# Patient Record
Sex: MALE | Race: BLACK OR AFRICAN AMERICAN | NOT HISPANIC OR LATINO | Employment: OTHER | URBAN - NONMETROPOLITAN AREA
[De-identification: names, ages, dates, MRNs, and addresses within clinical notes are randomized per-mention and may not be internally consistent; named-entity substitution may affect disease eponyms.]

---

## 2017-08-21 RX ORDER — DEFERASIROX 250 MG/1
250 TABLET, FOR SUSPENSION ORAL
Qty: 30 TABLET | Refills: 0 | Status: SHIPPED | OUTPATIENT
Start: 2017-08-21 | End: 2017-10-10 | Stop reason: SDUPTHER

## 2017-08-21 RX ORDER — DEFERASIROX 500 MG/1
500 TABLET, FOR SUSPENSION ORAL
Qty: 30 TABLET | Refills: 0 | Status: SHIPPED | OUTPATIENT
Start: 2017-08-21 | End: 2017-10-10 | Stop reason: SDUPTHER

## 2017-08-21 NOTE — TELEPHONE ENCOUNTER
Notified patient that he needs follow-up appointment.  Will give 1 month supply.  Verbal order from Dr. Funez to give one month supply.

## 2017-10-09 DIAGNOSIS — D57.40 SICKLE CELL BETA THALASSEMIA (HCC): ICD-10-CM

## 2017-10-09 DIAGNOSIS — E83.19 IRON OVERLOAD: Primary | ICD-10-CM

## 2017-10-10 ENCOUNTER — OFFICE VISIT (OUTPATIENT)
Dept: ONCOLOGY | Facility: CLINIC | Age: 47
End: 2017-10-10

## 2017-10-10 ENCOUNTER — LAB (OUTPATIENT)
Dept: LAB | Facility: HOSPITAL | Age: 47
End: 2017-10-10

## 2017-10-10 ENCOUNTER — RESULTS ENCOUNTER (OUTPATIENT)
Dept: ONCOLOGY | Facility: CLINIC | Age: 47
End: 2017-10-10

## 2017-10-10 VITALS
TEMPERATURE: 97.4 F | HEART RATE: 67 BPM | WEIGHT: 189.3 LBS | HEIGHT: 70 IN | RESPIRATION RATE: 18 BRPM | SYSTOLIC BLOOD PRESSURE: 142 MMHG | OXYGEN SATURATION: 95 % | BODY MASS INDEX: 27.1 KG/M2 | DIASTOLIC BLOOD PRESSURE: 76 MMHG

## 2017-10-10 DIAGNOSIS — D57.40 SICKLE CELL BETA THALASSEMIA (HCC): ICD-10-CM

## 2017-10-10 DIAGNOSIS — E83.19 IRON OVERLOAD: ICD-10-CM

## 2017-10-10 DIAGNOSIS — D57.1 SICKLE CELL ANEMIA WITHOUT CRISIS (HCC): Primary | ICD-10-CM

## 2017-10-10 DIAGNOSIS — C79.51 METASTATIC RENAL CELL CARCINOMA TO BONE (HCC): ICD-10-CM

## 2017-10-10 DIAGNOSIS — C64.9 METASTATIC RENAL CELL CARCINOMA TO BONE (HCC): ICD-10-CM

## 2017-10-10 DIAGNOSIS — E83.111 IRON OVERLOAD DUE TO REPEATED RED BLOOD CELL TRANSFUSIONS: ICD-10-CM

## 2017-10-10 LAB
ALBUMIN SERPL-MCNC: 4.5 G/DL (ref 3.5–5)
ALBUMIN/GLOB SERPL: 1.2 G/DL (ref 1.1–2.5)
ALP SERPL-CCNC: 44 U/L (ref 24–120)
ALT SERPL W P-5'-P-CCNC: 24 U/L (ref 0–54)
ANION GAP SERPL CALCULATED.3IONS-SCNC: 12 MMOL/L (ref 4–13)
AST SERPL-CCNC: 26 U/L (ref 7–45)
BILIRUB SERPL-MCNC: 2.2 MG/DL (ref 0.1–1)
BUN BLD-MCNC: 9 MG/DL (ref 5–21)
BUN/CREAT SERPL: 15.8 (ref 7–25)
CALCIUM SPEC-SCNC: 9.7 MG/DL (ref 8.4–10.4)
CHLORIDE SERPL-SCNC: 103 MMOL/L (ref 98–110)
CO2 SERPL-SCNC: 30 MMOL/L (ref 24–31)
CREAT BLD-MCNC: 0.57 MG/DL (ref 0.5–1.4)
DEPRECATED RDW RBC AUTO: 58.7 FL (ref 40–54)
EOSINOPHIL # BLD MANUAL: 0.31 10*3/MM3 (ref 0–0.7)
EOSINOPHIL NFR BLD MANUAL: 4 % (ref 0–4)
ERYTHROCYTE [DISTWIDTH] IN BLOOD BY AUTOMATED COUNT: 22.2 % (ref 12–15)
FERRITIN SERPL-MCNC: 713 NG/ML (ref 17.9–464)
GFR SERPL CREATININE-BSD FRML MDRD: >150 ML/MIN/1.73
GLOBULIN UR ELPH-MCNC: 3.8 GM/DL
GLUCOSE BLD-MCNC: 85 MG/DL (ref 70–100)
HCT VFR BLD AUTO: 30.3 % (ref 40–52)
HGB BLD-MCNC: 10 G/DL (ref 14–18)
HOWELL-JOLLY BOD BLD QL SMEAR: SLIGHT
HYPOCHROMIA BLD QL: ABNORMAL
IRON 24H UR-MRATE: 120 MCG/DL (ref 42–180)
IRON SATN MFR SERPL: 49 % (ref 20–45)
LYMPHOCYTES # BLD MANUAL: 1.24 10*3/MM3 (ref 0.72–4.86)
LYMPHOCYTES NFR BLD MANUAL: 15 % (ref 4–12)
LYMPHOCYTES NFR BLD MANUAL: 16 % (ref 15–45)
MCH RBC QN AUTO: 24.4 PG (ref 28–32)
MCHC RBC AUTO-ENTMCNC: 33 G/DL (ref 33–36)
MCV RBC AUTO: 74.1 FL (ref 82–95)
MONOCYTES # BLD AUTO: 1.16 10*3/MM3 (ref 0.19–1.3)
NEUTROPHILS # BLD AUTO: 4.97 10*3/MM3 (ref 1.87–8.4)
NEUTROPHILS NFR BLD MANUAL: 64 % (ref 39–78)
PLAT MORPH BLD: NORMAL
PLATELET # BLD AUTO: 361 10*3/MM3 (ref 130–400)
PMV BLD AUTO: 9.8 FL (ref 6–12)
POTASSIUM BLD-SCNC: 3.9 MMOL/L (ref 3.5–5.3)
PROT SERPL-MCNC: 8.3 G/DL (ref 6.3–8.7)
RBC # BLD AUTO: 4.09 10*6/MM3 (ref 4.8–5.9)
SCAN SLIDE: NORMAL
SODIUM BLD-SCNC: 145 MMOL/L (ref 135–145)
TARGETS BLD QL SMEAR: ABNORMAL
TIBC SERPL-MCNC: 247 MCG/DL (ref 225–420)
VARIANT LYMPHS NFR BLD MANUAL: 1 % (ref 0–5)
WBC MORPH BLD: NORMAL
WBC NRBC COR # BLD: 7.76 10*3/MM3 (ref 4.8–10.8)

## 2017-10-10 PROCEDURE — 80053 COMPREHEN METABOLIC PANEL: CPT

## 2017-10-10 PROCEDURE — 83550 IRON BINDING TEST: CPT

## 2017-10-10 PROCEDURE — 83540 ASSAY OF IRON: CPT

## 2017-10-10 PROCEDURE — 85025 COMPLETE CBC W/AUTO DIFF WBC: CPT

## 2017-10-10 PROCEDURE — 99214 OFFICE O/P EST MOD 30 MIN: CPT | Performed by: INTERNAL MEDICINE

## 2017-10-10 PROCEDURE — 82728 ASSAY OF FERRITIN: CPT

## 2017-10-10 PROCEDURE — 85007 BL SMEAR W/DIFF WBC COUNT: CPT

## 2017-10-10 PROCEDURE — 36415 COLL VENOUS BLD VENIPUNCTURE: CPT

## 2017-10-10 RX ORDER — MORPHINE SULFATE 30 MG/1
30 TABLET ORAL
Status: ON HOLD | COMMUNITY
End: 2021-07-13

## 2017-10-10 RX ORDER — DEFERASIROX 250 MG/1
250 TABLET, FOR SUSPENSION ORAL
Qty: 90 TABLET | Refills: 1 | Status: SHIPPED | OUTPATIENT
Start: 2017-10-10 | End: 2018-04-24 | Stop reason: SDUPTHER

## 2017-10-10 RX ORDER — MORPHINE SULFATE 60 MG/1
120 TABLET, FILM COATED, EXTENDED RELEASE ORAL 3 TIMES DAILY
Status: ON HOLD | COMMUNITY
End: 2021-07-13

## 2017-10-10 RX ORDER — DEFERASIROX 500 MG/1
500 TABLET, FOR SUSPENSION ORAL
Qty: 90 TABLET | Refills: 1 | Status: SHIPPED | OUTPATIENT
Start: 2017-10-10 | End: 2018-04-24 | Stop reason: SDUPTHER

## 2017-10-10 RX ORDER — LACTULOSE 10 G/15ML
20 SOLUTION ORAL 2 TIMES DAILY PRN
COMMUNITY

## 2017-10-10 NOTE — PROGRESS NOTES
[facility]  HEMATOLOGY & ONCOLOGY    Cancer Staging Information:  No matching staging information was found for the patient.      Subjective     VISIT DIAGNOSIS:   Encounter Diagnoses   Name Primary?   • Sickle cell anemia without crisis Yes   • Iron overload due to repeated red blood cell transfusions    • Metastatic renal cell carcinoma to bone        REASON FOR VISIT:     Chief Complaint   Patient presents with   • Follow-up        HEMATOLOGY / ONCOLOGY HISTORY:    No history exists.           INTERVAL HISTORY  Patient ID: Holland Phelps is a 47 y.o. year old male history significant for sickle cell disease, metastatic renal cell carcinoma to the bone at the present time I do not have details of his treatment for his metastatic renal cell cancer.  Patient is followed in [place] will obtain records.  From what he tells me, he was on some sort of IV medication.  He recently got a bone marrow biopsy in [place].  He is to start a clinical trial over th ere.  The reason he is seeing me today is that he needs refill on his Exjade.  Patient has not had any recent crises.    Past Medical History:   Past Medical History:   Diagnosis Date   • Gallstones    • Pneumonia    • Sickle cell      Past Surgical History:   Past Surgical History:   Procedure Laterality Date   • CHOLECYSTECTOMY       Social History:   Social History     Social History   • Marital status:      Spouse name: N/A   • Number of children: N/A   • Years of education: N/A     Occupational History   • Not on file.     Social History Main Topics   • Smoking status: Former Smoker   • Smokeless tobacco: Not on file   • Alcohol use Yes   • Drug use: Not on file   • Sexual activity: Not on file     Other Topics Concern   • Not on file     Social History Narrative     Family History:   Family History   Problem Relation Age of Onset   • Leukemia Maternal Grandmother    • Kidney failure Mother    • Hypertension Mother    • Heart disease  "Mother    • Sickle cell trait Father    • Hypertension Father    • Sickle cell trait Daughter    • Sickle cell trait Cousin    • Anemia Cousin    • Leukemia Other        Review of Systems   Constitutional: Negative.    HENT: Negative.    Eyes: Negative.    Respiratory: Negative.    Cardiovascular: Negative.    Gastrointestinal: Negative.    Endocrine: Negative.    Musculoskeletal: Negative.    Skin: Negative.    Allergic/Immunologic: Negative.    Neurological: Negative.    Hematological: Negative.    Psychiatric/Behavioral: Negative.         Performance Status:  Asymptomatic    Medications:    Current Outpatient Prescriptions   Medication Sig Dispense Refill   • lactulose (CHRONULAC) 10 GM/15ML solution Take 20 g by mouth 2 (Two) Times a Day As Needed.     • Morphine (MS CONTIN) 60 MG 12 hr tablet Take 60 mg by mouth 3 (Three) Times a Day.     • Morphine (MSIR) 30 MG tablet Take 30 mg by mouth Every 4 (Four) Hours As Needed for Severe Pain .     • deferasirox (EXJADE) 250 MG disintegrating tablet Take 1 tablet by mouth Every Morning Before Breakfast. With 500 mg tablet for total dose of 750 mg daily 90 tablet 1   • deferasirox (EXJADE) 500 MG disintegrating tablet Take 1 tablet by mouth Every Morning Before Breakfast. With 250 mg tablet for total dose of 750 mg daily 90 tablet 1   • folic acid (FOLVITE) 1 MG tablet Take 1 mg by mouth Daily.     • HYDROcodone-acetaminophen (NORCO)  MG per tablet Take 1 tablet by mouth Every 8 (Eight) Hours As Needed (two tabs am and one po afternoon).       No current facility-administered medications for this visit.        ALLERGIES:  No Known Allergies    Objective      Vitals:    10/10/17 0756   BP: 142/76   Pulse: 67   Resp: 18   Temp: 97.4 °F (36.3 °C)   TempSrc: Tympanic   SpO2: 95%   Weight: 189 lb 4.8 oz (85.9 kg)   Height: 70\" (177.8 cm)         Current Status 10/10/2017   ECOG score 0         Physical Exam  General Appearance: Patient is awake, alert, oriented and in " no acute distress. Patient is welldeveloped, wellnourished, and appears stated age.  HEENT: Normocephalic. Sclerae clear, conjunctiva pink, extraocular movements intact, pupils, round, reactive to light and  accommodation. Mouth and throat are clear with moist oral mucosa.  NECK: Supple, no jugular venous distention, thyroid not enlarged.  LYMPH: No cervical, supraclavicular, axillary, or inguinal lymphadenopathy.  CHEST: Equal bilateral expansion, AP  diameter normal, resonant percussion note  LUNGS: Good air movement, no rales, rhonchi, rubs or wheezes with auscultation  CARDIO: Regular sinus rhythm, no murmurs, gallops or rubs.  ABDOMEN: Nondistended, soft, No tenderness, no guarding, no rebound, No hepatosplenomegaly. No abdominal masses. Bowel sounds positive. No hernia  GENITALIA: Not examined.  BREASTS: Not examined.  MUSKEL: No joint swelling, decreased motion, or inflammation  EXTREMS: No edema, clubbing, cyanosis, No varicose veins.  NEURO: Grossly nonfocal, Gait is coordinated and smooth, Cognition is preserved.  SKIN: No rashes, no ecchymoses, no petechia.  PSYCH: Oriented to time, place and person. Memory is preserved. Mood and affect appear normal  RECENT LABS:  Lab on 10/10/2017   Component Date Value Ref Range Status   • Glucose 10/10/2017 85  70 - 100 mg/dL Final   • BUN 10/10/2017 9  5 - 21 mg/dL Final   • Creatinine 10/10/2017 0.57  0.50 - 1.40 mg/dL Final   • Sodium 10/10/2017 145  135 - 145 mmol/L Final   • Potassium 10/10/2017 3.9  3.5 - 5.3 mmol/L Final   • Chloride 10/10/2017 103  98 - 110 mmol/L Final   • CO2 10/10/2017 30.0  24.0 - 31.0 mmol/L Final   • Calcium 10/10/2017 9.7  8.4 - 10.4 mg/dL Final   • Total Protein 10/10/2017 8.3  6.3 - 8.7 g/dL Final   • Albumin 10/10/2017 4.50  3.50 - 5.00 g/dL Final   • ALT (SGPT) 10/10/2017 24  0 - 54 U/L Final   • AST (SGOT) 10/10/2017 26  7 - 45 U/L Final   • Alkaline Phosphatase 10/10/2017 44  24 - 120 U/L Final   • Total Bilirubin 10/10/2017 2.2*  0.1 - 1.0 mg/dL Final   • eGFR   Amer 10/10/2017 >150  >60 mL/min/1.73 Final   • Globulin 10/10/2017 3.8  gm/dL Final   • A/G Ratio 10/10/2017 1.2  1.1 - 2.5 g/dL Final   • BUN/Creatinine Ratio 10/10/2017 15.8  7.0 - 25.0 Final   • Anion Gap 10/10/2017 12.0  4.0 - 13.0 mmol/L Final   • Iron 10/10/2017 120  42 - 180 mcg/dL Final   • TIBC 10/10/2017 247  225 - 420 mcg/dL Final   • Iron Saturation 10/10/2017 49* 20 - 45 % Final   • WBC 10/10/2017 7.76  4.80 - 10.80 10*3/mm3 Final   • RBC 10/10/2017 4.09* 4.80 - 5.90 10*6/mm3 Final   • Hemoglobin 10/10/2017 10.0* 14.0 - 18.0 g/dL Final   • Hematocrit 10/10/2017 30.3* 40.0 - 52.0 % Final   • MCV 10/10/2017 74.1* 82.0 - 95.0 fL Final   • MCH 10/10/2017 24.4* 28.0 - 32.0 pg Final   • MCHC 10/10/2017 33.0  33.0 - 36.0 g/dL Final   • RDW 10/10/2017 22.2* 12.0 - 15.0 % Final   • RDW-SD 10/10/2017 58.7* 40.0 - 54.0 fl Final   • MPV 10/10/2017 9.8  6.0 - 12.0 fL Final   • Platelets 10/10/2017 361  130 - 400 10*3/mm3 Final   • Ferritin 10/10/2017 713.00* 17.90 - 464.00 ng/mL Final   • Scan Slide 10/10/2017    Final    See Manual Differential Results   • Neutrophil % 10/10/2017 64.0  39.0 - 78.0 % Final   • Lymphocyte % 10/10/2017 16.0  15.0 - 45.0 % Final   • Monocyte % 10/10/2017 15.0* 4.0 - 12.0 % Final   • Eosinophil % 10/10/2017 4.0  0.0 - 4.0 % Final   • Atypical Lymphocyte % 10/10/2017 1.0  0.0 - 5.0 % Final   • Neutrophils Absolute 10/10/2017 4.97  1.87 - 8.40 10*3/mm3 Final   • Lymphocytes Absolute 10/10/2017 1.24  0.72 - 4.86 10*3/mm3 Final   • Monocytes Absolute 10/10/2017 1.16  0.19 - 1.30 10*3/mm3 Final   • Eosinophils Absolute 10/10/2017 0.31  0.00 - 0.70 10*3/mm3 Final   • [place] Bodies 10/10/2017 Slight  None Seen Final   • Hypochromia 10/10/2017 Mod/2+  None Seen Final   • Target Cells 10/10/2017 Mod/2+  None Seen Final   • WBC Morphology 10/10/2017 Normal  Normal Final   • Platelet Morphology 10/10/2017 Normal  Normal Final       RADIOLOGY:  No  results found.         Assessment/Plan  Gene Mattie is a 47 y.o. year old male with sickle cell disease on Exjade, also centrally diagnosed with metastatic renal cell cancer to the bone.     Patient Active Problem List   Diagnosis   • Renal cell carcinoma of right kidney metastatic to other site   • Iron overload   • Sickle cell beta thalassemia          1.Metastatic mass or cancer: Patient has been followed at [facility]. I do not have details of his therapy so far.  I will obtain records.  I did mention to him that we could give him treatments locally for cancer and he can continue to follow-up up with [facility] physician.    2.  Sickle cell anemia: Patient has had multiple transfusions due to iron overload.  I asked that is pending.  Exjade refilled.          Jose G Amezquita MD    10/10/2017    11:52 AM

## 2018-05-02 RX ORDER — DEFERASIROX 250 MG/1
TABLET, FOR SUSPENSION ORAL
Qty: 90 TABLET | Refills: 1 | Status: SHIPPED | OUTPATIENT
Start: 2018-05-02 | End: 2021-07-09

## 2018-05-02 RX ORDER — DEFERASIROX 500 MG/1
TABLET, FOR SUSPENSION ORAL
Qty: 90 TABLET | Refills: 1 | Status: SHIPPED | OUTPATIENT
Start: 2018-05-02 | End: 2021-07-09

## 2018-12-04 ENCOUNTER — TELEPHONE (OUTPATIENT)
Dept: ONCOLOGY | Facility: CLINIC | Age: 48
End: 2018-12-04

## 2018-12-04 NOTE — TELEPHONE ENCOUNTER
Called patient and left message that he needs to make a follow up appointment with Dr. Amezquita before his prescription for exjade can be filled since it has been over a year since he has seen the doctor.

## 2018-12-10 RX ORDER — DEFERASIROX 250 MG/1
TABLET, FOR SUSPENSION ORAL
Qty: 90 TABLET | Refills: 1 | OUTPATIENT
Start: 2018-12-10

## 2018-12-10 RX ORDER — DEFERASIROX 500 MG/1
TABLET, FOR SUSPENSION ORAL
Qty: 90 TABLET | Refills: 1 | OUTPATIENT
Start: 2018-12-10

## 2020-10-12 ENCOUNTER — TREATMENT (OUTPATIENT)
Dept: PHYSICAL THERAPY | Facility: CLINIC | Age: 50
End: 2020-10-12

## 2020-10-12 DIAGNOSIS — I89.0 LYMPHEDEMA OF LEFT LEG: Primary | ICD-10-CM

## 2020-10-12 DIAGNOSIS — C64.1 RENAL CELL CARCINOMA OF RIGHT KIDNEY (HCC): ICD-10-CM

## 2020-10-12 DIAGNOSIS — I89.0 LYMPHEDEMA OF GENITALIA: ICD-10-CM

## 2020-10-12 PROCEDURE — 97162 PT EVAL MOD COMPLEX 30 MIN: CPT | Performed by: PHYSICAL THERAPIST

## 2020-10-12 NOTE — PROGRESS NOTES
Physical Therapy Lymphedema Initial Evaluation       Patient Name: Holland Phelps  : 1970  MRN: [id number]  Today's Date: 10/12/2020      Visit Date: 10/12/2020    Visit Dx:    ICD-10-CM ICD-9-CM   1. Lymphedema of left leg  I89.0 457.1   2. Lymphedema of genitalia  I89.0 457.1   3. Renal cell carcinoma of right kidney (CMS/HCC)  C64.1 189.0       Patient Active Problem List   Diagnosis   • Renal cell carcinoma of right kidney metastatic to other site (CMS/HCC)   • Iron overload   • Sickle cell beta thalassemia (CMS/HCC)        Past Medical History:   Diagnosis Date   • Gallstones    • Pneumonia    • Sickle cell         Past Surgical History:   Procedure Laterality Date   • CHOLECYSTECTOMY         Visit Dx:    ICD-10-CM ICD-9-CM   1. Lymphedema of left leg  I89.0 457.1   2. Lymphedema of genitalia  I89.0 457.1   3. Renal cell carcinoma of right kidney (CMS/HCC)  C64.1 189.0       Patient History     Row Name 10/12/20 1400             History    Chief Complaint  Other 1 (comment) edema left groin, penis and left LE  -KR      Date Current Problem(s) Began  20  -KR      Brief Description of Current Complaint  Patient was diagnosed with kidney cancer 5 years ago finding 9 tumors in the right kidney, 2 in each femur and on each side of tailbone.  The right kidney was removed and 3 years ago he had a apolinar placed in the left femur, a apolinar was placed in what was left of the left femur proximally and distally along with a total knee revision without patella.  He relates that he had a scratch 5 -6 weeks ago with onset of cellulitis.  He underwent radiation treatments in the left groin.  He complains of pain in the left groin and inner thigh on a constant basis. He has had swelling in the left groin and left LE.   -KR      Previous treatment for THIS PROBLEM  Medication  -KR      Patient/Caregiver Goals  Relieve pain;Improve mobility;Improve strength;Know what to do to help the symptoms;Decrease swelling  -KR       Current Tobacco Use  No  -KR      Smoking Status  quit 2000  -KR      Patient's Rating of General Health  Good  -KR      Hand Dominance  right-handed  -KR      Occupation/sports/leisure activities  Disabled due to sickle cell anemia; details cars  -KR      Patient seeing anyone else for problem(s)?  No  -KR      Related/Recent Hospitalizations  Yes  -KR      Date of Hospitalization  09/19/20  -KR      Surgery/Hospitalization  cellulitis  -KR         Pain     Pain Location  Groin left medial thigh  -KR      Pain at Present  6  -KR      Pain at Best  6  -KR      Pain at Worst  8  -KR      Pain Frequency  Constant/continuous  -KR      Pain Description  Burning;Other (Comment);Pressure hot  -KR      What Performance Factors Make the Current Problem(s) WORSE?  sitting on hard surface or perineum  -KR      What Performance Factors Make the Current Problem(s) BETTER?  lying back in recliner; elevation of legs  -KR      Is your sleep disturbed?  Yes  -KR      Is medication used to assist with sleep?  No  -KR      Total hours of sleep per night  4-5   -KR      Difficulties with ADL's?  Yes, unable to put on socks or shoes that tie  -KR         Fall Risk Assessment    Any falls in the past year:  No  -KR         Services    Prior Rehab/Home Health Experiences  Yes  -KR      When was the prior experience with Rehab/Home Health  does not remember  -KR      Are you currently receiving Home Health services  No  -KR         Daily Activities    Primary Language  English  -KR      How does patient learn best?  Listening;Reading;Demonstration;Pictures/Video  -KR      Teaching needs identified  Home Exercise Program;Management of Condition  -KR      Barriers to learning  None  -KR      Pt Participated in POC and Goals  Yes  -KR         Safety    Are you being hurt, hit, or frightened by anyone at home or in your life?  No  -KR      Are you being neglected by a caregiver  No  -KR        User Key  (r) = Recorded By, (t) = Taken By, (c)  = Cosigned By    Initials Name Provider Type    Charlene Martinez, PT DPT Physical Therapist          Lymphedema     Row Name 10/12/20 1400             Subjective Pain    Able to rate subjective pain?  yes  -KR      Pre-Treatment Pain Level  6  -KR         Lymphedema Assessment    Lymphedema Classification  LLE:;Other:;stage 1 (Spontaneously Reversible);secondary  -KR      Lymph Nodes Removed #  0  -KR      Cancer Comments  Kidney cancer stable per patient's report  -KR      Chemo Received  yes  -KR      Adverse Chemo Reactions/Complication  No  -KR      Radiation Therapy Received  yes  -KR      Radiation Treatments #/Timeframe  unknown  -KR      Adverse Radiation Reactions/Complication  no  -KR      Infections or Cellulitis?  yes  -KR      Infection/Cellulitis Treatment  IV and oral anti-biotics  -KR         General ROM    LT Lower Ext  Lt Hip ABduction  -KR         Left Lower Ext    Lt Hip ABduction AROM  18  -KR      Lt Hip Extension AROM  5  -KR      Lt Hip Flexion AROM  30  -KR      LT Lower Extremity Comments  all motions pain limited  -KR         Lymphedema Edema Assessment    Ptting Edema Category  By grade out of 3  -KR      Pitting Edema  + 1/3 (1 of 3)  -KR      Stemmer Sign  negative  -KR         Skin Changes/Observations    Location/Assessment  Lower Extremity;Other Location Left; Left groin  -KR      Lower Extremity Conditions  left:;clean;dry;other (comment) medial thigh pink, warm; L groin fibrotic, exquisite tendern  -KR      Lower Extremity Color/Pigment  left:;fibrosis left medial thigh  -KR      Other Location Conditions  other (comment) proximal to head of penis  -KR      Other Location Color/Pigment  hypopigmented;other (comment) bulbous ring of edema  -KR         Lymphedema Measurements    Measurement Type(s)  Circumferential  -KR      Circumferential Areas  Lower extremities  -KR         BLE Circumferential (cm)    Measurement Location 1  BOT  -KR      Left 1  22.5 cm  -KR      Right 1  23 cm   -KR      Measurement Location 2  mid arch  -KR      Left 2  22.6 cm  -KR      Right 2  23.3 cm  -KR      Measurement Location 3  ankle  -KR      Left 3  27.8 cm  -KR      Right 3  26 cm  -KR      Measurement Location 4  10 cm  -KR      Left 4  24.6 cm  -KR      Right 4  22.7 cm  -KR      Measurement Location 5  10 cm  -KR      Left 5  34.2 cm  -KR      Right 5  31.2 cm  -KR      Measurement Location 6  10 cm  -KR      Left 6  35.6 cm  -KR      Right 6  35.2 cm  -KR      Measurement Location 7  10 cm  -KR      Left 7  36.5 cm  -KR      Right 7  36.2 cm  -KR      Measurement Location 8  10 cm  -KR      Left 8  39.1 cm  -KR      Right 8  40.7 cm  -KR      Measurement Location 9  10 cm  -KR      Left 9  49.2 cm  -KR      Right 9  45.8 cm  -KR      Measurement Location 10  10 cm  -KR      Left 10  58.5 cm  -KR      Right 10  54.8 cm  -KR      LLE Circumferential Total  350.6 cm  -KR      RLE Circumferential Total  338.9 cm  -KR        User Key  (r) = Recorded By, (t) = Taken By, (c) = Cosigned By    Initials Name Provider Type    Charlene Martinez, PT DPT Physical Therapist            PT Ortho     Row Name 10/12/20 1400       Gait/Stairs (Locomotion)    Comment (Gait/Stairs)  Ambulates independently without AD demonstrating quick swing through on right with left hip externally rotated, decreased knee flexion during swing phase of gait on left.   -KR      User Key  (r) = Recorded By, (t) = Taken By, (c) = Cosigned By    Initials Name Provider Type    Charlene Martinez, PT DPT Physical Therapist                    Therapy Education  Education Details: Lymphedema and risk factors  Given: Edema management  Program: New  How Provided: Verbal, Demonstration, Written  Provided to: Patient  Level of Understanding: Verbalized      OP Exercises     Row Name 10/12/20 1400             Subjective Pain    Able to rate subjective pain?  yes  -KR      Pre-Treatment Pain Level  6  -KR        User Key  (r) = Recorded By, (t) = Taken By, (c) =  Cosigned By    Initials Name Provider Type    Charlene Martinez, PT DPT Physical Therapist                      PT OP Goals     Row Name 10/12/20 1430          PT Short Term Goals    STG Date to Achieve  11/11/20  -KR     STG 1  Patient will gain an understanding of lymphedema and risk factors  -KR     STG 1 Progress  New  -KR     STG 2  Patient will have a decrease in circumference of left LE along with pain reduction  -KR     STG 2 Progress  New  -KR        Long Term Goals    LTG Date to Achieve  12/11/20  -KR     LTG 1  Patient will be independent in basic exercises to increase lymph flow  -KR     LTG 1 Progress  New  -KR     LTG 2  Patient will be independent in self - massage  -KR     LTG 2 Progress  New  -KR     LTG 3  Patient will be advised of proper compression garments and fit assessed  -KR     LTG 3 Progress  New  -KR     LTG 4  Patient will be given a trial of a compression pump  -KR     LTG 4 Progress  New  -KR     LTG 5  Patient will have a pain level of no >2 on a consistent basis  -KR     LTG 5 Progress  New  -KR     LTG 6  Patient will have a 90 - 100% reduction in edema of left groin and penis  -KR     LTG 6 Progress  New  -KR     LTG 7  Patient will gain independent managment of lymphedema  -KR     LTG 7 Progress  New  -KR        Time Calculation    PT Goal Re-Cert Due Date  11/11/20  -KR       User Key  (r) = Recorded By, (t) = Taken By, (c) = Cosigned By    Initials Name Provider Type    Charlene Martinez, PT DPT Physical Therapist          PT Assessment/Plan     Row Name 10/12/20 1430          PT Assessment    Functional Limitations  Impaired gait;Limitation in home management;Limitations in community activities;Limitations in functional capacity and performance;Performance in leisure activities;Performance in self-care ADL  -KR     Impairments  Gait;Impaired lymphatic circulation;Pain;Range of motion  -KR     Assessment Comments   presents with past medical history of sickle cell anemia  which he obtained disability from 10 years ago.  Five years ago he was diagnosed with renal cell carcinoma right kidney and 8 - 9 tumors were found in the kidney, thighs and bilateral tailbone.  Three years ago an intermedullary apolinar was placed in the right femur and what was left of the left femur along with a total knee arthroplasty on the right.  Approximately 4 - 5 weeks ago he noticed a scratch on his groin that became infected.  He was hospitalized in [place] to receive anti-biotics and was cleared to come home a couple of weeks ago.  His primary complaints are pain and swelling in the penis, left groin and LLE.  Circumferential measurements reveal a 10 cm difference from left to right LE, the left groin is fibrotic from radiation he underwent and exquisitely tender to touch, the penis is edematous inferior to the head of the penis circumferentially about a 1/4 of an inch into the shaft of the penis. His gait is antalgic with reduced ROM left hip preventing him from performing normal ADL's and self-care at times. He will benefit from Complete Decongestive Therapy and education to gain independent management of condition.  Thank you for the referral.   -KR     Please refer to paper survey for additional self-reported information  Yes  -KR     Rehab Potential  Good  -KR     Patient/caregiver participated in establishment of treatment plan and goals  Yes  -KR     Patient would benefit from skilled therapy intervention  Yes  -KR        PT Plan    PT Frequency  3x/week  -KR     Predicted Duration of Therapy Intervention (OT)  10 - 12 weeks  -KR     Planned CPT's?  PT EVAL MOD COMPLELITY: 01549;PT RE-EVAL: 90619;PT THER PROC EA 15 MIN: 58679;PT THER ACT EA 15 MIN: 48824;PT MANUAL THERAPY EA 15 MIN: 45754  -KR     PT Plan Comments  Will proceed with manual lymph drainage and compression at next session.   -KR       User Key  (r) = Recorded By, (t) = Taken By, (c) = Cosigned By    Initials Name Provider Type    IAN  Charlene Camarillo, PT DPT Physical Therapist                       Time Calculation:                     Charlene Camarillo, PT DPT  10/12/2020

## 2020-10-13 ENCOUNTER — TREATMENT (OUTPATIENT)
Dept: PHYSICAL THERAPY | Facility: CLINIC | Age: 50
End: 2020-10-13

## 2020-10-13 DIAGNOSIS — I89.0 LYMPHEDEMA OF GENITALIA: ICD-10-CM

## 2020-10-13 DIAGNOSIS — I89.0 LYMPHEDEMA OF LEFT LEG: Primary | ICD-10-CM

## 2020-10-13 DIAGNOSIS — C64.1 RENAL CELL CARCINOMA OF RIGHT KIDNEY (HCC): ICD-10-CM

## 2020-10-13 PROCEDURE — 97140 MANUAL THERAPY 1/> REGIONS: CPT | Performed by: PHYSICAL THERAPIST

## 2020-10-13 NOTE — PROGRESS NOTES
Outpatient Physical Therapy Lymphedema Treatment Note       Patient Name: Holland Phelps  : 1970  MRN: [id number]  Today's Date: 10/13/2020        Visit Date: 10/13/2020    Visit Dx:    ICD-10-CM ICD-9-CM   1. Lymphedema of left leg  I89.0 457.1   2. Lymphedema of genitalia  I89.0 457.1   3. Renal cell carcinoma of right kidney (CMS/HCC)  C64.1 189.0       Patient Active Problem List   Diagnosis   • Renal cell carcinoma of right kidney metastatic to other site (CMS/HCC)   • Iron overload   • Sickle cell beta thalassemia (CMS/HCC)        Lymphedema     Row Name 10/13/20 0915             Subjective Pain    Able to rate subjective pain?  yes  -AL      Pre-Treatment Pain Level  5  -AL      Post-Treatment Pain Level  5  -AL      Subjective Pain Comment  L groin into the inner thigh to the knee  -AL         Subjective Comments    Subjective Comments  He woke up last night with burning pain in the L groin into the L inner thigh stopping before the knee.  He worked on massaging the L leg and it helped.  He noticed he had a little drainage from a small open spot L groin.    -AL         Manual Lymphatic Drainage    Manual Lymphatic Drainage  initial sequence;opened regional lymph nodes;opened anastamoses;extremity treatment  -AL      Initial Sequence  short neck;abdomen;diaphragmatic breathing  -AL      Abdomen  superficial  -AL      Diaphragmatic Breathing  x 9 with superficial abdominals  -AL      Opened Regional Lymph Nodes  axillary;inguinal  -AL      Axillary  right;left  -AL      Inguinal  right;left  -AL      Opened Anastamoses  inguino-axillary  -AL      Inguino-Axillary  right;left  -AL      Extremity Treatment  MLD to full limb  -AL      MLD to Full Limb  L LE  -AL      Manual Lymphatic Drainage Comments  Extra time working on dense tissue L groin  -AL      Manual Therapy  Instructed on HEP and MLD.  He is interested in the [employer] and did sign the consent form for [employer].   -AL          Compression/Skin Care    Compression/Skin Care  compression garment  -AL      Compression Garment Comments  Wear compression shorts with 1/2 in gray foam over genitals and left groin  -AL        User Key  (r) = Recorded By, (t) = Taken By, (c) = Cosigned By    Initials Name Provider Type    Elida Pickering PTA, CLT-LANA Physical Therapy Assistant                        PT Assessment/Plan     Row Name 10/13/20 0915          PT Assessment    Assessment Comments  Patient had a good repsonse to treatment, as he felt less discomfort after the MLD.  He was instructed on the HEP and MLD and will start working on CDT.  Will measure the L LE next treatment and assess how the compression and the gray foam is working.  Will need to spend extra time on the dense tissue L groin.  He did feel like this had softened after the MLD.  He is interested in the Flexitouch pump.  Using the basic pump would be detrimental to patient due to the genital swelling and the fibrosis left proximal thigh.    -AL        PT Plan    Predicted Duration of Therapy Intervention (OT)  Continue with CDT.   -AL       User Key  (r) = Recorded By, (t) = Taken By, (c) = Cosigned By    Initials Name Provider Type    Elida Pickering PTA, CLT-LANA Physical Therapy Assistant             OP Exercises     Row Name 10/13/20 7197             Subjective Comments    Subjective Comments  He woke up last night with burning pain in the L groin into the L inner thigh stopping before the knee.  He worked on massaging the L leg and it helped.  He noticed he had a little drainage from a small open spot L groin.    -AL         Subjective Pain    Able to rate subjective pain?  yes  -AL      Pre-Treatment Pain Level  5  -AL      Post-Treatment Pain Level  5  -AL      Subjective Pain Comment  L groin into the inner thigh to the knee  -AL         Total Minutes    52648 - PT Manual Therapy Minutes  80  -AL        User Key  (r) = Recorded By, (t) = Taken By, (c) = Cosigned By     Initials Name Provider Type    Elida Pickering, PTA, CLT-LANNY Physical Therapy Assistant                     Manual Rx (last 36 hours)      Manual Treatments     Row Name 10/13/20 0915             Total Minutes    11568 - PT Manual Therapy Minutes  80  -AL        User Key  (r) = Recorded By, (t) = Taken By, (c) = Cosigned By    Initials Name Provider Type    AL Elida Sequeira, PTA, CLT-LANNY Physical Therapy Assistant          PT OP Goals     Row Name 10/13/20 0915          PT Short Term Goals    STG Date to Achieve  11/11/20  -AL     STG 1  Patient will gain an understanding of lymphedema and risk factors  -AL     STG 1 Progress  Ongoing  -AL     STG 1 Progress Comments  Educated during treatment  -AL     STG 2  Patient will have a decrease in circumference of left LE along with pain reduction  -AL     STG 2 Progress  New  -AL        Long Term Goals    LTG Date to Achieve  12/11/20  -AL     LTG 1  Patient will be independent in basic exercises to increase lymph flow  -AL     LTG 1 Progress  Ongoing  -AL     LTG 1 Progress Comments  Instructed on HEP  -AL     LTG 2  Patient will be independent in self - massage  -AL     LTG 2 Progress  Ongoing  -AL     LTG 2 Progress Comments  Instructed on MLD  -AL     LTG 3  Patient will be advised of proper compression garments and fit assessed  -AL     LTG 3 Progress  New  -AL     LTG 4  Patient will be given a trial of a compression pump  -AL     LTG 4 Progress  New  -AL     LTG 5  Patient will have a pain level of no >2 on a consistent basis  -AL     LTG 5 Progress  New  -AL     LTG 6  Patient will have a 90 - 100% reduction in edema of left groin and penis  -AL     LTG 6 Progress  New  -AL     LTG 7  Patient will gain independent managment of lymphedema  -AL     LTG 7 Progress  New  -AL        Time Calculation    PT Goal Re-Cert Due Date  11/11/20  -AL       User Key  (r) = Recorded By, (t) = Taken By, (c) = Cosigned By    Initials Name Provider Type    AL Elida Sequeira,  SAHIL PEDRAZA Physical Therapy Assistant          Therapy Education  Education Details: Work on HEP and MLD, wear compression shorts and foam  Given: Edema management  Program: New  How Provided: Verbal, Demonstration, Written  Provided to: Patient  Level of Understanding: Verbalized, Demonstrated              Time Calculation:                     Elida Sequeira PTA, CLT-LANA  10/13/2020

## 2020-10-20 ENCOUNTER — TREATMENT (OUTPATIENT)
Dept: PHYSICAL THERAPY | Facility: CLINIC | Age: 50
End: 2020-10-20

## 2020-10-20 DIAGNOSIS — I89.0 LYMPHEDEMA OF LEFT LEG: Primary | ICD-10-CM

## 2020-10-20 DIAGNOSIS — C64.1 RENAL CELL CARCINOMA OF RIGHT KIDNEY (HCC): ICD-10-CM

## 2020-10-20 DIAGNOSIS — I89.0 LYMPHEDEMA OF GENITALIA: ICD-10-CM

## 2020-10-20 PROCEDURE — 97140 MANUAL THERAPY 1/> REGIONS: CPT | Performed by: PHYSICAL THERAPIST

## 2020-10-20 NOTE — PROGRESS NOTES
Outpatient Physical Therapy Lymphedema Treatment Note       Patient Name: Holland Phelps  : 1970  MRN: [id number]  Today's Date: 10/20/2020        Visit Date: 10/20/2020    Visit Dx:    ICD-10-CM ICD-9-CM   1. Lymphedema of left leg  I89.0 457.1   2. Lymphedema of genitalia  I89.0 457.1   3. Renal cell carcinoma of right kidney (CMS/HCC)  C64.1 189.0       Patient Active Problem List   Diagnosis   • Renal cell carcinoma of right kidney metastatic to other site (CMS/HCC)   • Iron overload   • Sickle cell beta thalassemia (CMS/HCC)        Lymphedema     Row Name 10/20/20 1300             Subjective Pain    Able to rate subjective pain?  yes  -HR      Pre-Treatment Pain Level  6  -HR      Post-Treatment Pain Level  5  -HR      Subjective Pain Comment  L groin and down the L leg to knee  -HR         Subjective Comments    Subjective Comments  He doesn't think the compression shorts he got are going to work  -HR         Skin Changes/Observations    Lower Extremity Color/Pigment  left:;radiation fibrosis;fibrosis;fat necrosis  -HR      Skin Observations Comment  fat necrosis palpable as ridge along suprapubic region and into L groin  -HR         BLE Circumferential (cm)    Measurement Location 3  ankle  -HR      Left 3  24 cm  -HR      Measurement Location 4  +10  -HR      Measurement Location 5  +10  -HR      Left 5  31.5 cm  -HR      Measurement Location 6  +10  -HR      Measurement Location 7  +10  -HR      Left 7  36 cm  -HR      Measurement Location 8  +10  -HR      Left 8  39.9 cm  -HR      Measurement Location 9  +10  -HR      Left 9  45.6 cm  -HR      Measurement Location 10  +10  -HR      Left 10  53.4 cm  -HR      LLE Circumferential Total  230.4 cm  -HR         Manual Lymphatic Drainage    Manual Lymphatic Drainage  initial sequence;opened regional lymph nodes;opened anastamoses;extremity treatment  -HR      Initial Sequence  short neck;abdomen;diaphragmatic breathing  -HR      Abdomen  superficial  -HR       Opened Regional Lymph Nodes  axillary;inguinal  -HR      Axillary  right;left  -HR      Inguinal  right;left  -HR      Opened Anastamoses  inguino-axillary  -HR      Inguino-Axillary  right;left  -HR      Inguino-Axillary Comment  sore  -HR      Extremity Treatment  MLD to full limb  -HR      MLD to Full Limb  LLE post L thigh with knees on bolster  -HR      Manual Lymphatic Drainage Comments  extra time and caution to the L inguinal crease due to not wanting wound to open.   -HR      Manual Therapy  I had to start very gently and progress pressure with mutiple follow up moves along the inner thigh and groin. Also extra time spent at the suprapubic region  -HR         Compression/Skin Care    Compression/Skin Care Comments  Discussed options to try for compression as the underwear he got is not comfortable.   -HR        User Key  (r) = Recorded By, (t) = Taken By, (c) = Cosigned By    Initials Name Provider Type    HR Debbie Morales, PT, DPT, CLT-LANNY Physical Therapist                               OP Exercises     Row Name 10/20/20 1300             Subjective Comments    Subjective Comments  He doesn't think the compression shorts he got are going to work  -HR         Subjective Pain    Able to rate subjective pain?  yes  -HR      Pre-Treatment Pain Level  6  -HR      Post-Treatment Pain Level  5  -HR      Subjective Pain Comment  L groin and down the L leg to knee  -HR        User Key  (r) = Recorded By, (t) = Taken By, (c) = Cosigned By    Initials Name Provider Type    HR Debbie Morales, PT, DPT, CLT-LANNY Physical Therapist                      PT OP Goals     Row Name 10/20/20 1400          PT Short Term Goals    STG Date to Achieve  11/11/20  -HR     STG 1  Patient will gain an understanding of lymphedema and risk factors  -HR     STG 1 Progress  Ongoing  -HR     STG 2  Patient will have a decrease in circumference of left LE along with pain reduction  -HR     STG 2 Progress  Ongoing  -HR      STG 2 Progress Comments  measurements were down in multiple places today  -HR        Long Term Goals    LTG Date to Achieve  12/11/20  -HR     LTG 1  Patient will be independent in basic exercises to increase lymph flow  -HR     LTG 1 Progress  Ongoing  -HR     LTG 2  Patient will be independent in self - massage  -HR     LTG 2 Progress  Ongoing  -HR     LTG 3  Patient will be advised of proper compression garments and fit assessed  -HR     LTG 3 Progress  New  -HR     LTG 4  Patient will be given a trial of a compression pump  -HR     LTG 4 Progress  New  -HR     LTG 5  Patient will have a pain level of no >2 on a consistent basis  -HR     LTG 5 Progress  New  -HR     LTG 6  Patient will have a 90 - 100% reduction in edema of left groin and penis  -HR     LTG 6 Progress  New  -HR     LTG 7  Patient will gain independent managment of lymphedema  -HR     LTG 7 Progress  New  -HR        Time Calculation    PT Goal Re-Cert Due Date  11/11/20  -HR       User Key  (r) = Recorded By, (t) = Taken By, (c) = Cosigned By    Initials Name Provider Type    Debbie Brambila, PT, DPT, CLZIGGY-LANNY Physical Therapist          Therapy Education  Education Details: I explained that getting compression that fits well should actually decrease his pain quite a bit.   Given: Symptoms/condition management, Pain management  Program: Reinforced, Progressed  How Provided: Verbal  Provided to: Patient  Level of Understanding: Verbalized              Time Calculation:                     Debbie Morales, PT, DPT, SAHIL  10/20/2020

## 2020-10-23 ENCOUNTER — TREATMENT (OUTPATIENT)
Dept: PHYSICAL THERAPY | Facility: CLINIC | Age: 50
End: 2020-10-23

## 2020-10-23 DIAGNOSIS — I89.0 LYMPHEDEMA OF LEFT LEG: Primary | ICD-10-CM

## 2020-10-23 DIAGNOSIS — I89.0 LYMPHEDEMA OF GENITALIA: ICD-10-CM

## 2020-10-23 PROCEDURE — 97140 MANUAL THERAPY 1/> REGIONS: CPT | Performed by: PHYSICAL THERAPIST

## 2020-10-23 NOTE — PROGRESS NOTES
Outpatient Physical Therapy Lymphedema Treatment Note       Patient Name: Holland Phelps  : 1970  MRN: [id number]  Today's Date: 10/23/2020        Visit Date: 10/23/2020    Visit Dx:    ICD-10-CM ICD-9-CM   1. Lymphedema of left leg  I89.0 457.1   2. Lymphedema of genitalia  I89.0 457.1       Patient Active Problem List   Diagnosis   • Renal cell carcinoma of right kidney metastatic to other site (CMS/HCC)   • Iron overload   • Sickle cell beta thalassemia (CMS/HCC)        Lymphedema     Row Name 10/23/20 0800             Subjective Pain    Able to rate subjective pain?  yes  -AL      Pre-Treatment Pain Level  5  -AL      Post-Treatment Pain Level  4  -AL      Subjective Pain Comment  L groin and lower abdomen  -AL         Subjective Comments    Subjective Comments  He is wearing the compression shorts with gray foam.  He woke up this morning around 2:00 am. with pain in the L groin  He was able to do the MLD and take pain meds.    -AL         Manual Lymphatic Drainage    Manual Lymphatic Drainage  initial sequence;opened regional lymph nodes;opened anastamoses;extremity treatment  -AL      Initial Sequence  short neck;abdomen;diaphragmatic breathing  -AL      Abdomen  superficial  -AL      Diaphragmatic Breathing  x 9 with superficial abdominals  -AL      Opened Regional Lymph Nodes  axillary;inguinal  -AL      Axillary  right;left  -AL      Inguinal  right;left  -AL      Opened Anastamoses  inguino-axillary  -AL      Inguino-Axillary  right;left  -AL      Inguino-Axillary Comment  .  -AL      Extremity Treatment  MLD to full limb  -AL      MLD to Full Limb  L LE Also posterior L leg in R sidelying  -AL      Manual Lymphatic Drainage Comments  Spent extra time working on MLD to the L groin and suprapubic area.  Being cautious to area where wound was present in the past.  No draining of wound or open areas L groin today.    -AL      Manual Therapy  Instructed to work on MLD to the L groin.   -AL          Compression/Skin Care    Compression/Skin Care  --  -AL      Compression/Skin Care Comments  Continue to wear compression shorts and use foam as long as this does not cause discomfort.   -AL        User Key  (r) = Recorded By, (t) = Taken By, (c) = Cosigned By    Initials Name Provider Type    Elida Pickering PTA, SAHIL Physical Therapy Assistant                        PT Assessment/Plan     Row Name 10/23/20 0800          PT Assessment    Assessment Comments  Patient continues to have soreness in the L groin that improves with the MLD.  The dense tissue just distal to the L inguinal crease did soften and change shapes with the MLD.  He did have less pain and increase comfort L groin area after treatment.  Instructed to continue to work on CDT.   -AL        PT Plan    PT Plan Comments  Continue with CDT  -AL       User Key  (r) = Recorded By, (t) = Taken By, (c) = Cosigned By    Initials Name Provider Type    Elida Pickering PTA, SAHIL Physical Therapy Assistant             OP Exercises     Row Name 10/23/20 0800             Subjective Comments    Subjective Comments  He is wearing the compression shorts with gray foam.  He woke up this morning around 2:00 am. with pain in the L groin  He was able to do the MLD and take pain meds.    -AL         Subjective Pain    Able to rate subjective pain?  yes  -AL      Pre-Treatment Pain Level  5  -AL      Post-Treatment Pain Level  4  -AL      Subjective Pain Comment  L groin and lower abdomen  -AL         Total Minutes    68262 - PT Manual Therapy Minutes  75  -AL        User Key  (r) = Recorded By, (t) = Taken By, (c) = Cosigned By    Initials Name Provider Type    Elida Pickering PTA, YOSI-LANNY Physical Therapy Assistant                     Manual Rx (last 36 hours)      Manual Treatments     Row Name 10/23/20 0800             Total Minutes    10091 - PT Manual Therapy Minutes  75  -AL        User Key  (r) = Recorded By, (t) = Taken By, (c) = Cosigned  By    Initials Name Provider Type    Elida Pickering PTA, YOSI-LANNY Physical Therapy Assistant          PT OP Goals     Row Name 10/23/20 0800          PT Short Term Goals    STG Date to Achieve  11/11/20  -AL     STG 1  Patient will gain an understanding of lymphedema and risk factors  -AL     STG 1 Progress  Ongoing  -AL     STG 1 Progress Comments  Improving  -AL     STG 2  Patient will have a decrease in circumference of left LE along with pain reduction  -AL     STG 2 Progress  Ongoing  -AL        Long Term Goals    LTG Date to Achieve  12/11/20  -AL     LTG 1  Patient will be independent in basic exercises to increase lymph flow  -AL     LTG 1 Progress  Ongoing  -AL     LTG 2  Patient will be independent in self - massage  -AL     LTG 2 Progress  Ongoing  -AL     LTG 2 Progress Comments  He is working on the self MLD  -AL     LTG 3  Patient will be advised of proper compression garments and fit assessed  -AL     LTG 3 Progress  Ongoing  -AL     LTG 3 Progress Comments  He is wearing compression shorts  -AL     LTG 4  Patient will be given a trial of a compression pump  -AL     LTG 4 Progress  Ongoing  -AL     LTG 4 Progress Comments  Have sent information to [employer]  -AL     LTG 5  Patient will have a pain level of no >2 on a consistent basis  -AL     LTG 5 Progress  New  -AL     LTG 6  Patient will have a 90 - 100% reduction in edema of left groin and penis  -AL     LTG 6 Progress  New  -AL     LTG 7  Patient will gain independent managment of lymphedema  -AL     LTG 7 Progress  New  -AL        Time Calculation    PT Goal Re-Cert Due Date  11/11/20  -AL       User Key  (r) = Recorded By, (t) = Taken By, (c) = Cosigned By    Initials Name Provider Type    Elida Pickering PTA, RDT-LANNY Physical Therapy Assistant          Therapy Education  Education Details: Continue to work on MLD to the L groin and wear compression  Given: Symptoms/condition management, Pain management  Program: Reinforced,  Progressed  How Provided: Verbal  Provided to: Patient  Level of Understanding: Verbalized              Time Calculation:                     Eliad Sequeira PTA, [facility]-LANNY  10/23/2020

## 2020-10-26 ENCOUNTER — TREATMENT (OUTPATIENT)
Dept: PHYSICAL THERAPY | Facility: CLINIC | Age: 50
End: 2020-10-26

## 2020-10-26 DIAGNOSIS — I89.0 LYMPHEDEMA OF GENITALIA: ICD-10-CM

## 2020-10-26 DIAGNOSIS — I89.0 LYMPHEDEMA OF LEFT LEG: Primary | ICD-10-CM

## 2020-10-26 PROCEDURE — 97140 MANUAL THERAPY 1/> REGIONS: CPT | Performed by: PHYSICAL THERAPIST

## 2020-10-26 NOTE — PROGRESS NOTES
Outpatient Physical Therapy Lymphedema Treatment Note       Patient Name: Holland Phelps  : 1970  MRN: [id number]  Today's Date: 10/26/2020        Visit Date: 10/26/2020    Visit Dx:    ICD-10-CM ICD-9-CM   1. Lymphedema of left leg  I89.0 457.1   2. Lymphedema of genitalia  I89.0 457.1       Patient Active Problem List   Diagnosis   • Renal cell carcinoma of right kidney metastatic to other site (CMS/HCC)   • Iron overload   • Sickle cell beta thalassemia (CMS/HCC)        Lymphedema     Row Name 10/26/20 0800             Subjective Pain    Able to rate subjective pain?  yes  -AL      Pre-Treatment Pain Level  5  -AL      Post-Treatment Pain Level  4  -AL      Subjective Pain Comment  L groin  -AL         Subjective Comments    Subjective Comments  He worked on the MLD, he feels like the tissue is a little softer in the L groin area.  He feels like he has a little more swelling in the penis.  He does feel like the burning sensation in the L upper leg is not as frequent.  He has been wearing the compression and the gray foam.  After his last treatment he felt better the rest of the day.   -AL         Manual Lymphatic Drainage    Manual Lymphatic Drainage  initial sequence;opened regional lymph nodes;opened anastamoses;extremity treatment  -AL      Initial Sequence  short neck;abdomen;diaphragmatic breathing  -AL      Abdomen  superficial  -AL      Diaphragmatic Breathing  x 9 with superficial abdominals  -AL      Opened Regional Lymph Nodes  axillary;inguinal  -AL      Axillary  right;left  -AL      Inguinal  right;left  -AL      Opened Anastamoses  inguino-axillary  -AL      Inguino-Axillary  right;left  -AL      Inguino-Axillary Comment  Tenderness L groin with MLD  -AL      Extremity Treatment  MLD to full limb  -AL      MLD to Full Limb  L LE L groin and suprapubic area  -AL      Manual Lymphatic Drainage Comments  Spent extra time working on MLD to the L groin, suprapubic area  -AL         Compression/Skin  Care    Compression/Skin Care Comments  Wear compression daily  -AL        User Key  (r) = Recorded By, (t) = Taken By, (c) = Cosigned By    Initials Name Provider Type    Elida Pickering PTA, CLT-LANA Physical Therapy Assistant                        PT Assessment/Plan     Row Name 10/26/20 0800          PT Assessment    Assessment Comments  The dense tissue L groin area and inguinal crease is changing shapes, more so distal to the inguinal crease.  This area is les dense and is wider as the height is decreasing.  The left leg is still painful but overall the pain is slowlying decreasing and the sharp pains are less frequent.  Will measure the L LE next treatment.   -AL        PT Plan    PT Plan Comments  Continue with CDT.   -AL       User Key  (r) = Recorded By, (t) = Taken By, (c) = Cosigned By    Initials Name Provider Type    Elida Pickering PTA, CLT-LANA Physical Therapy Assistant             OP Exercises     Row Name 10/26/20 0800             Subjective Comments    Subjective Comments  He worked on the MLD, he feels like the tissue is a little softer in the L groin area.  He feels like he has a little more swelling in the penis.  He does feel like the burning sensation in the L upper leg is not as frequent.  He has been wearing the compression and the gray foam.  After his last treatment he felt better the rest of the day.   -AL         Subjective Pain    Able to rate subjective pain?  yes  -AL      Pre-Treatment Pain Level  5  -AL      Post-Treatment Pain Level  4  -AL      Subjective Pain Comment  L groin  -AL         Total Minutes    51909 - PT Manual Therapy Minutes  75  -AL        User Key  (r) = Recorded By, (t) = Taken By, (c) = Cosigned By    Initials Name Provider Type    Elida Pickering PTA, CLT-LANA Physical Therapy Assistant                     Manual Rx (last 36 hours)      Manual Treatments     Row Name 10/26/20 0800             Total Minutes    12716 - PT Manual Therapy Minutes  75   -AL        User Key  (r) = Recorded By, (t) = Taken By, (c) = Cosigned By    Initials Name Provider Type    Elida Pickering, PTA, CLT-LANNY Physical Therapy Assistant          PT OP Goals     Row Name 10/26/20 0800          PT Short Term Goals    STG Date to Achieve  11/11/20  -AL     STG 1  Patient will gain an understanding of lymphedema and risk factors  -AL     STG 1 Progress  Ongoing  -AL     STG 1 Progress Comments  Continue to educate  -AL     STG 2  Patient will have a decrease in circumference of left LE along with pain reduction  -AL     STG 2 Progress  Ongoing  -AL     STG 2 Progress Comments  Will measure next treatment  -AL        Long Term Goals    LTG Date to Achieve  12/11/20  -AL     LTG 1  Patient will be independent in basic exercises to increase lymph flow  -AL     LTG 1 Progress  Ongoing  -AL     LTG 2  Patient will be independent in self - massage  -AL     LTG 2 Progress  Ongoing  -AL     LTG 2 Progress Comments  He is working on the MLD  -AL     LTG 3  Patient will be advised of proper compression garments and fit assessed  -AL     LTG 3 Progress  Ongoing  -AL     LTG 4  Patient will be given a trial of a compression pump  -AL     LTG 4 Progress  Ongoing  -AL     LTG 5  Patient will have a pain level of no >2 on a consistent basis  -AL     LTG 5 Progress  New  -AL     LTG 6  Patient will have a 90 - 100% reduction in edema of left groin and penis  -AL     LTG 6 Progress  New  -AL     LTG 7  Patient will gain independent managment of lymphedema  -AL     LTG 7 Progress  New  -AL        Time Calculation    PT Goal Re-Cert Due Date  11/11/20  -AL       User Key  (r) = Recorded By, (t) = Taken By, (c) = Cosigned By    Initials Name Provider Type    Elida Pickering PTA, CLT-LANNY Physical Therapy Assistant          Therapy Education  Education Details: Wear compression daily as well as work on the MLD  Given: Symptoms/condition management, Pain management  Program: Reinforced  How Provided:  Verbal  Provided to: Patient  Level of Understanding: Verbalized              Time Calculation:                     Elida Sequeira PTA, [facility]-LANNY  10/26/2020

## 2020-10-28 ENCOUNTER — TREATMENT (OUTPATIENT)
Dept: PHYSICAL THERAPY | Facility: CLINIC | Age: 50
End: 2020-10-28

## 2020-10-28 DIAGNOSIS — I89.0 LYMPHEDEMA OF LEFT LEG: Primary | ICD-10-CM

## 2020-10-28 DIAGNOSIS — I89.0 LYMPHEDEMA OF GENITALIA: ICD-10-CM

## 2020-10-28 DIAGNOSIS — C64.1 RENAL CELL CARCINOMA OF RIGHT KIDNEY (HCC): ICD-10-CM

## 2020-10-28 PROCEDURE — 97530 THERAPEUTIC ACTIVITIES: CPT | Performed by: PHYSICAL THERAPIST

## 2020-10-28 PROCEDURE — 97535 SELF CARE MNGMENT TRAINING: CPT | Performed by: PHYSICAL THERAPIST

## 2020-10-28 PROCEDURE — 97140 MANUAL THERAPY 1/> REGIONS: CPT | Performed by: PHYSICAL THERAPIST

## 2020-10-28 NOTE — PROGRESS NOTES
Outpatient Physical Therapy Lymphedema Treatment Note       Patient Name: Holland Phelps  : 1970  MRN: [id number]  Today's Date: 2020        Visit Date: 10/28/2020    Visit Dx:    ICD-10-CM ICD-9-CM   1. Lymphedema of left leg  I89.0 457.1   2. Lymphedema of genitalia  I89.0 457.1   3. Renal cell carcinoma of right kidney (CMS/HCC)  C64.1 189.0       Patient Active Problem List   Diagnosis   • Renal cell carcinoma of right kidney metastatic to other site (CMS/HCC)   • Iron overload   • Sickle cell beta thalassemia (CMS/[facility])            10/28/20 0900   Subjective Pain   Able to rate subjective pain? yes   Pre-Treatment Pain Level 4   Subjective Pain Comment L inner thigh   Subjective Comments   Subjective Comments Pain today is mainly in the inner thigh. It goes about half way down to the knee.   Lymphedema Measurements Pre- Pump   Measurement Type(s) Circumferential   Circumferential Areas Lower extremities;Trunk   Lymphedema Measurements Comments Measurements taken Pre-Pump trial in 9:00 column. AFTER Basic Pump Trial in 10;00 column   BLE Circumferential (cm)   Measurement Location 3 ankle- pre-pump   Left 3 23 cm   Measurement Location 4 +10- pre-pump   Left 4 24.2 cm   Measurement Location 5 +10- pre-pump   Left 5 31 cm   Measurement Location 6 +10- Pre-pump   Left 6 36.5 cm   Measurement Location 7 +10- Pre-Pump   Left 7 35.6 cm   Measurement Location 8 +10- Pre pump   Left 8 38.7 cm   Measurement Location 9 +10- Pre pump   Left 9 45.9 cm   Measurement Location 10 groin- pre-pump   Left 10 51.8 cm   LLE Circumferential Total 286.7 cm                                      Trunk Circumferential (cm)   Measurement Location 1 umbilicus- pre-pump   Trunk 1 99 cm   Trunk Circumferential Total 99 cm   Manual Lymphatic Drainage   Manual Lymphatic Drainage opened regional lymph nodes;opened anastamoses;extremity treatment   Initial Sequence abdomen   Abdomen superficial   Opened Regional Lymph Nodes  axillary;inguinal   Axillary left   Inguinal left   Opened Anastamoses inguino-axillary   Inguino-Axillary left   Inguino-Axillary Comment stayed away from the small area that is still slightly open in the hip crease   Extremity Treatment MLD to full limb   MLD to Full Limb LLE- focused on suprapubic area and inner thigh   Manual Lymphatic Drainage Comments Did a Basic pump trial to LLE with Entre pump on medium setting for 15 minutes. Measurements were taken before and after.    Manual Therapy more MLD to the posterior thigh and inner thigh with knee on bolster.         Measurements AFTER Pump   10/28/20 1000   BLE Circumferential (cm)   Measurement Location 3 ankle- post- pump   Left 3 23.6 cm   Measurement Location 4 +10- post-pump   Left 4 24.2 cm   Measurement Location 5 +10- Post- pump   Left 5 31 cm   Measurement Location 6 +10- Post- pump   Left 6 36.6 cm   Measurement Location 7 +10- Post pump   Left 7 36 cm   Measurement Location 8 +10- Post pump   Left 8 38.8 cm   Measurement Location 9 +10- Post pump   Left 9 46 cm   Measurement Location 10 groin- post pump   Left 10 54 cm   LLE Circumferential Total 290.2 cm                                      Trunk Circumferential (cm)   Measurement Location 1 umbilicus- post pump   Trunk 1 99.6 cm   Trunk Circumferential Total 99.6 cm      10/28/20 0900   PT Short Term Goals   STG Date to Achieve 11/11/20   STG 1 Patient will gain an understanding of lymphedema and risk factors   STG 1 Progress Ongoing   STG 2 Patient will have a decrease in circumference of left LE along with pain reduction   STG 2 Progress Ongoing   Long Term Goals   LTG Date to Achieve 12/11/20   LTG 1 Patient will be independent in basic exercises to increase lymph flow   LTG 1 Progress Ongoing   LTG 2 Patient will be independent in self - massage   LTG 2 Progress Ongoing   LTG 2 Progress Comments working on this. Spends time while in his recliner on self massage.   LTG 3 Patient will be advised  of proper compression garments and fit assessed   LTG 3 Progress Ongoing   LTG 3 Progress Comments Consistently wearing compression briefs with added foam across the suprapubic and proximal hip.   LTG 4 Patient will be given a trial of a compression pump   LTG 4 Progress Ongoing   LTG 4 Progress Comments Trial of entre basic LE pump today on medium setting. He had an increase in overall LLE and biggest increase at proximal thigh/groin   LTG 5 Patient will have a pain level of no >2 on a consistent basis   LTG 5 Progress Ongoing   LTG 6 Patient will have a 90 - 100% reduction in edema of left groin and penis   LTG 6 Progress Ongoing   LTG 6 Progress Comments This has gotten better. He is able to lie on his stomach for a while to get some compression through that area. However, he feels like this makes the inner thigh tighter and more painful.    LTG 7 Patient will gain independent managment of lymphedema   LTG 7 Progress Ongoing   LTG 7 Progress Comments Working on this each visit.    Time Calculation   PT Goal Re-Cert Due Date 11/11/20         10/28/20 1000   PT Assessment   Assessment Comments  has gotten a better understanding of the treatment for lymphedema and is being compliant with his self massage and compression at home.  He is wearing compression shorts and capris interchangeably. The compression grade is 20-30 mmHg. He is very careful with the tiny spot that is still open in the L inguinal crease. He checks this daily and is keeping it clean and treating with neosporin as directed by his doctor. One of his primary issues when evaluated in clinic was the involvement of his genitals and suprapubic region from his lymphedema.  He has necrotic fat deposits in the suprapubic region that are quite tender. He has hyperkeratosis and some p'eau d'orange appearance to the tissues along the inguinal and pubic area as well from radiation. His LLE also has fibrotic changes and swelling, but in treating the leg  swelling, we must avoid increasing the groin and genital swelling as this is the most symptomatic and painful. He also has a non-healing portion of the inguinal incision line.  For this reason, a lymphedema pump that addresses only the leg will not only be inadequate, but will most definitely be CONTRAINDICATED in this case.  We have already seen that the fibrosis and lymphedema is chronic all the way across the hip and includes the entire suprapubic tissues. He needs an advanced pump such as the flexitouch in order to address the truncal tissues as well as to treat across the groin and hip crease prior to drainage of the LE. The basic pump trial was completed today for a short time since I didn't want to have a significant worsening of his symptoms and the measurements at the groin measurement went up over 2 cm. His abdominal measurement increased to a lesser extent. He needs continued work with MLD, exercise, compression and skin care along with continuing to follow a low salt, low sugar diet as we complete his treatments. For his home maintenance program, he will require an advanced lymphedema pump such as the Flexitouch.     PT Plan   PT Plan Comments Cont treatment with CDT to address lymphedema.                Therapy Education  Education Details: explained the differences between the pumps and the reason that we have to try the basic even though I don't believe this would be an appropriate choice.   Given: Symptoms/condition management, Pain management  Program: Reinforced  How Provided: Verbal  Provided to: Patient  Level of Understanding: Verbalized  13397 - PT Self Care/Mgmt Minutes: 10                                  Debbie Morales, PT, DPT, CLT-LANNY  10/28/2020

## 2020-10-30 ENCOUNTER — TREATMENT (OUTPATIENT)
Dept: PHYSICAL THERAPY | Facility: CLINIC | Age: 50
End: 2020-10-30

## 2020-10-30 DIAGNOSIS — I89.0 LYMPHEDEMA OF GENITALIA: ICD-10-CM

## 2020-10-30 DIAGNOSIS — I89.0 LYMPHEDEMA OF LEFT LEG: Primary | ICD-10-CM

## 2020-10-30 PROCEDURE — 97530 THERAPEUTIC ACTIVITIES: CPT | Performed by: PHYSICAL THERAPIST

## 2020-10-30 PROCEDURE — 97140 MANUAL THERAPY 1/> REGIONS: CPT | Performed by: PHYSICAL THERAPIST

## 2020-10-30 NOTE — PROGRESS NOTES
"Outpatient Physical Therapy Lymphedema Treatment Note       Patient Name: Holland Phelps  : 1970  MRN: [id number]  Today's Date: 10/30/2020        Visit Date: 10/30/2020    Visit Dx:    ICD-10-CM ICD-9-CM   1. Lymphedema of left leg  I89.0 457.1   2. Lymphedema of genitalia  I89.0 457.1       Patient Active Problem List   Diagnosis   • Renal cell carcinoma of right kidney metastatic to other site (CMS/HCC)   • Iron overload   • Sickle cell beta thalassemia (CMS/HCC)        Lymphedema     Row Name 10/30/20 0900 10/30/20 0800          Subjective Pain    Able to rate subjective pain?  --  yes  -HR     Pre-Treatment Pain Level  --  4  -HR        Subjective Comments    Subjective Comments  He could tolerate the new compression all day yesterday. He does feel like the hard \"shelf\" along suprapubic area is softer and smaller.   -HR  --        Manual Lymphatic Drainage    Manual Lymphatic Drainage  opened regional lymph nodes;opened anastamoses;extremity treatment  -HR  --     Initial Sequence  abdomen  -HR  --     Abdomen  superficial  -HR  --     Opened Regional Lymph Nodes  axillary;inguinal  -HR  --     Axillary  left  -HR  --     Inguinal  left  -HR  --     Opened Anastamoses  inguino-axillary  -HR  --     Inguino-Axillary  left  -HR  --     Extremity Treatment  MLD to full limb  -HR  --     MLD to Full Limb  LLE- extra time to the inner and posterior thigh today.   -HR  --     Manual Lymphatic Drainage Comments  the suprapubic tissues are soft and not nearly as swollen. The fibrosis and necrotic fat still present from the L lateral edge of the pubis across the anterior hip crease.   -HR  --     Manual Therapy  Sore along suprapubic tissues and hip crease. Sore inner thigh and across patella  -HR  --        Compression/Skin Care    Compression/Skin Care Comments  Keep wearing the layered compression garments during the day  -HR  --       User Key  (r) = Recorded By, (t) = Taken By, (c) = Cosigned By    Initials " "Name Provider Type    HR Debbie Morales, PT, DPT, CLT-LANNY Physical Therapist                        PT Assessment/Plan     Row Name 10/30/20 0900          PT Assessment    Assessment Comments  He is still having neuropathy from the hip down the medial thigh that bothers him often. The suprapubic tissues have reduced in swelling and have really softened across midline and on the R. He is wearing better compression now that covers entire legs and abdomen.   -HR        PT Plan    PT Plan Comments  Cont progressing treatment as able.   -HR       User Key  (r) = Recorded By, (t) = Taken By, (c) = Cosigned By    Initials Name Provider Type    HR Debbie Morales, PT, DPT, CLT-LANNY Physical Therapist             OP Exercises     Row Name 10/30/20 0900 10/30/20 0800          Subjective Comments    Subjective Comments  He could tolerate the new compression all day yesterday. He does feel like the hard \"shelf\" along suprapubic area is softer and smaller.   -HR  --        Subjective Pain    Able to rate subjective pain?  --  yes  -HR     Pre-Treatment Pain Level  --  4  -HR        Total Minutes    72477 - PT Therapeutic Activity Minutes  --  10  -HR     52718 - PT Manual Therapy Minutes  60  -HR  --       User Key  (r) = Recorded By, (t) = Taken By, (c) = Cosigned By    Initials Name Provider Type    HR Debbie Morales, PT, DPT, CLT-LANNY Physical Therapist                     Manual Rx (last 36 hours)      Manual Treatments     Row Name 10/30/20 0900             Total Minutes    19909 - PT Manual Therapy Minutes  60  -HR        User Key  (r) = Recorded By, (t) = Taken By, (c) = Cosigned By    Initials Name Provider Type    HR Debbie Morales, PT, DPT, CLT-LANNY Physical Therapist          PT OP Goals     Row Name 10/30/20 0900          PT Short Term Goals    STG Date to Achieve  11/11/20  -HR     STG 1  Patient will gain an understanding of lymphedema and risk factors  -HR     STG 1 Progress  " Ongoing;Progressing  -HR     STG 2  Patient will have a decrease in circumference of left LE along with pain reduction  -HR     STG 2 Progress  Ongoing  -HR        Long Term Goals    LTG Date to Achieve  12/11/20  -HR     LTG 1  Patient will be independent in basic exercises to increase lymph flow  -HR     LTG 1 Progress  Ongoing  -HR     LTG 2  Patient will be independent in self - massage  -HR     LTG 2 Progress  Ongoing  -HR     LTG 2 Progress Comments  working on this  -HR     LTG 3  Patient will be advised of proper compression garments and fit assessed  -HR     LTG 3 Progress  Ongoing  -HR     LTG 3 Progress Comments  He has compression leggings and shorts now that he is able to wear   -HR     LTG 4  Patient will be given a trial of a compression pump  -HR     LTG 4 Progress  Ongoing  -HR     LTG 4 Progress Comments  Had basic pump trial. Having trial of Flexitouch today  -HR     LTG 5  Patient will have a pain level of no >2 on a consistent basis  -HR     LTG 5 Progress  Ongoing  -HR     LTG 6  Patient will have a 90 - 100% reduction in edema of left groin and penis  -HR     LTG 6 Progress  Ongoing  -HR     LTG 7  Patient will gain independent managment of lymphedema  -HR     LTG 7 Progress  Ongoing  -HR        Time Calculation    PT Goal Re-Cert Due Date  11/11/20  -HR       User Key  (r) = Recorded By, (t) = Taken By, (c) = Cosigned By    Initials Name Provider Type    Debbie Brambila, PT, DPT, CLT-LANNY Physical Therapist          Therapy Education  Education Details: Discussed the benefits of compression when he is up and moving around. Reviewed need for muscle contractions and mobility. Reviewed transfers and not forcing the stretch to the leg.  Given: Symptoms/condition management, Pain management  Program: Reinforced  How Provided: Verbal  Provided to: Patient  Level of Understanding: Verbalized              Time Calculation:                     Debbie Morales, PT, DPT,  CLT-LANNY  10/30/2020

## 2020-11-02 ENCOUNTER — TREATMENT (OUTPATIENT)
Dept: PHYSICAL THERAPY | Facility: CLINIC | Age: 50
End: 2020-11-02

## 2020-11-02 DIAGNOSIS — I89.0 LYMPHEDEMA OF LEFT LEG: Primary | ICD-10-CM

## 2020-11-02 DIAGNOSIS — I89.0 LYMPHEDEMA OF GENITALIA: ICD-10-CM

## 2020-11-02 PROCEDURE — 97140 MANUAL THERAPY 1/> REGIONS: CPT | Performed by: PHYSICAL THERAPIST

## 2020-11-02 NOTE — PROGRESS NOTES
Outpatient Physical Therapy Lymphedema Treatment Note       Patient Name: Holland Phelps  : 1970  MRN: [id number]  Today's Date: 2020        Visit Date: 2020    Visit Dx:    ICD-10-CM ICD-9-CM   1. Lymphedema of left leg  I89.0 457.1   2. Lymphedema of genitalia  I89.0 457.1       Patient Active Problem List   Diagnosis   • Renal cell carcinoma of right kidney metastatic to other site (CMS/HCC)   • Iron overload   • Sickle cell beta thalassemia (CMS/HCC)        Lymphedema     Row Name 20 0800             Subjective Pain    Able to rate subjective pain?  yes  -AL      Pre-Treatment Pain Level  4  -AL      Post-Treatment Pain Level  3 3.510  -AL      Subjective Pain Comment  L  groin  -AL         Subjective Comments    Subjective Comments  He states he has had more swelling in the L leg over the weekend.  He states the drive here made the groin area harder, this area has been softer.  He had pain at 2:00 am because of L inner thigh.  He feels like the treatments are helping.   -AL         Manual Lymphatic Drainage    Manual Lymphatic Drainage  initial sequence;opened regional lymph nodes;opened anastamoses;extremity treatment  -AL      Initial Sequence  short neck;abdomen;diaphragmatic breathing  -AL      Abdomen  superficial  -AL      Opened Regional Lymph Nodes  axillary;inguinal  -AL      Axillary  right;left  -AL      Inguinal  right;left  -AL      Opened Anastamoses  inguino-axillary  -AL      Inguino-Axillary  right;left  -AL      Extremity Treatment  MLD to full limb  -AL      MLD to Full Limb  LLE- focused on suprapubic area and inner thigh  -AL      Manual Therapy  Will look at different typed of chip bags for the L groin next treatment   -AL         Compression/Skin Care    Compression/Skin Care Comments  Wear compression   -AL        User Key  (r) = Recorded By, (t) = Taken By, (c) = Cosigned By    Initials Name Provider Type    AL Elida Sequeira PTA, YOSI-LANNY Physical Therapy  Assistant                        PT Assessment/Plan     Row Name 11/02/20 0800          PT Assessment    Assessment Comments  Patient has more visible swelling in the L LE today.  The borders of the dense tissue along the inguinal crease are starting to soften.  He also has softening of the dense tissue L inner thigh with the MLD.  Will take measurements of the L LE at the end of this week.   -AL        PT Plan    PT Plan Comments  Continue with CDT  -AL       User Key  (r) = Recorded By, (t) = Taken By, (c) = Cosigned By    Initials Name Provider Type    Elida Pickering PTA, CLT-LANA Physical Therapy Assistant             OP Exercises     Row Name 11/02/20 0800             Subjective Comments    Subjective Comments  He states he has had more swelling in the L leg over the weekend.  He states the drive here made the groin area harder, this area has been softer.  He had pain at 2:00 am because of L inner thigh.  He feels like the treatments are helping.   -AL         Subjective Pain    Able to rate subjective pain?  yes  -AL      Pre-Treatment Pain Level  4  -AL      Post-Treatment Pain Level  3 3.5/10  -AL      Subjective Pain Comment  L  groin  -AL         Total Minutes    75838 - PT Manual Therapy Minutes  75  -AL        User Key  (r) = Recorded By, (t) = Taken By, (c) = Cosigned By    Initials Name Provider Type    Elida Pickering PTA, CLT-LANA Physical Therapy Assistant                     Manual Rx (last 36 hours)      Manual Treatments     Row Name 11/02/20 0800             Total Minutes    42052 - PT Manual Therapy Minutes  75  -AL        User Key  (r) = Recorded By, (t) = Taken By, (c) = Cosigned By    Initials Name Provider Type    Elida Pickering PTA, CLT-ALNNY Physical Therapy Assistant          PT OP Goals     Row Name 11/02/20 0800          PT Short Term Goals    STG Date to Achieve  11/11/20  -AL     STG 1  Patient will gain an understanding of lymphedema and risk factors  -AL     STG 1  Progress  Ongoing;Progressing  -AL     STG 1 Progress Comments  Improving  -AL     STG 2  Patient will have a decrease in circumference of left LE along with pain reduction  -AL     STG 2 Progress  Ongoing  -AL     STG 2 Progress Comments  Will measure at the end of the week   -AL        Long Term Goals    LTG Date to Achieve  12/11/20  -AL     LTG 1  Patient will be independent in basic exercises to increase lymph flow  -AL     LTG 1 Progress  Ongoing  -AL     LTG 2  Patient will be independent in self - massage  -AL     LTG 2 Progress  Ongoing  -AL     LTG 2 Progress Comments  He is working on the MLD  -AL     LTG 3  Patient will be advised of proper compression garments and fit assessed  -AL     LTG 3 Progress  Ongoing  -AL     LTG 4  Patient will be given a trial of a compression pump  -AL     LTG 4 Progress  Ongoing  -AL     LTG 5  Patient will have a pain level of no >2 on a consistent basis  -AL     LTG 5 Progress  Ongoing  -AL     LTG 6  Patient will have a 90 - 100% reduction in edema of left groin and penis  -AL     LTG 6 Progress  Ongoing  -AL     LTG 7  Patient will gain independent managment of lymphedema  -AL     LTG 7 Progress  Ongoing  -AL        Time Calculation    PT Goal Re-Cert Due Date  11/11/20  -AL       User Key  (r) = Recorded By, (t) = Taken By, (c) = Cosigned By    Initials Name Provider Type    Elida Pickering PTA, CLT-LANA Physical Therapy Assistant          Therapy Education  Education Details: Continue to work on CDT  Given: Edema management  Program: Reinforced  How Provided: Verbal  Provided to: Patient  Level of Understanding: Verbalized              Time Calculation:                     Elida Sequeira PTA, CLT-LANA  11/2/2020

## 2020-11-04 ENCOUNTER — TREATMENT (OUTPATIENT)
Dept: PHYSICAL THERAPY | Facility: CLINIC | Age: 50
End: 2020-11-04

## 2020-11-04 DIAGNOSIS — I89.0 LYMPHEDEMA OF GENITALIA: ICD-10-CM

## 2020-11-04 DIAGNOSIS — I89.0 LYMPHEDEMA OF LEFT LEG: Primary | ICD-10-CM

## 2020-11-04 DIAGNOSIS — C64.1 RENAL CELL CARCINOMA OF RIGHT KIDNEY (HCC): ICD-10-CM

## 2020-11-04 PROCEDURE — 97140 MANUAL THERAPY 1/> REGIONS: CPT | Performed by: PHYSICAL THERAPIST

## 2020-11-04 NOTE — PROGRESS NOTES
Outpatient Physical Therapy Lymphedema Treatment Note       Patient Name: Holland Phelps  : 1970  MRN: [id number]  Today's Date: 2020        Visit Date: 2020    Visit Dx:    ICD-10-CM ICD-9-CM   1. Lymphedema of left leg  I89.0 457.1   2. Lymphedema of genitalia  I89.0 457.1   3. Renal cell carcinoma of right kidney (CMS/HCC)  C64.1 189.0       Patient Active Problem List   Diagnosis   • Renal cell carcinoma of right kidney metastatic to other site (CMS/HCC)   • Iron overload   • Sickle cell beta thalassemia (CMS/HCC)        Lymphedema     Row Name 20 0915             Subjective Pain    Able to rate subjective pain?  yes  -AL      Pre-Treatment Pain Level  -- 3.5/10  -AL      Post-Treatment Pain Level  3  -AL      Subjective Pain Comment  L groin  -AL         Subjective Comments    Subjective Comments  He states he is better today and his leg hasn't felt this good in a long time.  The L leg is not as swollen and the L thigh pain in not as intense.  The hard areas are still present in the L groin, but getting softer.  He has been wearing the compression pants.  Patient states he is eating a low salt high protein diet.  He is elevating the LE's.    -AL         Manual Lymphatic Drainage    Manual Lymphatic Drainage  initial sequence;opened regional lymph nodes;opened anastamoses;extremity treatment  -AL      Initial Sequence  short neck;abdomen;diaphragmatic breathing  -AL      Abdomen  superficial  -AL      Opened Regional Lymph Nodes  axillary;inguinal  -AL      Axillary  right;left  -AL      Inguinal  right;left  -AL      Opened Anastamoses  inguino-axillary  -AL      Inguino-Axillary  right;left  -AL      Extremity Treatment  MLD to full limb  -AL      MLD to Full Limb  MLD to the L medial thigh and L inguinal crease where hyperkeratosis is present.   -AL      Manual Lymphatic Drainage Comments  Instructed patient on how to use a straight cane, he will purchase one today.  -AL      Manual  Therapy  Mad a chip bag using foam, the gray foam and the more dense yellow foam.  Also made a pit pack using cherry pits.  He is to alternate these in the L groin area to help soften the tissue.   -AL         Compression/Skin Care    Compression/Skin Care Comments  Wear compression shorts or compression pants.   -AL        User Key  (r) = Recorded By, (t) = Taken By, (c) = Cosigned By    Initials Name Provider Type    Elida Pickering PTA, CLT-LANA Physical Therapy Assistant                        PT Assessment/Plan     Row Name 11/04/20 0988          PT Assessment    Assessment Comments  The tissue L inguinal crease continues to change shapes and soften.  He was able to sleep better with less pain last night.  He is compliant with CDT as well as elevating the legs and eating a low salt high protein diiet.  Patient was instructed on how to use a straight cane.  He was able to stand taller and have a more normal gait.  He does feel pulling in the L groin when standing tall, but his hip flexors are tight.    -AL        PT Plan    PT Plan Comments  Continue with CDT.   -AL       User Key  (r) = Recorded By, (t) = Taken By, (c) = Cosigned By    Initials Name Provider Type    Elida Pickering PTA, CLT-LANA Physical Therapy Assistant             OP Exercises     Row Name 11/04/20 7072             Subjective Comments    Subjective Comments  He states he is better today and his leg hasn't felt this good in a long time.  The L leg is not as swollen and the L thigh pain in not as intense.  The hard areas are still present in the L groin, but getting softer.  He has been wearing the compression pants.  Patient states he is eating a low salt high protein diet.  He is elevating the LE's.    -AL         Subjective Pain    Able to rate subjective pain?  yes  -AL      Pre-Treatment Pain Level  -- 3.5/10  -AL      Post-Treatment Pain Level  3  -AL      Subjective Pain Comment  L groin  -AL         Total Minutes    86411 - PT  Manual Therapy Minutes  75  -AL        User Key  (r) = Recorded By, (t) = Taken By, (c) = Cosigned By    Initials Name Provider Type    DEMARCUS SequeiraElida, PTA, YOSI-LANNY Physical Therapy Assistant                     Manual Rx (last 36 hours)      Manual Treatments     Row Name 11/04/20 0915             Total Minutes    33325 - PT Manual Therapy Minutes  75  -AL        User Key  (r) = Recorded By, (t) = Taken By, (c) = Cosigned By    Initials Name Provider Type    DEMARCUS Sequeira Elida DAVISON, KEILA, YOSI-LANNY Physical Therapy Assistant          PT OP Goals     Row Name 11/04/20 0915          PT Short Term Goals    STG Date to Achieve  11/11/20  -AL     STG 1  Patient will gain an understanding of lymphedema and risk factors  -AL     STG 1 Progress  Ongoing;Progressing  -AL     STG 1 Progress Comments  Continues to improve  -AL     STG 2  Patient will have a decrease in circumference of left LE along with pain reduction  -AL     STG 2 Progress  Ongoing  -AL     STG 2 Progress Comments  Will measure the LE next treatment  -AL        Long Term Goals    LTG Date to Achieve  12/11/20  -AL     LTG 1  Patient will be independent in basic exercises to increase lymph flow  -AL     LTG 1 Progress  Ongoing  -AL     LTG 1 Progress Comments  Patient is compliant with his exercise program  -AL     LTG 2  Patient will be independent in self - massage  -AL     LTG 2 Progress  Ongoing  -AL     LTG 2 Progress Comments  Patient is compliant with the self massage (MLD)  -AL     LTG 3  Patient will be advised of proper compression garments and fit assessed  -AL     LTG 3 Progress  Ongoing  -AL     LTG 3 Progress Comments  Patient wears compression pants or compression shorts.  -AL     LTG 4  Patient will be given a trial of a compression pump  -AL     LTG 4 Progress  Ongoing  -AL     LTG 4 Progress Comments  Patient has had the pump trial  -AL     LTG 5  Patient will have a pain level of no >2 on a consistent basis  -AL     LTG 5 Progress  Ongoing   -AL     LTG 5 Progress Comments  Pain 3.5/10 before treatment.  -AL     LTG 6  Patient will have a 90 - 100% reduction in edema of left groin and penis  -AL     LTG 6 Progress  Ongoing  -AL     LTG 6 Progress Comments  Improving  -AL     LTG 7  Patient will gain independent managment of lymphedema  -AL     LTG 7 Progress  Ongoing  -AL     LTG 7 Progress Comments  Working towards home management program  -AL        Time Calculation    PT Goal Re-Cert Due Date  11/11/20  -AL       User Key  (r) = Recorded By, (t) = Taken By, (c) = Cosigned By    Initials Name Provider Type    Elida Pickering PTA, CLT-LANA Physical Therapy Assistant          Therapy Education  Education Details: Work on CDT.  Use straight cane when ambulating.   Given: Edema management  Program: Reinforced  How Provided: Verbal  Provided to: Patient  Level of Understanding: Verbalized              Time Calculation:                     Elida Sequeira PTA, CLT-LANA  11/4/2020

## 2020-11-06 ENCOUNTER — TREATMENT (OUTPATIENT)
Dept: PHYSICAL THERAPY | Facility: CLINIC | Age: 50
End: 2020-11-06

## 2020-11-06 DIAGNOSIS — I89.0 LYMPHEDEMA OF LEFT LEG: ICD-10-CM

## 2020-11-06 DIAGNOSIS — I89.0 LYMPHEDEMA OF GENITALIA: Primary | ICD-10-CM

## 2020-11-06 PROCEDURE — 97535 SELF CARE MNGMENT TRAINING: CPT | Performed by: PHYSICAL THERAPIST

## 2020-11-06 PROCEDURE — 97140 MANUAL THERAPY 1/> REGIONS: CPT | Performed by: PHYSICAL THERAPIST

## 2020-11-06 NOTE — PROGRESS NOTES
Outpatient Physical Therapy Lymphedema Treatment Note       Patient Name: Holland Phleps  : 1970  MRN: [id number]  Today's Date: 2020        Visit Date: 2020    Visit Dx:    ICD-10-CM ICD-9-CM   1. Lymphedema of genitalia  I89.0 457.1   2. Lymphedema of left leg  I89.0 457.1       Patient Active Problem List   Diagnosis   • Renal cell carcinoma of right kidney metastatic to other site (CMS/HCC)   • Iron overload   • Sickle cell beta thalassemia (CMS/HCC)        Lymphedema     Row Name 20 08             Subjective Pain    Able to rate subjective pain?  yes  -HR      Pre-Treatment Pain Level  4  -HR      Post-Treatment Pain Level  3  -HR         Subjective Comments    Subjective Comments  He is complaining more of his lower leg and foot burning and being more swollen than they were.   -HR         Manual Lymphatic Drainage    Manual Lymphatic Drainage  initial sequence;opened regional lymph nodes;opened anastamoses;extremity treatment  -HR      Initial Sequence  short neck;abdomen;diaphragmatic breathing  -HR      Abdomen  superficial  -HR      Opened Regional Lymph Nodes  axillary;inguinal  -HR      Axillary  right;left  -HR      Inguinal  right;left  -HR      Opened Anastamoses  inguino-axillary  -HR      Inguino-Axillary  right;left  -HR      Extremity Treatment  MLD to full limb  -HR      MLD to Full Limb  extra time spent to the inguinal crease. Also spent more time down distal leg and foot. Foot with pitting edema today.  -HR         Compression/Skin Care    Compression/Skin Care Comments  He brought his compression leggings. Also fitted him with 20-30 mmHg compression sock to the L as the distal lymphedema is starting to increase.   -HR        User Key  (r) = Recorded By, (t) = Taken By, (c) = Cosigned By    Initials Name Provider Type    HR Debbie Morales, PT, DPT, CLT-LANNY Physical Therapist                        PT Assessment/Plan     Row Name 20 08          PT  Assessment    Assessment Comments  His L foot is visibly more swollen today. This started 2 nights ago per patient. He still has the majority of discomfort and tightness in the groin and upper thigh, but he is having burning pain with the swelling in the foot and lower leg today. Added compression to the foot and lower leg in addition to the leggings that he has been wearing.   -HR        PT Plan    PT Plan Comments  Continue with CDT. Adjustments as indicated.   -HR       User Key  (r) = Recorded By, (t) = Taken By, (c) = Cosigned By    Initials Name Provider Type    HR Debbie Morales, PT, DPT, CLT-LANNY Physical Therapist             OP Exercises     Row Name 11/06/20 1500 11/06/20 0800          Subjective Comments    Subjective Comments  --  He is complaining more of his lower leg and foot burning and being more swollen than they were.   -HR        Subjective Pain    Able to rate subjective pain?  --  yes  -HR     Pre-Treatment Pain Level  --  4  -HR     Post-Treatment Pain Level  --  3  -HR        Total Minutes    01327 - PT Manual Therapy Minutes  60  -HR  --       User Key  (r) = Recorded By, (t) = Taken By, (c) = Cosigned By    Initials Name Provider Type    HR Debbie Morales, PT, DPT, CLT-LANNY Physical Therapist                     Manual Rx (last 36 hours)      Manual Treatments     Row Name 11/06/20 1500             Total Minutes    19139 - PT Manual Therapy Minutes  60  -HR        User Key  (r) = Recorded By, (t) = Taken By, (c) = Cosigned By    Initials Name Provider Type    HR Debbie Morales, PT, DPT, CLT-LANNY Physical Therapist          PT OP Goals     Row Name 11/06/20 0800          PT Short Term Goals    STG 1  Patient will gain an understanding of lymphedema and risk factors  -HR     STG 1 Progress  Ongoing;Progressing  -HR     STG 2  Patient will have a decrease in circumference of left LE along with pain reduction  -HR     STG 2 Progress  Ongoing  -HR        Long Term Goals     LTG Date to Achieve  12/11/20  -HR     LTG 1  Patient will be independent in basic exercises to increase lymph flow  -HR     LTG 1 Progress  Ongoing  -HR     LTG 2  Patient will be independent in self - massage  -HR     LTG 2 Progress  Ongoing  -HR     LTG 3  Patient will be advised of proper compression garments and fit assessed  -HR     LTG 3 Progress  Ongoing  -HR     LTG 3 Progress Comments  wearing compression leggings and a sock on L. 20-30 mmHg  -HR     LTG 4  Patient will be given a trial of a compression pump  -HR     LTG 4 Progress  Ongoing  -HR     LTG 5  Patient will have a pain level of no >2 on a consistent basis  -HR     LTG 5 Progress  Ongoing  -HR     LTG 6  Patient will have a 90 - 100% reduction in edema of left groin and penis  -HR     LTG 6 Progress  Ongoing  -HR     LTG 7  Patient will gain independent managment of lymphedema  -HR     LTG 7 Progress  Ongoing  -HR        Time Calculation    PT Goal Re-Cert Due Date  11/11/20  -HR       User Key  (r) = Recorded By, (t) = Taken By, (c) = Cosigned By    Initials Name Provider Type    Debbie Brambila, PT, DPT, CLT-LANNY Physical Therapist          Therapy Education  Education Details: Discussed the use of soft material placed in the L inguinal crease to keep the crease more open and to adbsorb moisture as well. He has already been doing similar thing on his own. Try the compression sock to address the distal lymphedema and burning  Given: Edema management  Program: Reinforced  How Provided: Verbal  Provided to: Patient  Level of Understanding: Verbalized  55230 - PT Self Care/Mgmt Minutes: 15              Time Calculation:                     Debbie Morales, PT, DPT, CLT-LANNY  11/6/2020

## 2020-11-09 ENCOUNTER — TREATMENT (OUTPATIENT)
Dept: PHYSICAL THERAPY | Facility: CLINIC | Age: 50
End: 2020-11-09

## 2020-11-09 DIAGNOSIS — I89.0 LYMPHEDEMA OF LEFT LEG: ICD-10-CM

## 2020-11-09 DIAGNOSIS — I89.0 LYMPHEDEMA OF GENITALIA: Primary | ICD-10-CM

## 2020-11-09 DIAGNOSIS — C64.1 RENAL CELL CARCINOMA OF RIGHT KIDNEY (HCC): ICD-10-CM

## 2020-11-09 PROCEDURE — 97140 MANUAL THERAPY 1/> REGIONS: CPT | Performed by: PHYSICAL THERAPIST

## 2020-11-09 PROCEDURE — 97530 THERAPEUTIC ACTIVITIES: CPT | Performed by: PHYSICAL THERAPIST

## 2020-11-09 NOTE — PROGRESS NOTES
Outpatient Physical Therapy Lymphedema Treatment Note       Patient Name: Holland Phelps  : 1970  MRN: [id number]  Today's Date: 2020        Visit Date: 2020    Visit Dx:    ICD-10-CM ICD-9-CM   1. Lymphedema of genitalia  I89.0 457.1   2. Lymphedema of left leg  I89.0 457.1   3. Renal cell carcinoma of right kidney (CMS/HCC)  C64.1 189.0       Patient Active Problem List   Diagnosis   • Renal cell carcinoma of right kidney metastatic to other site (CMS/HCC)   • Iron overload   • Sickle cell beta thalassemia (CMS/HCC)        Lymphedema     Row Name 20 0800             Subjective Pain    Able to rate subjective pain?  yes  -HR      Pre-Treatment Pain Level  3  -HR      Subjective Pain Comment  more stiff and uncomfortable  -HR         Subjective Comments    Subjective Comments  His pain level isn't so much higher, but he is more uncomfortable.  -HR         Lymphedema Assessment    Lymphedema Classification  Other:;secondary;stage 2 (Spontaneously Irreversible);LLE:;stage 1 (Spontaneously Reversible) groin, pubic, inguinal  -HR      Lymphedema Surgery Comments  R nephrectomy, L femur   -HR      Stage of Cancer  Stage IV  -HR      Cancer Comments  metastatic renal carcinoma  -HR      Radiation Therapy Received  yes  -HR      Infections or Cellulitis?  yes  -HR      Lymphedema Assessment Comments  pitting edema is now increasing in the distal LLE. The proximal lymphedema involves the genital, groin and suprapubic areas and is Stage 2.  -HR         BLE Circumferential (cm)    Measurement Location 1  BOT  -HR      Left 1  23.5 cm  -HR      Measurement Location 2  mid arch  -HR      Left 2  24.9 cm  -HR      LLE Circumferential Total  48.4 cm  -HR        User Key  (r) = Recorded By, (t) = Taken By, (c) = Cosigned By    Initials Name Provider Type    HR Debbie Morales, PT, DPT, CLT-LANNY Physical Therapist                        PT Assessment/Plan     Row Name 20 0800          PT  Assessment    Assessment Comments  The swelling is progressing in the distal leg but still more symptomatic in the groin, pubic region and proximal thigh. Compression sock was painful at the knee. He will start wearing the compression while in the recliner and work on active muscle contractions such as ankle pumps. The 20-30mmHg sock was not enough compression to control the lymphedema during 45 minute car ride. More detailed progress note next visit.   -HR        PT Plan    PT Plan Comments  Continue with CDT. Adjustments as indicated.   -HR       User Key  (r) = Recorded By, (t) = Taken By, (c) = Cosigned By    Initials Name Provider Type    HR Debbie Morales, PT, DPT, CLT-LANNY Physical Therapist             OP Exercises     Row Name 11/09/20 1300 11/09/20 0800          Subjective Comments    Subjective Comments  --  His pain level isn't so much higher, but he is more uncomfortable.  -HR        Subjective Pain    Able to rate subjective pain?  --  yes  -HR     Pre-Treatment Pain Level  --  3  -HR     Subjective Pain Comment  --  more stiff and uncomfortable  -HR        Total Minutes    20142 - PT Therapeutic Activity Minutes  --  10  -HR     83386 - PT Manual Therapy Minutes  60  -HR  --       User Key  (r) = Recorded By, (t) = Taken By, (c) = Cosigned By    Initials Name Provider Type    HR Debbie Morales, PT, DPT, CLT-LANNY Physical Therapist                     Manual Rx (last 36 hours)      Manual Treatments     Row Name 11/09/20 1300             Total Minutes    87715 - PT Manual Therapy Minutes  60  -HR        User Key  (r) = Recorded By, (t) = Taken By, (c) = Cosigned By    Initials Name Provider Type    HR Debbie Morales, PT, DPT, CLT-LANNY Physical Therapist          PT OP Goals     Row Name 11/09/20 0800          PT Short Term Goals    STG 1  Patient will gain an understanding of lymphedema and risk factors  -HR     STG 1 Progress  Ongoing;Progressing  -HR     STG 2  Patient will have  a decrease in circumference of left LE along with pain reduction  -HR     STG 2 Progress  Ongoing  -HR        Long Term Goals    LTG Date to Achieve  12/11/20  -HR     LTG 1  Patient will be independent in basic exercises to increase lymph flow  -HR     LTG 1 Progress  Ongoing  -HR     LTG 2  Patient will be independent in self - massage  -HR     LTG 2 Progress  Ongoing  -HR     LTG 3  Patient will be advised of proper compression garments and fit assessed  -HR     LTG 3 Progress  Ongoing  -HR     LTG 4  Patient will be given a trial of a compression pump  -HR     LTG 4 Progress  Ongoing  -HR     LTG 5  Patient will have a pain level of no >2 on a consistent basis  -HR     LTG 5 Progress  Ongoing  -HR     LTG 6  Patient will have a 90 - 100% reduction in edema of left groin and penis  -HR     LTG 6 Progress  Ongoing  -HR     LTG 7  Patient will gain independent managment of lymphedema  -HR     LTG 7 Progress  Ongoing  -HR        Time Calculation    PT Goal Re-Cert Due Date  11/11/20  -HR       User Key  (r) = Recorded By, (t) = Taken By, (c) = Cosigned By    Initials Name Provider Type    HR Debbie Morales, PT, DPT, CLT-LANNY Physical Therapist          Therapy Education  Education Details: wear the sock and compression leggings in recliner and do active exercises. Don't wear the sock when his foot will be dependent for long periods.   Given: Edema management  Program: Reinforced  How Provided: Verbal  Provided to: Patient  Level of Understanding: Verbalized              Time Calculation:                     Debbie Morales PT, DPT, CLRAISA  11/9/2020

## 2020-11-11 ENCOUNTER — TREATMENT (OUTPATIENT)
Dept: PHYSICAL THERAPY | Facility: CLINIC | Age: 50
End: 2020-11-11

## 2020-11-11 DIAGNOSIS — I89.0 LYMPHEDEMA OF LEFT LEG: ICD-10-CM

## 2020-11-11 DIAGNOSIS — I89.0 LYMPHEDEMA OF GENITALIA: Primary | ICD-10-CM

## 2020-11-11 DIAGNOSIS — C64.1 RENAL CELL CARCINOMA OF RIGHT KIDNEY (HCC): ICD-10-CM

## 2020-11-11 PROCEDURE — 97140 MANUAL THERAPY 1/> REGIONS: CPT | Performed by: PHYSICAL THERAPIST

## 2020-11-11 PROCEDURE — 97530 THERAPEUTIC ACTIVITIES: CPT | Performed by: PHYSICAL THERAPIST

## 2020-11-11 NOTE — PROGRESS NOTES
Outpatient Physical Therapy Lymphedema Progress Note       Patient Name: Holland Phelps  : 1970  MRN: [id number]  Today's Date: 2020        Visit Date: 2020    Visit Dx:    ICD-10-CM ICD-9-CM   1. Lymphedema of genitalia  I89.0 457.1   2. Lymphedema of left leg  I89.0 457.1   3. Renal cell carcinoma of right kidney (CMS/HCC)  C64.1 189.0       Patient Active Problem List   Diagnosis   • Renal cell carcinoma of right kidney metastatic to other site (CMS/HCC)   • Iron overload   • Sickle cell beta thalassemia (CMS/HCC)        Lymphedema     Row Name 20 0900             Subjective Pain    Able to rate subjective pain?  yes  -HR      Pre-Treatment Pain Level  5  -HR      Post-Treatment Pain Level  3  -HR         Subjective Comments    Subjective Comments  The burning pain along the inguinal area is really bothering him today.   -HR         Lymphedema Measurements    Measurement Type(s)  Circumferential  -HR      Circumferential Areas  Lower extremities;Trunk  -HR         BLE Circumferential (cm)    Measurement Location 1  BOT  -HR      Left 1  23.4 cm  -HR      Measurement Location 2  mid arch  -HR      Left 2  24 cm  -HR      Measurement Location 3  ankle  -HR      Left 3  25 cm  -HR      Measurement Location 4  10 cm  -HR      Left 4  24.4 cm  -HR      Measurement Location 5  10 cm  -HR      Left 5  31 cm  -HR      Measurement Location 6  10 cm  -HR      Left 6  36 cm  -HR      Measurement Location 7  10 cm  -HR      Left 7  36.5 cm  -HR      Measurement Location 8  10 cm  -HR      Left 8  38.9 cm  -HR      Measurement Location 9  10 cm  -HR      Left 9  47 cm  -HR      Measurement Location 10  10 cm  -HR      Left 10  57 cm  -HR      LLE Circumferential Total  343.2 cm  -HR         Manual Lymphatic Drainage    Manual Lymphatic Drainage  initial sequence;opened regional lymph nodes;opened anastamoses;extremity treatment  -HR      Initial Sequence  short neck;abdomen;diaphragmatic breathing   -HR      Abdomen  superficial  -HR      Opened Regional Lymph Nodes  axillary;inguinal  -HR      Axillary  right;left  -HR      Inguinal  right;left  -HR      Opened Anastamoses  inguino-axillary  -HR      Inguino-Axillary  right;left  -HR      Extremity Treatment  MLD to full limb  -HR      MLD to Full Limb  LLE- supine and prone.  -HR      Manual Lymphatic Drainage Comments  Had him in prone/semi-prone position to address the posterior leg  more easily. He could not lie completely flat due to the anterior groin swelling and discomfort.   -HR         Compression/Skin Care    Compression/Skin Care Comments  He put the compression leggings on after treatment.  -HR        User Key  (r) = Recorded By, (t) = Taken By, (c) = Cosigned By    Initials Name Provider Type    HR Debbie Morales, PT, DPT, CLT-LANNY Physical Therapist                        PT Assessment/Plan     Row Name 11/11/20 0900          PT Assessment    Functional Limitations  Impaired gait;Limitation in home management;Limitations in community activities;Limitations in functional capacity and performance;Performance in leisure activities;Performance in self-care ADL  -HR     Impairments  Gait;Impaired lymphatic circulation;Pain;Range of motion  -HR     Assessment Comments   has been treated conservatively for his L groin, lower abdominal and LE lymphedema  for the past 4 weeks. He has been compliant with his home program for skin care, healthy diet, self massage, compression use and exercise. We have performed MLD sequences in clinic addressing the necrotic fat, and radiation damaged tissues along the pubic area and inguinal crease. He is still having burning pain and tenderness along the groin. We have been able to reduce the tightness in the LE and some of the swelling. The pubic tissues have softened in areas but are still hyperkeratotic and dense L of center. He wears a compression sock and leggings 20-30 mmHg but the sock has not  been able to contain the distal leg and foot lately as the lymphedema is progressing more distally. His greatest tissue damage and edema is in the groin, pubic region and proximal thigh. He is already wearing compression leggings that come up over the abdomen because if compression stopped at the top of the thigh, it would simply make the edema in the scrotum, pubic region and hip crease worse. This is the same reason a basic lymphedema pump is contraindicated for . We need to work on drainage of the LE as the distal swelling is worsening, but without proximal clearance of the abdomen and groin, along with drainage of the leg across the hip crease, would be harmful.  -HR     Rehab Potential  Good  -HR     Patient/caregiver participated in establishment of treatment plan and goals  Yes  -HR     Patient would benefit from skilled therapy intervention  Yes  -HR        PT Plan    PT Frequency  2x/week  -HR     Predicted Duration of Therapy Intervention (PT)  4 weeks  -HR     Planned CPT's?  PT RE-EVAL: 07890;PT THER PROC EA 15 MIN: 74788;PT THER ACT EA 15 MIN: 82202;PT MANUAL THERAPY EA 15 MIN: 29270;PT SELF CARE/HOME MGMT/TRAIN EA 15: 68973  -HR     PT Plan Comments  Continue with CDT. Adjustments as indicated.   -HR       User Key  (r) = Recorded By, (t) = Taken By, (c) = Cosigned By    Initials Name Provider Type    HR Debbie Morales, PT, DPT, CLT-LANNY Physical Therapist             OP Exercises     Row Name 11/11/20 0900             Subjective Comments    Subjective Comments  The burning pain along the inguinal area is really bothering him today.   -HR         Subjective Pain    Able to rate subjective pain?  yes  -HR      Pre-Treatment Pain Level  5  -HR      Post-Treatment Pain Level  3  -HR        User Key  (r) = Recorded By, (t) = Taken By, (c) = Cosigned By    Initials Name Provider Type    HR Debbie Morales, PT, DPT, CLT-LANNY Physical Therapist                      PT OP Goals     Row  Name 11/11/20 0900          PT Short Term Goals    STG 1  Patient will gain an understanding of lymphedema and risk factors  -HR     STG 1 Progress  Ongoing;Progressing  -HR     STG 2  Patient will have a decrease in circumference of left LE along with pain reduction  -HR     STG 2 Progress  Ongoing  -HR        Long Term Goals    LTG Date to Achieve  12/11/20  -HR     LTG 1  Patient will be independent in basic exercises to increase lymph flow  -HR     LTG 1 Progress  Ongoing  -HR     LTG 1 Progress Comments  working on this  -HR     LTG 2  Patient will be independent in self - massage  -HR     LTG 2 Progress  Ongoing  -HR     LTG 2 Progress Comments  does this daily  -HR     LTG 3  Patient will be advised of proper compression garments and fit assessed  -HR     LTG 3 Progress  Ongoing  -HR     LTG 3 Progress Comments  wearing compression daily. 20-30mmHg sock and leggings  -HR     LTG 4  Patient will be given a trial of a compression pump  -HR     LTG 4 Progress  Ongoing  -HR     LTG 4 Progress Comments  Basic pump is contraindicated in his case.   -HR     LTG 5  Patient will have a pain level of no >2 on a consistent basis  -HR     LTG 5 Progress  Ongoing  -HR     LTG 5 Progress Comments  inguinal pain is worsening  -HR     LTG 6  Patient will have a 90 - 100% reduction in edema of left groin and penis  -HR     LTG 6 Progress  Ongoing  -HR     LTG 7  Patient will gain independent managment of lymphedema  -HR     LTG 7 Progress  Ongoing  -HR        Time Calculation    PT Goal Re-Cert Due Date  12/11/20  -HR       User Key  (r) = Recorded By, (t) = Taken By, (c) = Cosigned By    Initials Name Provider Type    HR Debbie Morales, PT, DPT, SAHIL Physical Therapist          Therapy Education  Education Details: Cont with self massage, compression, HEP, skin care, elevation and healthy diet choices              Time Calculation:                     eDbbie Morales PT, DPT, SAHIL  11/11/2020

## 2020-11-13 ENCOUNTER — TREATMENT (OUTPATIENT)
Dept: PHYSICAL THERAPY | Facility: CLINIC | Age: 50
End: 2020-11-13

## 2020-11-13 DIAGNOSIS — I89.0 LYMPHEDEMA OF LEFT LEG: ICD-10-CM

## 2020-11-13 DIAGNOSIS — I89.0 LYMPHEDEMA OF GENITALIA: Primary | ICD-10-CM

## 2020-11-13 DIAGNOSIS — C64.1 RENAL CELL CARCINOMA OF RIGHT KIDNEY (HCC): ICD-10-CM

## 2020-11-13 PROCEDURE — 97140 MANUAL THERAPY 1/> REGIONS: CPT | Performed by: PHYSICAL THERAPIST

## 2020-11-13 NOTE — PROGRESS NOTES
"Outpatient Physical Therapy Lymphedema Treatment Note       Patient Name: Holland Phelps  : 1970  MRN: [id number]  Today's Date: 2020        Visit Date: 2020    Visit Dx:    ICD-10-CM ICD-9-CM   1. Lymphedema of genitalia  I89.0 457.1   2. Lymphedema of left leg  I89.0 457.1   3. Renal cell carcinoma of right kidney (CMS/HCC)  C64.1 189.0       Patient Active Problem List   Diagnosis   • Renal cell carcinoma of right kidney metastatic to other site (CMS/HCC)   • Iron overload   • Sickle cell beta thalassemia (CMS/HCC)        Lymphedema     Row Name 20 1200 20 1000          Subjective Pain    Able to rate subjective pain?  --  yes  -HR     Pre-Treatment Pain Level  --  4  -HR        Subjective Comments    Subjective Comments  --  The drive up here takes about 45 minutes and it always makes the \"knots\" come up more in the groin and the burning gets worse. He asked about the cause of burning and thought it was the nerves.   -HR        Manual Lymphatic Drainage    Manual Lymphatic Drainage  initial sequence;opened regional lymph nodes;opened anastamoses;extremity treatment  -HR  --     Initial Sequence  short neck;abdomen;diaphragmatic breathing  -HR  --     Abdomen  superficial  -HR  --     Diaphragmatic Breathing  X9 with abdominals  -HR  --     Opened Regional Lymph Nodes  axillary;inguinal  -HR  --     Axillary  right;left  -HR  --     Inguinal  right;left  -HR  --     Opened Anastamoses  inguino-axillary  -HR  --     Inguino-Axillary  right;left  -HR  --     Extremity Treatment  MLD to full limb  -HR  --     MLD to Full Limb  LLE- supine and prone.  -HR  --     Manual Lymphatic Drainage Comments  Had him in prone/semi-prone position to address the posterior leg  more easily. He could not lie completely flat due to the anterior groin swelling and discomfort.   -HR  --        Compression/Skin Care    Compression/Skin Care  skin care;wrapping location;bandaging  -HR  --     Skin Care  " "lotion applied  -HR  --     Wrapping Location  lower extremity  -HR  --     Wrapping Location LE  left:;foot to knee  -HR  --     Wrapping Comments  I want to see the foot reduce but wrapping to the top of the thigh would definitely cause a problem at the groin  -HR  --     Bandage Layers  cotton liner;padding/fluff layer;short-stretch bandages (comment size/quantity)  -HR  --     Bandaging Comments  4\" stockinette, 1 roll of artiflex, an 8cm and 10cm comprilan from base of toes to knee.   -HR  --     Compression Garment Comments  wear them for 24 hours if they remain comfortable. Remove if painful or upper leg symptoms worsen  -HR  --       User Key  (r) = Recorded By, (t) = Taken By, (c) = Cosigned By    Initials Name Provider Type    Debbie Brambila, PT, DPT, CLT-LANNY Physical Therapist                        PT Assessment/Plan     Row Name 11/13/20 1200          PT Assessment    Assessment Comments  He continues to complain of burning above the incision line in the L groin. The small area that was open has now completely healed. This may allow a bit more aggressive massage and compression to the area to calm down the paresthesias. I did not want to continue to let his foot be swollen, so I did apply compression wraps to the foot and lower leg. However, he is to remove them if they become painful or if he has an increase in swelling of the thigh or groin.   -HR        PT Plan    PT Plan Comments  Cont with CDT. [facility] for pump approval.   -HR       User Key  (r) = Recorded By, (t) = Taken By, (c) = Cosigned By    Initials Name Provider Type    Debbie Brambila, PT, DPT, CLT-LANNY Physical Therapist             OP Exercises     Row Name 11/13/20 1300 11/13/20 1000          Subjective Comments    Subjective Comments  --  The drive up here takes about 45 minutes and it always makes the \"knots\" come up more in the groin and the burning gets worse. He asked about the cause of burning and thought it was " the nerves.   -HR        Subjective Pain    Able to rate subjective pain?  --  yes  -HR     Pre-Treatment Pain Level  --  4  -HR        Total Minutes    46212 - PT Manual Therapy Minutes  60  -HR  --       User Key  (r) = Recorded By, (t) = Taken By, (c) = Cosigned By    Initials Name Provider Type    HR Debbie Morales, PT, DPT, CLT-LANNY Physical Therapist                     Manual Rx (last 36 hours)      Manual Treatments     Row Name 11/13/20 1300             Total Minutes    96821 - PT Manual Therapy Minutes  60  -HR        User Key  (r) = Recorded By, (t) = Taken By, (c) = Cosigned By    Initials Name Provider Type    HR Debbie Morales, PT, DPT, CLT-LANNY Physical Therapist          PT OP Goals     Row Name 11/13/20 1000          PT Short Term Goals    STG 1  Patient will gain an understanding of lymphedema and risk factors  -HR     STG 1 Progress  Ongoing;Progressing  -HR     STG 2  Patient will have a decrease in circumference of left LE along with pain reduction  -HR     STG 2 Progress  Ongoing  -HR        Long Term Goals    LTG Date to Achieve  12/11/20  -HR     LTG 1  Patient will be independent in basic exercises to increase lymph flow  -HR     LTG 1 Progress  Ongoing  -HR     LTG 2  Patient will be independent in self - massage  -HR     LTG 2 Progress  Ongoing  -HR     LTG 3  Patient will be advised of proper compression garments and fit assessed  -HR     LTG 3 Progress  Ongoing  -HR     LTG 3 Progress Comments  Explained use of compression wraps distally today  -HR     LTG 4  Patient will be given a trial of a compression pump  -HR     LTG 4 Progress  Ongoing  -HR     LTG 5  Patient will have a pain level of no >2 on a consistent basis  -HR     LTG 5 Progress  Ongoing  -HR     LTG 6  Patient will have a 90 - 100% reduction in edema of left groin and penis  -HR     LTG 6 Progress  Ongoing  -HR     LTG 7  Patient will gain independent managment of lymphedema  -HR     LTG 7 Progress  Ongoing   -HR        Time Calculation    PT Goal Re-Cert Due Date  12/11/20  -HR       User Key  (r) = Recorded By, (t) = Taken By, (c) = Cosigned By    Initials Name Provider Type    Debbie Brambila PT, SAHIL WILSON Physical Therapist          Therapy Education  Education Details: Leave the wraps on up to 24 hours. Wear the compression leggings with them.               Time Calculation:                     Debbie Morales PT, DPT, CLT-LANA  11/13/2020

## 2020-11-18 ENCOUNTER — TREATMENT (OUTPATIENT)
Dept: PHYSICAL THERAPY | Facility: CLINIC | Age: 50
End: 2020-11-18

## 2020-11-18 DIAGNOSIS — I89.0 LYMPHEDEMA OF GENITALIA: Primary | ICD-10-CM

## 2020-11-18 DIAGNOSIS — I89.0 LYMPHEDEMA OF LEFT LEG: ICD-10-CM

## 2020-11-18 DIAGNOSIS — C64.1 RENAL CELL CARCINOMA OF RIGHT KIDNEY (HCC): ICD-10-CM

## 2020-11-18 PROCEDURE — 97140 MANUAL THERAPY 1/> REGIONS: CPT | Performed by: PHYSICAL THERAPIST

## 2020-11-18 NOTE — PROGRESS NOTES
Outpatient Physical Therapy Lymphedema Treatment Note       Patient Name: Holland Phelps  : 1970  MRN: [id number]  Today's Date: 2020        Visit Date: 2020    Visit Dx:    ICD-10-CM ICD-9-CM   1. Lymphedema of genitalia  I89.0 457.1   2. Lymphedema of left leg  I89.0 457.1   3. Renal cell carcinoma of right kidney (CMS/HCC)  C64.1 189.0       Patient Active Problem List   Diagnosis   • Renal cell carcinoma of right kidney metastatic to other site (CMS/HCC)   • Iron overload   • Sickle cell beta thalassemia (CMS/HCC)        Lymphedema     Row Name 20 0900 20 0000          Subjective Pain    Able to rate subjective pain?  yes  -HR  --     Pre-Treatment Pain Level  2  -HR  --        Subjective Comments    Subjective Comments  He is really praying that he gets the pump. The wraps felt good on his lower leg last time and he left them on for even longer than 24 hours but when he took them off, the foot started to swell back up within just a couple of hours.  -HR  --        Manual Lymphatic Drainage    Manual Lymphatic Drainage  initial sequence;opened regional lymph nodes;opened anastamoses;extremity treatment  -HR  --     Initial Sequence  short neck;abdomen;diaphragmatic breathing  -HR  --     Abdomen  superficial  -HR  --     Opened Regional Lymph Nodes  axillary;inguinal  -HR  --     Axillary  right;left  -HR  --     Inguinal  right;left  -HR  --     Opened Anastamoses  inguino-axillary  -HR  --     Inguino-Axillary  right;left  -HR  --     Extremity Treatment  MLD to full limb  -HR  --     MLD to Full Limb  LLE- supine and prone.  -HR  --     Manual Lymphatic Drainage Comments  Again,  had him in prone/semi-prone position to address the posterior leg  more easily. He could not lie completely flat due to the anterior groin swelling and discomfort.   -HR  --        Compression/Skin Care    Compression/Skin Care  skin care;wrapping location;bandaging  -HR  --  -HR     Skin Care  lotion  "applied  -HR  --  -HR     Wrapping Location  lower extremity  -HR  --  -HR     Wrapping Location LE  left:;foot to knee  -HR  --  -HR     Bandage Layers  cotton liner;padding/fluff layer;short-stretch bandages (comment size/quantity)  -HR  --  -HR     Bandaging Comments  4\" loulou, 1 roll of artiflex, an 8cm and 10cm comprilan from base of toes to knee.   -HR  --       User Key  (r) = Recorded By, (t) = Taken By, (c) = Cosigned By    Initials Name Provider Type    Debbie Brambila, PT, DPT, SAHIL Physical Therapist                        PT Assessment/Plan     Row Name 11/18/20 0900          PT Assessment    Assessment Comments  The L thigh felt so tight last night that he really felt like it would burst. He continues to have the most discomfort from the groin and proximal leg swelling but his distal leg and especially the foot are now progressing towards Stage 2 lymphedema as well. He feels better after treatment. He really would benefit from the flexitouch pump as I believe he would use it daily and get more relief.   -HR        PT Plan    PT Plan Comments  Cont with CDT. [facility] for pump approval.   -HR       User Key  (r) = Recorded By, (t) = Taken By, (c) = Cosigned By    Initials Name Provider Type    Debbie Brambila, PT, DPT, SAHIL Physical Therapist             OP Exercises     Row Name 11/18/20 1100 11/18/20 0900          Subjective Comments    Subjective Comments  --  He is really praying that he gets the pump. The wraps felt good on his lower leg last time and he left them on for even longer than 24 hours but when he took them off, the foot started to swell back up within just a couple of hours.  -HR        Subjective Pain    Able to rate subjective pain?  --  yes  -HR     Pre-Treatment Pain Level  --  2  -HR        Total Minutes    28912 - PT Manual Therapy Minutes  70  -HR  --       User Key  (r) = Recorded By, (t) = Taken By, (c) = Cosigned By    Initials Name Provider Type    " HR Debbie Morales, PT, DPT, CLT-LANNY Physical Therapist                     Manual Rx (last 36 hours)      Manual Treatments     Row Name 11/18/20 1100             Total Minutes    95033 - PT Manual Therapy Minutes  70  -HR        User Key  (r) = Recorded By, (t) = Taken By, (c) = Cosigned By    Initials Name Provider Type    HR Debbie Morales, PT, DPT, CLT-LANNY Physical Therapist          PT OP Goals     Row Name 11/18/20 0900          PT Short Term Goals    STG 1  Patient will gain an understanding of lymphedema and risk factors  -HR     STG 1 Progress  Met;Ongoing  -HR     STG 2  Patient will have a decrease in circumference of left LE along with pain reduction  -HR     STG 2 Progress  Ongoing  -HR     STG 2 Progress Comments  This is actually worsening as he is having more distal swelling in the leg in addition to the groin and thigh where his symptoms are worst.   -HR        Long Term Goals    LTG Date to Achieve  12/11/20  -HR     LTG 1  Patient will be independent in basic exercises to increase lymph flow  -HR     LTG 1 Progress  Ongoing  -HR     LTG 2  Patient will be independent in self - massage  -HR     LTG 2 Progress  Ongoing  -HR     LTG 2 Progress Comments  works on this. His wife is also helping with his massage.   -HR     LTG 3  Patient will be advised of proper compression garments and fit assessed  -HR     LTG 3 Progress  Ongoing  -HR     LTG 3 Progress Comments  Keeps using compression garments for periods each day that he can tolerate.   -HR     LTG 4  Patient will be given a trial of a compression pump  -HR     LTG 4 Progress  Ongoing  -HR     LTG 5  Patient will have a pain level of no >2 on a consistent basis  -HR     LTG 5 Progress  Ongoing  -HR     LTG 5 Progress Comments  His thigh was very tight and painful last night- felt like it would burst  -HR     LTG 6  Patient will have a 90 - 100% reduction in edema of left groin and penis  -HR     LTG 6 Progress  Ongoing  -HR     LTG  7  Patient will gain independent managment of lymphedema  -HR     LTG 7 Progress  Ongoing  -HR        Time Calculation    PT Goal Re-Cert Due Date  12/11/20  -HR       User Key  (r) = Recorded By, (t) = Taken By, (c) = Cosigned By    Initials Name Provider Type    HR Debbie Morales, PT, DPT, CLT-LANNY Physical Therapist          Therapy Education  Education Details: Keep working on the massage and wear the compression every day. It is good for him to lie prone as tolerated each day to apply pressure and stretch at the groin and hip.   Given: HEP, Pain management, Edema management  Program: Reinforced  How Provided: Verbal  Provided to: Patient  Level of Understanding: Verbalized              Time Calculation:                     Debbie Morales, PT, DPT, YOSI-LANNY  11/18/2020

## 2020-11-20 ENCOUNTER — TREATMENT (OUTPATIENT)
Dept: PHYSICAL THERAPY | Facility: CLINIC | Age: 50
End: 2020-11-20

## 2020-11-20 DIAGNOSIS — I89.0 LYMPHEDEMA OF GENITALIA: Primary | ICD-10-CM

## 2020-11-20 DIAGNOSIS — C64.1 RENAL CELL CARCINOMA OF RIGHT KIDNEY (HCC): ICD-10-CM

## 2020-11-20 DIAGNOSIS — I89.0 LYMPHEDEMA OF LEFT LEG: ICD-10-CM

## 2020-11-20 PROCEDURE — 97530 THERAPEUTIC ACTIVITIES: CPT | Performed by: PHYSICAL THERAPIST

## 2020-11-20 PROCEDURE — 97140 MANUAL THERAPY 1/> REGIONS: CPT | Performed by: PHYSICAL THERAPIST

## 2020-11-20 NOTE — PROGRESS NOTES
Outpatient Physical Therapy Lymphedema Treatment Note       Patient Name: Holland Phelps  : 1970  MRN: [id number]  Today's Date: 2020        Visit Date: 2020    Visit Dx:    ICD-10-CM ICD-9-CM   1. Lymphedema of genitalia  I89.0 457.1   2. Lymphedema of left leg  I89.0 457.1   3. Renal cell carcinoma of right kidney (CMS/HCC)  C64.1 189.0       Patient Active Problem List   Diagnosis   • Renal cell carcinoma of right kidney metastatic to other site (CMS/HCC)   • Iron overload   • Sickle cell beta thalassemia (CMS/HCC)        Lymphedema     Row Name 20 0800             Subjective Pain    Able to rate subjective pain?  yes  -HR      Pre-Treatment Pain Level  2  -HR         Subjective Comments    Subjective Comments  The burning has gotten a little better.   -HR         Manual Lymphatic Drainage    Manual Lymphatic Drainage  initial sequence;opened regional lymph nodes;opened anastamoses;extremity treatment  -HR      Initial Sequence  short neck;abdomen;diaphragmatic breathing  -HR      Abdomen  superficial  -HR      Opened Regional Lymph Nodes  axillary;inguinal  -HR      Axillary  right;left  -HR      Inguinal  right;left  -HR      Opened Anastamoses  inguino-axillary  -HR      Inguino-Axillary  right;left  -HR      Extremity Treatment  MLD to full limb  -HR      Manual Lymphatic Drainage Comments  worked on posterior leg and hip in prone- he was able to go from more semi-prone over to the R to more fully prone over the time of treatment  -HR         Compression/Skin Care    Compression/Skin Care  skin care;wrapping location;bandaging  -HR      Skin Care  lotion applied  -HR      Wrapping Location  lower extremity  -HR      Wrapping Location LE  left:;foot to knee  -HR      Wrapping Comments  Talked at length about the need to have compression all the way up the leg and across the hip. Options are limited that work and stay up over the hips. He has been wearing compression pants. Had him try  "a neoprene type of material to contain the upper leg and proximal thigh.   -HR      Bandage Layers  cotton liner;padding/fluff layer;short-stretch bandages (comment size/quantity)  -HR      Bandaging Comments  4\" loulou, 1 roll of artiflex, an 8cm and 10cm comprilan from base of toes to knee.   -HR      Compression Garment Comments  he is going to try to cut and fit the neoprene compression wrap to fit his upper leg and his knee  -HR        User Key  (r) = Recorded By, (t) = Taken By, (c) = Cosigned By    Initials Name Provider Type    HR Debbie Morales, PT, DPT, CLT-LANNY Physical Therapist                        PT Assessment/Plan     Row Name 11/20/20 0800          PT Assessment    Assessment Comments  The burning has decreased somewhat. He is going to try the neoprene to his thigh for compression.   -HR        PT Plan    PT Plan Comments  Cont with current POC .  -HR       User Key  (r) = Recorded By, (t) = Taken By, (c) = Cosigned By    Initials Name Provider Type    HR Debbie Morales, PT, DPT, CLT-LANNY Physical Therapist             OP Exercises     Row Name 11/20/20 1300 11/20/20 0800          Subjective Comments    Subjective Comments  --  The burning has gotten a little better.   -HR        Subjective Pain    Able to rate subjective pain?  --  yes  -HR     Pre-Treatment Pain Level  --  2  -HR        Total Minutes    41141 - PT Therapeutic Activity Minutes  --  15  -HR     54533 - PT Manual Therapy Minutes  60  -HR  --       User Key  (r) = Recorded By, (t) = Taken By, (c) = Cosigned By    Initials Name Provider Type    HR Debbie Morales, PT, DPT, CLT-LANNY Physical Therapist                     Manual Rx (last 36 hours)      Manual Treatments     Row Name 11/20/20 1300             Total Minutes    34134 - PT Manual Therapy Minutes  60  -HR        User Key  (r) = Recorded By, (t) = Taken By, (c) = Cosigned By    Initials Name Provider Type    HR Debbie Morales, PT, DPT, " SAHIL Physical Therapist          PT OP Goals     Row Name 11/20/20 0800          PT Short Term Goals    STG 1  Patient will gain an understanding of lymphedema and risk factors  -HR     STG 1 Progress  Met;Ongoing  -HR     STG 2  Patient will have a decrease in circumference of left LE along with pain reduction  -HR     STG 2 Progress  Ongoing  -HR        Long Term Goals    LTG Date to Achieve  12/11/20  -HR     LTG 1  Patient will be independent in basic exercises to increase lymph flow  -HR     LTG 1 Progress  Ongoing  -HR     LTG 2  Patient will be independent in self - massage  -HR     LTG 2 Progress  Ongoing  -HR     LTG 3  Patient will be advised of proper compression garments and fit assessed  -HR     LTG 3 Progress  Ongoing  -HR     LTG 4  Patient will be given a trial of a compression pump  -HR     LTG 4 Progress  Ongoing  -HR     LTG 5  Patient will have a pain level of no >2 on a consistent basis  -HR     LTG 5 Progress  Ongoing  -HR     LTG 6  Patient will have a 90 - 100% reduction in edema of left groin and penis  -HR     LTG 6 Progress  Ongoing  -HR     LTG 7  Patient will gain independent managment of lymphedema  -HR     LTG 7 Progress  Ongoing  -HR        Time Calculation    PT Goal Re-Cert Due Date  12/11/20  -HR       User Key  (r) = Recorded By, (t) = Taken By, (c) = Cosigned By    Initials Name Provider Type    HR Debbie Morales, PT, DPT, SAHIL Physical Therapist          Therapy Education  Education Details: Discussed options for compression. Talked about what will stay in place with walking and what doesn't. Discussed long term goal of having compression that he can manage for the entire LLE and abdomen.               Time Calculation:                     Debbie Morales, PT, DPT, SAHIL  11/20/2020

## 2020-11-23 ENCOUNTER — TREATMENT (OUTPATIENT)
Dept: PHYSICAL THERAPY | Facility: CLINIC | Age: 50
End: 2020-11-23

## 2020-11-23 DIAGNOSIS — I89.0 LYMPHEDEMA OF LEFT LEG: ICD-10-CM

## 2020-11-23 DIAGNOSIS — I89.0 LYMPHEDEMA OF GENITALIA: Primary | ICD-10-CM

## 2020-11-23 PROCEDURE — 97140 MANUAL THERAPY 1/> REGIONS: CPT | Performed by: PHYSICAL THERAPIST

## 2020-11-23 NOTE — PROGRESS NOTES
Outpatient Physical Therapy Lymphedema Treatment Note       Patient Name: Holland Phelps  : 1970  MRN: [id number]  Today's Date: 2020        Visit Date: 2020    Visit Dx:    ICD-10-CM ICD-9-CM   1. Lymphedema of genitalia  I89.0 457.1   2. Lymphedema of left leg  I89.0 457.1       Patient Active Problem List   Diagnosis   • Renal cell carcinoma of right kidney metastatic to other site (CMS/HCC)   • Iron overload   • Sickle cell beta thalassemia (CMS/HCC)        Lymphedema     Row Name 20 0800             Subjective Pain    Able to rate subjective pain?  yes  -AL      Pre-Treatment Pain Level  3 3.5  -AL      Post-Treatment Pain Level  3  -AL      Subjective Pain Comment  L thigh  -AL         Subjective Comments    Subjective Comments  He states the swelling is in the L leg.  He still has the burning in the L leg but this is better.  The hard areas in the L groin get softer then tighten back up. He has bee wearing the chip bags and wearing compression.  He is using a straight cane for ambulation and feels like this helps him walk better.  He will see his DrPriti in [place] today.   -AL         Manual Lymphatic Drainage    Manual Lymphatic Drainage  initial sequence;opened regional lymph nodes;opened anastamoses;extremity treatment  -AL      Initial Sequence  short neck;abdomen;diaphragmatic breathing  -AL      Abdomen  superficial  -AL      Diaphragmatic Breathing  X9 with abdominals  -AL      Opened Regional Lymph Nodes  axillary;inguinal  -AL      Axillary  right;left  -AL      Inguinal  right;left  -AL      Opened Anastamoses  inguino-axillary  -AL      Inguino-Axillary  right;left  -AL      Extremity Treatment  MLD to full limb  -AL      MLD to Full Limb  LLE- supine and prone.  -AL      Manual Lymphatic Drainage Comments  Extra time working on L groin and posterior thigh.   -AL         Compression/Skin Care    Compression/Skin Care  skin care;wrapping location;bandaging  -AL      Skin Care  " lotion applied  -AL      Wrapping Location  lower extremity  -AL      Wrapping Location LE  left:;foot to knee  -AL      Bandage Layers  cotton liner;padding/fluff layer;short-stretch bandages (comment size/quantity)  -AL      Bandaging Comments  4\" stockinette, 1 roll of artiflex, an 8cm and 10cm comprilan from base of toes to knee.   -AL        User Key  (r) = Recorded By, (t) = Taken By, (c) = Cosigned By    Initials Name Provider Type    Elida Pickering PTA, SAHIL Physical Therapy Assistant                        PT Assessment/Plan     Row Name 11/23/20 0800          PT Assessment    Assessment Comments  Patient had a decrease in pain after treatment today.  He is having the burning still in the L thigh and the L lower leg is swollen.  He is able to tolerate the compression wraps with no problems.  He continues to have dense tissue L groin.  This does soften with the MLD.   -AL        PT Plan    PT Plan Comments  Will continue with CDT, will measure the L LE after treatment.   -AL       User Key  (r) = Recorded By, (t) = Taken By, (c) = Cosigned By    Initials Name Provider Type    Elida Pickering PTA, SAHIL Physical Therapy Assistant             OP Exercises     Row Name 11/23/20 0800             Subjective Comments    Subjective Comments  He states the swelling is in the L leg.  He still has the burning in the L leg but this is better.  The hard areas in the L groin get softer then tighten back up. He has bee wearing the chip bags and wearing compression.  He is using a straight cane for ambulation and feels like this helps him walk better.  He will see his  in [place] today.   -AL         Subjective Pain    Able to rate subjective pain?  yes  -AL      Pre-Treatment Pain Level  3 3.5  -AL      Post-Treatment Pain Level  3  -AL      Subjective Pain Comment  L thigh  -AL         Total Minutes    27727 - PT Manual Therapy Minutes  75  -AL        User Key  (r) = Recorded By, (t) = Taken By, (c) = " Cosigned By    Initials Name Provider Type    AL Jordin Sequeiraa P, PTA, CLT-ALNNY Physical Therapy Assistant                     Manual Rx (last 36 hours)      Manual Treatments     Row Name 11/23/20 0800             Total Minutes    11364 - PT Manual Therapy Minutes  75  -AL        User Key  (r) = Recorded By, (t) = Taken By, (c) = Cosigned By    Initials Name Provider Type    AL Fabby Elida SAMAN, PTA, CLT-LANNY Physical Therapy Assistant          PT OP Goals     Row Name 11/23/20 0800          PT Short Term Goals    STG 1  Patient will gain an understanding of lymphedema and risk factors  -AL     STG 1 Progress  Met;Ongoing  -AL     STG 2  Patient will have a decrease in circumference of left LE along with pain reduction  -AL     STG 2 Progress  Ongoing  -AL     STG 2 Progress Comments  Will measure next treatment  -AL        Long Term Goals    LTG Date to Achieve  12/11/20  -AL     LTG 1  Patient will be independent in basic exercises to increase lymph flow  -AL     LTG 1 Progress  Ongoing  -AL     LTG 1 Progress Comments  He is working on the HEP  -AL     LTG 2  Patient will be independent in self - massage  -AL     LTG 2 Progress  Ongoing  -AL     LTG 2 Progress Comments  He is working on the MLD  -AL     LTG 3  Patient will be advised of proper compression garments and fit assessed  -AL     LTG 3 Progress  Ongoing  -AL     LTG 4  Patient will be given a trial of a compression pump  -AL     LTG 4 Progress  Ongoing  -AL     LTG 5  Patient will have a pain level of no >2 on a consistent basis  -AL     LTG 5 Progress  Ongoing  -AL     LTG 6  Patient will have a 90 - 100% reduction in edema of left groin and penis  -AL     LTG 6 Progress  Ongoing  -AL     LTG 7  Patient will gain independent managment of lymphedema  -AL     LTG 7 Progress  Ongoing  -AL        Time Calculation    PT Goal Re-Cert Due Date  12/11/20  -AL       User Key  (r) = Recorded By, (t) = Taken By, (c) = Cosigned By    Initials Name Provider Type     Elida Pickering PTA, CLT-LANA Physical Therapy Assistant          Therapy Education  Education Details: Continue to work on CDT  Given: Edema management  Program: Reinforced  How Provided: Verbal  Provided to: Patient  Level of Understanding: Verbalized              Time Calculation:                     Elida Sequeira PTA, CLT-LANA  11/23/2020

## 2020-11-25 ENCOUNTER — TREATMENT (OUTPATIENT)
Dept: PHYSICAL THERAPY | Facility: CLINIC | Age: 50
End: 2020-11-25

## 2020-11-25 DIAGNOSIS — I89.0 LYMPHEDEMA OF GENITALIA: Primary | ICD-10-CM

## 2020-11-25 DIAGNOSIS — I89.0 LYMPHEDEMA OF LEFT LEG: ICD-10-CM

## 2020-11-25 PROCEDURE — 97140 MANUAL THERAPY 1/> REGIONS: CPT | Performed by: PHYSICAL THERAPIST

## 2020-11-25 NOTE — PROGRESS NOTES
Outpatient Physical Therapy Lymphedema Treatment Note       Patient Name: Holland Phelps  : 1970  MRN: [id number]  Today's Date: 2020        Visit Date: 2020    Visit Dx:    ICD-10-CM ICD-9-CM   1. Lymphedema of genitalia  I89.0 457.1   2. Lymphedema of left leg  I89.0 457.1       Patient Active Problem List   Diagnosis   • Renal cell carcinoma of right kidney metastatic to other site (CMS/HCC)   • Iron overload   • Sickle cell beta thalassemia (CMS/HCC)        Lymphedema     Row Name 20 0740             Subjective Pain    Able to rate subjective pain?  yes  -AL      Pre-Treatment Pain Level  3  -AL      Post-Treatment Pain Level  2  -AL      Subjective Pain Comment  L groin area.   -AL         Subjective Comments    Subjective Comments  He states the L leg is still swollen in some spots, but he feels pretty good.  He went to [place] after his last appointment to see his Dr's.  He states the Dr's is pleased with patient's progress and all tests came back good.  He states the L  lower leg looked good after the wraps were removed, but the swelling returns when the compression is not on.  He is working on the MLD and HEP.    -AL         Lymphedema Measurements    Measurement Type(s)  Circumferential  -AL      Circumferential Areas  Lower extremities;Trunk  -AL         BLE Circumferential (cm)    Measurement Location 1  BOT  -AL      Left 1  23.2 cm  -AL      Measurement Location 2  mid arch  -AL      Left 2  24.5 cm  -AL      Measurement Location 3  ankle  -AL      Left 3  28.4 cm  -AL      Measurement Location 4  10 cm  -AL      Left 4  24.4 cm  -AL      Measurement Location 5  10 cm  -AL      Left 5  32.1 cm  -AL      Measurement Location 6  10 cm  -AL      Left 6  38 cm  -AL      Measurement Location 7  10 cm  -AL      Left 7  38.6 cm  -AL      Measurement Location 8  10 cm  -AL      Left 8  43.4 cm  -AL      Measurement Location 9  10 cm  -AL      Left 9  51.5 cm  -AL      Measurement  "Location 10  10 cm  -AL      Left 10  67.5 cm  -AL      LLE Circumferential Total  371.6 cm  -AL         RLE Circumferential (cm)    Measurement Location 1  BOT  -AL      Measurement Location 2  mid arch  -AL      Measurement Location 3  ankle  -AL      Measurement Location 4  10 cm  -AL      Measurement Location 5  10 cm  -AL      Measurement Location 6  10 cm  -AL      Measurement Location 7  10 cm  -AL      Measurement Location 8  10 cm  -AL      Measurement Location 9  10 cm  -AL      Measurement Location 10  10 cm  -AL         Manual Lymphatic Drainage    Manual Lymphatic Drainage  initial sequence;opened regional lymph nodes;opened anastamoses;extremity treatment  -AL      Initial Sequence  short neck;abdomen;diaphragmatic breathing  -AL      Abdomen  superficial  -AL      Diaphragmatic Breathing  X9 with abdominals  -AL      Opened Regional Lymph Nodes  axillary;inguinal  -AL      Axillary  right;left  -AL      Inguinal  right;left  -AL      Opened Anastamoses  inguino-axillary  -AL      Inguino-Axillary  right;left  -AL      Extremity Treatment  MLD to full limb  -AL      MLD to Full Limb  LLE- supine and prone.  -AL      Manual Lymphatic Drainage Comments  Worked on L groin area.   -AL         Compression/Skin Care    Compression/Skin Care  skin care;wrapping location;bandaging  -AL      Skin Care  lotion applied  -AL      Wrapping Location  lower extremity  -AL      Wrapping Location LE  left:;foot to knee  -AL      Wrapping Comments  Wear compression pants as well with the wraps on the L lower leg  -AL      Bandage Layers  cotton liner;padding/fluff layer;short-stretch bandages (comment size/quantity)  -AL      Bandaging Comments  4\" stockinette, 1 roll of artiflex, an 8cm and 10cm comprilan from base of toes to knee.   -AL      Compression/Skin Care Comments  His wife will wrap the L lower leg at home.  Can keep waps on for 48 hours at a time.   -AL        User Key  (r) = Recorded By, (t) = Taken By, (c) " = Cosigned By    Initials Name Provider Type    Elida Pickering PTA, SAHIL Physical Therapy Assistant                        PT Assessment/Plan     Row Name 11/25/20 9041          PT Assessment    Assessment Comments  Patient has had an increase in size in the L LE since his last measurements.  He did have to travel to [place] and back two days ago which may have cuased increase swelling.  His wife will continue to wrap the L lower leg with the short stretch bandages, he will wear the compression pants and use the chip bag and pit pack over the L groin.  We will plan to do another basic pump trial with before and after pump measurements next treatment.  Patient is compliant with complete decongestive therapy,   -AL        PT Plan    PT Plan Comments  Will continue with CDT  -AL       User Key  (r) = Recorded By, (t) = Taken By, (c) = Cosigned By    Initials Name Provider Type    Elida Pickering PTA, CLT-LANA Physical Therapy Assistant             OP Exercises     Row Name 11/25/20 0167             Subjective Comments    Subjective Comments  He states the L leg is still swollen in some spots, but he feels pretty good.  He went to [place] after his last appointment to see his Dr's.  He states the Dr's is pleased with patient's progress and all tests came back good.  He states the L  lower leg looked good after the wraps were removed, but the swelling returns when the compression is not on.  He is working on the MLD and HEP.    -AL         Subjective Pain    Able to rate subjective pain?  yes  -AL      Pre-Treatment Pain Level  3  -AL      Post-Treatment Pain Level  2  -AL      Subjective Pain Comment  L groin area.   -AL         Total Minutes    67145 - PT Manual Therapy Minutes  75  -AL        User Key  (r) = Recorded By, (t) = Taken By, (c) = Cosigned By    Initials Name Provider Type    Elida Pickering PTA, SAHIL Physical Therapy Assistant                     Manual Rx (last 36 hours)      Manual  Treatments     Row Name 11/25/20 0740             Total Minutes    87380 - PT Manual Therapy Minutes  75  -AL        User Key  (r) = Recorded By, (t) = Taken By, (c) = Cosigned By    Initials Name Provider Type    Elida Pickering PTA, CLT-LANNY Physical Therapy Assistant          PT OP Goals     Row Name 11/25/20 0740          PT Short Term Goals    STG 1  Patient will gain an understanding of lymphedema and risk factors  -AL     STG 1 Progress  Met;Ongoing  -AL     STG 2  Patient will have a decrease in circumference of left LE along with pain reduction  -AL     STG 2 Progress  Ongoing  -AL     STG 2 Progress Comments  Has had an increase in size in the L LE  -AL        Long Term Goals    LTG Date to Achieve  12/11/20  -AL     LTG 1  Patient will be independent in basic exercises to increase lymph flow  -AL     LTG 1 Progress  Ongoing  -AL     LTG 1 Progress Comments  Compliant with HEP  -AL     LTG 2  Patient will be independent in self - massage  -AL     LTG 2 Progress  Ongoing  -AL     LTG 2 Progress Comments  Compliant with MLD  -AL     LTG 3  Patient will be advised of proper compression garments and fit assessed  -AL     LTG 3 Progress  Ongoing  -AL     LTG 4  Patient will be given a trial of a compression pump  -AL     LTG 4 Progress  Ongoing  -AL     LTG 5  Patient will have a pain level of no >2 on a consistent basis  -AL     LTG 5 Progress  Ongoing  -AL     LTG 5 Progress Comments  Pain decreased afer treatment.   -AL     LTG 6  Patient will have a 90 - 100% reduction in edema of left groin and penis  -AL     LTG 6 Progress  Ongoing  -AL     LTG 7  Patient will gain independent managment of lymphedema  -AL     LTG 7 Progress  Ongoing  -AL        Time Calculation    PT Goal Re-Cert Due Date  12/11/20  -AL       User Key  (r) = Recorded By, (t) = Taken By, (c) = Cosigned By    Initials Name Provider Type    Elida Pickering, PTA, CLT-LANNY Physical Therapy Assistant          Therapy Education  Education  Details: Work on CDT  Given: Edema management  Program: Reinforced  How Provided: Verbal  Provided to: Patient  Level of Understanding: Verbalized              Time Calculation:                     Elida Sequeira PTA, CLT-LANNY  11/25/2020

## 2020-11-30 ENCOUNTER — TREATMENT (OUTPATIENT)
Dept: PHYSICAL THERAPY | Facility: CLINIC | Age: 50
End: 2020-11-30

## 2020-11-30 DIAGNOSIS — I89.0 LYMPHEDEMA OF LEFT LEG: ICD-10-CM

## 2020-11-30 DIAGNOSIS — I89.0 LYMPHEDEMA OF GENITALIA: Primary | ICD-10-CM

## 2020-11-30 PROCEDURE — 97140 MANUAL THERAPY 1/> REGIONS: CPT | Performed by: PHYSICAL THERAPIST

## 2020-11-30 NOTE — PROGRESS NOTES
Outpatient Physical Therapy Lymphedema Treatment Note       Patient Name: Holland Phelps  : 1970  MRN: [id number]  Today's Date: 2020        Visit Date: 2020    Visit Dx:    ICD-10-CM ICD-9-CM   1. Lymphedema of genitalia  I89.0 457.1   2. Lymphedema of left leg  I89.0 457.1       Patient Active Problem List   Diagnosis   • Renal cell carcinoma of right kidney metastatic to other site (CMS/HCC)   • Iron overload   • Sickle cell beta thalassemia (CMS/HCC)        Lymphedema     Row Name 20 0750             Subjective Pain    Able to rate subjective pain?  yes  -AL      Pre-Treatment Pain Level  3  -AL      Post-Treatment Pain Level  3  -AL      Subjective Pain Comment  L groin area  -AL         Subjective Comments    Subjective Comments  He states he is working on the MLD, wearing compression, working on the MLD and elevating the L leg daily.  He is working on eating a low carb low salt diet and drinking plenty of water.  He uses a cane for gait to take pressure off of the left leg.  He has pain in the left groin area. He has had some clear/yellow drainage left groin.  Patient states when he was in the hospital his genitals were so swollen he had difficulty sitting and standing without discomfort.  Today he states he does not have the genital swelling.   -AL         Lymphedema Measurements    Measurement Type(s)  Circumferential  -AL      Circumferential Areas  Lower extremities;Trunk  -AL         BLE Circumferential (cm)    Measurement Location 1  Base of toes before pump  -AL      Left 1  22.6 cm After pump:  22.5  -AL      Measurement Location 2  mid arch before pump  -AL      Left 2  24.4 cm After pump:  24  -AL      Measurement Location 3  ankle before pump  -AL      Left 3  27.9 cm After pump:  27.7  -AL      Measurement Location 4  +10 cm  before pump  -AL      Left 4  24.6 cm After pump:  25.1  -AL      Measurement Location 5  +10 cm before pump  -AL      Left 5  30.9 cm After pump:   31.7  -AL      Measurement Location 6  +10 cm before pump:  -AL      Left 6  37.5 cm After pump:  37.6  -AL      Measurement Location 7  +10 cm  before pump  -AL      Left 7  36.4 cm After pump:  36.9  -AL      Measurement Location 8  +10 cm before pump  -AL      Left 8  41.9 cm After pump:  42.6  -AL      Measurement Location 9  +10 cm before pump  -AL      Left 9  49.1 cm After pump:  48.9  -AL      Measurement Location 10  +10 cm  before pump:  -AL      Left 10  56 cm After pump:  56.8  -AL      LLE Circumferential Total  351.3 cm  -AL         RLE Circumferential (cm)    Measurement Location 2  mid arch  -AL      Measurement Location 3  ankle  -AL         Trunk Circumferential (cm)    Measurement Location 1  umbilicus- before pump  -AL      Trunk 1  98.7 cm After pump:  100  -AL      Trunk Circumferential Total  98.7 cm  -AL         Manual Lymphatic Drainage    Manual Lymphatic Drainage  initial sequence;opened regional lymph nodes;opened anastamoses;extremity treatment  -AL      Initial Sequence  short neck;abdomen;diaphragmatic breathing  -AL      Abdomen  superficial  -AL      Diaphragmatic Breathing  X9 with superficial abdominals  -AL      Opened Regional Lymph Nodes  axillary;inguinal  -AL      Axillary  right;left  -AL      Inguinal  right;left  -AL      Opened Anastamoses  inguino-axillary  -AL      Inguino-Axillary  right;left  -AL      Extremity Treatment  MLD to full limb  -AL      MLD to Full Limb  LLE- supine and prone.  -AL      Manual Lymphatic Drainage Comments  Spent extra time working on MLD to the left groin area where hyperkeratosis is present.   -AL      Manual Therapy  DId a basic pump trial using the Entre x 30 min.  -AL         Compression/Skin Care    Compression/Skin Care  skin care;wrapping location;bandaging  -AL      Skin Care  lotion applied  -AL      Wrapping Location  lower extremity  -AL      Wrapping Location LE  left:;foot to knee  -AL      Wrapping Comments  Wear compression  "pants as well with the wraps on the L lower leg  -AL      Bandage Layers  cotton liner;padding/fluff layer;short-stretch bandages (comment size/quantity)  -AL      Bandaging Comments  4\" stockbintattjadyn, 1 roll of artiflex, an 8cm and 10cm comprilan from base of toes to knee.   -AL      Compression/Skin Care Comments  Continue to have wife wrap the L lower leg at home.  Use the pit pack and/or the foam chip bag over the L groin area where hyperkeratosis is present.   -AL        User Key  (r) = Recorded By, (t) = Taken By, (c) = Cosigned By    Initials Name Provider Type    Elida Pickering, PTA, RDT-LANNY Physical Therapy Assistant                        PT Assessment/Plan     Row Name 11/30/20 1150          PT Assessment    Assessment Comments  Patient is still having lymphorrhea left inguinal crease.  The hyperkeratosis is still present in the left groin as well.  Patient has been compliant with wearing compression daily, working on the MLD, HEP, elevating the left leg, as well as working on eating a low carb high protein diet.  Patient also drinks plenty of water.  Today patient had the basic pump trial for the left leg x 30 min.  Measurements were taken before and after the basic pump trial using the Entre.  After the basic pump was used, patient had an increase in size in both the left leg as well as the abdomen.  Patient does not have any genital swelling at this point and is tolerating the compression with no difficulties.  Using the basic pump would be detrimental to patients health, as it would cause increase swelling in the left proximal leg as well a the abdomen.  Patient's genital swelling has resolved, but using a basic pump would cause the swelling in the genitlas to return.  Patient would benifit from the Flexitouch pump for proximal clearance of the left leg and abdomen.    -AL        PT Plan    PT Plan Comments  Will continue with complete decongestive therapy.  -AL       User Key  (r) = Recorded By, " (t) = Taken By, (c) = Cosigned By    Initials Name Provider Type    Elida Pickering, PTA, CLT-LANNY Physical Therapy Assistant             OP Exercises     Row Name 11/30/20 0750             Subjective Comments    Subjective Comments  He states he is working on the MLD, wearing compression, working on the MLD and elevating the L leg daily.  He is working on eating a low carb low salt diet and drinking plenty of water.  He uses a cane for gait to take pressure off of the left leg.  He has pain in the left groin area. He has had some clear/yellow drainage left groin.  Patient states when he was in the hospital his genitals were so swollen he had difficulty sitting and standing without discomfort.  Today he states he does not have the genital swelling.   -AL         Subjective Pain    Able to rate subjective pain?  yes  -AL      Pre-Treatment Pain Level  3  -AL      Post-Treatment Pain Level  3  -AL      Subjective Pain Comment  L groin area  -AL         Total Minutes    83732 - PT Manual Therapy Minutes  40  -AL        User Key  (r) = Recorded By, (t) = Taken By, (c) = Cosigned By    Initials Name Provider Type    Elida Pickering, PTA, CLT-LANNY Physical Therapy Assistant                     Manual Rx (last 36 hours)      Manual Treatments     Row Name 11/30/20 0750             Total Minutes    53322 - PT Manual Therapy Minutes  40  -AL        User Key  (r) = Recorded By, (t) = Taken By, (c) = Cosigned By    Initials Name Provider Type    Elida Pickering, PTA, CLT-LANNY Physical Therapy Assistant          PT OP Goals     Row Name 11/30/20 0750          PT Short Term Goals    STG 1  Patient will gain an understanding of lymphedema and risk factors  -AL     STG 1 Progress  Met;Ongoing  -AL     STG 2  Patient will have a decrease in circumference of left LE along with pain reduction  -AL     STG 2 Progress  Ongoing  -AL     STG 2 Progress Comments  Pain is still present L groin today  -AL        Long Term Goals    LTG  Date to Achieve  12/11/20  -AL     LTG 1  Patient will be independent in basic exercises to increase lymph flow  -AL     LTG 1 Progress  Ongoing  -AL     LTG 1 Progress Comments  Patient is compliant with the HEP  -AL     LTG 2  Patient will be independent in self - massage  -AL     LTG 2 Progress  Ongoing  -AL     LTG 2 Progress Comments  Patient is compliant with the MLD  -AL     LTG 3  Patient will be advised of proper compression garments and fit assessed  -AL     LTG 3 Progress  Ongoing  -AL     LTG 3 Progress Comments  Patient is compliant with the compression garments as well as the short stretch banndages for the L lower leg.  -AL     LTG 4  Patient will be given a trial of a compression pump  -AL     LTG 4 Progress  Ongoing  -AL     LTG 4 Progress Comments  Patient has now had two basic pump trials.   -AL     LTG 5  Patient will have a pain level of no >2 on a consistent basis  -AL     LTG 5 Progress  Ongoing  -AL     LTG 5 Progress Comments  Pain 3/10 today  -AL     LTG 6  Patient will have a 90 - 100% reduction in edema of left groin and penis  -AL     LTG 6 Progress  Ongoing  -AL     LTG 6 Progress Comments  Genital swelling has improved  -AL     LTG 7  Patient will gain independent managment of lymphedema  -AL     LTG 7 Progress  Ongoing  -AL     LTG 7 Progress Comments  Patient is working towards his home maintenance program.   -AL        Time Calculation    PT Goal Re-Cert Due Date  12/11/20  -AL       User Key  (r) = Recorded By, (t) = Taken By, (c) = Cosigned By    Initials Name Provider Type    Elida Pickering PTA, CLT-LANA Physical Therapy Assistant          Therapy Education  Education Details: Continue to work on CDT  Given: Edema management  Program: Reinforced  How Provided: Verbal  Provided to: Patient  Level of Understanding: Verbalized              Time Calculation:   PT Non-Billable Time (min): 30 min                 Elida Sequeira PTA, CLT-LANA  11/30/2020

## 2020-12-02 ENCOUNTER — TREATMENT (OUTPATIENT)
Dept: PHYSICAL THERAPY | Facility: CLINIC | Age: 50
End: 2020-12-02

## 2020-12-02 DIAGNOSIS — C64.1 RENAL CELL CARCINOMA OF RIGHT KIDNEY (HCC): ICD-10-CM

## 2020-12-02 DIAGNOSIS — I89.0 LYMPHEDEMA OF LEFT LEG: ICD-10-CM

## 2020-12-02 DIAGNOSIS — I89.0 LYMPHEDEMA OF GENITALIA: Primary | ICD-10-CM

## 2020-12-02 PROCEDURE — 97140 MANUAL THERAPY 1/> REGIONS: CPT | Performed by: PHYSICAL THERAPIST

## 2020-12-02 NOTE — PROGRESS NOTES
Outpatient Physical Therapy Lymphedema Treatment Note       Patient Name: Holland Phelps  : 1970  MRN: [id number]  Today's Date: 2020        Visit Date: 2020    Visit Dx:    ICD-10-CM ICD-9-CM   1. Lymphedema of genitalia  I89.0 457.1   2. Lymphedema of left leg  I89.0 457.1   3. Renal cell carcinoma of right kidney (CMS/HCC)  C64.1 189.0       Patient Active Problem List   Diagnosis   • Renal cell carcinoma of right kidney metastatic to other site (CMS/HCC)   • Iron overload   • Sickle cell beta thalassemia (CMS/HCC)        Lymphedema     Row Name 20 0855             Subjective Pain    Able to rate subjective pain?  yes  -AL      Pre-Treatment Pain Level  3  -AL      Post-Treatment Pain Level  2  -AL      Subjective Pain Comment  L groin area  -AL         Subjective Comments    Subjective Comments  Patient states when he got to [place] after his last treatment his hemoglobin at at 6.  The Dr. states he was in a Sickle Cell crisis.  He did get 2 pints of blood.  The only difference he noticed was that he was not eating as much.  He is eating more now.  Patient goes back to [place] on Friday morning to see his Sickle Cell Dr.   -AL         Manual Lymphatic Drainage    Manual Lymphatic Drainage  initial sequence;opened regional lymph nodes;opened anastamoses;extremity treatment  -AL      Initial Sequence  short neck;abdomen;diaphragmatic breathing  -AL      Abdomen  superficial  -AL      Opened Regional Lymph Nodes  axillary;inguinal  -AL      Axillary  right;left  -AL      Inguinal  right;left  -AL      Opened Anastamoses  inguino-axillary  -AL      Inguino-Axillary  right;left  -AL      Extremity Treatment  MLD to full limb  -AL      MLD to Full Limb  LLE- supine and prone.  -AL      Manual Lymphatic Drainage Comments  Worked on MLD to the L groin where dense tissue is present.   -AL         Compression/Skin Care    Compression/Skin Care  skin care;wrapping location;bandaging  -AL       "Skin Care  lotion applied  -AL      Wrapping Location  lower extremity  -AL      Wrapping Location LE  left:;foot to knee  -AL      Wrapping Comments  Wear compression pants as well with the wraps on the L lower leg  -AL      Bandage Layers  cotton liner;padding/fluff layer;short-stretch bandages (comment size/quantity)  -AL      Bandaging Comments  4\" stockinette, 1 roll of artiflex, an 8cm and 10cm comprilan from base of toes to knee.   -AL      Compression/Skin Care Comments  Wear compression wraps to treatment tomorrow.   -AL        User Key  (r) = Recorded By, (t) = Taken By, (c) = Cosigned By    Initials Name Provider Type    Elida Pickering PTA, CLT-LANA Physical Therapy Assistant                        PT Assessment/Plan     Row Name 12/02/20 1639          PT Assessment    Assessment Comments  The dense tissue in the L groin softens today with the MLD.  He does still have tissue in the area that is very dense.  He feels like the wraps are keeping the lower leg size down, will measure the L LE tomorrow at his next treatment.  He no longer has the shooting pains in the L thigh,   -AL        PT Plan    PT Plan Comments  Continue with CDT, measure the L LE at his next visit.   -AL       User Key  (r) = Recorded By, (t) = Taken By, (c) = Cosigned By    Initials Name Provider Type    Elida Pickering PTA, CLT-LANA Physical Therapy Assistant             OP Exercises     Row Name 12/02/20 2289             Subjective Comments    Subjective Comments  Patient states when he got to [place] after his last treatment his hemoglobin at at 6.  The DrPriti states he was in a Sickle Cell crisis.  He did get 2 pints of blood.  The only difference he noticed was that he was not eating as much.  He is eating more now.  Patient goes back to [place] on Friday morning to see his Sickle Cell Dr.   -AL         Subjective Pain    Able to rate subjective pain?  yes  -AL      Pre-Treatment Pain Level  3  -AL      Post-Treatment Pain " Level  2  -AL      Subjective Pain Comment  L groin area  -AL         Total Minutes    84975 - PT Manual Therapy Minutes  75  -AL        User Key  (r) = Recorded By, (t) = Taken By, (c) = Cosigned By    Initials Name Provider Type    Jordin Pickeringcalos DAVISON, PTA, CLT-LANNY Physical Therapy Assistant                     Manual Rx (last 36 hours)      Manual Treatments     Row Name 12/02/20 0855             Total Minutes    [id number] - PT Manual Therapy Minutes  75  -AL        User Key  (r) = Recorded By, (t) = Taken By, (c) = Cosigned By    Initials Name Provider Type    Elida Pickering SAMAN, PTA, CLT-LANNY Physical Therapy Assistant          PT OP Goals     Row Name 12/02/20 0855          PT Short Term Goals    STG 1  Patient will gain an understanding of lymphedema and risk factors  -AL     STG 1 Progress  Met;Ongoing  -AL     STG 2  Patient will have a decrease in circumference of left LE along with pain reduction  -AL     STG 2 Progress  Ongoing  -AL     STG 2 Progress Comments  Will measrue the L LE tomorrow.  -AL        Long Term Goals    LTG Date to Achieve  12/11/20  -AL     LTG 1  Patient will be independent in basic exercises to increase lymph flow  -AL     LTG 1 Progress  Ongoing  -AL     LTG 2  Patient will be independent in self - massage  -AL     LTG 2 Progress  Ongoing  -AL     LTG 2 Progress Comments  Compliant with the MLD  -AL     LTG 3  Patient will be advised of proper compression garments and fit assessed  -AL     LTG 3 Progress  Ongoing  -AL     LTG 4  Patient will be given a trial of a compression pump  -AL     LTG 4 Progress  Ongoing  -AL     LTG 4 Progress Comments  He has had to basic pump trials  -AL     LTG 5  Patient will have a pain level of no >2 on a consistent basis  -AL     LTG 5 Progress  Ongoing  -AL     LTG 6  Patient will have a 90 - 100% reduction in edema of left groin and penis  -AL     LTG 6 Progress  Ongoing  -AL     LTG 7  Patient will gain independent managment of lymphedema  -AL     LTG  7 Progress  Ongoing  -AL        Time Calculation    PT Goal Re-Cert Due Date  12/11/20  -AL       User Key  (r) = Recorded By, (t) = Taken By, (c) = Cosigned By    Initials Name Provider Type    Elida Pickering PTA, CLT-LANA Physical Therapy Assistant          Therapy Education  Education Details: Work on CDT, wear compression wraps to treatment tomorrow  Given: Edema management  Program: Reinforced, Modified  How Provided: Verbal  Provided to: Patient  Level of Understanding: Verbalized              Time Calculation:                     Elida Sequeira PTA, CLT-LANA  12/2/2020

## 2020-12-03 ENCOUNTER — TREATMENT (OUTPATIENT)
Dept: PHYSICAL THERAPY | Facility: CLINIC | Age: 50
End: 2020-12-03

## 2020-12-03 DIAGNOSIS — C64.1 RENAL CELL CARCINOMA OF RIGHT KIDNEY (HCC): ICD-10-CM

## 2020-12-03 DIAGNOSIS — I89.0 LYMPHEDEMA OF LEFT LEG: ICD-10-CM

## 2020-12-03 DIAGNOSIS — I89.0 LYMPHEDEMA OF GENITALIA: Primary | ICD-10-CM

## 2020-12-03 PROCEDURE — 97140 MANUAL THERAPY 1/> REGIONS: CPT | Performed by: PHYSICAL THERAPIST

## 2020-12-03 NOTE — PROGRESS NOTES
Outpatient Physical Therapy Lymphedema Treatment Note       Patient Name: Holland Phelps  : 1970  MRN: [id number]  Today's Date: 12/3/2020        Visit Date: 2020    Visit Dx:    ICD-10-CM ICD-9-CM   1. Lymphedema of genitalia  I89.0 457.1   2. Lymphedema of left leg  I89.0 457.1   3. Renal cell carcinoma of right kidney (CMS/HCC)  C64.1 189.0       Patient Active Problem List   Diagnosis   • Renal cell carcinoma of right kidney metastatic to other site (CMS/HCC)   • Iron overload   • Sickle cell beta thalassemia (CMS/HCC)        Lymphedema     Row Name 20 0755             Subjective Pain    Able to rate subjective pain?  yes  -AL      Pre-Treatment Pain Level  2 2.5/10  -AL      Post-Treatment Pain Level  2  -AL      Subjective Pain Comment  L groin   -AL         Subjective Comments    Subjective Comments  He states the L leg tightened up when he was sleeping.  He was able to do the MLD and this helped.   -AL         Lymphedema Measurements    Measurement Type(s)  Circumferential  -AL      Circumferential Areas  Lower extremities;Trunk  -AL         BLE Circumferential (cm)    Measurement Location 1  BOT  -AL      Left 1  23.3 cm  -AL      Measurement Location 2  mid arch  -AL      Left 2  24.2 cm  -AL      Measurement Location 3  ankle  -AL      Left 3  27.2 cm  -AL      Measurement Location 4  10 cm  -AL      Left 4  23.9 cm  -AL      Measurement Location 5  10 cm  -AL      Left 5  30 cm  -AL      Measurement Location 6  10 cm  -AL      Left 6  36.9 cm  -AL      Measurement Location 7  10 cm  -AL      Left 7  38.5 cm  -AL      Measurement Location 8  10 cm  -AL      Left 8  45.6 cm  -AL      Measurement Location 9  10 cm  -AL      Left 9  51.4 cm  -AL      Measurement Location 10  10 cm  -AL      Left 10  56 cm  -AL      Measurement Location 11  Proximal thigh  -AL      Left 11  64 cm  -AL      LLE Circumferential Total  421 cm  -AL         RLE Circumferential (cm)    Measurement Location 1  BOT   -AL      Measurement Location 2  mid arch  -AL      Measurement Location 3  ankle  -AL      Measurement Location 4  10 cm  -AL      Measurement Location 5  10 cm  -AL      Measurement Location 6  10 cm  -AL      Measurement Location 7  10 cm  -AL      Measurement Location 8  10 cm  -AL      Measurement Location 9  10 cm  -AL      Measurement Location 10  10 cm  -AL         Trunk Circumferential (cm)    Measurement Location 1  Umbilicus  -AL      Trunk 1  100.5 cm  -AL      Measurement Location 2  Hips  -AL      Trunk 2  103.7 cm  -AL      Trunk Circumferential Total  204.2 cm  -AL         Manual Lymphatic Drainage    Manual Lymphatic Drainage  initial sequence;opened regional lymph nodes;opened anastamoses;extremity treatment  -AL      Initial Sequence  short neck;abdomen;diaphragmatic breathing  -AL      Abdomen  superficial  -AL      Diaphragmatic Breathing  X9 with superficial abdominals  -AL      Opened Regional Lymph Nodes  axillary;inguinal  -AL      Axillary  right;left  -AL      Inguinal  right;left  -AL      Opened Anastamoses  inguino-axillary  -AL      Inguino-Axillary  right;left  -AL      Extremity Treatment  MLD to full limb  -AL      MLD to Full Limb  LLE- supine and prone.  -AL      Manual Lymphatic Drainage Comments  MLD to the L groin where dense tissue is present.   -AL      Manual Therapy  Patient spoke with Ernestina before treatment stating that patient will have to persue the basic pump first.  If patient has an increase in swelling in the leg, abdomen, or genital area after using the basic pump, we can persue the Flexitouch.   -AL         Compression/Skin Care    Compression/Skin Care  skin care;wrapping location;bandaging  -AL      Skin Care  lotion applied  -AL      Wrapping Location  lower extremity  -AL      Wrapping Location LE  left:;foot to knee  -AL      Wrapping Comments  Wear compression pants as well with the wraps on the L lower leg  -AL      Bandage Layers  cotton  "liner;padding/fluff layer;short-stretch bandages (comment size/quantity)  -AL      Bandaging Comments  3\" stockinette, 1 roll of artiflex, an 8cm and 10cm comprilan from base of toes to knee.   -AL      Compression/Skin Care Comments  Wear short stretch bandages and compression pants  -AL        User Key  (r) = Recorded By, (t) = Taken By, (c) = Cosigned By    Initials Name Provider Type    Elida Pickering PTA, CLT-LANNY Physical Therapy Assistant                        PT Assessment/Plan     Row Name 12/03/20 8066          PT Assessment    Assessment Comments  Patient has had an increase in size in the L LE as well as the abdomen.  Patient will have to use the basic pump as his leg is improving.  If patient has an increase in size of the L LE or abdomen, or an increase in genital swelling we will have patient stop using the basic pump and peruse the Flexitouch.    -AL        PT Plan    PT Plan Comments  Continue with CDT  -AL       User Key  (r) = Recorded By, (t) = Taken By, (c) = Cosigned By    Initials Name Provider Type    Elida Pickering, PTA, CLT-LANNY Physical Therapy Assistant             OP Exercises     Row Name 12/03/20 5057             Subjective Comments    Subjective Comments  He states the L leg tightened up when he was sleeping.  He was able to do the MLD and this helped.   -AL         Subjective Pain    Able to rate subjective pain?  yes  -AL      Pre-Treatment Pain Level  2 2.5/10  -AL      Post-Treatment Pain Level  2  -AL      Subjective Pain Comment  L groin   -AL         Total Minutes    39757 - PT Manual Therapy Minutes  75  -AL        User Key  (r) = Recorded By, (t) = Taken By, (c) = Cosigned By    Initials Name Provider Type    Elida Pickering, PTA, CLT-LANNY Physical Therapy Assistant                     Manual Rx (last 36 hours)      Manual Treatments     Row Name 12/03/20 0752             Total Minutes    73237 - PT Manual Therapy Minutes  75  -AL        User Key  (r) = Recorded By, " (t) = Taken By, (c) = Cosigned By    Initials Name Provider Type    Elida Pickering PTA, CLT-LANNY Physical Therapy Assistant          PT OP Goals     Row Name 12/03/20 0755          PT Short Term Goals    STG 1  Patient will gain an understanding of lymphedema and risk factors  -AL     STG 1 Progress  Met;Ongoing  -AL     STG 2  Patient will have a decrease in circumference of left LE along with pain reduction  -AL     STG 2 Progress  Ongoing  -AL     STG 2 Progress Comments  He has had an overall increase in size of the L LE and an increase in size of the abdomen since last treatment.   -AL        Long Term Goals    LTG Date to Achieve  12/11/20  -AL     LTG 1  Patient will be independent in basic exercises to increase lymph flow  -AL     LTG 1 Progress  Ongoing  -AL     LTG 2  Patient will be independent in self - massage  -AL     LTG 2 Progress  Ongoing  -AL     LTG 2 Progress Comments  Working on self MLD  -AL     LTG 3  Patient will be advised of proper compression garments and fit assessed  -AL     LTG 3 Progress  Ongoing  -AL     LTG 4  Patient will be given a trial of a compression pump  -AL     LTG 4 Progress  Ongoing  -AL     LTG 5  Patient will have a pain level of no >2 on a consistent basis  -AL     LTG 5 Progress  Ongoing  -AL     LTG 6  Patient will have a 90 - 100% reduction in edema of left groin and penis  -AL     LTG 6 Progress  Ongoing  -AL     LTG 7  Patient will gain independent managment of lymphedema  -AL     LTG 7 Progress  Ongoing  -AL        Time Calculation    PT Goal Re-Cert Due Date  12/11/20  -AL       User Key  (r) = Recorded By, (t) = Taken By, (c) = Cosigned By    Initials Name Provider Type    Elida Pickering PTA, YOSI-LANNY Physical Therapy Assistant          Therapy Education  Education Details: Continue with CDT  Given: Edema management  Program: Reinforced  How Provided: Verbal  Provided to: Patient  Level of Understanding: Verbalized              Time Calculation:                      Elida Sequeira, KEILA, T-LANNY  12/3/2020

## 2020-12-07 ENCOUNTER — TREATMENT (OUTPATIENT)
Dept: PHYSICAL THERAPY | Facility: CLINIC | Age: 50
End: 2020-12-07

## 2020-12-07 DIAGNOSIS — I89.0 LYMPHEDEMA OF LEFT LEG: ICD-10-CM

## 2020-12-07 DIAGNOSIS — C64.1 RENAL CELL CARCINOMA OF RIGHT KIDNEY (HCC): ICD-10-CM

## 2020-12-07 DIAGNOSIS — I89.0 LYMPHEDEMA OF GENITALIA: Primary | ICD-10-CM

## 2020-12-07 PROCEDURE — 97140 MANUAL THERAPY 1/> REGIONS: CPT | Performed by: PHYSICAL THERAPIST

## 2020-12-07 NOTE — PROGRESS NOTES
Outpatient Physical Therapy Lymphedema Treatment Note       Patient Name: Holland Phelps  : 1970  MRN: [id number]  Today's Date: 2020        Visit Date: 2020    Visit Dx:    ICD-10-CM ICD-9-CM   1. Lymphedema of genitalia  I89.0 457.1   2. Lymphedema of left leg  I89.0 457.1   3. Renal cell carcinoma of right kidney (CMS/HCC)  C64.1 189.0       Patient Active Problem List   Diagnosis   • Renal cell carcinoma of right kidney metastatic to other site (CMS/HCC)   • Iron overload   • Sickle cell beta thalassemia (CMS/HCC)        Lymphedema     Row Name 20 0900             Subjective Pain    Able to rate subjective pain?  yes  -AL      Pre-Treatment Pain Level  2  -AL      Post-Treatment Pain Level  2  -AL      Subjective Pain Comment  L groin  -AL         Subjective Comments    Subjective Comments  He states the swelling gets better then comes back.  He did get a call from [facility] stating he would get the Flexitouch pump. He states the swelling in his leg is up today.   -AL         Manual Lymphatic Drainage    Manual Lymphatic Drainage  initial sequence;opened regional lymph nodes;opened anastamoses;extremity treatment  -AL      Initial Sequence  short neck;abdomen;diaphragmatic breathing  -AL      Abdomen  superficial  -AL      Diaphragmatic Breathing  X9 with superficial abdominals  -AL      Opened Regional Lymph Nodes  axillary;inguinal  -AL      Axillary  right;left  -AL      Inguinal  right;left  -AL      Opened Anastamoses  inguino-axillary  -AL      Inguino-Axillary  right;left  -AL      Extremity Treatment  MLD to full limb  -AL      MLD to Full Limb  LLE- supine and prone.  -AL      Manual Lymphatic Drainage Comments  Spent extra time working on posterior L thigh and L groin.   -AL         Compression/Skin Care    Compression/Skin Care  skin care;wrapping location;bandaging  -AL      Skin Care  lotion applied  -AL      Wrapping Location  lower extremity  -AL      Wrapping  "Location LE  left:;foot to knee  -AL      Wrapping Comments  Use the chip bag and pit pack over the L groin area  -AL      Bandage Layers  cotton liner;padding/fluff layer;short-stretch bandages (comment size/quantity)  -AL      Bandaging Comments  3\" stockinette, 1 roll of artiflex, an 8cm and 10cm comprilan from base of toes to knee.   -AL      Compression/Skin Care Comments  Re-wrap the L lower leg as needed.   -AL        User Key  (r) = Recorded By, (t) = Taken By, (c) = Cosigned By    Initials Name Provider Type    Elida Pickering PTA, CLT-LANA Physical Therapy Assistant                        PT Assessment/Plan     Row Name 12/07/20 0900          PT Assessment    Assessment Comments  Patient is very happy he will be able to have the Flexitouch pump.  This will help move the swelling out of the L LE, trunk, and help keep the genital area from swelling.  He did not complain of any cramping or pain in the L UE today.  He does feel slightly better after treatment, but rates the pain the same.   -AL        PT Plan    PT Plan Comments  Continue with CDT  -AL       User Key  (r) = Recorded By, (t) = Taken By, (c) = Cosigned By    Initials Name Provider Type    Elida Pickering PTA, CLT-LANA Physical Therapy Assistant             OP Exercises     Row Name 12/07/20 0900             Subjective Comments    Subjective Comments  He states the swelling gets better then comes back.  He did get a call from [facility] stating he would get the Flexitouch pump. He states the swelling in his leg is up today.   -AL         Subjective Pain    Able to rate subjective pain?  yes  -AL      Pre-Treatment Pain Level  2  -AL      Post-Treatment Pain Level  2  -AL      Subjective Pain Comment  L groin  -AL         Total Minutes    57066 - PT Manual Therapy Minutes  75  -AL        User Key  (r) = Recorded By, (t) = Taken By, (c) = Cosigned By    Initials Name Provider Type    Elida Pickering PTA, CLT-LANA Physical Therapy " Assistant                     Manual Rx (last 36 hours)      Manual Treatments     Row Name 12/07/20 0900             Total Minutes    68409 - PT Manual Therapy Minutes  75  -AL        User Key  (r) = Recorded By, (t) = Taken By, (c) = Cosigned By    Initials Name Provider Type    Elida Pickering PTA, CLT-LANNY Physical Therapy Assistant          PT OP Goals     Row Name 12/07/20 0900          PT Short Term Goals    STG 1  Patient will gain an understanding of lymphedema and risk factors  -AL     STG 1 Progress  Met;Ongoing  -AL     STG 2  Patient will have a decrease in circumference of left LE along with pain reduction  -AL     STG 2 Progress  Ongoing  -AL     STG 2 Progress Comments  Will measure the L LE at the end of the week.   -AL        Long Term Goals    LTG Date to Achieve  12/11/20  -AL     LTG 1  Patient will be independent in basic exercises to increase lymph flow  -AL     LTG 1 Progress  Ongoing  -AL     LTG 2  Patient will be independent in self - massage  -AL     LTG 2 Progress  Ongoing  -AL     LTG 2 Progress Comments  Compliant with self MLD  -AL     LTG 3  Patient will be advised of proper compression garments and fit assessed  -AL     LTG 3 Progress  Ongoing  -AL     LTG 4  Patient will be given a trial of a compression pump  -AL     LTG 4 Progress  Ongoing  -AL     LTG 5  Patient will have a pain level of no >2 on a consistent basis  -AL     LTG 5 Progress  Ongoing  -AL     LTG 6  Patient will have a 90 - 100% reduction in edema of left groin and penis  -AL     LTG 6 Progress  Ongoing  -AL     LTG 7  Patient will gain independent managment of lymphedema  -AL     LTG 7 Progress  Ongoing  -AL        Time Calculation    PT Goal Re-Cert Due Date  12/11/20  -AL       User Key  (r) = Recorded By, (t) = Taken By, (c) = Cosigned By    Initials Name Provider Type    Elida Pickering PTA, CLT-LANNY Physical Therapy Assistant          Therapy Education  Education Details: Use the pit pack and chip bag  over the dense tissue L groin  Given: Edema management  Program: Reinforced  How Provided: Verbal  Provided to: Patient  Level of Understanding: Verbalized              Time Calculation:                     Elida Sequeira PTA, CLT-LANNY  12/7/2020

## 2020-12-09 ENCOUNTER — TREATMENT (OUTPATIENT)
Dept: PHYSICAL THERAPY | Facility: CLINIC | Age: 50
End: 2020-12-09

## 2020-12-09 DIAGNOSIS — I89.0 LYMPHEDEMA OF LEFT LEG: ICD-10-CM

## 2020-12-09 DIAGNOSIS — C64.1 RENAL CELL CARCINOMA OF RIGHT KIDNEY (HCC): ICD-10-CM

## 2020-12-09 DIAGNOSIS — I89.0 LYMPHEDEMA OF GENITALIA: Primary | ICD-10-CM

## 2020-12-09 PROCEDURE — 97140 MANUAL THERAPY 1/> REGIONS: CPT | Performed by: PHYSICAL THERAPIST

## 2020-12-09 NOTE — PROGRESS NOTES
Outpatient Physical Therapy Lymphedema Treatment Note       Patient Name: Holland Phelps  : 1970  MRN: [id number]  Today's Date: 2020        Visit Date: 2020    Visit Dx:    ICD-10-CM ICD-9-CM   1. Lymphedema of genitalia  I89.0 457.1   2. Lymphedema of left leg  I89.0 457.1   3. Renal cell carcinoma of right kidney (CMS/HCC)  C64.1 189.0       Patient Active Problem List   Diagnosis   • Renal cell carcinoma of right kidney metastatic to other site (CMS/HCC)   • Iron overload   • Sickle cell beta thalassemia (CMS/HCC)        Lymphedema     Row Name 20 0915             Subjective Pain    Able to rate subjective pain?  yes  -AL      Pre-Treatment Pain Level  2  -AL      Subjective Pain Comment  L groin  -AL         Subjective Comments    Subjective Comments  He had the Flexitouch demo Monday and he feels like the Flexitouch will be a much better pump than the basic pump.    -AL         Manual Lymphatic Drainage    Manual Lymphatic Drainage  initial sequence;opened regional lymph nodes;opened anastamoses;extremity treatment  -AL      Initial Sequence  short neck;abdomen;diaphragmatic breathing  -AL      Abdomen  superficial  -AL      Diaphragmatic Breathing  X9 with superficial abdominals  -AL      Opened Regional Lymph Nodes  axillary;inguinal  -AL      Axillary  right;left  -AL      Inguinal  right;left  -AL      Opened Anastamoses  inguino-axillary  -AL      Inguino-Axillary  right;left  -AL      Extremity Treatment  MLD to full limb  -AL      MLD to Full Limb  LLE- supine and prone.  -AL      Manual Lymphatic Drainage Comments  MLD to the L groin where dense tissue is present.   -AL         Compression/Skin Care    Compression/Skin Care  skin care;wrapping location;bandaging  -AL      Skin Care  lotion applied  -AL      Wrapping Location  lower extremity  -AL      Wrapping Location LE  left:;foot to knee  -AL      Bandage Layers  cotton liner;padding/fluff layer;short-stretch bandages  "(comment size/quantity)  -AL      Bandaging Comments  3\" stockinette, 1 roll of artiflex, an 8cm and 10cm comprilan from base of toes to knee.   -AL      Compression/Skin Care Comments  Re-wrap the L lower leg as needed.   -AL        User Key  (r) = Recorded By, (t) = Taken By, (c) = Cosigned By    Initials Name Provider Type    Elida Pickering PTA, SAHIL Physical Therapy Assistant                        PT Assessment/Plan     Row Name 12/09/20 0915          PT Assessment    Assessment Comments  The L foot and ankle are swollen today, will measure the L LE next treatment.  Pateint had the Flexitouch trial, this was much more comfortable for him.  Patient will be able to use this pump daily to help decrease swelling in the L LE, trunk, and groin.  He did notice a softening of the L upper leg after the Flexitouch.   -AL        PT Plan    PT Plan Comments  Continue with CDT  -AL       User Key  (r) = Recorded By, (t) = Taken By, (c) = Cosigned By    Initials Name Provider Type    Elida Pickering PTA, SAHIL Physical Therapy Assistant             OP Exercises     Row Name 12/09/20 0915             Subjective Comments    Subjective Comments  He had the Flexitouch demo Monday and he feels like the Flexitouch will be a much better pump than the basic pump.    -AL         Subjective Pain    Able to rate subjective pain?  yes  -AL      Pre-Treatment Pain Level  2  -AL      Subjective Pain Comment  L groin  -AL         Total Minutes    20589 - PT Manual Therapy Minutes  75  -AL        User Key  (r) = Recorded By, (t) = Taken By, (c) = Cosigned By    Initials Name Provider Type    Elida Pickering, PTA, SAHIL Physical Therapy Assistant                     Manual Rx (last 36 hours)      Manual Treatments     Row Name 12/09/20 0915             Total Minutes    81932 - PT Manual Therapy Minutes  75  -AL        User Key  (r) = Recorded By, (t) = Taken By, (c) = Cosigned By    Initials Name Provider Type    DEMARCUS Sequeira, " Elida DAVISON PTA, CLT-LANA Physical Therapy Assistant          PT OP Goals     Row Name 12/09/20 0915          PT Short Term Goals    STG 1  Patient will gain an understanding of lymphedema and risk factors  -AL     STG 1 Progress  Met;Ongoing  -AL     STG 2  Patient will have a decrease in circumference of left LE along with pain reduction  -AL     STG 2 Progress  Ongoing  -AL     STG 2 Progress Comments  Will measure next treatment.   -AL        Long Term Goals    LTG Date to Achieve  12/11/20  -AL     LTG 1  Patient will be independent in basic exercises to increase lymph flow  -AL     LTG 1 Progress  Ongoing  -AL     LTG 2  Patient will be independent in self - massage  -AL     LTG 2 Progress  Ongoing  -AL     LTG 2 Progress Comments  He is working on the MLD  -AL     LTG 3  Patient will be advised of proper compression garments and fit assessed  -AL     LTG 3 Progress  Ongoing  -AL     LTG 4  Patient will be given a trial of a compression pump  -AL     LTG 4 Progress  Ongoing  -AL     LTG 4 Progress Comments  He has had the Flexitouch trial  -AL     LTG 5  Patient will have a pain level of no >2 on a consistent basis  -AL     LTG 5 Progress  Ongoing  -AL     LTG 6  Patient will have a 90 - 100% reduction in edema of left groin and penis  -AL     LTG 6 Progress  Ongoing  -AL     LTG 7  Patient will gain independent managment of lymphedema  -AL     LTG 7 Progress  Ongoing  -AL        Time Calculation    PT Goal Re-Cert Due Date  12/11/20  -AL       User Key  (r) = Recorded By, (t) = Taken By, (c) = Cosigned By    Initials Name Provider Type    Elida Pickering PTA, CLT-LANA Physical Therapy Assistant          Therapy Education  Education Details: Work on CDT  Given: Edema management  Program: Reinforced  How Provided: Verbal  Provided to: Patient  Level of Understanding: Verbalized              Time Calculation:                     Elida Sequeira PTA, CLT-LANA  12/9/2020

## 2020-12-11 ENCOUNTER — TREATMENT (OUTPATIENT)
Dept: PHYSICAL THERAPY | Facility: CLINIC | Age: 50
End: 2020-12-11

## 2020-12-11 DIAGNOSIS — I89.0 LYMPHEDEMA OF GENITALIA: Primary | ICD-10-CM

## 2020-12-11 DIAGNOSIS — I89.0 LYMPHEDEMA OF LEFT LEG: ICD-10-CM

## 2020-12-11 DIAGNOSIS — C64.1 RENAL CELL CARCINOMA OF RIGHT KIDNEY (HCC): ICD-10-CM

## 2020-12-11 PROCEDURE — 97140 MANUAL THERAPY 1/> REGIONS: CPT | Performed by: PHYSICAL THERAPIST

## 2020-12-11 PROCEDURE — 97530 THERAPEUTIC ACTIVITIES: CPT | Performed by: PHYSICAL THERAPIST

## 2020-12-11 NOTE — PROGRESS NOTES
Outpatient Physical Therapy Lymphedema Progress Note       Patient Name: Holland Phelps  : 1970  MRN: [id number]  Today's Date: 2020        Visit Date: 2020    Visit Dx:    ICD-10-CM ICD-9-CM   1. Lymphedema of genitalia  I89.0 457.1   2. Lymphedema of left leg  I89.0 457.1   3. Renal cell carcinoma of right kidney (CMS/HCC)  C64.1 189.0       Patient Active Problem List   Diagnosis   • Renal cell carcinoma of right kidney metastatic to other site (CMS/HCC)   • Iron overload   • Sickle cell beta thalassemia (CMS/HCC)        Lymphedema     Row Name 20 0750             Subjective Pain    Able to rate subjective pain?  yes  -AL      Pre-Treatment Pain Level  2 2.510  -AL      Post-Treatment Pain Level  2  -AL      Subjective Pain Comment  L groin  -AL         Subjective Comments    Subjective Comments  He states he was woken by L thigh pain today.  He is frustrated becuase of the left leg swelling.  He feels like the wraps help his lower leg.  He has not heard from [facility] in the past couple of days.   -AL         Lymphedema Measurements    Measurement Type(s)  Circumferential  -AL      Circumferential Areas  Lower extremities;Trunk  -AL         BLE Circumferential (cm)    Measurement Location 1  BOT  -AL      Left 1  22.5 cm  -AL      Measurement Location 2  mid arch  -AL      Left 2  24.5 cm  -AL      Measurement Location 3  ankle  -AL      Left 3  27.2 cm  -AL      Measurement Location 4  10 cm  -AL      Left 4  24.2 cm  -AL      Measurement Location 5  10 cm  -AL      Left 5  31 cm  -AL      Measurement Location 6  10 cm  -AL      Left 6  37.4 cm  -AL      Measurement Location 7  10 cm  -AL      Left 7  37 cm  -AL      Measurement Location 8  10 cm  -AL      Left 8  42.3 cm  -AL      Measurement Location 9  10 cm  -AL      Left 9  50 cm  -AL      Measurement Location 10  10 cm  -AL      Left 10  56.2 cm  -AL      Measurement Location 11  Proximal thigh  -AL      Left 11  60 cm  -AL  "     LLE Circumferential Total  412.3 cm  -AL         RLE Circumferential (cm)    Measurement Location 1  BOT  -AL      Measurement Location 2  mid arch  -AL      Measurement Location 3  ankle  -AL      Measurement Location 4  10 cm  -AL      Measurement Location 5  10 cm  -AL      Measurement Location 6  10 cm  -AL      Measurement Location 7  10 cm  -AL      Measurement Location 8  10 cm  -AL      Measurement Location 9  10 cm  -AL      Measurement Location 10  10 cm  -AL      Measurement Location 11  Proximal thigh  -AL         Trunk Circumferential (cm)    Measurement Location 1  Umbilicus  -AL      Measurement Location 2  Hips  -AL         Manual Lymphatic Drainage    Manual Lymphatic Drainage  initial sequence;opened regional lymph nodes;opened anastamoses;extremity treatment  -AL      Initial Sequence  short neck;abdomen;diaphragmatic breathing  -AL      Abdomen  superficial  -AL      Diaphragmatic Breathing  X9 with superficial abdominals  -AL      Opened Regional Lymph Nodes  axillary;inguinal  -AL      Axillary  right;left  -AL      Inguinal  right;left  -AL      Opened Anastamoses  inguino-axillary  -AL      Inguino-Axillary  right;left  -AL      Extremity Treatment  MLD to full limb  -AL      MLD to Full Limb  LLE- supine and prone.  -AL      Manual Lymphatic Drainage Comments  Extra time spent on MLD to the L groin area and L upper leg  -AL         Compression/Skin Care    Compression/Skin Care  skin care;wrapping location;bandaging  -AL      Skin Care  lotion applied  -AL      Wrapping Location  lower extremity  -AL      Wrapping Location LE  left:;foot to knee  -AL      Wrapping Comments  Use the chip bag and pit pack over the L groin area  -AL      Bandage Layers  cotton liner;padding/fluff layer;short-stretch bandages (comment size/quantity)  -AL      Bandaging Comments  3\" stockinette, 1 roll of artiflex, an 8cm and 10cm comprilan from base of toes to knee.   -AL      Compression/Skin Care Comments "  Re-wrap the lower leg as needed, continue to wear compression pants.  -AL        User Key  (r) = Recorded By, (t) = Taken By, (c) = Cosigned By    Initials Name Provider Type    Elida Pickering PTA, CLT-LANA Physical Therapy Assistant                        PT Assessment/Plan     Row Name 12/11/20 8675          PT Assessment    Functional Limitations  Impaired gait;Limitation in home management;Limitations in community activities;Limitations in functional capacity and performance;Performance in leisure activities;Performance in self-care ADL  -HR     Impairments  Gait;Impaired lymphatic circulation;Pain;Range of motion  -HR     Assessment Comments  Patient has had a reduction in the L upper leg as compared to his last treatment, but an increase in size in the lower leg.  He had pain in the night L upper leg that woke him, he was able to do the MLD to help the pain.  The tissue in the L upper leg is softer today, there is still dense fibrotic tissue L groin.  Patient is waiting for the Flexitouch pump, this will help him work towards his goal for his home maintenance program.   -AL     Rehab Potential  Good  -HR     Patient/caregiver participated in establishment of treatment plan and goals  Yes  -HR     Patient would benefit from skilled therapy intervention  Yes  -HR        PT Plan    PT Frequency  2x/week;3x/week  -AL     Predicted Duration of Therapy Intervention (PT)  4 weeks  -AL     Planned CPT's?  PT RE-EVAL: 46765;PT THER PROC EA 15 MIN: 86107;PT THER ACT EA 15 MIN: 13527;PT MANUAL THERAPY EA 15 MIN: 98011;PT SELF CARE/HOME MGMT/TRAIN EA 15: 26576  -AL     PT Plan Comments  Continue with CDT  -AL       User Key  (r) = Recorded By, (t) = Taken By, (c) = Cosigned By    Initials Name Provider Type    Elida Pickering PTA, CLT-LANA Physical Therapy Assistant    HR Debbie Morales, PT, DPT, SAHIL Physical Therapist             OP Exercises     Row Name 12/11/20 4276             Subjective Comments     Subjective Comments  He states he was woken by L thigh pain today.  He is frustrated becuase of the left leg swelling.  He feels like the wraps help his lower leg.  He has not heard from [facility] in the past couple of days.   -AL         Subjective Pain    Able to rate subjective pain?  yes  -AL      Pre-Treatment Pain Level  2 2.5/10  -AL      Post-Treatment Pain Level  2  -AL      Subjective Pain Comment  L groin  -AL         Total Minutes    41281 - PT Therapeutic Activity Minutes  15  -AL      46424 - PT Manual Therapy Minutes  75  -AL        User Key  (r) = Recorded By, (t) = Taken By, (c) = Cosigned By    Initials Name Provider Type    Elida Pickering, PTA, CLT-LANNY Physical Therapy Assistant                     Manual Rx (last 36 hours)      Manual Treatments     Row Name 12/11/20 0750             Total Minutes    20344 - PT Manual Therapy Minutes  75  -AL        User Key  (r) = Recorded By, (t) = Taken By, (c) = Cosigned By    Initials Name Provider Type    Elida Pickering, PTA, CLT-LANNY Physical Therapy Assistant          PT OP Goals     Row Name 12/11/20 0750          PT Short Term Goals    STG 1  Patient will gain an understanding of lymphedema and risk factors  -AL     STG 1 Progress  Met;Ongoing  -AL     STG 1 Progress Comments  He has a good understanding of lymphedema  -AL     STG 2  Patient will have a decrease in circumference of left LE along with pain reduction  -AL     STG 2 Progress  Ongoing  -AL        Long Term Goals    LTG Date to Achieve  12/11/20  -AL     LTG 1  Patient will be independent in basic exercises to increase lymph flow  -AL     LTG 1 Progress  Ongoing;Partially Met;Progressing  -AL     LTG 1 Progress Comments  He is working on his HEP  -AL     LTG 2  Patient will be independent in self - massage  -AL     LTG 2 Progress  Ongoing;Partially Met;Progressing  -AL     LTG 2 Progress Comments  He is compliant with the MLD, this helps when he wakes up with pain.  -AL      LTG 3  Patient will be advised of proper compression garments and fit assessed  -AL     LTG 3 Progress  Ongoing;Progressing  -AL     LTG 3 Progress Comments  Patient is wearing the compression pants  -AL     LTG 4  Patient will be given a trial of a compression pump  -AL     LTG 4 Progress  Ongoing;Progressing  -AL     LTG 4 Progress Comments  [facility] has let patient know he will be getting the Flexitouch pump  -AL     LTG 5  Patient will have a pain level of no >2 on a consistent basis  -AL     LTG 5 Progress  Ongoing  -AL     LTG 5 Progress Comments  Pain 2-3/10 on a consistent basis, he does wake up with pain that is more intense.  -AL     LTG 6  Patient will have a 90 - 100% reduction in edema of left groin and penis  -AL     LTG 6 Progress  Met;Ongoing  -AL     LTG 6 Progress Comments  He no longer has the genital swelling  -AL     LTG 7  Patient will gain independent managment of lymphedema  -AL     LTG 7 Progress  Ongoing;Progressing  -AL     LTG 7 Progress Comments  He is working towards his home maintenance program.   -AL        Time Calculation    PT Goal Re-Cert Due Date  01/10/21  -AL       User Key  (r) = Recorded By, (t) = Taken By, (c) = Cosigned By    Initials Name Provider Type    Elida Pickering PTA, SAHIL Physical Therapy Assistant          Therapy Education  Education Details: Continue to work on CDT.  Given: Edema management  Program: Reinforced  How Provided: Verbal  Provided to: Patient  Level of Understanding: Verbalized              Time Calculation:                     Debbie Morales, PT, DPT, CLZIGGY-LANNY  12/11/2020

## 2020-12-14 ENCOUNTER — TREATMENT (OUTPATIENT)
Dept: PHYSICAL THERAPY | Facility: CLINIC | Age: 50
End: 2020-12-14

## 2020-12-14 DIAGNOSIS — I89.0 LYMPHEDEMA OF GENITALIA: Primary | ICD-10-CM

## 2020-12-14 DIAGNOSIS — C64.1 RENAL CELL CARCINOMA OF RIGHT KIDNEY (HCC): ICD-10-CM

## 2020-12-14 DIAGNOSIS — I89.0 LYMPHEDEMA OF LEFT LEG: ICD-10-CM

## 2020-12-14 PROCEDURE — 97140 MANUAL THERAPY 1/> REGIONS: CPT | Performed by: PHYSICAL THERAPIST

## 2020-12-14 NOTE — PROGRESS NOTES
Outpatient Physical Therapy Lymphedema Treatment Note       Patient Name: Holland Phelps  : 1970  MRN: [id number]  Today's Date: 2020        Visit Date: 2020    Visit Dx:    ICD-10-CM ICD-9-CM   1. Lymphedema of genitalia  I89.0 457.1   2. Lymphedema of left leg  I89.0 457.1   3. Renal cell carcinoma of right kidney (CMS/HCC)  C64.1 189.0       Patient Active Problem List   Diagnosis   • Renal cell carcinoma of right kidney metastatic to other site (CMS/HCC)   • Iron overload   • Sickle cell beta thalassemia (CMS/HCC)        Lymphedema     Row Name 20 0915             Subjective Pain    Able to rate subjective pain?  yes  -AL      Pre-Treatment Pain Level  2  -AL      Post-Treatment Pain Level  -- 1.5/10  -AL         Subjective Comments    Subjective Comments  He states his leg is doing pretty good today, he does still have swelling in the L foot.  He feels like the dense tissue in the L groin is getting smaller.   -AL         Manual Lymphatic Drainage    Manual Lymphatic Drainage  initial sequence;opened regional lymph nodes;opened anastamoses;extremity treatment  -AL      Initial Sequence  short neck;abdomen;diaphragmatic breathing  -AL      Abdomen  superficial  -AL      Diaphragmatic Breathing  X9 with superficial abdominals  -AL      Opened Regional Lymph Nodes  axillary;inguinal  -AL      Axillary  right;left  -AL      Inguinal  right;left  -AL      Opened Anastamoses  inguino-axillary  -AL      Inguino-Axillary  right;left  -AL      Extremity Treatment  MLD to full limb  -AL      MLD to Full Limb  LLE- supine and prone.  -AL      Manual Lymphatic Drainage Comments  MLD to L groin/upper leg areas  -AL      Manual Therapy  In prone, gentle streting to the L quads while stabilizing L side of pelvis.   -AL         Compression/Skin Care    Compression/Skin Care  skin care;wrapping location;bandaging  -AL      Skin Care  lotion applied  -AL      Wrapping Location  lower extremity  -AL       "Wrapping Location LE  left:;foot to knee  -AL      Wrapping Comments  Continue to use the chip bag and pit pack.  -AL      Bandage Layers  cotton liner;padding/fluff layer;short-stretch bandages (comment size/quantity)  -AL      Bandaging Comments  3\" stockinette, 1 roll of artiflex, an 8cm and 10cm comprilan from base of toes to knee.   -AL      Compression/Skin Care Comments  Wrap the L lower leg as needed.   -AL        User Key  (r) = Recorded By, (t) = Taken By, (c) = Cosigned By    Initials Name Provider Type    Elida Pickering PTA, CLT-LANA Physical Therapy Assistant                        PT Assessment/Plan     Row Name 12/14/20 8910          PT Assessment    Assessment Comments  Patient is having less pain today overall, and less pain with the MLD.  He is complaint with CDT.  He has not had as much pain over the weekend at night and is able to sleep in the bed instead of the recliner.  He has not heard from [facility] about the pump.  The area L groin has less dense tissue today.  The L quads and L hip flexors are tight, will work on gentle stretching.   -AL        PT Plan    PT Plan Comments  Continue with CDT  -AL       User Key  (r) = Recorded By, (t) = Taken By, (c) = Cosigned By    Initials Name Provider Type    Elida Pickering PTA, CLT-LANA Physical Therapy Assistant             OP Exercises     Row Name 12/14/20 8382             Subjective Comments    Subjective Comments  He states his leg is doing pretty good today, he does still have swelling in the L foot.  He feels like the dense tissue in the L groin is getting smaller.   -AL         Subjective Pain    Able to rate subjective pain?  yes  -AL      Pre-Treatment Pain Level  2  -AL      Post-Treatment Pain Level  -- 1.5/10  -AL         Total Minutes    91444 - PT Manual Therapy Minutes  75  -AL        User Key  (r) = Recorded By, (t) = Taken By, (c) = Cosigned By    Initials Name Provider Type    Elida Pickering PTA, CLT-LANA Physical " Therapy Assistant                     Manual Rx (last 36 hours)      Manual Treatments     Row Name 12/14/20 0915             Total Minutes    67280 - PT Manual Therapy Minutes  75  -AL        User Key  (r) = Recorded By, (t) = Taken By, (c) = Cosigned By    Initials Name Provider Type    Elida Pickering PTA, CLT-LANNY Physical Therapy Assistant          PT OP Goals     Row Name 12/14/20 0915          PT Short Term Goals    STG 1  Patient will gain an understanding of lymphedema and risk factors  -AL     STG 1 Progress  Met;Ongoing  -AL     STG 2  Patient will have a decrease in circumference of left LE along with pain reduction  -AL     STG 2 Progress  Ongoing  -AL        Long Term Goals    LTG Date to Achieve  12/11/20  -AL     LTG 1  Patient will be independent in basic exercises to increase lymph flow  -AL     LTG 1 Progress  Ongoing;Partially Met;Progressing  -AL     LTG 2  Patient will be independent in self - massage  -AL     LTG 2 Progress  Ongoing;Partially Met;Progressing  -AL     LTG 2 Progress Comments  He is working on the MLD  -AL     LTG 3  Patient will be advised of proper compression garments and fit assessed  -AL     LTG 3 Progress  Ongoing;Progressing  -AL     LTG 4  Patient will be given a trial of a compression pump  -AL     LTG 4 Progress  Ongoing;Progressing  -AL     LTG 5  Patient will have a pain level of no >2 on a consistent basis  -AL     LTG 5 Progress  Ongoing  -AL     LTG 6  Patient will have a 90 - 100% reduction in edema of left groin and penis  -AL     LTG 6 Progress  Met;Ongoing  -AL     LTG 7  Patient will gain independent managment of lymphedema  -AL     LTG 7 Progress  Ongoing;Progressing  -AL        Time Calculation    PT Goal Re-Cert Due Date  12/10/20  -AL       User Key  (r) = Recorded By, (t) = Taken By, (c) = Cosigned By    Initials Name Provider Type    Elida Pickering, PTA, CLT-LANNY Physical Therapy Assistant          Therapy Education  Education Details: Work on  CDT  Given: Edema management  Program: Reinforced  How Provided: Verbal  Provided to: Patient  Level of Understanding: Verbalized              Time Calculation:                     Elida Sequeira PTA, [facility]-LANNY  12/14/2020

## 2020-12-16 ENCOUNTER — TREATMENT (OUTPATIENT)
Dept: PHYSICAL THERAPY | Facility: CLINIC | Age: 50
End: 2020-12-16

## 2020-12-16 DIAGNOSIS — C64.1 RENAL CELL CARCINOMA OF RIGHT KIDNEY (HCC): ICD-10-CM

## 2020-12-16 DIAGNOSIS — I89.0 LYMPHEDEMA OF LEFT LEG: ICD-10-CM

## 2020-12-16 DIAGNOSIS — I89.0 LYMPHEDEMA OF GENITALIA: Primary | ICD-10-CM

## 2020-12-16 PROCEDURE — 97140 MANUAL THERAPY 1/> REGIONS: CPT | Performed by: PHYSICAL THERAPIST

## 2020-12-16 PROCEDURE — 97530 THERAPEUTIC ACTIVITIES: CPT | Performed by: PHYSICAL THERAPIST

## 2020-12-16 NOTE — PROGRESS NOTES
Outpatient Physical Therapy Lymphedema Treatment Note       Patient Name: Holland Phelps  : 1970  MRN: [id number]  Today's Date: 2020        Visit Date: 2020    Visit Dx:    ICD-10-CM ICD-9-CM   1. Lymphedema of genitalia  I89.0 457.1   2. Lymphedema of left leg  I89.0 457.1   3. Renal cell carcinoma of right kidney (CMS/HCC)  C64.1 189.0       Patient Active Problem List   Diagnosis   • Renal cell carcinoma of right kidney metastatic to other site (CMS/HCC)   • Iron overload   • Sickle cell beta thalassemia (CMS/HCC)        Lymphedema     Row Name 20 0850             Subjective Pain    Able to rate subjective pain?  yes  -AL      Pre-Treatment Pain Level  2  -AL      Subjective Pain Comment  L groin  -AL         Subjective Comments    Subjective Comments  Patient states when he stood yesterday he has a sharp shooting pain in the L foot around the heel/ankle.  He lost his balance with this pain.  This has not happened again, but he still has some pain at times in the ankle/heel area.  He spoke with the [employer] rep Eleazar yesterday.  Eleazar states the order for the pump was supposed to be faxed yesterday to his Dr.    -AL         Manual Lymphatic Drainage    Manual Lymphatic Drainage  initial sequence;opened regional lymph nodes;opened anastamoses;extremity treatment  -AL      Initial Sequence  short neck;abdomen;diaphragmatic breathing  -AL      Abdomen  superficial  -AL      Opened Regional Lymph Nodes  axillary;inguinal  -AL      Axillary  right;left  -AL      Inguinal  right;left  -AL      Opened Anastamoses  inguino-axillary  -AL      Inguino-Axillary  right;left  -AL      Extremity Treatment  MLD to full limb  -AL      MLD to Full Limb  LLE- supine and prone.  -AL      Manual Lymphatic Drainage Comments  Extra time spent on MLD to the L groin area and L upper leg  -AL      Manual Therapy  In prone, gentle streting to the L quads while stabilizing L side of pelvis.  Supine with L  "leg slightly off the table, did L hip flexor stretch with gentle knee flexion.  Added self heel cord stretch and self hip flexor stretch to HEP.  -AL         Compression/Skin Care    Compression/Skin Care  skin care;wrapping location;bandaging  -AL      Skin Care  lotion applied  -AL      Wrapping Location  lower extremity  -AL      Wrapping Location LE  left:;foot to knee  -AL      Wrapping Comments  Continue to use the chip bag and pit pack.  -AL      Bandage Layers  cotton liner;padding/fluff layer;short-stretch bandages (comment size/quantity)  -AL      Bandaging Comments  3\" stockinette, Kidney shaped padding at lateral and medial and malleolus, 1 roll of artiflex, an 8cm and 10cm comprilan from base of toes to knee.   -AL      Compression/Skin Care Comments  Wrap the L lower leg as needed.   -AL        User Key  (r) = Recorded By, (t) = Taken By, (c) = Cosigned By    Initials Name Provider Type    Elida Pickering PTA, CLT-LANA Physical Therapy Assistant                        PT Assessment/Plan     Row Name 12/16/20 0850          PT Assessment    Assessment Comments  Patient has had pain at the L ankle which started yesterday.  He will work on gentle stretching for the L ankle/heelcord as well as stretching for the L hip and quads.  Added kidney shaped pads at the medial and lateral malleolus.  This was comfortable for patient upon standing, will see if this helps the ankle pain he has been having.   -AL        PT Plan    PT Plan Comments  Continue with CDT, assess effectiveness of the padding at the ankle.   -AL       User Key  (r) = Recorded By, (t) = Taken By, (c) = Cosigned By    Initials Name Provider Type    Elida Pickering PTA, CLT-LANA Physical Therapy Assistant             OP Exercises     Row Name 12/16/20 0833             Subjective Comments    Subjective Comments  Patient states when he stood yesterday he has a sharp shooting pain in the L foot around the heel/ankle.  He lost his balance with " this pain.  This has not happened again, but he still has some pain at times in the ankle/heel area.  He spoke with the [employer] rep Eleazar yesterday.  Eleazar states the order for the pump was supposed to be faxed yesterday to his Dr.    -AL         Subjective Pain    Able to rate subjective pain?  yes  -AL      Pre-Treatment Pain Level  2  -AL      Subjective Pain Comment  L groin  -AL         Total Minutes    49957 - PT Therapeutic Activity Minutes  10  -AL      97308 - PT Manual Therapy Minutes  80  -AL        User Key  (r) = Recorded By, (t) = Taken By, (c) = Cosigned By    Initials Name Provider Type    AL Sequeira, Elida P, PTA, CLT-LANNY Physical Therapy Assistant                     Manual Rx (last 36 hours)      Manual Treatments     Row Name 12/16/20 0850             Total Minutes    90542 - PT Manual Therapy Minutes  80  -AL        User Key  (r) = Recorded By, (t) = Taken By, (c) = Cosigned By    Initials Name Provider Type    AL Sequeira, Elida P, PTA, CLT-LANNY Physical Therapy Assistant          PT OP Goals     Row Name 12/16/20 0850          PT Short Term Goals    STG 1  Patient will gain an understanding of lymphedema and risk factors  -AL     STG 1 Progress  Met;Ongoing  -AL     STG 2  Patient will have a decrease in circumference of left LE along with pain reduction  -AL     STG 2 Progress  Ongoing  -AL     STG 2 Progress Comments  Will measure next visit  -AL        Long Term Goals    LTG Date to Achieve  12/11/20  -AL     LTG 1  Patient will be independent in basic exercises to increase lymph flow  -AL     LTG 1 Progress  Ongoing;Partially Met;Progressing  -AL     LTG 1 Progress Comments  He will also work on gentle stretching for the L LE  -AL     LTG 2  Patient will be independent in self - massage  -AL     LTG 2 Progress  Ongoing;Partially Met;Progressing  -AL     LTG 2 Progress Comments  Compliant with MLD  -AL     LTG 3  Patient will be advised of proper compression garments and fit assessed   -AL     LTG 3 Progress  Ongoing;Progressing  -AL     LTG 4  Patient will be given a trial of a compression pump  -AL     LTG 4 Progress  Ongoing;Progressing  -AL     LTG 4 Progress Comments  Waiting for the Flexitouch pump  -AL     LTG 5  Patient will have a pain level of no >2 on a consistent basis  -AL     LTG 5 Progress  Ongoing  -AL     LTG 6  Patient will have a 90 - 100% reduction in edema of left groin and penis  -AL     LTG 6 Progress  Met;Ongoing  -AL     LTG 7  Patient will gain independent managment of lymphedema  -AL     LTG 7 Progress  Ongoing;Progressing  -AL        Time Calculation    PT Goal Re-Cert Due Date  01/10/21  -AL       User Key  (r) = Recorded By, (t) = Taken By, (c) = Cosigned By    Initials Name Provider Type    Elida Pickering PTA, CLT-LANA Physical Therapy Assistant          Therapy Education  Education Details: Supine with L leg slightly off the table, did L hip flexor stretch with gentle knee flexion.  Added self heel cord stretch and self hip flexor stretch.  Given: Symptoms/condition management  Program: New  How Provided: Verbal, Demonstration  Provided to: Patient  Level of Understanding: Verbalized, Demonstrated              Time Calculation:                     Elida Sequeira PTA, CLT-LANA  12/16/2020

## 2020-12-18 ENCOUNTER — TREATMENT (OUTPATIENT)
Dept: PHYSICAL THERAPY | Facility: CLINIC | Age: 50
End: 2020-12-18

## 2020-12-18 DIAGNOSIS — I89.0 LYMPHEDEMA OF LEFT LEG: ICD-10-CM

## 2020-12-18 DIAGNOSIS — C64.1 RENAL CELL CARCINOMA OF RIGHT KIDNEY (HCC): ICD-10-CM

## 2020-12-18 DIAGNOSIS — I89.0 LYMPHEDEMA OF GENITALIA: Primary | ICD-10-CM

## 2020-12-18 PROCEDURE — 97140 MANUAL THERAPY 1/> REGIONS: CPT | Performed by: PHYSICAL THERAPIST

## 2020-12-18 NOTE — PROGRESS NOTES
Outpatient Physical Therapy Lymphedema Treatment Note       Patient Name: Holland Phelps  : 1970  MRN: [id number]  Today's Date: 2020        Visit Date: 2020    Visit Dx:    ICD-10-CM ICD-9-CM   1. Lymphedema of genitalia  I89.0 457.1   2. Lymphedema of left leg  I89.0 457.1   3. Renal cell carcinoma of right kidney (CMS/HCC)  C64.1 189.0       Patient Active Problem List   Diagnosis   • Renal cell carcinoma of right kidney metastatic to other site (CMS/HCC)   • Iron overload   • Sickle cell beta thalassemia (CMS/HCC)        Lymphedema     Row Name 20 0750             Subjective Pain    Able to rate subjective pain?  yes  -AL      Pre-Treatment Pain Level  2  -AL      Subjective Pain Comment  L groin  -AL         Subjective Comments    Subjective Comments  He did talk to [facility] and they are waiting for all the paperwork to be signed from the Dr.  He states his foot was swollen last night.  His wife re-wrapped the L lower leg.    -AL         BLE Circumferential (cm)    Measurement Location 1  BOT  -AL      Left 1  23.6 cm  -AL      Measurement Location 2  mid arch  -AL      Left 2  24.7 cm  -AL      Measurement Location 3  ankle  -AL      Left 3  26.3 cm  -AL      Measurement Location 4  10 cm  -AL      Left 4  26.5 cm  -AL      Measurement Location 5  10 cm  -AL      Left 5  32.5 cm  -AL      Measurement Location 6  10 cm  -AL      Left 6  39.9 cm  -AL      Measurement Location 7  10 cm  -AL      Left 7  40.2 cm  -AL      Measurement Location 8  10 cm  -AL      Left 8  46 cm  -AL      Measurement Location 9  10 cm  -AL      Left 9  51 cm  -AL      Measurement Location 10  10 cm  -AL      Left 10  57.2 cm  -AL      Measurement Location 11  Proximal thigh  -AL      Left 11  65 cm  -AL      LLE Circumferential Total  432.9 cm  -AL         RLE Circumferential (cm)    Measurement Location 1  BOT  -AL      Measurement Location 2  mid arch  -AL      Measurement Location 3  ankle  -AL       "Measurement Location 4  10 cm  -AL      Measurement Location 5  10 cm  -AL      Measurement Location 6  10 cm  -AL      Measurement Location 7  10 cm  -AL      Measurement Location 8  10 cm  -AL      Measurement Location 9  10 cm  -AL      Measurement Location 10  10 cm  -AL      Measurement Location 11  Proximal thigh  -AL         Trunk Circumferential (cm)    Measurement Location 1  Umbilicus  -AL      Measurement Location 2  Hips  -AL         Manual Lymphatic Drainage    Manual Lymphatic Drainage  initial sequence;opened regional lymph nodes;opened anastamoses;extremity treatment  -AL      Initial Sequence  short neck;abdomen;diaphragmatic breathing  -AL      Abdomen  superficial  -AL      Opened Regional Lymph Nodes  axillary;inguinal  -AL      Axillary  right;left  -AL      Inguinal  right;left  -AL      Opened Anastamoses  inguino-axillary  -AL      Inguino-Axillary  right;left  -AL      Extremity Treatment  MLD to full limb  -AL      MLD to Full Limb  LLE- supine and prone.  -AL      Manual Lymphatic Drainage Comments  Spent extra time working on the dense tissue L upper leg  -AL         Compression/Skin Care    Compression/Skin Care  skin care;wrapping location;bandaging  -AL      Skin Care  lotion applied  -AL      Wrapping Location  lower extremity  -AL      Wrapping Location LE  left:;foot to knee  -AL      Wrapping Comments  Continue to use the chip bag and pit pack.  -AL      Bandage Layers  cotton liner;padding/fluff layer;short-stretch bandages (comment size/quantity)  -AL      Bandaging Comments  3\" stockinette, Kidney shaped padding at lateral and medial and malleolus, 1 roll of artiflex, an 8cm and 10cm comprilan from base of toes to knee.   -AL      Compression/Skin Care Comments  Wear the compression pants.   -AL        User Key  (r) = Recorded By, (t) = Taken By, (c) = Cosigned By    Initials Name Provider Type    AL Elida Sequeira PTA, YOSI-LANNY Physical Therapy Assistant    "                     PT Assessment/Plan     Row Name 12/18/20 0750          PT Assessment    Assessment Comments  SInce patient's last measurements of the L LE, has has had an increase in size of the L LE.  He is compliant with CDT and elevating the leg.  He is waiting for all of the paperwork to be signed for the Flexitouch pump.  Will measure the L LE at his next visit.   -AL        PT Plan    PT Plan Comments  Continue wtih CDT  -AL       User Key  (r) = Recorded By, (t) = Taken By, (c) = Cosigned By    Initials Name Provider Type    Elida Pickering, PTA, CLT-LANNY Physical Therapy Assistant             OP Exercises     Row Name 12/18/20 0750             Subjective Comments    Subjective Comments  He did talk to [facility] and they are waiting for all the paperwork to be signed from the Dr.  He states his foot was swollen last night.  His wife re-wrapped the L lower leg.    -AL         Subjective Pain    Able to rate subjective pain?  yes  -AL      Pre-Treatment Pain Level  2  -AL      Subjective Pain Comment  L groin  -AL         Total Minutes    45064 - PT Manual Therapy Minutes  90  -AL        User Key  (r) = Recorded By, (t) = Taken By, (c) = Cosigned By    Initials Name Provider Type    Elida Pickering P, PTA, CLT-LANNY Physical Therapy Assistant                     Manual Rx (last 36 hours)      Manual Treatments     Row Name 12/18/20 0750             Total Minutes    63351 - PT Manual Therapy Minutes  90  -AL        User Key  (r) = Recorded By, (t) = Taken By, (c) = Cosigned By    Initials Name Provider Type    Elida Pickering P, PTA, CLT-LANNY Physical Therapy Assistant          PT OP Goals     Row Name 12/18/20 0750          PT Short Term Goals    STG 1  Patient will gain an understanding of lymphedema and risk factors  -AL     STG 1 Progress  Met;Ongoing  -AL     STG 2  Patient will have a decrease in circumference of left LE along with pain reduction  -AL     STG 2 Progress  Ongoing  -AL     STG 2  Progress Comments  He has had an increase in size of the L LE  -AL        Long Term Goals    LTG Date to Achieve  12/11/20  -AL     LTG 1  Patient will be independent in basic exercises to increase lymph flow  -AL     LTG 1 Progress  Ongoing;Partially Met;Progressing  -AL     LTG 1 Progress Comments  Compliant with HEP  -AL     LTG 2  Patient will be independent in self - massage  -AL     LTG 2 Progress  Ongoing;Partially Met;Progressing  -AL     LTG 3  Patient will be advised of proper compression garments and fit assessed  -AL     LTG 3 Progress  Ongoing;Progressing  -AL     LTG 4  Patient will be given a trial of a compression pump  -AL     LTG 4 Progress  Ongoing;Progressing  -AL     LTG 4 Progress Comments  Waiting for all of the paperwork to be signed.  -AL     LTG 5  Patient will have a pain level of no >2 on a consistent basis  -AL     LTG 5 Progress  Ongoing  -AL     LTG 6  Patient will have a 90 - 100% reduction in edema of left groin and penis  -AL     LTG 6 Progress  Met;Ongoing  -AL     LTG 7  Patient will gain independent managment of lymphedema  -AL     LTG 7 Progress  Ongoing;Progressing  -AL        Time Calculation    PT Goal Re-Cert Due Date  01/10/21  -AL       User Key  (r) = Recorded By, (t) = Taken By, (c) = Cosigned By    Initials Name Provider Type    Elida Pickering PTA, CLT-LANA Physical Therapy Assistant          Therapy Education  Education Details: Work on CDT  Given: Edema management  Program: Reinforced  How Provided: Verbal  Provided to: Patient  Level of Understanding: Verbalized              Time Calculation:                     Elida Sequeira PTA, CLT-LANA  12/18/2020

## 2020-12-23 ENCOUNTER — TREATMENT (OUTPATIENT)
Dept: PHYSICAL THERAPY | Facility: CLINIC | Age: 50
End: 2020-12-23

## 2020-12-23 DIAGNOSIS — I89.0 LYMPHEDEMA OF LEFT LEG: ICD-10-CM

## 2020-12-23 DIAGNOSIS — C64.1 RENAL CELL CARCINOMA OF RIGHT KIDNEY (HCC): ICD-10-CM

## 2020-12-23 DIAGNOSIS — I89.0 LYMPHEDEMA OF GENITALIA: Primary | ICD-10-CM

## 2020-12-23 PROCEDURE — 97140 MANUAL THERAPY 1/> REGIONS: CPT | Performed by: PHYSICAL THERAPIST

## 2020-12-23 NOTE — PROGRESS NOTES
Outpatient Physical Therapy Lymphedema Treatment Note       Patient Name: Holland Phelps  : 1970  MRN: [id number]  Today's Date: 2020        Visit Date: 2020    Visit Dx:    ICD-10-CM ICD-9-CM   1. Lymphedema of genitalia  I89.0 457.1   2. Lymphedema of left leg  I89.0 457.1   3. Renal cell carcinoma of right kidney (CMS/HCC)  C64.1 189.0       Patient Active Problem List   Diagnosis   • Renal cell carcinoma of right kidney metastatic to other site (CMS/HCC)   • Iron overload   • Sickle cell beta thalassemia (CMS/HCC)        Lymphedema     Row Name 20 0915             Subjective Pain    Able to rate subjective pain?  yes  -AL      Pre-Treatment Pain Level  -- 2.5/10  -AL      Post-Treatment Pain Level  -- 1.5/10  -AL      Subjective Pain Comment  L foot and L shin  -AL         Subjective Comments    Subjective Comments  He has a burning sensation in the shin and ankle today.   He did speak with [facility] and he should have the Flexitouch pump next week.   -AL         BLE Circumferential (cm)    Measurement Location 1  BOT  -AL      Left 1  22.1 cm  -AL      Measurement Location 2  mid arch  -AL      Left 2  23 cm  -AL      Measurement Location 3  ankle  -AL      Left 3  26.1 cm  -AL      Measurement Location 4  10 cm  -AL      Left 4  24.4 cm  -AL      Measurement Location 5  10 cm  -AL      Left 5  30.9 cm  -AL      Measurement Location 6  10 cm  -AL      Left 6  36 cm  -AL      Measurement Location 7  10 cm  -AL      Left 7  37.4 cm  -AL      Measurement Location 8  10 cm  -AL      Left 8  43.6 cm  -AL      Measurement Location 9  10 cm  -AL      Left 9  50.6 cm  -AL      Measurement Location 10  10 cm  -AL      Left 10  58.5 cm  -AL      Measurement Location 11  Proximal thigh  -AL      Left 11  59 cm  -AL      LLE Circumferential Total  411.6 cm  -AL         RLE Circumferential (cm)    Measurement Location 1  BOT  -AL      Measurement Location 2  mid arch  -AL      Measurement  "Location 3  ankle  -AL      Measurement Location 4  10 cm  -AL      Measurement Location 5  10 cm  -AL      Measurement Location 6  10 cm  -AL      Measurement Location 7  10 cm  -AL      Measurement Location 8  10 cm  -AL      Measurement Location 9  10 cm  -AL      Measurement Location 10  10 cm  -AL      Measurement Location 11  Proximal thigh  -AL         Trunk Circumferential (cm)    Measurement Location 1  Umbilicus  -AL      Measurement Location 2  Hips  -AL         Manual Lymphatic Drainage    Manual Lymphatic Drainage  initial sequence;opened regional lymph nodes;opened anastamoses;extremity treatment  -AL      Initial Sequence  short neck;abdomen;diaphragmatic breathing  -AL      Abdomen  superficial  -AL      Diaphragmatic Breathing  X 9 with superficial abdominals  -AL      Opened Regional Lymph Nodes  axillary;inguinal  -AL      Axillary  right;left  -AL      Inguinal  right;left  -AL      Opened Anastamoses  inguino-axillary  -AL      Inguino-Axillary  right;left  -AL      Extremity Treatment  MLD to full limb  -AL      MLD to Full Limb  LLE- supine and prone.  -AL      Manual Lymphatic Drainage Comments  Extra time working on MLD to the L ankle, calf, and upper leg.   -AL      Manual Therapy  Also MLD to the L groin where dense tissue is present.   -AL         Compression/Skin Care    Compression/Skin Care  skin care;wrapping location;bandaging  -AL      Skin Care  lotion applied  -AL      Wrapping Location  lower extremity  -AL      Wrapping Location LE  left:;foot to knee  -AL      Wrapping Comments  Continue to use the chip bag and pit pack.  -AL      Bandage Layers  cotton liner;padding/fluff layer;short-stretch bandages (comment size/quantity)  -AL      Bandaging Comments  3\" stockinette, 1.5  rolls of artiflex with extra padding at the ankles,  an 8cm and 10cm comprilan from base of toes to knee.   -AL      Compression/Skin Care Comments  Continue to wear compression and elevate the L LE  -AL   "      User Key  (r) = Recorded By, (t) = Taken By, (c) = Cosigned By    Initials Name Provider Type    Elida Pickering, PTA, CLT-LANNY Physical Therapy Assistant                        PT Assessment/Plan     Row Name 12/23/20 0915          PT Assessment    Assessment Comments  Patient is very happy he will getting the Flexitouch pump soon.  Paitent has elevated the L LE more the past few days.  He has had a reduction in the L LE as compared to last week.  He is compliant with CDT.  -AL        PT Plan    PT Plan Comments  Continue wtih CDT  -AL       User Key  (r) = Recorded By, (t) = Taken By, (c) = Cosigned By    Initials Name Provider Type    Elida Pickering, PTA, CLT-LANNY Physical Therapy Assistant             OP Exercises     Row Name 12/23/20 0915             Subjective Comments    Subjective Comments  He has a burning sensation in the shin and ankle today.   He did speak with [facility] and he should have the Flexitouch pump next week.   -AL         Subjective Pain    Able to rate subjective pain?  yes  -AL      Pre-Treatment Pain Level  -- 2.5/10  -AL      Post-Treatment Pain Level  -- 1.5/10  -AL      Subjective Pain Comment  L foot and L shin  -AL         Total Minutes    37710 - PT Manual Therapy Minutes  75  -AL        User Key  (r) = Recorded By, (t) = Taken By, (c) = Cosigned By    Initials Name Provider Type    Elida Pickering, PTA, CLT-LANNY Physical Therapy Assistant                     Manual Rx (last 36 hours)      Manual Treatments     Row Name 12/23/20 0915             Total Minutes    16980 - PT Manual Therapy Minutes  75  -AL        User Key  (r) = Recorded By, (t) = Taken By, (c) = Cosigned By    Initials Name Provider Type    Elida Pickering P, PTA, CLT-LANNY Physical Therapy Assistant          PT OP Goals     Row Name 12/23/20 0915          PT Short Term Goals    STG 1  Patient will gain an understanding of lymphedema and risk factors  -AL     STG 1 Progress  Met;Ongoing  -AL     STG  2  Patient will have a decrease in circumference of left LE along with pain reduction  -AL     STG 2 Progress  Ongoing  -AL     STG 2 Progress Comments  The L LE has reduced  -AL        Long Term Goals    LTG Date to Achieve  12/11/20  -AL     LTG 1  Patient will be independent in basic exercises to increase lymph flow  -AL     LTG 1 Progress  Ongoing;Partially Met;Progressing  -AL     LTG 2  Patient will be independent in self - massage  -AL     LTG 2 Progress  Ongoing;Partially Met;Progressing  -AL     LTG 2 Progress Comments  Compliant with the MLD  -AL     LTG 3  Patient will be advised of proper compression garments and fit assessed  -AL     LTG 3 Progress  Ongoing;Progressing  -AL     LTG 4  Patient will be given a trial of a compression pump  -AL     LTG 4 Progress  Ongoing;Progressing  -AL     LTG 5  Patient will have a pain level of no >2 on a consistent basis  -AL     LTG 5 Progress  Ongoing  -AL     LTG 6  Patient will have a 90 - 100% reduction in edema of left groin and penis  -AL     LTG 6 Progress  Met;Ongoing  -AL     LTG 7  Patient will gain independent managment of lymphedema  -AL     LTG 7 Progress  Ongoing;Progressing  -AL        Time Calculation    PT Goal Re-Cert Due Date  01/10/21  -AL       User Key  (r) = Recorded By, (t) = Taken By, (c) = Cosigned By    Initials Name Provider Type    Elida Pickering PTA, CLT-LANA Physical Therapy Assistant          Therapy Education  Education Details: Work on CDT  Given: Edema management  Program: Reinforced  How Provided: Verbal  Provided to: Patient  Level of Understanding: Verbalized              Time Calculation:                     Elida Sequeira PTA, CLT-LANA  12/23/2020

## 2020-12-28 ENCOUNTER — TREATMENT (OUTPATIENT)
Dept: PHYSICAL THERAPY | Facility: CLINIC | Age: 50
End: 2020-12-28

## 2020-12-28 DIAGNOSIS — I89.0 LYMPHEDEMA OF LEFT LEG: ICD-10-CM

## 2020-12-28 DIAGNOSIS — I89.0 LYMPHEDEMA OF GENITALIA: Primary | ICD-10-CM

## 2020-12-28 DIAGNOSIS — C64.1 RENAL CELL CARCINOMA OF RIGHT KIDNEY (HCC): ICD-10-CM

## 2020-12-28 PROCEDURE — 97140 MANUAL THERAPY 1/> REGIONS: CPT | Performed by: PHYSICAL THERAPIST

## 2020-12-28 NOTE — PROGRESS NOTES
Outpatient Physical Therapy Lymphedema Treatment Note       Patient Name: Holland Phelps  : 1970  MRN: [id number]  Today's Date: 2020        Visit Date: 2020    Visit Dx:    ICD-10-CM ICD-9-CM   1. Lymphedema of genitalia  I89.0 457.1   2. Lymphedema of left leg  I89.0 457.1   3. Renal cell carcinoma of right kidney (CMS/HCC)  C64.1 189.0       Patient Active Problem List   Diagnosis   • Renal cell carcinoma of right kidney metastatic to other site (CMS/HCC)   • Iron overload   • Sickle cell beta thalassemia (CMS/HCC)        Lymphedema     Row Name 20 0700             Subjective Pain    Able to rate subjective pain?  yes  -AL      Pre-Treatment Pain Level  2  -AL      Subjective Pain Comment  L ankle  -AL         Subjective Comments    Subjective Comments  He [place] pump [place] stuck in [place].   He has to leave by 8:00 am this morning to make it to his appointment in [place].    -AL         Manual Lymphatic Drainage    Manual Lymphatic Drainage  initial sequence;opened regional lymph nodes;opened anastamoses;extremity treatment  -AL      Initial Sequence  short neck;abdomen;diaphragmatic breathing  -AL      Abdomen  superficial  -AL      Diaphragmatic Breathing  X 9 with superficial abdominals  -AL      Opened Regional Lymph Nodes  axillary;inguinal  -AL      Axillary  right;left  -AL      Inguinal  right;left  -AL      Opened Anastamoses  inguino-axillary  -AL      Inguino-Axillary  right;left  -AL      Extremity Treatment  MLD to full limb  -AL      MLD to Full Limb  LLE- supine and prone.  -AL      Manual Lymphatic Drainage Comments  Spent extra time working on MLD to the L ankle and L upper leg.   -AL      Manual Therapy  MLD to L groin  -AL         Compression/Skin Care    Compression/Skin Care  skin care;wrapping location;bandaging  -AL      Skin Care  lotion applied  -AL      Wrapping Location  lower extremity  -AL      Wrapping Location LE  left:;foot to knee  -AL      Bandage Layers   "cotton liner;padding/fluff layer;short-stretch bandages (comment size/quantity)  -AL      Bandaging Comments  3\" stockinette, 1.5  rolls of artiflex with extra padding at the ankles,  an 8cm and 10cm comprilan from base of toes to knee.   -AL      Compression/Skin Care Comments  Use chip bag, pit pack, elevate L LE.   -AL        User Key  (r) = Recorded By, (t) = Taken By, (c) = Cosigned By    Initials Name Provider Type    Elida Pickering PTA, SAHIL Physical Therapy Assistant                        PT Assessment/Plan     Row Name 12/28/20 0700          PT Assessment    Assessment Comments  The Flextiouch pump is on it's way, but delayed with shipping.  Patient is frustrated that the pump is not here yet, he is very eager to start using the Flexitouch pump.  Patient is compliant with CDT.  He has had pain in the L ankle and shin the past few days, the L groin is better.  Still a minimal amount of dense tissue present in the L groin.  Patient had to leave early today, as he has appointments in [place] this morning.  Will measure the L LE next visit.   -AL        PT Plan    PT Plan Comments  Continue with CDT, assess effectiveness of pump if he has used it.   -AL       User Key  (r) = Recorded By, (t) = Taken By, (c) = Cosigned By    Initials Name Provider Type    Elida Pickering PTA, SAHIL Physical Therapy Assistant             OP Exercises     Row Name 12/28/20 0700             Subjective Comments    Subjective Comments  He [place] pump [place] stuck in [place].   He has to leave by 8:00 am this morning to make it to his appointment in [place].    -AL         Subjective Pain    Able to rate subjective pain?  yes  -AL      Pre-Treatment Pain Level  2  -AL      Subjective Pain Comment  L ankle  -AL         Total Minutes    45516 - PT Manual Therapy Minutes  60  -AL        User Key  (r) = Recorded By, (t) = Taken By, (c) = Cosigned By    Initials Name Provider Type    Elida Pickering PTA, SAHIL Physical " Therapy Assistant                     Manual Rx (last 36 hours)      Manual Treatments     Row Name 12/28/20 0700             Total Minutes    00589 - PT Manual Therapy Minutes  60  -AL        User Key  (r) = Recorded By, (t) = Taken By, (c) = Cosigned By    Initials Name Provider Type    Elida Pickering PTA, CLT-LANNY Physical Therapy Assistant          PT OP Goals     Row Name 12/28/20 0700          PT Short Term Goals    STG 1  Patient will gain an understanding of lymphedema and risk factors  -AL     STG 1 Progress  Met;Ongoing  -AL     STG 2  Patient will have a decrease in circumference of left LE along with pain reduction  -AL     STG 2 Progress  Ongoing  -AL     STG 2 Progress Comments  Will measure next visit.   -AL        Long Term Goals    LTG Date to Achieve  12/11/20  -AL     LTG 1  Patient will be independent in basic exercises to increase lymph flow  -AL     LTG 1 Progress  Ongoing;Partially Met;Progressing  -AL     LTG 2  Patient will be independent in self - massage  -AL     LTG 2 Progress  Ongoing;Partially Met;Progressing  -AL     LTG 3  Patient will be advised of proper compression garments and fit assessed  -AL     LTG 3 Progress  Ongoing;Progressing  -AL     LTG 4  Patient will be given a trial of a compression pump  -AL     LTG 4 Progress  Ongoing;Progressing  -AL     LTG 4 Progress Comments  Still waiting for the Flexitouch pump  -AL     LTG 5  Patient will have a pain level of no >2 on a consistent basis  -AL     LTG 5 Progress  Ongoing  -AL     LTG 6  Patient will have a 90 - 100% reduction in edema of left groin and penis  -AL     LTG 6 Progress  Met;Ongoing  -AL     LTG 7  Patient will gain independent managment of lymphedema  -AL     LTG 7 Progress  Ongoing;Progressing  -AL        Time Calculation    PT Goal Re-Cert Due Date  01/10/21  -AL       User Key  (r) = Recorded By, (t) = Taken By, (c) = Cosigned By    Initials Name Provider Type    Elida Pickering, PTA, CLT-LANNY Physical  Therapy Assistant          Therapy Education  Education Details: Continue with CDT and elevate L LE  Given: Edema management  Program: Reinforced  How Provided: Verbal  Provided to: Patient  Level of Understanding: Verbalized              Time Calculation:                     Elida Sequeira PTA, [facility]-LANNY  12/28/2020

## 2020-12-30 ENCOUNTER — TREATMENT (OUTPATIENT)
Dept: PHYSICAL THERAPY | Facility: CLINIC | Age: 50
End: 2020-12-30

## 2020-12-30 DIAGNOSIS — I89.0 LYMPHEDEMA OF LEFT LEG: ICD-10-CM

## 2020-12-30 DIAGNOSIS — I89.0 LYMPHEDEMA OF GENITALIA: Primary | ICD-10-CM

## 2020-12-30 DIAGNOSIS — C64.1 RENAL CELL CARCINOMA OF RIGHT KIDNEY (HCC): ICD-10-CM

## 2020-12-30 PROCEDURE — 97140 MANUAL THERAPY 1/> REGIONS: CPT | Performed by: PHYSICAL THERAPIST

## 2020-12-30 PROCEDURE — 97530 THERAPEUTIC ACTIVITIES: CPT | Performed by: PHYSICAL THERAPIST

## 2020-12-30 NOTE — PROGRESS NOTES
Outpatient Physical Therapy Lymphedema Treatment Note       Patient Name: Holland Phelps  : 1970  MRN: [id number]  Today's Date: 2020        Visit Date: 2020    Visit Dx:    ICD-10-CM ICD-9-CM   1. Lymphedema of genitalia  I89.0 457.1   2. Lymphedema of left leg  I89.0 457.1   3. Renal cell carcinoma of right kidney (CMS/HCC)  C64.1 189.0       Patient Active Problem List   Diagnosis   • Renal cell carcinoma of right kidney metastatic to other site (CMS/HCC)   • Iron overload   • Sickle cell beta thalassemia (CMS/HCC)        Lymphedema     Row Name 20 0858             Subjective Pain    Able to rate subjective pain?  yes  -AL      Pre-Treatment Pain Level  2  -AL      Post-Treatment Pain Level  -- < 2/10  -AL      Subjective Pain Comment  L ankle and shin  -AL         Subjective Comments    Subjective Comments  He states he has not had any chemo for about 3 months.  He had a scan and his scans looked better than the last scan he had 3 months ago.  He had been on chemo for 6 years.  He will have another scan in 3 months.  He states his pump will be delivered today.  He is elevating the leg, he can tell the leg swells when he is up or driving.    -AL         BLE Circumferential (cm)    Measurement Location 1  BOT  -AL      Left 1  22 cm  -AL      Measurement Location 2  mid arch  -AL      Left 2  24.6 cm  -AL      Measurement Location 3  ankle  -AL      Left 3  29.4 cm  -AL      Measurement Location 4  10 cm  -AL      Left 4  25.4 cm  -AL      Measurement Location 5  10 cm  -AL      Left 5  33.1 cm  -AL      Measurement Location 6  10 cm  -AL      Left 6  39.1 cm  -AL      Measurement Location 7  10 cm  -AL      Left 7  40.1 cm  -AL      Measurement Location 8  10 cm  -AL      Left 8  45.6 cm  -AL      Measurement Location 9  10 cm  -AL      Left 9  52.6 cm  -AL      Measurement Location 10  10 cm  -AL      Left 10  59.9 cm  -AL      Measurement Location 11  Proximal thigh  -AL      Left 11   "62.7 cm  -AL      LLE Circumferential Total  434.5 cm  -AL         Manual Lymphatic Drainage    Manual Lymphatic Drainage  initial sequence;opened regional lymph nodes;opened anastamoses;extremity treatment  -AL      Initial Sequence  short neck;abdomen;diaphragmatic breathing  -AL      Abdomen  superficial  -AL      Diaphragmatic Breathing  X 9 with superficial abdominals  -AL      Opened Regional Lymph Nodes  axillary;inguinal  -AL      Axillary  right;left  -AL      Inguinal  right;left  -AL      Opened Anastamoses  inguino-axillary  -AL      Inguino-Axillary  right;left  -AL      Extremity Treatment  MLD to full limb  -AL      MLD to Full Limb  LLE- supine and prone.  -AL      Manual Lymphatic Drainage Comments  Spent extra time working on MLD to the L ankle and L upper leg.   MLD to the L groin.  -AL      Manual Therapy  Spent time educating patient about how to use the Flexitouch pump, and to use the pump on the trunk and L leg just one time a day.  If he notices he has shortness of breath with the pump, he needs to use the pump every other day.  Also discussed decreasing frequency of appointments to 2 x per week.   -AL         Compression/Skin Care    Compression/Skin Care  skin care;wrapping location;bandaging  -AL      Skin Care  lotion applied  -AL      Wrapping Location  lower extremity  -AL      Wrapping Location LE  left:;foot to knee  -AL      Wrapping Comments  Continue to use the chip bag and pit pack.  -AL      Bandage Layers  cotton liner;padding/fluff layer;short-stretch bandages (comment size/quantity)  -AL      Bandaging Comments  3\" stockinette, 1.5  rolls of artiflex with extra padding at the ankles,  an 8cm and 10cm comprilan from base of toes to knee.   -AL      Compression/Skin Care Comments  Continue to wear compression pants and compression wraps.  Use the pit pack or chip bag over dense tissue L groin.   -AL        User Key  (r) = Recorded By, (t) = Taken By, (c) = Cosigned By    " Initials Name Provider Type    Elida Pickering PTA, SAHIL Physical Therapy Assistant                        PT Assessment/Plan     Row Name 12/30/20 7580          PT Assessment    Assessment Comments  Patient did go to [place] this week and his scans were good, he won't have to take chemo for the next 3 months.  With traveling he tends to have an increase in size in the L LE.  His Flexitouch pump should be at his home today, and he will start using it.  Will assess the effectiveness of the pump next visit.  Since patient will be able to use the pump the next few days, will decrease treatments to 2 x per week.   -AL        PT Plan    PT Plan Comments  Assess effectiveness of the Flexitouch pump, continue [facility] CDT.  -AL       User Key  (r) = Recorded By, (t) = Taken By, (c) = Cosigned By    Initials Name Provider Type    Elida Pickering PTA, CLT-LANA Physical Therapy Assistant             OP Exercises     Row Name 12/30/20 0790             Subjective Comments    Subjective Comments  He states he has not had any chemo for about 3 months.  He had a scan and his scans looked better than the last scan he had 3 months ago.  He had been on chemo for 6 years.  He will have another scan in 3 months.  He states his pump will be delivered today.  He is elevating the leg, he can tell the leg swells when he is up or driving.    -AL         Subjective Pain    Able to rate subjective pain?  yes  -AL      Pre-Treatment Pain Level  2  -AL      Post-Treatment Pain Level  -- < 2/10  -AL      Subjective Pain Comment  L ankle and shin  -AL         Total Minutes    14230 - PT Therapeutic Activity Minutes  15  -AL      53394 - PT Manual Therapy Minutes  75  -AL        User Key  (r) = Recorded By, (t) = Taken By, (c) = Cosigned By    Initials Name Provider Type    Elida Pickering PTA, SAHIL Physical Therapy Assistant                     Manual Rx (last 36 hours)      Manual Treatments     Row Name 12/30/20 6431              Total Minutes    83576 - PT Manual Therapy Minutes  75  -AL        User Key  (r) = Recorded By, (t) = Taken By, (c) = Cosigned By    Initials Name Provider Type    Eldia Pickering PTA, SAHIL Physical Therapy Assistant          PT OP Goals     Row Name 12/30/20 0858          PT Short Term Goals    STG 1  Patient will gain an understanding of lymphedema and risk factors  -AL     STG 1 Progress  Met;Ongoing  -AL     STG 2  Patient will have a decrease in circumference of left LE along with pain reduction  -AL     STG 2 Progress  Ongoing  -AL     STG 2 Progress Comments  He has had an increase in size.   -AL        Long Term Goals    LTG Date to Achieve  12/11/20  -AL     LTG 1  Patient will be independent in basic exercises to increase lymph flow  -AL     LTG 1 Progress  Ongoing;Partially Met;Progressing  -AL     LTG 2  Patient will be independent in self - massage  -AL     LTG 2 Progress  Ongoing;Partially Met;Progressing  -AL     LTG 3  Patient will be advised of proper compression garments and fit assessed  -AL     LTG 3 Progress  Ongoing;Progressing  -AL     LTG 4  Patient will be given a trial of a compression pump  -AL     LTG 4 Progress  Ongoing;Progressing  -AL     LTG 4 Progress Comments  His Flexitouch pump should be in today  -AL     LTG 5  Patient will have a pain level of no >2 on a consistent basis  -AL     LTG 5 Progress  Ongoing;Progressing;Partially Met  -AL     LTG 5 Progress Comments  Pain stays around 1.5-2/0  -AL     LTG 6  Patient will have a 90 - 100% reduction in edema of left groin and penis  -AL     LTG 6 Progress  Met;Ongoing  -AL     LTG 7  Patient will gain independent managment of lymphedema  -AL     LTG 7 Progress  Ongoing;Progressing  -AL        Time Calculation    PT Goal Re-Cert Due Date  01/10/21  -AL       User Key  (r) = Recorded By, (t) = Taken By, (c) = Cosigned By    Initials Name Provider Type    Elida Pickering PTA, YOSI-LANNY Physical Therapy Assistant          Therapy  Education  Education Details: Start using the Flexitouch today.  Watch for any shortness of breath after using the pump.   Given: Edema management  Program: New  How Provided: Verbal  Provided to: Patient  Level of Understanding: Verbalized              Time Calculation:                     Elida Sequeira PTA, CLT-LANNY  12/30/2020

## 2021-01-04 ENCOUNTER — TREATMENT (OUTPATIENT)
Dept: PHYSICAL THERAPY | Facility: CLINIC | Age: 51
End: 2021-01-04

## 2021-01-04 DIAGNOSIS — I89.0 LYMPHEDEMA OF LEFT LEG: ICD-10-CM

## 2021-01-04 DIAGNOSIS — I89.0 LYMPHEDEMA OF GENITALIA: Primary | ICD-10-CM

## 2021-01-04 PROCEDURE — 97140 MANUAL THERAPY 1/> REGIONS: CPT | Performed by: PHYSICAL THERAPIST

## 2021-01-04 NOTE — PROGRESS NOTES
Outpatient Physical Therapy Lymphedema Treatment Note       Patient Name: Holland Phelps  : 1970  MRN: [id number]  Today's Date: 2021        Visit Date: 2021    Visit Dx:    ICD-10-CM ICD-9-CM   1. Lymphedema of genitalia  I89.0 457.1   2. Lymphedema of left leg  I89.0 457.1       Patient Active Problem List   Diagnosis   • Renal cell carcinoma of right kidney metastatic to other site (CMS/HCC)   • Iron overload   • Sickle cell beta thalassemia (CMS/HCC)        Lymphedema     Row Name 21 1000             Subjective Pain    Able to rate subjective pain?  yes  -AL      Pre-Treatment Pain Level  2  -AL      Post-Treatment Pain Level  1  -AL      Subjective Pain Comment  L ankle and shin, burning  -AL         Subjective Comments    Subjective Comments  He is using the Flexitouch daily.  He has not noticed any genitle swelling, he feels like it is helping his leg.  He states the swelling is coming out of the L ankle, but is still in the shin and L upper leg.  He states he is tired and doesn't havec much appetite, he thinks this may be due to his meds.   -AL         Manual Lymphatic Drainage    Manual Lymphatic Drainage  initial sequence;opened regional lymph nodes;opened anastamoses;extremity treatment  -AL      Initial Sequence  short neck;abdomen;diaphragmatic breathing  -AL      Abdomen  superficial  -AL      Opened Regional Lymph Nodes  axillary;inguinal  -AL      Axillary  right;left  -AL      Inguinal  right;left  -AL      Opened Anastamoses  inguino-axillary  -AL      Inguino-Axillary  right;left  -AL      Extremity Treatment  MLD to full limb  -AL      MLD to Full Limb  LLE- supine and prone.  -AL      Manual Lymphatic Drainage Comments  Worked on MLD to the L shin and upper leg where dense tissue and pain are present.   -AL      Manual Therapy  Use the Flexitouch daily.   -AL         Compression/Skin Care    Compression/Skin Care  skin care;wrapping location;bandaging  -AL      Skin Care   "lotion applied  -AL      Wrapping Location  lower extremity  -AL      Wrapping Location LE  left:;foot to knee  -AL      Wrapping Comments  Continue to use the chip bag and pit pack. try chip bag to the L groin with Flexitouch  -AL      Bandage Layers  cotton liner;padding/fluff layer;short-stretch bandages (comment size/quantity)  -AL      Bandaging Comments  3\" stockinette, 1.5  rolls of artiflex with extra padding at the ankles,  an 8cm and 10cm comprilan from base of toes to knee.   -AL      Compression/Skin Care Comments  Continue to wear compression pants and compression wraps.  Use the pit pack or chip bag over dense tissue L groin.   -AL        User Key  (r) = Recorded By, (t) = Taken By, (c) = Cosigned By    Initials Name Provider Type    Elida Pickering PTA, CLT-LANA Physical Therapy Assistant                        PT Assessment/Plan     Row Name 01/04/21 1000          PT Assessment    Assessment Comments  Patient now has the Flexitouch pump, which he is using daily. He has noticed a decrease in size in the L foot, he still has the pain in the L shin.  I will measure the L LE next treatment.  The tissue is less dense L upper leg today.   -AL        PT Plan    PT Plan Comments  Continue with CDT  -AL       User Key  (r) = Recorded By, (t) = Taken By, (c) = Cosigned By    Initials Name Provider Type    Elida Pickering PTA, CLT-LANA Physical Therapy Assistant             OP Exercises     Row Name 01/04/21 1000             Subjective Comments    Subjective Comments  He is using the Flexitouch daily.  He has not noticed any genitle swelling, he feels like it is helping his leg.  He states the swelling is coming out of the L ankle, but is still in the shin and L upper leg.  He states he is tired and doesn't havec much appetite, he thinks this may be due to his meds.   -AL         Subjective Pain    Able to rate subjective pain?  yes  -AL      Pre-Treatment Pain Level  2  -AL      Post-Treatment Pain Level "  1  -AL      Subjective Pain Comment  L ankle and shin, burning  -AL         Total Minutes    43114 - PT Manual Therapy Minutes  70  -AL        User Key  (r) = Recorded By, (t) = Taken By, (c) = Cosigned By    Initials Name Provider Type    Jordin Pickeringcalos DAVISON, PTA, CLT-LANNY Physical Therapy Assistant                     Manual Rx (last 36 hours)      Manual Treatments     Row Name 01/04/21 1000             Total Minutes    81352 - PT Manual Therapy Minutes  70  -AL        User Key  (r) = Recorded By, (t) = Taken By, (c) = Cosigned By    Initials Name Provider Type    DEMARCUS Sequeira Elida DAVISON, PTA, CLT-LANNY Physical Therapy Assistant          PT OP Goals     Row Name 01/04/21 1000 01/04/21 0100       PT Short Term Goals    STG 1  Patient will gain an understanding of lymphedema and risk factors  -AL  --  -AL    STG 1 Progress  Met;Ongoing  -AL  --  -AL    STG 2  Patient will have a decrease in circumference of left LE along with pain reduction  -AL  --  -AL    STG 2 Progress  Ongoing  -AL  --  -AL    STG 2 Progress Comments  Will measure the L LE next treatment  -AL  --  -AL       Long Term Goals    LTG Date to Achieve  12/11/20  -AL  --  -AL    LTG 1  Patient will be independent in basic exercises to increase lymph flow  -AL  --  -AL    LTG 1 Progress  Ongoing;Partially Met;Progressing  -AL  --  -AL    LTG 2  Patient will be independent in self - massage  -AL  --  -AL    LTG 2 Progress  Ongoing;Partially Met;Progressing  -AL  --  -AL    LTG 3  Patient will be advised of proper compression garments and fit assessed  -AL  --  -AL    LTG 3 Progress  Ongoing;Progressing  -AL  --  -AL    LTG 4  Patient will be given a trial of a compression pump  -AL  --  -AL    LTG 4 Progress  Ongoing;Progressing  -AL  --  -AL    LTG 4 Progress Comments  He now has the Flexitouch pump.  -AL  --  -AL    LTG 5  Patient will have a pain level of no >2 on a consistent basis  -AL  --  -AL    LTG 5 Progress  Ongoing;Progressing;Partially Met  -AL  --   -AL    LTG 6  Patient will have a 90 - 100% reduction in edema of left groin and penis  -AL  --  -AL    LTG 6 Progress  Met;Ongoing  -AL  --  -AL    LTG 7  Patient will gain independent managment of lymphedema  -AL  --  -AL    LTG 7 Progress  Ongoing;Progressing  -AL  --  -AL       Time Calculation    PT Goal Re-Cert Due Date  --  --  -AL      User Key  (r) = Recorded By, (t) = Taken By, (c) = Cosigned By    Initials Name Provider Type    Elida Pickering PTA, CLT-LANA Physical Therapy Assistant          Therapy Education  Education Details: Use the chip bag to the L groin with the Flexitouch  Given: Edema management  Program: New  How Provided: Verbal  Provided to: Patient  Level of Understanding: Verbalized              Time Calculation:                     Elida Sequeira PTA, CLT-LANA  1/4/2021

## 2021-01-08 ENCOUNTER — TREATMENT (OUTPATIENT)
Dept: PHYSICAL THERAPY | Facility: CLINIC | Age: 51
End: 2021-01-08

## 2021-01-08 DIAGNOSIS — I89.0 LYMPHEDEMA OF LEFT LEG: ICD-10-CM

## 2021-01-08 DIAGNOSIS — I89.0 LYMPHEDEMA OF GENITALIA: Primary | ICD-10-CM

## 2021-01-08 PROCEDURE — 97140 MANUAL THERAPY 1/> REGIONS: CPT | Performed by: PHYSICAL THERAPIST

## 2021-01-08 NOTE — PROGRESS NOTES
Physical Therapy 30 Day Progress and Recertification Note    Patient: Holland Phelps            : 1970  Today's Date: 2021  Referring practitioner: Sharon Cheney,*  Date of Initial Visit: Type: THERAPY  Noted: 10/12/2020  Patient seen for 31 sessions  Visit Diagnoses:    ICD-10-CM ICD-9-CM   1. Lymphedema of genitalia  I89.0 457.1   2. Lymphedema of left leg  I89.0 457.1       SUBJECTIVE     Subjective Evaluation    History of Present Illness    Subjective comment: He is using the Flexitouch daily, he states the fluid is going out of the leg slowly.  He does feel like the ankle is not as small as it was earlier in the week. Pain  Current pain ratin  At best pain ratin  Location: A little in the L ankle but also in the L shin.  Pain 1.5/10 before treatment.   Quality: burning                 OBJECTIVE     Objective         Lymphedema     Row Name 21 0840             Lymphedema Measurements    Measurement Type(s)  Circumferential  -AL      Circumferential Areas  Lower extremities  -AL         BLE Circumferential (cm)    Measurement Location 1  BOT  -AL      Left 1  22.4 cm  -AL      Measurement Location 2  mid arch  -AL      Left 2  24.5 cm  -AL      Measurement Location 3  ankle  -AL      Left 3  27.9 cm  -AL      Measurement Location 4  10 cm  -AL      Left 4  23.9 cm  -AL      Measurement Location 5  10 cm  -AL      Left 5  30.6 cm  -AL      Measurement Location 6  10 cm  -AL      Left 6  38.4 cm  -AL      Measurement Location 7  10 cm  -AL      Left 7  37 cm  -AL      Measurement Location 8  10 cm  -AL      Left 8  43.4 cm  -AL      Measurement Location 9  10 cm  -AL      Left 9  49.9 cm  -AL      Measurement Location 10  10 cm  -AL      Left 10  57.2 cm  -AL      Measurement Location 11  Proximal thigh  -AL      Left 11  60.8 cm  -AL      LLE Circumferential Total  416 cm  -AL         Manual Lymphatic Drainage    Manual Lymphatic Drainage  initial sequence;opened regional lymph  "nodes;opened anastamoses;extremity treatment  -AL      Initial Sequence  short neck;abdomen;diaphragmatic breathing  -AL      Abdomen  superficial  -AL      Diaphragmatic Breathing  X 9 with superficial abdominals  -AL      Opened Regional Lymph Nodes  axillary;inguinal  -AL      Axillary  right;left  -AL      Inguinal  right;left  -AL      Opened Anastamoses  inguino-axillary  -AL      Inguino-Axillary  right;left  -AL      Extremity Treatment  MLD to full limb  -AL      MLD to Full Limb  LLE- supine and prone.  -AL      Manual Lymphatic Drainage Comments  MLD to the L shin and ankle where patient reports pain, MLD to the L upper leg where minimal dense tissue is present.   -AL      Manual Therapy  Manual Therapy 90 minutes  -AL         Compression/Skin Care    Compression/Skin Care  skin care;wrapping location;bandaging  -AL      Skin Care  lotion applied  -AL      Wrapping Location  lower extremity  -AL      Wrapping Location LE  left:;foot to knee  -AL      Wrapping Comments  Use pit pack and chip bag L groin area.   -AL      Bandage Layers  cotton liner;padding/fluff layer;short-stretch bandages (comment size/quantity)  -AL      Bandaging Comments  3\" stockinette, 1.5  rolls of artiflex with extra padding at the ankles,  an 8cm and 10cm comprilan from base of toes to knee.   -AL      Compression/Skin Care Comments  Wear compression daily on the L lower leg.  Also wear compression pants.   -AL        User Key  (r) = Recorded By, (t) = Taken By, (c) = Cosigned By    Initials Name Provider Type    Elida Pickering PTA, RDT-LANNY Physical Therapy Assistant             Goals   STG by: 1/10/21 Comments Status   Patient will gain an understanding of lymphedema and risk factors He has a good understanding of his lymphedema and risk factors Met;Ongoing   Patient will have a decrease in circumference of left LE along with pain reduction He ha had a reduction in the L LE as compared to his last visit.  Ongoing, " progressing             LTG by: 12/11/2020     Patient will be independent in basic exercises to increase lymph flow Compliant with HEP Met; Ongoing   Patient will be independent in self - massage Compliant with MLD   Met; Ongoing   Patient will be advised of proper compression garments and fit assessed He wears the short stretch bandages L lower leg, He also wears compression pants.  Ongoing;Progressing; Partially Met   Patient will be given a trial of a compression pump He now has the Flexitouch pump   Met   Patient will have a pain level of no >2 on a consistent basis His pain levels are starting to decrease more.  Ongoing;Progressing;Partially Met   Patient will have a 90 - 100% reduction in edema of left groin and penis He no longer has edema of the penis, still minimal swelling L groin. Partially Met, ongoing   Patient will gain independent managment of lymphedema He is working towards his home maintenance program, working on CDT and using Flexitouch pump.  Ongoing;Progressing, Partially Met         Total Timed Treatment:    90  mins  Total Time of Visit:            90   mins       Therapy Education/Self Care [id number]   Details: [facility]   [employer] Code:    Given Patient   Progress: Continue to work on CDT and use Flexitouch pump.   Who provided to: Patient   Level of understanding Verbal   Timed Minutes       ASSESSMENT/PLAN     Assessment/Plan      Patient has had a reduction in the L LE since his last treatment.  Patient is using the Flexitouch pump and has noticed a decrease in pain.  Patient has less pain today in sitting and standing as well as less pain with gait.  He is walking around more during the day.  Patient no longer has swelling in the genitale area, but does still have swelling in the L groin.  This has improved significantly since his initial visit, but there is still dense tissue present in the groin.          Elida Sequeira, KEILA, CLT-LANNY  Physical Therapist Assistant

## 2021-01-12 ENCOUNTER — TREATMENT (OUTPATIENT)
Dept: PHYSICAL THERAPY | Facility: CLINIC | Age: 51
End: 2021-01-12

## 2021-01-12 DIAGNOSIS — C64.1 RENAL CELL CARCINOMA OF RIGHT KIDNEY (HCC): ICD-10-CM

## 2021-01-12 DIAGNOSIS — I89.0 LYMPHEDEMA OF LEFT LEG: ICD-10-CM

## 2021-01-12 DIAGNOSIS — I89.0 LYMPHEDEMA OF GENITALIA: Primary | ICD-10-CM

## 2021-01-12 PROCEDURE — 97140 MANUAL THERAPY 1/> REGIONS: CPT | Performed by: PHYSICAL THERAPIST

## 2021-01-12 NOTE — PROGRESS NOTES
Physical Therapy Treatment Note    Patient: Holland Phelps            : 1970  Today's Date: 2021  Referring practitioner: Sharon Cheney,*  Date of Initial Visit: Type: THERAPY  Noted: 10/12/2020  Patient seen for 32 sessions  Visit Diagnoses:    ICD-10-CM ICD-9-CM   1. Lymphedema of genitalia  I89.0 457.1   2. Lymphedema of left leg  I89.0 457.1   3. Renal cell carcinoma of right kidney (CMS/[facility])  C64.1 189.0       SUBJECTIVE     Subjective Evaluation    History of Present Illness    Subjective comment: He states he still has a burning sensation in the L shin.  He is still using the pump daily and it is working ok.  He states there are days when he has more swelling than other.  He still has tightness in the L upper thigh. Pain  Current pain ratin (1.5/10)  At best pain ratin  Location: L shin  Quality: burning                 OBJECTIVE     Objective           Lymphedema     Row Name 21 0800             Manual Lymphatic Drainage    Manual Lymphatic Drainage  initial sequence;opened regional lymph nodes;opened anastamoses;extremity treatment  -AL      Initial Sequence  short neck;abdomen;diaphragmatic breathing  -AL      Abdomen  superficial  -AL      Diaphragmatic Breathing  X 9 with superficial abdominals  -AL      Opened Regional Lymph Nodes  axillary;inguinal  -AL      Axillary  right;left  -AL      Inguinal  right;left  -AL      Opened Anastamoses  inguino-axillary  -AL      Inguino-Axillary  right;left  -AL      Extremity Treatment  MLD to full limb  -AL      MLD to Full Limb  LLE- supine and prone.  -AL      Manual Lymphatic Drainage Comments  Spent extra time working on MLD to the L shin, ankle, upper leg, and L groin  -AL      Manual Therapy  Manual Therapy 75 minutes  -AL         Compression/Skin Care    Compression/Skin Care  skin care;wrapping location;bandaging  -AL      Skin Care  lotion applied  -AL      Wrapping Location  lower extremity  -AL      Wrapping Location  "LE  left:;foot to knee  -AL      Wrapping Comments  Use pit pack and chip bag L groin area.   -AL      Bandage Layers  cotton liner;padding/fluff layer;short-stretch bandages (comment size/quantity)  -AL      Bandaging Comments  3\" stockinette, 1.5  rolls of artiflex with extra padding at the ankles,  an 8cm and 10cm comprilan from base of toes to knee.   -AL      Compression/Skin Care Comments  Wear compression socks if not wearing the wraps  -AL        User Key  (r) = Recorded By, (t) = Taken By, (c) = Cosigned By    Initials Name Provider Type    Elida Pickering PTA, [facility] Physical Therapy Assistant             Goals   STG by: 1/10/21 Comments Status   Patient will gain an understanding of lymphedema and risk factors He has a good understanding of his lymphedema and risk factors Met;Ongoing   Patient will have a decrease in circumference of left LE along with pain reduction Will measure the L LE next treatment.  Burning sensation still present L shin and ankle.  Ongoing, progressing             LTG by: 12/11/2020     Patient will be independent in basic exercises to increase lymph flow Compliant with HEP Met; Ongoing   Patient will be independent in self - massage Compliant with MLD   Met; Ongoing   Patient will be advised of proper compression garments and fit assessed Needs to wear the compression socks when not wearing the short stretch bandages. Ongoing;Progressing; Partially Met   Patient will be given a trial of a compression pump He now has the Flexitouch pump   Met   Patient will have a pain level of no >2 on a consistent basis His pain levels are starting to decrease more.  Ongoing;Progressing;Partially Met   Patient will have a 90 - 100% reduction in edema of left groin and penis He no longer has edema of the penis, still minimal swelling L groin. Partially Met, ongoing   Patient will gain independent management of lymphedema He continues to work towards his home maintenance program, working on " CDT and using Flexitouch pump.  Ongoing;Progressing, Partially Met         Total Timed Treatment:   75  mins  Total Time of Visit:            75   mins       Therapy Education/Self Care [id number]   Details: HEP   Medbridge Code:    Given Patient   Progress: Continue to work on CDT and use Flexitouch pump.   Who provided to: Patient   Level of understanding Verbal   Timed Minutes       ASSESSMENT/PLAN     Assessment/Plan        Patient still has the burning pain present in the L shin and L ankle.  This is present more often when the short stretch bandages are not on. He will wear the compression sock if the short stretch bandages are not on to see if this helps the burning pain.  He is compliant with working on the MLD and using the pump.  Still dense tissue present L groin, the tissue in the L shin and thigh are less dense after the MLD.     Will continue to work on CDT 2 x a week, will measure the L LE next treatment.          Elida Sequeira PTA, [facility]  Physical Therapist Assistant

## 2021-01-15 ENCOUNTER — TREATMENT (OUTPATIENT)
Dept: PHYSICAL THERAPY | Facility: CLINIC | Age: 51
End: 2021-01-15

## 2021-01-15 DIAGNOSIS — I89.0 LYMPHEDEMA OF LEFT LEG: ICD-10-CM

## 2021-01-15 DIAGNOSIS — I89.0 LYMPHEDEMA OF GENITALIA: Primary | ICD-10-CM

## 2021-01-15 DIAGNOSIS — C64.1 RENAL CELL CARCINOMA OF RIGHT KIDNEY (HCC): ICD-10-CM

## 2021-01-15 PROCEDURE — 97140 MANUAL THERAPY 1/> REGIONS: CPT | Performed by: PHYSICAL THERAPIST

## 2021-01-15 NOTE — PROGRESS NOTES
Physical Therapy Lymphedema Treatment Note    Patient: Holland Phelps            : 1970  Today's Date: 1/15/2021  Referring practitioner: Sharon Cheney,*  Date of Initial Visit: Type: THERAPY  Noted: 10/12/2020  Patient seen for 33 sessions  Visit Diagnoses:    ICD-10-CM ICD-9-CM   1. Lymphedema of genitalia  I89.0 457.1   2. Lymphedema of left leg  I89.0 457.1   3. Renal cell carcinoma of right kidney (CMS/[facility])  C64.1 189.0       SUBJECTIVE      Subjective Evaluation    History of Present Illness    Subjective comment: He states he has been elevating the L leg om pillows keeping the foot above the heart when he sleeps.  He states this has helped the swelling in the ankle, but can tell it went to the L upper leg. Overall he feels like this is a good day.     Current pain ratin/10    Location: L shin and ankle  Quality: burning    Pain after treatment:  <1/10            OBJECTIVE     Objective         Lymphedema     Row Name 01/15/21 0800             Lymphedema Measurements    Measurement Type(s)  Circumferential  -AL      Circumferential Areas  Lower extremities  -AL         BLE Circumferential (cm)    Measurement Location 1  BOT  -AL      Left 1  21.8 cm  -AL      Measurement Location 2  mid arch  -AL      Left 2  22.9 cm  -AL      Measurement Location 3  ankle  -AL      Left 3  25.6 cm  -AL      Measurement Location 4  10 cm  -AL      Left 4  22 cm  -AL      Measurement Location 5  10 cm  -AL      Left 5  29.2 cm  -AL      Measurement Location 6  10 cm  -AL      Left 6  35.1 cm  -AL      Measurement Location 7  10 cm  -AL      Left 7  35.9 cm  -AL      Measurement Location 8  10 cm  -AL      Left 8  41.9 cm  -AL      Measurement Location 9  10 cm  -AL      Left 9  49.6 cm  -AL      Measurement Location 10  10 cm  -AL      Left 10  57 cm  -AL      Measurement Location 11  Proximal thigh  -AL      Left 11  60.5 cm  -AL      LLE Circumferential Total  401.5 cm  -AL         Manual Lymphatic  "Drainage    Manual Lymphatic Drainage  initial sequence;opened regional lymph nodes;opened anastamoses;extremity treatment  -AL      Initial Sequence  short neck;abdomen;diaphragmatic breathing  -AL      Abdomen  superficial  -AL      Diaphragmatic Breathing  X 9 with superficial abdominals  -AL      Opened Regional Lymph Nodes  axillary;inguinal  -AL      Axillary  right;left  -AL      Inguinal  right;left  -AL      Opened Anastamoses  inguino-axillary  -AL      Inguino-Axillary  right;left  -AL      Extremity Treatment  MLD to full limb  -AL      MLD to Full Limb  LLE- supine and prone.  -AL      Manual Lymphatic Drainage Comments  Spent extra time working on MLD to the L upper leg and L groin  -AL      Manual Therapy  Manual Therapy 70 minutes  -AL         Compression/Skin Care    Compression/Skin Care  skin care;wrapping location;bandaging  -AL      Skin Care  lotion applied  -AL      Wrapping Location  lower extremity  -AL      Wrapping Location LE  left:;foot to knee  -AL      Wrapping Comments  Use pit pack and chip bag L groin area.   -AL      Bandage Layers  cotton liner;padding/fluff layer;short-stretch bandages (comment size/quantity)  -AL      Bandaging Comments  3\" stockinette, 1.5  rolls of artiflex,  an 8cm and 10cm comprilan from base of toes to knee.   -AL      Compression/Skin Care Comments  Remove the wraps before going to bed if patient is going to elevate the L leg.   -AL        User Key  (r) = Recorded By, (t) = Taken By, (c) = Cosigned By    Initials Name Provider Type    Elida Pickering PTA, [facility] Physical Therapy Assistant             Goals                                                          Progress note due 2/7/2021   STG by: 1/10/21 Comments Status   Patient will gain an understanding of lymphedema and risk factors He has a good understanding of his lymphedema and risk factors Met;Ongoing   Patient will have a decrease in circumference of left LE along with pain reduction He " has had a reduction in the L LE, the burning sensation in the L shin is less today.  Ongoing, progressing             LTG by: 12/11/2020     Patient will be independent in basic exercises to increase lymph flow Compliant with HEP Met; Ongoing   Patient will be independent in self - massage Compliant with MLD   Met; Ongoing   Patient will be advised of proper compression garments and fit assessed Needs to wear the compression socks when not wearing the short stretch bandages.  Remove the compression on the L lower leg if elevating the L leg at night.  Ongoing;Progressing; Partially Met   Patient will be given a trial of a compression pump He now has the Flexitouch pump   Met   Patient will have a pain level of no >2 on a consistent basis His pain levels are starting to decrease more.  Ongoing;Progressing;Partially Met   Patient will have a 90 - 100% reduction in edema of left groin and penis He no longer has edema of the penis, still minimal swelling L groin. Partially Met, ongoing   Patient will gain independent management of lymphedema He continues to work towards his home maintenance program, working on CDT and using Flexitouch pump.  Ongoing;Progressing, Partially Met         Total Timed Treatment:   75  mins  Total Time of Visit:            70   mins       Therapy Education/Self Care [id number]   Details: Remove compression at night if elevating the leg when sleeping.  Don compression when he wakes in the morning.    eTec Code:    Given Edema management   Progress: New   Who provided to: Patient   Level of understanding Verbal   Timed Minutes       ASSESSMENT/PLAN     Assessment/Plan      Patient is elevating the L LE at night on pillows.  He has had a 14.5 cm reduction in the L LE since his last measurements were taken.  He did have to remove the compression sock in the night due to increase burning in the shin.  Instructed patient to remove any compression at night if elevating the L leg above the heart.  He  is having less pain today, and the L upper leg measurements did not increase.  He continues to have dense tissue in the L groin area than does soften with MLD, the area does become more dense and patient has to work more on the MLD to this area.      Will continue to work on CDT 2 x a week.          Elida Sequeira PTA, [facility]  Physical Therapist Assistant

## 2021-01-18 ENCOUNTER — TREATMENT (OUTPATIENT)
Dept: PHYSICAL THERAPY | Facility: CLINIC | Age: 51
End: 2021-01-18

## 2021-01-18 DIAGNOSIS — I89.0 LYMPHEDEMA OF GENITALIA: Primary | ICD-10-CM

## 2021-01-18 DIAGNOSIS — I89.0 LYMPHEDEMA OF LEFT LEG: ICD-10-CM

## 2021-01-18 DIAGNOSIS — C64.1 RENAL CELL CARCINOMA OF RIGHT KIDNEY (HCC): ICD-10-CM

## 2021-01-18 PROCEDURE — 97140 MANUAL THERAPY 1/> REGIONS: CPT | Performed by: PHYSICAL THERAPIST

## 2021-01-18 NOTE — PROGRESS NOTES
Physical Therapy Lymphedema Treatment Note    Patient: Holland Phelps            : 1970  Today's Date: 2021  Referring practitioner: Sharon Cheney,*  Date of Initial Visit: Type: THERAPY  Noted: 10/12/2020  Patient seen for 34 sessions  Visit Diagnoses:    ICD-10-CM ICD-9-CM   1. Lymphedema of genitalia  I89.0 457.1   2. Lymphedema of left leg  I89.0 457.1   3. Renal cell carcinoma of right kidney (CMS/[facility])  C64.1 189.0       SUBJECTIVE      Subjective Evaluation    History of Present Illness    Subjective comment:  Patient states today is a pretty good day.  He is still elevating the L leg at night, he states he is a little puffy today. He states the L upper leg is pretty good. He is wearing the compression and using the pump.  He goes back to [place] to see the  Either at the end of February or beginning of March for scans.      Current pain ratin/10    Location: L  ankle  Quality: burning    Pain after treatment:  1/10 L shin            OBJECTIVE     Objective           Lymphedema     Row Name 21 0800             Manual Lymphatic Drainage    Manual Lymphatic Drainage  initial sequence;opened regional lymph nodes;opened anastamoses;extremity treatment  -AL      Initial Sequence  short neck;abdomen;diaphragmatic breathing  -AL      Abdomen  superficial  -AL      Opened Regional Lymph Nodes  axillary;inguinal  -AL      Axillary  right;left  -AL      Inguinal  right;left  -AL      Opened Anastamoses  inguino-axillary  -AL      Inguino-Axillary  right;left  -AL      Extremity Treatment  MLD to full limb  -AL      MLD to Full Limb  LLE- supine and prone.  -AL      Manual Lymphatic Drainage Comments  Extra time working on MLD to the L ankle, L shin and L groin areas.   -AL      Manual Therapy  Manual Therapy 70 minutes  -AL         Compression/Skin Care    Compression/Skin Care  skin care;wrapping location;bandaging  -AL      Skin Care  lotion applied  -AL      Wrapping Location   "lower extremity  -AL      Wrapping Location LE  left:;foot to knee  -AL      Wrapping Comments  Use pit pack and chip bag L groin area.   -AL      Bandage Layers  cotton liner;padding/fluff layer;short-stretch bandages (comment size/quantity)  -AL      Bandaging Comments  3\" stockinette, 1.5  rolls of artiflex,  an 8cm and 10cm comprilan from base of toes to knee.   -AL      Compression/Skin Care Comments  Continue to wear compression, use the pit pack and chip bag with compression and also when using the Flexitouch pump.   -AL        User Key  (r) = Recorded By, (t) = Taken By, (c) = Cosigned By    Initials Name Provider Type    Eilda Pickering PTA, [facility] Physical Therapy Assistant             Goals                                                          Progress note due 2/7/2021   STG by: 1/10/21 Comments Status   Patient will gain an understanding of lymphedema and risk factors He has a good understanding of his lymphedema and risk factors Met;Ongoing   Patient will have a decrease in circumference of left LE along with pain reduction Will measure the L LE next treatment. Ongoing, progressing             LTG by: 12/11/2020     Patient will be independent in basic exercises to increase lymph flow Compliant with HEP Met; Ongoing   Patient will be independent in self - massage Compliant with MLD   Met; Ongoing   Patient will be advised of proper compression garments and fit assessed  Ongoing;Progressing; Partially Met   Patient will be given a trial of a compression pump He now has the Flexitouch pump   Met   Patient will have a pain level of no >2 on a consistent basis His pain levels are starting to decrease and stay down more consistently.  Ongoing;Progressing;Partially Met   Patient will have a 90 - 100% reduction in edema of left groin and penis He no longer has edema of the penis, still minimal swelling L groin. Partially Met, ongoing   Patient will gain independent management of lymphedema He " continues to work towards his home maintenance program, working on CDT and using Flexitouch pump.  Ongoing;Progressing, Partially Met         Total Timed Treatment:   75  mins  Total Time of Visit:            70   mins       Therapy Education/Self Care 46703   Details: Continue with CDT and use the pump, use the pit pack and or chip bag with compression and with the pump   Medbridge Code:    Given Edema management   Progress: Reinforced   Who provided to: Patient   Level of understanding Verbal   Timed Minutes       ASSESSMENT/PLAN     Assessment/Plan   Patient's left ankle looks slightly swollen today, will measure the L LE next treatment.  Each week the dense tissue in the L thigh decreases, the dense tissue L groin is changing shapes and is softening.  Patient is complaint with CDT and use of the Flexitouch pump.  He walked in today with no AD, he is trying to walk with the cane less.           Will continue to work on CDT 2 x a week.          Elida Sequeira PTA, [facility]  Physical Therapist Assistant

## 2021-01-22 ENCOUNTER — TREATMENT (OUTPATIENT)
Dept: PHYSICAL THERAPY | Facility: CLINIC | Age: 51
End: 2021-01-22

## 2021-01-22 DIAGNOSIS — I89.0 LYMPHEDEMA OF GENITALIA: Primary | ICD-10-CM

## 2021-01-22 DIAGNOSIS — I89.0 LYMPHEDEMA OF LEFT LEG: ICD-10-CM

## 2021-01-22 DIAGNOSIS — C64.1 RENAL CELL CARCINOMA OF RIGHT KIDNEY (HCC): ICD-10-CM

## 2021-01-22 PROCEDURE — 97140 MANUAL THERAPY 1/> REGIONS: CPT | Performed by: PHYSICAL THERAPIST

## 2021-01-22 NOTE — PROGRESS NOTES
Physical Therapy Lymphedema Treatment Note    Patient: Holland Phelps            : 1970  Today's Date: 2021  Referring practitioner: Sharon Cheney,*  Date of Initial Visit: Type: THERAPY  Noted: 10/12/2020  Patient seen for 35 sessions  Visit Diagnoses:    ICD-10-CM ICD-9-CM   1. Lymphedema of genitalia  I89.0 457.1   2. Lymphedema of left leg  I89.0 457.1   3. Renal cell carcinoma of right kidney (CMS/[facility])  C64.1 189.0       SUBJECTIVE      Subjective Evaluation    History of Present Illness    Subjective   He states he was up on the leg more.  He has pain in the L leg and tightness in the back of the leg.  He is still working on CDT as well as using the pump.    Current pain ratin/10 L ankle, but whole back of the leg feels tight    Location: L  ankle  Quality: Ache    Pain after treatment:  Close to 0/10.               OBJECTIVE     Objective         Lymphedema     Row Name 21 0750             Lymphedema Measurements    Measurement Type(s)  Circumferential  -AL      Circumferential Areas  Lower extremities  -AL         BLE Circumferential (cm)    Measurement Location 1  BOT  -AL      Left 1  21.1 cm  -AL      Measurement Location 2  mid arch  -AL      Left 2  22.7 cm  -AL      Measurement Location 3  ankle  -AL      Left 3  25.1 cm  -AL      Measurement Location 4  10 cm  -AL      Left 4  22.5 cm  -AL      Measurement Location 5  10 cm  -AL      Left 5  28.7 cm  -AL      Measurement Location 6  10 cm  -AL      Left 6  35 cm  -AL      Measurement Location 7  10 cm  -AL      Left 7  35.3 cm  -AL      Measurement Location 8  10 cm  -AL      Left 8  41.9 cm  -AL      Measurement Location 9  10 cm  -AL      Left 9  49.7 cm  -AL      Measurement Location 10  10 cm  -AL      Left 10  56.6 cm  -AL      Measurement Location 11  Proximal thigh  -AL      Left 11  60 cm  -AL      LLE Circumferential Total  398.6 cm  -AL         Manual Lymphatic Drainage    Manual Lymphatic Drainage  initial  "sequence;opened regional lymph nodes;opened anastamoses;extremity treatment  -AL      Initial Sequence  short neck;abdomen;diaphragmatic breathing  -AL      Abdomen  superficial  -AL      Diaphragmatic Breathing  X 9 with superficial abdominals  -AL      Opened Regional Lymph Nodes  axillary;inguinal  -AL      Axillary  right;left  -AL      Inguinal  right;left  -AL      Opened Anastamoses  inguino-axillary  -AL      Inguino-Axillary  right;left  -AL      Extremity Treatment  MLD to full limb  -AL      MLD to Full Limb  LLE- supine and prone.  -AL      Manual Lymphatic Drainage Comments  Spent extra time working on MLD to the L ankle, shin, and upper leg.  Also MLD to the dense tissue L groing.  Encourgaed patient to start moving more and worked briefly on gait, having patient start putting more wieght on the L LE as well as working on less trunk sway.   -AL      Manual Therapy  Manual Therapy 90 minutes  -AL         Compression/Skin Care    Compression/Skin Care  skin care;wrapping location;bandaging  -AL      Skin Care  lotion applied  -AL      Wrapping Location  lower extremity  -AL      Wrapping Location LE  left:;foot to knee  -AL      Wrapping Comments  Use pit pack and chip bag L groin area.   -AL      Bandage Layers  cotton liner;padding/fluff layer;short-stretch bandages (comment size/quantity)  -AL      Bandaging Comments  3\" stockinette, 1.5  rolls of artiflex,  an 8cm and 10cm comprilan from base of toes to knee.   -AL      Compression/Skin Care Comments  Wear compression socks next visit, continue with CDT and use the pump.   -AL        User Key  (r) = Recorded By, (t) = Taken By, (c) = Cosigned By    Initials Name Provider Type    AL Elida Sequeira PTA, [facility] Physical Therapy Assistant             Goals                                                          Progress note due 2/7/2021   STG by: 1/10/21 Comments Status   Patient will gain an understanding of lymphedema and risk factors He has a " good understanding of his lymphedema and risk factors Met;Ongoing   Patient will have a decrease in circumference of left LE along with pain reduction He has had a reduction in the L LE Ongoing, progressing             LTG by: 12/11/2020     Patient will be independent in basic exercises to increase lymph flow Compliant with HEP Met; Ongoing   Patient will be independent in self - massage Compliant with MLD   Met; Ongoing   Patient will be advised of proper compression garments and fit assessed  Ongoing;Progressing; Partially Met   Patient will be given a trial of a compression pump He now has the Flexitouch pump   Met   Patient will have a pain level of no >2 on a consistent basis Pain has been better overall, but did have increase pain yesterday.  Ongoing;Progressing;Partially Met   Patient will have a 90 - 100% reduction in edema of left groin and penis Improving Partially Met, ongoing   Patient will gain independent management of lymphedema He continues to work towards his home maintenance program, working on CDT and using Flexitouch pump.  Ongoing;Progressing, Partially Met         Total Timed Treatment:   90  mins  Total Time of Visit:            90   mins       Therapy Education/Self Care [id number]   Details: Start getting up on leg more, work on decreasing antalgic gait as well as trunk sway with gait.  Wear compression socks next visit.    [employer] Code:    Given Edema management, gait   Progress: Reinforced,  New   Who provided to: Patient   Level of understanding Verbal   Timed Minutes       ASSESSMENT/PLAN     Assessment/Plan Patient was on the L LE more yesterday and had increase pain.  His overall pain levels are decreasing and he had almost no pain after treatment today.  He has had a reduction in the L LE.  He is not using the cane as much at home, he will start working on decreasing antalgic gait as well as trunk sway with gait.  He also lacks decrease hip and knee flexion with gait.  His gait is much  improved with verbal cues.            Will continue to work on CDT 2 x a week.         Elida Sequeira PTA, [facility]  Physical Therapist Assistant

## 2021-01-25 ENCOUNTER — TREATMENT (OUTPATIENT)
Dept: PHYSICAL THERAPY | Facility: CLINIC | Age: 51
End: 2021-01-25

## 2021-01-25 DIAGNOSIS — I89.0 LYMPHEDEMA OF GENITALIA: Primary | ICD-10-CM

## 2021-01-25 DIAGNOSIS — I89.0 LYMPHEDEMA OF LEFT LEG: ICD-10-CM

## 2021-01-25 DIAGNOSIS — C64.1 RENAL CELL CARCINOMA OF RIGHT KIDNEY (HCC): ICD-10-CM

## 2021-01-25 PROCEDURE — 97140 MANUAL THERAPY 1/> REGIONS: CPT | Performed by: PHYSICAL THERAPIST

## 2021-01-25 NOTE — PROGRESS NOTES
Physical Therapy Lymphedema Treatment Note    Patient: Holland Phelps            : 1970  Today's Date: 2021  Referring practitioner: Sharon Cheney,*  Date of Initial Visit: Type: THERAPY  Noted: 10/12/2020  Patient seen for 36 sessions  Visit Diagnoses:    ICD-10-CM ICD-9-CM   1. Lymphedema of genitalia  I89.0 457.1   2. Lymphedema of left leg  I89.0 457.1   3. Renal cell carcinoma of right kidney (CMS/[facility])  C64.1 189.0       SUBJECTIVE      Subjective Evaluation    History of Present Illness    Subjective   He states he had a good weekend and the swelling is not too bad today.  He has just a little pain in the L ankle and shin, he feel like the fluid is still in the upper leg.  He worked on MLD to the L groin so it is a little sore. He is using his cane a little less than last week and working on walking better.    Current pain ratin/10 L ankle, L shin, and L groin    Location: L  ankle  Quality: Ache                  OBJECTIVE     Objective         Lymphedema     Row Name 21 0800             Manual Lymphatic Drainage    Manual Lymphatic Drainage  initial sequence;opened regional lymph nodes;opened anastamoses;extremity treatment  -AL      Initial Sequence  short neck;abdomen;diaphragmatic breathing  -AL      Abdomen  superficial  -AL      Diaphragmatic Breathing  X 9 with superficial abdominals  -AL      Opened Regional Lymph Nodes  axillary;inguinal  -AL      Axillary  right;left  -AL      Inguinal  right;left  -AL      Opened Anastamoses  inguino-axillary  -AL      Inguino-Axillary  right;left  -AL      Extremity Treatment  MLD to full limb  -AL      MLD to Full Limb  Worked on L LE in supine and prone.    -AL      Manual Lymphatic Drainage Comments  Extra time working on MLD to the L groin, ankle, and shin.  Continue to work on improving gait.   -AL      Manual Therapy  Manual Therapy: 70 Minutes  -AL         Compression/Skin Care    Compression/Skin Care  skin care;wrapping  "location;bandaging  -AL      Skin Care  lotion applied  -AL      Wrapping Location  lower extremity  -AL      Wrapping Location LE  left:;foot to knee  -AL      Wrapping Comments  Use pit pack and chip bag L groin area.   -AL      Bandage Layers  cotton liner;padding/fluff layer;short-stretch bandages (comment size/quantity)  -AL      Bandaging Comments  3\" stockinette, 1.5  rolls of artiflex,  an 8cm and 10cm comprilan from base of toes to knee.   -AL      Compression/Skin Care Comments  Continue to wear compression daily and use pump daily.   -AL        User Key  (r) = Recorded By, (t) = Taken By, (c) = Cosigned By    Initials Name Provider Type    Elida Pickering PTA, CLT-LANA Physical Therapy Assistant             Goals                                                          Progress note due 2/7/2021   STG by: 1/10/21 Comments Status   Patient will gain an understanding of lymphedema and risk factors He has a good understanding of his lymphedema and risk factors Met;Ongoing   Patient will have a decrease in circumference of left LE along with pain reduction Will measure the L LE next treatment.  Ongoing, progressing             LTG by: 12/11/2020     Patient will be independent in basic exercises to increase lymph flow Compliant with HEP Met; Ongoing   Patient will be independent in self - massage Compliant with MLD   Met; Ongoing   Patient will be advised of proper compression garments and fit assessed  Ongoing;Progressing; Partially Met   Patient will be given a trial of a compression pump He now has the Flexitouch pump   Met   Patient will have a pain level of no >2 on a consistent basis Improving Ongoing;Progressing;Partially Met   Patient will have a 90 - 100% reduction in edema of left groin and penis Improving Partially Met, ongoing   Patient will gain independent management of lymphedema Working on CDT and using Flexitouch pump.  Ongoing;Progressing, Partially Met         Total Timed Treatment:   90 "  mins  Total Time of Visit:            90   mins       Therapy Education/Self Care 22550   Details: Continue to work on decreasing antalgic gait as well as trunk sway with gait.  Wear compression socks again next visit.    [employer] Code:    Given Edema management, gait   Progress: Reinforced,  New   Who provided to: Patient   Level of understanding Verbal   Timed Minutes       ASSESSMENT/PLAN     Assessment/Plan  Patient has had less pain since his last treatment.  The L ankle and shin are not hurting/burning as much today.  The dense tissue L hamstring area is softer today.  The tissue L groin is more tender today and does not soften like it has in the past.  Slight amount of visible swelling at the ankle.  He did wear the compression sock today, this did help the swelling in the lower leg.  Will measure the L LE next treatment.     Will continue to work on CDT 2 x a week.         Elida Sequeira PTA, YOSI-LANNY  Physical Therapist Assistant

## 2021-01-29 ENCOUNTER — TREATMENT (OUTPATIENT)
Dept: PHYSICAL THERAPY | Facility: CLINIC | Age: 51
End: 2021-01-29

## 2021-01-29 DIAGNOSIS — I89.0 LYMPHEDEMA OF GENITALIA: Primary | ICD-10-CM

## 2021-01-29 DIAGNOSIS — C64.1 RENAL CELL CARCINOMA OF RIGHT KIDNEY (HCC): ICD-10-CM

## 2021-01-29 DIAGNOSIS — I89.0 LYMPHEDEMA OF LEFT LEG: ICD-10-CM

## 2021-01-29 PROCEDURE — 97140 MANUAL THERAPY 1/> REGIONS: CPT | Performed by: PHYSICAL THERAPIST

## 2021-01-29 NOTE — PROGRESS NOTES
Physical Therapy Lymphedema Treatment Note    Patient: Holland Phelps            : 1970  Today's Date: 2021  Referring practitioner: Sharon Cheney,*  Date of Initial Visit: Type: THERAPY  Noted: 10/12/2020  Patient seen for 37 sessions  Visit Diagnoses:    ICD-10-CM ICD-9-CM   1. Lymphedema of genitalia  I89.0 457.1   2. Lymphedema of left leg  I89.0 457.1   3. Renal cell carcinoma of right kidney (CMS/[facility])  C64.1 189.0       SUBJECTIVE      Subjective Evaluation    History of Present Illness    Subjective   He states he was able to cook a meal yesterday and his leg swelled but not as bad as normal.  He does feel like the leg is swollen today.  He does have more pain in the L groin, he still feels like part of the problem in the groin is from some kind of clip or something that is in the groin.  He is working on walking without his cane, it's very seldom that he uses it.     Current pain ratin/10 L upper leg   Quality: pins and needle pain  Pain after treatment <1/10                  OBJECTIVE     Objective         Lymphedema     Row Name 21 0800             Lymphedema Measurements    Measurement Type(s)  Circumferential  -AL      Circumferential Areas  Lower extremities  -AL         BLE Circumferential (cm)    Measurement Location 1  BOT  -AL      Left 1  22 cm  -AL      Measurement Location 2  mid arch  -AL      Left 2  24.5 cm  -AL      Measurement Location 3  ankle  -AL      Left 3  26 cm  -AL      Measurement Location 4  10 cm  -AL      Left 4  22.5 cm  -AL      Measurement Location 5  10 cm  -AL      Left 5  28.9 cm  -AL      Measurement Location 6  10 cm  -AL      Left 6  36 cm  -AL      Measurement Location 7  10 cm  -AL      Left 7  36.5 cm  -AL      Measurement Location 8  10 cm  -AL      Left 8  40.7 cm  -AL      Measurement Location 9  10 cm  -AL      Left 9  46.4 cm  -AL      Measurement Location 10  10 cm  -AL      Left 10  54 cm  -AL      Measurement Location 11   Proximal thigh  -AL      Left 11  60.1 cm  -AL      LLE Circumferential Total  397.6 cm  -AL         Manual Lymphatic Drainage    Manual Lymphatic Drainage  initial sequence;opened regional lymph nodes;opened anastamoses;extremity treatment  -AL      Initial Sequence  short neck;abdomen;diaphragmatic breathing  -AL      Abdomen  superficial  -AL      Diaphragmatic Breathing  X 9 with superficial abdominals  -AL      Opened Regional Lymph Nodes  axillary;inguinal  -AL      Axillary  right;left  -AL      Inguinal  right;left  -AL      Opened Anastamoses  inguino-axillary  -AL      Inguino-Axillary  right;left  -AL      Extremity Treatment  MLD to full limb  -AL      MLD to Full Limb  Worked on L LE in supine and prone.    -AL      Manual Lymphatic Drainage Comments  Extra time working on MLD to L upper leg.  Continue to work on improving gait. Stretched the L hamstring and gastroc x 2.  Work on stretching at home.   -AL      Manual Therapy  Manual Therapy: 70 Minutes  -AL         Compression/Skin Care    Compression/Skin Care  skin care;compression garment  -AL      Skin Care  lotion applied  -AL      Wrapping Location  --  -AL      Wrapping Location LE  --  -AL      Bandage Layers  --  -AL      Compression Garment Comments  He donned the compression sock L lower leg.   -AL      Compression/Skin Care Comments  Continue to wear compression daily and use pump daily.  Look for compression socks that are 20-30 mmHg to start wearing.   -AL        User Key  (r) = Recorded By, (t) = Taken By, (c) = Cosigned By    Initials Name Provider Type    Elida Pickering PTA, CLT-LANA Physical Therapy Assistant             Goals                                                          Progress note due 2/7/2021   STG by: 1/10/21 Comments Status   Patient will gain an understanding of lymphedema and risk factors He has a good understanding of his lymphedema and risk factors Met;Ongoing   Patient will have a decrease in circumference  of left LE along with pain reduction He has had a slight reduction in the L LE Ongoing, progressing             LTG by: 12/11/2020     Patient will be independent in basic exercises to increase lymph flow Compliant with HEP Met; Ongoing   Patient will be independent in self - massage Compliant with MLD   Met; Ongoing   Patient will be advised of proper compression garments and fit assessed He will check into stronger compression socks for the L lower leg Ongoing;Progressing; Partially Met   Patient will be given a trial of a compression pump He now has the Flexitouch pump   Met   Patient will have a pain level of no >2 on a consistent basis Pain has been consistently 1/10 or less.  Ongoing;Progressing;Partially Met   Patient will have a 90 - 100% reduction in edema of left groin and penis Improving Partially Met, ongoing   Patient will gain independent management of lymphedema Working on CDT and using Flexitouch pump.  Ongoing;Progressing, Partially Met         Total Timed Treatment:   75  mins  Total Time of Visit:            70   mins       Therapy Education/Self Care [id number]   Details: Work on CDT, use pump, purchased compression socks that are 20-30 mmHg.  Continue to work on slowly increasing activity as well as squatting.    [employer] Code: N/A   Given Edema management, mobility   Progress: Reinforced,  New   Who provided to: Patient   Level of understanding Verbal   Timed Minutes       ASSESSMENT/PLAN     Assessment/Plan     ASSESSMENT:   Patient has had a slight reduction in the L LE since his last treatment.  He is pleased because he has been able to move around more and was even able to make a meal for his family yesterday.  He will look into stronger compression socks to help move the fluid out of the lower leg.  He has had a reduction in the L upper leg.  Patient's gait is improving, he is using his cane less and has less of an antalgic gait. P    PLAN:  Will continue to work on CDT and will work on  stretching for the L LE.          Elida Sequeira PTA, CLT-LANNY  Physical Therapist Assistant

## 2021-02-01 ENCOUNTER — TREATMENT (OUTPATIENT)
Dept: PHYSICAL THERAPY | Facility: CLINIC | Age: 51
End: 2021-02-01

## 2021-02-01 DIAGNOSIS — I89.0 LYMPHEDEMA OF LEFT LEG: ICD-10-CM

## 2021-02-01 DIAGNOSIS — C64.1 RENAL CELL CARCINOMA OF RIGHT KIDNEY (HCC): ICD-10-CM

## 2021-02-01 DIAGNOSIS — I89.0 LYMPHEDEMA OF GENITALIA: Primary | ICD-10-CM

## 2021-02-01 PROCEDURE — 97140 MANUAL THERAPY 1/> REGIONS: CPT | Performed by: PHYSICAL THERAPIST

## 2021-02-01 NOTE — PROGRESS NOTES
Physical Therapy Lymphedema Treatment Note    Patient: Holland Phelps            : 1970  Today's Date: 2021  Referring practitioner: Sharon Cheney,*  Date of Initial Visit: Type: THERAPY  Noted: 10/12/2020  Patient seen for 38 sessions  Visit Diagnoses:    ICD-10-CM ICD-9-CM   1. Lymphedema of genitalia  I89.0 457.1   2. Lymphedema of left leg  I89.0 457.1   3. Renal cell carcinoma of right kidney (CMS/[facility])  C64.1 189.0       SUBJECTIVE      Subjective Evaluation    History of Present Illness    Subjective   He states he now has a 20-30 mmHg compression sock for the L lower leg, he states it's tighter and a little uncomfortable.  He has still been up more like last week.  He feels like he may be having issues with his Sickle Cell because he is tired.     Current pain ratin/10 L ankle with stiffness in the upper thigh.    Quality: pins and needle pain  Pain after treatment 1/10                  OBJECTIVE     Objective         Lymphedema     Row Name 21 0800             Manual Lymphatic Drainage    Manual Lymphatic Drainage  initial sequence;opened regional lymph nodes;opened anastamoses;extremity treatment  -AL      Initial Sequence  short neck;abdomen;diaphragmatic breathing  -AL      Abdomen  superficial  -AL      Diaphragmatic Breathing  X 9 with superficial abdominals  -AL      Opened Regional Lymph Nodes  axillary;inguinal  -AL      Axillary  right;left  -AL      Inguinal  right;left  -AL      Opened Anastamoses  inguino-axillary  -AL      Inguino-Axillary  right;left  -AL      Extremity Treatment  MLD to full limb  -AL      MLD to Full Limb  Worked on L LE in supine and prone.    -AL      Manual Lymphatic Drainage Comments  Stretched the L quads in prone, L hamstrings in supine.   -AL      Manual Therapy  Manual Therapy: 70 Minutes  -AL         Compression/Skin Care    Compression/Skin Care  skin care;compression garment  -AL      Skin Care  lotion applied  -AL      Compression  Garment Comments  He donned the 20-30 mmHg compression sock L lower leg.   -AL      Compression/Skin Care Comments  Continue to wear compression daily.   -AL        User Key  (r) = Recorded By, (t) = Taken By, (c) = Cosigned By    Initials Name Provider Type    Elida Pickering PTA, CLT-LANA Physical Therapy Assistant             Goals                                                          Progress note due 2/7/2021   STG by: 1/10/21 Comments Status   Patient will gain an understanding of lymphedema and risk factors He has a good understanding of his lymphedema and risk factors Met;Ongoing   Patient will have a decrease in circumference of left LE along with pain reduction Will measure next treatment.  Ongoing, progressing             LTG by: 12/11/2020     Patient will be independent in basic exercises to increase lymph flow Compliant with HEP Met; Ongoing   Patient will be independent in self - massage Compliant with MLD   Met; Ongoing   Patient will be advised of proper compression garments and fit assessed He now has a 20-30 mmHg compression sock for the L lower leg.  Ongoing;Progressing; Partially Met   Patient will be given a trial of a compression pump He now has the Flexitouch pump   Met   Patient will have a pain level of no >2 on a consistent basis Pain has been consistently 1/10 or less.  Ongoing;Progressing;Partially Met   Patient will have a 90 - 100% reduction in edema of left groin and penis Improving Partially Met, ongoing   Patient will gain independent management of lymphedema Working on CDT and using Flexitouch pump.  Ongoing;Progressing, Partially Met         Total Timed Treatment:   75  mins  Total Time of Visit:            70   mins       Therapy Education/Self Care [id number]   Details: Continue to work on CDT    [employer] Code: N/A   Given Edema management, mobility   Progress: Reinforced,  New   Who provided to: Patient   Level of understanding Verbal   Timed Minutes       ASSESSMENT/PLAN      Assessment/Plan     ASSESSMENT:  Patient's ankle and L lower leg look better today, he has less dense areas in the L upper leg.  He does still have dense tissue present in the L groin.  I believe the Flexitouch pump has helped keep the fluid out of the L upper leg. He continues to have tightness L quads and hamstrings.  Will measure the L LE next treatment and assess all goals.     PLAN:  Will continue to work on CDT and will work on stretching for the L LE.          Elida Sequeira PTA, [facility]-LANNY  Physical Therapist Assistant

## 2021-02-04 ENCOUNTER — TREATMENT (OUTPATIENT)
Dept: PHYSICAL THERAPY | Facility: CLINIC | Age: 51
End: 2021-02-04

## 2021-02-04 DIAGNOSIS — C64.1 RENAL CELL CARCINOMA OF RIGHT KIDNEY (HCC): ICD-10-CM

## 2021-02-04 DIAGNOSIS — I89.0 LYMPHEDEMA OF LEFT LEG: ICD-10-CM

## 2021-02-04 DIAGNOSIS — I89.0 LYMPHEDEMA OF GENITALIA: Primary | ICD-10-CM

## 2021-02-04 PROCEDURE — 97140 MANUAL THERAPY 1/> REGIONS: CPT | Performed by: PHYSICAL THERAPIST

## 2021-02-04 NOTE — PROGRESS NOTES
I have reviewed the notes, assessments, and/or procedures performed by Elida Sequeira PTA, I concur with her/his documentation of Gene K .

## 2021-02-04 NOTE — PROGRESS NOTES
Physical 30 Day Progress Note    Patient: Holland Phelps            : 1970  Today's Date: 2021  Referring practitioner: Sharon Cheney,*  Date of Initial Visit: Type: THERAPY  Noted: 10/12/2020  Patient seen for 39 sessions  Visit Diagnoses:    ICD-10-CM ICD-9-CM   1. Lymphedema of genitalia  I89.0 457.1   2. Lymphedema of left leg  I89.0 457.1   3. Renal cell carcinoma of right kidney (CMS/[facility])  C64.1 189.0       SUBJECTIVE      Subjective Evaluation    History of Present Illness    Subjective:  He sates he is feeling better and was able to go shopping and run errands yesterday.  He also states he was able to wear his boots.  He wore the compression sock on the L lower leg and the compression leggings.  He is still using the pump.  He wraps the L upper leg to help move the fluid out.  Patient states he was able to get in the floor with his granddaughter, but had some difficulty getting off the floor. His personal goal is to be able to clean his car.     Current pain rating: < 1/10 L upper leg, knee and groin  Quality: pressure  Pain after treatment 0/10, just stiffness and minor numbness in the L shin                  OBJECTIVE     Objective         Lymphedema     Row Name 21 0900             Lymphedema Measurements    Measurement Type(s)  Circumferential  -AL      Circumferential Areas  Lower extremities  -AL         BLE Circumferential (cm)    Measurement Location 1  BOT  -AL      Left 1  20.8 cm  -AL      Measurement Location 2  mid arch  -AL      Left 2  22 cm  -AL      Measurement Location 3  ankle  -AL      Left 3  24.2 cm  -AL      Measurement Location 4  10 cm  -AL      Left 4  21.8 cm  -AL      Measurement Location 5  10 cm  -AL      Left 5  28.6 cm  -AL      Measurement Location 6  10 cm  -AL      Left 6  33.9 cm  -AL      Measurement Location 7  10 cm  -AL      Left 7  35.2 cm  -AL      Measurement Location 8  10 cm  -AL      Left 8  40.6 cm  -AL      Measurement Location 9  10  cm  -AL      Left 9  46.5 cm  -AL      Measurement Location 10  10 cm  -AL      Left 10  54.6 cm  -AL      Measurement Location 11  Proximal thigh  -AL      Left 11  59 cm  -AL      LLE Circumferential Total  387.2 cm  -AL         Manual Lymphatic Drainage    Manual Lymphatic Drainage  initial sequence;opened regional lymph nodes;opened anastamoses;extremity treatment  -AL      Initial Sequence  short neck;abdomen;diaphragmatic breathing  -AL      Abdomen  superficial  -AL      Opened Regional Lymph Nodes  axillary;inguinal  -AL      Axillary  right;left  -AL      Inguinal  right;left  -AL      Opened Anastamoses  inguino-axillary  -AL      Inguino-Axillary  right;left  -AL      Extremity Treatment  MLD to full limb  -AL      MLD to Full Limb  Worked on L LE in supine and prone.    -AL      Manual Lymphatic Drainage Comments  Stretched the L quads in prone, L hamstrings in supine.   Neural tension mobs to L LE with hamsstring stretch and gentle dorsi flexion of the ankle.   He will cotinue to work on stretching the L hamstring at home.  -AL      Manual Therapy  Manual Therapy: 70 Minutes  -AL         Compression/Skin Care    Compression/Skin Care  skin care;compression garment  -AL      Skin Care  lotion applied  -AL      Compression Garment Comments  He donned the 20-30 mmHg compression sock L lower leg.   -AL      Compression/Skin Care Comments  Wear compression pants and sock, wrap the L upper leg as needed.   -AL        User Key  (r) = Recorded By, (t) = Taken By, (c) = Cosigned By    Initials Name Provider Type    Elida Pickering PTA, SAHIL Physical Therapy Assistant             Goals                                                          Progress note due 3/6/2021   STG by: 1/10/21 Comments Status   Patient will gain an understanding of lymphedema and risk factors He has a good understanding of his lymphedema and risk factors.  Met;Ongoing   Patient will have a decrease in circumference of left LE  along with pain reduction He has had a reduction in the L LE, pain levels 0-1/10 now.  Met, Ongoing             LTG by: 12/11/2020     Patient will be independent in basic exercises to increase lymph flow Compliant with HEP Met; Ongoing   Patient will be independent in self - massage Compliant with MLD   Met; Ongoing   Patient will be advised of proper compression garments and fit assessed He now has a 20-30 mmHg compression sock for the L lower leg.  This is good fitting compression and is containing the fluid in the L Lower leg' Met, ongoing   Patient will be given a trial of a compression pump He now has the Flexitouch pump   Met, ongoing   Patient will have a pain level of no >2 on a consistent basis Pain has been consistently 1/10 or less. This continues to improve Ongoing;Progressing;Partially Met   Patient will have a 90 - 100% reduction in edema of left groin and penis No swelling in the penis, he feels like the swelling in the L groin is 90% improved since the last 3 weeks.   Met, ongoing   Patient will gain independent management of lymphedema Working on CDT and using Flexitouch pump.  He is wrapping the L upper leg as needed, he wears the compression pants and compression sock.   Ongoing;Progressing, Partially Met         Total Timed Treatment:   75  mins  Total Time of Visit:            70   mins       Therapy Education/Self Care [id number]   Details: Continue to work on CDT    [employer] Code: N/A   Given Edema management, mobility   Progress: Reinforced,  New   Who provided to: Patient   Level of understanding Verbal   Timed Minutes       ASSESSMENT/PLAN     Assessment/Plan     ASSESSMENT:  Patient's has met most of his goals, but has a personal goal to be able to clean his car and be able to get off of the floor after playing with his grandchildren.  He has had a further reduction in the L LE and his pain has improved.  The numbness in the L shin area improved with the neural tension mobs.  Patient is  trying to slow get back into running errands and work on household chores.  He still has some dense tissue L medial thi    PLAN:  Will continue to work on CDT and will work on stretching for the L LE. Will also work on neural tension mobs for the L LE. Will decrease frequency to 1x a week.         Elida Sequeira PTA, T-LANNY  Physical Therapist Assistant

## 2021-02-08 NOTE — PROGRESS NOTES
30 day Progress Note Addendum      Patient: Holland Phelps           : 1970  Today's Date: 21  Referring practitioner: Sharon Cheney,*  Date of Initial Visit: Type: THERAPY  Noted: 10/12/2020  Patient seen for 39 sessions  Visit Diagnoses:    ICD-10-CM ICD-9-CM   1. Lymphedema of genitalia  I89.0 457.1   2. Lymphedema of left leg  I89.0 457.1   3. Renal cell carcinoma of right kidney (CMS/[facility])  C64.1 189.0          Clinical Progress: improved  Home Program Compliance: Yes  Treatment has included: therapeutic exercise, manual therapy and therapeutic activity  Progress toward previous goals: Partially Met  Prognosis to achieve goals: good    Subjective   Objective   Assessment & Plan     Assessment  Impairments: abnormal or restricted ROM and impaired physical strength  Other impairment: impaired lymphatics  Prognosis: good  Functional Limitations: stooping  Plan  Therapy options: will be seen for skilled physical therapy services  Planned therapy interventions: manual therapy, strengthening, stretching, therapeutic activities, home exercise program and compression  Frequency: 1x week  Duration in visits: 3  Treatment plan discussed with: PTA      Goals                                                          Progress note due 2021   STG by: 1/10/21 Comments Status   Patient will gain an understanding of lymphedema and risk factors He has a good understanding of his lymphedema and risk factors Met;Ongoing   Patient will have a decrease in circumference of left LE along with pain reduction Will measure next treatment.  Ongoing, progressing             LTG by: 2020     Patient will be independent in basic exercises to increase lymph flow Compliant with HEP Met; Ongoing   Patient will be independent in self - massage Compliant with MLD   Met; Ongoing   Patient will be advised of proper compression garments and fit assessed He now has a 20-30 mmHg compression sock for the L lower leg.   Ongoing;Progressing; Partially Met   Patient will be given a trial of a compression pump He now has the Flexitouch pump   Met   Patient will have a pain level of no >2 on a consistent basis Pain has been consistently 1/10 or less.  Ongoing;Progressing;Partially Met   Patient will have a 90 - 100% reduction in edema of left groin and penis Improving Partially Met, ongoing   Patient will be able to clean his car  New   Patient will be able to get down and up from floor to play with his grandchildren  New   Patient will gain independent management of lymphedema Working on CDT and using Flexitouch pump.  Ongoing;Progressing, Partially Met     I have reviewed the progress note information provided by Elida Sequeira PTA, and I concur with the findings.    Charlene Camarillo, PT DPT  Physical Therapist

## 2021-03-03 ENCOUNTER — TREATMENT (OUTPATIENT)
Dept: PHYSICAL THERAPY | Facility: CLINIC | Age: 51
End: 2021-03-03

## 2021-03-03 DIAGNOSIS — I89.0 LYMPHEDEMA OF LEFT LEG: ICD-10-CM

## 2021-03-03 DIAGNOSIS — C64.1 RENAL CELL CARCINOMA OF RIGHT KIDNEY (HCC): ICD-10-CM

## 2021-03-03 DIAGNOSIS — I89.0 LYMPHEDEMA OF GENITALIA: Primary | ICD-10-CM

## 2021-03-03 PROCEDURE — 97140 MANUAL THERAPY 1/> REGIONS: CPT | Performed by: PHYSICAL THERAPIST

## 2021-03-03 NOTE — PROGRESS NOTES
Physical  Treatment and 30 Day Progress Note    Patient: Holland Phelps            : 1970  Today's Date: 3/3/2021  Referring practitioner: Sharon Cheney,*  Date of Initial Visit: Type: THERAPY  Noted: 10/12/2020  Patient seen for 40 sessions  Visit Diagnoses:    ICD-10-CM ICD-9-CM   1. Lymphedema of genitalia  I89.0 457.1   2. Lymphedema of left leg  I89.0 457.1   3. Renal cell carcinoma of right kidney (CMS/[facility])  C64.1 189.0       SUBJECTIVE      Subjective Evaluation    History of Present Illness    Subjective:  He states he had a Sickle Cell Crisis and pneumonia, he was in the hospital for 6 days in [place].  He talked to his DrPriti About his fatigue level and may start a new medication for fatigue.  He states his leg swollen today and that he has days that the leg is not as swollen.  He has been able to get up and move around, he will start working on cleaning out some cars. He has been using the Flexitouch pump and wearing the compression sock.     Current pain ratin/10 L upper leg, knee and groin        OBJECTIVE     Objective       Lymphedema     Row Name 21 1400             Lymphedema Measurements    Measurement Type(s)  Circumferential  -AL      Circumferential Areas  Lower extremities  -AL         BLE Circumferential (cm)    Measurement Location 1  BOT  -AL      Left 1  21.8 cm  -AL      Measurement Location 2  mid arch  -AL      Left 2  24 cm  -AL      Measurement Location 3  ankle  -AL      Left 3  25.8 cm  -AL      Measurement Location 4  10 cm  -AL      Left 4  24.9 cm  -AL      Measurement Location 5  10 cm  -AL      Left 5  31.4 cm  -AL      Measurement Location 6  10 cm  -AL      Left 6  36.8 cm  -AL      Measurement Location 7  10 cm  -AL      Left 7  37.2 cm  -AL      Measurement Location 8  10 cm  -AL      Left 8  41.1 cm  -AL      Measurement Location 9  10 cm  -AL      Left 9  48 cm  -AL      Measurement Location 10  10 cm  -AL      Left 10  54 cm  -AL      Measurement  Location 11  Proximal thigh  -AL      Left 11  58.5 cm  -AL      LLE Circumferential Total  403.5 cm  -AL         Manual Lymphatic Drainage    Manual Lymphatic Drainage  initial sequence;opened regional lymph nodes;opened anastamoses;extremity treatment  -AL      Initial Sequence  short neck;abdomen;diaphragmatic breathing  -AL      Abdomen  superficial  -AL      Diaphragmatic Breathing  X 9 with superficial abdominals  -AL      Opened Regional Lymph Nodes  axillary;inguinal  -AL      Axillary  right;left  -AL      Inguinal  right;left  -AL      Opened Anastamoses  inguino-axillary  -AL      Inguino-Axillary  right;left  -AL      Extremity Treatment  MLD to full limb  -AL      MLD to Full Limb  Worked on L LE in supine and prone.    -AL      Manual Therapy  Manual Therapy: 70 Minutes  -AL         Compression/Skin Care    Compression/Skin Care  skin care;compression garment  -AL      Skin Care  lotion applied  -AL      Compression/Skin Care Comments  Wear compression pants and sock, wrap the L upper leg as needed.   -AL        User Key  (r) = Recorded By, (t) = Taken By, (c) = Cosigned By    Initials Name Provider Type    AL Elida Sequeira PTA, SAHIL Physical Therapy Assistant            Goals                                                          Progress note due 4/2/2021   STG by: 1/10/21 Comments Status   Patient will gain an understanding of lymphedema and risk factors He has a good understanding of his lymphedema and risk factors.  Met;Ongoing   Patient will have a decrease in circumference of left LE along with pain reduction He has had a reduction in the L LE, pain levels 0-1/10 now.  Met, Ongoing             LTG by: 12/11/2020     Patient will be independent in basic exercises to increase lymph flow Compliant with HEP Met; Ongoing   Patient will be independent in self - massage Compliant with MLD   Met; Ongoing   Patient will be advised of proper compression garments and fit assessed He now has a 20-30  mmHg compression sock for the L lower leg.  This is good fitting compression and is containing the fluid in the L Lower leg' Met, ongoing   Patient will be given a trial of a compression pump He now has the Flexitouch pump   Met, ongoing   Patient will have a pain level of no >2 on a consistent basis Pain did get up to 3/10 since his last treatment.  Ongoing;Progressing;Partially Met   Patient will have a 90 - 100% reduction in edema of left groin and penis The area L groin is softer at times, the pain is better.  Met, ongoing   Patient will gain independent management of lymphedema Had a set back and was in the hospital with a Sickle Cell crisis.  Ongoing;Progressing, Partially Met         Total Timed Treatment:   75  mins  Total Time of Visit:            70   mins       Therapy Education/Self Care [id number]   Details: Continue to work on CDT    [email or web address] Code: N/A   Given Edema management, mobility   Progress: Reinforced,  New   Who provided to: Patient   Level of understanding Verbal   Timed Minutes       ASSESSMENT/PLAN     Assessment/Plan     ASSESSMENT:  Patient has not been able to attend treatment due to the inclement weather and then being hospitalized for a Sickle Cell Crisis.  He has had an increase in size of the L LE since his last visit, but he has not been able to use the pump as he normally does this past week due to being hospitalized.  He is getting back into his routine for CDT.  He continues to have tightness in the L quads and hip.  There is dense tissue present in the L upper leg and L groin.  His pain level does not change after treatment, but he feels looser.     PLAN:  Will continue to work on CDT and will work on stretching for the L LE. Will continue to see patient 1 x a week.          Elida Sequeira PTA, YOSI-LANNY  Physical Therapist Assistant

## 2021-03-03 NOTE — PROGRESS NOTES
I have reviewed the notes, assessments, and/or procedures performed by Aneta Sequeira, I concur with her/his documentation of Gene  .

## 2021-03-15 ENCOUNTER — TREATMENT (OUTPATIENT)
Dept: PHYSICAL THERAPY | Facility: CLINIC | Age: 51
End: 2021-03-15

## 2021-03-15 DIAGNOSIS — I89.0 LYMPHEDEMA OF GENITALIA: Primary | ICD-10-CM

## 2021-03-15 DIAGNOSIS — I89.0 LYMPHEDEMA OF LEFT LEG: ICD-10-CM

## 2021-03-15 DIAGNOSIS — C64.1 RENAL CELL CARCINOMA OF RIGHT KIDNEY (HCC): ICD-10-CM

## 2021-03-15 PROCEDURE — 97140 MANUAL THERAPY 1/> REGIONS: CPT | Performed by: PHYSICAL THERAPIST

## 2021-03-15 NOTE — PROGRESS NOTES
Physical  Therapy Treatment Note    Patient: Holland Phelps            : 1970  Today's Date: 3/15/2021  Referring practitioner: No ref. provider found  Date of Initial Visit: Type: THERAPY  Noted: 10/12/2020  Patient seen for 41 sessions  Visit Diagnoses:    ICD-10-CM ICD-9-CM   1. Lymphedema of genitalia  I89.0 457.1   2. Lymphedema of left leg  I89.0 457.1   3. Renal cell carcinoma of right kidney (CMS/HCC)  C64.1 189.0       SUBJECTIVE      Subjective Evaluation    History of Present Illness    Subjective:  He states he had a Dr's appointment at [place] last week and everything was good.  His hemoglobin was back up to 8 which is his baseline.  He was been cleaning a few cars and the leg swelling on him.  He can clean about 2 cars a day.  He uses his pump and this helps, but does not take all of the swelling out. He is walking pretty good today, the L leg is just swollen.  He goes for the scans at the end of the month.     Current pain ratin/10  Today, just swollen.        OBJECTIVE     Objective       Lymphedema     Row Name 03/15/21 0800             Lymphedema Measurements    Measurement Type(s)  Circumferential  -AL      Circumferential Areas  Lower extremities  -AL         BLE Circumferential (cm)    Measurement Location 1  BOT  -AL      Left 1  21.8 cm  -AL      Measurement Location 2  mid arch  -AL      Left 2  23.2 cm  -AL      Measurement Location 3  ankle  -AL      Left 3  25.7 cm  -AL      Measurement Location 4  10 cm  -AL      Left 4  24 cm  -AL      Measurement Location 5  10 cm  -AL      Left 5  30.4 cm  -AL      Measurement Location 6  10 cm  -AL      Left 6  36.8 cm  -AL      Measurement Location 7  10 cm  -AL      Left 7  36.6 cm  -AL      Measurement Location 8  10 cm  -AL      Left 8  41 cm  -AL      Measurement Location 9  10 cm  -AL      Left 9  48.8 cm  -AL      Measurement Location 10  10 cm  -AL      Left 10  55 cm  -AL      Measurement Location 11  Proximal thigh  -AL       Left 11  59 cm  -AL      LLE Circumferential Total  402.3 cm  -AL         Manual Lymphatic Drainage    Manual Lymphatic Drainage  initial sequence;opened regional lymph nodes;opened anastamoses;extremity treatment  -AL      Initial Sequence  short neck;abdomen;diaphragmatic breathing  -AL      Abdomen  superficial  -AL      Diaphragmatic Breathing  X 9 with superficial abdominals  -AL      Opened Regional Lymph Nodes  axillary;inguinal  -AL      Axillary  right;left  -AL      Inguinal  right;left  -AL      Opened Anastamoses  inguino-axillary  -AL      Inguino-Axillary  right;left  -AL      Extremity Treatment  MLD to full limb  -AL      MLD to Full Limb  Worked on L LE in supine and prone.    -AL      Manual Lymphatic Drainage Comments  Stretched the L quads in prone, L hamstrings in supine.   Neural tension mobs to L LE with hamsstring stretch and gentle dorsi flexion of the ankle.  Also stretched hip flexors in prone with a pillow under the L thigh, patient work on this stretch at home.   -AL      Manual Therapy  Manual Therapy:  Minutes 70  -AL         Compression/Skin Care    Compression/Skin Care  skin care;compression garment  -AL      Skin Care  lotion applied  -AL      Compression Garment Comments  He will don his compression when he returns home.   -AL      Compression/Skin Care Comments  Start wrapping the L lower leg after the pump and use the abdominal binder on the upper leg.  keep the leg wrapped at night and use the abdominal binder at night as well.   -AL        User Key  (r) = Recorded By, (t) = Taken By, (c) = Cosigned By    Initials Name Provider Type    Elida Pickering PTA, CLT-LANA Physical Therapy Assistant            Goals                                                          Progress note due 4/2/2021   STG by: 1/10/21 Comments Status   Patient will gain an understanding of lymphedema and risk factors He has a good understanding of his lymphedema and risk factors.  Met;Ongoing    Patient will have a decrease in circumference of left LE along with pain reduction He has had a reduction in the L LE, pain levels 0-1/10 now.  Met, Ongoing             LTG by: 12/11/2020     Patient will be independent in basic exercises to increase lymph flow Compliant with HEP Met; Ongoing   Patient will be independent in self - massage Compliant with MLD   Met; Ongoing   Patient will be advised of proper compression garments and fit assessed He now has a 20-30 mmHg compression sock for the L lower leg.  This is good fitting compression and is containing the fluid in the L Lower leg' Met, ongoing   Patient will be given a trial of a compression pump He now has the Flexitouch pump   Met, ongoing   Patient will have a pain level of no >2 on a consistent basis Pain is better today Ongoing;Progressing;Partially Met   Patient will have a 90 - 100% reduction in edema of left groin and penis Still has swelling L groin, but the tissue is less dense.    Patient will gain independent management of lymphedema He is working on his home maintenance program. Ongoing;Progressing, Partially Met         Total Timed Treatment:   75  mins  Total Time of Visit:            70   mins       Therapy Education/Self Care [id number]   Details: Continue to work on CDT, use the Flexitouch pump, start wrapping the L lower leg in the evenings after the pump and wear binder on the L upper leg.  Start stretching hip flexors in prone with a pillow under the L thigh.    [employer] Code: N/A   Given Edema management, mobility   Progress: Reinforced,  New   Who provided to: Patient   Level of understanding Verbal   Timed Minutes       ASSESSMENT/PLAN     Assessment/Plan     ASSESSMENT:  Patient will try to use the short stretch bandages and the abdominal binder around the upper leg in the evenings and at night to help with the swelling.  He is becoming more active and the swelling is staying with him.  Will see how wearing the short stretch bandages and  the binder help with the swelling.  Will have patient work on stretching the L hip flexors in prone with a pillow under the L thigh, assess effectiveness of this stretch next treatment.     PLAN:  Will continue to work on CDT and will work on stretching for the L LE. Will continue to see patient 1 x a week.          Elida Sequeira PTA, YOSI-LANNY  Physical Therapist Assistant

## 2021-03-22 ENCOUNTER — TREATMENT (OUTPATIENT)
Dept: PHYSICAL THERAPY | Facility: CLINIC | Age: 51
End: 2021-03-22

## 2021-03-22 DIAGNOSIS — I89.0 LYMPHEDEMA OF LEFT LEG: ICD-10-CM

## 2021-03-22 DIAGNOSIS — I89.0 LYMPHEDEMA OF GENITALIA: Primary | ICD-10-CM

## 2021-03-22 DIAGNOSIS — C64.1 RENAL CELL CARCINOMA OF RIGHT KIDNEY (HCC): ICD-10-CM

## 2021-03-22 PROCEDURE — 97140 MANUAL THERAPY 1/> REGIONS: CPT | Performed by: PHYSICAL THERAPIST

## 2021-03-22 NOTE — PROGRESS NOTES
Physical  Therapy Treatment Note    Patient: Holland Phelps            : 1970  Today's Date: 3/22/2021  Referring practitioner: No ref. provider found  Date of Initial Visit: Type: THERAPY  Noted: 10/12/2020  Patient seen for 42 sessions  Visit Diagnoses:    ICD-10-CM ICD-9-CM   1. Lymphedema of genitalia  I89.0 457.1   2. Lymphedema of left leg  I89.0 457.1   3. Renal cell carcinoma of right kidney (CMS/[facility])  C64.1 189.0       SUBJECTIVE      Subjective Evaluation    History of Present Illness    Subjective:  He is still staying more active, he is not using the cane anymore.  He is working on CDT and using the Flexitouch pump.  He was able to sleep in the bed at night.  He has been using the compression wraps on the lower leg, then ace wraps on the upper leg, followed by the abdominal binder over the ace wraps. He feels like this has helped his swelling.      Current pain ratin/10  Numbness and throb L shin.         OBJECTIVE     Objective       Lymphedema     Row Name 21 0800             Lymphedema Measurements    Measurement Type(s)  Circumferential  -AL      Circumferential Areas  Lower extremities  -AL         BLE Circumferential (cm)    Measurement Location 1  BOT  -AL      Left 1  21.4 cm  -AL      Measurement Location 2  mid arch  -AL      Left 2  22.5 cm  -AL      Measurement Location 3  ankle  -AL      Left 3  24.9 cm  -AL      Measurement Location 4  10 cm  -AL      Left 4  21.8 cm  -AL      Measurement Location 5  10 cm  -AL      Left 5  28.6 cm  -AL      Measurement Location 6  10 cm  -AL      Left 6  34.6 cm  -AL      Measurement Location 7  10 cm  -AL      Left 7  34.2 cm  -AL      Measurement Location 8  10 cm  -AL      Left 8  38.6 cm  -AL      Measurement Location 9  10 cm  -AL      Left 9  45.2 cm  -AL      Measurement Location 10  10 cm  -AL      Left 10  52.6 cm  -AL      Measurement Location 11  Proximal thigh  -AL      Left 11  57.5 cm  -AL      LLE Circumferential Total   381.9 cm  -AL         Manual Lymphatic Drainage    Manual Lymphatic Drainage  initial sequence;opened regional lymph nodes;opened anastamoses;extremity treatment  -AL      Initial Sequence  short neck;abdomen;diaphragmatic breathing  -AL      Abdomen  superficial  -AL      Diaphragmatic Breathing  X 9 with superficial abdominals  -AL      Opened Regional Lymph Nodes  axillary;inguinal  -AL      Axillary  right;left  -AL      Inguinal  right;left  -AL      Opened Anastamoses  inguino-axillary  -AL      Inguino-Axillary  right;left  -AL      Extremity Treatment  MLD to full limb  -AL      MLD to Full Limb  L LE  -AL      Manual Lymphatic Drainage Comments  MLD to the L LE and also to the dense tissue L groin.  Stretched the L hp flexors in prone with a pillow under the L thigh gentle pressure L hip.  Instructed patient to work on this as well as hamstring stretch.  Stretched the L hamstrind with dorsiflexion.   -AL      Manual Therapy  Manual Threapy x 73 min  -AL         Compression/Skin Care    Compression/Skin Care  skin care;compression garment  -AL      Skin Care  lotion applied  -AL      Compression Garment Comments  He will don his compression when he returns home.   -AL      Compression/Skin Care Comments  Continue to wrap the L LE at night.  -AL        User Key  (r) = Recorded By, (t) = Taken By, (c) = Cosigned By    Initials Name Provider Type    AL Elida Sequeira PTA, SAHIL Physical Therapy Assistant            Goals                                                          Progress note due 4/2/2021   STG by: 1/10/21 Comments Status   Patient will gain an understanding of lymphedema and risk factors   Met;Ongoing   Patient will have a decrease in circumference of left LE along with pain reduction  Met, Ongoing             LTG by: 12/11/2020     Patient will be independent in basic exercises to increase lymph flow Compliant with HEP Met; Ongoing   Patient will be independent in self - massage Compliant  with MLD   Met; Ongoing   Patient will be advised of proper compression garments and fit assessed He is wrapping the entire L LE at night. Met, ongoing   Patient will be given a trial of a compression pump He now has the Flexitouch pump   Met, ongoing   Patient will have a pain level of no >2 on a consistent basis  Ongoing;Progressing;Partially Met   Patient will have a 90 - 100% reduction in edema of left groin and penis Had more dense tissue L groin today.    Patient will gain independent management of lymphedema He is working on his home maintenance program. Ongoing;Progressing, Partially Met         Total Timed Treatment:   75  mins  Total Time of Visit:            73   mins       Therapy Education/Self Care [id number]   Details: Continue to work on CDT, use the Flexitouch pump, continue to wrap the L LE at night.   [employer] Code: N/A   Given Edema management, mobility   Progress: Reinforced   Who provided to: Patient   Level of understanding Verbal   Timed Minutes       ASSESSMENT/PLAN     Assessment/Plan     ASSESSMENT:  Patient has had a reduction in the L LE since his last visit.  He has started wrapping the L LE up to the groin at night.  This has helped reduce the size of the L LE including the upper leg.  The tissue L groin is more dense today, but this does soften with the MLD.  Patient is able to be more active since the swelling has improved and he does not have to use his cane anymore.    PLAN:  Will continue to work on CDT and will work on stretching for the L LE. Will continue to see patient 1 x a week.          Elida Sequeira PTA, ZIGGY-LANNY  Physical Therapist Assistant

## 2021-03-31 ENCOUNTER — TREATMENT (OUTPATIENT)
Dept: PHYSICAL THERAPY | Facility: CLINIC | Age: 51
End: 2021-03-31

## 2021-03-31 DIAGNOSIS — C64.1 RENAL CELL CARCINOMA OF RIGHT KIDNEY (HCC): ICD-10-CM

## 2021-03-31 DIAGNOSIS — I89.0 LYMPHEDEMA OF LEFT LEG: ICD-10-CM

## 2021-03-31 DIAGNOSIS — I89.0 LYMPHEDEMA OF GENITALIA: Primary | ICD-10-CM

## 2021-03-31 PROCEDURE — 97140 MANUAL THERAPY 1/> REGIONS: CPT | Performed by: PHYSICAL THERAPIST

## 2021-03-31 NOTE — PROGRESS NOTES
Physical  Therapy Treatment Note and 30 Day Progress Note    Patient: Holland Phelps            : 1970  Today's Date: 3/31/2021  Referring practitioner: Sharon Cheney,*  Date of Initial Visit: Type: THERAPY  Noted: 10/12/2020  Patient seen for 43 sessions  Visit Diagnoses:    ICD-10-CM ICD-9-CM   1. Lymphedema of genitalia  I89.0 457.1   2. Lymphedema of left leg  I89.0 457.1   3. Renal cell carcinoma of right kidney (CMS/HCC)  C64.1 189.0       SUBJECTIVE      Subjective Evaluation    History of Present Illness    Subjective:  He has numbness and tingle in the L shin.  He had good reports from his cancer Dr. In [place], he does not have to go back for another 4 months.  He does not have to have chemo right now, his scans were good.  He and his wife are still wrapping the leg at night.  He knows he is not drinking enough.  He feels like he is 80% better.    Current pain ratin/10          OBJECTIVE     Objective       Lymphedema     Row Name 21 1417             Lymphedema Measurements    Measurement Type(s)  Circumferential  -AL      Circumferential Areas  Lower extremities  -AL         BLE Circumferential (cm)    Measurement Location 1  BOT  -AL      Left 1  21.4 cm  -AL      Measurement Location 2  mid arch  -AL      Left 2  22.7 cm  -AL      Measurement Location 3  ankle  -AL      Left 3  25.4 cm  -AL      Measurement Location 4  10 cm  -AL      Left 4  24.4 cm  -AL      Measurement Location 5  10 cm  -AL      Left 5  31 cm  -AL      Measurement Location 6  10 cm  -AL      Left 6  35.5 cm  -AL      Measurement Location 7  10 cm  -AL      Left 7  36.5 cm  -AL      Measurement Location 8  10 cm  -AL      Left 8  41 cm  -AL      Measurement Location 9  10 cm  -AL      Left 9  45 cm  -AL      Measurement Location 10  10 cm  -AL      Left 10  52 cm  -AL      Measurement Location 11  Proximal thigh  -AL      Left 11  57.1 cm  -AL      LLE Circumferential Total  392 cm  -AL         Manual  Lymphatic Drainage    Manual Lymphatic Drainage  initial sequence;opened regional lymph nodes;opened anastamoses;extremity treatment  -AL      Initial Sequence  short neck;abdomen;diaphragmatic breathing  -AL      Abdomen  superficial  -AL      Diaphragmatic Breathing  X 9 with superficial abdominals  -AL      Opened Regional Lymph Nodes  axillary;inguinal  -AL      Axillary  right;left  -AL      Inguinal  right;left  -AL      Opened Anastamoses  inguino-axillary  -AL      Inguino-Axillary  right;left  -AL      Extremity Treatment  MLD to full limb  -AL      MLD to Full Limb  L LE  -AL      Manual Lymphatic Drainage Comments  MLD to the L LE and also to the dense tissue L groin.  Stretched the L hp flexors in prone with a pillow under the L thigh gentle pressure L hip.  Stretched the L hamstring with dorsiflexion.   -AL      Manual Therapy  Manual Threapy x 72 min  -AL         Compression/Skin Care    Compression/Skin Care  skin care;compression garment  -AL      Skin Care  lotion applied  -AL      Compression Garment Comments  He donned his compression sock  -AL      Compression/Skin Care Comments  Continue to wrap the L LE at night.  -AL        User Key  (r) = Recorded By, (t) = Taken By, (c) = Cosigned By    Initials Name Provider Type    Elida Pickering PTA, SAHIL Physical Therapy Assistant            Goals                                                          Progress note due 4/2/2021   STG by: 1/10/21 Comments Status   Patient will gain an understanding of lymphedema and risk factors   Met;Ongoing   Patient will have a decrease in circumference of left LE along with pain reduction  Met, Ongoing             LTG by: 12/11/2020     Patient will be independent in basic exercises to increase lymph flow Compliant with HEP Met; Ongoing   Patient will be independent in self - massage Compliant with MLD   Met; Ongoing   Patient will be advised of proper compression garments and fit assessed He is wrapping the  entire L LE at night. Met, ongoing   Patient will be given a trial of a compression pump He now has the Flexitouch pump   Met, ongoing   Patient will have a pain level of no >2 on a consistent basis Pain has been closer to 0/10 Ongoing;Progressing;Partially Met   Patient will have a 90 - 100% reduction in edema of left groin and penis Dense tissue softer today L groin, no genital swelling Partially met, ongoing   Patient will gain independent management of lymphedema He is working on his home maintenance program with the help of his wife.  Ongoing;Progressing, Partially Met         Total Timed Treatment:   75  mins  Total Time of Visit:            73   mins       Therapy Education/Self Care 38849   Details: Continue to work on CDT, use the Flexitouch pump, continue to wrap the L LE at night.   [employer] Code: N/A   Given Edema management, mobility   Progress: Reinforced   Who provided to: Patient   Level of understanding Verbal   Timed Minutes       ASSESSMENT/PLAN     Assessment/Plan     ASSESSMENT: Functionally he has improved as he is able to walk without a cane, he is able to do ADL's much easier and with less pain.  He still is restricted with squatting, he does not have pain with activities now.  Patient continues to have tightness in the left hip and knee, he is working on stretching for the L hip and knee.  He is working on CDT as well as using the pump.     PLAN:  Will continue to work on CDT and will work on stretching for the L LE. Will see patient 2-4 x a month.    Other Outcome Measure Tool Used  Other Outcome Measure Tool Comments: LLIS:  41%    Elida Sequeira PTA, SAHIL  Physical Therapist Assistant

## 2021-04-14 ENCOUNTER — TREATMENT (OUTPATIENT)
Dept: PHYSICAL THERAPY | Facility: CLINIC | Age: 51
End: 2021-04-14

## 2021-04-14 DIAGNOSIS — I89.0 LYMPHEDEMA OF LEFT LEG: ICD-10-CM

## 2021-04-14 DIAGNOSIS — C64.1 RENAL CELL CARCINOMA OF RIGHT KIDNEY (HCC): ICD-10-CM

## 2021-04-14 DIAGNOSIS — I89.0 LYMPHEDEMA OF GENITALIA: Primary | ICD-10-CM

## 2021-04-14 PROCEDURE — 97140 MANUAL THERAPY 1/> REGIONS: CPT | Performed by: PHYSICAL THERAPIST

## 2021-04-14 NOTE — PROGRESS NOTES
Physical  Therapy Treatment Note     Patient: Holland Phelps            : 1970  Today's Date: 2021  Referring practitioner: Sharon Cheney,*  Date of Initial Visit: Type: THERAPY  Noted: 10/12/2020  Patient seen for 44 sessions  Visit Diagnoses:    ICD-10-CM ICD-9-CM   1. Lymphedema of genitalia  I89.0 457.1   2. Lymphedema of left leg  I89.0 457.1   3. Renal cell carcinoma of right kidney (CMS/[facility])  C64.1 189.0       SUBJECTIVE              Subjective:  He has new compression he is wearing today, he has a thigh high and new compression socks.  He has also bought a garment that straps around the thigh and around the waist.  He is very happy because he is feeling better and he is able to move around better. He is back to 90% of his normal activity.  He was able to clean 3 cars and mow the lawn all in the same day. No pain today but he does have numbness in the L shin. He is working on stretching his hamstrings and L hip at home.  He is able to sit on the floor and get back up easier. He is wearing the compression stocking at night.     Current pain ratin/10          OBJECTIVE     Objective       Lymphedema     Row Name 21 0800             Subjective Pain    Able to rate subjective pain?  yes  -AL      Pre-Treatment Pain Level  0  -AL         Lymphedema Measurements    Measurement Type(s)  Circumferential  -AL      Circumferential Areas  Lower extremities  -AL         BLE Circumferential (cm)    Measurement Location 1  BOT  -AL      Left 1  21.5 cm  -AL      Measurement Location 2  mid arch  -AL      Left 2  22 cm  -AL      Measurement Location 3  ankle  -AL      Left 3  23.9 cm  -AL      Measurement Location 4  10 cm  -AL      Left 4  21.7 cm  -AL      Measurement Location 5  10 cm  -AL      Left 5  28 cm  -AL      Measurement Location 6  10 cm  -AL      Left 6  34.4 cm  -AL      Measurement Location 7  10 cm  -AL      Left 7  33 cm  -AL      Measurement Location 8  10 cm  -AL      Left  8  38.6 cm  -AL      Measurement Location 9  10 cm  -AL      Left 9  44.7 cm  -AL      Measurement Location 10  10 cm  -AL      Left 10  51.8 cm  -AL      Measurement Location 11  Proximal thigh  -AL      Left 11  54.6 cm  -AL      LLE Circumferential Total  374.2 cm  -AL         Manual Lymphatic Drainage    Manual Lymphatic Drainage  initial sequence;opened regional lymph nodes;opened anastamoses;extremity treatment  -AL      Initial Sequence  short neck;abdomen;diaphragmatic breathing  -AL      Abdomen  superficial  -AL      Diaphragmatic Breathing  X 9 with superficial abdominals  -AL      Opened Regional Lymph Nodes  axillary;inguinal  -AL      Axillary  right;left  -AL      Inguinal  right;left  -AL      Opened Anastamoses  inguino-axillary  -AL      Inguino-Axillary  right;left  -AL      Extremity Treatment  MLD to full limb  -AL      MLD to Full Limb  L LE  -AL      Manual Lymphatic Drainage Comments  MLD to the L LE, stretched the L hip flexors and quads in prone with a pillow under the L thigh, stretched the L hamstrind in supine.  Cues for patient to spend more time on the L LE when walking.   -AL      Manual Therapy  Manual Threapy x 75 min  -AL         Compression/Skin Care    Compression/Skin Care  skin care;compression garment  -AL      Skin Care  lotion applied  -AL      Compression Garment Comments  Patient donned his compression thigh high  -AL      Compression/Skin Care Comments  Do not wear compression stocking at night, wear during the day.  Ok to wear compression sock with thigh high and thgih compression.   -AL        User Key  (r) = Recorded By, (t) = Taken By, (c) = Cosigned By    Initials Name Provider Type    AL Elida Sequeira, PTA, YOSI-LANNY Physical Therapy Assistant            Goals                                                          Progress note due 4/30/2021   STG by: 1/10/21 Comments Status   Patient will gain an understanding of lymphedema and risk factors   Met;Ongoing    Patient will have a decrease in circumference of left LE along with pain reduction  Met, Ongoing             LTG by: 12/11/2020     Patient will be independent in basic exercises to increase lymph flow Compliant with HEP, will add hip strengthening exercises next treatment.  Met; Ongoing   Patient will be independent in self - massage Compliant with MLD   Met; Ongoing   Patient will be advised of proper compression garments and fit assessed He has new compression for the L LE that is containing the swelling. Met, ongoing   Patient will be given a trial of a compression pump He now has the Flexitouch pump   Met, ongoing   Patient will have a pain level of no >2 on a consistent basis Pain has been closer to 0/10 Ongoing;Progressing;Partially Met   Patient will have a 90 - 100% reduction in edema of left groin and penis Dense tissue softer today L groin, no genital swelling Partially met, ongoing   Patient will gain independent management of lymphedema He is working on his home maintenance program with the help of his wife.  Ongoing;Progressing, Partially Met         Total Timed Treatment:   75  mins  Total Time of Visit:            75   mins       Therapy Education/Self Care [id number]   Details: Continue to work on CDT, use the Flexitouch pump, continue to wrap the L LE at night.   [employer] Code: N/A   Given Edema management, mobility   Progress: Reinforced   Who provided to: Patient   Level of understanding Verbal   Timed Minutes       ASSESSMENT/PLAN     Assessment/Plan     ASSESSMENT:  Patient has new compression for the L LE that has helped decrease the size of the L leg.  I did instruct patient to not wear the compression hose at night just during the day.  He is compliant with CDT.  He exhibits L hip and quad weakness with gait, will give patient and HEP for core and hip strengthening next treatment.  Patient's L hamstrings are less tight today. Overall patient has made great progress and has good support from  his wife as she helps with the MLD and stretching of the L leg.  Patient still has difficulty with squatting, but he may always be limited with squatting due to the surgery has had on the L knee and thigh.     PLAN:  Will continue to work on CDT and will work on stretching for the L LE. Will see patient 2-4 x a month. Will include core and hip strengthening next treatment.            Elida Sequeira PTA, CLT-LANNY  Physical Therapist Assistant

## 2021-04-28 ENCOUNTER — TREATMENT (OUTPATIENT)
Dept: PHYSICAL THERAPY | Facility: CLINIC | Age: 51
End: 2021-04-28

## 2021-04-28 DIAGNOSIS — C64.1 RENAL CELL CARCINOMA OF RIGHT KIDNEY (HCC): ICD-10-CM

## 2021-04-28 DIAGNOSIS — I89.0 LYMPHEDEMA OF GENITALIA: Primary | ICD-10-CM

## 2021-04-28 DIAGNOSIS — I89.0 LYMPHEDEMA OF LEFT LEG: ICD-10-CM

## 2021-04-28 PROCEDURE — 97140 MANUAL THERAPY 1/> REGIONS: CPT | Performed by: PHYSICAL THERAPIST

## 2021-04-28 NOTE — PROGRESS NOTES
Physical  Therapy Treatment Note     Patient: Holland Phelps            : 1970  Today's Date: 2021  Referring practitioner: Sharon Cheney,*  Date of Initial Visit: Type: THERAPY  Noted: 10/12/2020  Patient seen for 45 sessions  Visit Diagnoses:    ICD-10-CM ICD-9-CM   1. Lymphedema of genitalia  I89.0 457.1   2. Lymphedema of left leg  I89.0 457.1   3. Renal cell carcinoma of right kidney (CMS/[facility])  C64.1 189.0       SUBJECTIVE              Subjective:  He is able to do everything around the house and even yard work.  He has been able to wash two cars a day, he still is not able to squat due to the surgery he has had on the L leg.  This is how he was before he started having trouble with swelling in the L leg.  He is still wearing the compression garment, he does not have to wrap the lower leg anymore, he uses the Flexitouch daily.      Current pain ratin/10          OBJECTIVE     Objective       Lymphedema     Row Name 21 0800             Subjective Pain    Able to rate subjective pain?  yes  -AL      Pre-Treatment Pain Level  0  -AL         Lymphedema Measurements    Measurement Type(s)  Circumferential  -AL      Circumferential Areas  Lower extremities  -AL         BLE Circumferential (cm)    Measurement Location 1  BOT  -AL      Left 1  20.7 cm  -AL      Measurement Location 2  mid arch  -AL      Left 2  22 cm  -AL      Measurement Location 3  ankle  -AL      Left 3  24.5 cm  -AL      Measurement Location 4  10 cm  -AL      Left 4  21.7 cm  -AL      Measurement Location 5  10 cm  -AL      Left 5  28.1 cm  -AL      Measurement Location 6  10 cm  -AL      Left 6  32.5 cm  -AL      Measurement Location 7  10 cm  -AL      Left 7  32.9 cm  -AL      Measurement Location 8  10 cm  -AL      Left 8  38.2 cm  -AL      Measurement Location 9  10 cm  -AL      Left 9  44 cm  -AL      Measurement Location 10  10 cm  -AL      Left 10  49 cm  -AL      Measurement Location 11  Proximal thigh  -AL       Left 11  53.2 cm  -AL      LLE Circumferential Total  366.8 cm  -AL         Manual Lymphatic Drainage    Manual Lymphatic Drainage  initial sequence;opened regional lymph nodes;opened anastamoses;extremity treatment  -AL      Initial Sequence  short neck;abdomen;diaphragmatic breathing  -AL      Abdomen  superficial  -AL      Diaphragmatic Breathing  X 9 with superficial abdominals  -AL      Opened Regional Lymph Nodes  axillary;inguinal  -AL      Axillary  right;left  -AL      Inguinal  right;left  -AL      Opened Anastamoses  inguino-axillary  -AL      Inguino-Axillary  right;left  -AL      Extremity Treatment  MLD to full limb  -AL      MLD to Full Limb  L LE  -AL      Manual Lymphatic Drainage Comments  MLD to the L LE, stretched the L hip flexors and quads in prone with a pillow under the L thigh, stretched the L hamstrind in supine.  Continue with CDT and use Flextitouch pump daily.   -AL      Manual Therapy  Manual Therapy 70  -AL         Compression/Skin Care    Compression/Skin Care  skin care;compression garment  -AL      Skin Care  lotion applied  -AL      Compression Garment Comments  Patient donned his compression thigh high  -AL      Compression/Skin Care Comments  Continue to wear compression durnig the day.  -AL        User Key  (r) = Recorded By, (t) = Taken By, (c) = Cosigned By    Initials Name Provider Type    Elida Pickering PTA, CLT-LANNY Physical Therapy Assistant            Goals                                                          Progress note due 4/30/2021   STG by: 1/10/21 Comments Status   Patient will gain an understanding of lymphedema and risk factors  He has a good understanding of his lymphedema Met   Patient will have a decrease in circumference of left LE along with pain reduction He has had a reduction in the L LE since the initial eval  Met             LTG by: 12/11/2020     Patient will be independent in basic exercises to increase lymph flow Compliant with HEP, will  add hip strengthening exercises next treatment.  Met   Patient will be independent in self - massage Compliant with MLD   Met   Patient will be advised of proper compression garments and fit assessed He has new compression for the L LE that is containing the swelling. Met   Patient will be given a trial of a compression pump He now has the Flexitouch pump   Met   Patient will have a pain level of no >2 on a consistent basis Pain has been 0/10 consistently Met   Patient will have a 90 - 100% reduction in edema of left groin and penis Dense tissue softer today L groin, no genital swelling Met   Patient will gain independent management of lymphedema He is working on his home maintenance program with the help of his wife.  Met         Total Timed Treatment:   75  mins  Total Time of Visit:            70 mins       Therapy Education/Self Care [id number]   Details: Continue to work on CDT, use the Flexitouch pump, continue to wrap the L LE as needed.  Continue with stretching of the L LE.   [employer] Code: N/A   Given Edema management, mobility   Progress: Reinforced   Who provided to: Patient   Level of understanding Verbal   Timed Minutes       ASSESSMENT/PLAN     Assessment/Plan     ASSESSMENT:  Functionally patient is able to all activities he was doing before he developed lymphedema.  He is wearing compression on the L LE that has helped contain the swelling, he has had a reduction in the L LE and his leg has reduced from his initial visit and his last visit.  Today is leg is the smallest it has been since starting treatment with us.  Patient does still have some dense tissue L groin, but this has improved since his initial visit. Patient knows there may be times when he will have to spend extra time working on the MLD to this area.   He no longer has pain in the L LE, he does still have numbness in the L lower leg but this was present before he started having issues with swelling. Patient's wife is a great support for  patient and has helped him with his home maintenance program.  At this time patient has met all of his goals, will place patient on hold x 2 months.    PLAN:  Will place patient on hold x 2 months.  If he has any issues with his lymphedema he can call and be places back on our schedule.  If we do not hear form patient after 2 months we will d/c him.            Elida Sequeira PTA, YOSI-LANNY  Physical Therapist Assistant

## 2021-07-01 ENCOUNTER — DOCUMENTATION (OUTPATIENT)
Dept: PHYSICAL THERAPY | Facility: CLINIC | Age: 51
End: 2021-07-01

## 2021-07-01 DIAGNOSIS — I89.0 LYMPHEDEMA OF LEFT LEG: Primary | ICD-10-CM

## 2021-07-01 DIAGNOSIS — I89.0 LYMPHEDEMA OF GENITALIA: ICD-10-CM

## 2021-07-01 DIAGNOSIS — C64.1 RENAL CELL CARCINOMA OF RIGHT KIDNEY (HCC): ICD-10-CM

## 2021-07-01 NOTE — PROGRESS NOTES
I have reviewed the notes, assessments, and/or procedures performed by Aneta Sequeira PTA, I concur with her/his documentation of Gene ARIANNE .

## 2021-07-01 NOTE — PROGRESS NOTES
Physical Therapy Therapy Discharge Summary    Patient: Holland Phelps                                                                                     Today's Date: 2021  :     1970    Date of Initial Visit:          No linked episodes    Patient seen for Visit count could not be calculated. Make sure you are using a visit which is associated with an episode. sessions    Visit Diagnoses:    ICD-10-CM ICD-9-CM   1. Lymphedema of left leg  I89.0 457.1   2. Lymphedema of genitalia  I89.0 457.1   3. Renal cell carcinoma of right kidney (CMS/[facility])  C64.1 189.0       GOALS:  Goals                                             STG by: Comments Date Status   Patient will gain an understanding of lymphedema and risk factors  21 Met   Patient will have a decrease in circumference of left LE along with pain reduction  21 Met               LTG by:       Patient will be independent in basic exercises to increase lymph flow  21 Met   Patient will be independent in self - massage  21 Met   Patient will be advised of proper compression garments and fit assessed  21 Met   Patient will be given a trial of a compression pump  21 Met   Patient will have a pain level of no >2 on a consistent basis  21 Met   Patient will have a 90 - 100% reduction in edema of left groin and penis  21 Met   Patient will gain independent management of lymphedema  21 Met       DISCHARGE SUMMARY   Discharge date 21   Dates of this episode 10/12/21 through 21   Number of visits on this episode 43   Reason for discharge all goals met   Outcomes achieved Refer to the goals table for specifics on goals   Discharge instructions Work on CDT   Discharge plan Continue with current home exercise program as instructed   Summary of care Patient will continue to work on home maintenance program, all goals met.        Elida Sequeira PTA, SAHIL

## 2021-07-09 ENCOUNTER — APPOINTMENT (OUTPATIENT)
Dept: GENERAL RADIOLOGY | Facility: HOSPITAL | Age: 51
End: 2021-07-09

## 2021-07-09 ENCOUNTER — APPOINTMENT (OUTPATIENT)
Dept: CT IMAGING | Facility: HOSPITAL | Age: 51
End: 2021-07-09

## 2021-07-09 ENCOUNTER — HOSPITAL ENCOUNTER (INPATIENT)
Facility: HOSPITAL | Age: 51
LOS: 7 days | Discharge: HOME | End: 2021-07-16
Attending: EMERGENCY MEDICINE | Admitting: INTERNAL MEDICINE

## 2021-07-09 DIAGNOSIS — D57.00 SICKLE CELL CRISIS (HCC): Primary | ICD-10-CM

## 2021-07-09 DIAGNOSIS — I51.7 CARDIOMEGALY: ICD-10-CM

## 2021-07-09 DIAGNOSIS — R07.9 CHEST PAIN, UNSPECIFIED TYPE: ICD-10-CM

## 2021-07-09 DIAGNOSIS — D57.40 SICKLE CELL BETA THALASSEMIA (HCC): ICD-10-CM

## 2021-07-09 DIAGNOSIS — I31.39 PERICARDIAL EFFUSION: ICD-10-CM

## 2021-07-09 PROBLEM — R09.02 HYPOXIA: Status: ACTIVE | Noted: 2021-07-09

## 2021-07-09 PROBLEM — E80.6 HYPERBILIRUBINEMIA: Status: ACTIVE | Noted: 2021-07-09

## 2021-07-09 PROBLEM — Z90.5 SINGLE KIDNEY: Status: ACTIVE | Noted: 2021-07-09

## 2021-07-09 PROBLEM — D63.8 ANEMIA OF CHRONIC DISEASE: Status: ACTIVE | Noted: 2021-07-09

## 2021-07-09 PROBLEM — R52 ACUTE PAIN: Status: ACTIVE | Noted: 2021-07-09

## 2021-07-09 LAB
ABO GROUP BLD: NORMAL
ALBUMIN SERPL-MCNC: 4.9 G/DL (ref 3.5–5.2)
ALBUMIN/GLOB SERPL: 1.6 G/DL
ALP SERPL-CCNC: 82 U/L (ref 39–117)
ALT SERPL W P-5'-P-CCNC: 7 U/L (ref 1–41)
AMPHET+METHAMPHET UR QL: NEGATIVE
AMPHETAMINES UR QL: NEGATIVE
ANION GAP SERPL CALCULATED.3IONS-SCNC: 11 MMOL/L (ref 5–15)
ANISOCYTOSIS BLD QL: ABNORMAL
APTT PPP: 30.6 SECONDS (ref 24.1–35)
ARTERIAL PATENCY WRIST A: POSITIVE
AST SERPL-CCNC: 17 U/L (ref 1–40)
ATMOSPHERIC PRESS: 751 MMHG
BACTERIA UR QL AUTO: ABNORMAL /HPF
BARBITURATES UR QL SCN: NEGATIVE
BASE EXCESS BLDA CALC-SCNC: 3.2 MMOL/L (ref 0–2)
BASOPHILS # BLD MANUAL: 0.17 10*3/MM3 (ref 0–0.2)
BASOPHILS NFR BLD AUTO: 1 % (ref 0–1.5)
BDY SITE: ABNORMAL
BENZODIAZ UR QL SCN: NEGATIVE
BILIRUB SERPL-MCNC: 2.4 MG/DL (ref 0–1.2)
BILIRUB UR QL STRIP: NEGATIVE
BLD GP AB SCN SERPL QL: NEGATIVE
BODY TEMPERATURE: 37 C
BUN SERPL-MCNC: 12 MG/DL (ref 6–20)
BUN/CREAT SERPL: 12.5 (ref 7–25)
BUPRENORPHINE SERPL-MCNC: NEGATIVE NG/ML
CALCIUM SPEC-SCNC: 9.3 MG/DL (ref 8.6–10.5)
CANNABINOIDS SERPL QL: POSITIVE
CHLORIDE SERPL-SCNC: 103 MMOL/L (ref 98–107)
CK SERPL-CCNC: 75 U/L (ref 20–200)
CLARITY UR: CLEAR
CO2 SERPL-SCNC: 27 MMOL/L (ref 22–29)
COCAINE UR QL: NEGATIVE
COLOR UR: YELLOW
CREAT SERPL-MCNC: 0.96 MG/DL (ref 0.76–1.27)
D-LACTATE SERPL-SCNC: 1.6 MMOL/L (ref 0.5–2)
DEPRECATED RDW RBC AUTO: 52.1 FL (ref 37–54)
ERYTHROCYTE [DISTWIDTH] IN BLOOD BY AUTOMATED COUNT: 21.3 % (ref 12.3–15.4)
GFR SERPL CREATININE-BSD FRML MDRD: 100 ML/MIN/1.73
GIANT PLATELETS: ABNORMAL
GLOBULIN UR ELPH-MCNC: 3.1 GM/DL
GLUCOSE SERPL-MCNC: 108 MG/DL (ref 65–99)
GLUCOSE UR STRIP-MCNC: NEGATIVE MG/DL
HCO3 BLDA-SCNC: 28.3 MMOL/L (ref 20–26)
HCT VFR BLD AUTO: 23 % (ref 37.5–51)
HGB BLD-MCNC: 7.8 G/DL (ref 13–17.7)
HGB UR QL STRIP.AUTO: ABNORMAL
HYALINE CASTS UR QL AUTO: ABNORMAL /LPF
INR PPP: 1.18 (ref 0.91–1.09)
KETONES UR QL STRIP: NEGATIVE
LDH SERPL-CCNC: 250 U/L (ref 135–225)
LEUKOCYTE ESTERASE UR QL STRIP.AUTO: NEGATIVE
LIPASE SERPL-CCNC: 30 U/L (ref 13–60)
LYMPHOCYTES # BLD MANUAL: 0.67 10*3/MM3 (ref 0.7–3.1)
LYMPHOCYTES NFR BLD MANUAL: 3 % (ref 19.6–45.3)
LYMPHOCYTES NFR BLD MANUAL: 4 % (ref 5–12)
Lab: ABNORMAL
MACROCYTES BLD QL SMEAR: ABNORMAL
MCH RBC QN AUTO: 23.9 PG (ref 26.6–33)
MCHC RBC AUTO-ENTMCNC: 33.9 G/DL (ref 31.5–35.7)
MCV RBC AUTO: 70.3 FL (ref 79–97)
METHADONE UR QL SCN: NEGATIVE
MODALITY: ABNORMAL
MONOCYTES # BLD AUTO: 0.67 10*3/MM3 (ref 0.1–0.9)
NEUTROPHILS # BLD AUTO: 15.34 10*3/MM3 (ref 1.7–7)
NEUTROPHILS NFR BLD MANUAL: 90 % (ref 42.7–76)
NEUTS BAND NFR BLD MANUAL: 1 % (ref 0–5)
NITRITE UR QL STRIP: NEGATIVE
NRBC BLD AUTO-RTO: 12.6 /100 WBC (ref 0–0.2)
NRBC SPEC MANUAL: 24 /100 WBC (ref 0–0.2)
NT-PROBNP SERPL-MCNC: 89.4 PG/ML (ref 0–900)
OPIATES UR QL: POSITIVE
OXYCODONE UR QL SCN: NEGATIVE
PAPPENHEIMER BOD BLD QL SMEAR: PRESENT
PCO2 BLDA: 44.7 MM HG (ref 35–45)
PCO2 TEMP ADJ BLD: 44.7 MM HG (ref 35–45)
PCP UR QL SCN: NEGATIVE
PH BLDA: 7.41 PH UNITS (ref 7.35–7.45)
PH UR STRIP.AUTO: 6 [PH] (ref 5–8)
PH, TEMP CORRECTED: 7.41 PH UNITS (ref 7.35–7.45)
PLATELET # BLD AUTO: 278 10*3/MM3 (ref 140–450)
PMV BLD AUTO: 9.5 FL (ref 6–12)
PO2 BLDA: 73.5 MM HG (ref 83–108)
PO2 TEMP ADJ BLD: 73.5 MM HG (ref 83–108)
POLYCHROMASIA BLD QL SMEAR: ABNORMAL
POTASSIUM SERPL-SCNC: 3.8 MMOL/L (ref 3.5–5.2)
PROCALCITONIN SERPL-MCNC: 0.52 NG/ML (ref 0–0.25)
PROPOXYPH UR QL: NEGATIVE
PROT SERPL-MCNC: 8 G/DL (ref 6–8.5)
PROT UR QL STRIP: ABNORMAL
PROTHROMBIN TIME: 14.1 SECONDS (ref 11.5–13.4)
RBC # BLD AUTO: 3.27 10*6/MM3 (ref 4.14–5.8)
RBC # UR: ABNORMAL /HPF
REF LAB TEST METHOD: ABNORMAL
RETICS # AUTO: 0.07 10*6/MM3 (ref 0.02–0.13)
RETICS/RBC NFR AUTO: 2.17 % (ref 0.7–1.9)
RH BLD: POSITIVE
SAO2 % BLDCOA: 96 % (ref 94–99)
SARS-COV-2 RNA PNL SPEC NAA+PROBE: NOT DETECTED
SODIUM SERPL-SCNC: 141 MMOL/L (ref 136–145)
SP GR UR STRIP: >1.03 (ref 1–1.03)
SQUAMOUS #/AREA URNS HPF: ABNORMAL /HPF
T&S EXPIRATION DATE: NORMAL
TARGETS BLD QL SMEAR: ABNORMAL
TRICYCLICS UR QL SCN: NEGATIVE
TROPONIN T SERPL-MCNC: <0.01 NG/ML (ref 0–0.03)
UROBILINOGEN UR QL STRIP: ABNORMAL
VARIANT LYMPHS NFR BLD MANUAL: 1 % (ref 0–5)
VENTILATOR MODE: ABNORMAL
WBC # BLD AUTO: 16.86 10*3/MM3 (ref 3.4–10.8)
WBC MORPH BLD: NORMAL
WBC UR QL AUTO: ABNORMAL /HPF

## 2021-07-09 PROCEDURE — 93010 ELECTROCARDIOGRAM REPORT: CPT | Performed by: INTERNAL MEDICINE

## 2021-07-09 PROCEDURE — 80053 COMPREHEN METABOLIC PANEL: CPT | Performed by: EMERGENCY MEDICINE

## 2021-07-09 PROCEDURE — 86901 BLOOD TYPING SEROLOGIC RH(D): CPT | Performed by: EMERGENCY MEDICINE

## 2021-07-09 PROCEDURE — 86901 BLOOD TYPING SEROLOGIC RH(D): CPT

## 2021-07-09 PROCEDURE — 86920 COMPATIBILITY TEST SPIN: CPT

## 2021-07-09 PROCEDURE — 82803 BLOOD GASES ANY COMBINATION: CPT

## 2021-07-09 PROCEDURE — 71275 CT ANGIOGRAPHY CHEST: CPT

## 2021-07-09 PROCEDURE — 25010000002 CEFTRIAXONE PER 250 MG: Performed by: EMERGENCY MEDICINE

## 2021-07-09 PROCEDURE — 72128 CT CHEST SPINE W/O DYE: CPT

## 2021-07-09 PROCEDURE — 25010000002 ONDANSETRON PER 1 MG: Performed by: EMERGENCY MEDICINE

## 2021-07-09 PROCEDURE — 86850 RBC ANTIBODY SCREEN: CPT | Performed by: EMERGENCY MEDICINE

## 2021-07-09 PROCEDURE — 25010000002 HYDROMORPHONE PER 4 MG: Performed by: EMERGENCY MEDICINE

## 2021-07-09 PROCEDURE — 83690 ASSAY OF LIPASE: CPT | Performed by: EMERGENCY MEDICINE

## 2021-07-09 PROCEDURE — 86900 BLOOD TYPING SEROLOGIC ABO: CPT | Performed by: EMERGENCY MEDICINE

## 2021-07-09 PROCEDURE — 36600 WITHDRAWAL OF ARTERIAL BLOOD: CPT

## 2021-07-09 PROCEDURE — 71045 X-RAY EXAM CHEST 1 VIEW: CPT

## 2021-07-09 PROCEDURE — 25010000002 DROPERIDOL PER 5 MG: Performed by: EMERGENCY MEDICINE

## 2021-07-09 PROCEDURE — 93005 ELECTROCARDIOGRAM TRACING: CPT | Performed by: EMERGENCY MEDICINE

## 2021-07-09 PROCEDURE — 84484 ASSAY OF TROPONIN QUANT: CPT | Performed by: EMERGENCY MEDICINE

## 2021-07-09 PROCEDURE — 82550 ASSAY OF CK (CPK): CPT | Performed by: EMERGENCY MEDICINE

## 2021-07-09 PROCEDURE — 85007 BL SMEAR W/DIFF WBC COUNT: CPT | Performed by: EMERGENCY MEDICINE

## 2021-07-09 PROCEDURE — 83605 ASSAY OF LACTIC ACID: CPT | Performed by: EMERGENCY MEDICINE

## 2021-07-09 PROCEDURE — 85045 AUTOMATED RETICULOCYTE COUNT: CPT | Performed by: EMERGENCY MEDICINE

## 2021-07-09 PROCEDURE — 85730 THROMBOPLASTIN TIME PARTIAL: CPT | Performed by: EMERGENCY MEDICINE

## 2021-07-09 PROCEDURE — 83615 LACTATE (LD) (LDH) ENZYME: CPT | Performed by: EMERGENCY MEDICINE

## 2021-07-09 PROCEDURE — 84145 PROCALCITONIN (PCT): CPT | Performed by: EMERGENCY MEDICINE

## 2021-07-09 PROCEDURE — 87040 BLOOD CULTURE FOR BACTERIA: CPT | Performed by: EMERGENCY MEDICINE

## 2021-07-09 PROCEDURE — 85610 PROTHROMBIN TIME: CPT | Performed by: EMERGENCY MEDICINE

## 2021-07-09 PROCEDURE — 99285 EMERGENCY DEPT VISIT HI MDM: CPT

## 2021-07-09 PROCEDURE — 0 IOPAMIDOL PER 1 ML: Performed by: EMERGENCY MEDICINE

## 2021-07-09 PROCEDURE — 87635 SARS-COV-2 COVID-19 AMP PRB: CPT | Performed by: EMERGENCY MEDICINE

## 2021-07-09 PROCEDURE — 25010000002 AZITHROMYCIN PER 500 MG: Performed by: EMERGENCY MEDICINE

## 2021-07-09 PROCEDURE — 81001 URINALYSIS AUTO W/SCOPE: CPT | Performed by: EMERGENCY MEDICINE

## 2021-07-09 PROCEDURE — 85025 COMPLETE CBC W/AUTO DIFF WBC: CPT | Performed by: EMERGENCY MEDICINE

## 2021-07-09 PROCEDURE — 80306 DRUG TEST PRSMV INSTRMNT: CPT | Performed by: EMERGENCY MEDICINE

## 2021-07-09 PROCEDURE — 86900 BLOOD TYPING SEROLOGIC ABO: CPT

## 2021-07-09 PROCEDURE — 83880 ASSAY OF NATRIURETIC PEPTIDE: CPT | Performed by: EMERGENCY MEDICINE

## 2021-07-09 RX ORDER — HYDROMORPHONE HCL 110MG/55ML
1.5 PATIENT CONTROLLED ANALGESIA SYRINGE INTRAVENOUS ONCE
Status: COMPLETED | OUTPATIENT
Start: 2021-07-09 | End: 2021-07-09

## 2021-07-09 RX ORDER — ONDANSETRON 2 MG/ML
4 INJECTION INTRAMUSCULAR; INTRAVENOUS ONCE
Status: COMPLETED | OUTPATIENT
Start: 2021-07-09 | End: 2021-07-09

## 2021-07-09 RX ORDER — SODIUM CHLORIDE 0.9 % (FLUSH) 0.9 %
10 SYRINGE (ML) INJECTION AS NEEDED
Status: DISCONTINUED | OUTPATIENT
Start: 2021-07-09 | End: 2021-07-16 | Stop reason: HOSPADM

## 2021-07-09 RX ORDER — MORPHINE SULFATE 1 MG/ML
INJECTION INTRAVENOUS CONTINUOUS
Status: DISCONTINUED | OUTPATIENT
Start: 2021-07-10 | End: 2021-07-12

## 2021-07-09 RX ORDER — ONDANSETRON 4 MG/1
4 TABLET, FILM COATED ORAL EVERY 6 HOURS PRN
Status: DISCONTINUED | OUTPATIENT
Start: 2021-07-09 | End: 2021-07-16 | Stop reason: HOSPADM

## 2021-07-09 RX ORDER — NALOXONE HCL 0.4 MG/ML
0.1 VIAL (ML) INJECTION
Status: DISCONTINUED | OUTPATIENT
Start: 2021-07-09 | End: 2021-07-16 | Stop reason: HOSPADM

## 2021-07-09 RX ORDER — SODIUM CHLORIDE 0.9 % (FLUSH) 0.9 %
10 SYRINGE (ML) INJECTION EVERY 12 HOURS SCHEDULED
Status: DISCONTINUED | OUTPATIENT
Start: 2021-07-10 | End: 2021-07-16 | Stop reason: HOSPADM

## 2021-07-09 RX ORDER — MORPHINE SULFATE 15 MG/1
40 TABLET ORAL EVERY 4 HOURS PRN
Status: DISCONTINUED | OUTPATIENT
Start: 2021-07-09 | End: 2021-07-16 | Stop reason: HOSPADM

## 2021-07-09 RX ORDER — FOLIC ACID 1 MG/1
1 TABLET ORAL DAILY
Status: DISCONTINUED | OUTPATIENT
Start: 2021-07-10 | End: 2021-07-16 | Stop reason: HOSPADM

## 2021-07-09 RX ORDER — ESCITALOPRAM OXALATE 10 MG/1
20 TABLET ORAL DAILY
Status: DISCONTINUED | OUTPATIENT
Start: 2021-07-10 | End: 2021-07-16 | Stop reason: HOSPADM

## 2021-07-09 RX ORDER — LACTULOSE 20 G/30ML
20 SOLUTION ORAL 2 TIMES DAILY PRN
Status: DISCONTINUED | OUTPATIENT
Start: 2021-07-09 | End: 2021-07-16 | Stop reason: HOSPADM

## 2021-07-09 RX ORDER — ONDANSETRON 2 MG/ML
4 INJECTION INTRAMUSCULAR; INTRAVENOUS EVERY 6 HOURS PRN
Status: DISCONTINUED | OUTPATIENT
Start: 2021-07-09 | End: 2021-07-16 | Stop reason: HOSPADM

## 2021-07-09 RX ORDER — MORPHINE SULFATE 30 MG/1
120 TABLET, FILM COATED, EXTENDED RELEASE ORAL 3 TIMES DAILY
Status: DISCONTINUED | OUTPATIENT
Start: 2021-07-10 | End: 2021-07-14

## 2021-07-09 RX ORDER — SODIUM CHLORIDE, SODIUM LACTATE, POTASSIUM CHLORIDE, CALCIUM CHLORIDE 600; 310; 30; 20 MG/100ML; MG/100ML; MG/100ML; MG/100ML
100 INJECTION, SOLUTION INTRAVENOUS CONTINUOUS
Status: DISCONTINUED | OUTPATIENT
Start: 2021-07-10 | End: 2021-07-12

## 2021-07-09 RX ORDER — ESCITALOPRAM OXALATE 20 MG/1
20 TABLET ORAL EVERY EVENING
COMMUNITY
End: 2023-03-11

## 2021-07-09 RX ORDER — DROPERIDOL 2.5 MG/ML
2.5 INJECTION, SOLUTION INTRAMUSCULAR; INTRAVENOUS ONCE
Status: COMPLETED | OUTPATIENT
Start: 2021-07-09 | End: 2021-07-09

## 2021-07-09 RX ADMIN — SODIUM CHLORIDE, POTASSIUM CHLORIDE, SODIUM LACTATE AND CALCIUM CHLORIDE 125 ML/HR: 600; 310; 30; 20 INJECTION, SOLUTION INTRAVENOUS at 23:36

## 2021-07-09 RX ADMIN — CEFTRIAXONE SODIUM 2 G: 2 INJECTION, POWDER, FOR SOLUTION INTRAMUSCULAR; INTRAVENOUS at 21:04

## 2021-07-09 RX ADMIN — AZITHROMYCIN MONOHYDRATE 500 MG: 500 INJECTION, POWDER, LYOPHILIZED, FOR SOLUTION INTRAVENOUS at 21:01

## 2021-07-09 RX ADMIN — SODIUM CHLORIDE 2625 ML: 9 INJECTION, SOLUTION INTRAVENOUS at 21:00

## 2021-07-09 RX ADMIN — DROPERIDOL 2.5 MG: 2.5 INJECTION, SOLUTION INTRAMUSCULAR; INTRAVENOUS at 21:53

## 2021-07-09 RX ADMIN — MORPHINE SULFATE 37.5 MG: 15 TABLET ORAL at 23:26

## 2021-07-09 RX ADMIN — IOPAMIDOL 100 ML: 755 INJECTION, SOLUTION INTRAVENOUS at 20:50

## 2021-07-09 RX ADMIN — ONDANSETRON 4 MG: 2 INJECTION INTRAMUSCULAR; INTRAVENOUS at 19:48

## 2021-07-09 RX ADMIN — HYDROMORPHONE HYDROCHLORIDE 1.5 MG: 2 INJECTION, SOLUTION INTRAMUSCULAR; INTRAVENOUS; SUBCUTANEOUS at 19:48

## 2021-07-10 LAB
ALBUMIN SERPL-MCNC: 4.2 G/DL (ref 3.5–5.2)
ALBUMIN/GLOB SERPL: 1.4 G/DL
ALP SERPL-CCNC: 73 U/L (ref 39–117)
ALT SERPL W P-5'-P-CCNC: 6 U/L (ref 1–41)
ANION GAP SERPL CALCULATED.3IONS-SCNC: 8 MMOL/L (ref 5–15)
ANISOCYTOSIS BLD QL: ABNORMAL
AST SERPL-CCNC: 16 U/L (ref 1–40)
BASOPHILS # BLD MANUAL: 0.15 10*3/MM3 (ref 0–0.2)
BASOPHILS NFR BLD AUTO: 1 % (ref 0–1.5)
BILIRUB SERPL-MCNC: 1.8 MG/DL (ref 0–1.2)
BUN SERPL-MCNC: 10 MG/DL (ref 6–20)
BUN/CREAT SERPL: 10.8 (ref 7–25)
CALCIUM SPEC-SCNC: 8.6 MG/DL (ref 8.6–10.5)
CHLORIDE SERPL-SCNC: 108 MMOL/L (ref 98–107)
CO2 SERPL-SCNC: 26 MMOL/L (ref 22–29)
CREAT SERPL-MCNC: 0.93 MG/DL (ref 0.76–1.27)
DEPRECATED RDW RBC AUTO: 52.3 FL (ref 37–54)
EOSINOPHIL # BLD MANUAL: 0.15 10*3/MM3 (ref 0–0.4)
EOSINOPHIL NFR BLD MANUAL: 1 % (ref 0.3–6.2)
ERYTHROCYTE [DISTWIDTH] IN BLOOD BY AUTOMATED COUNT: 21.4 % (ref 12.3–15.4)
GFR SERPL CREATININE-BSD FRML MDRD: 103 ML/MIN/1.73
GIANT PLATELETS: ABNORMAL
GLOBULIN UR ELPH-MCNC: 3 GM/DL
GLUCOSE SERPL-MCNC: 107 MG/DL (ref 65–99)
HCT VFR BLD AUTO: 21.4 % (ref 37.5–51)
HGB BLD-MCNC: 7 G/DL (ref 13–17.7)
HOLD SPECIMEN: NORMAL
LYMPHOCYTES # BLD MANUAL: 0.88 10*3/MM3 (ref 0.7–3.1)
LYMPHOCYTES NFR BLD MANUAL: 6.1 % (ref 19.6–45.3)
LYMPHOCYTES NFR BLD MANUAL: 9.1 % (ref 5–12)
MACROCYTES BLD QL SMEAR: ABNORMAL
MCH RBC QN AUTO: 23.9 PG (ref 26.6–33)
MCHC RBC AUTO-ENTMCNC: 33.5 G/DL (ref 31.5–35.7)
MCV RBC AUTO: 71.3 FL (ref 79–97)
MONOCYTES # BLD AUTO: 1.32 10*3/MM3 (ref 0.1–0.9)
NEUTROPHILS # BLD AUTO: 12.01 10*3/MM3 (ref 1.7–7)
NEUTROPHILS NFR BLD MANUAL: 80.8 % (ref 42.7–76)
NEUTS BAND NFR BLD MANUAL: 2 % (ref 0–5)
NRBC SPEC MANUAL: 39.4 /100 WBC (ref 0–0.2)
PAPPENHEIMER BOD BLD QL SMEAR: PRESENT
PLATELET # BLD AUTO: 254 10*3/MM3 (ref 140–450)
PMV BLD AUTO: 9.7 FL (ref 6–12)
POIKILOCYTOSIS BLD QL SMEAR: ABNORMAL
POLYCHROMASIA BLD QL SMEAR: ABNORMAL
POTASSIUM SERPL-SCNC: 3.9 MMOL/L (ref 3.5–5.2)
PROT SERPL-MCNC: 7.2 G/DL (ref 6–8.5)
QT INTERVAL: 396 MS
QTC INTERVAL: 467 MS
RBC # BLD AUTO: 2.93 10*6/MM3 (ref 4.14–5.8)
RETICS # AUTO: 0.08 10*6/MM3 (ref 0.02–0.13)
RETICS/RBC NFR AUTO: 2.58 % (ref 0.7–1.9)
SODIUM SERPL-SCNC: 142 MMOL/L (ref 136–145)
TARGETS BLD QL SMEAR: ABNORMAL
WBC # BLD AUTO: 14.5 10*3/MM3 (ref 3.4–10.8)
WBC MORPH BLD: NORMAL

## 2021-07-10 PROCEDURE — 80053 COMPREHEN METABOLIC PANEL: CPT | Performed by: NURSE PRACTITIONER

## 2021-07-10 PROCEDURE — P9016 RBC LEUKOCYTES REDUCED: HCPCS

## 2021-07-10 PROCEDURE — 85007 BL SMEAR W/DIFF WBC COUNT: CPT | Performed by: NURSE PRACTITIONER

## 2021-07-10 PROCEDURE — 85025 COMPLETE CBC W/AUTO DIFF WBC: CPT | Performed by: NURSE PRACTITIONER

## 2021-07-10 PROCEDURE — 25010000002 MORPHINE (PF) 10 MG/ML SOLUTION: Performed by: NURSE PRACTITIONER

## 2021-07-10 PROCEDURE — 85045 AUTOMATED RETICULOCYTE COUNT: CPT | Performed by: INTERNAL MEDICINE

## 2021-07-10 PROCEDURE — 86900 BLOOD TYPING SEROLOGIC ABO: CPT

## 2021-07-10 PROCEDURE — 36430 TRANSFUSION BLD/BLD COMPNT: CPT

## 2021-07-10 RX ORDER — HYDROCORTISONE 10 MG/1
TABLET ORAL 2 TIMES DAILY
Status: ON HOLD | COMMUNITY
End: 2022-04-21

## 2021-07-10 RX ORDER — HYDROCORTISONE 10 MG/1
10 TABLET ORAL NIGHTLY
Status: DISCONTINUED | OUTPATIENT
Start: 2021-07-10 | End: 2021-07-16 | Stop reason: HOSPADM

## 2021-07-10 RX ORDER — NIFEDIPINE 90 MG/1
90 TABLET, EXTENDED RELEASE ORAL
Status: DISCONTINUED | OUTPATIENT
Start: 2021-07-10 | End: 2021-07-16 | Stop reason: HOSPADM

## 2021-07-10 RX ORDER — NIFEDIPINE 90 MG/1
90 TABLET, EXTENDED RELEASE ORAL DAILY
COMMUNITY
End: 2023-03-11 | Stop reason: ALTCHOICE

## 2021-07-10 RX ORDER — HYDROCORTISONE 10 MG/1
20 TABLET ORAL DAILY
Status: DISCONTINUED | OUTPATIENT
Start: 2021-07-11 | End: 2021-07-16 | Stop reason: HOSPADM

## 2021-07-10 RX ADMIN — NIFEDIPINE 90 MG: 90 TABLET, EXTENDED RELEASE ORAL at 17:22

## 2021-07-10 RX ADMIN — SODIUM CHLORIDE, PRESERVATIVE FREE 10 ML: 5 INJECTION INTRAVENOUS at 20:41

## 2021-07-10 RX ADMIN — MORPHINE SULFATE 120 MG: 30 TABLET, FILM COATED, EXTENDED RELEASE ORAL at 20:26

## 2021-07-10 RX ADMIN — HYDROCORTISONE 10 MG: 10 TABLET ORAL at 20:26

## 2021-07-10 RX ADMIN — MORPHINE SULFATE: 10 INJECTION, SOLUTION INTRAMUSCULAR; INTRAVENOUS at 00:44

## 2021-07-10 RX ADMIN — ESCITALOPRAM 20 MG: 10 TABLET, FILM COATED ORAL at 08:37

## 2021-07-10 RX ADMIN — FOLIC ACID 1 MG: 1 TABLET ORAL at 08:37

## 2021-07-10 RX ADMIN — MORPHINE SULFATE: 10 INJECTION, SOLUTION INTRAMUSCULAR; INTRAVENOUS at 18:30

## 2021-07-10 RX ADMIN — SODIUM CHLORIDE, PRESERVATIVE FREE 10 ML: 5 INJECTION INTRAVENOUS at 08:38

## 2021-07-10 RX ADMIN — MORPHINE SULFATE 120 MG: 30 TABLET, FILM COATED, EXTENDED RELEASE ORAL at 16:23

## 2021-07-10 RX ADMIN — LINACLOTIDE 145 MCG: 145 CAPSULE, GELATIN COATED ORAL at 17:22

## 2021-07-10 RX ADMIN — MORPHINE SULFATE: 10 INJECTION, SOLUTION INTRAMUSCULAR; INTRAVENOUS at 09:52

## 2021-07-10 RX ADMIN — MORPHINE SULFATE 120 MG: 30 TABLET, FILM COATED, EXTENDED RELEASE ORAL at 08:36

## 2021-07-10 NOTE — PROGRESS NOTES
1           [facility] Medicine Services  INPATIENT PROGRESS NOTE    Patient Name: Holland Phelps  Date of Admission: 7/9/2021  Today's Date: 07/10/21  Length of Stay: 1  Primary Care Physician: Mallory Mccarthy MD    Subjective   Chief Complaint: Follow-up  HPI     He was admitted yesterday for sickle pain crisis.  He was recently in [place] yesterday but had develop pain .   It is my understanding he was at [facility]. He got transferred to our hospital.     50-year-old man who follows with [place].  He has known history of acute kidney injury, acute on chronic respiratory failure, renal cancer with lymph node mets and sickle cell disease with prior acute chest syndrome.  He has been on opiate (MSIR and MS Contin.  It appears that it was recently prescribed on July 9  When he came in yesterday his LDH is 250, reticulocyte count is 2.1  His hemoglobin is 7.8 with hematocrit of 23.  White count is 16.8.  He had adequate oxygenation in room air with SPO2 of 96, PaO2 of 73.5.  PE  protocol showed cardiomegaly with trace pericardial fluid, mild bronchial wall thickening.  No pathologic enlargement of lymph node.  The study was limited due to contrast bolus timing.    He had a recent MRI/MRA of head and neck.  This was described to be normal MRA angiography head and neck.  There are some foci of nonspecific T2/FLAIR hyperintensity in the centrum semiovale and periventricular white matter compatible with chronic small vessel ischemic change    Patient told me that he had chest pain and back pain.  It appeared that he had CT scan from our hospital in outside hospital.  I am not sure why there were 2 CT angiogram done (I have to verify this).  Patient reportedly was hypoxic at 78% when picked up however he had an arterial blood gas that showed his PaO2 at 74% as mentioned above.    He normally runs around 7.9-8.1 hemoglobin.  His most recent hemoglobin is 7.  I think it is  reasonable to transfuse a unit of packed RBC.    He rates his pain level as 5 out of 10.  He is on morphine PCA.  Spouse reports that the urine is clear yellow and the best she ever seen.   Review of Systems     All pertinent negatives and positives are as above. All other systems have been reviewed and are negative unless otherwise stated.     Objective    Temp:  [97.6 °F (36.4 °C)-99.6 °F (37.6 °C)] 99.6 °F (37.6 °C)  Heart Rate:  [72-87] 76  Resp:  [16-18] 16  BP: (107-152)/() 152/78  Physical Exam  No gross jaundice  Alert and oriented  On oxygen with O2 saturation of 93%  On morphine PCA.  He states that he is more comfortable than yesterday  Alert and oriented x3  Nonlabored breathing  Diminished breath sounds  No crackles or wheezes  S1-S2, positive murmur  Distended abdomen, nontender, no guarding  No masses  No cyanosis or edema  Warm dry skin with good capillary refill    Results Review:  I have reviewed the labs, radiology results, and diagnostic studies.    Laboratory Data:   Results from last 7 days   Lab Units 07/10/21  0430 07/09/21  1938   WBC 10*3/mm3 14.50* 16.86*   HEMOGLOBIN g/dL 7.0* 7.8*   HEMATOCRIT % 21.4* 23.0*   PLATELETS 10*3/mm3 254 278        Results from last 7 days   Lab Units 07/10/21  0430 07/09/21  1938   SODIUM mmol/L 142 141   POTASSIUM mmol/L 3.9 3.8   CHLORIDE mmol/L 108* 103   CO2 mmol/L 26.0 27.0   BUN mg/dL 10 12   CREATININE mg/dL 0.93 0.96   CALCIUM mg/dL 8.6 9.3   BILIRUBIN mg/dL 1.8* 2.4*   ALK PHOS U/L 73 82   ALT (SGPT) U/L 6 7   AST (SGOT) U/L 16 17   GLUCOSE mg/dL 107* 108*       Culture Data:   No results found for: BLOODCX, URINECX, WOUNDCX, MRSACX, RESPCX, STOOLCX    Radiology Data:   Imaging Results (Last 24 Hours)     ** No results found for the last 24 hours. **          I have reviewed the patient's current medications.     Assessment/Plan     Active Hospital Problems    Diagnosis    • **Sickle cell crisis (CMS/[facility])    • Anemia of chronic disease    •  Acute pain    • Hyperbilirubinemia    • Hypoxia    • Single kidney, left    • Renal cell carcinoma of right kidney metastatic to other site (CMS/HCC)      Problem list  Sickle cell anemia  Sickle cell pain crisis on opiate treatment; on morphine PCA  Hypertension -resume home medication  Leukocytosis -monitor for signs of infection  History of renal cell cancer right kidney with reported mets to lymph nodes, he has remaining left kidney  Hyperbilirubinemia likely secondary to sickle cell  History of adrenal insufficiency      Plans  Transfuse 1 unit of packed RBC  Continue morphine PCA for pain control  We will resume hydrocortisone from home medication  Oxygen to keep O2 saturation greater than 92%  IV fluid    Discussed with wife and nurse Gaby  escitalopram, 20 mg, Oral, Daily  folic acid, 1 mg, Oral, Daily  hydrocortisone, 20 mg, Oral, Daily  Morphine, 120 mg, Oral, TID  NIFEdipine XL, 90 mg, Oral, Q24H  NON FORMULARY, 145 mcg, Oral, Daily  sodium chloride, 10 mL, Intravenous, Q12H          Discharge Planning: To be determined    Electronically signed by Justino Mcdonald MD, 07/10/21, 16:29 CDT.

## 2021-07-10 NOTE — PLAN OF CARE
Goal Outcome Evaluation:  Plan of Care Reviewed With: spouse        Progress: no change  Outcome Summary: VSS. Patient complains of 10/10 pain throughout the shift. Patient currently on PCA pump. Recieving 120mg of MS contin 3X a day. Patient currently wearing 2L NC, patient does not wear O2 @ home. HGB 7 this shift. Safety maintained, no falls. Will cont to monitor and notify MD of any changes. S 67-56 on tele.

## 2021-07-10 NOTE — ED PROVIDER NOTES
"Subjective   49 y/o male with SSD and metastatic renal cell CA who follows with [facility] arrives for evaluation of anterior chest tightness and upper back pain that he states is from his shoulders down to his mid back, described as \"it hurts man\" without radiation made worse with \"everything\" without prior similar history. He tells me this occurred when he was in an MRI of his brain (his wife tells me he was getting the MRI \"its just something they do for his sickle cell\") around 30 minutes into the procedure he began to have the above symptoms. He denies any cough but was apparently found to be hypoxic via EMS. He denies any fevers or chills, recent abx usage, falls, or trauma. He notes the anterior chest tightness without radiation made worse with breathing. Wife tells me the mets are only to his hips and femur apparently and not to his back. He denies any history of back issues including surgeries, weakness, numbness or tingling. He arrives in NAD.           Review of Systems   All other systems reviewed and are negative.      Past Medical History:   Diagnosis Date   • Cancer (CMS/HCC) 2016    Kidney cancer with METS to bone   • Gallstones    • Pneumonia    • Sickle cell        No Known Allergies    Past Surgical History:   Procedure Laterality Date   • CHOLECYSTECTOMY     • LEG SURGERY     • NEPHRECTOMY Right        Family History   Problem Relation Age of Onset   • Leukemia Maternal Grandmother    • Kidney failure Mother    • Hypertension Mother    • Heart disease Mother    • Sickle cell trait Father    • Hypertension Father    • Sickle cell trait Daughter    • Sickle cell trait Cousin    • Anemia Cousin    • Leukemia Other        Social History     Socioeconomic History   • Marital status:      Spouse name: Not on file   • Number of children: Not on file   • Years of education: Not on file   • Highest education level: Not on file   Tobacco Use   • Smoking status: Former Smoker   Substance and Sexual " Activity   • Alcohol use: Yes           Objective   Physical Exam  Vitals and nursing note reviewed.   Constitutional:       Appearance: Normal appearance. He is normal weight.   HENT:      Head: Normocephalic and atraumatic.      Right Ear: External ear normal.      Left Ear: External ear normal.      Nose: Nose normal.      Mouth/Throat:      Mouth: Mucous membranes are moist.      Pharynx: Oropharynx is clear.   Eyes:      Conjunctiva/sclera: Conjunctivae normal.      Pupils: Pupils are equal, round, and reactive to light.   Cardiovascular:      Rate and Rhythm: Normal rate and regular rhythm.      Pulses: Normal pulses.      Heart sounds: Normal heart sounds.   Pulmonary:      Effort: Pulmonary effort is normal.      Breath sounds: Rhonchi present.   Abdominal:      General: Abdomen is flat. Bowel sounds are normal. There is no distension.      Palpations: There is no mass.      Tenderness: There is no abdominal tenderness.      Hernia: No hernia is present.   Musculoskeletal:         General: No swelling.      Cervical back: No spasms or tenderness. Normal range of motion.      Thoracic back: Spasms present. Decreased range of motion.      Lumbar back: Normal.        Back:    Skin:     General: Skin is warm.      Capillary Refill: Capillary refill takes less than 2 seconds.   Neurological:      General: No focal deficit present.      Mental Status: He is alert and oriented to person, place, and time. Mental status is at baseline.      Cranial Nerves: No cranial nerve deficit.      Sensory: No sensory deficit.   Psychiatric:         Mood and Affect: Mood normal.         Behavior: Behavior normal.         Thought Content: Thought content normal.         ECG 12 Lead      Date/Time: 7/9/2021 6:53 PM  Performed by: Dwight Ferguson MD  Authorized by: Dwight Ferguson MD   Interpreted by physician  Rhythm: sinus rhythm  Rate: normal  BPM: 84  QRS axis: normal                   ED Course  ED Course as of Jul  09 2259 Fri Jul 09, 2021 2217 I feel this is a sickle cell crisis. We will admit for further evaluation and pain control. Abx were given for possible PNA but CT does not show this.     []   3821 Please note: the astute NP for IM actually found out the patient was at [facility] after going to [facility]. He had CT there which showed the same as ours here. HE NEVER TOLD ME ABOUT GETTING A CTA TODAY OTHERWISE I NEVER WOULD HAVE ORDERED THIS.     []      ED Course User Index  [] Dwight Ferguson MD            CT Angiogram Chest   Final Result   1. Poor evaluation of the pulmonary arteries due to contrast bolus   timing. Allowing for this, no PE is identified.       2. Cardiomegaly with trace pericardial fluid.   3. Mild bronchial wall thickening. Some degree of chronic interstitial   change not excluded.   4. No pathologically enlarged lymph nodes identified, though several   tiny nodules are seen in the anterior mediastinum, possibly reflecting   lymph nodes.               This report was finalized on 07/09/2021 21:15 by Dr. Macario Ramos MD.      CT Thoracic Spine Without Contrast   Final Result   1. No acute findings in the thoracic spine.   2. No metastatic disease identified.       This report was finalized on 07/09/2021 21:10 by Dr. Macario Ramos MD.      XR Chest 1 View   Final Result   Airspace opacities in the left lung base concerning for   inflammation or early infection.   This report was finalized on 07/09/2021 20:12 by Dr. Macario Ramos MD.        Labs Reviewed   COMPREHENSIVE METABOLIC PANEL - Abnormal; Notable for the following components:       Result Value    Glucose 108 (*)     Total Bilirubin 2.4 (*)     All other components within normal limits    Narrative:     GFR Normal >60  Chronic Kidney Disease <60  Kidney Failure <15     PROTIME-INR - Abnormal; Notable for the following components:    Protime 14.1 (*)     INR 1.18 (*)     All other components within normal limits   URINALYSIS  W/ CULTURE IF INDICATED - Abnormal; Notable for the following components:    Specific Gravity, UA >1.030 (*)     Blood, UA Trace (*)     Protein,  mg/dL (2+) (*)     All other components within normal limits   URINE DRUG SCREEN - Abnormal; Notable for the following components:    THC, Screen, Urine Positive (*)     Opiate Screen Positive (*)     All other components within normal limits    Narrative:     Cutoff For Drugs Screened:    Amphetamines               500 ng/ml  Barbiturates               200 ng/ml  Benzodiazepines            150 ng/ml  Cocaine                    150 ng/ml  Methadone                  200 ng/ml  Opiates                    100 ng/ml  Phencyclidine               25 ng/ml  THC                            50 ng/ml  Methamphetamine            500 ng/ml  Tricyclic Antidepressants  300 ng/ml  Oxycodone                  100 ng/ml  Propoxyphene               300 ng/ml  Buprenorphine               10 ng/ml    The normal value for all drugs tested is negative. This report includes unconfirmed screening results, with the cutoff values listed, to be used for medical treatment purposes only.  Unconfirmed results must not be used for non-medical purposes such as employment or legal testing.  Clinical consideration should be applied to any drug of abuse test, particularly when unconfirmed results are used.     PROCALCITONIN - Abnormal; Notable for the following components:    Procalcitonin 0.52 (*)     All other components within normal limits    Narrative:     As a Marker for Sepsis (Non-Neonates):     1. <0.5 ng/mL represents a low risk of severe sepsis and/or septic shock.  2. >2 ng/mL represents a high risk of severe sepsis and/or septic shock.    As a Marker for Lower Respiratory Tract Infections that require antibiotic therapy:  PCT on Admission     Antibiotic Therapy             6-12 Hrs later  >0.5                          Strongly Recommended            >0.25 - <0.5              "Recommended  0.1 - 0.25                  Discouraged                       Remeasure/reassess PCT  <0.1                         Strongly Discouraged         Remeasure/reassess PCT      As 28 day mortality risk marker: \"Change in Procalcitonin Result\" (>80% or <=80%) if Day 0 (or Day 1) and Day 4 values are available. Refer to http://www.All4Staffpct-calculator.com/    Change in PCT <=80 %   A decrease of PCT levels below or equal to 80% defines a positive change in PCT test result representing a higher risk for 28-day all-cause mortality of patients diagnosed with severe sepsis or septic shock.    Change in PCT >80 %   A decrease of PCT levels of more than 80% defines a negative change in PCT result representing a lower risk for 28-day all-cause mortality of patients diagnosed with severe sepsis or septic shock.               CBC WITH AUTO DIFFERENTIAL - Abnormal; Notable for the following components:    WBC 16.86 (*)     RBC 3.27 (*)     Hemoglobin 7.8 (*)     Hematocrit 23.0 (*)     MCV 70.3 (*)     MCH 23.9 (*)     RDW 21.3 (*)     nRBC 12.6 (*)     All other components within normal limits   BLOOD GAS, ARTERIAL - Abnormal; Notable for the following components:    pO2, Arterial 73.5 (*)     HCO3, Arterial 28.3 (*)     Base Excess, Arterial 3.2 (*)     pO2, Temperature Corrected 73.5 (*)     All other components within normal limits   MANUAL DIFFERENTIAL - Abnormal; Notable for the following components:    Neutrophil % 90.0 (*)     Lymphocyte % 3.0 (*)     Monocyte % 4.0 (*)     Neutrophils Absolute 15.34 (*)     Lymphocytes Absolute 0.67 (*)     nRBC 24.0 (*)     All other components within normal limits   URINALYSIS, MICROSCOPIC ONLY - Abnormal; Notable for the following components:    RBC, UA 0-2 (*)     All other components within normal limits   RETICULOCYTES - Abnormal; Notable for the following components:    Reticulocyte % 2.17 (*)     All other components within normal limits   LACTATE DEHYDROGENASE - " Abnormal; Notable for the following components:     (*)     All other components within normal limits   COVID-19,[facility] IN-HOUSE,NASAL SWAB NO TRANSPORT MEDIA 2 HR TAT - Normal    Narrative:     Fact sheet for providers: https://www.fda.gov/media/764454/download     Fact sheet for patients: https://www.fda.gov/media/093105/download    Test performed by PCR.    Consider negative results in combination with clinical observations, patient history, and epidemiological information.   APTT - Normal   BNP (IN-HOUSE) - Normal    Narrative:     Among patients with dyspnea, NT-proBNP is highly sensitive for the detection of acute congestive heart failure. In addition NT-proBNP of <300 pg/ml effectively rules out acute congestive heart failure with 99% negative predictive value.    Results may be falsely decreased if patient taking Biotin.     LIPASE - Normal   TROPONIN (IN-HOUSE) - Normal    Narrative:     Troponin T Reference Range:  <= 0.03 ng/mL-   Negative for AMI  >0.03 ng/mL-     Abnormal for myocardial necrosis.  Clinicians would have to utilize clinical acumen, EKG, Troponin and serial changes to determine if it is an Acute Myocardial Infarction or myocardial injury due to an underlying chronic condition.       Results may be falsely decreased if patient taking Biotin.     CK - Normal   LACTIC ACID, PLASMA - Normal   COVID PRE-OP / PRE-PROCEDURE SCREENING ORDER (NO ISOLATION)    Narrative:     The following orders were created for panel order COVID PRE-OP / PRE-PROCEDURE SCREENING ORDER (NO ISOLATION) - Swab, Nasal Cavity.  Procedure                               Abnormality         Status                     ---------                               -----------         ------                     COVID-19,[facility]...[[id number]]  Normal              Final result                 Please view results for these tests on the individual orders.   BLOOD CULTURE   BLOOD CULTURE   BLOOD GAS, ARTERIAL   TYPE AND  SCREEN   CBC AND DIFFERENTIAL    Narrative:     The following orders were created for panel order CBC & Differential.  Procedure                               Abnormality         Status                     ---------                               -----------         ------                     CBC Auto Differential[[id number]]        Abnormal            Final result                 Please view results for these tests on the individual orders.                                     HEART Score (for prediction of 6-week risk of major adverse cardiac event) reviewed and/or performed as part of the patient evaluation and treatment planning process.  The result associated with this review/performance is: 1       MDM    Final diagnoses:   Sickle cell crisis (CMS/[facility])   Chest pain, unspecified type   Pericardial effusion   Cardiomegaly       ED Disposition  ED Disposition     ED Disposition Condition Comment    Decision to Admit  Level of Care: Telemetry [5]   Diagnosis: Sickle cell crisis (CMS/HCC) [222186]   Admitting Physician: MOISE GANDARA [[id number]]   Attending Physician: MOISE GANDARA [[id number]]   Isolate for COVID?: No [0]   Certification: I Certify That Inpatient Hospital Services Are Medically Necessary For Greater Than 2 Midnights            No follow-up provider specified.       Medication List      No changes were made to your prescriptions during this visit.          Dwight Ferguson MD  07/09/21 3660       Dwight Ferguson MD  07/09/21 3986

## 2021-07-11 LAB
ALBUMIN SERPL-MCNC: 4.4 G/DL (ref 3.5–5.2)
ALBUMIN/GLOB SERPL: 1.4 G/DL
ALP SERPL-CCNC: 83 U/L (ref 39–117)
ALT SERPL W P-5'-P-CCNC: 8 U/L (ref 1–41)
ANION GAP SERPL CALCULATED.3IONS-SCNC: 8 MMOL/L (ref 5–15)
AST SERPL-CCNC: 24 U/L (ref 1–40)
BILIRUB SERPL-MCNC: 2.3 MG/DL (ref 0–1.2)
BUN SERPL-MCNC: 8 MG/DL (ref 6–20)
BUN/CREAT SERPL: 10.7 (ref 7–25)
CALCIUM SPEC-SCNC: 8.9 MG/DL (ref 8.6–10.5)
CHLORIDE SERPL-SCNC: 105 MMOL/L (ref 98–107)
CO2 SERPL-SCNC: 27 MMOL/L (ref 22–29)
CREAT SERPL-MCNC: 0.75 MG/DL (ref 0.76–1.27)
DEPRECATED RDW RBC AUTO: 55.8 FL (ref 37–54)
ERYTHROCYTE [DISTWIDTH] IN BLOOD BY AUTOMATED COUNT: 21.9 % (ref 12.3–15.4)
GFR SERPL CREATININE-BSD FRML MDRD: 133 ML/MIN/1.73
GLOBULIN UR ELPH-MCNC: 3.1 GM/DL
GLUCOSE SERPL-MCNC: 107 MG/DL (ref 65–99)
HCT VFR BLD AUTO: 24.2 % (ref 37.5–51)
HGB BLD-MCNC: 8.3 G/DL (ref 13–17.7)
LDH SERPL-CCNC: 417 U/L (ref 135–225)
MCH RBC QN AUTO: 24.9 PG (ref 26.6–33)
MCHC RBC AUTO-ENTMCNC: 34.3 G/DL (ref 31.5–35.7)
MCV RBC AUTO: 72.7 FL (ref 79–97)
PLATELET # BLD AUTO: 224 10*3/MM3 (ref 140–450)
PMV BLD AUTO: 10.1 FL (ref 6–12)
POTASSIUM SERPL-SCNC: 3.7 MMOL/L (ref 3.5–5.2)
PROT SERPL-MCNC: 7.5 G/DL (ref 6–8.5)
RBC # BLD AUTO: 3.33 10*6/MM3 (ref 4.14–5.8)
SODIUM SERPL-SCNC: 140 MMOL/L (ref 136–145)
WBC # BLD AUTO: 14.54 10*3/MM3 (ref 3.4–10.8)

## 2021-07-11 PROCEDURE — 80053 COMPREHEN METABOLIC PANEL: CPT | Performed by: INTERNAL MEDICINE

## 2021-07-11 PROCEDURE — 85027 COMPLETE CBC AUTOMATED: CPT | Performed by: INTERNAL MEDICINE

## 2021-07-11 PROCEDURE — 83615 LACTATE (LD) (LDH) ENZYME: CPT | Performed by: INTERNAL MEDICINE

## 2021-07-11 PROCEDURE — 94799 UNLISTED PULMONARY SVC/PX: CPT

## 2021-07-11 PROCEDURE — 94640 AIRWAY INHALATION TREATMENT: CPT

## 2021-07-11 RX ORDER — IPRATROPIUM BROMIDE AND ALBUTEROL SULFATE 2.5; .5 MG/3ML; MG/3ML
3 SOLUTION RESPIRATORY (INHALATION) EVERY 6 HOURS PRN
Status: DISCONTINUED | OUTPATIENT
Start: 2021-07-11 | End: 2021-07-16 | Stop reason: HOSPADM

## 2021-07-11 RX ADMIN — MORPHINE SULFATE 120 MG: 30 TABLET, FILM COATED, EXTENDED RELEASE ORAL at 09:04

## 2021-07-11 RX ADMIN — MORPHINE SULFATE 120 MG: 30 TABLET, FILM COATED, EXTENDED RELEASE ORAL at 16:08

## 2021-07-11 RX ADMIN — ESCITALOPRAM 20 MG: 10 TABLET, FILM COATED ORAL at 09:04

## 2021-07-11 RX ADMIN — MORPHINE SULFATE 120 MG: 30 TABLET, FILM COATED, EXTENDED RELEASE ORAL at 20:23

## 2021-07-11 RX ADMIN — IPRATROPIUM BROMIDE AND ALBUTEROL SULFATE 3 ML: 2.5; .5 SOLUTION RESPIRATORY (INHALATION) at 16:17

## 2021-07-11 RX ADMIN — LINACLOTIDE 145 MCG: 145 CAPSULE, GELATIN COATED ORAL at 09:04

## 2021-07-11 RX ADMIN — SODIUM CHLORIDE, PRESERVATIVE FREE 10 ML: 5 INJECTION INTRAVENOUS at 09:04

## 2021-07-11 RX ADMIN — HYDROCORTISONE 20 MG: 10 TABLET ORAL at 09:04

## 2021-07-11 RX ADMIN — FOLIC ACID 1 MG: 1 TABLET ORAL at 09:04

## 2021-07-11 RX ADMIN — NIFEDIPINE 90 MG: 90 TABLET, EXTENDED RELEASE ORAL at 09:03

## 2021-07-11 RX ADMIN — SODIUM CHLORIDE, POTASSIUM CHLORIDE, SODIUM LACTATE AND CALCIUM CHLORIDE 125 ML/HR: 600; 310; 30; 20 INJECTION, SOLUTION INTRAVENOUS at 00:52

## 2021-07-11 RX ADMIN — LACTULOSE 20 G: 20 SOLUTION ORAL at 16:43

## 2021-07-11 RX ADMIN — HYDROCORTISONE 10 MG: 10 TABLET ORAL at 20:23

## 2021-07-11 NOTE — PLAN OF CARE
Goal Outcome Evaluation:  Plan of Care Reviewed With: patient        Progress: improving  Outcome Summary: VSS this shift. Patient complains of pain in his back throughout the day. Scheduled morphine given. PCA pump stopped around 4pm per MD orders. Patient requiring 4L NC this shift,  breathing treatment given, trying to wean O2 down to home setting of 2L. Hgb improved to 8.3 this shift. PRN lactulose given. Safety maintained, no falls. Will cont to monitor and notify MD of any changes. S/ST  on tele.

## 2021-07-11 NOTE — PLAN OF CARE
Pt remains A&O x4; HR NSR 80s -150s; 3-5 L nasal cannula, pt will desat to high 80s when asleep, when awake sats are 93-95%, lungs diminished/clear; voiding per urinal; one unit of RBC completed this shift, morning labs show H&H 8.3, 24.2; morphine PCA in place patient rates pain 2-5; low grade temp 99.4 oral

## 2021-07-11 NOTE — PROGRESS NOTES
1           [facility]  INPATIENT PROGRESS NOTE    Patient Name: Holland Phelps  Date of Admission: 7/9/2021  Today's Date: 07/11/21  Length of Stay: 2  Primary Care Physician: Mallory Mccarthy MD    Subjective   Chief Complaint: Follow-up  HPI   Admitted for sickle cell crisis with anemia  Known history of renal cell cancer with mets to lymph node  On morphine PCA.  Patient normally takes morphine IR and MS Contin  Reportedly had O2 desaturation in the high 80s during sleep; had to be increased on 5 L oxygen to keep saturation greater than 90%  Transfused yesterday with 1 unit of blood for hemoglobin less than 7; posttransfusion hemoglobin is 8.3    Patient states that is more comfortable right now  He has the same pain level as of yesterday (5 out of 10) but this is more of a common 5 to him than when he came in.  He had 42 mg of morphine through PCA.  He is taking his home medication MS Contin 120 mg 3 times daily.  He had not gotten any other pain medication    States urine is yellow color    Normally has bowel movement every 2 to 3 days.  Takes Linzess normally and as needed lactulose.  Denies any nausea or vomiting or abdominal pain  Chest discomfort and back discomfort.  CT imaging of thoracic spine and chest reviewed    Remains afebrile   Review of Systems     All pertinent negatives and positives are as above. All other systems have been reviewed and are negative unless otherwise stated.     Objective    Temp:  [98.4 °F (36.9 °C)-99.8 °F (37.7 °C)] 98.7 °F (37.1 °C)  Heart Rate:  [76-94] 94  Resp:  [12-18] 14  BP: (136-152)/(68-86) 136/77  Physical Exam  No gross jaundice  Alert and oriented  On oxygen with O2 saturation of 93% on 4 L.  On morphine PCA.  He states that he is more comfortable than yesterday  Alert and oriented x3  Nonlabored breathing  Diminished breath sounds  No crackles or wheezes  S1-S2, positive murmur  Distended abdomen, nontender, no  guarding  No masses  No cyanosis or edema  Warm dry skin with good capillary refill         Results Review:  I have reviewed the labs, radiology results, and diagnostic studies.    Laboratory Data:   Results from last 7 days   Lab Units 07/11/21  0203 07/10/21  0430 07/09/21 1938   WBC 10*3/mm3 14.54* 14.50* 16.86*   HEMOGLOBIN g/dL 8.3* 7.0* 7.8*   HEMATOCRIT % 24.2* 21.4* 23.0*   PLATELETS 10*3/mm3 224 254 278        Results from last 7 days   Lab Units 07/11/21  0203 07/10/21  0430 07/09/21  1938   SODIUM mmol/L 140 142 141   POTASSIUM mmol/L 3.7 3.9 3.8   CHLORIDE mmol/L 105 108* 103   CO2 mmol/L 27.0 26.0 27.0   BUN mg/dL 8 10 12   CREATININE mg/dL 0.75* 0.93 0.96   CALCIUM mg/dL 8.9 8.6 9.3   BILIRUBIN mg/dL 2.3* 1.8* 2.4*   ALK PHOS U/L 83 73 82   ALT (SGPT) U/L 8 6 7   AST (SGOT) U/L 24 16 17   GLUCOSE mg/dL 107* 107* 108*       Culture Data:   Blood Culture   Date Value Ref Range Status   07/09/2021 No growth at 24 hours  Preliminary   07/09/2021 No growth at 24 hours  Preliminary       Radiology Data:   Imaging Results (Last 24 Hours)     ** No results found for the last 24 hours. **         (250)  Reticulocyte count 2.58 (2.17)  Total bilirubin up at 2.3 compared to 1.8  I have reviewed the patient's current medications.     Assessment/Plan     Active Hospital Problems    Diagnosis    • **Sickle cell crisis (CMS/HCC)    • Anemia of chronic disease    • Acute pain    • Hyperbilirubinemia    • Hypoxia    • Single kidney, left    • Renal cell carcinoma of right kidney metastatic to other site (CMS/HCC)        Problem list  · Sickle cell anemia  · Sickle cell pain crisis on opiate treatment; on morphine PCA  · Hypertension -resumed home medication  · Leukocytosis -monitor for signs of infection  · History of renal cell cancer right kidney with reported mets to lymph nodes, he has remaining left kidney  · Hyperbilirubinemia likely secondary to sickle cell  · History of adrenal  insufficiency  · Hypoxia    Discussion/plan  Status post 1 unit blood transfusion on July 10.  Posttransfusion hemoglobin 8.3  Continue morphine PCA for pain control  We will resume hydrocortisone from home medication  Oxygen to keep O2 saturation greater than 92%; encourage incentive spirometry.  Wean down oxygen to 2 L as discussed with nurse Griffin  Cut back on IV fluid  Informed nurse Griffin to give lactulose x1 from his as needed order  Will DC morphine PCA after this bag.  May take MSIR per home medication  We will monitor LDH, reticulocyte count and LFTs  Increase activities as tolerated  Discussed with wife and the over the phone.      escitalopram, 20 mg, Oral, Daily  folic acid, 1 mg, Oral, Daily  hydrocortisone, 10 mg, Oral, Nightly  hydrocortisone, 20 mg, Oral, Daily  linaclotide, 145 mcg, Oral, Daily  Morphine, 120 mg, Oral, TID  NIFEdipine XL, 90 mg, Oral, Q24H  sodium chloride, 10 mL, Intravenous, Q12H          Discharge Planning: Possibly 1 to 2 days  Electronically signed by Justino Mcdonald MD, 07/11/21, 08:19 CDT.

## 2021-07-12 LAB
ALBUMIN SERPL-MCNC: 4.1 G/DL (ref 3.5–5.2)
ALP SERPL-CCNC: 82 U/L (ref 39–117)
ALT SERPL W P-5'-P-CCNC: 7 U/L (ref 1–41)
AST SERPL-CCNC: 13 U/L (ref 1–40)
BH BB BLOOD EXPIRATION DATE: NORMAL
BH BB BLOOD TYPE BARCODE: 6200
BH BB DISPENSE STATUS: NORMAL
BH BB PRODUCT CODE: NORMAL
BH BB UNIT NUMBER: NORMAL
BILIRUB CONJ SERPL-MCNC: 0.4 MG/DL (ref 0–0.3)
BILIRUB INDIRECT SERPL-MCNC: 0.9 MG/DL
BILIRUB SERPL-MCNC: 1.3 MG/DL (ref 0–1.2)
CROSSMATCH INTERPRETATION: NORMAL
DEPRECATED RDW RBC AUTO: 55.8 FL (ref 37–54)
ERYTHROCYTE [DISTWIDTH] IN BLOOD BY AUTOMATED COUNT: 21.4 % (ref 12.3–15.4)
HCT VFR BLD AUTO: 22.7 % (ref 37.5–51)
HGB BLD-MCNC: 7.6 G/DL (ref 13–17.7)
LDH SERPL-CCNC: 291 U/L (ref 135–225)
MCH RBC QN AUTO: 24.5 PG (ref 26.6–33)
MCHC RBC AUTO-ENTMCNC: 33.5 G/DL (ref 31.5–35.7)
MCV RBC AUTO: 73.2 FL (ref 79–97)
PLATELET # BLD AUTO: 225 10*3/MM3 (ref 140–450)
PMV BLD AUTO: 10.2 FL (ref 6–12)
PROT SERPL-MCNC: 7.1 G/DL (ref 6–8.5)
RBC # BLD AUTO: 3.1 10*6/MM3 (ref 4.14–5.8)
RETICS # AUTO: 0.12 10*6/MM3 (ref 0.02–0.13)
RETICS/RBC NFR AUTO: 3.73 % (ref 0.7–1.9)
UNIT  ABO: NORMAL
UNIT  RH: NORMAL
WBC # BLD AUTO: 14.18 10*3/MM3 (ref 3.4–10.8)

## 2021-07-12 PROCEDURE — 25010000002 LEVOFLOXACIN PER 250 MG: Performed by: INTERNAL MEDICINE

## 2021-07-12 PROCEDURE — 94799 UNLISTED PULMONARY SVC/PX: CPT

## 2021-07-12 PROCEDURE — 94660 CPAP INITIATION&MGMT: CPT

## 2021-07-12 PROCEDURE — 83615 LACTATE (LD) (LDH) ENZYME: CPT | Performed by: INTERNAL MEDICINE

## 2021-07-12 PROCEDURE — 85045 AUTOMATED RETICULOCYTE COUNT: CPT | Performed by: INTERNAL MEDICINE

## 2021-07-12 PROCEDURE — 85027 COMPLETE CBC AUTOMATED: CPT | Performed by: INTERNAL MEDICINE

## 2021-07-12 PROCEDURE — 80076 HEPATIC FUNCTION PANEL: CPT | Performed by: INTERNAL MEDICINE

## 2021-07-12 RX ORDER — LEVOFLOXACIN 5 MG/ML
750 INJECTION, SOLUTION INTRAVENOUS EVERY 24 HOURS
Status: COMPLETED | OUTPATIENT
Start: 2021-07-12 | End: 2021-07-16

## 2021-07-12 RX ORDER — DOCUSATE SODIUM 100 MG/1
100 CAPSULE, LIQUID FILLED ORAL 2 TIMES DAILY
Status: DISCONTINUED | OUTPATIENT
Start: 2021-07-12 | End: 2021-07-16 | Stop reason: HOSPADM

## 2021-07-12 RX ORDER — BISACODYL 10 MG
10 SUPPOSITORY, RECTAL RECTAL DAILY PRN
Status: DISCONTINUED | OUTPATIENT
Start: 2021-07-12 | End: 2021-07-16 | Stop reason: HOSPADM

## 2021-07-12 RX ORDER — POLYETHYLENE GLYCOL 3350 17 G/17G
17 POWDER, FOR SOLUTION ORAL DAILY
Status: DISCONTINUED | OUTPATIENT
Start: 2021-07-12 | End: 2021-07-16 | Stop reason: HOSPADM

## 2021-07-12 RX ADMIN — POLYETHYLENE GLYCOL 3350 17 G: 17 POWDER, FOR SOLUTION ORAL at 13:43

## 2021-07-12 RX ADMIN — ESCITALOPRAM 20 MG: 10 TABLET, FILM COATED ORAL at 08:24

## 2021-07-12 RX ADMIN — SODIUM CHLORIDE, POTASSIUM CHLORIDE, SODIUM LACTATE AND CALCIUM CHLORIDE 100 ML/HR: 600; 310; 30; 20 INJECTION, SOLUTION INTRAVENOUS at 05:18

## 2021-07-12 RX ADMIN — LEVOFLOXACIN 750 MG: 5 INJECTION, SOLUTION INTRAVENOUS at 13:43

## 2021-07-12 RX ADMIN — DOCUSATE SODIUM 100 MG: 100 CAPSULE ORAL at 21:37

## 2021-07-12 RX ADMIN — NIFEDIPINE 90 MG: 90 TABLET, EXTENDED RELEASE ORAL at 08:24

## 2021-07-12 RX ADMIN — MORPHINE SULFATE 120 MG: 30 TABLET, FILM COATED, EXTENDED RELEASE ORAL at 16:32

## 2021-07-12 RX ADMIN — LINACLOTIDE 145 MCG: 145 CAPSULE, GELATIN COATED ORAL at 08:24

## 2021-07-12 RX ADMIN — SODIUM CHLORIDE, PRESERVATIVE FREE 10 ML: 5 INJECTION INTRAVENOUS at 08:25

## 2021-07-12 RX ADMIN — SODIUM CHLORIDE, PRESERVATIVE FREE 10 ML: 5 INJECTION INTRAVENOUS at 21:38

## 2021-07-12 RX ADMIN — DOCUSATE SODIUM 100 MG: 100 CAPSULE ORAL at 13:43

## 2021-07-12 RX ADMIN — HYDROCORTISONE 10 MG: 10 TABLET ORAL at 21:37

## 2021-07-12 RX ADMIN — FOLIC ACID 1 MG: 1 TABLET ORAL at 08:24

## 2021-07-12 RX ADMIN — MORPHINE SULFATE 120 MG: 30 TABLET, FILM COATED, EXTENDED RELEASE ORAL at 08:24

## 2021-07-12 RX ADMIN — MORPHINE SULFATE 120 MG: 30 TABLET, FILM COATED, EXTENDED RELEASE ORAL at 21:47

## 2021-07-12 RX ADMIN — HYDROCORTISONE 20 MG: 10 TABLET ORAL at 08:24

## 2021-07-12 NOTE — PLAN OF CARE
Goal Outcome Evaluation:  Plan of Care Reviewed With: patient        Progress: no change  Outcome Summary: VSS, scheduled pain medication given with no futher c/o pain pt has been asleep this shift, 02 sats WNL on 4L/nc, S-ST

## 2021-07-12 NOTE — PROGRESS NOTES
[facility] Medicine Services  INPATIENT PROGRESS NOTE    Patient Name: Holland Phelps  Date of Admission: 7/9/2021  Today's Date: 07/12/21  Length of Stay: 3  Primary Care Physician: Mallory Mccarthy MD    Subjective   Chief Complaint: Follow-up sickle cell crisis    HPI   Patient is off PCA pump.  States his pain is about a 4-5.  He looks groggy when I saw him.  Wife states he is a little slow to wake up at times.  Denies chest pain currently.  Still some shortness of breath.  No nausea.  No abdominal pain.        Review of Systems   All pertinent negatives and positives are as above. All other systems have been reviewed and are negative unless otherwise stated.     Objective    Temp:  [97.8 °F (36.6 °C)-99 °F (37.2 °C)] 98.6 °F (37 °C)  Heart Rate:  [] 97  Resp:  [14-18] 16  BP: (108-144)/(62-75) 144/62  Physical Exam  GEN: Awake, alert, interactive, in NAD  HEENT:  PERRLA, EOMI, Anicteric, Trachea midline  Lungs: diminished, no wheezing or rales  Heart: RRR, +S1/s2, no rub  ABD: soft, nt/nd, +BS, no guarding/rebound  Extremities: atraumatic, no cyanosis, no edema  Skin: no rashes or lesions  Neuro: AAOx3, no focal deficits  Psych: normal mood & affect        Results Review:  I have reviewed the labs, radiology results, and diagnostic studies.    Laboratory Data:   Results from last 7 days   Lab Units 07/12/21  0420 07/11/21  0203 07/10/21  0430   WBC 10*3/mm3 14.18* 14.54* 14.50*   HEMOGLOBIN g/dL 7.6* 8.3* 7.0*   HEMATOCRIT % 22.7* 24.2* 21.4*   PLATELETS 10*3/mm3 225 224 254        Results from last 7 days   Lab Units 07/12/21  0420 07/11/21  0203 07/10/21  0430 07/09/21  1938   SODIUM mmol/L  --  140 142 141   POTASSIUM mmol/L  --  3.7 3.9 3.8   CHLORIDE mmol/L  --  105 108* 103   CO2 mmol/L  --  27.0 26.0 27.0   BUN mg/dL  --  8 10 12   CREATININE mg/dL  --  0.75* 0.93 0.96   CALCIUM mg/dL  --  8.9 8.6 9.3   BILIRUBIN mg/dL 1.3* 2.3* 1.8* 2.4*   ALK PHOS U/L 82 83 73  82   ALT (SGPT) U/L 7 8 6 7   AST (SGOT) U/L 13 24 16 17   GLUCOSE mg/dL  --  107* 107* 108*       Culture Data:   Blood Culture   Date Value Ref Range Status   07/09/2021 No growth at 2 days  Preliminary   07/09/2021 No growth at 2 days  Preliminary       Radiology Data:   Imaging Results (Last 24 Hours)     ** No results found for the last 24 hours. **          I have reviewed the patient's current medications.     Assessment/Plan     Active Hospital Problems    Diagnosis    • **Sickle cell crisis (CMS/HCC)    • Anemia of chronic disease    • Acute pain    • Hyperbilirubinemia    • Hypoxia    • Single kidney, left    • Renal cell carcinoma of right kidney metastatic to other site (CMS/HCC)        #1 sickle cell crisis -seems to be improving.  Hemoglobin 7.6.  No plans for transfusion today.  Still has elevated white count which could be potentially stress reaction however he has had a neutrophil predominance since arrival with a positive procalcitonin imaging showing left lower lobe pneumonia.  Although scarring could be possibility from prior disease we will start him on Levaquin today to cover him for pneumonia.  Incentive spirometry.  Overall seems to have improved and pain is stable out of 4 off of PCA pump.    #2 acute respiratory failure hypoxia -as above potential left lower lobe pneumonia on CTA.  CT was negative for PE.  Incentive spirometry.  Ambulate patient.  Levaquin.  Wean O2 as able.    #3 anemia of chronic disease -again has sickle cell.  No signs of bleeding.  Monitor.  No need for transfusion today.    #4 history of renal cell carcinoma/solitary kidney -is post right nephrectomy.  Renal function is normal.  Follows at [facility] for his care.    Discharge Planning: Ongoing.  Try to wean off oxygen.  Hopefully can go home in the coming days.  May need to consider hematology consult if not improving.    Electronically signed by Chuy Patel DO, 07/12/21, 11:48 CDT.

## 2021-07-13 ENCOUNTER — APPOINTMENT (OUTPATIENT)
Dept: CARDIOLOGY | Facility: HOSPITAL | Age: 51
End: 2021-07-13

## 2021-07-13 LAB
ALBUMIN SERPL-MCNC: 4.2 G/DL (ref 3.5–5.2)
ALBUMIN/GLOB SERPL: 1.2 G/DL
ALP SERPL-CCNC: 83 U/L (ref 39–117)
ALT SERPL W P-5'-P-CCNC: 6 U/L (ref 1–41)
ANION GAP SERPL CALCULATED.3IONS-SCNC: 8 MMOL/L (ref 5–15)
ANISOCYTOSIS BLD QL: ABNORMAL
AST SERPL-CCNC: 11 U/L (ref 1–40)
BASO STIPL COARSE BLD QL SMEAR: ABNORMAL
BASOPHILS # BLD MANUAL: 0.13 10*3/MM3 (ref 0–0.2)
BASOPHILS NFR BLD AUTO: 1 % (ref 0–1.5)
BH CV ECHO MEAS - AO MAX PG (FULL): 12.8 MMHG
BH CV ECHO MEAS - AO MAX PG: 20.1 MMHG
BH CV ECHO MEAS - AO MEAN PG (FULL): 7 MMHG
BH CV ECHO MEAS - AO MEAN PG: 11 MMHG
BH CV ECHO MEAS - AO ROOT AREA (BSA CORRECTED): 1.4
BH CV ECHO MEAS - AO ROOT AREA: 7.1 CM^2
BH CV ECHO MEAS - AO ROOT DIAM: 3 CM
BH CV ECHO MEAS - AO V2 MAX: 224 CM/SEC
BH CV ECHO MEAS - AO V2 MEAN: 153 CM/SEC
BH CV ECHO MEAS - AO V2 VTI: 42.6 CM
BH CV ECHO MEAS - AVA(I,A): 2.3 CM^2
BH CV ECHO MEAS - AVA(I,D): 2.3 CM^2
BH CV ECHO MEAS - AVA(V,A): 2.5 CM^2
BH CV ECHO MEAS - AVA(V,D): 2.5 CM^2
BH CV ECHO MEAS - BSA(HAYCOCK): 2.2 M^2
BH CV ECHO MEAS - BSA: 2.2 M^2
BH CV ECHO MEAS - BZI_BMI: 29.3 KILOGRAMS/M^2
BH CV ECHO MEAS - BZI_METRIC_HEIGHT: 180.3 CM
BH CV ECHO MEAS - BZI_METRIC_WEIGHT: 95.3 KG
BH CV ECHO MEAS - EDV(CUBED): 171.9 ML
BH CV ECHO MEAS - EDV(MOD-SP4): 117 ML
BH CV ECHO MEAS - EDV(TEICH): 151.2 ML
BH CV ECHO MEAS - EF(CUBED): 83.3 %
BH CV ECHO MEAS - EF(MOD-SP4): 67.8 %
BH CV ECHO MEAS - EF(TEICH): 75.7 %
BH CV ECHO MEAS - ESV(CUBED): 28.7 ML
BH CV ECHO MEAS - ESV(MOD-SP4): 37.7 ML
BH CV ECHO MEAS - ESV(TEICH): 36.7 ML
BH CV ECHO MEAS - FS: 45 %
BH CV ECHO MEAS - IVS/LVPW: 1.1
BH CV ECHO MEAS - IVSD: 1.2 CM
BH CV ECHO MEAS - LA DIMENSION: 3.7 CM
BH CV ECHO MEAS - LA/AO: 1.2
BH CV ECHO MEAS - LAT PEAK E' VEL: 10.4 CM/SEC
BH CV ECHO MEAS - LV DIASTOLIC VOL/BSA (35-75): 54.3 ML/M^2
BH CV ECHO MEAS - LV MASS(C)D: 269.4 GRAMS
BH CV ECHO MEAS - LV MASS(C)DI: 125.1 GRAMS/M^2
BH CV ECHO MEAS - LV MAX PG: 7.3 MMHG
BH CV ECHO MEAS - LV MEAN PG: 4 MMHG
BH CV ECHO MEAS - LV SYSTOLIC VOL/BSA (12-30): 17.5 ML/M^2
BH CV ECHO MEAS - LV V1 MAX: 135 CM/SEC
BH CV ECHO MEAS - LV V1 MEAN: 88.4 CM/SEC
BH CV ECHO MEAS - LV V1 VTI: 23.6 CM
BH CV ECHO MEAS - LVIDD: 5.6 CM
BH CV ECHO MEAS - LVIDS: 3.1 CM
BH CV ECHO MEAS - LVLD AP4: 8.8 CM
BH CV ECHO MEAS - LVLS AP4: 7 CM
BH CV ECHO MEAS - LVOT AREA (M): 4.2 CM^2
BH CV ECHO MEAS - LVOT AREA: 4.2 CM^2
BH CV ECHO MEAS - LVOT DIAM: 2.3 CM
BH CV ECHO MEAS - LVPWD: 1.1 CM
BH CV ECHO MEAS - MED PEAK E' VEL: 11.7 CM/SEC
BH CV ECHO MEAS - MV A MAX VEL: 76.5 CM/SEC
BH CV ECHO MEAS - MV DEC TIME: 0.16 SEC
BH CV ECHO MEAS - MV E MAX VEL: 121 CM/SEC
BH CV ECHO MEAS - MV E/A: 1.6
BH CV ECHO MEAS - RAP SYSTOLE: 5 MMHG
BH CV ECHO MEAS - RVSP: 17.7 MMHG
BH CV ECHO MEAS - SI(AO): 139.9 ML/M^2
BH CV ECHO MEAS - SI(CUBED): 66.5 ML/M^2
BH CV ECHO MEAS - SI(LVOT): 45.5 ML/M^2
BH CV ECHO MEAS - SI(MOD-SP4): 36.8 ML/M^2
BH CV ECHO MEAS - SI(TEICH): 53.1 ML/M^2
BH CV ECHO MEAS - SV(AO): 301.1 ML
BH CV ECHO MEAS - SV(CUBED): 143.2 ML
BH CV ECHO MEAS - SV(LVOT): 98.1 ML
BH CV ECHO MEAS - SV(MOD-SP4): 79.3 ML
BH CV ECHO MEAS - SV(TEICH): 114.4 ML
BH CV ECHO MEAS - TR MAX VEL: 178 CM/SEC
BH CV ECHO MEASUREMENTS AVERAGE E/E' RATIO: 10.95
BILIRUB SERPL-MCNC: 1.2 MG/DL (ref 0–1.2)
BUN SERPL-MCNC: 17 MG/DL (ref 6–20)
BUN/CREAT SERPL: 12.7 (ref 7–25)
CALCIUM SPEC-SCNC: 9.2 MG/DL (ref 8.6–10.5)
CHLORIDE SERPL-SCNC: 103 MMOL/L (ref 98–107)
CO2 SERPL-SCNC: 29 MMOL/L (ref 22–29)
CREAT SERPL-MCNC: 1.34 MG/DL (ref 0.76–1.27)
DEPRECATED RDW RBC AUTO: 55.3 FL (ref 37–54)
EOSINOPHIL # BLD MANUAL: 1.24 10*3/MM3 (ref 0–0.4)
EOSINOPHIL NFR BLD MANUAL: 9.4 % (ref 0.3–6.2)
ERYTHROCYTE [DISTWIDTH] IN BLOOD BY AUTOMATED COUNT: 21.5 % (ref 12.3–15.4)
GFR SERPL CREATININE-BSD FRML MDRD: 68 ML/MIN/1.73
GIANT PLATELETS: ABNORMAL
GLOBULIN UR ELPH-MCNC: 3.4 GM/DL
GLUCOSE SERPL-MCNC: 98 MG/DL (ref 65–99)
HCT VFR BLD AUTO: 23 % (ref 37.5–51)
HGB BLD-MCNC: 7.7 G/DL (ref 13–17.7)
HOWELL-JOLLY BOD BLD QL SMEAR: ABNORMAL
HYPOCHROMIA BLD QL: ABNORMAL
LDH SERPL-CCNC: 274 U/L (ref 135–225)
LEFT ATRIUM VOLUME INDEX: 37.4 ML/M2
LEFT ATRIUM VOLUME: 80.4 CM3
LYMPHOCYTES # BLD MANUAL: 1.23 10*3/MM3 (ref 0.7–3.1)
LYMPHOCYTES NFR BLD MANUAL: 10.4 % (ref 5–12)
LYMPHOCYTES NFR BLD MANUAL: 8.3 % (ref 19.6–45.3)
MACROCYTES BLD QL SMEAR: ABNORMAL
MAXIMAL PREDICTED HEART RATE: 168 BPM
MCH RBC QN AUTO: 24.4 PG (ref 26.6–33)
MCHC RBC AUTO-ENTMCNC: 33.5 G/DL (ref 31.5–35.7)
MCV RBC AUTO: 72.8 FL (ref 79–97)
MONOCYTES # BLD AUTO: 1.37 10*3/MM3 (ref 0.1–0.9)
MYELOCYTES NFR BLD MANUAL: 1 % (ref 0–0)
NEUTROPHILS # BLD AUTO: 9.1 10*3/MM3 (ref 1.7–7)
NEUTROPHILS NFR BLD MANUAL: 68.8 % (ref 42.7–76)
NRBC SPEC MANUAL: 14.6 /100 WBC (ref 0–0.2)
PAPPENHEIMER BOD BLD QL SMEAR: PRESENT
PLATELET # BLD AUTO: 252 10*3/MM3 (ref 140–450)
PMV BLD AUTO: 10 FL (ref 6–12)
POIKILOCYTOSIS BLD QL SMEAR: ABNORMAL
POLYCHROMASIA BLD QL SMEAR: ABNORMAL
POTASSIUM SERPL-SCNC: 4.1 MMOL/L (ref 3.5–5.2)
PROT SERPL-MCNC: 7.6 G/DL (ref 6–8.5)
RBC # BLD AUTO: 3.16 10*6/MM3 (ref 4.14–5.8)
RETICS # AUTO: 0.08 10*6/MM3 (ref 0.02–0.13)
RETICS/RBC NFR AUTO: 2.67 % (ref 0.7–1.9)
SODIUM SERPL-SCNC: 140 MMOL/L (ref 136–145)
STRESS TARGET HR: 143 BPM
TARGETS BLD QL SMEAR: ABNORMAL
URATE SERPL-MCNC: 7.4 MG/DL (ref 3.4–7)
VARIANT LYMPHS NFR BLD MANUAL: 1 % (ref 0–5)
WBC # BLD AUTO: 13.22 10*3/MM3 (ref 3.4–10.8)
WBC MORPH BLD: NORMAL

## 2021-07-13 PROCEDURE — 25010000002 LEVOFLOXACIN PER 250 MG: Performed by: INTERNAL MEDICINE

## 2021-07-13 PROCEDURE — 80053 COMPREHEN METABOLIC PANEL: CPT | Performed by: INTERNAL MEDICINE

## 2021-07-13 PROCEDURE — 94799 UNLISTED PULMONARY SVC/PX: CPT

## 2021-07-13 PROCEDURE — 93306 TTE W/DOPPLER COMPLETE: CPT

## 2021-07-13 PROCEDURE — 85025 COMPLETE CBC W/AUTO DIFF WBC: CPT | Performed by: INTERNAL MEDICINE

## 2021-07-13 PROCEDURE — 83615 LACTATE (LD) (LDH) ENZYME: CPT | Performed by: INTERNAL MEDICINE

## 2021-07-13 PROCEDURE — 85007 BL SMEAR W/DIFF WBC COUNT: CPT | Performed by: INTERNAL MEDICINE

## 2021-07-13 PROCEDURE — 99223 1ST HOSP IP/OBS HIGH 75: CPT | Performed by: INTERNAL MEDICINE

## 2021-07-13 PROCEDURE — 93306 TTE W/DOPPLER COMPLETE: CPT | Performed by: INTERNAL MEDICINE

## 2021-07-13 PROCEDURE — 85045 AUTOMATED RETICULOCYTE COUNT: CPT | Performed by: INTERNAL MEDICINE

## 2021-07-13 PROCEDURE — 84550 ASSAY OF BLOOD/URIC ACID: CPT | Performed by: INTERNAL MEDICINE

## 2021-07-13 RX ORDER — HYDROXYUREA 500 MG/1
1000 CAPSULE ORAL DAILY
Status: DISCONTINUED | OUTPATIENT
Start: 2021-07-13 | End: 2021-07-16 | Stop reason: HOSPADM

## 2021-07-13 RX ORDER — SODIUM CHLORIDE 450 MG/100ML
100 INJECTION, SOLUTION INTRAVENOUS CONTINUOUS
Status: DISCONTINUED | OUTPATIENT
Start: 2021-07-13 | End: 2021-07-16

## 2021-07-13 RX ADMIN — HYDROXYUREA 1000 MG: 500 CAPSULE ORAL at 17:34

## 2021-07-13 RX ADMIN — DOCUSATE SODIUM 100 MG: 100 CAPSULE ORAL at 21:11

## 2021-07-13 RX ADMIN — POLYETHYLENE GLYCOL 3350 17 G: 17 POWDER, FOR SOLUTION ORAL at 08:58

## 2021-07-13 RX ADMIN — HYDROCORTISONE 20 MG: 10 TABLET ORAL at 08:51

## 2021-07-13 RX ADMIN — SODIUM CHLORIDE 100 ML/HR: 4.5 INJECTION, SOLUTION INTRAVENOUS at 22:38

## 2021-07-13 RX ADMIN — LINACLOTIDE 145 MCG: 145 CAPSULE, GELATIN COATED ORAL at 08:50

## 2021-07-13 RX ADMIN — NIFEDIPINE 90 MG: 90 TABLET, EXTENDED RELEASE ORAL at 08:50

## 2021-07-13 RX ADMIN — FOLIC ACID 1 MG: 1 TABLET ORAL at 08:50

## 2021-07-13 RX ADMIN — MORPHINE SULFATE 120 MG: 30 TABLET, FILM COATED, EXTENDED RELEASE ORAL at 08:51

## 2021-07-13 RX ADMIN — LEVOFLOXACIN 750 MG: 5 INJECTION, SOLUTION INTRAVENOUS at 12:11

## 2021-07-13 RX ADMIN — ESCITALOPRAM 20 MG: 10 TABLET, FILM COATED ORAL at 08:50

## 2021-07-13 RX ADMIN — MORPHINE SULFATE 120 MG: 30 TABLET, FILM COATED, EXTENDED RELEASE ORAL at 16:14

## 2021-07-13 RX ADMIN — SODIUM CHLORIDE, PRESERVATIVE FREE 10 ML: 5 INJECTION INTRAVENOUS at 08:58

## 2021-07-13 RX ADMIN — LACTULOSE 20 G: 20 SOLUTION ORAL at 22:33

## 2021-07-13 RX ADMIN — DOCUSATE SODIUM 100 MG: 100 CAPSULE ORAL at 08:50

## 2021-07-13 RX ADMIN — MORPHINE SULFATE 120 MG: 30 TABLET, FILM COATED, EXTENDED RELEASE ORAL at 21:11

## 2021-07-13 RX ADMIN — SODIUM CHLORIDE 100 ML/HR: 4.5 INJECTION, SOLUTION INTRAVENOUS at 12:10

## 2021-07-13 RX ADMIN — HYDROCORTISONE 10 MG: 10 TABLET ORAL at 21:11

## 2021-07-13 NOTE — PLAN OF CARE
Goal Outcome Evaluation:  Plan of Care Reviewed With: patient        Progress: no change  Outcome Summary: VSS, 02 SATS drop to 88 and up to 91% on 5L/NC up to 98 on Bipap, scheduled pain med given with no futher complaints, Sinus 68-95

## 2021-07-13 NOTE — PROGRESS NOTES
[facility] Medicine Services  INPATIENT PROGRESS NOTE    Patient Name: Holland Phelps  Date of Admission: 7/9/2021  Today's Date: 07/13/21  Length of Stay: 4  Primary Care Physician: Mallory Mccarthy MD    Subjective   Chief Complaint: Follow-up sickle cell crisis    HPI   Patient is resting in bed.  Was wearing BiPAP earlier this morning.  Feels fatigued and tired but pain relatively stable.  Eating okay.  No nausea or vomiting.  Was coughing up some green phlegm this morning.  No diarrhea.    ROS:  All pertinent negatives and positives are as above. All other systems have been reviewed and are negative unless otherwise stated.     Objective    Temp:  [97.9 °F (36.6 °C)-98.9 °F (37.2 °C)] 98.2 °F (36.8 °C)  Heart Rate:  [87-96] 96  Resp:  [15-18] 16  BP: (106-132)/(60-79) 121/72  Physical Exam  GEN: Awake, alert, interactive, in NAD  HEENT:  PERRLA, EOMI, Anicteric, Trachea midline  Lungs: diminished, no wheezing or rales  Heart: RRR, +S1/s2, no rub  ABD: soft, nt/nd, +BS, no guarding/rebound  Extremities: atraumatic, no cyanosis, no edema  Skin: no rashes or lesions  Neuro: AAOx3, no focal deficits  Psych: normal mood & affect        Results Review:  I have reviewed the labs, radiology results, and diagnostic studies.    Laboratory Data:   Results from last 7 days   Lab Units 07/13/21 0516 07/12/21 0420 07/11/21  0203   WBC 10*3/mm3 13.22* 14.18* 14.54*   HEMOGLOBIN g/dL 7.7* 7.6* 8.3*   HEMATOCRIT % 23.0* 22.7* 24.2*   PLATELETS 10*3/mm3 252 225 224        Results from last 7 days   Lab Units 07/13/21 0516 07/12/21 0420 07/11/21  0203 07/10/21  0430   SODIUM mmol/L 140  --  140 142   POTASSIUM mmol/L 4.1  --  3.7 3.9   CHLORIDE mmol/L 103  --  105 108*   CO2 mmol/L 29.0  --  27.0 26.0   BUN mg/dL 17  --  8 10   CREATININE mg/dL 1.34*  --  0.75* 0.93   CALCIUM mg/dL 9.2  --  8.9 8.6   BILIRUBIN mg/dL 1.2 1.3* 2.3* 1.8*   ALK PHOS U/L 83 82 83 73   ALT (SGPT) U/L 6 7 8 6    AST (SGOT) U/L 11 13 24 16   GLUCOSE mg/dL 98  --  107* 107*       Culture Data:   Blood Culture   Date Value Ref Range Status   07/09/2021 No growth at 2 days  Preliminary   07/09/2021 No growth at 2 days  Preliminary       Radiology Data:   Imaging Results (Last 24 Hours)     ** No results found for the last 24 hours. **          I have reviewed the patient's current medications.     Assessment/Plan     Active Hospital Problems    Diagnosis    • **Sickle cell crisis (CMS/HCC)    • Anemia of chronic disease    • Acute pain    • Hyperbilirubinemia    • Hypoxia    • Single kidney, left    • Renal cell carcinoma of right kidney metastatic to other site (CMS/HCC)        #1 sickle cell crisis -off PCA pump and pain is stable.  Started on Levaquin for likely pneumonia based on prior CT which was negative for PE.  Was transfused 1 unit of blood earlier in the stay.  H&H looks stable.  Question need to start hydroxyurea.  Will ask hematology for evaluation and recommendations.    #2 acute respiratory failure hypoxia -as above potential left lower lobe pneumonia on CTA.  CT was negative for PE.  Incentive spirometry.  Ambulate patient.  Levaquin.  Wean O2 as able.  Check a 2D echo.    #3 anemia of chronic disease -again has sickle cell.  No signs of bleeding.  Monitor.  No obvious need for transfusion today again will ask hematology for recommendations.    #4  KIARRA -worsening renal function today.  Only has 1 kidney due to prior history and nephrectomy.  Will start on half-normal saline at 100 an hour.  Encourage p.o. intake.    #5 history of renal cell carcinoma/solitary kidney -is post right nephrectomy. Follows at [facility] for his care.    Discharge Planning: Ongoing. Will f/u heme/onc recs    Electronically signed by Chuy Patel DO, 07/13/21, 11:54 CDT.

## 2021-07-13 NOTE — PLAN OF CARE
Goal Outcome Evaluation:  Plan of Care Reviewed With: patient        Progress: no change  Outcome Summary: no co pain. echo done today that show EF of 61-65%. IV fluids started at 100ml/hr. Dr. Sandhu consulted. O2 at 5L NC and using home bipap. cont to monitor.

## 2021-07-13 NOTE — CONSULTS
[facility] Oncology/Hematology Services  CONSULT NOTE    PATIENT NAME:  Holland Phelps  YOB: 1970  PATIENT MRN:  [id number]    Date of Admission:  7/9/2021  Consultation Date:  7/13/2021  Referring Provider: Wale Galloway*    Subjective     Reason for Consultation: Sickle cell anemia crisis    History of present illness: Holland Phelps 50 y.o. male who is normally followed at [facility] for management of his sickle cell disease as well as previous history of nephrectomy secondary to renal cell carcinoma.  As per the notes from [place], the patient was diagnosed in childhood with hemoglobin SS disease but had a relatively mild phenotypic expression.  Based on an evaluation done at [place] on 9/25/2015, the patient had hemoglobin A of 14.6%, hemoglobin S of 78.9%, and a 2 of 6.5% suggestive of hemoglobinS/ B  Thalassemia 0.  The patient had reported that he has a painful episode once every 6 months and has had multiple transfusions in the past.    On 9/25/2015, his WBC count was 14.5, hemoglobin of 10.1, MCV of 75 and platelets of 287,000.  BUN and creatinine were normal with a bilirubin of 4.7 and LDH of 814 but had been recently found to have symptomatic cholelithiasis and was being considered for elective cholecystectomy.  Other pertinent findings were her ferritin of 1960, TIBC of 178, U IBC of 48 and a serum iron of 130 with an iron saturation of 73% back on 9/25/2015.  He denied any history of acute chest syndrome, retinopathy nor a history of stroke.  The patient did not have a history of priapism, leg ulcers.  He was noted to have a heart murmur but otherwise without complaints.  At that time he was referred to [place] for exchange transfusions preop before a surgery as he was diagnosed with stage IV kidney cancer with metastatic disease in the left sacrum which was impinging on the S3 nerve root.  Unfortunately, the patient had progressed on VOTRIENT as  well as radiation therapy to the left and right femurs.  He was started on nivolumab on 12/21 every 2 weeks.  The patient does have osseous metastatic lesions that were previously treated with SBRT to the left sacrum and the left and right distal femurs and he was now receiving SBRT to the left tibia, right femur and right sacrum.  He continued to have nivolumab therapy and he complained of some intermittent leg pains but minimal discomfort at rest.  He had been taking morphine for pain relief with some improvement and was scheduled to meet with Dr. Estrada from orthopedic oncology at [place] to discuss placement of a apolinar in the right femur after finishing radiotherapy.  He was off his hydroxyurea due to radiosensitization.  He was on CV-839 study drug offer 2 weeks due to hyperbilirubinemia.  He is status post the right lap nephrectomy for tumor debulking.      When the patient was seen in the office in [place] on April 9, 2021 he was still off hydroxyurea while receiving chemotherapy for his renal cell carcinoma and had complained that his fatigue had improved and the pain was well controlled.  He completed radiation therapy 1 week prior and felt that he was recovering well.  He did not have any skin toxicities at that time nor pain related to radiation therapy.  He is seeing Dr. Earl from [place] for management of his metastatic renal cell carcinoma and was being managed with pembrolizumab with the plan to restart him on axitinib.      When he was seen in the office last in April 2021, he was doing well off his hydroxyurea and had not experienced any acute vaso-occlusive pain episodes nor other vaso-occlusive complications.  As per the notes, the patient had previously been on 1000 mg p.o. daily of hydroxyurea with folic acid 1 mg p.o. daily but hydroxyurea was held during the time of palliative RT.  The plan was to restart the hydroxyurea in the months that followed.  The patient was also  "previously taking 750 mg of p.o. Exjade with improvement in his ferritin levels but the most recent ferritin level was 1563.    He was being followed by medical oncology for the metastatic renal cell carcinoma with mets to the bone and had been through multiple chemotherapies.  He also gets denosumab for bone protection.  He had completed radiation therapy and in June 2020 was put on cabozanitinib 40 mg which was held in October 2020 for cellulitis and lymphedema.  He was undergoing treatment in [place] for his lymphedema.    Pain management for sickle cell was ibuprofen 600 mg every 6 hours as needed for pain, Lortab 10/325 mg 1 tablet every 6 hours as needed for pain during an acute crisis    Dr. Patel from the hospitalist team called me today as the patient had been admitted to [facility] with a sickle cell crisis on 7/9/2021 and was placed on a PCA pump which had controlling his pain well -- he is off the PCA pump now with \"good control of pain..  A CT scan that was done to rule out a pulmonary embolism had actually showed a likely pneumonia and the patient was started on Levaquin therapy.  He was transfused 1 unit of blood earlier in his stay with a stabilized H&H.    Hematology was called to help with possible need to restart hydroxyurea.  As per patient, he has been off chemo and RT for about 7 months but did not restart the hydrea, previously on 1000 mg hydrea daily.  He is scheduled to go back to [place] on 8/12/21 to have a restaging and to see Dr. Earl, his oncologist.  Hematology took him off Exjade as well.     Past Medical History:  Past Medical History:   Diagnosis Date   • Cancer (CMS/HCC) 2016    Kidney cancer with METS to bone   • Gallstones    • Pneumonia    • Sickle cell      Prior Surgeries:  Past Surgical History:   Procedure Laterality Date   • CHOLECYSTECTOMY     • LEG SURGERY     • NEPHRECTOMY Right         Allergies:Patient has no known allergies.  PTA Meds:  Medications " Prior to Admission   Medication Sig Dispense Refill Last Dose   • escitalopram (LEXAPRO) 20 MG tablet Take 20 mg by mouth Every Evening.   7/8/2021 at Unknown time   • folic acid (FOLVITE) 1 MG tablet Take 1 mg by mouth Daily.   7/9/2021 at Unknown time   • hydrocortisone (CORTEF) 10 MG tablet 2 in the morning and 1 at night    7/9/2021 at Unknown time   • lactulose (CHRONULAC) 10 GM/15ML solution Take 20 g by mouth 2 (Two) Times a Day As Needed.   Past Week at Unknown time   • NIFEdipine XL (PROCARDIA XL) 90 MG 24 hr tablet Take 90 mg by mouth Daily.   Past Week at Unknown time      Current Meds:   Current Facility-Administered Medications   Medication Dose Route Frequency Provider Last Rate Last Admin   • bisacodyl (DULCOLAX) suppository 10 mg  10 mg Rectal Daily PRN Chuy Patel DO       • docusate sodium (COLACE) capsule 100 mg  100 mg Oral BID Chuy Patel DO   100 mg at 07/13/21 0850   • escitalopram (LEXAPRO) tablet 20 mg  20 mg Oral Daily Iqra Martinez APRN   20 mg at 07/13/21 0850   • folic acid (FOLVITE) tablet 1 mg  1 mg Oral Daily Iqra Martinez APRN   1 mg at 07/13/21 0850   • hydrocortisone (CORTEF) tablet 10 mg  10 mg Oral Nightly Justino Mcdonald MD   10 mg at 07/12/21 2137   • hydrocortisone (CORTEF) tablet 20 mg  20 mg Oral Daily Justino Mcdonald MD   20 mg at 07/13/21 0851   • ipratropium-albuterol (DUO-NEB) nebulizer solution 3 mL  3 mL Nebulization Q6H PRN Justino Mcdonald MD   3 mL at 07/11/21 1617   • lactulose solution 20 g  20 g Oral BID PRN Iqra Martinez APRN   20 g at 07/11/21 1643   • levoFLOXacin (LEVAQUIN) 750 mg/150 mL D5W (premix) (LEVAQUIN) 750 mg  750 mg Intravenous Q24H Chuy Patel DO   750 mg at 07/13/21 1211   • linaclotide (LINZESS) capsule 145 mcg  145 mcg Oral Daily Justino Mcdonald MD   145 mcg at 07/13/21 0850   • Morphine (MS CONTIN) 12 hr tablet 120 mg  120 mg Oral TID Iqra Martinez APRN   120 mg at  07/13/21 1614   • Morphine (MSIR) tablet 37.5 mg  37.5 mg Oral Q4H PRN Iqra Martinez APRN   37.5 mg at 07/09/21 2326   • naloxone (NARCAN) injection 0.1 mg  0.1 mg Intravenous Q5 Min PRN Iqra Martinez APRN       • NIFEdipine XL (PROCARDIA XL) 24 hr tablet 90 mg  90 mg Oral Q24H Justino Mcdonald MD   90 mg at 07/13/21 0850   • ondansetron (ZOFRAN) tablet 4 mg  4 mg Oral Q6H PRN Iqra Martinez APRN        Or   • ondansetron (ZOFRAN) injection 4 mg  4 mg Intravenous Q6H PRN Iqra Martinez APRN       • polyethylene glycol (MIRALAX) packet 17 g  17 g Oral Daily Chuy Patel DO   17 g at 07/13/21 0858   • sodium chloride 0.45 % infusion  100 mL/hr Intravenous Continuous Chuy Patel  mL/hr at 07/13/21 1210 100 mL/hr at 07/13/21 1210   • sodium chloride 0.9 % flush 10 mL  10 mL Intravenous Q12H Iqra Martinez APRN   10 mL at 07/13/21 0858   • sodium chloride 0.9 % flush 10 mL  10 mL Intravenous PRN Iqra Martinez APRN           Family History:family history includes Anemia in his cousin; Heart disease in his mother; Hypertension in his father and mother; Kidney failure in his mother; Leukemia in his maternal grandmother and another family member; Sickle cell trait in his cousin, daughter, and father.   Social History: reports that he has quit smoking. He does not have any smokeless tobacco history on file. He reports previous alcohol use. He reports current drug use. Drug: Marijuana.    Review of Systems  Review of Systems   Constitutional: Negative for activity change, appetite change, chills, fatigue, fever and unexpected weight change.   HENT: Negative for congestion, dental problem, ear pain, hearing loss, mouth sores, nosebleeds, sore throat and trouble swallowing.    Eyes: Negative.    Respiratory: Negative for apnea, cough, chest tightness, shortness of breath and wheezing.    Cardiovascular: Negative for chest pain, palpitations and leg swelling.    Gastrointestinal: Positive for abdominal pain and constipation. Negative for abdominal distention, blood in stool, diarrhea, nausea and vomiting.   Genitourinary: Negative for difficulty urinating, frequency, hematuria and urgency.   Musculoskeletal: Positive for back pain. Negative for arthralgias, gait problem and neck stiffness.   Skin: Negative for pallor and rash.   Allergic/Immunologic: Negative for environmental allergies.   Neurological: Negative for dizziness, seizures, syncope, weakness, light-headedness, numbness and headaches.   Hematological: Negative for adenopathy. Does not bruise/bleed easily.   Psychiatric/Behavioral: Negative for confusion and suicidal ideas. The patient is not nervous/anxious.          Objective      Vital Signs   Temp:  [97.9 °F (36.6 °C)-98.9 °F (37.2 °C)] 98 °F (36.7 °C)  Heart Rate:  [87-96] 87  Resp:  [15-18] 16  BP: (109-132)/(58-79) 109/58    Physical Exam  Constitutional:       General: He is awake.      Appearance: Normal appearance. He is well-developed, well-groomed and normal weight.   HENT:      Head: Normocephalic and atraumatic.      Nose: Nose normal.      Mouth/Throat:      Mouth: Mucous membranes are dry.   Eyes:      Pupils: Pupils are equal, round, and reactive to light.   Cardiovascular:      Rate and Rhythm: Normal rate and regular rhythm.      Pulses: Normal pulses.      Heart sounds: Gallop present. S3 sounds present.    Pulmonary:      Effort: Pulmonary effort is normal.      Breath sounds: Examination of the left-lower field reveals decreased breath sounds. Decreased breath sounds present.   Abdominal:      General: Abdomen is flat. Bowel sounds are normal.      Palpations: Abdomen is soft.      Comments: Slightly distended bith no tenderness   Musculoskeletal:         General: Normal range of motion.      Cervical back: Neck supple.   Lymphadenopathy:      Head:      Right side of head: No submental or submandibular adenopathy.      Left side of head:  No submental or submandibular adenopathy.      Cervical:      Right cervical: No superficial cervical adenopathy.     Left cervical: No superficial cervical adenopathy.      Upper Body:      Right upper body: No supraclavicular or axillary adenopathy.      Left upper body: No supraclavicular or axillary adenopathy.   Skin:     General: Skin is warm and moist.   Neurological:      General: No focal deficit present.      Mental Status: He is alert and oriented to person, place, and time.   Psychiatric:         Attention and Perception: Attention and perception normal.         Mood and Affect: Mood normal.         Speech: Speech normal.         Behavior: Behavior normal. Behavior is cooperative.         Thought Content: Thought content normal.         Cognition and Memory: Cognition and memory normal.         Judgment: Judgment normal.          Results from last 7 days   Lab Units 07/13/21  0516 07/12/21  0420 07/11/21  0203   WBC 10*3/mm3 13.22* 14.18* 14.54*   HEMOGLOBIN g/dL 7.7* 7.6* 8.3*   HEMATOCRIT % 23.0* 22.7* 24.2*   PLATELETS 10*3/mm3 252 225 224        Results from last 7 days   Lab Units 07/13/21  0516 07/12/21  0420 07/11/21  0203 07/10/21  0430   SODIUM mmol/L 140  --  140 142   POTASSIUM mmol/L 4.1  --  3.7 3.9   CHLORIDE mmol/L 103  --  105 108*   CO2 mmol/L 29.0  --  27.0 26.0   BUN mg/dL 17  --  8 10   CREATININE mg/dL 1.34*  --  0.75* 0.93   CALCIUM mg/dL 9.2  --  8.9 8.6   BILIRUBIN mg/dL 1.2 1.3* 2.3* 1.8*   ALK PHOS U/L 83 82 83 73   ALT (SGPT) U/L 6 7 8 6   AST (SGOT) U/L 11 13 24 16   GLUCOSE mg/dL 98  --  107* 107*       ABG:  No results found for: PHART, PO2ART, HVC3XHY    Culture Data:   Blood Culture   Date Value Ref Range Status   07/09/2021 No growth at 3 days  Preliminary   07/09/2021 No growth at 3 days  Preliminary       Radiology:   Imaging Results (Last 7 Days)     Procedure Component Value Units Date/Time    CT Angiogram Chest [[id number]] Collected: 07/09/21 2110     Updated:  07/09/21 2118    Narrative:      EXAMINATION: CT ANGIOGRAM CHEST- 7/9/2021 9:10 PM CDT     HISTORY: Chest and back pain, shortness of breath, history of renal  cancer with bone metastases     COMPARISON: None     DOSE: 342 mGy-cm     TECHNIQUE: Sequential imaging was performed from the thoracic inlet  through the upper abdomen following the administration of IV contrast.   Sagittal and coronal reformations were made from the original source  data and reviewed. Additionally, 3-D MIPS reconstructions of the vessels  were made per CTA protocol. Automated exposure control was also utilized  to decrease patient radiation dose.     FINDINGS:   Thyroid gland is unremarkable. Trachea and main bronchi appear widely  patent and in normal anatomic position. The esophagus is grossly normal  in appearance.     No pathologically enlarged axillary lymph nodes are identified. No  mediastinal or hilar lymphadenopathy is appreciated. Small areas of  nodularity are seen throughout the anterior mediastinum without  pathologic enlargement, etiology unclear.     The heart is enlarged. There is trace pericardial fluid. The ascending  thoracic aorta appears normal in caliber. Main pulmonary artery appears  normal in caliber. There is poor opacification of the pulmonary  arteries, which limits evaluation. No large central or proximal  segmental filling defects are identified.     There is mild diffuse bronchial wall thickening. There is some  interlobular septal thickening at the lung bases. There are dependent  changes in the lung bases. No focal pulmonary nodules are identified.     Review of the visualized portion of the upper abdomen demonstrates  evidence of prior right nephrectomy. There is contrast in the left renal  collecting system.     Review of the visualized osseous structures demonstrates somewhat of a  heterogeneous appearance of the sternum without a discrete lesion  identified. The ribs also appear somewhat heterogeneous  bilaterally.       Impression:      1. Poor evaluation of the pulmonary arteries due to contrast bolus  timing. Allowing for this, no PE is identified.     2. Cardiomegaly with trace pericardial fluid.  3. Mild bronchial wall thickening. Some degree of chronic interstitial  change not excluded.  4. No pathologically enlarged lymph nodes identified, though several  tiny nodules are seen in the anterior mediastinum, possibly reflecting  lymph nodes.           This report was finalized on 07/09/2021 21:15 by Dr. Macario Ramos MD.    CT Thoracic Spine Without Contrast [[id number]] Collected: 07/09/21 2106     Updated: 07/09/21 2113    Narrative:      EXAMINATION: CT THORACIC SPINE WO CONTRAST- 7/9/2021 9:06 PM CDT     HISTORY: Chest and back pain, history of renal cell carcinoma with bone  metastases     COMPARISON: None     DOSE: 758 mGy-cm     TECHNIQUE: Sequential imaging was performed through the thoracic spine  without the use of IV contrast.  Sagittal and coronal reformations were  made from the original source data and reviewed. Automated exposure  control was also utilized to decrease patient radiation dose.     FINDINGS:   Alignment of the thoracic spine appears normal. Vertebral body heights  appear well maintained. There is no evidence of perched facet. The  spinous processes appear intact. Endplate degenerative spurring is  evident. A few tiny foci of sclerosis are evident. No lytic lesions are  identified. There is no evidence of acute fracture. There is no evidence  of critical spinal canal stenosis. No severe neuroforaminal narrowing is  identified.     Review of the visualized paraspinal soft tissues demonstrates no acute  abnormalities.           Impression:      1. No acute findings in the thoracic spine.  2. No metastatic disease identified.     This report was finalized on 07/09/2021 21:10 by Dr. Macario Ramos MD.    XR Chest 1 View [[id number]] Collected: 07/09/21 2011     Updated: 07/09/21 2015     Narrative:      EXAMINATION: XR CHEST 1 VW- 7/9/2021 8:11 PM CDT     HISTORY: Shortness of breath     COMPARISON: None     FINDINGS:  There are patchy groundglass airspace opacities in the left lung base.  There is hypoaeration. Heart is magnified but felt to be mildly  enlarged. Mediastinal contours otherwise appear normal. There is no  appreciable pneumothorax or pleural effusion. There are surgical clips  in the right upper quadrant of the abdomen.       Impression:      Airspace opacities in the left lung base concerning for  inflammation or early infection.  This report was finalized on 07/09/2021 20:12 by Dr. Macario Ramos MD.          Pathology Results:   No results found for: PATHINTERP    Results Review:          I reviewed the patient's new clinical results.       Assessment/Plan        Assessment/Plans:   Date  3/29/2021  7/9/2021  7/11/2021  7/12/21 7/13/21       WBC  10.2   14.54  14.18  13.2       Hb  8.7   8.3  7.6  7.7       MCV  70   72.7  73.2  72.8       Plt  276   224  225  254       ANC      9.10       ALC      1.23       AMC      1.37       AEC     1.24       ABC     0.13       Ferritin   1328          Iron            Iron SAT            Transferrin            TIBC            Hapto            LDH   211  417   274       Víctor            Harley            Zinc            Retic      2.67       B12            Folate            Hep panel            HIV            Creatinine   0.99          Glucose   108          ALT   7          AST   17          Alk phos   80          Total bilirubin   2.7          Uric acid      7.4                                             **HbS/Beta Thal 0  -Being treated at [facility] and has very infrequent flares  -Admitted to [facility] with an acute pain episode requiring pain management and is currently on a PCA pump with adequate pain control  -Status post 1 unit of transfusion during this admission  TREATMENT:  • Patients should receive folic acid 1 mg daily  for life  • Continue incentive spirometry  • Pneumococcal vaccine is important if he has not yet received it  • Hydrea 1000 mg hydroxyurea which was held due to radiation and started again now -- can have re-evalution at next appointment with [facility]  • Hydrea is held during pregnancy although NO teratogenic or leukemogenic effect has been observed to date  • Hydrea was initially used to increase the HbF but DOES NOT occur in most Sickle Cell patients.  The drug does decrease the neutrophil counts in the blood  • Indications for RBC transfusions:  o Acute chest syndrome  o Stroke prevention and treatment  o Aplastic and sequestration crisis  o Multiorgan failure  o Transfusion prior to surgery  • Management of complications:  o In vascular crises, patients should be kept warm and given adequate hydration and pain control. Oxygen only helps in hypoxic patients and overhydration should be avoided  o Patients who undergo anesthesia are at increased risk for crisis and should be observed closely for development of hypoxia or acidosis  o Acute chest syndrome is life threatening and exchange transfusions appear to be beneficial along with adequate pain control, bronchodilators, antibiotics and incentive spirometry  o Priapism should be treated by rapid hydration, RBC transfusions and pain control while urgent urological evaluation is requested  o In pregnancy, prophylactic transfusions are given for Hg <6  o Patients should have a yearly auditory and ophthalmologic exam while the patient is on an iron chelator      -Patient can follow-up at [facility] upon discharge and has appointment on 8.12.21      **HIGH FERRITIN   ** Likely secondary to transfusions  - was taken off the Exjade as well as the hydrea while he was on the chemo but will rediscuss with hematology getting back on the Exjade      ** Infection status:   · Currently admitted with a pneumonia and is being treated with Levaquin    **Metabolic /  Renal:   · History of metastatic renal cell carcinoma being followed and treated at [facility], status post multiple chemotherapies and radiation    ** GI  - No BM for several days -- please consider x-ray to rule out obstruction especially in view of high opiates       ** Pulmonary:   · Current smoking status former smoker  -PE negative but patient has pneumonia  - on 5L NC     ** Pain control:   -Currently being managed with a PCA but protocol from [place] was Lortab 10/325 every 6 hours as needed for pain along with Advil 600 mg every 6 hours as needed for pain which can be recontinued upon discharge      Thank you for allowing me to participate in the care of this patient.  Ami Goldberg, MD  Hematology/oncology

## 2021-07-14 LAB
ANION GAP SERPL CALCULATED.3IONS-SCNC: 10 MMOL/L (ref 5–15)
BACTERIA SPEC AEROBE CULT: NORMAL
BACTERIA SPEC AEROBE CULT: NORMAL
BASOPHILS # BLD AUTO: 0.03 10*3/MM3 (ref 0–0.2)
BASOPHILS NFR BLD AUTO: 0.2 % (ref 0–1.5)
BUN SERPL-MCNC: 20 MG/DL (ref 6–20)
BUN/CREAT SERPL: 17.9 (ref 7–25)
CALCIUM SPEC-SCNC: 9.1 MG/DL (ref 8.6–10.5)
CHLORIDE SERPL-SCNC: 105 MMOL/L (ref 98–107)
CO2 SERPL-SCNC: 27 MMOL/L (ref 22–29)
CREAT SERPL-MCNC: 1.12 MG/DL (ref 0.76–1.27)
DEPRECATED RDW RBC AUTO: 55.5 FL (ref 37–54)
EOSINOPHIL # BLD AUTO: 0.69 10*3/MM3 (ref 0–0.4)
EOSINOPHIL NFR BLD AUTO: 5.7 % (ref 0.3–6.2)
ERYTHROCYTE [DISTWIDTH] IN BLOOD BY AUTOMATED COUNT: 21.9 % (ref 12.3–15.4)
GFR SERPL CREATININE-BSD FRML MDRD: 83 ML/MIN/1.73
GLUCOSE SERPL-MCNC: 102 MG/DL (ref 65–99)
HCT VFR BLD AUTO: 21.2 % (ref 37.5–51)
HGB BLD-MCNC: 7.1 G/DL (ref 13–17.7)
LYMPHOCYTES # BLD AUTO: 0.56 10*3/MM3 (ref 0.7–3.1)
LYMPHOCYTES NFR BLD AUTO: 4.6 % (ref 19.6–45.3)
MCH RBC QN AUTO: 24.1 PG (ref 26.6–33)
MCHC RBC AUTO-ENTMCNC: 33.5 G/DL (ref 31.5–35.7)
MCV RBC AUTO: 72.1 FL (ref 79–97)
MONOCYTES # BLD AUTO: 2.17 10*3/MM3 (ref 0.1–0.9)
MONOCYTES NFR BLD AUTO: 18 % (ref 5–12)
NEUTROPHILS NFR BLD AUTO: 70.6 % (ref 42.7–76)
NEUTROPHILS NFR BLD AUTO: 8.5 10*3/MM3 (ref 1.7–7)
PLATELET # BLD AUTO: 249 10*3/MM3 (ref 140–450)
PMV BLD AUTO: 9.9 FL (ref 6–12)
POTASSIUM SERPL-SCNC: 4 MMOL/L (ref 3.5–5.2)
RBC # BLD AUTO: 2.94 10*6/MM3 (ref 4.14–5.8)
SODIUM SERPL-SCNC: 142 MMOL/L (ref 136–145)
WBC # BLD AUTO: 12.06 10*3/MM3 (ref 3.4–10.8)

## 2021-07-14 PROCEDURE — 85025 COMPLETE CBC W/AUTO DIFF WBC: CPT | Performed by: INTERNAL MEDICINE

## 2021-07-14 PROCEDURE — 80048 BASIC METABOLIC PNL TOTAL CA: CPT | Performed by: INTERNAL MEDICINE

## 2021-07-14 PROCEDURE — 25010000002 LEVOFLOXACIN PER 250 MG: Performed by: INTERNAL MEDICINE

## 2021-07-14 RX ORDER — MORPHINE SULFATE 30 MG/1
60 TABLET, FILM COATED, EXTENDED RELEASE ORAL 3 TIMES DAILY
Status: DISCONTINUED | OUTPATIENT
Start: 2021-07-14 | End: 2021-07-16 | Stop reason: HOSPADM

## 2021-07-14 RX ADMIN — LACTULOSE 20 G: 20 SOLUTION ORAL at 09:41

## 2021-07-14 RX ADMIN — DOCUSATE SODIUM 100 MG: 100 CAPSULE ORAL at 20:17

## 2021-07-14 RX ADMIN — SODIUM CHLORIDE 100 ML/HR: 4.5 INJECTION, SOLUTION INTRAVENOUS at 22:35

## 2021-07-14 RX ADMIN — HYDROXYUREA 1000 MG: 500 CAPSULE ORAL at 08:21

## 2021-07-14 RX ADMIN — FOLIC ACID 1 MG: 1 TABLET ORAL at 08:20

## 2021-07-14 RX ADMIN — BISACODYL 10 MG: 10 SUPPOSITORY RECTAL at 13:43

## 2021-07-14 RX ADMIN — MORPHINE SULFATE 120 MG: 30 TABLET, FILM COATED, EXTENDED RELEASE ORAL at 08:20

## 2021-07-14 RX ADMIN — DOCUSATE SODIUM 100 MG: 100 CAPSULE ORAL at 08:20

## 2021-07-14 RX ADMIN — NIFEDIPINE 90 MG: 90 TABLET, EXTENDED RELEASE ORAL at 08:20

## 2021-07-14 RX ADMIN — LACTULOSE 20 G: 20 SOLUTION ORAL at 18:01

## 2021-07-14 RX ADMIN — ESCITALOPRAM 20 MG: 10 TABLET, FILM COATED ORAL at 08:20

## 2021-07-14 RX ADMIN — SODIUM CHLORIDE 100 ML/HR: 4.5 INJECTION, SOLUTION INTRAVENOUS at 09:39

## 2021-07-14 RX ADMIN — HYDROCORTISONE 10 MG: 10 TABLET ORAL at 20:17

## 2021-07-14 RX ADMIN — MORPHINE SULFATE 60 MG: 30 TABLET, FILM COATED, EXTENDED RELEASE ORAL at 20:17

## 2021-07-14 RX ADMIN — HYDROCORTISONE 20 MG: 10 TABLET ORAL at 08:19

## 2021-07-14 RX ADMIN — SODIUM CHLORIDE, PRESERVATIVE FREE 10 ML: 5 INJECTION INTRAVENOUS at 20:17

## 2021-07-14 RX ADMIN — LEVOFLOXACIN 750 MG: 5 INJECTION, SOLUTION INTRAVENOUS at 13:37

## 2021-07-14 RX ADMIN — LINACLOTIDE 145 MCG: 145 CAPSULE, GELATIN COATED ORAL at 08:20

## 2021-07-14 NOTE — PROGRESS NOTES
[facility] Medicine Services  INPATIENT PROGRESS NOTE    Patient Name: Holland Phelps  Date of Admission: 7/9/2021  Today's Date: 07/14/21  Length of Stay: 5  Primary Care Physician: Mallory Mccarthy MD    Subjective   Chief Complaint: Follow-up sickle cell crisis    HPI:  Patient is up ambulating around the room. Still has not had a bm but denies abdominal pain or nausea. Tolerating po. No vomiting.     ROS:  All pertinent negatives and positives are as above. All other systems have been reviewed and are negative unless otherwise stated.     Objective    Temp:  [98 °F (36.7 °C)-98.7 °F (37.1 °C)] 98.7 °F (37.1 °C)  Heart Rate:  [] 83  Resp:  [16-18] 18  BP: (109-128)/(58-72) 120/67  Physical Exam  GEN: Awake, alert, interactive, in NAD  HEENT:  PERRLA, EOMI, Anicteric, Trachea midline  Lungs: diminished, no wheezing or rales  Heart: RRR, +S1/s2, no rub  ABD: soft, nt/nd, +BS, no guarding/rebound  Extremities: atraumatic, no cyanosis, no edema  Skin: no rashes or lesions  Neuro: AAOx3, no focal deficits  Psych: normal mood & affect        Results Review:  I have reviewed the labs, radiology results, and diagnostic studies.    Laboratory Data:   Results from last 7 days   Lab Units 07/14/21  0553 07/13/21  0516 07/12/21  0420   WBC 10*3/mm3 12.06* 13.22* 14.18*   HEMOGLOBIN g/dL 7.1* 7.7* 7.6*   HEMATOCRIT % 21.2* 23.0* 22.7*   PLATELETS 10*3/mm3 249 252 225        Results from last 7 days   Lab Units 07/14/21  0553 07/13/21  0516 07/12/21  0420 07/11/21  0203   SODIUM mmol/L 142 140  --  140   POTASSIUM mmol/L 4.0 4.1  --  3.7   CHLORIDE mmol/L 105 103  --  105   CO2 mmol/L 27.0 29.0  --  27.0   BUN mg/dL 20 17  --  8   CREATININE mg/dL 1.12 1.34*  --  0.75*   CALCIUM mg/dL 9.1 9.2  --  8.9   BILIRUBIN mg/dL  --  1.2 1.3* 2.3*   ALK PHOS U/L  --  83 82 83   ALT (SGPT) U/L  --  6 7 8   AST (SGOT) U/L  --  11 13 24   GLUCOSE mg/dL 102* 98  --  107*       Culture Data:   Blood  Culture   Date Value Ref Range Status   07/09/2021 No growth at 2 days  Preliminary   07/09/2021 No growth at 2 days  Preliminary       Radiology Data:   Imaging Results (Last 24 Hours)     ** No results found for the last 24 hours. **          I have reviewed the patient's current medications.     Assessment/Plan     Active Hospital Problems    Diagnosis    • **Sickle cell crisis (CMS/HCC)    • Anemia of chronic disease    • Acute pain    • Hyperbilirubinemia    • Hypoxia    • Single kidney, left    • Renal cell carcinoma of right kidney metastatic to other site (CMS/HCC)        #1 sickle cell crisis -off PCA pump and pain is stable.  Will decrease morphine dosing today.  Started on Levaquin for likely pneumonia based on prior CT which was negative for PE.  Was transfused 1 unit of blood earlier in the stay.  Started on hydroxyurea.  Defer transfusions to hematology.  Appreciate blood.    #2 acute respiratory failure hypoxia -as above potential left lower lobe pneumonia on CTA.  CT was negative for PE.  Incentive spirometry.  Ambulate patient.  Levaquin.  Wean O2 as able. 2d echo with preserved EF and no significant pericardial effusion. WBC trending down.     #3 anemia of chronic disease -again has sickle cell.  No signs of bleeding.  Monitor.     #4  KIARRA - creatinine improving today with ivf, continue    #5 history of renal cell carcinoma/solitary kidney -is post right nephrectomy. Follows at [place] for his care.    Discharge Planning: Ongoing. Will f/u heme/onc recs    Electronically signed by Chuy Patel DO, 07/14/21, 09:43 CDT.

## 2021-07-14 NOTE — CASE MANAGEMENT/SOCIAL WORK
Discharge Planning Assessment  [facility]     Patient Name: Holland Phelps  MRN: [id number]  Today's Date: 7/14/2021    Admit Date: 7/9/2021    Discharge Needs Assessment     Row Name 07/14/21 2833       Living Environment    Lives With  spouse    Name(s) of Who Lives With Patient  Wife- Primo    Current Living Arrangements  home/apartment/condo    Primary Care Provided by  self    Provides Primary Care For  no one    Family Caregiver if Needed  spouse    Quality of Family Relationships  supportive;involved;helpful    Able to Return to Prior Arrangements  yes       Resource/Environmental Concerns    Resource/Environmental Concerns  none       Transition Planning    Patient/Family Anticipates Transition to  home with family    Patient/Family Anticipated Services at Transition  none    Transportation Anticipated  family or friend will provide       Discharge Needs Assessment    Readmission Within the Last 30 Days  no previous admission in last 30 days    Equipment Currently Used at Home  cane, straight;bipap;oxygen    Concerns to be Addressed  denies needs/concerns at this time    Anticipated Changes Related to Illness  none    Equipment Needed After Discharge  none    Current Discharge Risk  chronically ill        Discharge Plan     Row Name 07/14/21 2949       Plan    Plan  Home    Patient/Family in Agreement with Plan  yes    Plan Comments  Spoke with pt and his wife to assess for home needs. Pt will return home with wife at d/c as before. Pt has RX coverage/PCP.  Pt does have bipap/O2 he has at home via [employer], keeps O2 at 2L at night. No home needs identified. Will follow.        Continued Care and Services - Admitted Since 7/9/2021    Coordination has not been started for this encounter.         Demographic Summary    No documentation.       Functional Status    No documentation.       Psychosocial    No documentation.       Abuse/Neglect    No documentation.       Legal    No documentation.       Substance  Abuse    No documentation.       Patient Forms    No documentation.           WALLY Martínez

## 2021-07-14 NOTE — PLAN OF CARE
Goal Outcome Evaluation:           Progress: improving  Outcome Summary: nO C/O PAIN PT HAVING PROBLEMS KEEPING O SAT UP WHEN SLEEPING BIPAP ON  CONT TO MONITOR , NO FALLS OR INJURIES THIS SHIFT .

## 2021-07-15 LAB
ANION GAP SERPL CALCULATED.3IONS-SCNC: 8 MMOL/L (ref 5–15)
BUN SERPL-MCNC: 14 MG/DL (ref 6–20)
BUN/CREAT SERPL: 15.4 (ref 7–25)
CALCIUM SPEC-SCNC: 9.1 MG/DL (ref 8.6–10.5)
CHLORIDE SERPL-SCNC: 106 MMOL/L (ref 98–107)
CO2 SERPL-SCNC: 29 MMOL/L (ref 22–29)
CREAT SERPL-MCNC: 0.91 MG/DL (ref 0.76–1.27)
DEPRECATED RDW RBC AUTO: 55.8 FL (ref 37–54)
ERYTHROCYTE [DISTWIDTH] IN BLOOD BY AUTOMATED COUNT: 22.1 % (ref 12.3–15.4)
GFR SERPL CREATININE-BSD FRML MDRD: 106 ML/MIN/1.73
GLUCOSE SERPL-MCNC: 100 MG/DL (ref 65–99)
HCT VFR BLD AUTO: 21.5 % (ref 37.5–51)
HGB BLD-MCNC: 7.2 G/DL (ref 13–17.7)
MCH RBC QN AUTO: 23.8 PG (ref 26.6–33)
MCHC RBC AUTO-ENTMCNC: 33.5 G/DL (ref 31.5–35.7)
MCV RBC AUTO: 71.2 FL (ref 79–97)
PLATELET # BLD AUTO: 280 10*3/MM3 (ref 140–450)
PMV BLD AUTO: 10 FL (ref 6–12)
POTASSIUM SERPL-SCNC: 3.8 MMOL/L (ref 3.5–5.2)
RBC # BLD AUTO: 3.02 10*6/MM3 (ref 4.14–5.8)
SODIUM SERPL-SCNC: 143 MMOL/L (ref 136–145)
WBC # BLD AUTO: 10.86 10*3/MM3 (ref 3.4–10.8)

## 2021-07-15 PROCEDURE — 80048 BASIC METABOLIC PNL TOTAL CA: CPT | Performed by: INTERNAL MEDICINE

## 2021-07-15 PROCEDURE — 25010000002 LEVOFLOXACIN PER 250 MG: Performed by: INTERNAL MEDICINE

## 2021-07-15 PROCEDURE — 85027 COMPLETE CBC AUTOMATED: CPT | Performed by: INTERNAL MEDICINE

## 2021-07-15 RX ORDER — HYDROCORTISONE 10 MG/1
10 TABLET ORAL
Status: ON HOLD | COMMUNITY
End: 2021-11-05

## 2021-07-15 RX ADMIN — NIFEDIPINE 90 MG: 90 TABLET, EXTENDED RELEASE ORAL at 08:28

## 2021-07-15 RX ADMIN — MORPHINE SULFATE 60 MG: 30 TABLET, FILM COATED, EXTENDED RELEASE ORAL at 05:40

## 2021-07-15 RX ADMIN — SODIUM CHLORIDE 100 ML/HR: 4.5 INJECTION, SOLUTION INTRAVENOUS at 08:27

## 2021-07-15 RX ADMIN — DOCUSATE SODIUM 100 MG: 100 CAPSULE ORAL at 08:28

## 2021-07-15 RX ADMIN — HYDROCORTISONE 10 MG: 10 TABLET ORAL at 21:17

## 2021-07-15 RX ADMIN — ESCITALOPRAM 20 MG: 10 TABLET, FILM COATED ORAL at 08:27

## 2021-07-15 RX ADMIN — LINACLOTIDE 145 MCG: 145 CAPSULE, GELATIN COATED ORAL at 08:28

## 2021-07-15 RX ADMIN — HYDROCORTISONE 20 MG: 10 TABLET ORAL at 08:27

## 2021-07-15 RX ADMIN — SODIUM CHLORIDE, PRESERVATIVE FREE 10 ML: 5 INJECTION INTRAVENOUS at 21:19

## 2021-07-15 RX ADMIN — LEVOFLOXACIN 750 MG: 5 INJECTION, SOLUTION INTRAVENOUS at 12:29

## 2021-07-15 RX ADMIN — FOLIC ACID 1 MG: 1 TABLET ORAL at 08:27

## 2021-07-15 RX ADMIN — MORPHINE SULFATE 60 MG: 30 TABLET, FILM COATED, EXTENDED RELEASE ORAL at 14:51

## 2021-07-15 RX ADMIN — DOCUSATE SODIUM 100 MG: 100 CAPSULE ORAL at 21:17

## 2021-07-15 RX ADMIN — POLYETHYLENE GLYCOL 3350 17 G: 17 POWDER, FOR SOLUTION ORAL at 08:27

## 2021-07-15 RX ADMIN — HYDROXYUREA 1000 MG: 500 CAPSULE ORAL at 08:27

## 2021-07-15 NOTE — PROGRESS NOTES
[facility] Medicine Services  INPATIENT PROGRESS NOTE    Patient Name: Holland Phelps  Date of Admission: 7/9/2021  Today's Date: 07/15/21  Length of Stay: 6  Primary Care Physician: Mallory Mccarthy MD    Subjective   Chief Complaint: Follow-up sickle cell crisis    HPI:   Patient states he is starting to feel little better.  He has been up ambulating more.  He is down to 2 L O2.  Pain persists but is improving and controlled.  Tolerating p.o.  No nausea or vomiting.  Positive BM yesterday x2.    ROS:  All pertinent negatives and positives are as above. All other systems have been reviewed and are negative unless otherwise stated.     Objective    Temp:  [98.4 °F (36.9 °C)-99.3 °F (37.4 °C)] 99.3 °F (37.4 °C)  Heart Rate:  [] 66  Resp:  [16-18] 16  BP: (106-136)/(57-76) 134/72  Physical Exam  GEN: Awake, alert, interactive, in NAD  HEENT:  PERRLA, EOMI, Anicteric, Trachea midline  Lungs: diminished, no wheezing or rales  Heart: RRR, +S1/s2, no rub  ABD: soft, nt/nd, +BS, no guarding/rebound  Extremities: atraumatic, no cyanosis, no edema  Skin: no rashes or lesions  Neuro: AAOx3, no focal deficits  Psych: normal mood & affect        Results Review:  I have reviewed the labs, radiology results, and diagnostic studies.    Laboratory Data:   Results from last 7 days   Lab Units 07/15/21  0516 07/14/21  0553 07/13/21  0516   WBC 10*3/mm3 10.86* 12.06* 13.22*   HEMOGLOBIN g/dL 7.2* 7.1* 7.7*   HEMATOCRIT % 21.5* 21.2* 23.0*   PLATELETS 10*3/mm3 280 249 252        Results from last 7 days   Lab Units 07/15/21  0516 07/14/21  0553 07/13/21  0516 07/12/21  0420 07/11/21  0203   SODIUM mmol/L 143 142 140  --  140   POTASSIUM mmol/L 3.8 4.0 4.1  --  3.7   CHLORIDE mmol/L 106 105 103  --  105   CO2 mmol/L 29.0 27.0 29.0  --  27.0   BUN mg/dL 14 20 17  --  8   CREATININE mg/dL 0.91 1.12 1.34*  --  0.75*   CALCIUM mg/dL 9.1 9.1 9.2  --  8.9   BILIRUBIN mg/dL  --   --  1.2 1.3* 2.3*    ALK PHOS U/L  --   --  83 82 83   ALT (SGPT) U/L  --   --  6 7 8   AST (SGOT) U/L  --   --  11 13 24   GLUCOSE mg/dL 100* 102* 98  --  107*       Culture Data:   Blood Culture   Date Value Ref Range Status   07/09/2021 No growth at 2 days  Preliminary   07/09/2021 No growth at 2 days  Preliminary       Radiology Data:   Imaging Results (Last 24 Hours)     ** No results found for the last 24 hours. **          I have reviewed the patient's current medications.     Assessment/Plan     Active Hospital Problems    Diagnosis    • **Sickle cell crisis (CMS/HCC)    • Anemia of chronic disease    • Acute pain    • Hyperbilirubinemia    • Hypoxia    • Single kidney, left    • Renal cell carcinoma of right kidney metastatic to other site (CMS/HCC)        #1 sickle cell crisis -off PCA pump and pain is stable.  Feeding dosing decreased yesterday.  On Levaquin for likely pneumonia based on prior CT which was negative for PE.  Was transfused 1 unit of blood earlier in the stay.  Started on hydroxyurea.  Defer transfusions to hematology.  Appreciate input.    #2 acute respiratory failure hypoxia -as above potential left lower lobe pneumonia on CTA.  CT was negative for PE.  Incentive spirometry.  Ambulate patient.  Levaquin.  Wean O2 as able.  Down from 5 L to 2 L today.  2d echo with preserved EF and no significant pericardial effusion. WBC trending down.     #3 anemia of chronic disease -again has sickle cell.  No signs of active external bleeding.  Monitor.     #4  KIARRA - creatinine improving with IV fluid.  Taking good p.o.  Will likely stop IVF soon and monitor off.    #5 history of renal cell carcinoma/solitary kidney -is post right nephrectomy. Follows at [place] for his care.    Discharge Planning: Ongoing. Will f/u heme/onc recs, appreciate input.  Hopefully will be off oxygen and improved enough for discharge in the next 24 to 48 hours.    Electronically signed by Chuy Patel DO, 07/15/21, 10:11 CDT.

## 2021-07-15 NOTE — PLAN OF CARE
Problem: Adult Inpatient Plan of Care  Goal: Plan of Care Review  Outcome: Ongoing, Progressing  Flowsheets (Taken 7/15/2021 0331)  Progress: improving  Plan of Care Reviewed With: patient  Outcome Summary: Pt c/o minimal pain, given scheduled pain medication with relief. Normal sinus 78-97 on tele. 4L O2 nasal cannula when awake, home cpap when asleep. VSS. Safety maintained.

## 2021-07-15 NOTE — PLAN OF CARE
Goal Outcome Evaluation:  Plan of Care Reviewed With: patient        Progress: improving  Outcome Summary: VSS. Pt c/o minimal pain today, medicated with scheduled Morphine PO. Able to wean oxygen down to 1L NC this afternoon. No SOA per patient. IV Levaquin continues for possible PNA. IV fluids continue, good intake PO. Cont to monitor overnight. Possible discharge home soon.

## 2021-07-16 VITALS
HEART RATE: 76 BPM | TEMPERATURE: 98.6 F | DIASTOLIC BLOOD PRESSURE: 56 MMHG | WEIGHT: 201.4 LBS | OXYGEN SATURATION: 93 % | HEIGHT: 71 IN | SYSTOLIC BLOOD PRESSURE: 120 MMHG | BODY MASS INDEX: 28.19 KG/M2 | RESPIRATION RATE: 18 BRPM

## 2021-07-16 LAB
ABO GROUP BLD: NORMAL
ANION GAP SERPL CALCULATED.3IONS-SCNC: 11 MMOL/L (ref 5–15)
BLD GP AB SCN SERPL QL: NEGATIVE
BUN SERPL-MCNC: 14 MG/DL (ref 6–20)
BUN/CREAT SERPL: 15.7 (ref 7–25)
CALCIUM SPEC-SCNC: 9.1 MG/DL (ref 8.6–10.5)
CHLORIDE SERPL-SCNC: 104 MMOL/L (ref 98–107)
CO2 SERPL-SCNC: 29 MMOL/L (ref 22–29)
CREAT SERPL-MCNC: 0.89 MG/DL (ref 0.76–1.27)
DEPRECATED RDW RBC AUTO: 56.9 FL (ref 37–54)
ERYTHROCYTE [DISTWIDTH] IN BLOOD BY AUTOMATED COUNT: 22.9 % (ref 12.3–15.4)
GFR SERPL CREATININE-BSD FRML MDRD: 109 ML/MIN/1.73
GLUCOSE SERPL-MCNC: 94 MG/DL (ref 65–99)
HCT VFR BLD AUTO: 21.2 % (ref 37.5–51)
HGB BLD-MCNC: 7 G/DL (ref 13–17.7)
MCH RBC QN AUTO: 23.4 PG (ref 26.6–33)
MCHC RBC AUTO-ENTMCNC: 33 G/DL (ref 31.5–35.7)
MCV RBC AUTO: 70.9 FL (ref 79–97)
PLATELET # BLD AUTO: 317 10*3/MM3 (ref 140–450)
PMV BLD AUTO: 10 FL (ref 6–12)
POTASSIUM SERPL-SCNC: 4.1 MMOL/L (ref 3.5–5.2)
RBC # BLD AUTO: 2.99 10*6/MM3 (ref 4.14–5.8)
RH BLD: POSITIVE
SODIUM SERPL-SCNC: 144 MMOL/L (ref 136–145)
T&S EXPIRATION DATE: NORMAL
WBC # BLD AUTO: 10.69 10*3/MM3 (ref 3.4–10.8)

## 2021-07-16 PROCEDURE — 36430 TRANSFUSION BLD/BLD COMPNT: CPT

## 2021-07-16 PROCEDURE — 86900 BLOOD TYPING SEROLOGIC ABO: CPT | Performed by: INTERNAL MEDICINE

## 2021-07-16 PROCEDURE — P9016 RBC LEUKOCYTES REDUCED: HCPCS

## 2021-07-16 PROCEDURE — 80048 BASIC METABOLIC PNL TOTAL CA: CPT | Performed by: INTERNAL MEDICINE

## 2021-07-16 PROCEDURE — 86850 RBC ANTIBODY SCREEN: CPT | Performed by: INTERNAL MEDICINE

## 2021-07-16 PROCEDURE — 25010000002 LEVOFLOXACIN PER 250 MG: Performed by: INTERNAL MEDICINE

## 2021-07-16 PROCEDURE — 86923 COMPATIBILITY TEST ELECTRIC: CPT

## 2021-07-16 PROCEDURE — 86901 BLOOD TYPING SEROLOGIC RH(D): CPT | Performed by: INTERNAL MEDICINE

## 2021-07-16 PROCEDURE — 85027 COMPLETE CBC AUTOMATED: CPT | Performed by: INTERNAL MEDICINE

## 2021-07-16 PROCEDURE — 86900 BLOOD TYPING SEROLOGIC ABO: CPT

## 2021-07-16 RX ORDER — HYDROCODONE BITARTRATE AND ACETAMINOPHEN 10; 325 MG/1; MG/1
1 TABLET ORAL EVERY 6 HOURS PRN
Status: ON HOLD
Start: 2021-07-16 | End: 2021-11-05

## 2021-07-16 RX ORDER — LEVOFLOXACIN 750 MG/1
750 TABLET ORAL DAILY
Qty: 5 TABLET | Refills: 0 | Status: ON HOLD | OUTPATIENT
Start: 2021-07-17 | End: 2021-11-04

## 2021-07-16 RX ORDER — HYDROXYUREA 500 MG/1
1000 CAPSULE ORAL DAILY
Qty: 28 CAPSULE | Refills: 0 | Status: ON HOLD | OUTPATIENT
Start: 2021-07-17 | End: 2021-11-04

## 2021-07-16 RX ORDER — IBUPROFEN 200 MG
600 TABLET ORAL EVERY 6 HOURS PRN
Status: ON HOLD
Start: 2021-07-16 | End: 2021-11-04

## 2021-07-16 RX ADMIN — LEVOFLOXACIN 750 MG: 5 INJECTION, SOLUTION INTRAVENOUS at 12:44

## 2021-07-16 RX ADMIN — HYDROXYUREA 1000 MG: 500 CAPSULE ORAL at 08:44

## 2021-07-16 RX ADMIN — LINACLOTIDE 145 MCG: 145 CAPSULE, GELATIN COATED ORAL at 08:42

## 2021-07-16 RX ADMIN — MORPHINE SULFATE 60 MG: 30 TABLET, FILM COATED, EXTENDED RELEASE ORAL at 14:44

## 2021-07-16 RX ADMIN — ESCITALOPRAM 20 MG: 10 TABLET, FILM COATED ORAL at 08:42

## 2021-07-16 RX ADMIN — HYDROCORTISONE 20 MG: 10 TABLET ORAL at 08:42

## 2021-07-16 RX ADMIN — DOCUSATE SODIUM 100 MG: 100 CAPSULE ORAL at 08:42

## 2021-07-16 RX ADMIN — FOLIC ACID 1 MG: 1 TABLET ORAL at 08:42

## 2021-07-16 RX ADMIN — MORPHINE SULFATE 60 MG: 30 TABLET, FILM COATED, EXTENDED RELEASE ORAL at 05:58

## 2021-07-16 RX ADMIN — NIFEDIPINE 90 MG: 90 TABLET, EXTENDED RELEASE ORAL at 08:44

## 2021-07-16 RX ADMIN — LACTULOSE 20 G: 20 SOLUTION ORAL at 09:37

## 2021-07-16 RX ADMIN — POLYETHYLENE GLYCOL 3350 17 G: 17 POWDER, FOR SOLUTION ORAL at 08:43

## 2021-07-16 NOTE — NURSING NOTE
The monitor room sent me an alert that the patient's HR was 49, I immediately ran to the room and woke the patient up. Upon awaking the patient's HR came back up to the 60's. I checked his pulses and listened to his apical pulse. His rhythm sounded normal and his pulses were normal.. The patient said he feels okay and denies any chest pain. He said it drops in his sleep sometimes.

## 2021-07-16 NOTE — PLAN OF CARE
Goal Outcome Evaluation:  Plan of Care Reviewed With: patient        Progress: improving  Outcome Summary: Pt c/o minimal pain and refused scheduled PO morphine before bed, but took the scheduled 0600 dose this AM. Pt is still on oxygen and slept with bipap on, took off this morning and was put back on oxygen which is set at 2L. Pt is receiving 0.45% NS @ 100mL/hr. The patient's heart rate dropped to 49 during his sleep, pt was awakened and okay. HR came back up to the 60's once awake. Will continue to monitor.

## 2021-07-16 NOTE — DISCHARGE SUMMARY
[facility] Medicine Services  DISCHARGE SUMMARY       Date of Admission: 7/9/2021  Date of Discharge:  7/16/2021  Primary Care Physician: Mallory Mccarthy MD    Presenting Problem/History of Present Illness:  Sickle cell crisis (CMS/HCC) [D57.00]     Final Discharge Diagnoses:  Active Hospital Problems    Diagnosis    • **Sickle cell crisis (CMS/HCC)    • Anemia of chronic disease    • Acute pain    • Hyperbilirubinemia    • Hypoxia    • Single kidney, left    • Renal cell carcinoma of right kidney metastatic to other site (CMS/HCC)        Consults:   #1 Dr. Goldberg, hematology    Procedures Performed: none    Pertinent Test Results:   Procedure Component Value Units Date/Time   CT Angiogram Chest [[id number]] Med as Reviewed   Order Status: Completed Collected: 07/09/21 2110    Updated: 07/09/21 2118   Narrative:     EXAMINATION: CT ANGIOGRAM CHEST- 7/9/2021 9:10 PM CDT       HISTORY: Chest and back pain, shortness of breath, history of renal   cancer with bone metastases       COMPARISON: None       DOSE: 342 mGy-cm       TECHNIQUE: Sequential imaging was performed from the thoracic inlet   through the upper abdomen following the administration of IV contrast.   Sagittal and coronal reformations were made from the original source   data and reviewed. Additionally, 3-D MIPS reconstructions of the vessels   were made per CTA protocol. Automated exposure control was also utilized   to decrease patient radiation dose.       FINDINGS:   Thyroid gland is unremarkable. Trachea and main bronchi appear widely   patent and in normal anatomic position. The esophagus is grossly normal   in appearance.       No pathologically enlarged axillary lymph nodes are identified. No   mediastinal or hilar lymphadenopathy is appreciated. Small areas of   nodularity are seen throughout the anterior mediastinum without   pathologic enlargement, etiology unclear.       The heart is enlarged. There is  trace pericardial fluid. The ascending   thoracic aorta appears normal in caliber. Main pulmonary artery appears   normal in caliber. There is poor opacification of the pulmonary   arteries, which limits evaluation. No large central or proximal   segmental filling defects are identified.       There is mild diffuse bronchial wall thickening. There is some   interlobular septal thickening at the lung bases. There are dependent   changes in the lung bases. No focal pulmonary nodules are identified.       Review of the visualized portion of the upper abdomen demonstrates   evidence of prior right nephrectomy. There is contrast in the left renal   collecting system.       Review of the visualized osseous structures demonstrates somewhat of a   heterogeneous appearance of the sternum without a discrete lesion   identified. The ribs also appear somewhat heterogeneous bilaterally.       Impression:     1. Poor evaluation of the pulmonary arteries due to contrast bolus   timing. Allowing for this, no PE is identified.       2. Cardiomegaly with trace pericardial fluid.   3. Mild bronchial wall thickening. Some degree of chronic interstitial   change not excluded.   4. No pathologically enlarged lymph nodes identified, though several   tiny nodules are seen in the anterior mediastinum, possibly reflecting   lymph nodes.               This report was finalized on 07/09/2021 21:15 by Dr. Macario Ramos MD.   CT Thoracic Spine Without Contrast [[id number]] Med as Reviewed   Order Status: Completed Collected: 07/09/21 2106    Updated: 07/09/21 2113   Narrative:     EXAMINATION: CT THORACIC SPINE WO CONTRAST- 7/9/2021 9:06 PM CDT       HISTORY: Chest and back pain, history of renal cell carcinoma with bone   metastases       COMPARISON: None       DOSE: 758 mGy-cm       TECHNIQUE: Sequential imaging was performed through the thoracic spine   without the use of IV contrast.  Sagittal and coronal reformations were   made from the  original source data and reviewed. Automated exposure   control was also utilized to decrease patient radiation dose.       FINDINGS:   Alignment of the thoracic spine appears normal. Vertebral body heights   appear well maintained. There is no evidence of perched facet. The   spinous processes appear intact. Endplate degenerative spurring is   evident. A few tiny foci of sclerosis are evident. No lytic lesions are   identified. There is no evidence of acute fracture. There is no evidence   of critical spinal canal stenosis. No severe neuroforaminal narrowing is   identified.       Review of the visualized paraspinal soft tissues demonstrates no acute   abnormalities.           Impression:     1. No acute findings in the thoracic spine.   2. No metastatic disease identified.       This report was finalized on 07/09/2021 21:10 by Dr. Macario Ramos MD.   XR Chest 1 View [[id number]] Med as Reviewed   Order Status: Completed Collected: 07/09/21 2011    Updated: 07/09/21 2015   Narrative:     EXAMINATION: XR CHEST 1 VW- 7/9/2021 8:11 PM CDT       HISTORY: Shortness of breath       COMPARISON: None       FINDINGS:   There are patchy groundglass airspace opacities in the left lung base.   There is hypoaeration. Heart is magnified but felt to be mildly   enlarged. Mediastinal contours otherwise appear normal. There is no   appreciable pneumothorax or pleural effusion. There are surgical clips   in the right upper quadrant of the abdomen.       Impression:     Airspace opacities in the left lung base concerning for   inflammation or early infection.   This report was finalized on 07/09/2021 20:12 by Dr. Macario Ramos MD.         Results for orders placed during the hospital encounter of 07/09/21    Adult Transthoracic Echo Complete W/ Cont if Necessary Per Protocol    Interpretation Summary  · Left ventricular systolic function is normal. Left ventricular ejection fraction appears to be 61 - 65%.  · Left ventricular wall  "thickness is consistent with mild concentric hypertrophy.  · Normal right ventricular cavity size and systolic function noted.  · There is a small (<1cm) posterior pericardial effusion.      Chief Complaint on Day of Discharge: f/u sickle crisis    History of Present Illness on Day of Discharge:   Patient feels better today.  Feels like he is doing well not to go home.  Denies overt pain currently.  No nausea or vomiting.  Tolerating p.o.  Breathing improved.    Hospital Course:  The patient is a 50 y.o. male with a history of sickle cell disease and renal cell carcinoma post nephrectomy. Follows with hematology/oncology at [place]. Gets about 2 sickle cell attacks a year. Normally gets cared for there. Was recently seen at [place] with his hematologist and was on the way home from office visit when he started having pain and stopped in [facility]. Concern at that time that he was in a sickle cell crisis. He was then shipped here to [place] and admitted to the hospital. CTA negative for PE but evidence for pneumonia. He was started on Levaquin. He was given a blood transfusion. Hematology was consulted here and started him back on Hydrea. They recommend continuing this along with folic acid at discharge. He is receiving another unit of blood prior to discharge to be hemoglobin of 7. He however feels well and his breathing has improved. He feels back to baseline. He can continue his previous pain regimen from [place] at discharge. Follow-up closely with hematology back to [place] in the next 1 to 2 weeks.    Condition on Discharge: Stable/improved    Physical Exam on Discharge:  /62 (BP Location: Left arm, Patient Position: Lying)   Pulse 56   Temp 97.9 °F (36.6 °C) (Oral)   Resp 16   Ht 180.3 cm (70.98\")   Wt 91.4 kg (201 lb 6.4 oz)   SpO2 93%   BMI 28.10 kg/m²   Physical Exam  GEN: Awake, alert, interactive, in NAD  HEENT:  PERRLA, EOMI, Anicteric, Trachea midline  Lungs: diminished, " no wheezing or rales  Heart: RRR, +S1/s2, no rub  ABD: soft, nt/nd, +BS, no guarding/rebound  Extremities: atraumatic, no cyanosis, no edema  Skin: no rashes or lesions  Neuro: AAOx3, no focal deficits  Psych: normal mood & affect    Discharge Disposition:  Home or Self Care    Discharge Medications:     Discharge Medications      New Medications      Instructions Start Date   HYDROcodone-acetaminophen  MG per tablet  Commonly known as: NORCO   1 tablet, Oral, Every 6 Hours PRN      hydroxyurea 500 MG capsule  Commonly known as: HYDREA   1,000 mg, Oral, Daily   Start Date: July 17, 2021     ibuprofen 200 MG tablet  Commonly known as: Advil   600 mg, Oral, Every 6 Hours PRN      levoFLOXacin 750 MG tablet  Commonly known as: Levaquin   750 mg, Oral, Daily   Start Date: July 17, 2021        Continue These Medications      Instructions Start Date   escitalopram 20 MG tablet  Commonly known as: LEXAPRO   20 mg, Oral, Every Evening      folic acid 1 MG tablet  Commonly known as: FOLVITE   1 mg, Oral, Daily      hydrocortisone 10 MG tablet  Commonly known as: CORTEF   20 mg, Daily      hydrocortisone 10 MG tablet  Commonly known as: CORTEF   10 mg, Oral, Every Night at Bedtime      lactulose 10 GM/15ML solution  Commonly known as: CHRONULAC   20 g, Oral, 2 Times Daily PRN      NIFEdipine XL 90 MG 24 hr tablet  Commonly known as: PROCARDIA XL   90 mg, Oral, Daily             Discharge Diet:    Dietary Orders (From admission, onward)     Start     Ordered    07/09/21 2323  Diet Regular  Diet Effective Now     Question:  Diet Texture / Consistency  Answer:  Regular    07/09/21 2322                  Activity at Discharge:    Baseline as tolerated    Discharge Care Plan/Instructions:   Follow up with your pcp. Follow up with you hematologist at [place].     Follow-up Appointments:   No future appointments.    Test Results Pending at Discharge: none    Electronically signed by Chuy Patel DO, 07/16/21, 12:43  CDT.    Time: 36 minutes

## 2021-07-16 NOTE — PLAN OF CARE
Goal Outcome Evaluation:  Plan of Care Reviewed With: (P) patient, spouse        Progress: (P) improving  Outcome Summary: (P) Ntn consult per LOS protocol. pt reports improved PO intake and appetite. Will follow per POC.

## 2021-07-17 ENCOUNTER — READMISSION MANAGEMENT (OUTPATIENT)
Dept: CALL CENTER | Facility: HOSPITAL | Age: 51
End: 2021-07-17

## 2021-07-17 LAB
BH BB BLOOD EXPIRATION DATE: NORMAL
BH BB BLOOD TYPE BARCODE: 6200
BH BB DISPENSE STATUS: NORMAL
BH BB PRODUCT CODE: NORMAL
BH BB UNIT NUMBER: NORMAL
CROSSMATCH INTERPRETATION: NORMAL
UNIT  ABO: NORMAL
UNIT  RH: NORMAL

## 2021-07-17 NOTE — OUTREACH NOTE
Prep Survey      Responses   [facility] facility patient discharged from?  [facility]   Is LACE score < 7 ?  No   Emergency Room discharge w/ pulse ox?  No   Eligibility  Readm Mgmt   Discharge diagnosis  sickle cell crisis   Does the patient have one of the following disease processes/diagnoses(primary or secondary)?  Other   Does the patient have Home health ordered?  No   Is there a DME ordered?  No   Comments regarding appointments  call for apmts   Prep survey completed?  Yes          Vernell Spence RN

## 2021-07-21 ENCOUNTER — READMISSION MANAGEMENT (OUTPATIENT)
Dept: CALL CENTER | Facility: HOSPITAL | Age: 51
End: 2021-07-21

## 2021-07-21 NOTE — OUTREACH NOTE
Medical Week 1 Survey      Responses   [facility] patient discharged from?  [place]   Does the patient have one of the following disease processes/diagnoses(primary or secondary)?  Other   Week 1 attempt successful?  Yes   Call start time  1519   Call end time  1520   Discharge diagnosis  sickle cell crisis   Is patient permission given to speak with other caregiver?  Yes   List who call center can speak with  spouse- Primo   Person spoke with today (if not patient) and relationship  spouse- Primo   Meds reviewed with patient/caregiver?  Yes   Is the patient having any side effects they believe may be caused by any medication additions or changes?  No   Does the patient have all medications ordered at discharge?  Yes   Is the patient taking all medications as directed (includes completed medication regime)?  Yes   Does the patient have a primary care provider?   Yes   Does the patient have an appointment with their PCP within 7 days of discharge?  Greater than 7 days   Comments regarding PCP  appt with PCP on 7/26   What is preventing the patient from scheduling follow up appointments within 7 days of discharge?  Earlier appointment not available   Nursing Interventions  Verified appointment date/time/provider   Has the patient kept scheduled appointments due by today?  N/A   Has home health visited the patient within 72 hours of discharge?  N/A   Did the patient receive a copy of their discharge instructions?  Yes   Nursing interventions  Reviewed instructions with patient [with spouse]   What is the patient's perception of their health status since discharge?  Improving   Is the patient/caregiver able to teach back the hierarchy of who to call/visit for symptoms/problems? PCP, Specialist, Home health nurse, Urgent Care, ED, 911  Yes   Week 1 call completed?  Yes   Wrap up additional comments  Per spouse, patient is doing great, no questions or concerns at this time.          Marisa Pool RN

## 2021-07-29 ENCOUNTER — READMISSION MANAGEMENT (OUTPATIENT)
Dept: CALL CENTER | Facility: HOSPITAL | Age: 51
End: 2021-07-29

## 2021-07-29 NOTE — OUTREACH NOTE
Medical Week 2 Survey      Responses   [facility] patient discharged from?  [place]   Does the patient have one of the following disease processes/diagnoses(primary or secondary)?  Other   Week 2 attempt successful?  No   Unsuccessful attempts  Attempt 1          Brianna Leon RN

## 2021-08-04 ENCOUNTER — READMISSION MANAGEMENT (OUTPATIENT)
Dept: CALL CENTER | Facility: HOSPITAL | Age: 51
End: 2021-08-04

## 2021-08-04 NOTE — OUTREACH NOTE
Medical Week 2 Survey      Responses   [facility] patient discharged from?  [place]   Does the patient have one of the following disease processes/diagnoses(primary or secondary)?  Other   Week 2 attempt successful?  No   Unsuccessful attempts  Attempt 2          Maykel Mclain RN

## 2021-08-12 ENCOUNTER — READMISSION MANAGEMENT (OUTPATIENT)
Dept: CALL CENTER | Facility: HOSPITAL | Age: 51
End: 2021-08-12

## 2021-08-12 NOTE — OUTREACH NOTE
Medical Week 3 Survey      Responses   [facility] patient discharged from?  [place]   Does the patient have one of the following disease processes/diagnoses(primary or secondary)?  Other   Week 3 attempt successful?  No   Unsuccessful attempts  Attempt 1          Brianna Leon RN

## 2021-10-14 ENCOUNTER — TRANSCRIBE ORDERS (OUTPATIENT)
Dept: PHYSICAL THERAPY | Facility: CLINIC | Age: 51
End: 2021-10-14

## 2021-10-14 DIAGNOSIS — I89.0 LYMPHEDEMA: Primary | ICD-10-CM

## 2021-10-22 ENCOUNTER — TREATMENT (OUTPATIENT)
Dept: PHYSICAL THERAPY | Facility: CLINIC | Age: 51
End: 2021-10-22

## 2021-10-22 DIAGNOSIS — I89.0 LYMPHEDEMA OF LEFT LEG: Primary | ICD-10-CM

## 2021-10-22 DIAGNOSIS — C64.1 RENAL CELL CARCINOMA OF RIGHT KIDNEY (HCC): ICD-10-CM

## 2021-10-22 DIAGNOSIS — I89.0 LYMPHEDEMA OF GENITALIA: ICD-10-CM

## 2021-10-22 PROCEDURE — 97161 PT EVAL LOW COMPLEX 20 MIN: CPT | Performed by: PHYSICAL THERAPIST

## 2021-10-22 NOTE — PROGRESS NOTES
Physical Therapy Initial Evaluation and Plan of Care    Patient: Holland Phelps             : 1970  Today's Date: 10/22/2021  Referring practitioner: Sharon Cheney,*  Date of Initial Visit: 10/22/2021  Patient seen for 46 sessions    Visit Diagnoses:    ICD-10-CM ICD-9-CM   1. Lymphedema of left leg  I89.0 457.1   2. Lymphedema of genitalia  I89.0 457.1   3. 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 yes  -HR      Pre-Treatment Pain Level 3  -HR              Subjective Comments    Subjective Comments He has noticed the leg seems to be getting more swollen. He saw MD at [facility] who referred him back for lymphedema therapy again.  -HR              Lymphedema Assessment    Lymphedema Classification LLE:; secondary; stage 2 (Spontaneously Irreversible)  -HR      Stage of Cancer Stage IV  -HR      Cancer Comments metastatic renal  -HR      Chemo Received yes  -HR      Radiation Therapy Received yes  -HR      Infections or Cellulitis? yes  -HR      Infection/Cellulitis Treatment not recently  -HR              Physical Concerns    The amount of pain associated with my lymphedema is: 3  -HR      The amount of limb heaviness associated with my lymphedema is: 3  -HR      The amount of skin tightness associated with my lymphedema is: 3  -HR      The size of my swollen limb(s) seems: 2  -HR      Lymphedema affects the movement of my swollen limb(s): 2  -HR      The strength in my swollen limb(s) is: 2  -HR    "           Psychosocial Concerns    Lymphedema affects my body image (i.e., \"how I think I look\"). 1  -HR      Lymphedema affects my socializing with others. 1  -HR      Lymphedema affects my intimate relations with spouse or partner (rate 0 if not applicable 3  -HR      Lymphedema \"gets me down\" (i.e., depression, frustration, or anger) 2  -HR      I must rely on others for help due to my lymphedema. 1  -HR      I know what to do to manage my lymphedema 2  -HR              Functional Concerns    Lymphedema affects my ability to perform self-care activities (i.e. eating, dressing, hygiene) 2  -HR      Lymphedema affects my ability to perform routine home or work-related activities. 2  -HR      Lymphedema affects my performance of preferred leisure activities. 2  -HR      Lymphedema affects proper fit of clothing/shoes 2  -HR      Lymphedema affects my sleep 2  -HR              Left Lower Ext    LT Lower Extremity Comments No abnormal restrictions in hip motion currently. Tightness at knee and ankle. Knee has not had full motion since femur replacement.  -HR              Lymphedema Measurements    Measurement Type(s) Circumferential  -HR      Circumferential Areas Lower extremities  -HR              BUE Circumferential (cm)    Measurement Location 1 BOT  -HR      Left 1 22.5 cm  -HR      Right 1 22.5 cm  -HR      Measurement Location 2 +7  -HR      Left 2 25.6 cm  -HR      Right 2 23.8 cm  -HR      Measurement Location 3 +7  -HR      Left 3 28.9 cm  -HR      Right 3 23 cm  -HR      Measurement Location 4 +10  -HR      Left 4 29.4 cm  -HR      Right 4 24.3 cm  -HR      Measurement Location 5 +10  -HR      Left 5 36 cm  -HR      Right 5 31.6 cm  -HR      Measurement Location 6 +10  -HR      Left 6 41.3 cm  -HR      Right 6 36.7 cm  -HR      Measurement Location 7 +10  -HR      Left 7 39.4 cm  -HR      Right 7 37.5 cm  -HR      Measurement Location 8 +10  -HR      Left 8 44.8 cm  -HR      Right 8 39.5 cm  -HR      " Measurement Location 9 +10  -HR      Left 9 52.3 cm  -HR      Right 9 45.8 cm  -HR      Measurement Location 10 +10  -HR      Left 10 59 cm  -HR      Right 10 54 cm  -HR      LUE Circumferential Total 379.2 cm  -HR      RUE Circumferential Total 338.7 cm  -HR              Trunk Circumferential (cm)    Measurement Location 1 Umbilicus  -HR      Trunk 1 99 cm  -HR      Trunk Circumferential Total 99 cm  -HR              Lymphedema Life Impact Scale Totals    A.  Total Q1 - Q17 (Do not include Q18) 35  -HR      B.  Total number of questions answered (Q1-Q17) 17  -HR      C. Divide A by B 2.06  -HR      D. Multiple C by 25 51.5  -HR            User Key  (r) = Recorded By, (t) = Taken By, (c) = Cosigned By    Initials Name Provider Type    HR Debbie Morales, PT, DPT, CLT-LANNY Physical Therapist              Therapy Education/Self Care [id number]   Details: Reviewed need for pump use and compression   Given edema management   Progress: Reinforced   Education provided to:  Patient   Level of understanding Verbalized   Timed Minutes          Total Timed Treatment:        mins  Total Time of Visit:            45   mins    ASSESSMENT/PLAN     Goals                                          Progress Note due by 11/22/21                                                      Recert due by 2/20/2022   STG by: 3 weeks Comments Date Status   Patient will have a good basic understanding of lymphedema and its suggested risk reduction practices      Patient will be independent with remedial HEP for lymphedema      Patient will be independent with self MLD for lymphedema            LTG by: 6 weeks      Patient will have appropriate compression garments for lymphedema maintenance      Patient will have no sign or symptoms of infection      Patient will be independent with comprehensive maintenance program for lymphedema                          Assessment & Plan     Assessment  Impairments: abnormal or restricted ROM  Assessment  details:  would benefit from a course of CDT for his LLE lymphedema. He has done well in the past with treatment. However, over the past few months he has had increased swelling in the leg. He has had more energy with a new medication and has been more active, but has slacked off from the maintenance program he was doing with us. He should do well.   Barriers to therapy: none  Prognosis: good    Plan  Therapy options: will be seen for skilled physical therapy services  Planned therapy interventions: manual therapy, soft tissue mobilization, stretching, therapeutic activities, functional ROM exercises and compression  Frequency: 2x week  Duration in weeks: 6  Treatment plan discussed with: patient  Plan details: PT to see for lymphedema treatment of LLE.          PT SIGNATURE: Debbie Moarles, PT, DPT, CLT-LANNY     Initial Certification  Certification Period: 10/22/2021 through 1/20/2022  I certify that the therapy services are furnished while this patient is under my care.  The services outlined above are required by this patient, and will be reviewed every 90 days.     PHYSICIAN:   Sharon Cheney MD PhD__________________________________DATE: ________    Please sign and return via fax to [phone].   Thank you so much for letting us work with Gene. I appreciate your letting us work with your patients. If you have any questions or concerns, please don't hesitate to contact me.          [address] [address]  [phone]

## 2021-11-02 ENCOUNTER — TREATMENT (OUTPATIENT)
Dept: PHYSICAL THERAPY | Facility: CLINIC | Age: 51
End: 2021-11-02

## 2021-11-02 DIAGNOSIS — I89.0 LYMPHEDEMA OF GENITALIA: ICD-10-CM

## 2021-11-02 DIAGNOSIS — C64.1 RENAL CELL CARCINOMA OF RIGHT KIDNEY (HCC): ICD-10-CM

## 2021-11-02 DIAGNOSIS — I89.0 LYMPHEDEMA OF LEFT LEG: Primary | ICD-10-CM

## 2021-11-02 PROCEDURE — 97140 MANUAL THERAPY 1/> REGIONS: CPT | Performed by: PHYSICAL THERAPIST

## 2021-11-02 NOTE — PROGRESS NOTES
Physical Therapy Treatment Note    Patient: Holland Phelps                                                                     Visit Date: 2021  :     1970    Referring practitioner:    Sharon Cheney,*  Date of Initial Visit:          Type: THERAPY  Noted: 10/22/2021    Patient seen for 2 sessions    Visit Diagnoses:    ICD-10-CM ICD-9-CM   1. Lymphedema of left leg  I89.0 457.1   2. Lymphedema of genitalia  I89.0 457.1   3. Renal cell carcinoma of right kidney (HCC)  C64.1 189.0     SUBJECTIVE     Subjective :  His scans came back good, he is still not having to take any kind of chemo treatment.  He did have one major flair up his Sickle Cell since he has last seen us in 2021 when he had to be hospitalized.  He states the L leg is swelling again, he has not been working as much on taking care of his lymphedema.  He has started using the pump and wearing the compression again and can tell it is helping. He is still cleaning a few cars.  He is not having any genitale swelling now but has had some as well as swelling in the R leg.      PAIN: 0/10         OBJECTIVE     Objective    Lymphedema     Row Name 21 1025             Subjective Pain    Able to rate subjective pain? yes  -AL      Pre-Treatment Pain Level 0  -AL      Subjective Pain Comment No pain but numbness and stiffness in the L lower leg.  -AL              Manual Lymphatic Drainage    Manual Lymphatic Drainage initial sequence; opened regional lymph nodes; opened anastamoses; extremity treatment  -AL      Initial Sequence short neck; abdomen; diaphragmatic breathing  -AL      Abdomen superficial  -AL      Diaphragmatic Breathing X 9 with superficial abdominals  -AL      Opened Regional Lymph Nodes axillary; inguinal  -AL      Axillary right; left  -AL      Inguinal right; left  -AL      Opened Anastamoses inguino-axillary  -AL      Inguino-Axillary right; left   Supine and prone  -AL      Extremity Treatment MLD to full limb  -AL      MLD to Full Limb L LE  -AL      Manual Lymphatic Drainage Comments Stretched the L hip flexors in prone with one pillow under thigh, also gentle stretching to the L quads.  -AL      Manual Therapy 75  -AL              Compression/Skin Care    Compression/Skin Care compression garment  -AL      Skin Care --  -AL      Compression/Skin Care Comments Wear compression thigh high  -AL            User Key  (r) = Recorded By, (t) = Taken By, (c) = Cosigned By    Initials Name Provider Type    Elida Pickering, PTA, CLT-LANNY Physical Therapy Assistant                Therapy Education/Self Care [id number]   Details: Use pump and compression   Given edema management   Progress: Reinforced   Education provided to:  Patient   Level of understanding Verbalized   Timed Minutes              Total Timed Treatment:    75    mins  Total Time of Visit:            75   mins     ASSESSMENT/PLAN            Goals                                          Progress Note due by 11/22/21                                                      Recert due by 2/20/2022   STG by: 3 weeks Comments Date Status   Patient will have a good basic understanding of lymphedema and its suggested risk reduction practices  Educated during treatment 11/2/21 Ongoing   Patient will be independent with remedial HEP for lymphedema      New   Patient will be independent with self MLD for lymphedema He has started using the Flexitouch pump again 11/2/21 Ongoing             LTG by: 6 weeks         Patient will have appropriate compression garments for lymphedema maintenance  He needs to wear the compression thigh high on the L. 11/2/21 Ongoing   Patient will have no sign or symptoms of infection      New   Patient will be independent with comprehensive maintenance program for lymphedema      New                                        Assessment/Plan     ASSESSMENT: He has started using the Flexitouch  pump again and can tell his leg is not as swollen, I will take measurements of the L LE next treatment.  He does have areas of dense tissue in the hamstring area, anterior L upper leg, and the calf and ankle. He has tightness in the L hip flexors and quads, I will check the L hamstring tightness next treatment.  Patient will start wearing his compression thigh high for the L LE as well as continue using the Flexitouch pump.    PLAN: Will work on CDT    SIGNATURE: Elida Sequeira PTA, YOSILANNY, License #: [id number]  Electronically Signed on 11/2/2021        [address] [place] [address]  [phone]

## 2021-11-04 ENCOUNTER — APPOINTMENT (OUTPATIENT)
Dept: CT IMAGING | Facility: HOSPITAL | Age: 51
End: 2021-11-04

## 2021-11-04 ENCOUNTER — HOSPITAL ENCOUNTER (INPATIENT)
Facility: HOSPITAL | Age: 51
LOS: 2 days | Discharge: HOME | End: 2021-11-06
Attending: INTERNAL MEDICINE | Admitting: INTERNAL MEDICINE

## 2021-11-04 ENCOUNTER — APPOINTMENT (OUTPATIENT)
Dept: GENERAL RADIOLOGY | Facility: HOSPITAL | Age: 51
End: 2021-11-04

## 2021-11-04 DIAGNOSIS — R09.02 HYPOXIA: Primary | ICD-10-CM

## 2021-11-04 DIAGNOSIS — D57.1 SICKLE CELL DISEASE WITHOUT CRISIS (HCC): ICD-10-CM

## 2021-11-04 DIAGNOSIS — J18.9 PNEUMONIA OF BOTH LOWER LOBES DUE TO INFECTIOUS ORGANISM: ICD-10-CM

## 2021-11-04 LAB
A-A DO2: 7.8 MMHG
ALBUMIN SERPL-MCNC: 4.6 G/DL (ref 3.5–5.2)
ALBUMIN/GLOB SERPL: 1.5 G/DL
ALP SERPL-CCNC: 92 U/L (ref 39–117)
ALT SERPL W P-5'-P-CCNC: 20 U/L (ref 1–41)
ANION GAP SERPL CALCULATED.3IONS-SCNC: 9 MMOL/L (ref 5–15)
ANISOCYTOSIS BLD QL: ABNORMAL
ARTERIAL PATENCY WRIST A: ABNORMAL
AST SERPL-CCNC: 56 U/L (ref 1–40)
ATMOSPHERIC PRESS: 758 MMHG
BACTERIA UR QL AUTO: ABNORMAL /HPF
BASE EXCESS BLDA CALC-SCNC: 3.1 MMOL/L (ref 0–2)
BASO STIPL COARSE BLD QL SMEAR: ABNORMAL
BASOPHILS # BLD MANUAL: 0.16 10*3/MM3 (ref 0–0.2)
BASOPHILS NFR BLD AUTO: 1.1 % (ref 0–1.5)
BDY SITE: ABNORMAL
BILIRUB SERPL-MCNC: 3.8 MG/DL (ref 0–1.2)
BILIRUB UR QL STRIP: NEGATIVE
BODY TEMPERATURE: 37 C
BUN SERPL-MCNC: 14 MG/DL (ref 6–20)
BUN/CREAT SERPL: 15.7 (ref 7–25)
CALCIUM SPEC-SCNC: 8.9 MG/DL (ref 8.6–10.5)
CHLORIDE SERPL-SCNC: 109 MMOL/L (ref 98–107)
CLARITY UR: CLEAR
CO2 SERPL-SCNC: 29 MMOL/L (ref 22–29)
COHGB MFR BLD: 2.6 % (ref 0–5)
COLOR UR: YELLOW
CREAT SERPL-MCNC: 0.89 MG/DL (ref 0.76–1.27)
CRP SERPL-MCNC: 9.74 MG/DL (ref 0–0.5)
D-LACTATE SERPL-SCNC: 1.1 MMOL/L (ref 0.5–2)
DEPRECATED RDW RBC AUTO: 54.4 FL (ref 37–54)
ERYTHROCYTE [DISTWIDTH] IN BLOOD BY AUTOMATED COUNT: 21.2 % (ref 12.3–15.4)
FERRITIN SERPL-MCNC: 9647 NG/ML (ref 30–400)
GAS FLOW AIRWAY: 4 LPM
GFR SERPL CREATININE-BSD FRML MDRD: 108 ML/MIN/1.73
GIANT PLATELETS: ABNORMAL
GLOBULIN UR ELPH-MCNC: 3 GM/DL
GLUCOSE SERPL-MCNC: 118 MG/DL (ref 65–99)
GLUCOSE UR STRIP-MCNC: NEGATIVE MG/DL
HCO3 BLDA-SCNC: 29.9 MMOL/L (ref 20–26)
HCT VFR BLD AUTO: 25.3 % (ref 37.5–51)
HCT VFR BLD CALC: 27 % (ref 38–51)
HGB BLD-MCNC: 8.2 G/DL (ref 13–17.7)
HGB BLDA-MCNC: 8.8 G/DL (ref 14–18)
HGB UR QL STRIP.AUTO: ABNORMAL
HYALINE CASTS UR QL AUTO: ABNORMAL /LPF
HYPOCHROMIA BLD QL: ABNORMAL
KETONES UR QL STRIP: NEGATIVE
LDH SERPL-CCNC: 367 U/L (ref 135–225)
LEUKOCYTE ESTERASE UR QL STRIP.AUTO: NEGATIVE
LYMPHOCYTES # BLD MANUAL: 1.25 10*3/MM3 (ref 0.7–3.1)
LYMPHOCYTES NFR BLD MANUAL: 18.9 % (ref 5–12)
LYMPHOCYTES NFR BLD MANUAL: 8.4 % (ref 19.6–45.3)
Lab: ABNORMAL
MCH RBC QN AUTO: 23.6 PG (ref 26.6–33)
MCHC RBC AUTO-ENTMCNC: 32.4 G/DL (ref 31.5–35.7)
MCV RBC AUTO: 72.9 FL (ref 79–97)
METHGB BLD QL: 2.1 % (ref 0–3)
MICROCYTES BLD QL: ABNORMAL
MODALITY: ABNORMAL
MONOCYTES # BLD AUTO: 2.81 10*3/MM3 (ref 0.1–0.9)
MYELOCYTES NFR BLD MANUAL: 1.1 % (ref 0–0)
NEUTROPHILS # BLD AUTO: 10.48 10*3/MM3 (ref 1.7–7)
NEUTROPHILS NFR BLD MANUAL: 70.5 % (ref 42.7–76)
NITRITE UR QL STRIP: NEGATIVE
NOTE: ABNORMAL
NRBC SPEC MANUAL: 43.2 /100 WBC (ref 0–0.2)
OXYHGB MFR BLDV: 90.8 % (ref 94–99)
PCO2 BLDA: 57.3 MM HG (ref 35–45)
PCO2 TEMP ADJ BLD: 57.3 MM HG (ref 35–45)
PH BLDA: 7.33 PH UNITS (ref 7.35–7.45)
PH UR STRIP.AUTO: 6.5 [PH] (ref 5–8)
PH, TEMP CORRECTED: 7.33 PH UNITS (ref 7.35–7.45)
PLATELET # BLD AUTO: 301 10*3/MM3 (ref 140–450)
PMV BLD AUTO: 10.1 FL (ref 6–12)
PO2 BLDA: 76.8 MM HG (ref 83–108)
PO2 TEMP ADJ BLD: 76.8 MM HG (ref 83–108)
POLYCHROMASIA BLD QL SMEAR: ABNORMAL
POTASSIUM BLDA-SCNC: 3.9 MMOL/L (ref 3.5–5.2)
POTASSIUM SERPL-SCNC: 4.2 MMOL/L (ref 3.5–5.2)
PROT SERPL-MCNC: 7.6 G/DL (ref 6–8.5)
PROT UR QL STRIP: ABNORMAL
RBC # BLD AUTO: 3.47 10*6/MM3 (ref 4.14–5.8)
RBC # UR: ABNORMAL /HPF
REF LAB TEST METHOD: ABNORMAL
RETICS # AUTO: 0.1 10*6/MM3 (ref 0.02–0.13)
RETICS/RBC NFR AUTO: 2.87 % (ref 0.7–1.9)
SAO2 % BLDCOA: 95.3 % (ref 94–99)
SARS-COV-2 RNA PNL SPEC NAA+PROBE: NOT DETECTED
SODIUM BLDA-SCNC: 143 MMOL/L (ref 136–145)
SODIUM SERPL-SCNC: 147 MMOL/L (ref 136–145)
SP GR UR STRIP: 1.01 (ref 1–1.03)
SQUAMOUS #/AREA URNS HPF: ABNORMAL /HPF
TARGETS BLD QL SMEAR: ABNORMAL
TROPONIN T SERPL-MCNC: <0.01 NG/ML (ref 0–0.03)
UROBILINOGEN UR QL STRIP: ABNORMAL
VENTILATOR MODE: ABNORMAL
WBC # BLD AUTO: 14.87 10*3/MM3 (ref 3.4–10.8)
WBC MORPH BLD: NORMAL
WBC UR QL AUTO: ABNORMAL /HPF

## 2021-11-04 PROCEDURE — 85007 BL SMEAR W/DIFF WBC COUNT: CPT | Performed by: NURSE PRACTITIONER

## 2021-11-04 PROCEDURE — 25010000002 ENOXAPARIN PER 10 MG: Performed by: INTERNAL MEDICINE

## 2021-11-04 PROCEDURE — 85045 AUTOMATED RETICULOCYTE COUNT: CPT | Performed by: NURSE PRACTITIONER

## 2021-11-04 PROCEDURE — 73502 X-RAY EXAM HIP UNI 2-3 VIEWS: CPT

## 2021-11-04 PROCEDURE — 83615 LACTATE (LD) (LDH) ENZYME: CPT | Performed by: NURSE PRACTITIONER

## 2021-11-04 PROCEDURE — 87635 SARS-COV-2 COVID-19 AMP PRB: CPT | Performed by: NURSE PRACTITIONER

## 2021-11-04 PROCEDURE — 93010 ELECTROCARDIOGRAM REPORT: CPT | Performed by: INTERNAL MEDICINE

## 2021-11-04 PROCEDURE — 94799 UNLISTED PULMONARY SVC/PX: CPT

## 2021-11-04 PROCEDURE — 82375 ASSAY CARBOXYHB QUANT: CPT

## 2021-11-04 PROCEDURE — 25010000002 ONDANSETRON PER 1 MG: Performed by: NURSE PRACTITIONER

## 2021-11-04 PROCEDURE — 83605 ASSAY OF LACTIC ACID: CPT | Performed by: NURSE PRACTITIONER

## 2021-11-04 PROCEDURE — 85025 COMPLETE CBC W/AUTO DIFF WBC: CPT | Performed by: NURSE PRACTITIONER

## 2021-11-04 PROCEDURE — 99284 EMERGENCY DEPT VISIT MOD MDM: CPT

## 2021-11-04 PROCEDURE — 25010000002 METHYLPREDNISOLONE PER 125 MG: Performed by: NURSE PRACTITIONER

## 2021-11-04 PROCEDURE — 93005 ELECTROCARDIOGRAM TRACING: CPT | Performed by: INTERNAL MEDICINE

## 2021-11-04 PROCEDURE — 25010000002 CEFTRIAXONE PER 250 MG: Performed by: NURSE PRACTITIONER

## 2021-11-04 PROCEDURE — 81001 URINALYSIS AUTO W/SCOPE: CPT | Performed by: NURSE PRACTITIONER

## 2021-11-04 PROCEDURE — 94640 AIRWAY INHALATION TREATMENT: CPT

## 2021-11-04 PROCEDURE — 0 HYDROMORPHONE 1 MG/ML SOLUTION: Performed by: NURSE PRACTITIONER

## 2021-11-04 PROCEDURE — 25010000002 MORPHINE PER 10 MG: Performed by: NURSE PRACTITIONER

## 2021-11-04 PROCEDURE — 82728 ASSAY OF FERRITIN: CPT | Performed by: INTERNAL MEDICINE

## 2021-11-04 PROCEDURE — 71045 X-RAY EXAM CHEST 1 VIEW: CPT

## 2021-11-04 PROCEDURE — 71275 CT ANGIOGRAPHY CHEST: CPT

## 2021-11-04 PROCEDURE — 86140 C-REACTIVE PROTEIN: CPT | Performed by: NURSE PRACTITIONER

## 2021-11-04 PROCEDURE — 80053 COMPREHEN METABOLIC PANEL: CPT | Performed by: NURSE PRACTITIONER

## 2021-11-04 PROCEDURE — 83050 HGB METHEMOGLOBIN QUAN: CPT

## 2021-11-04 PROCEDURE — 84484 ASSAY OF TROPONIN QUANT: CPT | Performed by: INTERNAL MEDICINE

## 2021-11-04 PROCEDURE — 0 IOPAMIDOL PER 1 ML: Performed by: INTERNAL MEDICINE

## 2021-11-04 PROCEDURE — 99222 1ST HOSP IP/OBS MODERATE 55: CPT | Performed by: INTERNAL MEDICINE

## 2021-11-04 PROCEDURE — 87040 BLOOD CULTURE FOR BACTERIA: CPT | Performed by: NURSE PRACTITIONER

## 2021-11-04 PROCEDURE — 36600 WITHDRAWAL OF ARTERIAL BLOOD: CPT

## 2021-11-04 PROCEDURE — 82805 BLOOD GASES W/O2 SATURATION: CPT

## 2021-11-04 RX ORDER — METHYLPREDNISOLONE SODIUM SUCCINATE 125 MG/2ML
125 INJECTION, POWDER, LYOPHILIZED, FOR SOLUTION INTRAMUSCULAR; INTRAVENOUS ONCE
Status: COMPLETED | OUTPATIENT
Start: 2021-11-04 | End: 2021-11-04

## 2021-11-04 RX ORDER — ONDANSETRON 2 MG/ML
4 INJECTION INTRAMUSCULAR; INTRAVENOUS ONCE
Status: COMPLETED | OUTPATIENT
Start: 2021-11-04 | End: 2021-11-04

## 2021-11-04 RX ORDER — FOLIC ACID 1 MG/1
1 TABLET ORAL DAILY
Status: DISCONTINUED | OUTPATIENT
Start: 2021-11-05 | End: 2021-11-06 | Stop reason: HOSPADM

## 2021-11-04 RX ORDER — LACTULOSE 20 G/30ML
20 SOLUTION ORAL 2 TIMES DAILY PRN
Status: DISCONTINUED | OUTPATIENT
Start: 2021-11-04 | End: 2021-11-06 | Stop reason: HOSPADM

## 2021-11-04 RX ORDER — MORPHINE SULFATE 15 MG/1
30 TABLET ORAL EVERY 4 HOURS PRN
Status: DISCONTINUED | OUTPATIENT
Start: 2021-11-04 | End: 2021-11-04

## 2021-11-04 RX ORDER — IPRATROPIUM BROMIDE AND ALBUTEROL SULFATE 2.5; .5 MG/3ML; MG/3ML
3 SOLUTION RESPIRATORY (INHALATION) ONCE
Status: COMPLETED | OUTPATIENT
Start: 2021-11-04 | End: 2021-11-04

## 2021-11-04 RX ORDER — MORPHINE SULFATE 30 MG/1
30 TABLET ORAL EVERY 6 HOURS PRN
Status: ON HOLD | COMMUNITY
End: 2022-04-21

## 2021-11-04 RX ORDER — MORPHINE SULFATE 60 MG/1
120 TABLET, FILM COATED, EXTENDED RELEASE ORAL 2 TIMES DAILY
COMMUNITY
End: 2023-03-11

## 2021-11-04 RX ORDER — ACETAMINOPHEN 325 MG/1
650 TABLET ORAL EVERY 4 HOURS PRN
Status: DISCONTINUED | OUTPATIENT
Start: 2021-11-04 | End: 2021-11-06 | Stop reason: HOSPADM

## 2021-11-04 RX ORDER — MORPHINE SULFATE 30 MG/1
120 TABLET, FILM COATED, EXTENDED RELEASE ORAL 2 TIMES DAILY
Status: DISCONTINUED | OUTPATIENT
Start: 2021-11-04 | End: 2021-11-06 | Stop reason: HOSPADM

## 2021-11-04 RX ORDER — HYDROCORTISONE 10 MG/1
10 TABLET ORAL NIGHTLY
Status: DISCONTINUED | OUTPATIENT
Start: 2021-11-04 | End: 2021-11-06 | Stop reason: HOSPADM

## 2021-11-04 RX ORDER — ESCITALOPRAM OXALATE 10 MG/1
20 TABLET ORAL EVERY EVENING
Status: DISCONTINUED | OUTPATIENT
Start: 2021-11-05 | End: 2021-11-06 | Stop reason: HOSPADM

## 2021-11-04 RX ORDER — GLUTAMINE 5 G/1
10 POWDER, FOR SOLUTION ORAL 2 TIMES DAILY
COMMUNITY
End: 2023-03-11

## 2021-11-04 RX ORDER — HYDROCORTISONE 10 MG/1
20 TABLET ORAL DAILY
Status: DISCONTINUED | OUTPATIENT
Start: 2021-11-05 | End: 2021-11-06 | Stop reason: HOSPADM

## 2021-11-04 RX ORDER — SODIUM CHLORIDE 9 MG/ML
50 INJECTION, SOLUTION INTRAVENOUS CONTINUOUS
Status: DISCONTINUED | OUTPATIENT
Start: 2021-11-04 | End: 2021-11-05

## 2021-11-04 RX ORDER — SODIUM CHLORIDE 0.9 % (FLUSH) 0.9 %
10 SYRINGE (ML) INJECTION AS NEEDED
Status: DISCONTINUED | OUTPATIENT
Start: 2021-11-04 | End: 2021-11-06 | Stop reason: HOSPADM

## 2021-11-04 RX ORDER — ONDANSETRON 2 MG/ML
4 INJECTION INTRAMUSCULAR; INTRAVENOUS EVERY 6 HOURS PRN
Status: DISCONTINUED | OUTPATIENT
Start: 2021-11-04 | End: 2021-11-06 | Stop reason: HOSPADM

## 2021-11-04 RX ORDER — MORPHINE SULFATE 15 MG/1
30 TABLET ORAL EVERY 4 HOURS PRN
Status: DISCONTINUED | OUTPATIENT
Start: 2021-11-04 | End: 2021-11-06 | Stop reason: HOSPADM

## 2021-11-04 RX ORDER — MORPHINE SULFATE 30 MG/1
120 TABLET, FILM COATED, EXTENDED RELEASE ORAL 2 TIMES DAILY
Status: DISCONTINUED | OUTPATIENT
Start: 2021-11-04 | End: 2021-11-04

## 2021-11-04 RX ORDER — SODIUM CHLORIDE 0.9 % (FLUSH) 0.9 %
10 SYRINGE (ML) INJECTION EVERY 12 HOURS SCHEDULED
Status: DISCONTINUED | OUTPATIENT
Start: 2021-11-04 | End: 2021-11-06 | Stop reason: HOSPADM

## 2021-11-04 RX ADMIN — SODIUM CHLORIDE 1 G: 9 INJECTION, SOLUTION INTRAVENOUS at 14:44

## 2021-11-04 RX ADMIN — SODIUM CHLORIDE, POTASSIUM CHLORIDE, SODIUM LACTATE AND CALCIUM CHLORIDE 500 ML: 600; 310; 30; 20 INJECTION, SOLUTION INTRAVENOUS at 09:58

## 2021-11-04 RX ADMIN — IOPAMIDOL 100 ML: 755 INJECTION, SOLUTION INTRAVENOUS at 15:43

## 2021-11-04 RX ADMIN — MORPHINE SULFATE 30 MG: 15 TABLET ORAL at 18:16

## 2021-11-04 RX ADMIN — MORPHINE SULFATE 4 MG: 4 INJECTION, SOLUTION INTRAMUSCULAR; INTRAVENOUS at 12:20

## 2021-11-04 RX ADMIN — ONDANSETRON 4 MG: 2 INJECTION INTRAMUSCULAR; INTRAVENOUS at 12:20

## 2021-11-04 RX ADMIN — MORPHINE SULFATE 30 MG: 15 TABLET ORAL at 23:51

## 2021-11-04 RX ADMIN — HYDROCORTISONE 10 MG: 10 TABLET ORAL at 21:32

## 2021-11-04 RX ADMIN — Medication 10 ML: at 21:33

## 2021-11-04 RX ADMIN — MORPHINE SULFATE 120 MG: 30 TABLET, FILM COATED, EXTENDED RELEASE ORAL at 21:54

## 2021-11-04 RX ADMIN — SODIUM CHLORIDE 500 ML: 9 INJECTION, SOLUTION INTRAVENOUS at 14:44

## 2021-11-04 RX ADMIN — METHYLPREDNISOLONE SODIUM SUCCINATE 125 MG: 125 INJECTION, POWDER, FOR SOLUTION INTRAMUSCULAR; INTRAVENOUS at 14:44

## 2021-11-04 RX ADMIN — IPRATROPIUM BROMIDE AND ALBUTEROL SULFATE 3 ML: 2.5; .5 SOLUTION RESPIRATORY (INHALATION) at 14:10

## 2021-11-04 RX ADMIN — HYDROMORPHONE HYDROCHLORIDE 1 MG: 1 INJECTION, SOLUTION INTRAMUSCULAR; INTRAVENOUS; SUBCUTANEOUS at 09:59

## 2021-11-04 RX ADMIN — SODIUM CHLORIDE 100 ML/HR: 9 INJECTION, SOLUTION INTRAVENOUS at 18:16

## 2021-11-04 RX ADMIN — ENOXAPARIN SODIUM 40 MG: 40 INJECTION SUBCUTANEOUS at 18:16

## 2021-11-04 RX ADMIN — ONDANSETRON 4 MG: 2 INJECTION INTRAMUSCULAR; INTRAVENOUS at 09:58

## 2021-11-04 NOTE — H&P
[facility] Medicine Services  HISTORY AND PHYSICAL    Date of Admission: 11/4/2021  Primary Care Physician: Vanessa Gtz APRN    Subjective     Chief Complaint: Pain    History of Present Illness  Patient seen in ER 10.  Asked why came to the hospital he said severe pain in his hips and shoulders.  Says he feels like is having a pain crisis.  States he tried to offset yesterday by drinking more and taking his pain meds.  He was using home oxygen.  It was not any better and today they were increasing his oxygen needs so he came to the hospital.  When I see him he is on 4 L satting 96%.  He received multiple rounds of pain medications in the ER.  he states he states he had a fever last night 101 but that always happens when he is having a crisis.  Denies any overt phlegm or shortness of breath.  Really denies any chest pain bursitis in the shoulders and hips.  No nausea, vomiting, diarrhea, no focal numbness tingling or weakness.  Chest x-ray is on questioning tension pneumonia versus sickle cell disease.  We will go ahead and give him IV fluid bolus, check a CTA, check EKG, admit for further work-up and care.        Review of Systems   Otherwise complete ROS reviewed and negative except as mentioned in the HPI.    Past Medical History:   Past Medical History:   Diagnosis Date   • Cancer (HCC) 2016    Kidney cancer with METS to bone   • Gallstones    • Pneumonia    • Sickle cell      Past Surgical History:  Past Surgical History:   Procedure Laterality Date   • CHOLECYSTECTOMY     • LEG SURGERY     • NEPHRECTOMY Right      Social History:  reports that he has quit smoking. He does not have any smokeless tobacco history on file. He reports previous alcohol use. He reports current drug use. Drug: Marijuana.    Family History: family history includes Anemia in his cousin; Heart disease in his mother; Hypertension in his father and mother; Kidney failure in his mother;  "Leukemia in his maternal grandmother and another family member; Sickle cell trait in his cousin, daughter, and father.       Allergies:  No Known Allergies    Medications:  Prior to Admission medications    Medication Sig Start Date End Date Taking? Authorizing Provider   Morphine (MS CONTIN) 60 MG 12 hr tablet Take 120 mg by mouth 2 (Two) Times a Day.   Yes Sam Reis MD   Morphine (MSIR) 30 MG tablet Take 30 mg by mouth Every 4 (Four) Hours As Needed for Severe Pain .   Yes Sam Reis MD   escitalopram (LEXAPRO) 20 MG tablet Take 20 mg by mouth Every Evening.    Sam Reis MD   folic acid (FOLVITE) 1 MG tablet Take 1 mg by mouth Daily.    Sam Reis MD   HYDROcodone-acetaminophen (NORCO)  MG per tablet Take 1 tablet by mouth Every 6 (Six) Hours As Needed for Moderate Pain . 7/16/21   Chuy Patel DO   hydrocortisone (CORTEF) 10 MG tablet 20 mg Daily.    Sam Reis MD   hydrocortisone (CORTEF) 10 MG tablet Take 10 mg by mouth every night at bedtime.    Sam Reis MD   hydroxyurea (HYDREA) 500 MG capsule Take 2 capsules by mouth Daily. 7/17/21   Chuy Patel DO   ibuprofen (Advil) 200 MG tablet Take 3 tablets by mouth Every 6 (Six) Hours As Needed for Mild Pain . 7/16/21   Chuy Patel DO   lactulose (CHRONULAC) 10 GM/15ML solution Take 20 g by mouth 2 (Two) Times a Day As Needed.    Sam Reis MD   levoFLOXacin (Levaquin) 750 MG tablet Take 1 tablet by mouth Daily. 7/17/21   Chuy Patel DO   NIFEdipine XL (PROCARDIA XL) 90 MG 24 hr tablet Take 90 mg by mouth Daily.    Sam Reis MD     I have utilized all available immediate resources to obtain, update, and review the patient's current medications.    Objective     Vital Signs: /75 (BP Location: Right arm, Patient Position: Sitting)   Pulse 94   Temp 98.6 °F (37 °C) (Oral)   Resp 20   Ht 177.8 cm (70\")   Wt 90.7 kg (200 lb)   SpO2 97%   " BMI 28.70 kg/m²   Physical Exam   GEN: Awake, alert, interactive, appears uncomfortable  HEENT: PERRLA, EOMI, Anicteric, Trachea midline  Lungs: CTAB, no wheezing/rales/rhonchi  Heart: RRR, +S1/s2, no rub  ABD: soft, nt/nd, +BS, no guarding/rebound  Extremities: atraumatic, no cyanosis, no edema  Skin: no rashes or lesions  Neuro: AAOx3, no focal deficits  Psych: normal mood & affect        Results Reviewed:  Lab Results (last 24 hours)     Procedure Component Value Units Date/Time    Blood Culture - Blood, Arm, Left [[id number]] Collected: 11/04/21 1430    Specimen: Blood from Arm, Left Updated: 11/04/21 1436    Lactic Acid, Plasma [[id number]] Collected: 11/04/21 1430    Specimen: Blood Updated: 11/04/21 1435    COVID-19,[facility] IN-HOUSE,Nasal Swab No Transport Media 3-4 HR TAT - Swab, Nasal Cavity [[id number]]  (Normal) Collected: 11/04/21 1335    Specimen: Swab from Nasal Cavity Updated: 11/04/21 1426     COVID19 Not Detected    Narrative:      Fact sheet for providers: https://www.fda.gov/media/489283/download     Fact sheet for patients: https://www.fda.gov/media/974285/download    Test performed by PCR.    Consider negative results in combination with clinical observations, patient history, and epidemiological information.    Blood Gas, Arterial With Co-Ox [[id number]]  (Abnormal) Collected: 11/04/21 1408    Specimen: Arterial Blood Updated: 11/04/21 1418     Site Right Radial     Luis Carlos's Test N/A     pH, Arterial 7.325 pH units      Comment: 84 Value below reference range        pCO2, Arterial 57.3 mm Hg      Comment: 83 Value above reference range        pO2, Arterial 76.8 mm Hg      Comment: 84 Value below reference range        HCO3, Arterial 29.9 mmol/L      Comment: 83 Value above reference range        Base Excess, Arterial 3.1 mmol/L      Comment: 83 Value above reference range        O2 Saturation, Arterial 95.3 %      Hemoglobin, Blood Gas 8.8 g/dL      Comment: 84 Value below reference range         Hematocrit, Blood Gas 27.0 %      Comment: 84 Value below reference range        Oxyhemoglobin 90.8 %      Comment: 84 Value below reference range        Methemoglobin 2.10 %      Carboxyhemoglobin 2.6 %      A-a Gradiant 7.8 mmHg      Temperature 37.0 C      Sodium, Arterial 143 mmol/L      Potassium, Arterial 3.9 mmol/L      Barometric Pressure for Blood Gas 758 mmHg      Modality Nasal Cannula     Flow Rate 4.0 lpm      Ventilator Mode NA     Note --     Collected by [id number]     Comment: Meter: [id number]     :  [id number]        pH, Temp Corrected 7.325 pH Units      pCO2, Temperature Corrected 57.3 mm Hg      pO2, Temperature Corrected 76.8 mm Hg     Urinalysis With Microscopic If Indicated (No Culture) - Urine, Clean Catch [[id number]]  (Abnormal) Collected: 11/04/21 1133    Specimen: Urine, Clean Catch Updated: 11/04/21 1153     Color, UA Yellow     Appearance, UA Clear     pH, UA 6.5     Specific Gravity, UA 1.012     Glucose, UA Negative     Ketones, UA Negative     Bilirubin, UA Negative     Blood, UA Moderate (2+)     Protein,  mg/dL (2+)     Leuk Esterase, UA Negative     Nitrite, UA Negative     Urobilinogen, UA 1.0 E.U./dL    Urinalysis, Microscopic Only - Urine, Clean Catch [[id number]]  (Abnormal) Collected: 11/04/21 1133    Specimen: Urine, Clean Catch Updated: 11/04/21 1153     RBC, UA 3-5 /HPF      WBC, UA 0-2 /HPF      Bacteria, UA None Seen /HPF      Squamous Epithelial Cells, UA None Seen /HPF      Hyaline Casts, UA None Seen /LPF      Methodology Automated Microscopy    CBC & Differential [[id number]]  (Abnormal) Collected: 11/04/21 0958    Specimen: Blood Updated: 11/04/21 1105    Narrative:      The following orders were created for panel order CBC & Differential.  Procedure                               Abnormality         Status                     ---------                               -----------         ------                     CBC Auto Differential[[id number]]         Abnormal            Final result                 Please view results for these tests on the individual orders.    CBC Auto Differential [[id number]]  (Abnormal) Collected: 11/04/21 0958    Specimen: Blood Updated: 11/04/21 1105     WBC 14.87 10*3/mm3      RBC 3.47 10*6/mm3      Hemoglobin 8.2 g/dL      Hematocrit 25.3 %      MCV 72.9 fL      MCH 23.6 pg      MCHC 32.4 g/dL      RDW 21.2 %      RDW-SD 54.4 fl      MPV 10.1 fL      Platelets 301 10*3/mm3     Narrative:      The previously reported component NRBC is no longer being reported. Previous result was 17.8 /100 WBC (Reference Range: 0.0-0.2 /100 WBC) on 11/4/2021 at 1025 CDT.    Manual Differential [[id number]]  (Abnormal) Collected: 11/04/21 0958    Specimen: Blood Updated: 11/04/21 1105     Neutrophil % 70.5 %      Lymphocyte % 8.4 %      Monocyte % 18.9 %      Basophil % 1.1 %      Myelocyte % 1.1 %      Neutrophils Absolute 10.48 10*3/mm3      Lymphocytes Absolute 1.25 10*3/mm3      Monocytes Absolute 2.81 10*3/mm3      Basophils Absolute 0.16 10*3/mm3      nRBC 43.2 /100 WBC      Anisocytosis Large/3+     Basophilic Stippling Slight/1+     Hypochromia Slight/1+     Microcytes Mod/2+     Polychromasia Large/3+     Target Cells Large/3+     WBC Morphology Normal     Giant Platelets Slight/1+    C-reactive Protein [[id number]]  (Abnormal) Collected: 11/04/21 0958    Specimen: Blood Updated: 11/04/21 1051     C-Reactive Protein 9.74 mg/dL     Comprehensive Metabolic Panel [[id number]]  (Abnormal) Collected: 11/04/21 0958    Specimen: Blood Updated: 11/04/21 1051     Glucose 118 mg/dL      BUN 14 mg/dL      Creatinine 0.89 mg/dL      Sodium 147 mmol/L      Potassium 4.2 mmol/L      Chloride 109 mmol/L      CO2 29.0 mmol/L      Calcium 8.9 mg/dL      Total Protein 7.6 g/dL      Albumin 4.60 g/dL      ALT (SGPT) 20 U/L      AST (SGOT) 56 U/L      Alkaline Phosphatase 92 U/L      Total Bilirubin 3.8 mg/dL      eGFR  African Amer 108 mL/min/1.73      Globulin 3.0  gm/dL      A/G Ratio 1.5 g/dL      BUN/Creatinine Ratio 15.7     Anion Gap 9.0 mmol/L     Narrative:      GFR Normal >60  Chronic Kidney Disease <60  Kidney Failure <15      Lactate Dehydrogenase [[id number]]  (Abnormal) Collected: 11/04/21 0958    Specimen: Blood Updated: 11/04/21 1051      U/L     Reticulocytes [[id number]]  (Abnormal) Collected: 11/04/21 0958    Specimen: Blood Updated: 11/04/21 1024     Reticulocyte % 2.87 %      Reticulocyte Absolute 0.0996 10*6/mm3         Imaging Results (Last 24 Hours)     Procedure Component Value Units Date/Time    XR Chest 1 View [[id number]] Collected: 11/04/21 1129     Updated: 11/04/21 1137    Narrative:      Frontal upright radiograph of the chest 11/4/2021 10:18 AM CDT     HISTORY: Chest congestion, history of sickle cell     COMPARISON: 7/9/2021.     FINDINGS:      Cardiomegaly with central pulmonary congestion. No obvious interstitial  coarsening, however, there are bilateral scattered patchy infiltrates.  No consolidation, pleural effusion or pneumothorax. No acute bony  abnormality.       Impression:      1. There are a few bilateral scattered patchy infiltrates. Atypical  pneumonia is certainly a consideration. Findings could also be  chronic/fibrotic in the setting of sickle cell, as this could reflect  areas of earlier pulmonary infarct.  2. Cardiomegaly with mild central pulmonary congestion.     This report was finalized on 11/04/2021 11:34 by Dr James Siegel, .    XR Hip With or Without Pelvis 2 - 3 View Right [[id number]] Collected: 11/04/21 1123     Updated: 11/04/21 1127    Narrative:      XR HIP W OR WO PELVIS 2-3 VIEW RIGHT- 11/4/2021 10:19 AM CDT     HISTORY: hip pain; hx of sickle cell- include pelvis     COMPARISON: None      FINDINGS:      Frontal and lateral views of the right hip were obtained.  Additional AP  pelvis.     There is no fracture or joint subluxation. The joint spaces are well  maintained.      Pelvic ring is intact. Sacral  arches are intact. Partially imaged  femoral rods. No gross soft tissue abnormality is visualized.        Impression:      1. No acute osseous injury or malalignment. Pelvic ring is intact.           This report was finalized on 11/04/2021 11:24 by Dr James Siegel, .        I have personally reviewed and interpreted the radiology studies and ECG obtained at time of admission.     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  Labs do not seem that much worse than his baseline.  Will admit with IV fluid hydration.  Check a CTA to rule out PE or other issues in his lungs.  If signs of infection continue antibiotics.  We will continue his pain regimen and folic acid.  Hematology consult    #2 hypoxia -in setting of #1 above most likely.  As above but a CTA rule out PE or pneumonia.  Received antibiotics in the ER.    #3 acute pain -can suspect sickle disease.  Home regimen.  Monitor closely.    #4 anemia of chronic disease -no need for transfusion currently.  Hemoglobin around 8.    #5 history of renal cell carcinoma -status post nephrectomy and radiation treatments.  Not currently being treated.    Code Status/Advanced Care Plan: Full code    The patient's surrogate decision maker is patient's wife Primo.     I discussed my findings and recommendations with the patient and wife directly bedside.    Estimated length of stay is 2+ days.     The patient was seen and examined by me on 11/4/2021 at 2:15 PM.    Electronically signed by Chuy Patel DO, 11/04/21, 14:52 CDT.

## 2021-11-04 NOTE — ED NOTES
Patient presents to the ED with the CC of sickle cell crisis. He states he thinks he got too cold yesterday while washing the car causing a flare up. He states he took (2) 30mg tablets of morphine @ 0630 today, 11/4/21.      Milana Crawford, RN  11/04/21 0918

## 2021-11-04 NOTE — ED NOTES
Spoke with patients wife and told her that she can come into the patients room .     Tawanna Villalta RN  11/04/21 8178

## 2021-11-04 NOTE — CONSULTS
[facility] Oncology/Hematology Services  CONSULT NOTE    PATIENT NAME:  Holland Phelps  YOB: 1970  PATIENT MRN:  [id number]    Date of Admission:  11/4/2021  Consultation Date:  11/4/2021  Referring Provider: Chuy Patel DO    Subjective     Reason for Consultation: Sickle cell anemia with pneumonia    History of present illness: Holland Phelps 51 y.o. male who was initially seen in consultation on 7/13/2021 when he was hospitalized for sickle cell anemia crisis (see note from below for details).  The patient presents now after washing his car in the frigid cold and developed right shoulder pains but decided to stay at home with pain control and hydration PO but this morning came in to the ER when his O2 was droppin and pain was not controlled.  Found to have a pneumonia again and admitted. The itial CXR showd concern for peneumonia but the CT scan 1. No evidence of pulmonary embolus.  2. Four-chamber cardiomegaly with pulmonary hypertension and evidence for interstitial edema. No inflammatory infiltrate or airspace filling edema. No pleural effusion. 3. Prior right nephrectomy. Questionable hydronephrosis on the left,  which be especially concerning given the contralateral nephrectomy. Consider renal ultrasound for further evaluation of suspected hydronephrosis.  He last received chemo and RT for IV renal cell ca in summer of 2020 and has been observed since.       From the hospitalization in Summer 2021: normally followed at [facility] for management of his sickle cell disease as well as previous history of nephrectomy secondary to renal cell carcinoma.  As per the notes from [place], the patient was diagnosed in childhood with hemoglobin SS disease but had a relatively mild phenotypic expression.  Based on an evaluation done at [place] on 9/25/2015, the patient had hemoglobin A of 14.6%, hemoglobin S of 78.9%, and a 2 of 6.5% suggestive of hemoglobinS/ B   Thalassemia 0.  The patient had reported that he has a painful episode once every 6 months and has had multiple transfusions in the past.     On 9/25/2015, his WBC count was 14.5, hemoglobin of 10.1, MCV of 75 and platelets of 287,000.  BUN and creatinine were normal with a bilirubin of 4.7 and LDH of 814 but had been recently found to have symptomatic cholelithiasis and was being considered for elective cholecystectomy.  Other pertinent findings were her ferritin of 1958, TIBC of 178, U IBC of 48 and a serum iron of 130 with an iron saturation of 73% back on 9/25/2015.  He denied any history of acute chest syndrome, retinopathy nor a history of stroke.  The patient did not have a history of priapism, leg ulcers.  He was noted to have a heart murmur but otherwise without complaints.  At that time he was referred to [place] for exchange transfusions preop before a surgery as he was diagnosed with stage IV kidney cancer with metastatic disease in the left sacrum which was impinging on the S3 nerve root.  Unfortunately, the patient had progressed on VOTRIENT as well as radiation therapy to the left and right femurs.  He was started on nivolumab on 12/21 every 2 weeks.  The patient does have osseous metastatic lesions that were previously treated with SBRT to the left sacrum and the left and right distal femurs and he was now receiving SBRT to the left tibia, right femur and right sacrum.  He continued to have nivolumab therapy and he complained of some intermittent leg pains but minimal discomfort at rest.  He had been taking morphine for pain relief with some improvement and was scheduled to meet with Dr. Estrada from orthopedic oncology at [place] to discuss placement of a apolinar in the right femur after finishing radiotherapy.  He was off his hydroxyurea due to radiosensitization.  He was on CV-839 study drug offer 2 weeks due to hyperbilirubinemia.  He is status post the right lap nephrectomy for tumor  debulking.       When the patient was seen in the office in [place] on April 9, 2021 he was still off hydroxyurea while receiving chemotherapy for his renal cell carcinoma and had complained that his fatigue had improved and the pain was well controlled.  He completed radiation therapy 1 week prior and felt that he was recovering well.  He did not have any skin toxicities at that time nor pain related to radiation therapy.  He is seeing Dr. Earl from [place] for management of his metastatic renal cell carcinoma and was being managed with pembrolizumab with the plan to restart him on axitinib.       When he was seen in the office last in April 2021, he was doing well off his hydroxyurea and had not experienced any acute vaso-occlusive pain episodes nor other vaso-occlusive complications.  As per the notes, the patient had previously been on 1000 mg p.o. daily of hydroxyurea with folic acid 1 mg p.o. daily but hydroxyurea was held during the time of palliative RT.  The plan was to restart the hydroxyurea in the months that followed.  The patient was also previously taking 750 mg of p.o. Exjade with improvement in his ferritin levels but the most recent ferritin level was 1563.     He was being followed by medical oncology for the metastatic renal cell carcinoma with mets to the bone and had been through multiple chemotherapies.  He also gets denosumab for bone protection.  He had completed radiation therapy and in June 2020 was put on cabozanitinib 40 mg which was held in October 2020 for cellulitis and lymphedema.  He was undergoing treatment in [place] for his lymphedema.     Pain management for sickle cell was ibuprofen 600 mg every 6 hours as needed for pain, Lortab 10/325 mg 1 tablet every 6 hours as needed for pain during an acute crisis     Dr. Patel from the hospitalist team called me today as the patient had been admitted to [facility] with a sickle cell crisis on 7/9/2021 and was  "placed on a PCA pump which had controlling his pain well -- he is off the PCA pump now with \"good control of pain..  A CT scan that was done to rule out a pulmonary embolism had actually showed a likely pneumonia and the patient was started on Levaquin therapy.  He was transfused 1 unit of blood earlier in his stay with a stabilized H&H.     Hematology was called to help with possible need to restart hydroxyurea.  As per patient, he has been off chemo and RT for about 7 months but did not restart the hydrea, previously on 1000 mg hydrea daily.  He is scheduled to go back to [place] on 8/12/21 to have a restaging and to see Dr. Earl, his oncologist.  Hematology took him off Exjade as well.   Past Medical History:  Past Medical History:   Diagnosis Date   • Cancer (HCC) 2016    Kidney cancer with METS to bone   • Gallstones    • Pneumonia    • Sickle cell      Prior Surgeries:  Past Surgical History:   Procedure Laterality Date   • CHOLECYSTECTOMY     • LEG SURGERY     • NEPHRECTOMY Right         Allergies:Patient has no known allergies.  PTA Meds:  Medications Prior to Admission   Medication Sig Dispense Refill Last Dose   • Morphine (MS CONTIN) 60 MG 12 hr tablet Take 120 mg by mouth 2 (Two) Times a Day.      • Morphine (MSIR) 30 MG tablet Take 30 mg by mouth Every 4 (Four) Hours As Needed for Severe Pain .      • escitalopram (LEXAPRO) 20 MG tablet Take 20 mg by mouth Every Evening.      • folic acid (FOLVITE) 1 MG tablet Take 1 mg by mouth Daily.      • HYDROcodone-acetaminophen (NORCO)  MG per tablet Take 1 tablet by mouth Every 6 (Six) Hours As Needed for Moderate Pain .      • hydrocortisone (CORTEF) 10 MG tablet 20 mg Daily.      • hydrocortisone (CORTEF) 10 MG tablet Take 10 mg by mouth every night at bedtime.      • hydroxyurea (HYDREA) 500 MG capsule Take 2 capsules by mouth Daily. 28 capsule 0    • ibuprofen (Advil) 200 MG tablet Take 3 tablets by mouth Every 6 (Six) Hours As Needed for Mild " Pain .      • lactulose (CHRONULAC) 10 GM/15ML solution Take 20 g by mouth 2 (Two) Times a Day As Needed.      • levoFLOXacin (Levaquin) 750 MG tablet Take 1 tablet by mouth Daily. 5 tablet 0    • NIFEdipine XL (PROCARDIA XL) 90 MG 24 hr tablet Take 90 mg by mouth Daily.         Current Meds:   Current Facility-Administered Medications   Medication Dose Route Frequency Provider Last Rate Last Admin   • acetaminophen (TYLENOL) tablet 650 mg  650 mg Oral Q4H PRN Chuy Patel DO       • enoxaparin (LOVENOX) syringe 40 mg  40 mg Subcutaneous Q24H Chuy Patel DO       • [START ON 11/5/2021] escitalopram (LEXAPRO) tablet 20 mg  20 mg Oral Q PM Chuy Patel DO       • [START ON 11/5/2021] folic acid (FOLVITE) tablet 1 mg  1 mg Oral Daily Chuy Patel DO       • hydrocortisone (CORTEF) tablet 10 mg  10 mg Oral Nightly Chuy Patel DO       • [START ON 11/5/2021] hydrocortisone (CORTEF) tablet 20 mg  20 mg Oral Daily Chuy Patel DO       • lactulose solution 20 g  20 g Oral BID PRN Chuy Patel DO       • Morphine (MS CONTIN) 12 hr tablet 120 mg  120 mg Oral BID Chuy Patel DO       • Morphine (MSIR) tablet 30 mg  30 mg Oral Q4H PRN Chuy Patel DO       • ondansetron (ZOFRAN) injection 4 mg  4 mg Intravenous Q6H PRN Chuy Patel DO       • sodium chloride 0.9 % flush 10 mL  10 mL Intravenous Q12H Chuy Patel DO       • sodium chloride 0.9 % flush 10 mL  10 mL Intravenous PRN Chuy Patel DO       • sodium chloride 0.9 % infusion  100 mL/hr Intravenous Continuous Chuy Patel DO           Family History:family history includes Anemia in his cousin; Heart disease in his mother; Hypertension in his father and mother; Kidney failure in his mother; Leukemia in his maternal grandmother and another family member; Sickle cell trait in his cousin, daughter, and father.   Social History: reports that he has quit smoking. He does not have any smokeless tobacco  history on file. He reports previous alcohol use. He reports current drug use. Drug: Marijuana.    Review of Systems  Review of Systems   Constitutional: Positive for activity change.        Pain crisis   HENT: Negative.    Eyes: Negative.    Respiratory: Negative.  Negative for cough and shortness of breath.    Cardiovascular: Negative.    Gastrointestinal: Negative.    Genitourinary: Negative.         Hesitation of urine which is more chronic   Musculoskeletal:        Bilateral shoulder and hip pins    chronic swelling in the left LE after radiation to the left iliac chain and around   Skin: Negative.    Neurological: Negative.    Psychiatric/Behavioral: Negative.  Negative for suicidal ideas.         Objective      Vital Signs   Temp:  [97.9 °F (36.6 °C)-98.6 °F (37 °C)] 97.9 °F (36.6 °C)  Heart Rate:  [94-95] 95  Resp:  [20] 20  BP: (156-157)/(75-81) 157/81    Physical Exam  Constitutional:       Appearance: Normal appearance. He is well-groomed and normal weight.      Comments: Clearly in pain   HENT:      Head: Normocephalic and atraumatic.      Nose: Nose normal.      Mouth/Throat:      Mouth: Mucous membranes are dry.   Eyes:      General: Scleral icterus present.      Pupils: Pupils are equal, round, and reactive to light.   Cardiovascular:      Rate and Rhythm: Normal rate and regular rhythm.      Pulses: Normal pulses.      Heart sounds: Normal heart sounds.   Pulmonary:      Effort: Pulmonary effort is normal.      Breath sounds: Normal breath sounds and air entry. No decreased breath sounds, wheezing, rhonchi or rales.   Abdominal:      General: Abdomen is flat. Bowel sounds are normal.      Palpations: Abdomen is soft.      Comments: Distended abdomen centrally, no palpable HSM   Musculoskeletal:         General: Normal range of motion.      Cervical back: Neck supple.   Skin:     General: Skin is warm and dry.   Neurological:      Mental Status: He is alert and oriented to person, place, and time.       Comments: Drowsy but awake and alert and able to speak AAOx3   Psychiatric:         Mood and Affect: Mood normal.         Behavior: Behavior normal.      Comments: Very clearly in pain but remains cooperative          Results from last 7 days   Lab Units 11/04/21  0958   WBC 10*3/mm3 14.87*   HEMOGLOBIN g/dL 8.2*   HEMATOCRIT % 25.3*   PLATELETS 10*3/mm3 301        Results from last 7 days   Lab Units 11/04/21  1408 11/04/21  0958   SODIUM mmol/L  --  147*   SODIUM, ARTERIAL mmol/L 143  --    POTASSIUM mmol/L  --  4.2   CHLORIDE mmol/L  --  109*   CO2 mmol/L  --  29.0   BUN mg/dL  --  14   CREATININE mg/dL  --  0.89   CALCIUM mg/dL  --  8.9   BILIRUBIN mg/dL  --  3.8*   ALK PHOS U/L  --  92   ALT (SGPT) U/L  --  20   AST (SGOT) U/L  --  56*   GLUCOSE mg/dL  --  118*       ABG:    pH, Arterial   Date Value Ref Range Status   11/04/2021 7.325 (L) 7.350 - 7.450 pH units Final     Comment:     84 Value below reference range     pO2, Arterial   Date Value Ref Range Status   11/04/2021 76.8 (L) 83.0 - 108.0 mm Hg Final     Comment:     84 Value below reference range     pCO2, Arterial   Date Value Ref Range Status   11/04/2021 57.3 (H) 35.0 - 45.0 mm Hg Final     Comment:     83 Value above reference range       Culture Data:   No results found for: BLOODCX, URINECX, WOUNDCX, MRSACX, RESPCX, STOOLCX    Radiology:   Imaging Results (Last 7 Days)     Procedure Component Value Units Date/Time    CT Angiogram Chest [[id number]] Collected: 11/04/21 1554     Updated: 11/04/21 1602    Narrative:      CT ANGIOGRAM CHEST- 11/4/2021 3:34 PM CDT      HISTORY: sickle crisis, possible pna, r/o PE; R09.02-Hypoxemia;  J18.9-Pneumonia, unspecified organism; D57.1-Sickle-cell disease without  crisis      COMPARISON: CT scan dated 7/9/2021.      DOSE LENGTH PRODUCT: 391 mGy cm. Automated exposure control was also  utilized to decrease patient radiation dose.     TECHNIQUE: Helical tomographic images of the chest were obtained after  the  administration of intravenous contrast following angiogram protocol.  Additionally, 3D and multiplanar reformatted images were provided.        FINDINGS:    Pulmonary arteries: There is adequate enhancement of the pulmonary  arteries to evaluate for central and segmental pulmonary emboli. There  are no filling defects within the main, lobar, segmental or visualized  subsegmental pulmonary arteries. The pulmonary arteries are enlarged,  consistent with pulmonary arterial hypertension.      Aorta and great vessels: Thoracic aorta is normal in caliber. No  dissection identified. No flow-limiting stenosis identified at the great  vessel origins.     Visualized neck base: The imaged portion of the base of the neck appears  unremarkable.      Lungs: Shallow inspiration with low lung volumes. Pulmonary vascular  congestion. Interlobular septal thickening in the lung bases may reflect  a mild interstitial edema. No consolidation identified. No pleural  effusion. The airways are clear.     Heart: Four-chamber cardiomegaly. There is no pericardial effusion.      Mediastinum and lymph nodes: No suspicious hilar or mediastinal  adenopathy..     Skeletal and soft tissues: Chest wall soft tissues are unremarkable. No  acute bony abnormality. Thoracic spine degenerative change..      Upper abdomen: The imaged portion of the upper abdomen demonstrates no  acute process. Right nephrectomy. Questionable left hydronephrosis.  Prior cholecystectomy.       Impression:      1. No evidence of pulmonary embolus.  2. Four-chamber cardiomegaly with pulmonary hypertension and evidence  for interstitial edema. No inflammatory infiltrate or airspace filling  edema. No pleural effusion.  3. Prior right nephrectomy. Questionable hydronephrosis on the left,  which be especially concerning given the contralateral nephrectomy.  Consider renal ultrasound for further evaluation of suspected  hydronephrosis.        This report was finalized on  11/04/2021 15:59 by Dr James Siegel, .    XR Chest 1 View [[id number]] Collected: 11/04/21 1129     Updated: 11/04/21 1137    Narrative:      Frontal upright radiograph of the chest 11/4/2021 10:18 AM CDT     HISTORY: Chest congestion, history of sickle cell     COMPARISON: 7/9/2021.     FINDINGS:      Cardiomegaly with central pulmonary congestion. No obvious interstitial  coarsening, however, there are bilateral scattered patchy infiltrates.  No consolidation, pleural effusion or pneumothorax. No acute bony  abnormality.       Impression:      1. There are a few bilateral scattered patchy infiltrates. Atypical  pneumonia is certainly a consideration. Findings could also be  chronic/fibrotic in the setting of sickle cell, as this could reflect  areas of earlier pulmonary infarct.  2. Cardiomegaly with mild central pulmonary congestion.     This report was finalized on 11/04/2021 11:34 by Dr James Siegel, .    XR Hip With or Without Pelvis 2 - 3 View Right [[id number]] Collected: 11/04/21 1123     Updated: 11/04/21 1127    Narrative:      XR HIP W OR WO PELVIS 2-3 VIEW RIGHT- 11/4/2021 10:19 AM CDT     HISTORY: hip pain; hx of sickle cell- include pelvis     COMPARISON: None      FINDINGS:      Frontal and lateral views of the right hip were obtained.  Additional AP  pelvis.     There is no fracture or joint subluxation. The joint spaces are well  maintained.      Pelvic ring is intact. Sacral arches are intact. Partially imaged  femoral rods. No gross soft tissue abnormality is visualized.        Impression:      1. No acute osseous injury or malalignment. Pelvic ring is intact.           This report was finalized on 11/04/2021 11:24 by Dr James Siegel, .          Pathology Results:   No results found for: PATHINTERP       Assessment/Plan      Assessment/Plans:   Date  3/29/2021  7/9/2021  7/11/2021  7/12/21 7/13/21  9.3.21  10.14.21  11.4.21     WBC  10.2    14.54  14.18  13.2  13.2  11.6  14.87     Hb  8.7     8.3  7.6  7.7  8.1  8.8  8.2     MCV  70    72.7  73.2  72.8  75  73  72.9     Plt  276    224  225  254  296  252  301     ANC          9.10  9.62  8.27  10.48     ALC          1.23      1.25     AMC          1.37      2.81     AEC         1.24           ABC         0.13      0.16     nRBC      11 11 43.2    Ferritin    1328            9647( !)     Iron                     Iron SAT                     Transferrin                     TIBC                     Hapto                     LDH    211  417    274      367     Víctor                     Harley                     Zinc                     Retic          2.67      2.87%     B12                     Folate                     Hep panel                     HIV                     Creatinine    0.99            0.89     Glucose    108            118     ALT    7            20     AST    17            56     Alk phos    80            92     EGFR        108    Total bilirubin    2.7            3.8     Uric acid          7.4            CRP                9.74      Covid               Neg                              **HbS/Beta Thal 0  -Being treated at [facility] and has very infrequent flares  -Admitted to [facility] with an acute pain episode requiring pain management and is currently on a PCA pump with adequate pain control in the smer of 2021. Admitted on 11.4.21 with pneumonia  - last seen at [facility] on 10.15.21: Patient was seen for follow-up appointment and is being managed by pain and symptom management.  Previously discussed Endari for potential disease modifying treatment and it was discussed that may assist with the pain and provide patient with a little energy and as he was interested in starting the medication, Endari 10 bid was started September 1, 2021(PATIENT'S WIFE HAS A HOME SUPPLY as [facility]and the patient was able to tell the difference.  The hydroxyurea therapy was placed on hold while he was  receiving chemotherapy for renal cell carcinoma and the plan was for the team to collaborate with oncology for plans of moving forward.  He was off immunotherapy since the first week of June and is being followed by [facility] for management of metastatic renal cell carcinoma as well as his sickle cell anemia.    TREATMENT:  · Patients should receive folic acid 1 mg daily for life  · Continue incentive spirometry  · Patient appears to have been doing well on Endari therapy 10 mg daily.  This can be continued and patient's   wife has her own supply  · Pneumococcal vaccine is important if he has not yet received it and after completion of treatment for recurrent  pneumonia  · Hydrea  has been on hold since the patient was on chemotherapy for renal cell carcinoma -would not start the  medication at this time and patient can discuss this with his renal team as well as hematology upon discharge from  this admission  · Indications for RBC transfusions:  ? Acute chest syndrome  ? Stroke prevention and treatment  ? Aplastic and sequestration crisis  ? Multiorgan failure  ? Transfusion prior to surgery  · Management of complications:  ? In vascular crises, patients should be kept warm and given adequate hydration and pain control. Oxygen only helps in hypoxic patients and overhydration should be avoided  ? Patients who undergo anesthesia are at increased risk for crisis and should be observed closely for development of hypoxia or acidosis  ? Acute chest syndrome is life threatening and exchange transfusions appear to be beneficial along with adequate pain control, bronchodilators, antibiotics and incentive spirometry  ? Priapism should be treated by rapid hydration, RBC transfusions and pain control while urgent urological evaluation is requested  ? Patients should have a yearly auditory and ophthalmologic exam while the patient is on an iron chelator          **HIGH FERRITIN and increasing   ** Likely secondary  to transfusions as well as acute inflammation due to the infection  - was taken off the Exjade as well as the hydrea while he was on the chemo but will rediscuss with hematology getting back on the Exjade.  This will be discussed when the patient is seen in the outpatient setting with [facility]        ** Infection status:   -Covid testing is negative, s/p MODERNA with second dose in April 201  -Patient currently admitted to [facility] for pneumonia (CXR looks suspicious but CT scan looks more like interstitial oedema but no infiltrate noted).Thsi being managed by medicine team and patient is on antibiotics, would be cautious with IV fluids     **Metabolic / Renal:   · History of metastatic renal cell carcinoma being followed and treated at [facility], status post multiple chemotherapies and radiation which finished in Summer 2020        ** Pain control:   -Currently should be  managed with a PCA but protocol from [facility] was MORPHINE IR 30 mg q 3 hours prn, MORPHINE  mg 12 hours (which he takes all the time)         Thank you for allowing me to participate in the care of this patient.  Amit Goldberg, MD  Hematology/oncology

## 2021-11-04 NOTE — ED NOTES
Patient states that he will try and urinate for me at this time.      Tawanna Villalta RN  11/04/21 5342

## 2021-11-04 NOTE — ED PROVIDER NOTES
Subjective   Patient is a 51-year-old male with history significant for renal cancer with metastasis to the bone, gallstones, pneumonia, sickle cell.  He presents to the ER with complaints of sickle cell crisis.  Patient states he was outdoors in the cold air for several hours yesterday.  Today he presents with significant pain to his right hip as well as congestion.  He has taken morphine without any relief.  He denies any recorded fevers.  He denies any cough or congestion.  He has had no chest pain, nausea or vomiting, or abdominal pain.  Due to pain described above he came to the ER for evaluation and treatment.          Review of Systems   Constitutional: Negative.  Negative for fever.   HENT: Positive for congestion.    Eyes: Negative.    Respiratory: Negative.  Negative for cough and shortness of breath.    Cardiovascular: Negative.  Negative for chest pain.   Gastrointestinal: Negative.  Negative for abdominal pain, constipation, diarrhea, nausea and vomiting.   Genitourinary: Negative.    Musculoskeletal:        Positive for right hip pain   Skin: Negative.    All other systems reviewed and are negative.      Past Medical History:   Diagnosis Date   • Cancer (HCC) 2016    Kidney cancer with METS to bone   • Gallstones    • Pneumonia    • Sickle cell        No Known Allergies    Past Surgical History:   Procedure Laterality Date   • CHOLECYSTECTOMY     • LEG SURGERY     • NEPHRECTOMY Right        Family History   Problem Relation Age of Onset   • Leukemia Maternal Grandmother    • Kidney failure Mother    • Hypertension Mother    • Heart disease Mother    • Sickle cell trait Father    • Hypertension Father    • Sickle cell trait Daughter    • Sickle cell trait Cousin    • Anemia Cousin    • Leukemia Other        Social History     Socioeconomic History   • Marital status:    Tobacco Use   • Smoking status: Former Smoker   Substance and Sexual Activity   • Alcohol use: Not Currently   • Drug use: Yes      Types: Marijuana           Objective   Physical Exam  Vitals and nursing note reviewed.   Constitutional:       General: He is not in acute distress.     Appearance: He is well-developed. He is not diaphoretic.   HENT:      Head: Atraumatic.      Right Ear: External ear normal.      Left Ear: External ear normal.      Nose: Nose normal.      Mouth/Throat:      Mouth: Mucous membranes are moist.      Pharynx: Oropharynx is clear.   Eyes:      General: No scleral icterus.     Extraocular Movements: Extraocular movements intact.      Conjunctiva/sclera: Conjunctivae normal.      Pupils: Pupils are equal, round, and reactive to light.   Neck:      Thyroid: No thyromegaly.      Vascular: No JVD.   Cardiovascular:      Rate and Rhythm: Normal rate and regular rhythm.      Heart sounds: Normal heart sounds. No murmur heard.      Pulmonary:      Effort: Pulmonary effort is normal. No respiratory distress.      Breath sounds: Normal breath sounds. No wheezing or rales.   Chest:      Chest wall: No tenderness.   Abdominal:      General: Bowel sounds are normal. There is no distension.      Palpations: Abdomen is soft. There is no mass.      Tenderness: There is no abdominal tenderness. There is no guarding or rebound.   Musculoskeletal:         General: Tenderness present. Normal range of motion.      Cervical back: Normal range of motion and neck supple.      Comments: Pain to palpation throughout the right hip without any obvious deformity identified, neurovascular intact, sensory intact   Lymphadenopathy:      Cervical: No cervical adenopathy.   Skin:     General: Skin is warm and dry.      Capillary Refill: Capillary refill takes less than 2 seconds.      Coloration: Skin is not pale.      Findings: No erythema or rash.   Neurological:      General: No focal deficit present.      Mental Status: He is alert and oriented to person, place, and time.      Cranial Nerves: No cranial nerve deficit.      Coordination:  Coordination normal.      Deep Tendon Reflexes: Reflexes are normal and symmetric.   Psychiatric:         Mood and Affect: Mood normal.         Behavior: Behavior normal.         Thought Content: Thought content normal.         Judgment: Judgment normal.         Procedures           ED Course  ED Course as of 11/04/21 1820   Thu Nov 04, 2021   1404 We are in the process of attempting to discharge however patient becomes hypoxic with any decrease in oxygen.  Patient significant other states that he has oxygen at home that he uses only as needed.  Currently he is on 4 to 5 L of oxygen.  Nursing staff states when they decrease oxygen his pulse ox immediately drops.  Patient reports fever but denies any significant cough or congestion. [TW]   1405 Patient does have an elevation of his white count at 14.87, H&H is stable for this patient at 8.2 and 25.3, platelets are 301.  Urinalysis reveals moderate blood however no indication of infection.  CRP is 9.74, glucose is 118, kidney function within normal limits, sodium 147, reticulocytes 2.87, ,  [TW]   1406    IMPRESSION:  1. There are a few bilateral scattered patchy infiltrates. Atypical  pneumonia is certainly a consideration. Findings could also be  chronic/fibrotic in the setting of sickle cell, as this could reflect  areas of earlier pulmonary infarct.  2. Cardiomegaly with mild central pulmonary congestion.   [TW]   1406 IMPRESSION:  1. No acute osseous injury or malalignment. Pelvic ring is intact.      [TW]   1406 Spoke with hospitalist.  His labs are essentially at patient's baseline regarding his sickle cell however the concern is for hypoxia questioning an atypical pneumonia.  Plan is to admit to their services.  ABGs and Covid swab remain pending.  Please see their note for details. [TW]      ED Course User Index  [TW] Lita Mann, APRN                                           MDM  Number of Diagnoses or Management Options  Hypoxia: new and  requires workup  Pneumonia of both lower lobes due to infectious organism: new and requires workup  Sickle cell disease without crisis (HCC): new and requires workup     Amount and/or Complexity of Data Reviewed  Clinical lab tests: ordered and reviewed  Tests in the radiology section of CPT®: ordered and reviewed  Decide to obtain previous medical records or to obtain history from someone other than the patient: yes  Discuss the patient with other providers: yes    Risk of Complications, Morbidity, and/or Mortality  Presenting problems: moderate  Diagnostic procedures: moderate  Management options: moderate    Patient Progress  Patient progress: improved      Final diagnoses:   Hypoxia   Pneumonia of both lower lobes due to infectious organism   Sickle cell disease without crisis (HCC)       ED Disposition  ED Disposition     ED Disposition Condition Comment    Decision to Admit  Level of Care: Telemetry [5]   Diagnosis: Hypoxia [282061]   Admitting Physician: LUIS ALBERTO DUMONT [[id number]]   Certification: I Certify That Inpatient Hospital Services Are Medically Necessary For Greater Than 2 Midnights            No follow-up provider specified.       Medication List      No changes were made to your prescriptions during this visit.          Lita Mann, APRN  11/04/21 1827

## 2021-11-04 NOTE — ED NOTES
Report called to [facility] on 4 B for patient to go to 441.      Tawanna Villalta, RN  11/04/21 5640

## 2021-11-05 ENCOUNTER — APPOINTMENT (OUTPATIENT)
Dept: ULTRASOUND IMAGING | Facility: HOSPITAL | Age: 51
End: 2021-11-05

## 2021-11-05 LAB
ALBUMIN SERPL-MCNC: 3.9 G/DL (ref 3.5–5.2)
ALBUMIN/GLOB SERPL: 1.2 G/DL
ALP SERPL-CCNC: 136 U/L (ref 39–117)
ALT SERPL W P-5'-P-CCNC: 40 U/L (ref 1–41)
ANION GAP SERPL CALCULATED.3IONS-SCNC: 7 MMOL/L (ref 5–15)
ANISOCYTOSIS BLD QL: ABNORMAL
AST SERPL-CCNC: 81 U/L (ref 1–40)
BASO STIPL COARSE BLD QL SMEAR: ABNORMAL
BILIRUB SERPL-MCNC: 2 MG/DL (ref 0–1.2)
BUN SERPL-MCNC: 14 MG/DL (ref 6–20)
BUN/CREAT SERPL: 16.5 (ref 7–25)
CALCIUM SPEC-SCNC: 8.9 MG/DL (ref 8.6–10.5)
CHLORIDE SERPL-SCNC: 107 MMOL/L (ref 98–107)
CO2 SERPL-SCNC: 27 MMOL/L (ref 22–29)
CREAT SERPL-MCNC: 0.85 MG/DL (ref 0.76–1.27)
DEPRECATED RDW RBC AUTO: 51.7 FL (ref 37–54)
ERYTHROCYTE [DISTWIDTH] IN BLOOD BY AUTOMATED COUNT: 20.3 % (ref 12.3–15.4)
GFR SERPL CREATININE-BSD FRML MDRD: 114 ML/MIN/1.73
GIANT PLATELETS: ABNORMAL
GLOBULIN UR ELPH-MCNC: 3.3 GM/DL
GLUCOSE SERPL-MCNC: 119 MG/DL (ref 65–99)
HCT VFR BLD AUTO: 24.3 % (ref 37.5–51)
HGB BLD-MCNC: 7.8 G/DL (ref 13–17.7)
HYPOCHROMIA BLD QL: ABNORMAL
LYMPHOCYTES # BLD MANUAL: 0.86 10*3/MM3 (ref 0.7–3.1)
LYMPHOCYTES NFR BLD MANUAL: 4 % (ref 19.6–45.3)
LYMPHOCYTES NFR BLD MANUAL: 7.9 % (ref 5–12)
MAGNESIUM SERPL-MCNC: 2.4 MG/DL (ref 1.6–2.6)
MCH RBC QN AUTO: 23.3 PG (ref 26.6–33)
MCHC RBC AUTO-ENTMCNC: 32.1 G/DL (ref 31.5–35.7)
MCV RBC AUTO: 72.5 FL (ref 79–97)
MICROCYTES BLD QL: ABNORMAL
MONOCYTES # BLD AUTO: 1.36 10*3/MM3 (ref 0.1–0.9)
NEUTROPHILS # BLD AUTO: 15.02 10*3/MM3 (ref 1.7–7)
NEUTROPHILS NFR BLD MANUAL: 87.1 % (ref 42.7–76)
NRBC SPEC MANUAL: 23.8 /100 WBC (ref 0–0.2)
PHOSPHATE SERPL-MCNC: 3.9 MG/DL (ref 2.5–4.5)
PLATELET # BLD AUTO: 253 10*3/MM3 (ref 140–450)
PMV BLD AUTO: 9.7 FL (ref 6–12)
POIKILOCYTOSIS BLD QL SMEAR: ABNORMAL
POLYCHROMASIA BLD QL SMEAR: ABNORMAL
POTASSIUM SERPL-SCNC: 4.4 MMOL/L (ref 3.5–5.2)
PROT SERPL-MCNC: 7.2 G/DL (ref 6–8.5)
RBC # BLD AUTO: 3.35 10*6/MM3 (ref 4.14–5.8)
SODIUM SERPL-SCNC: 141 MMOL/L (ref 136–145)
TARGETS BLD QL SMEAR: ABNORMAL
VARIANT LYMPHS NFR BLD MANUAL: 1 % (ref 0–5)
WBC # BLD AUTO: 17.25 10*3/MM3 (ref 3.4–10.8)
WBC MORPH BLD: NORMAL

## 2021-11-05 PROCEDURE — 76775 US EXAM ABDO BACK WALL LIM: CPT

## 2021-11-05 PROCEDURE — 85025 COMPLETE CBC W/AUTO DIFF WBC: CPT | Performed by: INTERNAL MEDICINE

## 2021-11-05 PROCEDURE — 25010000002 ENOXAPARIN PER 10 MG: Performed by: INTERNAL MEDICINE

## 2021-11-05 PROCEDURE — 84100 ASSAY OF PHOSPHORUS: CPT | Performed by: INTERNAL MEDICINE

## 2021-11-05 PROCEDURE — 80053 COMPREHEN METABOLIC PANEL: CPT | Performed by: INTERNAL MEDICINE

## 2021-11-05 PROCEDURE — 85007 BL SMEAR W/DIFF WBC COUNT: CPT | Performed by: INTERNAL MEDICINE

## 2021-11-05 PROCEDURE — 99221 1ST HOSP IP/OBS SF/LOW 40: CPT | Performed by: INTERNAL MEDICINE

## 2021-11-05 PROCEDURE — 83735 ASSAY OF MAGNESIUM: CPT | Performed by: INTERNAL MEDICINE

## 2021-11-05 PROCEDURE — 36415 COLL VENOUS BLD VENIPUNCTURE: CPT | Performed by: INTERNAL MEDICINE

## 2021-11-05 RX ORDER — SENNA PLUS 8.6 MG/1
1 TABLET ORAL DAILY
Status: DISCONTINUED | OUTPATIENT
Start: 2021-11-05 | End: 2021-11-06 | Stop reason: HOSPADM

## 2021-11-05 RX ORDER — SENNA PLUS 8.6 MG/1
1 TABLET ORAL EVERY EVENING
COMMUNITY

## 2021-11-05 RX ORDER — NIFEDIPINE 30 MG/1
90 TABLET, EXTENDED RELEASE ORAL
Status: DISCONTINUED | OUTPATIENT
Start: 2021-11-05 | End: 2021-11-06 | Stop reason: HOSPADM

## 2021-11-05 RX ADMIN — GLUTAMINE 10 GRANULE: 5 POWDER, FOR SOLUTION ORAL at 14:08

## 2021-11-05 RX ADMIN — Medication 10 ML: at 21:03

## 2021-11-05 RX ADMIN — MORPHINE SULFATE 30 MG: 15 TABLET ORAL at 04:32

## 2021-11-05 RX ADMIN — MORPHINE SULFATE 30 MG: 15 TABLET ORAL at 11:25

## 2021-11-05 RX ADMIN — SENNOSIDES 1 TABLET: 8.6 TABLET, FILM COATED ORAL at 16:08

## 2021-11-05 RX ADMIN — MORPHINE SULFATE 120 MG: 30 TABLET, FILM COATED, EXTENDED RELEASE ORAL at 21:01

## 2021-11-05 RX ADMIN — HYDROCORTISONE 10 MG: 10 TABLET ORAL at 21:01

## 2021-11-05 RX ADMIN — GLUTAMINE 10 GRANULE: 5 POWDER, FOR SOLUTION ORAL at 21:02

## 2021-11-05 RX ADMIN — MORPHINE SULFATE 30 MG: 15 TABLET ORAL at 15:05

## 2021-11-05 RX ADMIN — ENOXAPARIN SODIUM 40 MG: 40 INJECTION SUBCUTANEOUS at 17:47

## 2021-11-05 RX ADMIN — SODIUM CHLORIDE 100 ML/HR: 9 INJECTION, SOLUTION INTRAVENOUS at 04:32

## 2021-11-05 RX ADMIN — FOLIC ACID 1 MG: 1 TABLET ORAL at 08:51

## 2021-11-05 RX ADMIN — ESCITALOPRAM 20 MG: 10 TABLET, FILM COATED ORAL at 17:46

## 2021-11-05 RX ADMIN — NIFEDIPINE 90 MG: 30 TABLET, FILM COATED, EXTENDED RELEASE ORAL at 16:08

## 2021-11-05 RX ADMIN — HYDROCORTISONE 20 MG: 10 TABLET ORAL at 08:51

## 2021-11-05 RX ADMIN — MORPHINE SULFATE 120 MG: 30 TABLET, FILM COATED, EXTENDED RELEASE ORAL at 08:53

## 2021-11-05 RX ADMIN — DICLOFENAC 2 G: 10 GEL TOPICAL at 21:01

## 2021-11-05 NOTE — PROGRESS NOTES
[facility] Medicine Services  INPATIENT PROGRESS NOTE    Patient Name: Holland Phelps  Date of Admission: 11/4/2021  Today's Date: 11/05/21  Length of Stay: 1  Primary Care Physician: Vanessa Gtz APRN    Subjective   Chief Complaint: f/u sickle crisis    HPI   Patient states he feels better today than yesterday but pain is still a 6 out of 10.  Continues to deny chest pain or shortness of breath.  His oxygen has been weaned from 5 L down to 2 L.  Sats are good.  No nausea or vomiting.  No cough or phlegm production. Afebrile overnight.      Review of Systems   All pertinent negatives and positives are as above. All other systems have been reviewed and are negative unless otherwise stated.     Objective    Temp:  [97.9 °F (36.6 °C)-99.6 °F (37.6 °C)] 98.8 °F (37.1 °C)  Heart Rate:  [66-95] 66  Resp:  [16-20] 16  BP: (139-167)/(64-81) 167/77  Physical Exam  GEN: Awake, alert, interactive, appears mildly uncomfortable  HEENT: PERRLA, EOMI, Anicteric, Trachea midline  Lungs: CTAB, no wheezing/rales/rhonchi  Heart: RRR, +S1/s2, no rub  ABD: soft, nt/nd, +BS, no guarding/rebound  Extremities: atraumatic, no cyanosis, no edema  Skin: no rashes or lesions  Neuro: AAOx3, no focal deficits  Psych: normal mood & affect      Results Review:  I have reviewed the labs, radiology results, and diagnostic studies.    Laboratory Data:   Results from last 7 days   Lab Units 11/05/21  0506 11/04/21  0958   WBC 10*3/mm3 17.25* 14.87*   HEMOGLOBIN g/dL 7.8* 8.2*   HEMATOCRIT % 24.3* 25.3*   PLATELETS 10*3/mm3 253 301        Results from last 7 days   Lab Units 11/05/21  0506 11/04/21  1408 11/04/21  0958   SODIUM mmol/L 141  --  147*   SODIUM, ARTERIAL mmol/L  --  143  --    POTASSIUM mmol/L 4.4  --  4.2   CHLORIDE mmol/L 107  --  109*   CO2 mmol/L 27.0  --  29.0   BUN mg/dL 14  --  14   CREATININE mg/dL 0.85  --  0.89   CALCIUM mg/dL 8.9  --  8.9   BILIRUBIN mg/dL 2.0*  --  3.8*   ALK PHOS  U/L 136*  --  92   ALT (SGPT) U/L 40  --  20   AST (SGOT) U/L 81*  --  56*   GLUCOSE mg/dL 119*  --  118*       Culture Data:   No results found for: BLOODCX, URINECX, WOUNDCX, MRSACX, RESPCX, STOOLCX    Radiology Data:   Imaging Results (Last 24 Hours)     Procedure Component Value Units Date/Time    US Renal Limited [[id number]] Resulted: 11/05/21 0945     Updated: 11/05/21 0945    CT Angiogram Chest [[id number]] Collected: 11/04/21 1554     Updated: 11/04/21 1602    Narrative:      CT ANGIOGRAM CHEST- 11/4/2021 3:34 PM CDT      HISTORY: sickle crisis, possible pna, r/o PE; R09.02-Hypoxemia;  J18.9-Pneumonia, unspecified organism; D57.1-Sickle-cell disease without  crisis      COMPARISON: CT scan dated 7/9/2021.      DOSE LENGTH PRODUCT: 391 mGy cm. Automated exposure control was also  utilized to decrease patient radiation dose.     TECHNIQUE: Helical tomographic images of the chest were obtained after  the administration of intravenous contrast following angiogram protocol.  Additionally, 3D and multiplanar reformatted images were provided.        FINDINGS:    Pulmonary arteries: There is adequate enhancement of the pulmonary  arteries to evaluate for central and segmental pulmonary emboli. There  are no filling defects within the main, lobar, segmental or visualized  subsegmental pulmonary arteries. The pulmonary arteries are enlarged,  consistent with pulmonary arterial hypertension.      Aorta and great vessels: Thoracic aorta is normal in caliber. No  dissection identified. No flow-limiting stenosis identified at the great  vessel origins.     Visualized neck base: The imaged portion of the base of the neck appears  unremarkable.      Lungs: Shallow inspiration with low lung volumes. Pulmonary vascular  congestion. Interlobular septal thickening in the lung bases may reflect  a mild interstitial edema. No consolidation identified. No pleural  effusion. The airways are clear.     Heart: Four-chamber  cardiomegaly. There is no pericardial effusion.      Mediastinum and lymph nodes: No suspicious hilar or mediastinal  adenopathy..     Skeletal and soft tissues: Chest wall soft tissues are unremarkable. No  acute bony abnormality. Thoracic spine degenerative change..      Upper abdomen: The imaged portion of the upper abdomen demonstrates no  acute process. Right nephrectomy. Questionable left hydronephrosis.  Prior cholecystectomy.       Impression:      1. No evidence of pulmonary embolus.  2. Four-chamber cardiomegaly with pulmonary hypertension and evidence  for interstitial edema. No inflammatory infiltrate or airspace filling  edema. No pleural effusion.  3. Prior right nephrectomy. Questionable hydronephrosis on the left,  which be especially concerning given the contralateral nephrectomy.  Consider renal ultrasound for further evaluation of suspected  hydronephrosis.        This report was finalized on 11/04/2021 15:59 by Dr James Siegel, .    XR Chest 1 View [[id number]] Collected: 11/04/21 1129     Updated: 11/04/21 1137    Narrative:      Frontal upright radiograph of the chest 11/4/2021 10:18 AM CDT     HISTORY: Chest congestion, history of sickle cell     COMPARISON: 7/9/2021.     FINDINGS:      Cardiomegaly with central pulmonary congestion. No obvious interstitial  coarsening, however, there are bilateral scattered patchy infiltrates.  No consolidation, pleural effusion or pneumothorax. No acute bony  abnormality.       Impression:      1. There are a few bilateral scattered patchy infiltrates. Atypical  pneumonia is certainly a consideration. Findings could also be  chronic/fibrotic in the setting of sickle cell, as this could reflect  areas of earlier pulmonary infarct.  2. Cardiomegaly with mild central pulmonary congestion.     This report was finalized on 11/04/2021 11:34 by Dr James Siegel, .    XR Hip With or Without Pelvis 2 - 3 View Right [[id number]] Collected: 11/04/21 1123     Updated:  11/04/21 1127    Narrative:      XR HIP W OR WO PELVIS 2-3 VIEW RIGHT- 11/4/2021 10:19 AM CDT     HISTORY: hip pain; hx of sickle cell- include pelvis     COMPARISON: None      FINDINGS:      Frontal and lateral views of the right hip were obtained.  Additional AP  pelvis.     There is no fracture or joint subluxation. The joint spaces are well  maintained.      Pelvic ring is intact. Sacral arches are intact. Partially imaged  femoral rods. No gross soft tissue abnormality is visualized.        Impression:      1. No acute osseous injury or malalignment. Pelvic ring is intact.           This report was finalized on 11/04/2021 11:24 by Dr James Siegel, .          I have reviewed the patient's current medications.     Assessment/Plan     Active Hospital Problems    Diagnosis    • **Sickle cell crisis (HCC)    • Hypoxia    • Acute pain    • Anemia of chronic disease    • Renal cell carcinoma of right kidney metastatic to other site (HCC)        #1  Sickle cell crisis -improved with hydration overnight.  O2 weaned.  Pain level down to a 6 out of 10.  Continue with current supportive care and pain meds.  IV as needed's as well.  We will hold off on PCA pump at this time as he is improving.  Continue folic acid and Endari.      #2 hypoxia -in setting of #1.  CTA is negative for PE.  Initial chest x-ray question some possible pneumonia but subsequent CT showed no infiltrates.  Do not suspect infection.  Monitoring off antibiotics.     #3 leukocytosis -again suspect stress reaction steroids.  Afebrile overnight.  CT did not show obvious pneumonia.  Monitor closely for any fevers.     #4 anemia of chronic disease -no need for transfusion currently.  Hemoglobin around 8.  Ferritin level very high.  May need to resume chelation per heme-onc but deferring to his primary tolerance.     #5 history of renal cell carcinoma -status post nephrectomy and radiation treatments.  Not currently being treated.  There was some concern for  possible left hydronephrosis on CT but subsequent ultrasound shows normal kidney.      Discharge Planning: Ongoing.  Continue supportive care.  Hopefully home in the next 24 to 48 hours if continues to improve.    Electronically signed by Chuy Patel DO, 11/05/21, 10:43 CDT.

## 2021-11-05 NOTE — PLAN OF CARE
Goal Outcome Evaluation:  Plan of Care Reviewed With: patient        Progress: improving  Outcome Summary: VSS.  Sinus 69-95.  Pt c/o ongoing constant pain in R hip and bilat shoulders.  Scheduled and PRN morphine given.  Pt was able to sleep some.  O2 sat maintained in low/mid 90s on 4L.  Good urine output.  NPO since 0000 for ultrasound of kidney today.  Safety maintained.

## 2021-11-05 NOTE — PROGRESS NOTES
[facility] Oncology/Hematology Services  FOLLOW UP NOTE    PATIENT NAME:  Holland Phelps  YOB: 1970  PATIENT MRN:  [id number]    Date of Admission:  11/4/2021  FOLLOW UP  Date:  11/5/2021  Referring Provider: Chuy Patel DO    Subjective       Reason for Consultation: Sickle cell anemia with pneumonia     History of present illness: Holland Phelps 51 y.o. male who was initially seen in consultation on 7/13/2021 when he was hospitalized for sickle cell anemia crisis (see note from below for details).  The patient presents now after washing his car in the frigid cold and developed right shoulder pains but decided to stay at home with pain control and hydration PO but this morning came in to the ER when his O2 was droppin and pain was not controlled.  Found to have a pneumonia again and admitted. The itial CXR showd concern for peneumonia but the CT scan 1. No evidence of pulmonary embolus.  2. Four-chamber cardiomegaly with pulmonary hypertension and evidence for interstitial edema. No inflammatory infiltrate or airspace filling edema. No pleural effusion. 3. Prior right nephrectomy. Questionable hydronephrosis on the left,  which be especially concerning given the contralateral nephrectomy. Consider renal ultrasound for further evaluation of suspected hydronephrosis.  He last received chemo and RT for IV renal cell ca in summer of 2020 and has been observed since.         From the hospitalization in Summer 2021: normally followed at [facility] for management of his sickle cell disease as well as previous history of nephrectomy secondary to renal cell carcinoma.  As per the notes from [place], the patient was diagnosed in childhood with hemoglobin SS disease but had a relatively mild phenotypic expression.  Based on an evaluation done at [place] on 9/25/2015, the patient had hemoglobin A of 14.6%, hemoglobin S of 78.9%, and a 2 of 6.5% suggestive of hemoglobinS/ B   Thalassemia 0.  The patient had reported that he has a painful episode once every 6 months and has had multiple transfusions in the past.     On 9/25/2015, his WBC count was 14.5, hemoglobin of 10.1, MCV of 75 and platelets of 287,000.  BUN and creatinine were normal with a bilirubin of 4.7 and LDH of 814 but had been recently found to have symptomatic cholelithiasis and was being considered for elective cholecystectomy.  Other pertinent findings were her ferritin of 1960, TIBC of 178, U IBC of 48 and a serum iron of 130 with an iron saturation of 73% back on 9/25/2015.  He denied any history of acute chest syndrome, retinopathy nor a history of stroke.  The patient did not have a history of priapism, leg ulcers.  He was noted to have a heart murmur but otherwise without complaints.  At that time he was referred to [place] for exchange transfusions preop before a surgery as he was diagnosed with stage IV kidney cancer with metastatic disease in the left sacrum which was impinging on the S3 nerve root.  Unfortunately, the patient had progressed on VOTRIENT as well as radiation therapy to the left and right femurs.  He was started on nivolumab on 12/21 every 2 weeks.  The patient does have osseous metastatic lesions that were previously treated with SBRT to the left sacrum and the left and right distal femurs and he was now receiving SBRT to the left tibia, right femur and right sacrum.  He continued to have nivolumab therapy and he complained of some intermittent leg pains but minimal discomfort at rest.  He had been taking morphine for pain relief with some improvement and was scheduled to meet with Dr. Estrada from orthopedic oncology at [place] to discuss placement of a apolinar in the right femur after finishing radiotherapy.  He was off his hydroxyurea due to radiosensitization.  He was on CV-839 study drug offer 2 weeks due to hyperbilirubinemia.  He is status post the right lap nephrectomy for tumor  debulking.       When the patient was seen in the office in [place] on April 9, 2021 he was still off hydroxyurea while receiving chemotherapy for his renal cell carcinoma and had complained that his fatigue had improved and the pain was well controlled.  He completed radiation therapy 1 week prior and felt that he was recovering well.  He did not have any skin toxicities at that time nor pain related to radiation therapy.  He is seeing Dr. Earl from [place] for management of his metastatic renal cell carcinoma and was being managed with pembrolizumab with the plan to restart him on axitinib.       When he was seen in the office last in April 2021, he was doing well off his hydroxyurea and had not experienced any acute vaso-occlusive pain episodes nor other vaso-occlusive complications.  As per the notes, the patient had previously been on 1000 mg p.o. daily of hydroxyurea with folic acid 1 mg p.o. daily but hydroxyurea was held during the time of palliative RT.  The plan was to restart the hydroxyurea in the months that followed.  The patient was also previously taking 750 mg of p.o. Exjade with improvement in his ferritin levels but the most recent ferritin level was 1563.     He was being followed by medical oncology for the metastatic renal cell carcinoma with mets to the bone and had been through multiple chemotherapies.  He also gets denosumab for bone protection.  He had completed radiation therapy and in June 2020 was put on cabozanitinib 40 mg which was held in October 2020 for cellulitis and lymphedema.  He was undergoing treatment in [place] for his lymphedema.     Pain management for sickle cell was ibuprofen 600 mg every 6 hours as needed for pain, Lortab 10/325 mg 1 tablet every 6 hours as needed for pain during an acute crisis     Dr. Patel from the hospitalist team called me today as the patient had been admitted to [facility] with a sickle cell crisis on 7/9/2021 and was  "placed on a PCA pump which had controlling his pain well -- he is off the PCA pump now with \"good control of pain..  A CT scan that was done to rule out a pulmonary embolism had actually showed a likely pneumonia and the patient was started on Levaquin therapy.  He was transfused 1 unit of blood earlier in his stay with a stabilized H&H.     Hematology was called to help with possible need to restart hydroxyurea.  As per patient, he has been off chemo and RT for about 7 months but did not restart the hydrea, previously on 1000 mg hydrea daily.  He is scheduled to go back to [place] on 8/12/21 to have a restaging and to see Dr. Earl, his oncologist.  Hematology took him off Exjade as well.     On follow up on 11.5.21: Noted to have an O2 sat in the 90's on 4 L O2.   He is feeling much better this morning, pain well controlled. No BM since coming into the hospital.  He is hungry but not able to eat as awaiting a renal US    Past Medical History:  Past Medical History:   Diagnosis Date   • Cancer (HCC) 2016    Kidney cancer with METS to bone   • Gallstones    • Pneumonia    • Sickle cell      Prior Surgeries:  Past Surgical History:   Procedure Laterality Date   • CHOLECYSTECTOMY     • LEG SURGERY     • NEPHRECTOMY Right         Allergies:Patient has no known allergies.  PTA Meds:  Medications Prior to Admission   Medication Sig Dispense Refill Last Dose   • Glutamine, Sickle Cell, (Endari) 5 g pack Take 10 g by mouth 2 (Two) Times a Day.      • Morphine (MS CONTIN) 60 MG 12 hr tablet Take 120 mg by mouth 2 (Two) Times a Day.      • Morphine (MSIR) 30 MG tablet Take 30 mg by mouth Every 4 (Four) Hours As Needed for Severe Pain .      • escitalopram (LEXAPRO) 20 MG tablet Take 20 mg by mouth Every Evening.      • folic acid (FOLVITE) 1 MG tablet Take 1 mg by mouth Daily.      • HYDROcodone-acetaminophen (NORCO)  MG per tablet Take 1 tablet by mouth Every 6 (Six) Hours As Needed for Moderate Pain .      • " hydrocortisone (CORTEF) 10 MG tablet 20 mg Daily.      • hydrocortisone (CORTEF) 10 MG tablet Take 10 mg by mouth every night at bedtime.      • lactulose (CHRONULAC) 10 GM/15ML solution Take 20 g by mouth 2 (Two) Times a Day As Needed.      • NIFEdipine XL (PROCARDIA XL) 90 MG 24 hr tablet Take 90 mg by mouth Daily.         Current Meds:   Current Facility-Administered Medications   Medication Dose Route Frequency Provider Last Rate Last Admin   • acetaminophen (TYLENOL) tablet 650 mg  650 mg Oral Q4H PRN Chuy Patel DO       • enoxaparin (LOVENOX) syringe 40 mg  40 mg Subcutaneous Q24H Chuy Patel DO   40 mg at 11/04/21 1816   • escitalopram (LEXAPRO) tablet 20 mg  20 mg Oral Q PM Chuy Patel DO       • folic acid (FOLVITE) tablet 1 mg  1 mg Oral Daily Chuy Patel DO       • hydrocortisone (CORTEF) tablet 10 mg  10 mg Oral Nightly Chuy Patel DO   10 mg at 11/04/21 2132   • hydrocortisone (CORTEF) tablet 20 mg  20 mg Oral Daily Chuy Patel DO       • influenza vac split quad (FLUZONE,FLUARIX,AFLURIA,FLULAVAL) injection 0.5 mL  0.5 mL Intramuscular During Hospitalization Chuy Patel DO       • lactulose solution 20 g  20 g Oral BID PRN Chuy Patel DO       • Morphine (MS CONTIN) 12 hr tablet 120 mg  120 mg Oral BID Chuy Patel DO   120 mg at 11/04/21 2154   • Morphine (MSIR) tablet 30 mg  30 mg Oral Q4H PRN Chuy Patel DO   30 mg at 11/05/21 0432   • ondansetron (ZOFRAN) injection 4 mg  4 mg Intravenous Q6H PRN Chuy Patel DO       • sodium chloride 0.9 % flush 10 mL  10 mL Intravenous Q12H Chuy Patel DO   10 mL at 11/04/21 2133   • sodium chloride 0.9 % flush 10 mL  10 mL Intravenous PRN Chuy Patel DO       • sodium chloride 0.9 % infusion  100 mL/hr Intravenous Continuous Chuy Patel  mL/hr at 11/05/21 0432 100 mL/hr at 11/05/21 0432       Family History:family history includes Anemia in his cousin; Heart disease in his  mother; Hypertension in his father and mother; Kidney failure in his mother; Leukemia in his maternal grandmother and another family member; Sickle cell trait in his cousin, daughter, and father.   Social History: reports that he has quit smoking. He does not have any smokeless tobacco history on file. He reports previous alcohol use. He reports current drug use. Drug: Marijuana.    Review of Systems  I reviewed the ROS as documented here and confirmed the accuracy of it with the patient today. 11/5/2021     Review of Systems   Constitutional: Positive for activity change. Negative for appetite change, chills, fatigue, fever and unexpected weight change.        Improved pain on 11.5.21   HENT: Negative.  Negative for congestion, dental problem, ear pain, hearing loss, mouth sores, nosebleeds, sore throat and trouble swallowing.    Eyes: Negative.    Respiratory: Negative.  Negative for apnea, cough, chest tightness, shortness of breath and wheezing.    Cardiovascular: Negative.  Negative for chest pain, palpitations and leg swelling.   Gastrointestinal: Negative.  Negative for abdominal distention, abdominal pain, blood in stool, constipation, diarrhea, nausea and vomiting.   Genitourinary: Negative.  Negative for difficulty urinating, frequency, hematuria and urgency.        Hesitation of urine   Musculoskeletal: Negative for arthralgias, gait problem and neck stiffness.        Bilateral shoulder pains  Worsening on this admission    Chronic swelling in the left LE after radiation to the left iliac chain and around    Skin: Negative for pallor and rash.   Allergic/Immunologic: Negative for environmental allergies.   Neurological: Negative.  Negative for dizziness, seizures, syncope, weakness, light-headedness, numbness and headaches.   Hematological: Negative.  Negative for adenopathy. Does not bruise/bleed easily.   Psychiatric/Behavioral: Negative.  Negative for confusion and suicidal ideas. The patient is not  nervous/anxious.          Objective    I have reexamined the patient and the results are consistent with the previously documented exam. Amit Goldberg, MD     Vital Signs   Temp:  [97.9 °F (36.6 °C)-99.6 °F (37.6 °C)] 99 °F (37.2 °C)  Heart Rate:  [70-95] 70  Resp:  [16-20] 16  BP: (139-161)/(64-81) 150/69    Physical Exam  Constitutional:       Appearance: Normal appearance. He is normal weight.   HENT:      Head: Normocephalic and atraumatic.      Nose: Nose normal.      Mouth/Throat:      Mouth: Mucous membranes are dry.   Eyes:      General: Scleral icterus present.      Pupils: Pupils are equal, round, and reactive to light.   Cardiovascular:      Rate and Rhythm: Normal rate and regular rhythm.      Pulses: Normal pulses.      Heart sounds: Gallop present. S3 sounds present.    Pulmonary:      Effort: Pulmonary effort is normal.      Breath sounds: Normal breath sounds.   Abdominal:      General: Abdomen is flat. Bowel sounds are normal. There is distension.      Palpations: Abdomen is soft.      Comments: Distention of abdomen precluding proper evaluation for HSM   Musculoskeletal:         General: Normal range of motion.      Cervical back: Neck supple.        Legs:       Comments: Hardening of the area described above   Skin:     General: Skin is warm and dry.   Neurological:      Mental Status: He is alert and oriented to person, place, and time.      Comments: Drowsy but awake, AOx3   Psychiatric:         Mood and Affect: Mood normal.         Behavior: Behavior normal.          Results from last 7 days   Lab Units 11/04/21  0958   WBC 10*3/mm3 14.87*   HEMOGLOBIN g/dL 8.2*   HEMATOCRIT % 25.3*   PLATELETS 10*3/mm3 301        Results from last 7 days   Lab Units 11/05/21  0506 11/04/21  1408 11/04/21  0958   SODIUM mmol/L 141  --  147*   SODIUM, ARTERIAL mmol/L  --  143  --    POTASSIUM mmol/L 4.4  --  4.2   CHLORIDE mmol/L 107  --  109*   CO2 mmol/L 27.0  --  29.0   BUN mg/dL 14  --  14   CREATININE mg/dL  0.85  --  0.89   CALCIUM mg/dL 8.9  --  8.9   BILIRUBIN mg/dL 2.0*  --  3.8*   ALK PHOS U/L 136*  --  92   ALT (SGPT) U/L 40  --  20   AST (SGOT) U/L 81*  --  56*   GLUCOSE mg/dL 119*  --  118*       ABG:    pH, Arterial   Date Value Ref Range Status   11/04/2021 7.325 (L) 7.350 - 7.450 pH units Final     Comment:     84 Value below reference range     pO2, Arterial   Date Value Ref Range Status   11/04/2021 76.8 (L) 83.0 - 108.0 mm Hg Final     Comment:     84 Value below reference range     pCO2, Arterial   Date Value Ref Range Status   11/04/2021 57.3 (H) 35.0 - 45.0 mm Hg Final     Comment:     83 Value above reference range       Culture Data:   No results found for: BLOODCX, URINECX, WOUNDCX, MRSACX, RESPCX, STOOLCX    Radiology:   Imaging Results (Last 7 Days)     Procedure Component Value Units Date/Time    CT Angiogram Chest [[id number]] Collected: 11/04/21 1554     Updated: 11/04/21 1602    Narrative:      CT ANGIOGRAM CHEST- 11/4/2021 3:34 PM CDT      HISTORY: sickle crisis, possible pna, r/o PE; R09.02-Hypoxemia;  J18.9-Pneumonia, unspecified organism; D57.1-Sickle-cell disease without  crisis      COMPARISON: CT scan dated 7/9/2021.      DOSE LENGTH PRODUCT: 391 mGy cm. Automated exposure control was also  utilized to decrease patient radiation dose.     TECHNIQUE: Helical tomographic images of the chest were obtained after  the administration of intravenous contrast following angiogram protocol.  Additionally, 3D and multiplanar reformatted images were provided.        FINDINGS:    Pulmonary arteries: There is adequate enhancement of the pulmonary  arteries to evaluate for central and segmental pulmonary emboli. There  are no filling defects within the main, lobar, segmental or visualized  subsegmental pulmonary arteries. The pulmonary arteries are enlarged,  consistent with pulmonary arterial hypertension.      Aorta and great vessels: Thoracic aorta is normal in caliber. No  dissection identified. No  flow-limiting stenosis identified at the great  vessel origins.     Visualized neck base: The imaged portion of the base of the neck appears  unremarkable.      Lungs: Shallow inspiration with low lung volumes. Pulmonary vascular  congestion. Interlobular septal thickening in the lung bases may reflect  a mild interstitial edema. No consolidation identified. No pleural  effusion. The airways are clear.     Heart: Four-chamber cardiomegaly. There is no pericardial effusion.      Mediastinum and lymph nodes: No suspicious hilar or mediastinal  adenopathy..     Skeletal and soft tissues: Chest wall soft tissues are unremarkable. No  acute bony abnormality. Thoracic spine degenerative change..      Upper abdomen: The imaged portion of the upper abdomen demonstrates no  acute process. Right nephrectomy. Questionable left hydronephrosis.  Prior cholecystectomy.       Impression:      1. No evidence of pulmonary embolus.  2. Four-chamber cardiomegaly with pulmonary hypertension and evidence  for interstitial edema. No inflammatory infiltrate or airspace filling  edema. No pleural effusion.  3. Prior right nephrectomy. Questionable hydronephrosis on the left,  which be especially concerning given the contralateral nephrectomy.  Consider renal ultrasound for further evaluation of suspected  hydronephrosis.        This report was finalized on 11/04/2021 15:59 by Dr James Siegel, .    XR Chest 1 View [[id number]] Collected: 11/04/21 1129     Updated: 11/04/21 1137    Narrative:      Frontal upright radiograph of the chest 11/4/2021 10:18 AM CDT     HISTORY: Chest congestion, history of sickle cell     COMPARISON: 7/9/2021.     FINDINGS:      Cardiomegaly with central pulmonary congestion. No obvious interstitial  coarsening, however, there are bilateral scattered patchy infiltrates.  No consolidation, pleural effusion or pneumothorax. No acute bony  abnormality.       Impression:      1. There are a few bilateral scattered  patchy infiltrates. Atypical  pneumonia is certainly a consideration. Findings could also be  chronic/fibrotic in the setting of sickle cell, as this could reflect  areas of earlier pulmonary infarct.  2. Cardiomegaly with mild central pulmonary congestion.     This report was finalized on 11/04/2021 11:34 by Dr James Siegel, .    XR Hip With or Without Pelvis 2 - 3 View Right [[id number]] Collected: 11/04/21 1123     Updated: 11/04/21 1127    Narrative:      XR HIP W OR WO PELVIS 2-3 VIEW RIGHT- 11/4/2021 10:19 AM CDT     HISTORY: hip pain; hx of sickle cell- include pelvis     COMPARISON: None      FINDINGS:      Frontal and lateral views of the right hip were obtained.  Additional AP  pelvis.     There is no fracture or joint subluxation. The joint spaces are well  maintained.      Pelvic ring is intact. Sacral arches are intact. Partially imaged  femoral rods. No gross soft tissue abnormality is visualized.        Impression:      1. No acute osseous injury or malalignment. Pelvic ring is intact.           This report was finalized on 11/04/2021 11:24 by Dr James Siegel, .          Pathology Results:   No results found for: PATHINTERP             Assessment/Plan       Assessment/Plans:   Date  3/29/2021  7/9/2021  7/11/2021  7/12/21 7/13/21  9.3.21  10.14.21  11.4.21  11.5.21     WBC  10.2    14.54  14.18  13.2  13.2  11.6  14.87  17.25     Hb  8.7    8.3  7.6  7.7  8.1  8.8  8.2  7.8     MCV  70    72.7  73.2  72.8  75  73  72.9  72.5     Plt  276    224  225  254  296  252  301  253     ANC          9.10  9.62  8.27  10.48       ALC          1.23      1.25       AMC          1.37      2.81       AEC         1.24             ABC         0.13      0.16       nRBC           11 11 43.2       Ferritin    1328            9647( !)       Iron                       Iron SAT                       Transferrin                       TIBC                       Hapto                       LDH    211  417    274      333        Víctor                       Harley                       Zinc                       Retic          2.67      2.87%       B12                       Folate                       Hep panel                       HIV                       Creatinine    0.99            0.89  0.85     Glucose    108            118  119     ALT    7            20  40     AST    17            56  81     Alk phos    80            92  136     EGFR               108  114     Total bilirubin    2.7            3.8  2.0     Uric acid          7.4              CRP                9.74        Covid               Neg                                  **HbS/Beta Thal 0  ** crisis after exposure to cold  -Being treated at [facility] and has very infrequent flares  -Admitted to [place] with an acute pain episode requiring pain management and is currently on a PCA pump with adequate pain control in the smer of 2021. Admitted on 11.4.21 with pneumonia  - last seen at [facility] on 10.15.21: Patient was seen for follow-up appointment and is being managed by pain and symptom management.  Previously discussed Endari for potential disease modifying treatment and it was discussed that may assist with the pain and provide patient with a little energy and as he was interested in starting the medication, Endari 10 bid was started September 1, 2021(PATIENT'S WIFE HAS A HOME SUPPLY as [facility]and the patient was able to tell the difference.  The hydroxyurea therapy was placed on hold while he was receiving chemotherapy for renal cell carcinoma and the plan was for the team to collaborate with oncology for plans of moving forward.  He was off immunotherapy since the first week of June and is being followed by [facility] for management of metastatic renal cell carcinoma as well as his sickle cell anemia.     TREATMENT:  · Patients should receive folic acid 1 mg daily for life  · Continue incentive  spirometry  · Patient appears to have been doing well on Endari therapy 10 mg daily.  This can be continued and patient's        wife has her own supply which was given to pharmacy to confirm and dispense while he is in the hopital  · Pneumococcal vaccine is important if he has not yet received it and after completion of treatment for recurrent          pneumonia  · Hydrea  has been on hold since the patient was on chemotherapy for renal cell carcinoma -would not start the    medication at this time and patient can discuss this with his renal team as well as hematology upon discharge from     this admission  · Indications for RBC transfusions:  § Acute chest syndrome  § Stroke prevention and treatment  § Aplastic and sequestration crisis  § Multiorgan failure  § Transfusion prior to surgery  · Management of complications:  § In vascular crises, patients should be kept warm and given adequate hydration and pain control. Oxygen only  helps in hypoxic patients and overhydration should be avoided  § Patients who undergo anesthesia are at increased risk for crisis and should be observed closely for development of  hypoxia or acidosis  § Acute chest syndrome is life threatening and exchange transfusions appear to be beneficial along with adequate pain control, bronchodilators, antibiotics and incentive spirometry  § Priapism should be treated by rapid hydration, RBC transfusions and pain control while urgent urological evaluation is requested  § Patients should have a yearly auditory and ophthalmologic exam while the patient is on an iron chelator           **HIGH FERRITIN and increasing   ** Likely secondary to transfusions as well as acute inflammation due to the infection  - was taken off the Exjade as well as the hydrea while he was on the chemo but will rediscuss with hematology getting back on the Exjade.    - discussed with patient that will probably need to discuss iron chelation with OP hematologist        **  Infection status:   -Covid testing is negative, s/p MODERNA with second dose in April 201  -Patient currently admitted to [facility] question for pneumonia (CXR looks suspicious but CT scan looks more like interstitial oedema but no infiltrate noted).Thsi being managed by medicine team and patient is on antibiotics, would be cautious with IV fluids     **Metabolic / Renal:   · History of metastatic renal cell carcinoma being followed and treated at [facility], status post multiple chemotherapies and radiation which finished in Summer 2020        ** Pain control:   -Currently should be  managed with a PCA but protocol from [facility] was MORPHINE IR 30 mg q 3 hours prn, MORPHINE  mg 12 hours (which he takes all the time)         Thank you for allowing me to participate in the care of this patient.  Amit Goldberg, MD  Hematology/oncology

## 2021-11-06 ENCOUNTER — READMISSION MANAGEMENT (OUTPATIENT)
Dept: CALL CENTER | Facility: HOSPITAL | Age: 51
End: 2021-11-06

## 2021-11-06 VITALS
OXYGEN SATURATION: 93 % | DIASTOLIC BLOOD PRESSURE: 57 MMHG | SYSTOLIC BLOOD PRESSURE: 128 MMHG | BODY MASS INDEX: 28.63 KG/M2 | TEMPERATURE: 98.6 F | RESPIRATION RATE: 18 BRPM | WEIGHT: 200 LBS | HEART RATE: 81 BPM | HEIGHT: 70 IN

## 2021-11-06 LAB
ALBUMIN SERPL-MCNC: 4 G/DL (ref 3.5–5.2)
ALBUMIN/GLOB SERPL: 1.3 G/DL
ALP SERPL-CCNC: 147 U/L (ref 39–117)
ALT SERPL W P-5'-P-CCNC: 32 U/L (ref 1–41)
ANION GAP SERPL CALCULATED.3IONS-SCNC: 6 MMOL/L (ref 5–15)
ANISOCYTOSIS BLD QL: ABNORMAL
AST SERPL-CCNC: 44 U/L (ref 1–40)
BASO STIPL COARSE BLD QL SMEAR: ABNORMAL
BILIRUB SERPL-MCNC: 1.7 MG/DL (ref 0–1.2)
BUN SERPL-MCNC: 20 MG/DL (ref 6–20)
BUN/CREAT SERPL: 22.5 (ref 7–25)
CALCIUM SPEC-SCNC: 8.8 MG/DL (ref 8.6–10.5)
CHLORIDE SERPL-SCNC: 104 MMOL/L (ref 98–107)
CO2 SERPL-SCNC: 31 MMOL/L (ref 22–29)
CREAT SERPL-MCNC: 0.89 MG/DL (ref 0.76–1.27)
DEPRECATED RDW RBC AUTO: 50.5 FL (ref 37–54)
EOSINOPHIL # BLD MANUAL: 0.28 10*3/MM3 (ref 0–0.4)
EOSINOPHIL NFR BLD MANUAL: 2 % (ref 0.3–6.2)
ERYTHROCYTE [DISTWIDTH] IN BLOOD BY AUTOMATED COUNT: 19.8 % (ref 12.3–15.4)
GFR SERPL CREATININE-BSD FRML MDRD: 108 ML/MIN/1.73
GIANT PLATELETS: ABNORMAL
GLOBULIN UR ELPH-MCNC: 3 GM/DL
GLUCOSE SERPL-MCNC: 116 MG/DL (ref 65–99)
HCT VFR BLD AUTO: 24 % (ref 37.5–51)
HGB BLD-MCNC: 8 G/DL (ref 13–17.7)
HYPOCHROMIA BLD QL: ABNORMAL
LYMPHOCYTES # BLD MANUAL: 1.43 10*3/MM3 (ref 0.7–3.1)
LYMPHOCYTES NFR BLD MANUAL: 10.2 % (ref 19.6–45.3)
LYMPHOCYTES NFR BLD MANUAL: 8.2 % (ref 5–12)
MACROCYTES BLD QL SMEAR: ABNORMAL
MCH RBC QN AUTO: 24.3 PG (ref 26.6–33)
MCHC RBC AUTO-ENTMCNC: 33.3 G/DL (ref 31.5–35.7)
MCV RBC AUTO: 72.9 FL (ref 79–97)
MICROCYTES BLD QL: ABNORMAL
MONOCYTES # BLD AUTO: 1.15 10*3/MM3 (ref 0.1–0.9)
NEUTROPHILS # BLD AUTO: 11.13 10*3/MM3 (ref 1.7–7)
NEUTROPHILS NFR BLD MANUAL: 74.5 % (ref 42.7–76)
NEUTS BAND NFR BLD MANUAL: 5.1 % (ref 0–5)
NRBC SPEC MANUAL: 14.3 /100 WBC (ref 0–0.2)
PLATELET # BLD AUTO: 262 10*3/MM3 (ref 140–450)
PMV BLD AUTO: 10.3 FL (ref 6–12)
POIKILOCYTOSIS BLD QL SMEAR: ABNORMAL
POLYCHROMASIA BLD QL SMEAR: ABNORMAL
POTASSIUM SERPL-SCNC: 3.7 MMOL/L (ref 3.5–5.2)
PROT SERPL-MCNC: 7 G/DL (ref 6–8.5)
RBC # BLD AUTO: 3.29 10*6/MM3 (ref 4.14–5.8)
SODIUM SERPL-SCNC: 141 MMOL/L (ref 136–145)
TARGETS BLD QL SMEAR: ABNORMAL
WBC # BLD AUTO: 13.98 10*3/MM3 (ref 3.4–10.8)
WBC MORPH BLD: NORMAL

## 2021-11-06 PROCEDURE — 85025 COMPLETE CBC W/AUTO DIFF WBC: CPT | Performed by: INTERNAL MEDICINE

## 2021-11-06 PROCEDURE — 80053 COMPREHEN METABOLIC PANEL: CPT | Performed by: INTERNAL MEDICINE

## 2021-11-06 PROCEDURE — 85007 BL SMEAR W/DIFF WBC COUNT: CPT | Performed by: INTERNAL MEDICINE

## 2021-11-06 PROCEDURE — 99221 1ST HOSP IP/OBS SF/LOW 40: CPT | Performed by: INTERNAL MEDICINE

## 2021-11-06 RX ADMIN — FOLIC ACID 1 MG: 1 TABLET ORAL at 08:06

## 2021-11-06 RX ADMIN — NIFEDIPINE 90 MG: 30 TABLET, FILM COATED, EXTENDED RELEASE ORAL at 08:05

## 2021-11-06 RX ADMIN — MORPHINE SULFATE 120 MG: 30 TABLET, FILM COATED, EXTENDED RELEASE ORAL at 08:06

## 2021-11-06 RX ADMIN — GLUTAMINE 10 GRANULE: 5 POWDER, FOR SOLUTION ORAL at 08:07

## 2021-11-06 RX ADMIN — DICLOFENAC 2 G: 10 GEL TOPICAL at 08:06

## 2021-11-06 RX ADMIN — HYDROCORTISONE 20 MG: 10 TABLET ORAL at 08:05

## 2021-11-06 RX ADMIN — SENNOSIDES 1 TABLET: 8.6 TABLET, FILM COATED ORAL at 08:06

## 2021-11-06 NOTE — PROGRESS NOTES
[facility] Oncology/Hematology Services  FOLLOW UP NOTE    PATIENT NAME:  Holland Phelps  YOB: 1970  PATIENT MRN:  [id number]    Date of Admission:  11/4/2021  FOLLOW UP  Date:  11/6/2021  Referring Provider: Chuy Patel DO    Subjective       Reason for Consultation: Sickle cell anemia with pneumonia     History of present illness: Holland Phelps 51 y.o. male who was initially seen in consultation on 7/13/2021 when he was hospitalized for sickle cell anemia crisis (see note from below for details).  The patient presents now after washing his car in the frigid cold and developed right shoulder pains but decided to stay at home with pain control and hydration PO but this morning came in to the ER when his O2 was droppin and pain was not controlled.  Found to have a pneumonia again and admitted. The itial CXR showd concern for peneumonia but the CT scan 1. No evidence of pulmonary embolus.  2. Four-chamber cardiomegaly with pulmonary hypertension and evidence for interstitial edema. No inflammatory infiltrate or airspace filling edema. No pleural effusion. 3. Prior right nephrectomy. Questionable hydronephrosis on the left,  which be especially concerning given the contralateral nephrectomy. Consider renal ultrasound for further evaluation of suspected hydronephrosis.  He last received chemo and RT for IV renal cell ca in summer of 2020 and has been observed since.         From the hospitalization in Summer 2021: normally followed at [facility] for management of his sickle cell disease as well as previous history of nephrectomy secondary to renal cell carcinoma.  As per the notes from [place], the patient was diagnosed in childhood with hemoglobin SS disease but had a relatively mild phenotypic expression.  Based on an evaluation done at [place] on 9/25/2015, the patient had hemoglobin A of 14.6%, hemoglobin S of 78.9%, and a 2 of 6.5% suggestive of hemoglobinS/ B   Thalassemia 0.  The patient had reported that he has a painful episode once every 6 months and has had multiple transfusions in the past.     On 9/25/2015, his WBC count was 14.5, hemoglobin of 10.1, MCV of 75 and platelets of 287,000.  BUN and creatinine were normal with a bilirubin of 4.7 and LDH of 814 but had been recently found to have symptomatic cholelithiasis and was being considered for elective cholecystectomy.  Other pertinent findings were her ferritin of 1958, TIBC of 178, U IBC of 48 and a serum iron of 130 with an iron saturation of 73% back on 9/25/2015.  He denied any history of acute chest syndrome, retinopathy nor a history of stroke.  The patient did not have a history of priapism, leg ulcers.  He was noted to have a heart murmur but otherwise without complaints.  At that time he was referred to [facility] for exchange transfusions preop before a surgery as he was diagnosed with stage IV kidney cancer with metastatic disease in the left sacrum which was impinging on the S3 nerve root.  Unfortunately, the patient had progressed on VOTRIENT as well as radiation therapy to the left and right femurs.  He was started on nivolumab on 12/21 every 2 weeks.  The patient does have osseous metastatic lesions that were previously treated with SBRT to the left sacrum and the left and right distal femurs and he was now receiving SBRT to the left tibia, right femur and right sacrum.  He continued to have nivolumab therapy and he complained of some intermittent leg pains but minimal discomfort at rest.  He had been taking morphine for pain relief with some improvement and was scheduled to meet with Dr. Estrada from orthopedic oncology at [facility] to discuss placement of a apolinar in the right femur after finishing radiotherapy.  He was off his hydroxyurea due to radiosensitization.  He was on [id number] study drug offer 2 weeks due to hyperbilirubinemia.  He is status post the right lap nephrectomy for tumor  debulking.       When the patient was seen in the office in [place] on April 9, 2021 he was still off hydroxyurea while receiving chemotherapy for his renal cell carcinoma and had complained that his fatigue had improved and the pain was well controlled.  He completed radiation therapy 1 week prior and felt that he was recovering well.  He did not have any skin toxicities at that time nor pain related to radiation therapy.  He is seeing Dr. Earl from [place] for management of his metastatic renal cell carcinoma and was being managed with pembrolizumab with the plan to restart him on axitinib.       When he was seen in the office last in April 2021, he was doing well off his hydroxyurea and had not experienced any acute vaso-occlusive pain episodes nor other vaso-occlusive complications.  As per the notes, the patient had previously been on 1000 mg p.o. daily of hydroxyurea with folic acid 1 mg p.o. daily but hydroxyurea was held during the time of palliative RT.  The plan was to restart the hydroxyurea in the months that followed.  The patient was also previously taking 750 mg of p.o. Exjade with improvement in his ferritin levels but the most recent ferritin level was 1563.     He was being followed by medical oncology for the metastatic renal cell carcinoma with mets to the bone and had been through multiple chemotherapies.  He also gets denosumab for bone protection.  He had completed radiation therapy and in June 2020 was put on cabozanitinib 40 mg which was held in October 2020 for cellulitis and lymphedema.  He was undergoing treatment in [place] for his lymphedema.     Pain management for sickle cell was ibuprofen 600 mg every 6 hours as needed for pain, Lortab 10/325 mg 1 tablet every 6 hours as needed for pain during an acute crisis     Dr. Patel from the hospitalist team called me today as the patient had been admitted to [facility] with a sickle cell crisis on 7/9/2021 and was  "placed on a PCA pump which had controlling his pain well -- he is off the PCA pump now with \"good control of pain..  A CT scan that was done to rule out a pulmonary embolism had actually showed a likely pneumonia and the patient was started on Levaquin therapy.  He was transfused 1 unit of blood earlier in his stay with a stabilized H&H.     Hematology was called to help with possible need to restart hydroxyurea.  As per patient, he has been off chemo and RT for about 7 months but did not restart the hydrea, previously on 1000 mg hydrea daily.  He is scheduled to go back to [place] on 8/12/21 to have a restaging and to see Dr. Earl, his oncologist.  Hematology took him off Exjade as well.     On follow up on 11.5.21: Noted to have an O2 sat in the 90's on 4 L O2.   He is feeling much better this morning, pain well controlled. No BM since coming into the hospital.  He is hungry but not able to eat as awaiting a renal US    On follow-up on 11/6/2021: Pain continues to improve, O2 being weaned and as such PCA pump was held.  He stated he is feeling much much better this morning      Past Medical History:  Past Medical History:   Diagnosis Date   • Cancer (HCC) 2016    Kidney cancer with METS to bone   • Gallstones    • Pneumonia    • Sickle cell      Prior Surgeries:  Past Surgical History:   Procedure Laterality Date   • CHOLECYSTECTOMY     • LEG SURGERY     • NEPHRECTOMY Right         Allergies:Patient has no known allergies.  PTA Meds:  Medications Prior to Admission   Medication Sig Dispense Refill Last Dose   • Diclofenac Sodium (VOLTAREN) 1 % gel gel Apply 2 g topically to the appropriate area as directed 2 (Two) Times a Day.      • Glutamine, Sickle Cell, (Endari) 5 g pack Take 10 g by mouth 2 (Two) Times a Day.      • linaclotide (LINZESS) 290 MCG capsule capsule Take 145 mcg by mouth Every Morning Before Breakfast.      • Morphine (MS CONTIN) 60 MG 12 hr tablet Take 120 mg by mouth 2 (Two) Times a Day. "      • Morphine (MSIR) 30 MG tablet Take 30 mg by mouth Every 4 (Four) Hours As Needed for Severe Pain .      • senna (senna) 8.6 MG tablet Take 1 tablet by mouth Daily.      • escitalopram (LEXAPRO) 20 MG tablet Take 20 mg by mouth Every Evening.      • folic acid (FOLVITE) 1 MG tablet Take 1 mg by mouth Daily.      • hydrocortisone (CORTEF) 10 MG tablet Take  by mouth 2 (Two) Times a Day. Take 2 tablets in the morning and 1 tablet in the evening      • lactulose (CHRONULAC) 10 GM/15ML solution Take 20 g by mouth 2 (Two) Times a Day As Needed.      • NIFEdipine XL (PROCARDIA XL) 90 MG 24 hr tablet Take 90 mg by mouth Daily.         Current Meds:   Current Facility-Administered Medications   Medication Dose Route Frequency Provider Last Rate Last Admin   • acetaminophen (TYLENOL) tablet 650 mg  650 mg Oral Q4H PRN Chuy Patel DO       • Diclofenac Sodium (VOLTAREN) 1 % gel 2 g  2 g Topical BID Chuy Patel DO   2 g at 11/05/21 2101   • enoxaparin (LOVENOX) syringe 40 mg  40 mg Subcutaneous Q24H Chuy Patel DO   40 mg at 11/05/21 1747   • escitalopram (LEXAPRO) tablet 20 mg  20 mg Oral Q PM Chuy Patel DO   20 mg at 11/05/21 1746   • folic acid (FOLVITE) tablet 1 mg  1 mg Oral Daily Chuy Patel DO   1 mg at 11/05/21 0851   • Glutamine (Sickle Cell) pack 10 granule  10 granule Oral BID Chuy Patel DO   10 granule at 11/05/21 2102   • hydrocortisone (CORTEF) tablet 10 mg  10 mg Oral Nightly Chuy Patel DO   10 mg at 11/05/21 2101   • hydrocortisone (CORTEF) tablet 20 mg  20 mg Oral Daily Chuy Patel DO   20 mg at 11/05/21 0851   • influenza vac split quad (FLUZONE,FLUARIX,AFLURIA,FLULAVAL) injection 0.5 mL  0.5 mL Intramuscular During Hospitalization Chuy Patel DO       • lactulose solution 20 g  20 g Oral BID PRN Chuy Patel DO       • Morphine (MS CONTIN) 12 hr tablet 120 mg  120 mg Oral BID Chuy Patel,    120 mg at 11/05/21 2101   • Morphine ([facility])  tablet 30 mg  30 mg Oral Q4H PRN Chuy Patel DO   30 mg at 11/05/21 1505   • morphine injection 4 mg  4 mg Intravenous Q4H PRN Chuy Patel DO       • NIFEdipine XL (PROCARDIA XL) 24 hr tablet 90 mg  90 mg Oral Q24H Chuy Patel DO   90 mg at 11/05/21 1608   • ondansetron (ZOFRAN) injection 4 mg  4 mg Intravenous Q6H PRN Chuy Patel DO       • senna (SENOKOT) tablet 1 tablet  1 tablet Oral Daily Chuy Patel DO   1 tablet at 11/05/21 1608   • sodium chloride 0.9 % flush 10 mL  10 mL Intravenous Q12H Chuy Patel DO   10 mL at 11/05/21 2103   • sodium chloride 0.9 % flush 10 mL  10 mL Intravenous PRN Chuy Patel DO           Family History:family history includes Anemia in his cousin; Heart disease in his mother; Hypertension in his father and mother; Kidney failure in his mother; Leukemia in his maternal grandmother and another family member; Sickle cell trait in his cousin, daughter, and father.   Social History: reports that he has quit smoking. He does not have any smokeless tobacco history on file. He reports previous alcohol use. He reports current drug use. Drug: Marijuana.    Review of Systems  I reviewed the ROS as documented here and confirmed the accuracy of it with the patient today. 11/6/2021     Review of Systems   Constitutional: Positive for activity change. Negative for appetite change, chills, fatigue, fever and unexpected weight change.        Improved pain on 11.5.21   HENT: Negative.  Negative for congestion, dental problem, ear pain, hearing loss, mouth sores, nosebleeds, sore throat and trouble swallowing.    Eyes: Negative.    Respiratory: Negative.  Negative for apnea, cough, chest tightness, shortness of breath and wheezing.    Cardiovascular: Negative.  Negative for chest pain, palpitations and leg swelling.   Gastrointestinal: Negative.  Negative for abdominal distention, abdominal pain, blood in stool, constipation, diarrhea, nausea and vomiting.    Genitourinary: Negative.  Negative for difficulty urinating, frequency, hematuria and urgency.        Hesitation of urine   Musculoskeletal: Negative for arthralgias, gait problem and neck stiffness.        Bilateral shoulder pains, improving for now    Chronic swelling in the left LE after radiation to the left iliac chain and around    Skin: Negative for pallor and rash.   Allergic/Immunologic: Negative for environmental allergies.   Neurological: Negative.  Negative for dizziness, seizures, syncope, weakness, light-headedness, numbness and headaches.   Hematological: Negative.  Negative for adenopathy. Does not bruise/bleed easily.   Psychiatric/Behavioral: Negative.  Negative for confusion and suicidal ideas. The patient is not nervous/anxious.          Objective    I have reexamined the patient and the results are consistent with the previously documented exam. Amit [facility], MD     Vital Signs   Temp:  [98.1 °F (36.7 °C)-98.9 °F (37.2 °C)] 98.5 °F (36.9 °C)  Heart Rate:  [60-91] 75  Resp:  [16] 16  BP: (127-155)/(53-82) 127/53    Physical Exam  Constitutional:       Appearance: Normal appearance. He is normal weight.   HENT:      Head: Normocephalic and atraumatic.      Nose: Nose normal.      Mouth/Throat:      Mouth: Mucous membranes are dry.   Eyes:      General: Scleral icterus present.      Pupils: Pupils are equal, round, and reactive to light.   Cardiovascular:      Rate and Rhythm: Normal rate and regular rhythm.      Pulses: Normal pulses.      Heart sounds: Gallop present. S3 sounds present.    Pulmonary:      Effort: Pulmonary effort is normal.      Breath sounds: Normal breath sounds.   Abdominal:      General: Abdomen is flat. Bowel sounds are normal. There is distension.      Palpations: Abdomen is soft.      Comments: Distention of abdomen precluding proper evaluation for HSM   Musculoskeletal:         General: Normal range of motion.      Cervical back: Neck supple.        Legs:        Comments: Hardening of the area described above   Skin:     General: Skin is warm and dry.   Neurological:      Mental Status: He is alert and oriented to person, place, and time.      Comments: Drowsy but awake, AOx3   Psychiatric:         Mood and Affect: Mood normal.         Behavior: Behavior normal.          Results from last 7 days   Lab Units 11/05/21  0506 11/04/21  0958   WBC 10*3/mm3 17.25* 14.87*   HEMOGLOBIN g/dL 7.8* 8.2*   HEMATOCRIT % 24.3* 25.3*   PLATELETS 10*3/mm3 253 301        Results from last 7 days   Lab Units 11/06/21  0549 11/05/21  0506 11/04/21  1408 11/04/21  0958   SODIUM mmol/L 141 141  --  147*   SODIUM, ARTERIAL mmol/L  --   --  143  --    POTASSIUM mmol/L 3.7 4.4  --  4.2   CHLORIDE mmol/L 104 107  --  109*   CO2 mmol/L 31.0* 27.0  --  29.0   BUN mg/dL 20 14  --  14   CREATININE mg/dL 0.89 0.85  --  0.89   CALCIUM mg/dL 8.8 8.9  --  8.9   BILIRUBIN mg/dL 1.7* 2.0*  --  3.8*   ALK PHOS U/L 147* 136*  --  92   ALT (SGPT) U/L 32 40  --  20   AST (SGOT) U/L 44* 81*  --  56*   GLUCOSE mg/dL 116* 119*  --  118*       ABG:    pH, Arterial   Date Value Ref Range Status   11/04/2021 7.325 (L) 7.350 - 7.450 pH units Final     Comment:     84 Value below reference range     pO2, Arterial   Date Value Ref Range Status   11/04/2021 76.8 (L) 83.0 - 108.0 mm Hg Final     Comment:     84 Value below reference range     pCO2, Arterial   Date Value Ref Range Status   11/04/2021 57.3 (H) 35.0 - 45.0 mm Hg Final     Comment:     83 Value above reference range       Culture Data:   No results found for: BLOODCX, URINECX, WOUNDCX, MRSACX, RESPCX, STOOLCX    Radiology:   Imaging Results (Last 7 Days)     Procedure Component Value Units Date/Time     Renal Limited [[id number]] Collected: 11/05/21 1031     Updated: 11/05/21 1144    Narrative:      HISTORY: Questionable left-sided hydronephrosis based on CT chest  11/4/2021, prior right nephrectomy     Renal ultrasound: Sonographic imaging of the left kidney  and bladder  performed. Prior right nephrectomy.     The visible abdominal aorta is normal in caliber. Proximal IVC is  patent.     The left kidney measures 11.5 x 8.2 x 6.0 cm. There is no left-sided  hydronephrosis. No left renal mass. No abnormal perinephric fluid  collection.     Mild bladder wall thickening likely due to underdistention the bladder.       Impression:      1. Prior right nephrectomy. No sonographic abnormality identified left  kidney. No left-sided hydronephrosis seen on today's ultrasound.  This report was finalized on 11/05/2021 10:32 by Dr. Latricia Houser MD.    CT Angiogram Chest [[id number]] Collected: 11/04/21 1554     Updated: 11/04/21 1602    Narrative:      CT ANGIOGRAM CHEST- 11/4/2021 3:34 PM CDT      HISTORY: sickle crisis, possible pna, r/o PE; R09.02-Hypoxemia;  J18.9-Pneumonia, unspecified organism; D57.1-Sickle-cell disease without  crisis      COMPARISON: CT scan dated 7/9/2021.      DOSE LENGTH PRODUCT: 391 mGy cm. Automated exposure control was also  utilized to decrease patient radiation dose.     TECHNIQUE: Helical tomographic images of the chest were obtained after  the administration of intravenous contrast following angiogram protocol.  Additionally, 3D and multiplanar reformatted images were provided.        FINDINGS:    Pulmonary arteries: There is adequate enhancement of the pulmonary  arteries to evaluate for central and segmental pulmonary emboli. There  are no filling defects within the main, lobar, segmental or visualized  subsegmental pulmonary arteries. The pulmonary arteries are enlarged,  consistent with pulmonary arterial hypertension.      Aorta and great vessels: Thoracic aorta is normal in caliber. No  dissection identified. No flow-limiting stenosis identified at the great  vessel origins.     Visualized neck base: The imaged portion of the base of the neck appears  unremarkable.      Lungs: Shallow inspiration with low lung volumes. Pulmonary  vascular  congestion. Interlobular septal thickening in the lung bases may reflect  a mild interstitial edema. No consolidation identified. No pleural  effusion. The airways are clear.     Heart: Four-chamber cardiomegaly. There is no pericardial effusion.      Mediastinum and lymph nodes: No suspicious hilar or mediastinal  adenopathy..     Skeletal and soft tissues: Chest wall soft tissues are unremarkable. No  acute bony abnormality. Thoracic spine degenerative change..      Upper abdomen: The imaged portion of the upper abdomen demonstrates no  acute process. Right nephrectomy. Questionable left hydronephrosis.  Prior cholecystectomy.       Impression:      1. No evidence of pulmonary embolus.  2. Four-chamber cardiomegaly with pulmonary hypertension and evidence  for interstitial edema. No inflammatory infiltrate or airspace filling  edema. No pleural effusion.  3. Prior right nephrectomy. Questionable hydronephrosis on the left,  which be especially concerning given the contralateral nephrectomy.  Consider renal ultrasound for further evaluation of suspected  hydronephrosis.        This report was finalized on 11/04/2021 15:59 by Dr James Siegel, .    XR Chest 1 View [[id number]] Collected: 11/04/21 1129     Updated: 11/04/21 1137    Narrative:      Frontal upright radiograph of the chest 11/4/2021 10:18 AM CDT     HISTORY: Chest congestion, history of sickle cell     COMPARISON: 7/9/2021.     FINDINGS:      Cardiomegaly with central pulmonary congestion. No obvious interstitial  coarsening, however, there are bilateral scattered patchy infiltrates.  No consolidation, pleural effusion or pneumothorax. No acute bony  abnormality.       Impression:      1. There are a few bilateral scattered patchy infiltrates. Atypical  pneumonia is certainly a consideration. Findings could also be  chronic/fibrotic in the setting of sickle cell, as this could reflect  areas of earlier pulmonary infarct.  2. Cardiomegaly with  mild central pulmonary congestion.     This report was finalized on 11/04/2021 11:34 by Dr James Siegel, .    XR Hip With or Without Pelvis 2 - 3 View Right [[id number]] Collected: 11/04/21 1123     Updated: 11/04/21 1127    Narrative:      XR HIP W OR WO PELVIS 2-3 VIEW RIGHT- 11/4/2021 10:19 AM CDT     HISTORY: hip pain; hx of sickle cell- include pelvis     COMPARISON: None      FINDINGS:      Frontal and lateral views of the right hip were obtained.  Additional AP  pelvis.     There is no fracture or joint subluxation. The joint spaces are well  maintained.      Pelvic ring is intact. Sacral arches are intact. Partially imaged  femoral rods. No gross soft tissue abnormality is visualized.        Impression:      1. No acute osseous injury or malalignment. Pelvic ring is intact.           This report was finalized on 11/04/2021 11:24 by Dr James Siegel, .          Pathology Results:   No results found for: PATHINTERP             Assessment/Plan       Assessment/Plans:   Date  3/29/2021  7/9/2021  7/11/2021  7/12/21 7/13/21  9.3.21  10.14.21  11.4.21  11.5.21 11.6.21    WBC  10.2    14.54  14.18  13.2  13.2  11.6  14.87  17.25 13.98    Hb  8.7    8.3  7.6  7.7  8.1  8.8  8.2  7.8 8.0    MCV  70    72.7  73.2  72.8  75  73  72.9  72.5 72.9    Plt  276    224  225  254  296  252  301  253 262    ANC          9.10  9.62  8.27  10.48   11.13    ALC          1.23      1.25   1.43    AMC          1.37      2.81   1.15    AEC         1.24         0.28    ABC         0.13      0.16       nRBC           11 11 43.2   14.3    Ferritin    1328            9647( !)       Iron                       Iron SAT                       Transferrin                       TIBC                       Hapto                       LDH    211  417    274      367       Víctor                       Harley                       Zinc                       Retic          2.67      2.87%       B12                       Folate                        Hep panel                       HIV                       Creatinine    0.99            0.89  0.85 0.89    Glucose    108            118  119 116    ALT    7            20  40  32    AST    17            56  81  44    Alk phos    80            92  136  147    EGFR               108  114  108    Total bilirubin    2.7            3.8  2.0  1.7    Uric acid          7.4              CRP                9.74        Covid               Neg                                  **HbS/Beta Thal 0  ** crisis after exposure to cold  -Being treated at [facility] and has very infrequent flares  -Admitted to [facility] with an acute pain episode requiring pain management and is currently on a PCA pump with adequate pain control in the smer of 2021. Admitted on 11.4.21 with pneumonia  - last seen at [facility] on 10.15.21: Patient was seen for follow-up appointment and is being managed by pain and symptom management.  Previously discussed Endari for potential disease modifying treatment and it was discussed that may assist with the pain and provide patient with a little energy and as he was interested in starting the medication, Endari 10 bid was started September 1, 2021(PATIENT'S WIFE HAS A HOME SUPPLY as [facility]and the patient was able to tell the difference.  The hydroxyurea therapy was placed on hold while he was receiving chemotherapy for renal cell carcinoma and the plan was for the team to collaborate with oncology for plans of moving forward.  He was off immunotherapy since the first week of June and is being followed by [facility] for management of metastatic renal cell carcinoma as well as his sickle cell anemia.     TREATMENT:  · Patients should receive folic acid 1 mg daily for life  · Continue incentive spirometry  · Patient appears to have been doing well on Endari therapy 10 mg daily.  This can be continued and patient's        wife has her own supply which was  given to pharmacy to confirm and being dispensed while he is in the hopital  · Pneumococcal vaccine is important if he has not yet received it and after completion of treatment for recurrent          pneumonia  · Hydrea  has been on hold since the patient was on chemotherapy for renal cell carcinoma -would not start the    medication at this time and patient can discuss this with his renal team as well as hematology upon discharge from     this admission  · Indications for RBC transfusions:  § Acute chest syndrome  § Stroke prevention and treatment  § Aplastic and sequestration crisis  § Multiorgan failure  § Transfusion prior to surgery  · Management of complications:  § In vascular crises, patients should be kept warm and given adequate hydration and pain control. Oxygen only  helps in hypoxic patients and overhydration should be avoided  § Patients who undergo anesthesia are at increased risk for crisis and should be observed closely for development of  hypoxia or acidosis  § Acute chest syndrome is life threatening and exchange transfusions appear to be beneficial along with adequate pain control, bronchodilators, antibiotics and incentive spirometry  § Priapism should be treated by rapid hydration, RBC transfusions and pain control while urgent urological evaluation is requested  § Patients should have a yearly auditory and ophthalmologic exam while the patient is on an iron chelator      -Symptoms are improving on 11/6/2021, continue to monitor for now     **HIGH FERRITIN and increasing   ** Likely secondary to transfusions as well as acute inflammation due to the infection  - was taken off the Exjade and the hydrea while he was on the chemo but will rediscuss with hematology getting back on the Exjade.    - discussed with patient that will probably need to discuss iron chelation with OP hematologist        ** Infection status:   -Covid testing is negative, s/p MODERNA with second dose in April 201  -Patient  currently admitted to [facility] for pneumonia but now off antibiotics  - leukocytosis is not uncommon in patients in the sickle cell crisis as neutrophils and monocytes are activated in these patients.  This also is known to promote vascular inflammation and vessel damage     **Metabolic / Renal:   · History of metastatic renal cell carcinoma being followed and treated at [facility], status post multiple chemotherapies and radiation which finished in Summer 2020        ** Pain control:   -Outpatient pain protocol MORPHINE IR 30 mg q 3 hours prn, MORPHINE  mg 12 hours (which he takes all the time)   -As pain improving, patient does not appear to require PCA at this time        Thank you for allowing me to participate in the care of this patient.  Amit Goldberg, MD  Hematology/oncology

## 2021-11-06 NOTE — OUTREACH NOTE
Prep Survey      Responses   [facility] facility patient discharged from? [facility]   Is LACE score < 7 ? No   Emergency Room discharge w/ pulse ox? No   Eligibility Readm Mgmt   Discharge diagnosis Sickle cell crisis    Does the patient have one of the following disease processes/diagnoses(primary or secondary)? Other   Does the patient have Home health ordered? No   Is there a DME ordered? No   Prep survey completed? Yes          Estrellita Ga RN

## 2021-11-06 NOTE — PLAN OF CARE
Goal Outcome Evaluation:  Plan of Care Reviewed With: patient        Progress: improving  Outcome Summary: VSS.  S/SB 56-85.  Pain has not been above a 5/6 rating overnight, and pt has been able to sleep well.  Voltaren is reportedly giving some additional relief.  Pt anticipates d/c to home today. Safety maintained.

## 2021-11-06 NOTE — DISCHARGE SUMMARY
[facility] Medicine Services  DISCHARGE SUMMARY       Date of Admission: 11/4/2021  Date of Discharge:  11/6/2021  Primary Care Physician: Vanessa Gtz APRN    Presenting Problem/History of Present Illness:  Hypoxia [R09.02]     Final Discharge Diagnoses:  Active Hospital Problems    Diagnosis    • **Sickle cell crisis (HCC)    • Hypoxia    • Acute pain    • Anemia of chronic disease    • Renal cell carcinoma of right kidney metastatic to other site (HCC)        Consults:   #1 Dr. Goldberg, Hematology    Procedures Performed: none    Pertinent Test Results:   Procedure Component Value Units Date/Time    Renal Limited [[id number]] Med as Reviewed   Order Status: Completed Collected: 11/05/21 1031    Updated: 11/05/21 1144   Narrative:     HISTORY: Questionable left-sided hydronephrosis based on CT chest   11/4/2021, prior right nephrectomy       Renal ultrasound: Sonographic imaging of the left kidney and bladder   performed. Prior right nephrectomy.       The visible abdominal aorta is normal in caliber. Proximal IVC is   patent.       The left kidney measures 11.5 x 8.2 x 6.0 cm. There is no left-sided   hydronephrosis. No left renal mass. No abnormal perinephric fluid   collection.       Mild bladder wall thickening likely due to underdistention the bladder.       Impression:     1. Prior right nephrectomy. No sonographic abnormality identified left   kidney. No left-sided hydronephrosis seen on today's ultrasound.   This report was finalized on 11/05/2021 10:32 by Dr. Latricia Houser MD.   CT Angiogram Chest [[id number]] Med as Reviewed   Order Status: Completed Collected: 11/04/21 1554    Updated: 11/04/21 1602   Narrative:     CT ANGIOGRAM CHEST- 11/4/2021 3:34 PM CDT       HISTORY: sickle crisis, possible pna, r/o PE; R09.02-Hypoxemia;   J18.9-Pneumonia, unspecified organism; D57.1-Sickle-cell disease without   crisis       COMPARISON: CT scan dated 7/9/2021.        DOSE LENGTH PRODUCT: 391 mGy cm. Automated exposure control was also   utilized to decrease patient radiation dose.       TECHNIQUE: Helical tomographic images of the chest were obtained after   the administration of intravenous contrast following angiogram protocol.   Additionally, 3D and multiplanar reformatted images were provided.         FINDINGS:     Pulmonary arteries: There is adequate enhancement of the pulmonary   arteries to evaluate for central and segmental pulmonary emboli. There   are no filling defects within the main, lobar, segmental or visualized   subsegmental pulmonary arteries. The pulmonary arteries are enlarged,   consistent with pulmonary arterial hypertension.       Aorta and great vessels: Thoracic aorta is normal in caliber. No   dissection identified. No flow-limiting stenosis identified at the great   vessel origins.       Visualized neck base: The imaged portion of the base of the neck appears   unremarkable.       Lungs: Shallow inspiration with low lung volumes. Pulmonary vascular   congestion. Interlobular septal thickening in the lung bases may reflect   a mild interstitial edema. No consolidation identified. No pleural   effusion. The airways are clear.       Heart: Four-chamber cardiomegaly. There is no pericardial effusion.       Mediastinum and lymph nodes: No suspicious hilar or mediastinal   adenopathy..       Skeletal and soft tissues: Chest wall soft tissues are unremarkable. No   acute bony abnormality. Thoracic spine degenerative change..       Upper abdomen: The imaged portion of the upper abdomen demonstrates no   acute process. Right nephrectomy. Questionable left hydronephrosis.   Prior cholecystectomy.       Impression:     1. No evidence of pulmonary embolus.   2. Four-chamber cardiomegaly with pulmonary hypertension and evidence   for interstitial edema. No inflammatory infiltrate or airspace filling   edema. No pleural effusion.   3. Prior right nephrectomy.  Questionable hydronephrosis on the left,   which be especially concerning given the contralateral nephrectomy.   Consider renal ultrasound for further evaluation of suspected   hydronephrosis.           This report was finalized on 11/04/2021 15:59 by Dr James Siegel, .   XR Hip With or Without Pelvis 2 - 3 View Right [[id number]] Med as Reviewed   Order Status: Completed Collected: 11/04/21 1123    Updated: 11/04/21 1127   Narrative:     XR HIP W OR WO PELVIS 2-3 VIEW RIGHT- 11/4/2021 10:19 AM CDT       HISTORY: hip pain; hx of sickle cell- include pelvis       COMPARISON: None       FINDINGS:       Frontal and lateral views of the right hip were obtained.  Additional AP   pelvis.       There is no fracture or joint subluxation. The joint spaces are well   maintained.       Pelvic ring is intact. Sacral arches are intact. Partially imaged   femoral rods. No gross soft tissue abnormality is visualized.       Impression:     1. No acute osseous injury or malalignment. Pelvic ring is intact.               This report was finalized on 11/04/2021 11:24 by Dr James Siegel, .   XR Chest 1 View [[id number]] Med as Reviewed   Order Status: Completed Collected: 11/04/21 1129    Updated: 11/04/21 1137   Narrative:     Frontal upright radiograph of the chest 11/4/2021 10:18 AM CDT       HISTORY: Chest congestion, history of sickle cell       COMPARISON: 7/9/2021.       FINDINGS:       Cardiomegaly with central pulmonary congestion. No obvious interstitial   coarsening, however, there are bilateral scattered patchy infiltrates.   No consolidation, pleural effusion or pneumothorax. No acute bony   abnormality.       Impression:     1. There are a few bilateral scattered patchy infiltrates. Atypical   pneumonia is certainly a consideration. Findings could also be   chronic/fibrotic in the setting of sickle cell, as this could reflect   areas of earlier pulmonary infarct.   2. Cardiomegaly with mild central pulmonary congestion.        This report was finalized on 11/04/2021 11:34 by Dr James Siegel, .         Results for orders placed during the hospital encounter of 07/09/21    Adult Transthoracic Echo Complete W/ Cont if Necessary Per Protocol    Interpretation Summary  · Left ventricular systolic function is normal. Left ventricular ejection fraction appears to be 61 - 65%.  · Left ventricular wall thickness is consistent with mild concentric hypertrophy.  · Normal right ventricular cavity size and systolic function noted.  · There is a small (<1cm) posterior pericardial effusion.      Chief Complaint on Day of Discharge: f/u sickle crisis    History of Present Illness on Day of Discharge:   Patient continues to feel better.  He states his pain is now down to about a 2-3 out of 10.  He is also down to 1 L of oxygen.  He feels much improved.  No fevers or chills.  No chest pain or overt signs of shortness of breath.  No nausea or vomiting.  Tolerating p.o. without issue.  He is wanting to go home at this time.  Wife at bedside and agreeable.    Hospital Course:  The patient is a 51 y.o. male with a past medical history of metastatic renal cancer status post nephrectomy and radiation.  Also has a history of sickle cell disease.  He presented to the hospital on 11/4 for pain in his hips and shoulders.  He said he felt he was having a sickle crisis.  He tried to offset the day prior to ER arrival by drinking increased fluids and taking his pain medications.  He started wearing oxygen at home as he has it available but his O2 needs continued to increase.  He also felt like he had a fever the night before coming to the hospital but states that is not a typical for him when he has a crisis.  In the ER patient was on 4 L of O2 satting 96%.  Had been status post multiple doses of pain meds in the ER without improvement.  There was some potential question or concern for pneumonia on x-ray so he was given a dose antibiotics.  Patient himself  "really denied any shortness of breath or cough/phlegm production.  He was admitted to the hospital with IV fluid resuscitation and ongoing pain management.  A CTA was done and was negative for PE or pneumonia.  Patient required no further antibiotics after ER dose.  With fluid resuscitation patient felt like he was improving within the first 24 hours.  He was able to wean down from 5 L of oxygen to 2 L over the first 24 hours.  His pain decreased from a 10 out of 10 to a 6 out of 10.  Now on subsequent day #2 his pain is down to a 2-3 out of 10.  He is down to 1 L of oxygen.  He has had no fever since admission to the hospital.  His blood cultures are negative for 2 days.  His white count is trending down.  At this point patient is on his home pain medicine regimen without requiring any extra as needed's.  He is down to 1 L of oxygen and again has O2 available to him at home for his sickle cell events.  At this point we are essentially providing no treatment outside of patient's typical home regimen.  He feels better.  He is asking to go home.  I feel that it is okay at this time.  Wife is at bedside and also feels comfortable taking him home at this point.  Instructed the patient if any worsening of symptoms at home to include increasing pain, return or fevers, increasing O2 needs he should seek urgent medical evaluation.  He was also seen by hematology during stay and I appreciate their input.    Condition on Discharge:  Improving, still on 1L 02    Physical Exam on Discharge:  /57 (BP Location: Right arm, Patient Position: Lying)   Pulse 81   Temp 98.6 °F (37 °C) (Oral)   Resp 18   Ht 177.8 cm (70\")   Wt 90.7 kg (200 lb)   SpO2 93%   BMI 28.70 kg/m²   Physical Exam  GEN: Awake, alert, interactive, NAD  HEENT: PERRLA, EOMI, Anicteric, Trachea midline  Lungs: CTAB, no wheezing/rales/rhonchi  Heart: RRR, +S1/s2, no rub  ABD: soft, nt, +BS, no guarding/rebound  Extremities: atraumatic, no cyanosis, no " edema  Skin: no rashes or lesions  Neuro: AAOx3, no focal deficits  Psych: normal mood & affect    Discharge Disposition:  Home or Self Care    Discharge Medications:     Discharge Medications      Continue These Medications      Instructions Start Date   Diclofenac Sodium 1 % gel gel  Commonly known as: VOLTAREN   2 g, Topical, 2 Times Daily      Endari 5 g pack  Generic drug: Glutamine (Sickle Cell)   10 g, Oral, 2 Times Daily      escitalopram 20 MG tablet  Commonly known as: LEXAPRO   20 mg, Oral, Every Evening      folic acid 1 MG tablet  Commonly known as: FOLVITE   1 mg, Oral, Daily      hydrocortisone 10 MG tablet  Commonly known as: CORTEF   Oral, 2 Times Daily, Take 2 tablets in the morning and 1 tablet in the evening      lactulose 10 GM/15ML solution  Commonly known as: CHRONULAC   20 g, Oral, 2 Times Daily PRN      linaclotide 290 MCG capsule capsule  Commonly known as: LINZESS   145 mcg, Oral, Every Morning Before Breakfast      Morphine 60 MG 12 hr tablet  Commonly known as: MS CONTIN   120 mg, Oral, 2 Times Daily      Morphine 30 MG tablet  Commonly known as: MSIR   30 mg, Oral, Every 4 Hours PRN      NIFEdipine XL 90 MG 24 hr tablet  Commonly known as: PROCARDIA XL   90 mg, Oral, Daily      senna 8.6 MG tablet  Commonly known as: SENOKOT   1 tablet, Oral, Daily             Discharge Diet:    Regular    Activity at Discharge:    Increase to baseline as tolerated    Discharge Care Plan/Instructions:   Continue to wean off your O2 at home as prior.  Continue pain regimen as prior.  Follow-up with your PCP and with hematology as scheduled prior.  If any worsening of your symptoms, increasing pain, fevers, worsening O2 needs return to nearest facility for medical evaluation.    Follow-up Appointments:   Future Appointments   Date Time Provider Department Center   11/9/2021  9:15 AM Elida Sequeira PTA, [facility]   11/11/2021  8:45 AM Elida Sequeira PTA, [facility]    11/15/2021  9:30 AM Sequeira, Elida P, PTA, [facility]   11/17/2021  9:30 AM Sequeira, Elida P, PTA, [facility]   11/22/2021  9:15 AM Sequeira, Elida P, PTA, [facility]   11/24/2021  9:15 AM Debbie Morales, PT, DPT, [facility]   11/29/2021  9:00 AM Sequeira, Elida P, PTA, [facility]   12/1/2021  9:15 AM Debbie Morales, PT, DPT, [facility]       Test Results Pending at Discharge: None    Electronically signed by Chuy Patel DO, 11/06/21, 13:01 CDT.    Time: 34 minutes

## 2021-11-07 LAB
QT INTERVAL: 376 MS
QTC INTERVAL: 477 MS

## 2021-11-09 ENCOUNTER — READMISSION MANAGEMENT (OUTPATIENT)
Dept: CALL CENTER | Facility: HOSPITAL | Age: 51
End: 2021-11-09

## 2021-11-09 ENCOUNTER — TREATMENT (OUTPATIENT)
Dept: PHYSICAL THERAPY | Facility: CLINIC | Age: 51
End: 2021-11-09

## 2021-11-09 DIAGNOSIS — C64.1 RENAL CELL CARCINOMA OF RIGHT KIDNEY (HCC): ICD-10-CM

## 2021-11-09 DIAGNOSIS — I89.0 LYMPHEDEMA OF GENITALIA: ICD-10-CM

## 2021-11-09 DIAGNOSIS — I89.0 LYMPHEDEMA OF LEFT LEG: Primary | ICD-10-CM

## 2021-11-09 LAB
BACTERIA SPEC AEROBE CULT: NORMAL
BACTERIA SPEC AEROBE CULT: NORMAL

## 2021-11-09 PROCEDURE — 97140 MANUAL THERAPY 1/> REGIONS: CPT | Performed by: PHYSICAL THERAPIST

## 2021-11-09 NOTE — OUTREACH NOTE
Medical Week 1 Survey      Responses   [facility] patient discharged from? [place]   Does the patient have one of the following disease processes/diagnoses(primary or secondary)? Other   Week 1 attempt successful? No   Unsuccessful attempts Attempt 1          Alisia Rose RN

## 2021-11-09 NOTE — PROGRESS NOTES
Physical Therapy Treatment Note    Patient: Holland Phelps                                                                     Visit Date: 2021  :     1970    Referring practitioner:    Sharon Cheney,*  Date of Initial Visit:          Type: THERAPY  Noted: 10/22/2021    Patient seen for 3 sessions    Visit Diagnoses:    ICD-10-CM ICD-9-CM   1. Lymphedema of left leg  I89.0 457.1   2. Lymphedema of genitalia  I89.0 457.1   3. 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  He was d/c's on the  and he is still sore. If he spends too much time in the cold air he can go into a Sickle Cell crisis.  He is wearing O2 as needed, his O2 did get down to 76%.  Patient states he will sometimes use the Flexitouch pump two cycles in a row.     PAIN: 6/10 both shoulders and R hip         OBJECTIVE     Objective    Lymphedema     Row Name 21 0915             Subjective Pain    Able to rate subjective pain? yes  -AL      Pre-Treatment Pain Level 6  -AL              Manual Lymphatic Drainage    Manual Lymphatic Drainage initial sequence; opened regional lymph nodes; opened anastamoses; extremity treatment  -AL      Initial Sequence short neck; abdomen; diaphragmatic breathing  -AL      Abdomen superficial  -AL      Diaphragmatic Breathing X 9 with superficial abdominals  -AL      Opened Regional Lymph Nodes axillary; inguinal  -AL      Axillary right; left  -AL      Inguinal right; left  -AL      Opened Anastamoses inguino-axillary  -AL      Inguino-Axillary right; left  Supine and prone  -AL      Extremity Treatment MLD to full limb  -AL      MLD to Full Limb L LE  -AL      Manual Lymphatic Drainage Comments O2 89% on room air.  After being prne, O2 decreased to 84%, able to get up to 90% with deep  breathing.  Patient's O2 did get down to 81% during treatment, he does have his O2 in the car and will wear it on the way home.  I offered to get his O2 out of his car but he did not feel like he needed it.  Stressed to patient he needs to wait 6-8 hours between pumping and to stop pumping two times in a row.  -AL      Manual Therapy 70  -AL              Compression/Skin Care    Compression/Skin Care compression garment  -AL      Compression/Skin Care Comments Patient donned his compression garment.  -AL            User Key  (r) = Recorded By, (t) = Taken By, (c) = Cosigned By    Initials Name Provider Type    Elida Pickering, PTA, [facility] Physical Therapy Assistant                Therapy Education/Self Care [id number]   Details: Use pump and compression   Given edema management   Progress: Reinforced   Education provided to:  Patient   Level of understanding Verbalized   Timed Minutes              Total Timed Treatment:    70    mins  Total Time of Visit:            70   mins     ASSESSMENT/PLAN            Goals                                          Progress Note due by 11/22/21                                                      Recert due by 2/20/2022   STG by: 3 weeks Comments Date Status   Patient will have a good basic understanding of lymphedema and its suggested risk reduction practices  Educated during treatment 11/2/21 Ongoing   Patient will be independent with remedial HEP for lymphedema      New   Patient will be independent with self MLD for lymphedema He has started using the Flexitouch pump again 11/2/21 Ongoing             LTG by: 6 weeks         Patient will have appropriate compression garments for lymphedema maintenance  Patient is wearing the compression more often. 11/9/21 Ongoing   Patient will have no sign or symptoms of infection      New   Patient will be independent with comprehensive maintenance program for lymphedema      New                                        Assessment/Plan      ASSESSMENT:   was in the hospital for a few days due to a Sickle Cell Crisis, he was on an antibiotic for possible pneumonia.  Patient did have O2 in the lower 80's but this does recover quickly with dep breathing.  He left his O2 in his car and did not want me to get his O2 for him, but he was planning on wearing the O2 home.  He has more dense tissue in the L upper thigh today, this softens only slightly with the MLD. He is wearing his compression more. Stressed to patient that he does not need to use the Flexitouch pump two cycles in a row as this will cause more work and stress on the heart.     PLAN: Will work on CDT    SIGNATURE: Elida Sequeira PTA, [facility], License #: [id number]  Electronically Signed on 11/9/2021        [address] [place] [address]. [id number]  [phone]

## 2021-11-11 ENCOUNTER — TREATMENT (OUTPATIENT)
Dept: PHYSICAL THERAPY | Facility: CLINIC | Age: 51
End: 2021-11-11

## 2021-11-11 DIAGNOSIS — I89.0 LYMPHEDEMA OF GENITALIA: ICD-10-CM

## 2021-11-11 DIAGNOSIS — C64.1 RENAL CELL CARCINOMA OF RIGHT KIDNEY (HCC): ICD-10-CM

## 2021-11-11 DIAGNOSIS — I89.0 LYMPHEDEMA OF LEFT LEG: Primary | ICD-10-CM

## 2021-11-11 PROCEDURE — 97140 MANUAL THERAPY 1/> REGIONS: CPT | Performed by: PHYSICAL THERAPIST

## 2021-11-11 NOTE — PROGRESS NOTES
Physical Therapy Treatment Note    Patient: Holland Phelps                                                                     Visit Date: 2021  :     1970    Referring practitioner:    Sharon Cheney,*  Date of Initial Visit:          Type: THERAPY  Noted: 10/22/2021    Patient seen for 4 sessions    Visit Diagnoses:    ICD-10-CM ICD-9-CM   1. Lymphedema of left leg  I89.0 457.1   2. Lymphedema of genitalia  I89.0 457.1   3. Renal cell carcinoma of right kidney (HCC)  C64.1 189.0     SUBJECTIVE     Subjective :    states his O2 has been going up and down but he doesn't feel any different.  He is getting back to normal, he has been concentrating on doing his treatments for lymphedema and getting better.      PAIN: 4/10 both shoulders L lateral quad is burning         OBJECTIVE     Objective    Lymphedema     Row Name 21 0845             Subjective Pain    Able to rate subjective pain? yes  -AL      Pre-Treatment Pain Level 4  -AL      Subjective Pain Comment L lateral quad and upper back  -AL              Lymphedema Measurements    Measurement Type(s) Circumferential  -AL      Circumferential Areas Lower extremities  -AL              BUE Circumferential (cm)    Measurement Location 1 BOT  -AL      Left 1 23.4 cm  -AL      Measurement Location 2 +7  -AL      Left 2 25.9 cm  -AL      Measurement Location 3 +7  -AL      Left 3 29.3 cm  -AL      Measurement Location 4 +10  -AL      Left 4 29.5 cm  -AL      Measurement Location 5 +10  -AL      Left 5 35.5 cm  -AL      Measurement Location 6 +10  -AL      Left 6 40.5 cm  -AL      Measurement Location 7 +10  -AL      Left 7 38.4 cm  -AL      Measurement Location 8 +10  -AL      Left 8 46 cm  -AL      Measurement Location 9 +10  -AL      Left 9 52.9 cm  -AL      Measurement Location 10 +10  -AL      Left 10 60.6 cm  -AL              Manual Lymphatic Drainage    Manual  Lymphatic Drainage initial sequence; opened regional lymph nodes; opened anastamoses; extremity treatment  -AL      Initial Sequence short neck; abdomen; diaphragmatic breathing  -AL      Abdomen superficial  -AL      Diaphragmatic Breathing X 9 with superficial abdominals  -AL      Opened Regional Lymph Nodes axillary; inguinal  -AL      Axillary right; left  -AL      Inguinal right; left  -AL      Opened Anastamoses inguino-axillary  -AL      Inguino-Axillary right; left  Supine and prone  -AL      Extremity Treatment MLD to full limb  -AL      MLD to Full Limb L LE  -AL      Manual Lymphatic Drainage Comments Used the small red ridged roller for IASTM to the L hamstrings and quads.  -AL      Manual Therapy 70  -AL              Compression/Skin Care    Compression/Skin Care compression garment  -AL      Compression/Skin Care Comments Patient donned his compression socks.  -AL            User Key  (r) = Recorded By, (t) = Taken By, (c) = Cosigned By    Initials Name Provider Type    Elida Pickering PTA, CLT-LANA Physical Therapy Assistant                Therapy Education/Self Care 84438   Details: Use pump and compression   Given edema management   Progress: Reinforced   Education provided to:  Patient   Level of understanding Verbalized   Timed Minutes              Total Timed Treatment:    70    mins  Total Time of Visit:            70   mins     ASSESSMENT/PLAN            Goals                                          Progress Note due by 11/22/21                                                      Recert due by 2/20/2022   STG by: 3 weeks Comments Date Status   Patient will have a good basic understanding of lymphedema and its suggested risk reduction practices  Educated during treatment 11/2/21 Ongoing   Patient will be independent with remedial HEP for lymphedema      New   Patient will be independent with self MLD for lymphedema He has started using the Flexitouch pump again 11/2/21 Ongoing              LTG by: 6 weeks         Patient will have appropriate compression garments for lymphedema maintenance  Patient is wearing the compression more often. 11/9/21 Ongoing   Patient will have no sign or symptoms of infection  No signs or symptoms of infection 11/11/21 Ongoing   Patient will be independent with comprehensive maintenance program for lymphedema      New                                        Assessment/Plan     ASSESSMENT:   has had a slight increase in size of the L LE since his last measurements were taken.  He is still recovering from being in the hospital last week with a Sickle Cell Crisis.  His O2 will go up and down at home, he does wear O2 at home as needed.  Today I did IASTM to the L hamstrings and quads, this along with the MLD did soften the dense tissue.  I have not stretched the L leg since he has been hospitalized, will start this again next treatment.      PLAN: Will work on CDT    SIGNATURE: Elida Sequeira PTA, SAHIL, License #: [id number]  Electronically Signed on 11/11/2021        [address] [place] [address]. [address]  [phone]

## 2021-11-15 ENCOUNTER — READMISSION MANAGEMENT (OUTPATIENT)
Dept: CALL CENTER | Facility: HOSPITAL | Age: 51
End: 2021-11-15

## 2021-11-15 ENCOUNTER — TREATMENT (OUTPATIENT)
Dept: PHYSICAL THERAPY | Facility: CLINIC | Age: 51
End: 2021-11-15

## 2021-11-15 DIAGNOSIS — I89.0 LYMPHEDEMA OF GENITALIA: ICD-10-CM

## 2021-11-15 DIAGNOSIS — C64.1 RENAL CELL CARCINOMA OF RIGHT KIDNEY (HCC): ICD-10-CM

## 2021-11-15 DIAGNOSIS — I89.0 LYMPHEDEMA OF LEFT LEG: Primary | ICD-10-CM

## 2021-11-15 PROCEDURE — 97140 MANUAL THERAPY 1/> REGIONS: CPT | Performed by: PHYSICAL THERAPIST

## 2021-11-15 NOTE — OUTREACH NOTE
Medical Week 1 Survey      Responses   [facility] patient discharged from? [place]   Does the patient have one of the following disease processes/diagnoses(primary or secondary)? Other   Week 1 attempt successful? No   Unsuccessful attempts Attempt 2          Sandi Kaufman RN

## 2021-11-15 NOTE — PROGRESS NOTES
Physical Therapy Treatment Note    Patient: Holland Phelps                                                                     Visit Date: 11/15/2021  :     1970    Referring practitioner:    Sharon Cheney,*  Date of Initial Visit:          Type: THERAPY  Noted: 10/22/2021    Patient seen for 5 sessions    Visit Diagnoses:    ICD-10-CM ICD-9-CM   1. Lymphedema of left leg  I89.0 457.1   2. Lymphedema of genitalia  I89.0 457.1   3. Renal cell carcinoma of right kidney (HCC)  C64.1 189.0     SUBJECTIVE     Subjective :    states he is back to 95%, his O2 is leveling out.  He states with a Sickle Cell Crisis his O2 will do this.  The burning sensation is not in the L thigh anymore, he liked the IASTM with the roller.     PAIN: 2/10  Just a little in the R shoulder.         OBJECTIVE     Objective    Lymphedema     Row Name 11/15/21 0916             Subjective Pain    Able to rate subjective pain? yes  -AL      Pre-Treatment Pain Level 2  -AL      Subjective Pain Comment Slight pain in the R shoulder  -AL              Manual Lymphatic Drainage    Manual Lymphatic Drainage initial sequence; opened regional lymph nodes; opened anastamoses; extremity treatment  -AL      Initial Sequence short neck; abdomen; diaphragmatic breathing  -AL      Abdomen superficial  -AL      Diaphragmatic Breathing X 9 with superficial abdominals  -AL      Opened Regional Lymph Nodes axillary; inguinal  -AL      Axillary right; left  -AL      Inguinal right; left  -AL      Opened Anastamoses inguino-axillary  -AL      Inguino-Axillary right; left  Supine and prone  -AL      Extremity Treatment MLD to full limb  -AL      MLD to Full Limb L LE  -AL      Manual Lymphatic Drainage Comments Used the small red ridged roller for IASTM to the L hamstrings and quads.  Stretched L hip flexors, quads, and hamstrings.  -AL      Manual Therapy 70  -AL               Compression/Skin Care    Compression/Skin Care compression garment  -AL      Compression/Skin Care Comments Patient donned his compression thigh high, he needs to purchase new compression.  -AL            User Key  (r) = Recorded By, (t) = Taken By, (c) = Cosigned By    Initials Name Provider Type    Elida Pickering PTA, RDT-LANNY Physical Therapy Assistant                Therapy Education/Self Care 84835   Details: Use pump and compression   Given edema management   Progress: Reinforced   Education provided to:  Patient   Level of understanding Verbalized   Timed Minutes              Total Timed Treatment:    70    mins  Total Time of Visit:            70   mins     ASSESSMENT/PLAN            Goals                                          Progress Note due by 11/22/21                                                      Recert due by 2/20/2022   STG by: 3 weeks Comments Date Status   Patient will have a good basic understanding of lymphedema and its suggested risk reduction practices  Educated during treatment 11/2/21 Ongoing   Patient will be independent with remedial HEP for lymphedema      New   Patient will be independent with self MLD for lymphedema He has started using the Flexitouch pump again 11/2/21 Ongoing             LTG by: 6 weeks         Patient will have appropriate compression garments for lymphedema maintenance  Patient will purchase new compression garments 11/15/21 Ongoing   Patient will have no sign or symptoms of infection  No signs or symptoms of infection 11/11/21 Ongoing   Patient will be independent with comprehensive maintenance program for lymphedema      New                                        Assessment/Plan     ASSESSMENT:   is still having some issues with his O2 going up and down but overall he is feeling better. Today I stretched the L leg as well as did the IASTM with the red roller to the L leg.  The IASTM helped to soften the tissue in the L hamstrings ans quads.   Patient has had the thigh high for several months and it is no longer containing the swelling, he will purchase a new thigh high.     PLAN: Will work on CDT    SIGNATURE: Elida Sequeira PTA, [facility], License #: [id number]  Electronically Signed on 11/15/2021        [address] [place] [address]. [address]  [phone]

## 2021-11-17 ENCOUNTER — TREATMENT (OUTPATIENT)
Dept: PHYSICAL THERAPY | Facility: CLINIC | Age: 51
End: 2021-11-17

## 2021-11-17 DIAGNOSIS — I89.0 LYMPHEDEMA OF GENITALIA: ICD-10-CM

## 2021-11-17 DIAGNOSIS — C64.1 RENAL CELL CARCINOMA OF RIGHT KIDNEY (HCC): ICD-10-CM

## 2021-11-17 DIAGNOSIS — I89.0 LYMPHEDEMA OF LEFT LEG: Primary | ICD-10-CM

## 2021-11-17 PROCEDURE — 97140 MANUAL THERAPY 1/> REGIONS: CPT | Performed by: PHYSICAL THERAPIST

## 2021-11-17 NOTE — PROGRESS NOTES
Physical Therapy Treatment Note    Patient: Holland Phelps                                                                     Visit Date: 2021  :     1970    Referring practitioner:    Sharon Cheney,*  Date of Initial Visit:          Type: THERAPY  Noted: 10/22/2021    Patient seen for 6 sessions    Visit Diagnoses:    ICD-10-CM ICD-9-CM   1. Lymphedema of left leg  I89.0 457.1   2. Lymphedema of genitalia  I89.0 457.1   3. Renal cell carcinoma of right kidney (HCC)  C64.1 189.0     SUBJECTIVE     Subjective :    states he is still about 95% percent.  His O2 is staying in the 90's now, he is feeling pretty good.  He is having just minor pain in the both shoulders and in between them.  The dense tissue in the thigh is better and his mobility is better. He has tightness and some numbness in the L leg.     PAIN:  <2/10  Just a little in the R shoulder.         OBJECTIVE     Objective    Lymphedema     Row Name 21 0997             Subjective Pain    Able to rate subjective pain? yes  -AL      Pre-Treatment Pain Level --  less than 2/10  -AL      Subjective Pain Comment Shoulders and between shoulders.  -AL              Lymphedema Measurements    Measurement Type(s) Circumferential  -AL      Circumferential Areas Lower extremities  -AL              BUE Circumferential (cm)    Measurement Location 1 BOT  -AL      Left 1 22.5 cm  -AL      Measurement Location 2 +7  -AL      Left 2 25.6 cm  -AL      Measurement Location 3 +7  -AL      Left 3 26.4 cm  -AL      Measurement Location 4 +10  -AL      Left 4 26.9 cm  -AL      Measurement Location 5 +10  -AL      Left 5 33 cm  -AL      Measurement Location 6 +10  -AL      Left 6 39 cm  -AL      Measurement Location 7 +10  -AL      Left 7 36.5 cm  -AL      Measurement Location 8 +10  -AL      Left 8 43.1 cm  -AL      Measurement Location 9 +10  -AL      Left 9 50.2 cm  -AL       Measurement Location 10 +10  -AL      Left 10 55.9 cm  -AL              RUE Circumferential (cm)    Measurement Location 1 BOT  -AL      Measurement Location 2 +7  -AL      Measurement Location 3 +7  -AL      Measurement Location 4 +10  -AL      Measurement Location 5 +10  -AL      Measurement Location 6 +10  -AL      Measurement Location 7 +10  -AL      Measurement Location 8 +10  -AL      Measurement Location 9 +10  -AL      Measurement Location 10 +10  -AL              Manual Lymphatic Drainage    Manual Lymphatic Drainage initial sequence; opened regional lymph nodes; opened anastamoses; extremity treatment  -AL      Initial Sequence short neck; abdomen; diaphragmatic breathing  -AL      Abdomen superficial  -AL      Diaphragmatic Breathing X 9 with superficial abdominals  -AL      Opened Regional Lymph Nodes axillary; inguinal  -AL      Axillary right; left  -AL      Inguinal right; left  -AL      Opened Anastamoses inguino-axillary  -AL      Inguino-Axillary right; left  Supine and prone  -AL      Extremity Treatment MLD to full limb  -AL      MLD to Full Limb L LE  -AL      Manual Lymphatic Drainage Comments Used the small red ridged roller for IASTM to the L hamstrings and quads.  Stretched L hip flexors, quads, and hamstrings.  -AL      Manual Therapy 70  -AL              Compression/Skin Care    Compression/Skin Care compression garment  -AL      Compression/Skin Care Comments Patient donned his compression thigh high, he needs to purchase new compression.  -AL            User Key  (r) = Recorded By, (t) = Taken By, (c) = Cosigned By    Initials Name Provider Type    Elida Pickering PTA, CLT-LANA Physical Therapy Assistant                Therapy Education/Self Care 03135   Details: Use pump and compression   Given edema management   Progress: Reinforced   Education provided to:  Patient   Level of understanding Verbalized   Timed Minutes              Total Timed Treatment:    70    mins  Total Time of  Visit:            70   mins     ASSESSMENT/PLAN            Goals                                          Progress Note due by 11/22/21                                                      Recert due by 2/20/2022   STG by: 3 weeks Comments Date Status   Patient will have a good basic understanding of lymphedema and its suggested risk reduction practices  Educated during treatment 11/2/21 Ongoing   Patient will be independent with remedial HEP for lymphedema      New   Patient will be independent with self MLD for lymphedema He has started using the Flexitouch pump again 11/2/21 Ongoing             LTG by: 6 weeks         Patient will have appropriate compression garments for lymphedema maintenance  Patient will purchase new compression garments 11/15/21 Ongoing   Patient will have no sign or symptoms of infection  No signs or symptoms of infection 11/11/21 Ongoing   Patient will be independent with comprehensive maintenance program for lymphedema  He is working towards his home maintenance program 11/17/21  Ongoing                                        Assessment/Plan     ASSESSMENT:   is feeling better today, his O2 is becoming more stable. Patient has less dense tissue in the L upper leg today both anterior and posterior. The L leg has had a good reduction since his last measurements were taken. Patient is working towards his goal for his home maintenance program, he is wearing the compression thigh high and using the Flexitouch pump.     PLAN: Will work on CDT, will do a progress note next treatment.     SIGNATURE: Elida Sequeira PTA, YOSI-LANNY, License #: [id number]  Electronically Signed on 11/17/2021        [address] [place] [address]. [address]  [phone]

## 2021-11-18 ENCOUNTER — READMISSION MANAGEMENT (OUTPATIENT)
Dept: CALL CENTER | Facility: HOSPITAL | Age: 51
End: 2021-11-18

## 2021-11-19 NOTE — OUTREACH NOTE
Medical Week 1 Survey      Responses   [facility] patient discharged from? [place]   Does the patient have one of the following disease processes/diagnoses(primary or secondary)? Other   Week 1 attempt successful? No   Unsuccessful attempts Attempt 3   Wrap up additional comments UTR x3          Evelyn Miranda RN

## 2021-11-23 ENCOUNTER — TREATMENT (OUTPATIENT)
Dept: PHYSICAL THERAPY | Facility: CLINIC | Age: 51
End: 2021-11-23

## 2021-11-23 DIAGNOSIS — I89.0 LYMPHEDEMA OF LEFT LEG: Primary | ICD-10-CM

## 2021-11-23 DIAGNOSIS — I89.0 LYMPHEDEMA OF GENITALIA: ICD-10-CM

## 2021-11-23 DIAGNOSIS — C64.1 RENAL CELL CARCINOMA OF RIGHT KIDNEY (HCC): ICD-10-CM

## 2021-11-23 PROCEDURE — 97140 MANUAL THERAPY 1/> REGIONS: CPT | Performed by: PHYSICAL THERAPIST

## 2021-11-23 NOTE — PROGRESS NOTES
Progress Note Addendum      Patient: Holland Phelps           : 1970  Visit Date: 2021  Referring practitioner: Sharon Cheney,*  Date of Initial Visit: Type: THERAPY  Noted: 10/22/2021  Patient seen for 7 sessions  Visit Diagnoses:    ICD-10-CM ICD-9-CM   1. Lymphedema of left leg  I89.0 457.1   2. Lymphedema of genitalia  I89.0 457.1   3. Renal cell carcinoma of right kidney (HCC)  C64.1 189.0          Clinical Progress: improved  Home Program Compliance: Yes  Progress toward previous goals: Partially Met  Prognosis to achieve goals: good    Objective     Assessment/Plan    I spent 5 minutes with the patient and SAHIL Gil PTA, and reviewed their progress and plan of care. He has had a great reduction in the swelling in the LLE and the tightness in his HS. Plan to continue twice a week for the next 2 weeks and then decrease his frequency down to 1X/wk as long as the gains he has made are consistent.The patient is in agreement with this plan.     SIGNATURE: Debbie Morales, PT, DPT, SAHIL, License #: [id number]  Electronically Signed on 2021

## 2021-11-23 NOTE — PROGRESS NOTES
"                                                                Physical Therapy Treatment Note and 30 Day Progress Note    Patient: Holland Phelps                                                                     Visit Date: 2021  :     1970    Referring practitioner:    Sharon Cheney,*  Date of Initial Visit:          Type: THERAPY  Noted: 10/22/2021    Patient seen for 7 sessions    Visit Diagnoses:    ICD-10-CM ICD-9-CM   1. Lymphedema of left leg  I89.0 457.1   2. Lymphedema of genitalia  I89.0 457.1   3. Renal cell carcinoma of right kidney (HCC)  C64.1 189.0     SUBJECTIVE     Subjective :    He states he was sore in the L upper leg after using the roller but the leg is not as hard now.  He is stiff and sore from the knee down in the shin area.  Today is a cold day outside and he doesn't do well with the cold.     PAIN:  0/10  Just stiff and sore in the L lower leg         OBJECTIVE     Objective    Lymphedema     Row Name 21 0850             Subjective Pain    Able to rate subjective pain? yes  -AL      Pre-Treatment Pain Level 0  -AL      Subjective Pain Comment Just siff and sore in the L lower leg  -AL              Physical Concerns    The amount of pain associated with my lymphedema is: 3  -AL      The amount of limb heaviness associated with my lymphedema is: 2  -AL      The amount of skin tightness associated with my lymphedema is: 3  -AL      The size of my swollen limb(s) seems: 2  -AL      Lymphedema affects the movement of my swollen limb(s): 2  -AL      The strength in my swollen limb(s) is: 2  -AL              Psychosocial Concerns    Lymphedema affects my body image (i.e., \"how I think I look\"). 1  -AL      Lymphedema affects my socializing with others. 1  -AL      Lymphedema affects my intimate relations with spouse or partner (rate 0 if not applicable 2  -AL      Lymphedema \"gets me down\" (i.e., depression, frustration, or anger) 1  -AL      I must rely on others " for help due to my lymphedema. 0  -AL      I know what to do to manage my lymphedema 1  -AL              Functional Concerns    Lymphedema affects my ability to perform self-care activities (i.e. eating, dressing, hygiene) 2  -AL      Lymphedema affects my ability to perform routine home or work-related activities. 1  -AL      Lymphedema affects my performance of preferred leisure activities. 2  -AL      Lymphedema affects proper fit of clothing/shoes 1  -AL      Lymphedema affects my sleep 1  -AL              Lymphedema Measurements    Measurement Type(s) Circumferential  -AL      Circumferential Areas Lower extremities  -AL              BUE Circumferential (cm)    Measurement Location 1 BOT  -AL      Left 1 22 cm  -AL      Measurement Location 2 +7  -AL      Left 2 25.5 cm  -AL      Measurement Location 3 +7  -AL      Left 3 24 cm  -AL      Measurement Location 4 +10  -AL      Left 4 26.1 cm  -AL      Measurement Location 5 +10  -AL      Left 5 33.2 cm  -AL      Measurement Location 6 +10  -AL      Left 6 38.4 cm  -AL      Measurement Location 7 +10  -AL      Left 7 36.5 cm  -AL      Measurement Location 8 +10  -AL      Left 8 43.1 cm  -AL      Measurement Location 9 +10  -AL      Left 9 50.5 cm  -AL      Measurement Location 10 +10  -AL      Left 10 55.8 cm  -AL              RUE Circumferential (cm)    Measurement Location 1 BOT  -AL      Measurement Location 2 +7  -AL      Measurement Location 3 +7  -AL      Measurement Location 4 +10  -AL      Measurement Location 5 +10  -AL      Measurement Location 6 +10  -AL      Measurement Location 7 +10  -AL      Measurement Location 8 +10  -AL      Measurement Location 9 +10  -AL      Measurement Location 10 +10  -AL              Manual Lymphatic Drainage    Manual Lymphatic Drainage initial sequence; opened regional lymph nodes; opened anastamoses; extremity treatment  -AL      Initial Sequence short neck; abdomen; diaphragmatic breathing  -AL      Abdomen superficial   -AL      Diaphragmatic Breathing X 9 with superficial abdominals  -AL      Opened Regional Lymph Nodes axillary; inguinal  -AL      Axillary right; left  -AL      Inguinal right; left  -AL      Opened Anastamoses inguino-axillary  -AL      Inguino-Axillary right; left  Supine and prone  -AL      Extremity Treatment MLD to full limb  -AL      MLD to Full Limb L LE  -AL      Manual Lymphatic Drainage Comments Used the small red ridged roller for IASTM to the L hamstrings, IT band and quads.  Stretched L hip flexors, quads, and hamstrings.  -AL      Manual Therapy 75  -AL              Compression/Skin Care    Compression/Skin Care compression garment  -AL      Compression/Skin Care Comments Patient donned the L compression thigh high, continue to use the Flexitouch pump.  -AL              Lymphedema Life Impact Scale Totals    A.  Total Q1 - Q17 (Do not include Q18) 27  -AL      B.  Total number of questions answered (Q1-Q17) 17  -AL      C. Divide A by B 1.59  -AL      D. Multiple C by 25 39.75  -AL            User Key  (r) = Recorded By, (t) = Taken By, (c) = Cosigned By    Initials Name Provider Type    AL Elida Sequeira PTA, SAHIL Physical Therapy Assistant                Therapy Education/Self Care 30097   Details: Use pump and compression   Given edema management   Progress: Reinforced   Education provided to:  Patient   Level of understanding Verbalized   Timed Minutes              Total Timed Treatment:    75    mins  Total Time of Visit:            75   mins     ASSESSMENT/PLAN            Goals                                          Progress Note due by 12/23/21                                                      Recert due by 2/20/2022   STG by: 3 weeks Comments Date Status   Patient will have a good basic understanding of lymphedema and its suggested risk reduction practices  Educated during treatment 11/23/21 Progressing   Patient will be independent with remedial HEP for lymphedema  He is working on  his HEP 11/23/21 Ongoing   Patient will be independent with self MLD for lymphedema He is using the Flexitouch pump 11/23/21 Partially Met             LTG by: 6 weeks         Patient will have appropriate compression garments for lymphedema maintenance  Patient will purchase new compression garments 11/23/21 Ongoing   Patient will have no sign or symptoms of infection  No signs or symptoms of infection 11/23/21 Met   Patient will be independent with comprehensive maintenance program for lymphedema  He continues to work towards his home maintenance program 11/23/21 Progressing                                        Assessment/Plan     ASSESSMENT:   has had a reduction of 24.1 cm in the L LE since his initial eval.  Functionally is able to move around much better and his pain is much improved.  He does still have some stiffness and soreness.  Patient has had a Sickle Cell Crisis in the past month, despite this he has gotten back into his routine for taking care of he lymphedema.  We have started using IASTM using the red ridged roller on the dense tissue in the L LE, this has helped tremendously in decreasing the dense tissue and helping the fluid move out of the L LE.     PLAN: Will work on CD, will see patient 2 x a week for 2 weeks, if he continues to do well will decrease frequency to 1 x a week x 4 weeks.     SIGNATURE: Elida Sequeira PTA, YOSI-LANNY, License #: [id number]  Electronically Signed on 11/23/2021        [address] [place] [address]. [address]  [phone]

## 2021-11-24 ENCOUNTER — TREATMENT (OUTPATIENT)
Dept: PHYSICAL THERAPY | Facility: CLINIC | Age: 51
End: 2021-11-24

## 2021-11-24 ENCOUNTER — READMISSION MANAGEMENT (OUTPATIENT)
Dept: CALL CENTER | Facility: HOSPITAL | Age: 51
End: 2021-11-24

## 2021-11-24 DIAGNOSIS — I89.0 LYMPHEDEMA OF LEFT LEG: Primary | ICD-10-CM

## 2021-11-24 DIAGNOSIS — C64.1 RENAL CELL CARCINOMA OF RIGHT KIDNEY (HCC): ICD-10-CM

## 2021-11-24 DIAGNOSIS — I89.0 LYMPHEDEMA OF GENITALIA: ICD-10-CM

## 2021-11-24 PROCEDURE — 97140 MANUAL THERAPY 1/> REGIONS: CPT | Performed by: PHYSICAL THERAPIST

## 2021-11-24 NOTE — OUTREACH NOTE
Medical Week 2 Survey      Responses   [facility] patient discharged from? [place]   Does the patient have one of the following disease processes/diagnoses(primary or secondary)? Other   Week 2 attempt successful? No   Unsuccessful attempts Attempt 1          Estrellita Ga RN

## 2021-11-24 NOTE — PROGRESS NOTES
Physical Therapy Treatment Note     Patient: Holland Phelps                                                                     Visit Date: 2021  :     1970    Referring practitioner:    Sharon Cheney,*  Date of Initial Visit:          Type: THERAPY  Noted: 10/22/2021    Patient seen for 8 sessions    Visit Diagnoses:    ICD-10-CM ICD-9-CM   1. Lymphedema of left leg  I89.0 457.1   2. Lymphedema of genitalia  I89.0 457.1   3. Renal cell carcinoma of right kidney (HCC)  C64.1 189.0            OBJECTIVE     Objective    Lymphedema     Row Name 21 0900             Subjective Pain    Able to rate subjective pain? yes  -HR      Pre-Treatment Pain Level 0  -HR              Subjective Comments    Subjective Comments Just feels like the leg is stiff sometimes from the knee down but he figures it's just from the apolinar in the leg.  -HR              Manual Lymphatic Drainage    Manual Lymphatic Drainage initial sequence; opened regional lymph nodes; opened anastamoses; extremity treatment  -HR      Initial Sequence short neck; abdomen; diaphragmatic breathing  -HR      Abdomen superficial  -HR      Diaphragmatic Breathing X 9 with superficial abdominals  -HR      Opened Regional Lymph Nodes axillary; inguinal  -HR      Axillary right; left  -HR      Inguinal right; left  -HR      Opened Anastamoses inguino-axillary  -HR      Inguino-Axillary right; left  Supine and prone  -HR      Extremity Treatment MLD to full limb  -HR      MLD to Full Limb LLE  -HR      Manual Lymphatic Drainage Comments Used the small red ridged roller for IASTM to the L hamstrings, IT band and quads.  Stretched L hip flexors, quads, and hamstrings.  -HR      Manual Therapy 70  -HR            User Key  (r) = Recorded By, (t) = Taken By, (c) = Cosigned By    Initials Name Provider Type    HR Debbie Morales, PT, DPT, CLT-LANNY Physical Therapist                 Therapy Education/Self Care [id number]   Details: Use pump and compression   Given edema management   Progress: Reinforced   Education provided to:  Patient   Level of understanding Verbalized   Timed Minutes              Total Timed Treatment:    70    mins  Total Time of Visit:            70   mins     ASSESSMENT/PLAN            Goals                                          Progress Note due by 12/23/21                                                      Recert due by 2/20/2022   STG by: 3 weeks Comments Date Status   Patient will have a good basic understanding of lymphedema and its suggested risk reduction practices  Educated during treatment 11/23/21 Progressing   Patient will be independent with remedial HEP for lymphedema  He is working on his HEP 11/23/21 Ongoing   Patient will be independent with self MLD for lymphedema He is using the Flexitouch pump 11/24/21 Partially Met             LTG by: 6 weeks         Patient will have appropriate compression garments for lymphedema maintenance  Patient will purchase new compression garments 11/23/21 Ongoing   Patient will have no sign or symptoms of infection  No signs or symptoms of infection 11/24/21 Met   Patient will be independent with comprehensive maintenance program for lymphedema  He continues to work towards his home maintenance program 11/23/21 Progressing                                        Assessment/Plan     ASSESSMENT: He continues to feel much better than when he first came back to treatment. He has some stiffness in the knee and below on the LLE but this is going to be chronic based on his surgical history. His foot and ankle show very little swelling if any.     PLAN: Cont with POC    SIGNATURE: Debbie Morales, PT, DPT, CLT-LANNY, License #: [id number]  Electronically Signed on 11/24/2021        [address] [place] [address]. [address]  [phone]

## 2021-11-29 ENCOUNTER — TREATMENT (OUTPATIENT)
Dept: PHYSICAL THERAPY | Facility: CLINIC | Age: 51
End: 2021-11-29

## 2021-11-29 DIAGNOSIS — I89.0 LYMPHEDEMA OF GENITALIA: ICD-10-CM

## 2021-11-29 DIAGNOSIS — I89.0 LYMPHEDEMA OF LEFT LEG: Primary | ICD-10-CM

## 2021-11-29 DIAGNOSIS — C64.1 RENAL CELL CARCINOMA OF RIGHT KIDNEY (HCC): ICD-10-CM

## 2021-11-29 PROCEDURE — 97140 MANUAL THERAPY 1/> REGIONS: CPT | Performed by: PHYSICAL THERAPIST

## 2021-11-29 NOTE — PROGRESS NOTES
Physical Therapy Treatment Note     Patient: Holland Phelps                                                                     Visit Date: 2021  :     1970    Referring practitioner:    Sharon Cheney,*  Date of Initial Visit:          Type: THERAPY  Noted: 10/22/2021    Patient seen for 9 sessions    Visit Diagnoses:    ICD-10-CM ICD-9-CM   1. Lymphedema of left leg  I89.0 457.1   2. Lymphedema of genitalia  I89.0 457.1   3. Renal cell carcinoma of right kidney (HCC)  C64.1 189.0            OBJECTIVE     Objective    Lymphedema     Row Name 21 0850             Subjective Pain    Able to rate subjective pain? yes  -AL      Pre-Treatment Pain Level 0  -AL              Subjective Comments    Subjective Comments He feels like he ate too much salt over the holiday weekend and he is swollen.  He also thinks he may be swollen becuase he washed two of his vehicles on Saturday.  He noticed the swelling after washing the vehicles.  -AL              Manual Lymphatic Drainage    Manual Lymphatic Drainage initial sequence; opened regional lymph nodes; opened anastamoses; extremity treatment  -AL      Initial Sequence short neck; abdomen; diaphragmatic breathing  -AL      Abdomen superficial  -AL      Diaphragmatic Breathing X 9 with superficial abdominals  -AL      Opened Regional Lymph Nodes axillary; inguinal  -AL      Axillary right; left  -AL      Inguinal right; left  -AL      Opened Anastamoses inguino-axillary  -AL      Inguino-Axillary right; left  Supine and prone  -AL      Extremity Treatment MLD to full limb  -AL      MLD to Full Limb LLE  -AL      Manual Lymphatic Drainage Comments Used the small red ridged roller for IASTM to the L hamstrings, IT band and quads.  Stretched L hip flexors, quads, and hamstrings.  Extra time spent using the roller to the L lateral upper leg and L hamstrings.  -AL              Compression/Skin  Care    Compression/Skin Care compression garment  -AL      Compression/Skin Care Comments Patient donned the L compression thigh high, continue to use the Flexitouch pump.  -AL            User Key  (r) = Recorded By, (t) = Taken By, (c) = Cosigned By    Initials Name Provider Type    Elida Pickering PTA, CLT-LANA Physical Therapy Assistant                Therapy Education/Self Care [id number]   Details: Use pump and compression   Given edema management   Progress: Reinforced   Education provided to:  Patient   Level of understanding Verbalized   Timed Minutes              Total Timed Treatment:    75    mins  Total Time of Visit:            75   mins     ASSESSMENT/PLAN            Goals                                          Progress Note due by 12/23/21                                                      Recert due by 2/20/2022   STG by: 3 weeks Comments Date Status   Patient will have a good basic understanding of lymphedema and its suggested risk reduction practices  Educated during treatment 11/29/21 Partially Met   Patient will be independent with remedial HEP for lymphedema  He is working on his HEP 11/23/21 Ongoing   Patient will be independent with self MLD for lymphedema He is using the Flexitouch pump 11/24/21 Partially Met             LTG by: 6 weeks         Patient will have appropriate compression garments for lymphedema maintenance  Patient will purchase new compression garments 11/23/21 Ongoing   Patient will have no sign or symptoms of infection  No signs or symptoms of infection 11/24/21 Met   Patient will be independent with comprehensive maintenance program for lymphedema  He continues to work towards his home maintenance program 11/23/21 Progressing                                        Assessment/Plan     ASSESSMENT:   was visibly more swollen today and has more dense tissue present in the L hamstrings, L quads, and L lateral upper leg.  This does soften with the IASTM and the MLD.   He felt the swelling was from eating too much salt and cleaning two of his vehicles. He is going to try to clean one or two vehicles this week while it is warmer and see how things go. Patient will need to purchase another compression garment for the L lower leg.     PLAN: Cont with POC    SIGNATURE: Elida Sequeira [facility], License #: [id number]  Electronically Signed on 11/29/2021        [address] [place] [address]. [address]  [phone]

## 2021-12-01 ENCOUNTER — TREATMENT (OUTPATIENT)
Dept: PHYSICAL THERAPY | Facility: CLINIC | Age: 51
End: 2021-12-01

## 2021-12-01 DIAGNOSIS — I89.0 LYMPHEDEMA OF GENITALIA: ICD-10-CM

## 2021-12-01 DIAGNOSIS — C64.1 RENAL CELL CARCINOMA OF RIGHT KIDNEY (HCC): ICD-10-CM

## 2021-12-01 DIAGNOSIS — I89.0 LYMPHEDEMA OF LEFT LEG: Primary | ICD-10-CM

## 2021-12-01 PROCEDURE — 97140 MANUAL THERAPY 1/> REGIONS: CPT | Performed by: PHYSICAL THERAPIST

## 2021-12-01 NOTE — PROGRESS NOTES
Physical Therapy Treatment Note     Patient: Holland Phelps                                                                     Visit Date: 2021  :     1970    Referring practitioner:    Sharon Cheney,*  Date of Initial Visit:          Type: THERAPY  Noted: 10/22/2021    Patient seen for 10 sessions    Visit Diagnoses:    ICD-10-CM ICD-9-CM   1. Lymphedema of left leg  I89.0 457.1   2. Lymphedema of genitalia  I89.0 457.1   3. Renal cell carcinoma of right kidney (HCC)  C64.1 189.0            OBJECTIVE     Objective    Lymphedema     Row Name 21 0900             Subjective Pain    Able to rate subjective pain? yes  -HR      Pre-Treatment Pain Level 0  -HR              Subjective Comments    Subjective Comments He thinks he got food poisoning Monday because he threw up 3 times after eating and felt bad all day yesterday.  -HR              Lymphedema Measurements    Measurement Type(s) Circumferential  -HR      Circumferential Areas Lower extremities  -HR              BUE Circumferential (cm)    Measurement Location 1 BOT  -HR      Left 1 22.3 cm  -HR      Measurement Location 2 +7  -HR      Left 2 24.8 cm  -HR      Measurement Location 3 +7  -HR      Left 3 26.5 cm  -HR      Measurement Location 4 +10  -HR      Left 4 28.9 cm  -HR      Measurement Location 5 +10  -HR      Left 5 35.5 cm  -HR      Measurement Location 6 +10  -HR      Left 6 39.7 cm  -HR      Measurement Location 7 +10  -HR      Left 7 38 cm  -HR      Measurement Location 8 +10  -HR      Left 8 43.7 cm  -HR      Measurement Location 9 +10  -HR      Left 9 50.5 cm  -HR      Measurement Location 10 +10  -HR      Left 10 57.1 cm  -HR              Manual Lymphatic Drainage    Manual Lymphatic Drainage initial sequence; opened regional lymph nodes; opened anastamoses; extremity treatment  -HR      Initial Sequence short neck; abdomen; diaphragmatic breathing  -HR       Abdomen Comment Did not do any work to abdomen or have him do deep breathing as his belly is still not right since he was sick Monday  -HR      Opened Regional Lymph Nodes axillary; inguinal  -HR      Axillary right; left  -HR      Inguinal right; left  -HR      Opened Anastamoses inguino-axillary  -HR      Inguino-Axillary right; left  Supine and prone  -HR      Extremity Treatment MLD to full limb  -HR      MLD to Full Limb LLE  -HR      Manual Lymphatic Drainage Comments stretched L HS and calf in supine. Used small wratchet roller to posterior hip and leg. Stretched quad in prone  -HR      Manual Therapy 70  -HR              Compression/Skin Care    Compression/Skin Care Comments He wore his compression thigh high stocking today and put it back on after treatment. He needs a new one as this one has holes in it and has certainly stretched out some. Working on getting appropriate size and garment to order for him.  -HR            User Key  (r) = Recorded By, (t) = Taken By, (c) = Cosigned By    Initials Name Provider Type    HR Debbie Morales, PT, DPT, CLT-LANNY Physical Therapist                Therapy Education/Self Care [id number]   Details: Use pump and compression. Explained fund to cover new garments through [employer]   Given edema management   Progress: Reinforced   Education provided to:  Patient   Level of understanding Verbalized   Timed Minutes              Total Timed Treatment:    70    mins  Total Time of Visit:            70   mins     ASSESSMENT/PLAN            Goals                                          Progress Note due by 12/23/21                                                      Recert due by 2/20/2022   STG by: 3 weeks Comments Date Status   Patient will have a good basic understanding of lymphedema and its suggested risk reduction practices  Educated during treatment 11/29/21 Partially Met   Patient will be independent with remedial HEP for lymphedema  He is working on his HEP  11/23/21 Ongoing   Patient will be independent with self MLD for lymphedema He is using the Flexitouch pump 11/24/21 Partially Met             LTG by: 6 weeks         Patient will have appropriate compression garments for lymphedema maintenance  Has not gotten new garments but I am working on it 12/1/21 Ongoing   Patient will have no sign or symptoms of infection  No signs or symptoms of infection 12/1/21 Met   Patient will be independent with comprehensive maintenance program for lymphedema  He continues to work towards his home maintenance program 11/23/21 Progressing                                        Assessment/Plan     ASSESSMENT:  He had quite a bit of pitting edema in the lower leg today. His measurements were up significantly as well even though he did have a compression stocking on. His compression is definitely stretched out and does have some holes in it so it is not as containing as it needs to be. We are going to order a new garment for him that should be much better.     PLAN: Cont with POC. Decreasing to 1X/wk until he gets new compression and numbers go back down.     SIGNATURE: Debbie Morales, PT, DPT, CLZIGGY-LANNY, License #: [id number]  Electronically Signed on 12/1/2021        [address] [place] [address]. [address]  [phone]

## 2021-12-08 ENCOUNTER — TREATMENT (OUTPATIENT)
Dept: PHYSICAL THERAPY | Facility: CLINIC | Age: 51
End: 2021-12-08

## 2021-12-08 DIAGNOSIS — I89.0 LYMPHEDEMA OF LEFT LEG: Primary | ICD-10-CM

## 2021-12-08 DIAGNOSIS — I89.0 LYMPHEDEMA OF GENITALIA: ICD-10-CM

## 2021-12-08 PROCEDURE — 97140 MANUAL THERAPY 1/> REGIONS: CPT | Performed by: PHYSICAL THERAPIST

## 2021-12-08 NOTE — PROGRESS NOTES
Physical Therapy Treatment Note     Patient: Holland Phelps                                                                     Visit Date: 2021  :     1970    Referring practitioner:    Sharon Cheney,*  Date of Initial Visit:          Type: THERAPY  Noted: 10/22/2021    Patient seen for 11 sessions    Visit Diagnoses:    ICD-10-CM ICD-9-CM   1. Lymphedema of left leg  I89.0 457.1   2. Lymphedema of genitalia  I89.0 457.1            OBJECTIVE     Objective    Lymphedema     Row Name 21 1000             Subjective Pain    Able to rate subjective pain? yes  -HR      Pre-Treatment Pain Level 0  -HR              Subjective Comments    Subjective Comments Just the same chronic stiffness and numbness in his left leg.  -HR              Manual Lymphatic Drainage    Manual Lymphatic Drainage initial sequence; opened regional lymph nodes; opened anastamoses; extremity treatment  -HR      Initial Sequence short neck; abdomen; diaphragmatic breathing  -HR      Abdomen superficial; deep  -HR      Abdomen Comment deep with breathing  -HR      Diaphragmatic Breathing X10  -HR      Opened Regional Lymph Nodes axillary; inguinal  -HR      Axillary right; left  -HR      Inguinal right; left  -HR      Opened Anastamoses inguino-axillary  -HR      Inguino-Axillary right; left  Supine and prone  -HR      Extremity Treatment MLD to full limb  -HR      MLD to Full Limb LLE  -HR      Manual Lymphatic Drainage Comments Extra time to the proximal thigh along the medial and lateral sides. Stretched into hip extension today with foam 1/2 bolster just below L ischial tuberosity in supine X 3 minutes.Then manual stretch to HS and in prone roller to posterior LLE and stretch to calf  -HR      Manual Therapy 70  -HR              Compression/Skin Care    Compression/Skin Care skin care; compression garment  -HR      Skin Care lotion applied  I put lotion on  the L lower leg while still doing MLD to allow it to soak in  -HR      Compression Garment Comments He donned the new thigh high compression stocking and it fit well and was comfortable.  -HR            User Key  (r) = Recorded By, (t) = Taken By, (c) = Cosigned By    Initials Name Provider Type    HR Debbie Morales, PT, DPT, CLT-LANNY Physical Therapist                Therapy Education/Self Care 28240   Details: Use pump and compression. Wear the new compression   Given edema management   Progress: Reinforced   Education provided to:  Patient   Level of understanding Verbalized   Timed Minutes              Total Timed Treatment:    70    mins  Total Time of Visit:            70   mins     ASSESSMENT/PLAN            Goals                                          Progress Note due by 12/23/21                                                      Recert due by 2/20/2022   STG by: 3 weeks Comments Date Status   Patient will have a good basic understanding of lymphedema and its suggested risk reduction practices  Educated during treatment 11/29/21 Partially Met   Patient will be independent with remedial HEP for lymphedema  He is working on his HEP 11/23/21 Ongoing   Patient will be independent with self MLD for lymphedema He is using the Flexitouch pump 11/24/21 Partially Met             LTG by: 6 weeks         Patient will have appropriate compression garments for lymphedema maintenance He now has new compression 12/8/21 Ongoing   Patient will have no sign or symptoms of infection  No signs or symptoms of infection 12/1/21 Met   Patient will be independent with comprehensive maintenance program for lymphedema  He continues to work towards his home maintenance program 12/8/21 Progressing                                        Assessment/Plan     ASSESSMENT: He did feel more of a stretch with new technique today for the hip extensors. The new compression stocking fit well. He will wear the new compression with  hopes that it will reduce the LLE.    PLAN: Cont with POC. Measure next week to see difference new compression makes.     SIGNATURE: Debbie Morales, PT, DPT, CLT-LANNY, License #: [id number]  Electronically Signed on 12/8/2021        [address] [place] [address]. [address]  [phone]

## 2021-12-15 ENCOUNTER — TREATMENT (OUTPATIENT)
Dept: PHYSICAL THERAPY | Facility: CLINIC | Age: 51
End: 2021-12-15

## 2021-12-15 DIAGNOSIS — C64.1 RENAL CELL CARCINOMA OF RIGHT KIDNEY (HCC): ICD-10-CM

## 2021-12-15 DIAGNOSIS — I89.0 LYMPHEDEMA OF GENITALIA: ICD-10-CM

## 2021-12-15 DIAGNOSIS — I89.0 LYMPHEDEMA OF LEFT LEG: Primary | ICD-10-CM

## 2021-12-15 PROCEDURE — 97140 MANUAL THERAPY 1/> REGIONS: CPT | Performed by: PHYSICAL THERAPIST
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
## 2021-12-22 ENCOUNTER — TREATMENT (OUTPATIENT)
Dept: PHYSICAL THERAPY | Facility: CLINIC | Age: 51
End: 2021-12-22

## 2021-12-22 DIAGNOSIS — C64.1 RENAL CELL CARCINOMA OF RIGHT KIDNEY (HCC): ICD-10-CM

## 2021-12-22 DIAGNOSIS — I89.0 LYMPHEDEMA OF GENITALIA: ICD-10-CM

## 2021-12-22 DIAGNOSIS — I89.0 LYMPHEDEMA OF LEFT LEG: Primary | ICD-10-CM

## 2021-12-22 PROCEDURE — 97140 MANUAL THERAPY 1/> REGIONS: CPT | Performed by: PHYSICAL THERAPIST

## 2021-12-22 NOTE — PROGRESS NOTES
Physical Therapy Treatment Note and 30 Day Progress Note    Patient: Holland Phelps                                                                     Visit Date: 2021  :     1970    Referring practitioner:    Sharon Cheney,*  Date of Initial Visit:          Type: THERAPY  Noted: 10/22/2021    Patient seen for 13 sessions    Visit Diagnoses:    ICD-10-CM ICD-9-CM   1. Lymphedema of left leg  I89.0 457.1   2. Lymphedema of genitalia  I89.0 457.1   3. Renal cell carcinoma of right kidney (HCC)  C64.1 189.0       SUBJECTIVE     Subjective:  He was able to wash some cars in a bay with hot water for about 6 hours and he did fine.  He is back to using the pump and he continues to wear his compression.          OBJECTIVE     Objective    Lymphedema     Row Name 21 0900             Subjective Pain    Able to rate subjective pain? yes  -AL      Pre-Treatment Pain Level 1  -AL      Subjective Pain Comment Stiffness and tight L leg  -AL              Lymphedema Measurements    Measurement Type(s) Circumferential  -AL      Circumferential Areas Lower extremities  -AL              BUE Circumferential (cm)    Measurement Location 1 BOT  -AL      Left 1 22.2 cm  -AL      Measurement Location 2 +7  -AL      Left 2 23.9 cm  -AL      Measurement Location 3 +7  -AL      Left 3 24.6 cm  -AL      Measurement Location 4 +10  -AL      Left 4 30 cm  -AL      Measurement Location 5 +10  -AL      Left 5 36.4 cm  -AL      Measurement Location 6 +10  -AL      Left 6 39.4 cm  -AL      Measurement Location 7 +10  -AL      Left 7 39.8 cm  -AL      Measurement Location 8 +10  -AL      Left 8 45.4 cm  -AL      Measurement Location 9 +10  -AL      Left 9 52.8 cm  -AL      Measurement Location 10 +10  -AL      Left 10 58 cm  -AL              RUE Circumferential (cm)    Measurement Location 1 BOT  -AL      Measurement Location 2 +7  -AL      Measurement  Location 3 +7  -AL      Measurement Location 4 +10  -AL      Measurement Location 5 +10  -AL      Measurement Location 6 +10  -AL      Measurement Location 7 +10  -AL      Measurement Location 8 +10  -AL      Measurement Location 9 +10  -AL      Measurement Location 10 +10  -AL              Manual Lymphatic Drainage    Manual Lymphatic Drainage initial sequence; opened regional lymph nodes; opened anastamoses; extremity treatment  -AL      Initial Sequence short neck; abdomen; diaphragmatic breathing  -AL      Abdomen superficial; deep  -AL      Diaphragmatic Breathing x 10 with superficial abdominals  -AL      Opened Regional Lymph Nodes axillary; inguinal  -AL      Axillary right; left  -AL      Inguinal right; left  -AL      Opened Anastamoses inguino-axillary  -AL      Inguino-Axillary right; left  Supine and prone  -AL      Extremity Treatment MLD to full limb  -AL      MLD to Full Limb LLE  -AL      Manual Lymphatic Drainage Comments Extra time to the proximal thigh along the medial and lateral sides. Stretched into hip extension today with foam 1/2 bolster just below L ischial tuberosity in supine X 3 minutes.Then manual stretch to HS and in prone roller to posterior LLE and stretch to calf  -AL      Manual Therapy 75  -AL              Compression/Skin Care    Compression/Skin Care skin care; compression garment  -AL      Skin Care --  -AL      Compression Garment Comments Patient donned his compression garment.  -AL            User Key  (r) = Recorded By, (t) = Taken By, (c) = Cosigned By    Initials Name Provider Type    Elida Pickering PTA, SAHIL Physical Therapy Assistant                Therapy Education/Self Care 08810   Details: Use pump and compression. Wear the new compression   Given edema management   Progress: Reinforced   Education provided to:  Patient   Level of understanding Verbalized   Timed Minutes              Total Timed Treatment:    75    mins  Total Time of Visit:            75    mins     ASSESSMENT/PLAN            Goals                                          Progress Note due by 1/22/22                                                      Recert due by 1/20/2022   STG by: 3 weeks Comments Date Status   Patient will have a good basic understanding of lymphedema and its suggested risk reduction practices  Has a good understanding of his lymphedema 12/22/21 Met   Patient will be independent with remedial HEP for lymphedema  He is working on his HEP 12/22/21 Progressing   Patient will be independent with self MLD for lymphedema Patient is using the pump again 12/22/21 Partially Met             LTG by: 6 weeks         Patient will have appropriate compression garments for lymphedema maintenance Patient has two new compression garments for the L LE that are a good fit. 12/15/21 Met   Patient will have no sign or symptoms of infection  No signs or symptoms of infection 12/1/21 Met   Patient will be independent with comprehensive maintenance program for lymphedema  He continues to work towards his home maintenance program 12/22/21 Progressing                                        Assessment/Plan     ASSESSMENT: Patient was on his feet more yesterday and has had an increase in size of the L LE.  He does not have as much dense tissue in the L leg as he did when he initially started with us.  Functionally he is able to move around more and clean cars as well as do household chores.  Patient has new good fitting compression for the L LE and since the power is back on at his house he is using the pump again.     PLAN: Cont with POC. Will continue to measure the L LE weekly.     SIGNATURE: Elida Sequeira PTA, YOSI-LANNY, License #: [id number]  Electronically Signed on 12/22/2021        [address] [place] [address]. [address]  [phone]

## 2021-12-28 NOTE — PROGRESS NOTES
Progress Note Addendum      Patient: Holland Phelps           : 1970  Visit Date: 2021  Referring practitioner: Sharon Cheney,*  Date of Initial Visit: Type: THERAPY  Noted: 10/22/2021  Patient seen for 13 sessions  Visit Diagnoses:    ICD-10-CM ICD-9-CM   1. Lymphedema of left leg  I89.0 457.1   2. Lymphedema of genitalia  I89.0 457.1   3. Renal cell carcinoma of right kidney (HCC)  C64.1 189.0          Clinical Progress: improved  Home Program Compliance: Yes  Progress toward previous goals: Partially Met  Prognosis to achieve goals: good    Objective     Assessment & Plan       Plan  Therapy options: will be seen for skilled therapy services  Frequency: 1x week  Duration in weeks: 4  Treatment plan discussed with: PTA and patient        Progress and goals were addressed by  SAHIL Kaminski PTA and we reviewed his progress and plan of care. The patient is in agreement with this plan.     SIGNATURE: Debbie Morales, PT, DPT, SAHIL, License #: [id number]  Electronically Signed on 2021

## 2021-12-29 ENCOUNTER — TREATMENT (OUTPATIENT)
Dept: PHYSICAL THERAPY | Facility: CLINIC | Age: 51
End: 2021-12-29

## 2021-12-29 DIAGNOSIS — I89.0 LYMPHEDEMA OF GENITALIA: ICD-10-CM

## 2021-12-29 DIAGNOSIS — C64.1 RENAL CELL CARCINOMA OF RIGHT KIDNEY (HCC): ICD-10-CM

## 2021-12-29 DIAGNOSIS — I89.0 LYMPHEDEMA OF LEFT LEG: Primary | ICD-10-CM

## 2021-12-29 PROCEDURE — 97140 MANUAL THERAPY 1/> REGIONS: CPT | Performed by: PHYSICAL THERAPIST

## 2021-12-29 NOTE — PROGRESS NOTES
Physical Therapy Treatment Note     Patient: Holland Phelps                                                                     Visit Date: 2021  :     1970    Referring practitioner:    Sharon Cheney,*  Date of Initial Visit:          Type: THERAPY  Noted: 10/22/2021    Patient seen for 14 sessions    Visit Diagnoses:    ICD-10-CM ICD-9-CM   1. Lymphedema of left leg  I89.0 457.1   2. Lymphedema of genitalia  I89.0 457.1   3. Renal cell carcinoma of right kidney (HCC)  C64.1 189.0       SUBJECTIVE     Subjective:   states he had some swelling over the holidays but it is better today.  He sleeps with a pillow between his knees on his side and this seems to help the joint stiffness and swelling.          OBJECTIVE     Objective    Lymphedema     Row Name 21 0800             Subjective Pain    Able to rate subjective pain? yes  -AL      Pre-Treatment Pain Level 0  -AL      Post-Treatment Pain Level 0  -AL      Subjective Pain Comment Stiffness and numbness in the L leg but no pain.  -AL              Lymphedema Measurements    Measurement Type(s) Circumferential  -AL      Circumferential Areas Lower extremities  -AL              BUE Circumferential (cm)    Measurement Location 1 BOT  -AL      Left 1 22.7 cm  -AL      Measurement Location 2 +7  -AL      Left 2 24.6 cm  -AL      Measurement Location 3 +7  -AL      Left 3 24.6 cm  -AL      Measurement Location 4 +10  -AL      Left 4 28.6 cm  -AL      Measurement Location 5 +10  -AL      Left 5 35.6 cm  -AL      Measurement Location 6 +10  -AL      Left 6 38 cm  -AL      Measurement Location 7 +10  -AL      Left 7 37.5 cm  -AL      Measurement Location 8 +10  -AL      Left 8 42.7 cm  -AL      Measurement Location 9 +10  -AL      Left 9 50.8 cm  -AL      Measurement Location 10 +10  -AL      Left 10 57.1 cm  -AL              RUE Circumferential (cm)    Measurement Location  1 BOT  -AL      Measurement Location 2 +7  -AL      Measurement Location 3 +7  -AL      Measurement Location 4 +10  -AL      Measurement Location 5 +10  -AL      Measurement Location 6 +10  -AL      Measurement Location 7 +10  -AL      Measurement Location 8 +10  -AL      Measurement Location 9 +10  -AL      Measurement Location 10 +10  -AL              Manual Lymphatic Drainage    Manual Lymphatic Drainage initial sequence; opened regional lymph nodes; opened anastamoses; extremity treatment  -AL      Initial Sequence short neck; abdomen; diaphragmatic breathing  -AL      Abdomen superficial; deep  -AL      Diaphragmatic Breathing x 10 with superficial abdominals  -AL      Opened Regional Lymph Nodes axillary; inguinal  -AL      Axillary right; left  -AL      Inguinal right; left  -AL      Opened Anastamoses inguino-axillary  -AL      Inguino-Axillary right; left  Supine and prone  -AL      Extremity Treatment MLD to full limb  -AL      MLD to Full Limb LLE  -AL      Manual Lymphatic Drainage Comments Stretched the L hamstrings and hip flexors, used small half foam roll just distal to ischial tuberosity to stretch L hip.  Spent extra time working on MLD to the L upper leg where dense tissue is present.  Also did IASTM with the red ridged roller to the L quads, hamstrings, and calf areas.  -AL      Manual Therapy 75  -AL              Compression/Skin Care    Compression/Skin Care compression garment  -AL      Compression Garment Comments Patient donned his compression garment.  -AL            User Key  (r) = Recorded By, (t) = Taken By, (c) = Cosigned By    Initials Name Provider Type    Elida Pickering PTA, SAHIL Physical Therapy Assistant                Therapy Education/Self Care 43040   Details: Use pump and work on CDT   Given edema management   Progress: Reinforced   Education provided to:  Patient   Level of understanding Verbalized   Timed Minutes              Total Timed Treatment:    75     mins  Total Time of Visit:            75   mins     ASSESSMENT/PLAN            Goals                                          Progress Note due by 1/22/22                                                      Recert due by 1/20/2022   STG by: 3 weeks Comments Date Status   Patient will have a good basic understanding of lymphedema and its suggested risk reduction practices  Has a good understanding of his lymphedema 12/22/21 Met   Patient will be independent with remedial HEP for lymphedema  He is working on his HEP 12/22/21 Progressing   Patient will be independent with self MLD for lymphedema Patient is using the pump again 12/22/21 Partially Met             LTG by: 6 weeks         Patient will have appropriate compression garments for lymphedema maintenance Patient has two new compression garments for the L LE that are a good fit. 12/15/21 Met   Patient will have no sign or symptoms of infection  No signs or symptoms of infection 12/1/21 Met   Patient will be independent with comprehensive maintenance program for lymphedema  Patient is working towards his home maintenance program.  12/29/21 Progressing                                        Assessment/Plan     ASSESSMENT:   had a busy holiday weekend and was not able to work on his routine as much for the swelling in the L leg.  Today overall all he has had a good reduction in the L LE as compared to last week.  He does have dense tissue in the L upper leg near the groin.  The dense tissue is improving L hamstring area.  Today patient is doing well as far as pain as he is only having some leg stiffness and numbness.     PLAN: Cont with POC. Will continue to measure the L LE weekly.     SIGNATURE: Elida Sequeira PTA, YOSI-LANNY, License #: [id number]  Electronically Signed on 12/29/2021        [address] [place] [address]. [address]  [phone]

## 2022-01-05 ENCOUNTER — TREATMENT (OUTPATIENT)
Dept: PHYSICAL THERAPY | Facility: CLINIC | Age: 52
End: 2022-01-05

## 2022-01-05 DIAGNOSIS — C64.1 RENAL CELL CARCINOMA OF RIGHT KIDNEY: ICD-10-CM

## 2022-01-05 DIAGNOSIS — I89.0 LYMPHEDEMA OF LEFT LEG: Primary | ICD-10-CM

## 2022-01-05 DIAGNOSIS — I89.0 LYMPHEDEMA OF GENITALIA: ICD-10-CM

## 2022-01-05 PROCEDURE — 97140 MANUAL THERAPY 1/> REGIONS: CPT | Performed by: PHYSICAL THERAPIST

## 2022-01-05 NOTE — PROGRESS NOTES
Physical Therapy Treatment Note     Patient: Holland Phelps                                                                     Visit Date: 2022  :     1970    Referring practitioner:    Sharon Cheney,*  Date of Initial Visit:          Type: THERAPY  Noted: 10/22/2021    Patient seen for 15 sessions    Visit Diagnoses:    ICD-10-CM ICD-9-CM   1. Lymphedema of left leg  I89.0 457.1   2. Lymphedema of genitalia  I89.0 457.1   3. Renal cell carcinoma of right kidney (HCC)  C64.1 189.0       SUBJECTIVE     Subjective:   states he has some swelling in the ankle and shin.  He is having some numbness in the L shin but it's not as bad today.           OBJECTIVE     Objective    Lymphedema     Row Name 22 0800             Subjective Pain    Able to rate subjective pain? yes  -AL      Pre-Treatment Pain Level 0  -AL      Subjective Pain Comment Numbness and stiffness in the L LE  -AL              Lymphedema Measurements    Measurement Type(s) Circumferential  -AL      Circumferential Areas Lower extremities  -AL              BUE Circumferential (cm)    Measurement Location 1 BOT  -AL      Left 1 22.4 cm  -AL      Measurement Location 2 +7  -AL      Left 2 25 cm  -AL      Measurement Location 3 +7  -AL      Left 3 25.9 cm  -AL      Measurement Location 4 +10  -AL      Left 4 28.7 cm  -AL      Measurement Location 5 +10  -AL      Left 5 36 cm  -AL      Measurement Location 6 +10  -AL      Left 6 40.5 cm  -AL      Measurement Location 7 +10  -AL      Left 7 37.6 cm  -AL      Measurement Location 8 +10  -AL      Left 8 44 cm  -AL      Measurement Location 9 +10  -AL      Left 9 50.8 cm  -AL      Measurement Location 10 +10  -AL      Left 10 56.9 cm  -AL              Manual Lymphatic Drainage    Manual Lymphatic Drainage initial sequence; opened regional lymph nodes; opened anastamoses; extremity treatment  -AL      Initial Sequence  short neck; abdomen; diaphragmatic breathing  -AL      Abdomen superficial; deep  -AL      Diaphragmatic Breathing x 10 with superficial abdominals  -AL      Opened Regional Lymph Nodes axillary; inguinal  -AL      Axillary right; left  -AL      Inguinal right; left  -AL      Opened Anastamoses inguino-axillary  -AL      Inguino-Axillary right; left  Supine and prone  -AL      Extremity Treatment MLD to full limb  -AL      MLD to Full Limb LLE  -AL      Manual Lymphatic Drainage Comments Stretched the L hamstrings and hip flexors, used small half foam roll just distal to ischial tuberosity to stretch L hip.  Spent extra time working on MLD to the L upper leg where dense tissue is present.  Also did IASTM with the red ridged roller to the L quads, hamstrings, and calf areas. IASTM to the L IT band as well with the red roller.  -AL      Manual Therapy 75  -AL              Compression/Skin Care    Compression/Skin Care compression garment  -AL      Compression Garment Comments Patient donned his compression garment.  -AL            User Key  (r) = Recorded By, (t) = Taken By, (c) = Cosigned By    Initials Name Provider Type    Elida Pickering PTA, SAHIL Physical Therapy Assistant                Therapy Education/Self Care 64194   Details: Use pump and work on CDT   Given edema management   Progress: Reinforced   Education provided to:  Patient   Level of understanding Verbalized   Timed Minutes              Total Timed Treatment:    75    mins  Total Time of Visit:            75   mins     ASSESSMENT/PLAN            Goals                                          Progress Note due by 1/22/22                                                      Recert due by 1/20/2022   STG by: 3 weeks Comments Date Status   Patient will have a good basic understanding of lymphedema and its suggested risk reduction practices  Has a good understanding of his lymphedema 12/22/21 Met   Patient will be independent with remedial HEP for  lymphedema  He is working on his HEP 12/22/21 Progressing   Patient will be independent with self MLD for lymphedema Patient is using the pump again 12/22/21 Partially Met             LTG by: 6 weeks         Patient will have appropriate compression garments for lymphedema maintenance Patient has two new compression garments for the L LE that are a good fit. 12/15/21 Met   Patient will have no sign or symptoms of infection  No signs or symptoms of infection 12/1/21 Met   Patient will be independent with comprehensive maintenance program for lymphedema Continues to work towards home maintenance program.  1/5/22 Progressing                                        Assessment/Plan     ASSESSMENT:   has started using the pump again on a more consistent basis.  He did have an increase in overall swelling today, more so in the L lower leg.  The dense tissue in the hamstring area is much softer today, he is not having as much numbness today either.      PLAN: Cont with POC. Will continue to measure the L LE weekly.     SIGNATURE: Elida Sequeira PTA, YOSI-LANNY, License #: [id number]  Electronically Signed on 1/5/2022        [address] [place] [address]. [address]  [phone]

## 2022-01-12 ENCOUNTER — TREATMENT (OUTPATIENT)
Dept: PHYSICAL THERAPY | Facility: CLINIC | Age: 52
End: 2022-01-12

## 2022-01-12 DIAGNOSIS — I89.0 LYMPHEDEMA OF GENITALIA: ICD-10-CM

## 2022-01-12 DIAGNOSIS — C64.1 RENAL CELL CARCINOMA OF RIGHT KIDNEY: ICD-10-CM

## 2022-01-12 DIAGNOSIS — I89.0 LYMPHEDEMA OF LEFT LEG: Primary | ICD-10-CM

## 2022-01-12 PROCEDURE — 97140 MANUAL THERAPY 1/> REGIONS: CPT | Performed by: PHYSICAL THERAPIST

## 2022-01-12 NOTE — PROGRESS NOTES
Physical Therapy Treatment Note     Patient: Holland Phelps                                                                     Visit Date: 2022  :     1970    Referring practitioner:    Sharon Cheney,*  Date of Initial Visit:          Type: THERAPY  Noted: 10/22/2021    Patient seen for 16 sessions    Visit Diagnoses:    ICD-10-CM ICD-9-CM   1. Lymphedema of left leg  I89.0 457.1   2. Lymphedema of genitalia  I89.0 457.1   3. Renal cell carcinoma of right kidney (HCC)  C64.1 189.0       SUBJECTIVE     Subjective:  He still has a little numbness in the L knee, he is not having any pain.  He has been alternating using the thigh high compression then removing this and donning just the knee high compression.  The Thigh high compression causes his leg to itch.         OBJECTIVE     Objective    Lymphedema     Row Name 22 0800             Subjective Pain    Able to rate subjective pain? yes  -AL      Pre-Treatment Pain Level 0  -AL      Subjective Pain Comment Numbness in the L kne area  -AL              Lymphedema Measurements    Measurement Type(s) Circumferential  -AL      Circumferential Areas Lower extremities  -AL              BUE Circumferential (cm)    Measurement Location 1 BOT  -AL      Left 1 22 cm  -AL      Measurement Location 2 +7  -AL      Left 2 23.5 cm  -AL      Measurement Location 3 +7  -AL      Left 3 23.5 cm  -AL      Measurement Location 4 +10  -AL      Left 4 26.6 cm  -AL      Measurement Location 5 +10  -AL      Left 5 34.6 cm  -AL      Measurement Location 6 +10  -AL      Left 6 38.7 cm  -AL      Measurement Location 7 +10  -AL      Left 7 37.9 cm  -AL      Measurement Location 8 +10  -AL      Left 8 44.2 cm  -AL      Measurement Location 9 +10  -AL      Left 9 49.9 cm  -AL      Measurement Location 10 +10  -AL      Left 10 57 cm  -AL              RUE Circumferential (cm)    Measurement Location 1 BOT  -AL       Measurement Location 2 +7  -AL      Measurement Location 3 +7  -AL      Measurement Location 4 +10  -AL      Measurement Location 5 +10  -AL      Measurement Location 6 +10  -AL      Measurement Location 7 +10  -AL      Measurement Location 8 +10  -AL      Measurement Location 9 +10  -AL      Measurement Location 10 +10  -AL              Manual Lymphatic Drainage    Manual Lymphatic Drainage initial sequence; opened regional lymph nodes; opened anastamoses; extremity treatment  -AL      Initial Sequence short neck; abdomen; diaphragmatic breathing  -AL      Abdomen superficial; deep  -AL      Diaphragmatic Breathing x 10 with superficial abdominals  -AL      Opened Regional Lymph Nodes axillary; inguinal  -AL      Axillary right; left  -AL      Inguinal right; left  -AL      Opened Anastamoses inguino-axillary  -AL      Inguino-Axillary right; left  Supine and prone  -AL      Extremity Treatment MLD to full limb  -AL      MLD to Full Limb LLE  -AL      Manual Lymphatic Drainage Comments IASTM to the L LE with the red roller, stretched the L hip with half foam roll under the L ischial tuberosity, stretched the L hamstrings, stretched the L hip flexors in prone with a pillow under thigh and overpressure at the L hip.  -AL      Manual Therapy 70  -AL              Compression/Skin Care    Compression/Skin Care compression garment  -AL      Compression Garment Comments Patient donned his compression garment.  -AL            User Key  (r) = Recorded By, (t) = Taken By, (c) = Cosigned By    Initials Name Provider Type    Elida Pickering PTA, SAHIL Physical Therapy Assistant                Therapy Education/Self Care 20504   Details: Use pump and work on CDT   Given edema management   Progress: Reinforced   Education provided to:  Patient   Level of understanding Verbalized   Timed Minutes              Total Timed Treatment:    70   mins  Total Time of Visit:            70   mins     ASSESSMENT/PLAN             Goals                                          Progress Note due by 1/22/22                                                      Recert due by 1/20/2022   STG by: 3 weeks Comments Date Status   Patient will have a good basic understanding of lymphedema and its suggested risk reduction practices  Has a good understanding of his lymphedema 12/22/21 Met   Patient will be independent with remedial HEP for lymphedema  He is working on his HEP 12/22/21 Progressing   Patient will be independent with self MLD for lymphedema He has gotten back into the routine for using the pump. 1/12/22 Partially Met             LTG by: 6 weeks         Patient will have appropriate compression garments for lymphedema maintenance Patient has two new compression garments for the L LE that are a good fit. 12/15/21 Met   Patient will have no sign or symptoms of infection  No signs or symptoms of infection 12/1/21 Met   Patient will be independent with comprehensive maintenance program for lymphedema Continues to work towards home maintenance program.  1/5/22 Progressing                                        Assessment/Plan     ASSESSMENT:   has had a reduction in the L LE since his last visit.  He has started alternating the compression he wears and is using the Flexitouch pump again. Yesenia combination has worked to help reduce the L LE.  Since it is colder and he works outside he has not been on his feet as much which helps the swelling as well.      PLAN: Cont with POC. Will continue to measure the L LE weekly.     SIGNATURE: Elida Sequeira PTA, YOSI-LNANY, License #: [id number]  Electronically Signed on 1/12/2022        [address] [place] [address]. [address]  [phone]

## 2022-01-19 ENCOUNTER — TREATMENT (OUTPATIENT)
Dept: PHYSICAL THERAPY | Facility: CLINIC | Age: 52
End: 2022-01-19

## 2022-01-19 DIAGNOSIS — I89.0 LYMPHEDEMA OF LEFT LEG: Primary | ICD-10-CM

## 2022-01-19 DIAGNOSIS — C64.1 RENAL CELL CARCINOMA OF RIGHT KIDNEY: ICD-10-CM

## 2022-01-19 DIAGNOSIS — I89.0 LYMPHEDEMA OF GENITALIA: ICD-10-CM

## 2022-01-19 PROCEDURE — 97140 MANUAL THERAPY 1/> REGIONS: CPT | Performed by: PHYSICAL THERAPIST

## 2022-01-19 NOTE — PROGRESS NOTES
30 Day Progress Note and 90 Day Recertification Addendum      Patient: Holland Phelps           : 1970  Visit Date: 2022  Referring practitioner: Sharon Cheney,*  Date of Initial Visit: Type: THERAPY  Noted: 10/22/2021  Patient seen for 17 sessions  Visit Diagnoses:    ICD-10-CM ICD-9-CM   1. Lymphedema of left leg  I89.0 457.1   2. Lymphedema of genitalia  I89.0 457.1   3. Renal cell carcinoma of right kidney (HCC)  C64.1 189.0          Clinical Progress: improved  Home Program Compliance: Yes  Progress toward previous goals: Partially Met  Prognosis to achieve goals: good    Objective     Assessment & Plan       Plan  Therapy options: will be seen for skilled therapy services  Frequency: 1x week  Duration in weeks: 12  Treatment plan discussed with: patient and PTA        I spent 6 minutes with the patient and Aneta Sequeira PTA and reviewed their progress and plan of care. The patient is in agreement with this plan.     SIGNATURE: Debbie Morales, PT, DPT, CLT-LANNY, License #: [id number]  Electronically Signed on 2022      90 Day Recertification  Certification Period: 2022 through 2022  Based upon review of the patient's progress and continued therapy plan, it is my medical opinion that Holland Phelps should continue physical therapy treatment at [facility]     PHYSICIAN: Sharon Cheney MD PhD______________________________________________DATE: ___________________     Please sign and return via fax to [phone].   Thank you so much for letting us work with Holland. I appreciate your letting us work with your patients. If you have any questions or concerns, please don't hesitate to contact me.

## 2022-01-19 NOTE — PROGRESS NOTES
"                                                                Physical Therapy 90 Day Progress Note and ReCertification    Patient: Holland Phelps                                                                     Visit Date: 2022  :     1970    Referring practitioner:    Sharon Cheney,*  Date of Initial Visit:          Type: THERAPY  Noted: 10/22/2021    Patient seen for 17 sessions    Visit Diagnoses:    ICD-10-CM ICD-9-CM   1. Lymphedema of left leg  I89.0 457.1   2. Lymphedema of genitalia  I89.0 457.1   3. Renal cell carcinoma of right kidney (HCC)  C64.1 189.0       SUBJECTIVE     Subjective:  He went to [place] two days ago and his scans were good.  He will have a brain scan and bone density test in 3-4 months.  He has some tightness in the L quad and L shin.        OBJECTIVE     Objective    Lymphedema     Row Name 22 0915             Subjective Pain    Able to rate subjective pain? yes  -AL      Pre-Treatment Pain Level 0  -AL      Subjective Pain Comment Numbness in the L knee and shin  -AL              Physical Concerns    The amount of pain associated with my lymphedema is: 2  -AL      The amount of limb heaviness associated with my lymphedema is: 2  -AL      The amount of skin tightness associated with my lymphedema is: 3  -AL      The size of my swollen limb(s) seems: 2  -AL      Lymphedema affects the movement of my swollen limb(s): 3  -AL      The strength in my swollen limb(s) is: 3  -AL              Psychosocial Concerns    Lymphedema affects my body image (i.e., \"how I think I look\"). 1  -AL      Lymphedema affects my socializing with others. 1  -AL      Lymphedema affects my intimate relations with spouse or partner (rate 0 if not applicable 2  -AL      Lymphedema \"gets me down\" (i.e., depression, frustration, or anger) 1  -AL      I must rely on others for help due to my lymphedema. 1  -AL      I know what to do to manage my lymphedema 3  -AL              " Functional Concerns    Lymphedema affects my ability to perform self-care activities (i.e. eating, dressing, hygiene) 3  -AL      Lymphedema affects my ability to perform routine home or work-related activities. 2  -AL      Lymphedema affects my performance of preferred leisure activities. 2  -AL      Lymphedema affects proper fit of clothing/shoes 2  -AL      Lymphedema affects my sleep 3  -AL              Lymphedema Measurements    Measurement Type(s) Circumferential  -AL      Circumferential Areas Lower extremities  -AL              BUE Circumferential (cm)    Measurement Location 1 BOT  -AL      Left 1 22.6 cm  -AL      Measurement Location 2 +7  -AL      Left 2 24.4 cm  -AL      Measurement Location 3 +7  -AL      Left 3 24.1 cm  -AL      Measurement Location 4 +10  -AL      Left 4 28.5 cm  -AL      Measurement Location 5 +10  -AL      Left 5 35.4 cm  -AL      Measurement Location 6 +10  -AL      Left 6 38.9 cm  -AL      Measurement Location 7 +10  -AL      Left 7 37.5 cm  -AL      Measurement Location 8 +10  -AL      Left 8 43.7 cm  -AL      Measurement Location 9 +10  -AL      Left 9 50.5 cm  -AL      Measurement Location 10 +10  -AL      Left 10 56.9 cm  -AL              RUE Circumferential (cm)    Measurement Location 1 BOT  -AL      Measurement Location 2 +7  -AL      Measurement Location 3 +7  -AL      Measurement Location 4 +10  -AL      Measurement Location 5 +10  -AL      Measurement Location 6 +10  -AL      Measurement Location 7 +10  -AL      Measurement Location 8 +10  -AL      Measurement Location 9 +10  -AL      Measurement Location 10 +10  -AL              Manual Lymphatic Drainage    Manual Lymphatic Drainage initial sequence; opened regional lymph nodes; opened anastamoses; extremity treatment  -AL      Initial Sequence short neck; abdomen; diaphragmatic breathing  -AL      Abdomen superficial; deep  -AL      Diaphragmatic Breathing x 10 with superficial abdominals  -AL      Opened Regional  Lymph Nodes axillary; inguinal  -AL      Axillary right; left  -AL      Inguinal right; left  -AL      Opened Anastamoses inguino-axillary  -AL      Inguino-Axillary right; left  Supine and prone  -AL      Extremity Treatment MLD to full limb  -AL      MLD to Full Limb LLE  -AL      Manual Lymphatic Drainage Comments Continued with IASTM to the L LE with the red roller, stretched the L hip with half foam roll under the L ischial tuberosity, stretched the L hamstrings, stretched the L hip flexors in prone with a pillow under thigh and overpressure at the L hip.  -AL      Manual Therapy 70  -AL              Compression/Skin Care    Compression/Skin Care compression garment  -AL      Compression Garment Comments Patient donned his compression garment.  -AL              Lymphedema Life Impact Scale Totals    A.  Total Q1 - Q17 (Do not include Q18) 36  -AL      B.  Total number of questions answered (Q1-Q17) 17  -AL      C. Divide A by B 2.12  -AL      D. Multiple C by 25 53  -AL            User Key  (r) = Recorded By, (t) = Taken By, (c) = Cosigned By    Initials Name Provider Type    Elida Pickering PTA, SAHIL Physical Therapy Assistant                Therapy Education/Self Care [id number]   Details: Use pump and work on CDT   Given edema management   Progress: Reinforced   Education provided to:  Patient   Level of understanding Verbalized   Timed Minutes              Total Timed Treatment:    70   mins  Total Time of Visit:            70   mins     ASSESSMENT/PLAN            Goals                                          Progress Note due by 2/18/22                                                      Recert due by 4/19/2022   STG by: 3 weeks Comments Date Status   Patient will have a good basic understanding of lymphedema and its suggested risk reduction practices  Has a good understanding of his lymphedema 12/22/21 Met   Patient will be independent with remedial HEP for lymphedema  He is working on his HEP 1/19/22  Partially  Met   Patient will be independent with self MLD for lymphedema He is still trying to get into a routine for the pump 1/19/22 Partially Met             LTG by: 6 weeks         Patient will have appropriate compression garments for lymphedema maintenance Patient has two new compression garments for the L LE that are a good fit. 12/15/21 Met   Patient will have no sign or symptoms of infection  No signs or symptoms of infection 12/1/21 Met   Patient will be independent with comprehensive maintenance program for lymphedema Working towards his home maintenance program.  1/19/22 Progressing                                        Assessment/Plan     ASSESSMENT:   has had an increase in size of the L LE as compared to his last visit.  He did have to travel to [place] two days ago which could have caused the increase in swelling.  He has been working on getting back into the routine for CDT and using the Flexitouch pump, he does wear his compression daily.  Functionally he is having little to no pain which makes it easier for him to walk.  He does continue to have some numbness in the L LE.     PLAN: Cont with POC. Will continue to measure the L LE weekly.     SIGNATURE: Elida Sequeira PTA, YOSI-LANNY, License #: [id number]  Electronically Signed on 1/19/2022        [address] [place] [address]. [address]  [phone]

## 2022-01-26 ENCOUNTER — TREATMENT (OUTPATIENT)
Dept: PHYSICAL THERAPY | Facility: CLINIC | Age: 52
End: 2022-01-26

## 2022-01-26 DIAGNOSIS — I89.0 LYMPHEDEMA OF GENITALIA: ICD-10-CM

## 2022-01-26 DIAGNOSIS — I89.0 LYMPHEDEMA OF LEFT LEG: Primary | ICD-10-CM

## 2022-01-26 DIAGNOSIS — C64.1 RENAL CELL CARCINOMA OF RIGHT KIDNEY: ICD-10-CM

## 2022-01-26 PROCEDURE — 97140 MANUAL THERAPY 1/> REGIONS: CPT | Performed by: PHYSICAL THERAPIST

## 2022-01-26 NOTE — PROGRESS NOTES
Physical Therapy Treatment Note    Patient: Holland Phelps                                                                     Visit Date: 2022  :     1970    Referring practitioner:    Sharon Cheney,*  Date of Initial Visit:          Type: THERAPY  Noted: 10/22/2021    Patient seen for 18 sessions    Visit Diagnoses:    ICD-10-CM ICD-9-CM   1. Lymphedema of left leg  I89.0 457.1   2. Lymphedema of genitalia  I89.0 457.1   3. 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        OBJECTIVE     Objective    Lymphedema     Row Name 22 1045             Subjective Pain    Able to rate subjective pain? yes  -AL      Subjective Pain Comment Stiffness in the L leg  -AL              Lymphedema Measurements    Measurement Type(s) Circumferential  -AL      Circumferential Areas Lower extremities  -AL              BUE Circumferential (cm)    Measurement Location 1 BOT  -AL      Left 1 22.5 cm  -AL      Measurement Location 2 +7  -AL      Left 2 25.8 cm  -AL      Measurement Location 3 +7  -AL      Left 3 25.6 cm  -AL      Measurement Location 4 +10  -AL      Left 4 31.2 cm  -AL      Measurement Location 5 +10  -AL      Left 5 39.1 cm  -AL      Measurement Location 6 +10  -AL      Left 6 40.1 cm  -AL      Measurement Location 7 +10  -AL      Left 7 42.5 cm  -AL      Measurement Location 8 +10  -AL      Left 8 48.5 cm  -AL      Measurement Location 9 +10  -AL      Left 9 56.2 cm  -AL      Measurement Location 10 +10  -AL      Left 10 63.1 cm  -AL              RUE Circumferential (cm)    Measurement Location 1 BOT  -AL      Measurement Location 2 +7  -AL      Measurement Location 3 +7  -AL      Measurement Location 4 +10  -AL      Measurement Location 5 +10  -AL      Measurement Location 6 +10  -AL       Measurement Location 7 +10  -AL      Measurement Location 8 +10  -AL      Measurement Location 9 +10  -AL      Measurement Location 10 +10  -AL              Manual Lymphatic Drainage    Manual Lymphatic Drainage initial sequence; opened regional lymph nodes; opened anastamoses; extremity treatment  -AL      Initial Sequence short neck; abdomen; diaphragmatic breathing  -AL      Abdomen superficial; deep  -AL      Diaphragmatic Breathing x 10 with superficial abdominals  -AL      Opened Regional Lymph Nodes axillary; inguinal  -AL      Axillary right; left  -AL      Inguinal right; left  -AL      Opened Anastamoses inguino-axillary  -AL      Inguino-Axillary right; left  Supine and prone  -AL      Extremity Treatment MLD to full limb  -AL      MLD to Full Limb LLE  -AL      Manual Lymphatic Drainage Comments Extra time working on MLD to the L upper leg and L calf. IASTM to the L LE with the red roller, stretched the L hip with half foam roll under the L ischial tuberosity, stretched the L hamstrings, stretched the L hip flexors in prone with a pillow under thigh and overpressure at the L hip.  -AL      Manual Therapy 75  -AL              Compression/Skin Care    Compression/Skin Care compression garment  -AL      Compression Garment Comments Patient donned his compression garment.  -AL            User Key  (r) = Recorded By, (t) = Taken By, (c) = Cosigned By    Initials Name Provider Type    AL Elida Sequeira PTA, SAHIL Physical Therapy Assistant                Therapy Education/Self Care [id number]   Details: Use pump more often and work on CDT   Given edema management   Progress: Reinforced   Education provided to:  Patient   Level of understanding Verbalized   Timed Minutes              Total Timed Treatment:    75   mins  Total Time of Visit:            75   mins     ASSESSMENT/PLAN            Goals                                          Progress Note due by 2/18/22                                                       Recert due by 4/19/2022   STG by: 3 weeks Comments Date Status   Patient will have a good basic understanding of lymphedema and its suggested risk reduction practices  Has a good understanding of his lymphedema 12/22/21 Met   Patient will be independent with remedial HEP for lymphedema  He is working on his HEP 1/19/22 Partially  Met   Patient will be independent with self MLD for lymphedema He is still trying to get into a routine for the pump 1/19/22 Partially Met             LTG by: 6 weeks         Patient will have appropriate compression garments for lymphedema maintenance Patient has two new compression garments for the L LE that are a good fit. 12/15/21 Met   Patient will have no sign or symptoms of infection  No signs or symptoms of infection 12/1/21 Met   Patient will be independent with comprehensive maintenance program for lymphedema Working towards his home maintenance program.  1/19/22 Progressing                                        Assessment/Plan     ASSESSMENT:   has had an increase in size of the L LE as compared to his last visit.  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  He was not able to use the pump last night.  Stressed importance of using pump to help move the fluid out of the L LE.  He is wearing the compression garment on the L LE daily.      PLAN: Cont with POC. Will continue to measure the L LE weekly.     SIGNATURE: Elida Sequeira PTA, YOSI-LANNY, License #: [id number]  Electronically Signed on 1/26/2022        [address] [place] [address]. [address]  [phone]

## 2022-02-02 ENCOUNTER — TREATMENT (OUTPATIENT)
Dept: PHYSICAL THERAPY | Facility: CLINIC | Age: 52
End: 2022-02-02

## 2022-02-02 DIAGNOSIS — I89.0 LYMPHEDEMA OF GENITALIA: ICD-10-CM

## 2022-02-02 DIAGNOSIS — I89.0 LYMPHEDEMA OF LEFT LEG: Primary | ICD-10-CM

## 2022-02-02 DIAGNOSIS — C64.1 RENAL CELL CARCINOMA OF RIGHT KIDNEY: ICD-10-CM

## 2022-02-02 PROCEDURE — 97140 MANUAL THERAPY 1/> REGIONS: CPT | Performed by: PHYSICAL THERAPIST

## 2022-02-02 NOTE — PROGRESS NOTES
Physical Therapy Treatment Note    Patient: Holland Phelps                                                                     Visit Date: 2022  :     1970    Referring practitioner:    Sharon Cheney,*  Date of Initial Visit:          Type: THERAPY  Noted: 10/22/2021    Patient seen for 19 sessions    Visit Diagnoses:    ICD-10-CM ICD-9-CM   1. Lymphedema of left leg  I89.0 457.1   2. Lymphedema of genitalia  I89.0 457.1   3. 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  He is tired and he states his leg is swollen, tight, and he has numbness in the L shin and ankle.        OBJECTIVE     Objective    Lymphedema     Row Name 22 0800             Subjective Pain    Able to rate subjective pain? yes  -AL      Pre-Treatment Pain Level 0  -AL      Subjective Pain Comment No pain but a little numbness in the L shin and ankle.  -AL              Lymphedema Measurements    Measurement Type(s) Circumferential  -AL      Circumferential Areas Lower extremities  -AL              BUE Circumferential (cm)    Measurement Location 1 BOT  -AL      Left 1 22.5 cm  -AL      Measurement Location 2 +7  -AL      Left 2 25.6 cm  -AL      Measurement Location 3 +7  -AL      Left 3 25.9 cm  -AL      Measurement Location 4 +10  -AL      Left 4 31.2 cm  -AL      Measurement Location 5 +10  -AL      Left 5 39 cm  -AL      Measurement Location 6 +10  -AL      Left 6 42 cm  -AL      Measurement Location 7 +10  -AL      Left 7 41.8 cm  -AL      Measurement Location 8 +10  -AL      Left 8 47.9 cm  -AL      Measurement Location 9 +10  -AL      Left 9 55.7 cm  -AL      Measurement Location 10 +10  -AL      Left 10 60.9 cm  -AL              RUE Circumferential (cm)    Measurement Location 1 BOT   -AL      Measurement Location 2 +7  -AL      Measurement Location 3 +7  -AL      Measurement Location 4 +10  -AL      Measurement Location 5 +10  -AL      Measurement Location 6 +10  -AL      Measurement Location 7 +10  -AL      Measurement Location 8 +10  -AL      Measurement Location 9 +10  -AL      Measurement Location 10 +10  -AL              Manual Lymphatic Drainage    Manual Lymphatic Drainage initial sequence; opened regional lymph nodes; opened anastamoses; extremity treatment  -AL      Initial Sequence short neck; abdomen; diaphragmatic breathing  -AL      Abdomen superficial; deep  -AL      Diaphragmatic Breathing x 10 with superficial abdominals  -AL      Opened Regional Lymph Nodes axillary; inguinal  -AL      Axillary right; left  -AL      Inguinal right; left  -AL      Opened Anastamoses inguino-axillary  -AL      Inguino-Axillary right; left  Supine and prone  -AL      Extremity Treatment MLD to full limb  -AL      MLD to Full Limb LLE  -AL      Manual Lymphatic Drainage Comments Discussed patietn working less hours a day.  Continued with IASTM to the L LE with the red roller, stretched the L hip with half foam roll under the L ischial tuberosity, stretched the L hamstrings, stretched the L hip flexors in prone with a pillow under thigh and overpressure at the L hip.  -AL      Manual Therapy 75  -AL              Compression/Skin Care    Compression/Skin Care compression garment  -AL      Compression Garment Comments Patient donned his compression garment.  -AL            User Key  (r) = Recorded By, (t) = Taken By, (c) = Cosigned By    Initials Name Provider Type    Elida Pickering PTA, CLT-LANNY Physical Therapy Assistant                Therapy Education/Self Care [id number]   Details: Use pump more often and work on CDT   Given edema management   Progress: Reinforced   Education provided to:  Patient   Level of understanding Verbalized   Timed Minutes              Total Timed Treatment:    75    mins  Total Time of Visit:            75   mins     ASSESSMENT/PLAN            Goals                                          Progress Note due by 2/18/22                                                      Recert due by 4/19/2022   STG by: 3 weeks Comments Date Status   Patient will have a good basic understanding of lymphedema and its suggested risk reduction practices  Has a good understanding of his lymphedema 12/22/21 Met   Patient will be independent with remedial HEP for lymphedema  He is working on his HEP 1/19/22 Partially  Met   Patient will be independent with self MLD for lymphedema He is still trying to get into a routine for the pump 1/19/22 Partially Met             LTG by: 6 weeks         Patient will have appropriate compression garments for lymphedema maintenance Patient has two new compression garments for the L LE that are a good fit. 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  2/2/22 Progressing                                        Assessment/Plan     ASSESSMENT:   has had almost a 2 cm reduction in the L LE since his last visit.  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  He has dense tissue in the L upper leg and calf, spent more time doing IASTM and MLD to these areas. The tissue was softer by the end of treatment and patient was more comfortable.  He will continue to wear compression daily as well as use the Flexitouch pump.     PLAN: Cont with POC. Will continue to measure the L LE weekly.     SIGNATURE: Elida Sequeira PTA, ZIGGY-LANNY, License #: [id number]  Electronically Signed on 2/2/2022        [address] [place] [address]. [address]  [phone]

## 2022-02-09 ENCOUNTER — TREATMENT (OUTPATIENT)
Dept: PHYSICAL THERAPY | Facility: CLINIC | Age: 52
End: 2022-02-09

## 2022-02-09 DIAGNOSIS — I89.0 LYMPHEDEMA OF GENITALIA: ICD-10-CM

## 2022-02-09 DIAGNOSIS — C64.1 RENAL CELL CARCINOMA OF RIGHT KIDNEY: ICD-10-CM

## 2022-02-09 DIAGNOSIS — I89.0 LYMPHEDEMA OF LEFT LEG: Primary | ICD-10-CM

## 2022-02-09 PROCEDURE — 97140 MANUAL THERAPY 1/> REGIONS: CPT | Performed by: PHYSICAL THERAPIST

## 2022-02-09 NOTE — PROGRESS NOTES
Physical Therapy Treatment Note    Patient: Holland Phelps                                                                     Visit Date: 2022  :     1970    Referring practitioner:    Sharon Cheney,*  Date of Initial Visit:          Type: THERAPY  Noted: 10/22/2021    Patient seen for 20 sessions    Visit Diagnoses:    ICD-10-CM ICD-9-CM   1. Lymphedema of left leg  I89.0 457.1   2. Lymphedema of genitalia  I89.0 457.1   3. Renal cell carcinoma of right kidney (HCC)  C64.1 189.0       SUBJECTIVE     Subjective: The past week has been pretty good as far as swelling.  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  He has just a little numbness in the L ankle, he has been able to pump.        OBJECTIVE     Objective    Lymphedema     Row Name 22 0800             Subjective Pain    Able to rate subjective pain? yes  -AL      Pre-Treatment Pain Level 0  -AL              Lymphedema Measurements    Measurement Type(s) Circumferential  -AL      Circumferential Areas Lower extremities  -AL              BUE Circumferential (cm)    Measurement Location 1 BOT  -AL      Left 1 22.1 cm  -AL      Measurement Location 2 +7  -AL      Left 2 24.6 cm  -AL      Measurement Location 3 +7  -AL      Left 3 26.5 cm  -AL      Measurement Location 4 +10  -AL      Left 4 30 cm  -AL      Measurement Location 5 +10  -AL      Left 5 37.6 cm  -AL      Measurement Location 6 +10  -AL      Left 6 42 cm  -AL      Measurement Location 7 +10  -AL      Left 7 38.9 cm  -AL      Measurement Location 8 +10  -AL      Left 8 46 cm  -AL      Measurement Location 9 +10  -AL      Left 9 54 cm  -AL      Measurement Location 10 +10  -AL      Left 10 60 cm  -AL              RUE Circumferential (cm)    Measurement Location 1 BOT  -AL      Measurement Location 2 +7  -AL      Measurement Location 3 +7  -AL      Measurement Location 4 +10  -AL       Measurement Location 5 +10  -AL      Measurement Location 6 +10  -AL      Measurement Location 7 +10  -AL      Measurement Location 8 +10  -AL      Measurement Location 9 +10  -AL      Measurement Location 10 +10  -AL              Manual Lymphatic Drainage    Manual Lymphatic Drainage initial sequence; opened regional lymph nodes; opened anastamoses; extremity treatment  -AL      Initial Sequence short neck; abdomen; diaphragmatic breathing  -AL      Abdomen superficial; deep  -AL      Diaphragmatic Breathing x 9 with superficial abdominals  -AL      Opened Regional Lymph Nodes axillary; inguinal  -AL      Axillary right; left  -AL      Inguinal right; left  -AL      Opened Anastamoses inguino-axillary  -AL      Inguino-Axillary right; left  Supine and prone  -AL      Extremity Treatment MLD to full limb  -AL      MLD to Full Limb LLE  -AL      Manual Lymphatic Drainage Comments IASTM to the L upper leg and calf.  Half foam roll under L hip, stretched the L hamstrings and quads.  -AL      Manual Therapy 75  -AL              Compression/Skin Care    Compression/Skin Care compression garment  -AL      Compression Garment Comments Patient donned his compression garment.  -AL            User Key  (r) = Recorded By, (t) = Taken By, (c) = Cosigned By    Initials Name Provider Type    Elida Pickering PTA, SAHIL Physical Therapy Assistant                Therapy Education/Self Care 07642   Details: Use pump more often and work on CDT   Given edema management   Progress: Reinforced   Education provided to:  Patient   Level of understanding Verbalized   Timed Minutes              Total Timed Treatment:    75   mins  Total Time of Visit:            75   mins     ASSESSMENT/PLAN            Goals                                          Progress Note due by 2/18/22                                                      Recert due by 4/19/2022   STG by: 3 weeks Comments Date Status   Patient will have a good basic  understanding of lymphedema and its suggested risk reduction practices  Has a good understanding of his lymphedema 12/22/21 Met   Patient will be independent with remedial HEP for lymphedema  He is working on his HEP 1/19/22 Partially  Met   Patient will be independent with self MLD for lymphedema He is using the pump more often. 2/9/22 Partially Met             LTG by: 6 weeks         Patient will have appropriate compression garments for lymphedema maintenance Patient has two new compression garments for the L LE that are a good fit. 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  2/2/22 Progressing                                        Assessment/Plan     ASSESSMENT:   has had a good reduction in the L LE since his last treatment.  He is trying to space the cars he washes out more or if he does more than one in a day, the vehicles he cleans do not take as much time.  He does have several cars to wash in the next two days, stressed importance of using the pump the next 2 days at then end of the day.  Patient could tell his leg was down in size but he slept in the recliner last night so the leg was not as small as it was last night.     PLAN: Cont with POC. Will continue to measure the L LE weekly.     SIGNATURE: Elida Sequeira PTA, YOSI-LANNY, License #: [id number]  Electronically Signed on 2/9/2022        [address] [place] [address]. [address]  [phone]

## 2022-02-16 ENCOUNTER — TREATMENT (OUTPATIENT)
Dept: PHYSICAL THERAPY | Facility: CLINIC | Age: 52
End: 2022-02-16

## 2022-02-16 DIAGNOSIS — I89.0 LYMPHEDEMA OF GENITALIA: ICD-10-CM

## 2022-02-16 DIAGNOSIS — C64.1 RENAL CELL CARCINOMA OF RIGHT KIDNEY: ICD-10-CM

## 2022-02-16 DIAGNOSIS — I89.0 LYMPHEDEMA OF LEFT LEG: Primary | ICD-10-CM

## 2022-02-16 PROCEDURE — 97140 MANUAL THERAPY 1/> REGIONS: CPT | Performed by: PHYSICAL THERAPIST

## 2022-02-16 NOTE — PROGRESS NOTES
Progress Note Addendum      Patient: Holland Phelps           : 1970  Visit Date: 2022  Referring practitioner: Sharon Cheney,*  Date of Initial Visit: Type: THERAPY  Noted: 10/22/2021  Patient seen for 21 sessions  Visit Diagnoses:    ICD-10-CM ICD-9-CM   1. Lymphedema of left leg  I89.0 457.1   2. Lymphedema of genitalia  I89.0 457.1   3. Renal cell carcinoma of right kidney (HCC)  C64.1 189.0          Clinical Progress: worse today- measurements were up  Home Program Compliance: Yes  Progress toward previous goals: Partially Met  Prognosis to achieve goals: good    Objective     Assessment & Plan       Plan  Therapy options: will be seen for skilled therapy services  Frequency: 1x week  Treatment plan discussed with: patient and PTA        I spent 6 minutes with the patient and SAHIL Kaminski PTA, and reviewed their progress and plan of care. The patient is in agreement with this plan.     SIGNATURE: Debbie Morales, PT, DPT, SAHIL, License #: [id number]  Electronically Signed on 2022

## 2022-02-16 NOTE — PROGRESS NOTES
"                                                                Physical Therapy Treatment Note and 30 Day Progress Note     Patient: Holland Phelps                                                                     Visit Date: 2022  :     1970    Referring practitioner:    Sharon Cheney,*  Date of Initial Visit:          Type: THERAPY  Noted: 10/22/2021    Patient seen for 21 sessions    Visit Diagnoses:    ICD-10-CM ICD-9-CM   1. Lymphedema of left leg  I89.0 457.1   2. Lymphedema of genitalia  I89.0 457.1   3. Renal cell carcinoma of right kidney (HCC)  C64.1 189.0       SUBJECTIVE     Subjective: He is having an ache in the L shin but not really any pain, it just doesn't feel right.  He is tired from taking care of his grand kids.         OBJECTIVE     Objective    Lymphedema     Row Name 22 0800             Subjective Pain    Able to rate subjective pain? yes  -AL      Pre-Treatment Pain Level 0  -AL              Physical Concerns    The amount of pain associated with my lymphedema is: 2  -AL      The amount of limb heaviness associated with my lymphedema is: 2  -AL      The amount of skin tightness associated with my lymphedema is: 3  -AL      The size of my swollen limb(s) seems: 2  -AL      Lymphedema affects the movement of my swollen limb(s): 3  -AL      The strength in my swollen limb(s) is: 3  -AL              Psychosocial Concerns    Lymphedema affects my body image (i.e., \"how I think I look\"). 1  -AL      Lymphedema affects my socializing with others. 1  -AL      Lymphedema affects my intimate relations with spouse or partner (rate 0 if not applicable 2  -AL      Lymphedema \"gets me down\" (i.e., depression, frustration, or anger) 1  -AL      I must rely on others for help due to my lymphedema. 1  -AL      I know what to do to manage my lymphedema 3  -AL              Functional Concerns    Lymphedema affects my ability to perform self-care activities (i.e. eating, dressing, " hygiene) 3  -AL      Lymphedema affects my ability to perform routine home or work-related activities. 2  -AL      Lymphedema affects my performance of preferred leisure activities. 2  -AL      Lymphedema affects proper fit of clothing/shoes 2  -AL      Lymphedema affects my sleep 3  -AL              Lymphedema Measurements    Measurement Type(s) Circumferential  -AL      Circumferential Areas Lower extremities  -AL              BUE Circumferential (cm)    Measurement Location 1 BOT  -AL      Left 1 22.5 cm  -AL      Measurement Location 2 +7  -AL      Left 2 24.7 cm  -AL      Measurement Location 3 +7  -AL      Left 3 27.1 cm  -AL      Measurement Location 4 +10  -AL      Left 4 29.9 cm  -AL      Measurement Location 5 +10  -AL      Left 5 37.7 cm  -AL      Measurement Location 6 +10  -AL      Left 6 41.9 cm  -AL      Measurement Location 7 +10  -AL      Left 7 40.5 cm  -AL      Measurement Location 8 +10  -AL      Left 8 46.9 cm  -AL      Measurement Location 9 +10  -AL      Left 9 54.6 cm  -AL      Measurement Location 10 +10  -AL      Left 10 61.4 cm  -AL              RUE Circumferential (cm)    Measurement Location 1 BOT  -AL      Measurement Location 2 +7  -AL      Measurement Location 3 +7  -AL      Measurement Location 4 +10  -AL      Measurement Location 5 +10  -AL      Measurement Location 6 +10  -AL      Measurement Location 7 +10  -AL      Measurement Location 8 +10  -AL      Measurement Location 9 +10  -AL      Measurement Location 10 +10  -AL              Manual Lymphatic Drainage    Manual Lymphatic Drainage initial sequence; opened regional lymph nodes; opened anastamoses; extremity treatment  -AL      Initial Sequence short neck; abdomen; diaphragmatic breathing  -AL      Abdomen superficial; deep  -AL      Diaphragmatic Breathing x 9 with superficial abdominals  -AL      Opened Regional Lymph Nodes axillary; inguinal  -AL      Axillary right; left  -AL      Inguinal right; left  -AL      Opened  Anastamoses inguino-axillary  -AL      Inguino-Axillary right; left  Supine and prone  -AL      Extremity Treatment MLD to full limb  -AL      MLD to Full Limb LLE  -AL      Manual Lymphatic Drainage Comments IASTM to the L upper leg and calf.  Half foam roll under L hip, stretched the L hamstrings and quads.  -AL      Manual Therapy 70  -AL              Compression/Skin Care    Compression/Skin Care compression garment  -AL      Compression Garment Comments Patient donned his compression garment.  -AL              Lymphedema Life Impact Scale Totals    A.  Total Q1 - Q17 (Do not include Q18) 36  -AL      B.  Total number of questions answered (Q1-Q17) 17  -AL      C. Divide A by B 2.12  -AL      D. Multiple C by 25 53  -AL            User Key  (r) = Recorded By, (t) = Taken By, (c) = Cosigned By    Initials Name Provider Type    Elida Pickering PTA, RDT-LANNY Physical Therapy Assistant                Therapy Education/Self Care [id number]   Details: Use pump more often and work on CDT   Given edema management   Progress: Reinforced   Education provided to:  Patient   Level of understanding Verbalized   Timed Minutes              Total Timed Treatment:    70   mins  Total Time of Visit:            70   mins     ASSESSMENT/PLAN            Goals                                          Progress Note due by 3/18/22                                                      Recert due by 4/19/2022   STG by: 3 weeks Comments Date Status   Patient will have a good basic understanding of lymphedema and its suggested risk reduction practices  Has a good understanding of his lymphedema 12/22/21 Met   Patient will be independent with remedial HEP for lymphedema  He is working on the HEP and moving around more. 2/16/22 Partially  Met   Patient will be independent with self MLD for lymphedema Patient is using the pump more often. 2/16/22 Partially Met             LTG by: 6 weeks         Patient will have appropriate compression garments  for lymphedema maintenance Patient has two new compression garments for the L LE that are a good fit. 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  2/16/22 Progressing                                        Assessment/Plan     ASSESSMENT:  Functionally  knows if he does too much activity during the day his leg will swell.  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 He is compliant with CDT and he is using the pump more often.  He has not had any issues with his Sickle Cell anemia in the past month.  He and his wife are helping out with their grandchildren so patient's schedule is off and he is not resting as well.  There are times he sleeps in the  which may be causing increase swelling in the L leg. He has had an increase in size in the L LE as compared to last week.      PLAN: Cont with POC. Will continue to measure the L LE weekly.     SIGNATURE: Elida Sequeira PTA, YOSI-LANNY, License #: [id number]  Electronically Signed on 2/16/2022        [address] [place] [address]. [address]  [phone]

## 2022-02-23 ENCOUNTER — TREATMENT (OUTPATIENT)
Dept: PHYSICAL THERAPY | Facility: CLINIC | Age: 52
End: 2022-02-23

## 2022-02-23 DIAGNOSIS — C64.1 RENAL CELL CARCINOMA OF RIGHT KIDNEY: ICD-10-CM

## 2022-02-23 DIAGNOSIS — I89.0 LYMPHEDEMA OF GENITALIA: ICD-10-CM

## 2022-02-23 DIAGNOSIS — I89.0 LYMPHEDEMA OF LEFT LEG: Primary | ICD-10-CM

## 2022-02-23 PROCEDURE — 97140 MANUAL THERAPY 1/> REGIONS: CPT | Performed by: PHYSICAL THERAPIST

## 2022-02-23 NOTE — PROGRESS NOTES
Physical Therapy Treatment Note     Patient: Holland Phelps                                                                     Visit Date: 2022  :     1970    Referring practitioner:    Sharon Cheney,*  Date of Initial Visit:          Type: THERAPY  Noted: 10/22/2021    Patient seen for 22 sessions    Visit Diagnoses:    ICD-10-CM ICD-9-CM   1. Lymphedema of left leg  I89.0 457.1   2. Lymphedema of genitalia  I89.0 457.1   3. Renal cell carcinoma of right kidney (HCC)  C64.1 189.0       SUBJECTIVE     Subjective: He states he has been working on his leg with the MLD and elevation.  He continues to wears his compression and use the pump.  He is sleeping in the bed again but his granddaughter wakes him up.  No pain in the L leg but does have numbness in the L lower leg and inside of the L thigh.        OBJECTIVE     Objective    Lymphedema     Row Name 22 0800             Subjective Pain    Able to rate subjective pain? yes  -AL      Pre-Treatment Pain Level 0  -AL              Lymphedema Measurements    Measurement Type(s) Circumferential  -AL      Circumferential Areas Lower extremities  -AL              BUE Circumferential (cm)    Measurement Location 1 BOT  -AL      Left 1 22.2 cm  -AL      Measurement Location 2 +7  -AL      Left 2 25 cm  -AL      Measurement Location 3 +7  -AL      Left 3 25.5 cm  -AL      Measurement Location 4 +10  -AL      Left 4 30.2 cm  -AL      Measurement Location 5 +10  -AL      Left 5 37.2 cm  -AL      Measurement Location 6 +10  -AL      Left 6 41.9 cm  -AL      Measurement Location 7 +10  -AL      Left 7 37.9 cm  -AL      Measurement Location 8 +10  -AL      Left 8 46.6 cm  -AL      Measurement Location 9 +10  -AL      Left 9 55.5 cm  -AL      Measurement Location 10 +10  -AL      Left 10 60.8 cm  -AL              RUE Circumferential (cm)    Measurement Location 1 BOT  -AL      Measurement  Location 2 +7  -AL      Measurement Location 3 +7  -AL      Measurement Location 4 +10  -AL      Measurement Location 5 +10  -AL      Measurement Location 6 +10  -AL      Measurement Location 7 +10  -AL      Measurement Location 8 +10  -AL      Measurement Location 9 +10  -AL      Measurement Location 10 +10  -AL              Manual Lymphatic Drainage    Manual Lymphatic Drainage initial sequence; opened regional lymph nodes; opened anastamoses; extremity treatment  -AL      Initial Sequence short neck; abdomen; diaphragmatic breathing  -AL      Abdomen superficial; deep  -AL      Diaphragmatic Breathing x 9 with superficial abdominals  -AL      Opened Regional Lymph Nodes axillary; inguinal  -AL      Axillary right; left  -AL      Inguinal right; left  -AL      Opened Anastamoses inguino-axillary  -AL      Inguino-Axillary right; left  Supine and prone  -AL      Extremity Treatment MLD to full limb  -AL      MLD to Full Limb LLE  -AL      Manual Lymphatic Drainage Comments IASTM to the L upper leg and calf.  Half foam roll under L hip, stretched the L hamstrings and quads.  -AL      Manual Therapy 70  -AL              Compression/Skin Care    Compression/Skin Care compression garment  -AL      Compression Garment Comments Patient donned his compression on the L LE.  -AL            User Key  (r) = Recorded By, (t) = Taken By, (c) = Cosigned By    Initials Name Provider Type    Elida Pickering PTA, CLT-LANNY Physical Therapy Assistant                Therapy Education/Self Care [id number]   Details: Use pump more often and work on CDT   Given edema management   Progress: Reinforced   Education provided to:  Patient   Level of understanding Verbalized   Timed Minutes              Total Timed Treatment:    70   mins  Total Time of Visit:            70   mins     ASSESSMENT/PLAN            Goals                                          Progress Note due by 3/18/22                                                      Recert  due by 4/19/2022   STG by: 3 weeks Comments Date Status   Patient will have a good basic understanding of lymphedema and its suggested risk reduction practices  Has a good understanding of his lymphedema 12/22/21 Met   Patient will be independent with remedial HEP for lymphedema  He is working on the HEP and moving around more. 2/16/22 Partially  Met   Patient will be independent with self MLD for lymphedema He has been working on MLD for the L LE.  2/23/22 Partially Met             LTG by: 6 weeks         Patient will have appropriate compression garments for lymphedema maintenance Patient has two new compression garments for the L LE that are a good fit. 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  2/16/22 Progressing                                        Assessment/Plan     ASSESSMENT:   has had an overall reduction in the L LE since his last treatment, he is working on CDT and using the pump.  He has not had any change in size in the L knee.  He has dense tissue in the L Upper leg, this does soften with the MLD.     PLAN: Cont with POC. Will continue to measure the L LE weekly.     SIGNATURE: Elida Sequeira PTA, YOSI-LANNY, License #: [id number]  Electronically Signed on 2/23/2022        [address] [place] [address]. [address]  [phone]

## 2022-03-09 ENCOUNTER — TREATMENT (OUTPATIENT)
Dept: PHYSICAL THERAPY | Facility: CLINIC | Age: 52
End: 2022-03-09

## 2022-03-09 DIAGNOSIS — I89.0 LYMPHEDEMA OF GENITALIA: ICD-10-CM

## 2022-03-09 DIAGNOSIS — I89.0 LYMPHEDEMA OF LEFT LEG: Primary | ICD-10-CM

## 2022-03-09 DIAGNOSIS — C64.1 RENAL CELL CARCINOMA OF RIGHT KIDNEY: ICD-10-CM

## 2022-03-09 PROCEDURE — 97140 MANUAL THERAPY 1/> REGIONS: CPT | Performed by: PHYSICAL THERAPIST

## 2022-03-09 NOTE — PROGRESS NOTES
Physical Therapy Treatment Note     Patient: Holland Phelps                                                                     Visit Date: 3/9/2022  :     1970    Referring practitioner:    Sharon Cheney,*  Date of Initial Visit:          Type: THERAPY  Noted: 10/22/2021    Patient seen for 23 sessions    Visit Diagnoses:    ICD-10-CM ICD-9-CM   1. Lymphedema of left leg  I89.0 457.1   2. Lymphedema of genitalia  I89.0 457.1   3. Renal cell carcinoma of right kidney (HCC)  C64.1 189.0       SUBJECTIVE     Subjective: He is doing good today. No pain, just numbness in the L shin.  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        OBJECTIVE     Objective    Lymphedema     Row Name 22 0800             Subjective Pain    Able to rate subjective pain? yes  -AL      Pre-Treatment Pain Level 0  -AL      Subjective Pain Comment Numbness L lower leg  -AL              Lymphedema Measurements    Measurement Type(s) Circumferential  -AL      Circumferential Areas Lower extremities  -AL              BUE Circumferential (cm)    Measurement Location 1 BOT  -AL      Left 1 22.4 cm  -AL      Measurement Location 2 +7  -AL      Left 2 24 cm  -AL      Measurement Location 3 +7  -AL      Left 3 25.7 cm  -AL      Measurement Location 4 +10  -AL      Left 4 29.5 cm  -AL      Measurement Location 5 +10  -AL      Left 5 36.1 cm  -AL      Measurement Location 6 +10  -AL      Left 6 40.3 cm  -AL      Measurement Location 7 +10  -AL      Left 7 39.6 cm  -AL      Measurement Location 8 +10  -AL      Left 8 45.9 cm  -AL      Measurement Location 9 +10  -AL      Left 9 55 cm  -AL      Measurement Location 10 +10  -AL      Left 10 60.5 cm  -AL              RUE Circumferential (cm)    Measurement Location 1 BOT  -AL      Measurement Location 2 +7  -AL      Measurement Location 3 +7  -AL      Measurement Location 4 +10   -AL      Measurement Location 5 +10  -AL      Measurement Location 6 +10  -AL      Measurement Location 7 +10  -AL      Measurement Location 8 +10  -AL      Measurement Location 9 +10  -AL      Measurement Location 10 +10  -AL              Manual Lymphatic Drainage    Manual Lymphatic Drainage initial sequence;opened regional lymph nodes;opened anastamoses;extremity treatment  -AL      Initial Sequence short neck;abdomen;diaphragmatic breathing  -AL      Abdomen superficial;deep  -AL      Diaphragmatic Breathing x 9 with superficial abdominals  -AL      Opened Regional Lymph Nodes axillary;inguinal  -AL      Axillary right;left  -AL      Inguinal right;left  -AL      Opened Anastamoses inguino-axillary  -AL      Inguino-Axillary right;left  Supine and prone  -AL      Extremity Treatment MLD to full limb  -AL      MLD to Full Limb LLE  -AL      Manual Lymphatic Drainage Comments IASTM to the L upper leg and calf.  Half foam roll under L hip, stretched the L hamstrings and quads.  -AL      Manual Therapy 65  -AL              Compression/Skin Care    Compression/Skin Care compression garment  -AL      Compression Garment Comments Patient donned his compression on the L LE.  -AL            User Key  (r) = Recorded By, (t) = Taken By, (c) = Cosigned By    Initials Name Provider Type    Elida Pickering PTA, CLT-LANNY Physical Therapy Assistant                Therapy Education/Self Care [id number]   Details: Use pump more often and work on CDT   Given edema management   Progress: Reinforced   Education provided to:  Patient   Level of understanding Verbalized   Timed Minutes              Total Timed Treatment:    65   mins  Total Time of Visit:            65   mins     ASSESSMENT/PLAN            Goals                                          Progress Note due by 3/18/22                                                      Recert due by 4/19/2022   STG by: 3 weeks Comments Date Status   Patient will have a good basic  understanding of lymphedema and its suggested risk reduction practices  Has a good understanding of his lymphedema 12/22/21 Met   Patient will be independent with remedial HEP for lymphedema  He is working on the HEP and moving around more. 2/16/22 Partially  Met   Patient will be independent with self MLD for lymphedema He has been working on MLD for the L LE and using the pump.  3/9/22 Partially Met             LTG by: 6 weeks         Patient will have appropriate compression garments for lymphedema maintenance Patient has two new compression garments for the L LE that are a good fit. 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  2/16/22 Progressing                                        Assessment/Plan     ASSESSMENT:   has had a reduction in the L LE since his last measurement, he has less dense tissue but instead has pitting edema in the calf area. This is better after the MLD.  He does not have any pain today but does have numbness in the L shin area.      PLAN: Cont with POC. Will continue to measure the L LE weekly.     SIGNATURE: Elida Sequeira PTA, YOSI-LANNY, License #: [id number]  Electronically Signed on 3/9/2022        [address] [place] [address]. [address]  [phone]

## 2022-03-16 ENCOUNTER — TREATMENT (OUTPATIENT)
Dept: PHYSICAL THERAPY | Facility: CLINIC | Age: 52
End: 2022-03-16

## 2022-03-16 DIAGNOSIS — I89.0 LYMPHEDEMA OF LEFT LEG: Primary | ICD-10-CM

## 2022-03-16 DIAGNOSIS — I89.0 LYMPHEDEMA OF GENITALIA: ICD-10-CM

## 2022-03-16 DIAGNOSIS — C64.1 RENAL CELL CARCINOMA OF RIGHT KIDNEY: ICD-10-CM

## 2022-03-16 PROCEDURE — 97140 MANUAL THERAPY 1/> REGIONS: CPT | Performed by: PHYSICAL THERAPIST

## 2022-03-16 NOTE — PROGRESS NOTES
Progress Note Addendum      Patient: Holland Phelps           : 1970  Visit Date: 3/16/2022  Referring practitioner: Sharon Cheney,*  Date of Initial Visit: Type: THERAPY  Noted: 10/22/2021  Patient seen for 24 sessions  Visit Diagnoses:    ICD-10-CM ICD-9-CM   1. Lymphedema of left leg  I89.0 457.1   2. Lymphedema of genitalia  I89.0 457.1   3. Renal cell carcinoma of right kidney (HCC)  C64.1 189.0          Clinical Progress: unchanged  Home Program Compliance: Yes  Progress toward previous goals: Partially Met  Prognosis to achieve goals: good    Objective     Assessment/Plan    I spent 5 minutes with the patient and SAHIL Kaminski PTA, and reviewed their progress and plan of care. The patient is in agreement with this plan.     SIGNATURE: Debbie Morales, PT, DPT, SAHIL, License #: [id number]  Electronically Signed on 3/16/2022

## 2022-03-16 NOTE — PROGRESS NOTES
"                                                                Physical Therapy Treatment Note and 30 Day Progress Note    Patient: Holland Phelps                                                                     Visit Date: 3/16/2022  :     1970    Referring practitioner:    Sharon Cheney,*  Date of Initial Visit:          Type: THERAPY  Noted: 10/22/2021    Patient seen for 24 sessions    Visit Diagnoses:    ICD-10-CM ICD-9-CM   1. Lymphedema of left leg  I89.0 457.1   2. Lymphedema of genitalia  I89.0 457.1   3. 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 He is using the pump and wearing his compression.        OBJECTIVE     Objective    Lymphedema     Row Name 22 0800             Subjective Pain    Able to rate subjective pain? yes  -AL      Pre-Treatment Pain Level 0  -AL      Subjective Pain Comment A little bit of numbness in the L shin area, no pain.  -AL              LLIS - Physical Concerns    The amount of pain associated with my lymphedema is: 2  -AL      The amount of limb heaviness associated with my lymphedema is: 2  -AL      The amount of skin tightness associated with my lymphedema is: 2  -AL      The size of my swollen limb(s) seems: 2  -AL      Lymphedema affects the movement of my swollen limb(s): 2  -AL      The strength in my swollen limb(s) is: 1  -AL              LLIS - Psychosocial Concerns    Lymphedema affects my body image (i.e., \"how I think I look\"). 1  -AL      Lymphedema affects my socializing with others. 0  -AL      Lymphedema affects my intimate relations with spouse or partner (rate 0 if not applicable 2  -AL      Lymphedema \"gets me down\" (i.e., depression, frustration, or anger) 1  -AL      I must rely on others for help due to my lymphedema. 0  " -AL      I know what to do to manage my lymphedema 2  -AL              LLIS - Functional Concerns    Lymphedema affects my ability to perform self-care activities (i.e. eating, dressing, hygiene) 1  -AL      Lymphedema affects my ability to perform routine home or work-related activities. 1  -AL      Lymphedema affects my performance of preferred leisure activities. 1  -AL      Lymphedema affects proper fit of clothing/shoes 1  -AL      Lymphedema affects my sleep 1  -AL              Lymphedema Measurements    Measurement Type(s) Circumferential  -AL      Circumferential Areas Lower extremities  -AL              BUE Circumferential (cm)    Measurement Location 1 BOT  -AL      Left 1 22.5 cm  -AL      Measurement Location 2 +7  -AL      Left 2 26.2 cm  -AL      Measurement Location 3 +7  -AL      Left 3 27.4 cm  -AL      Measurement Location 4 +10  -AL      Left 4 29.9 cm  -AL      Measurement Location 5 +10  -AL      Left 5 37.9 cm  -AL      Measurement Location 6 +10  -AL      Left 6 42.8 cm  -AL      Measurement Location 7 +10  -AL      Left 7 40.2 cm  -AL      Measurement Location 8 +10  -AL      Left 8 45.6 cm  -AL      Measurement Location 9 +10  -AL      Left 9 53.5 cm  -AL      Measurement Location 10 +10  -AL      Left 10 59.5 cm  -AL              RUE Circumferential (cm)    Measurement Location 1 BOT  -AL      Measurement Location 2 +7  -AL      Measurement Location 3 +7  -AL      Measurement Location 4 +10  -AL      Measurement Location 5 +10  -AL      Measurement Location 6 +10  -AL      Measurement Location 7 +10  -AL      Measurement Location 8 +10  -AL      Measurement Location 9 +10  -AL      Measurement Location 10 +10  -AL              Manual Lymphatic Drainage    Manual Lymphatic Drainage initial sequence;opened regional lymph nodes;opened anastamoses;extremity treatment  -AL      Initial Sequence short neck;abdomen;diaphragmatic breathing  -AL      Abdomen superficial;deep  -AL      Diaphragmatic  Breathing x 9 with superficial abdominals  -AL      Opened Regional Lymph Nodes axillary;inguinal  -AL      Axillary right;left  -AL      Inguinal right;left  -AL      Opened Anastamoses inguino-axillary  -AL      Inguino-Axillary right;left  Supine and prone  -AL      Extremity Treatment MLD to full limb  -AL      MLD to Full Limb L LE  -AL      Manual Lymphatic Drainage Comments IASTM to the L upper leg and calf.  Half foam roll under L hip, stretched the L hamstrings and quads.  -AL      Manual Therapy 75  -AL              Compression/Skin Care    Compression/Skin Care compression garment  -AL      Compression Garment Comments Patient donned his compression on the L LE.  -AL      Compression/Skin Care Comments Gave patient information about ordering a Pac Band, he will need a standard size.  Patient could also try to wrap the foot and just above ankle with using the short stretch bandages.  -AL              Lymphedema Life Impact Scale Totals    A.  Total Q1 - Q17 (Do not include Q18) 22  -AL      B.  Total number of questions answered (Q1-Q17) 17  -AL      C. Divide A by B 1.29  -AL      D. 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  Wrap the foot and above ankle with short stretch bandages or order a Pac Band.   Given edema management   Progress: Reinforced and New   Education provided to:  Patient   Level of understanding Verbalized   Timed Minutes              Total Timed Treatment:    75   mins  Total Time of Visit:            75   mins     ASSESSMENT/PLAN            Goals                                          Progress Note due by 3/18/22                                                      Recert due by 4/19/2022   STG by: 3 weeks Comments  Date Status   Patient will have a good basic understanding of lymphedema and its suggested risk reduction practices  Has a good understanding of his lymphedema 12/22/21 Met   Patient will be independent with remedial HEP for lymphedema  He is trying to stretch and do his HEP. 3/16/22 Partially Met   Patient will be independent with self MLD for lymphedema He is working on using the pump more often. 3/16/22 Partially Met             LTG by: 6 weeks         Patient will have appropriate compression garments for lymphedema maintenance Patient has two new compression garments for the L LE that are a good fit. 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  He has had an increase in size in the L LE since his last visit, the area that is larger is his lower let.  Since he is spending more time standing I believe gravity is not letting the swelling get out of the lower leg.  He will shower then use the pump after he is finished cleaning cars then don the compression.  He will also try to use the short stretch bandages on the foot and just above the ankle or order a Pac Band to help with the increase swelling in the foot and ankle. He will also work on stretching before he cleans the cars.     PLAN: Cont with POC. Will continue to measure the L LE weekly. May have patient wear the thigh high then a compression sock to see if this helps move the swelling out of the lower leg.     SIGNATURE: Elida Sequeira PTA, YOSI-LANNY, License #: [id number]  Electronically Signed on 3/16/2022        [address] [place] [address]. [address]  [phone]

## 2022-03-23 ENCOUNTER — TREATMENT (OUTPATIENT)
Dept: PHYSICAL THERAPY | Facility: CLINIC | Age: 52
End: 2022-03-23

## 2022-03-23 DIAGNOSIS — I89.0 LYMPHEDEMA OF GENITALIA: ICD-10-CM

## 2022-03-23 DIAGNOSIS — I89.0 LYMPHEDEMA OF LEFT LEG: Primary | ICD-10-CM

## 2022-03-23 DIAGNOSIS — C64.1 RENAL CELL CARCINOMA OF RIGHT KIDNEY: ICD-10-CM

## 2022-03-23 PROCEDURE — 97140 MANUAL THERAPY 1/> REGIONS: CPT | Performed by: PHYSICAL THERAPIST

## 2022-03-23 NOTE — PROGRESS NOTES
Physical Therapy Treatment Note and 30 Day Progress Note    Patient: Holland Phelps                                                                     Visit Date: 3/23/2022  :     1970    Referring practitioner:    Sharon Cheney,*  Date of Initial Visit:          Type: THERAPY  Noted: 10/22/2021    Patient seen for 25 sessions    Visit Diagnoses:    ICD-10-CM ICD-9-CM   1. Lymphedema of left leg  I89.0 457.1   2. Lymphedema of genitalia  I89.0 457.1   3. Renal cell carcinoma of right kidney (HCC)  C64.1 189.0       SUBJECTIVE     Subjective: He states he has not tried any extra compression on the foot and ankle but the leg is down.  He has been more compliant with the pump and he thinks this has helped the swelling.       OBJECTIVE     Objective    Lymphedema     Row Name 22 0800             Subjective Pain    Able to rate subjective pain? yes  -AL      Pre-Treatment Pain Level 0  -AL      Subjective Pain Comment Numb from the L knee down to the ankle and just above the ankle the inner thigh.  -AL              Lymphedema Measurements    Measurement Type(s) Circumferential  -AL      Circumferential Areas Lower extremities  -AL              BUE Circumferential (cm)    Measurement Location 1 BOT  -AL      Left 1 22.5 cm  -AL      Measurement Location 2 +7  -AL      Left 2 25 cm  -AL      Measurement Location 3 +7  -AL      Left 3 25.7 cm  -AL      Measurement Location 4 +10  -AL      Left 4 28 cm  -AL      Measurement Location 5 +10  -AL      Left 5 36.2 cm  -AL      Measurement Location 6 +10  -AL      Left 6 40.7 cm  -AL      Measurement Location 7 +10  -AL      Left 7 37.6 cm  -AL      Measurement Location 8 +10  -AL      Left 8 44 cm  -AL      Measurement Location 9 +10  -AL      Left 9 51.9 cm  -AL      Measurement Location 10 +10  -AL      Left 10 59.8 cm  -AL              RUE Circumferential (cm)    Measurement Location 1 BOT   -AL      Measurement Location 2 +7  -AL      Measurement Location 3 +7  -AL      Measurement Location 4 +10  -AL      Measurement Location 5 +10  -AL      Measurement Location 6 +10  -AL      Measurement Location 7 +10  -AL      Measurement Location 8 +10  -AL      Measurement Location 9 +10  -AL      Measurement Location 10 +10  -AL              Manual Lymphatic Drainage    Manual Lymphatic Drainage initial sequence;opened regional lymph nodes;opened anastamoses;extremity treatment  -AL      Initial Sequence short neck;abdomen;diaphragmatic breathing  -AL      Abdomen superficial;deep  -AL      Diaphragmatic Breathing x 9 with superficial abdominals  -AL      Opened Regional Lymph Nodes axillary;inguinal  -AL      Axillary right;left  -AL      Inguinal right;left  -AL      Opened Anastamoses inguino-axillary  -AL      Inguino-Axillary right;left  Supine and prone  -AL      Extremity Treatment MLD to full limb  -AL      MLD to Full Limb L LE  -AL      Manual Lymphatic Drainage Comments IASTM to the L upper leg and calf.  Half foam roll under L hip, stretched the L hamstrings and quads.  -AL      Manual Therapy 75  -AL              Compression/Skin Care    Compression/Skin Care compression garment  -AL      Compression Garment Comments Patient donned his compression on the L LE.  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  Wrap the foot and above ankle with short stretch bandages or order a Pac Band.   Given edema management   Progress: Reinforced and New   Education provided to:  Patient   Level of understanding Verbalized   Timed Minutes              Total Timed Treatment:    75   mins  Total Time of Visit:            75   mins     ASSESSMENT/PLAN            Goals                                           Progress Note due by 4/15/22                                                      Recert due by 4/19/2022   STG by: 3 weeks Comments Date Status   Patient will have a good basic understanding of lymphedema and its suggested risk reduction practices  Has a good understanding of his lymphedema 12/22/21 Met   Patient will be independent with remedial HEP for lymphedema  He is trying to stretch and do his HEP. 3/16/22 Partially Met   Patient will be independent with self MLD for lymphedema He is working on using the pump more often. 3/16/22 Partially Met             LTG by: 6 weeks         Patient will have appropriate compression garments for lymphedema maintenance Patient has two new compression garments for the L LE that are a good fit. 12/15/21 Met   Patient will have no sign or symptoms of infection  No signs or symptoms of infection 12/1/21 Met   Patient will be independent with comprehensive maintenance program for lymphedema He is getting into a routine for CDT and using the pump. 3/23/22 Progressing                                        Assessment/Plan     ASSESSMENT:  Patient has had a good reduction in the L LE since last week, he has been diligent about using the pump and wearing the compression.  He has not needed to don and extra compression on the foot and ankle.  He does not have pain today but continues to have numbness in the L LE.     PLAN: Cont with POC. Will continue to measure the L LE weekly.    SIGNATURE: Elida Sequeira PTA, YOSI-LANNY, License #: [id number]  Electronically Signed on 3/23/2022        [address] [place] [address]  [phone]

## 2022-04-01 ENCOUNTER — TREATMENT (OUTPATIENT)
Dept: PHYSICAL THERAPY | Facility: CLINIC | Age: 52
End: 2022-04-01

## 2022-04-01 DIAGNOSIS — I89.0 LYMPHEDEMA OF GENITALIA: ICD-10-CM

## 2022-04-01 DIAGNOSIS — I89.0 LYMPHEDEMA OF LEFT LEG: Primary | ICD-10-CM

## 2022-04-01 DIAGNOSIS — C64.1 RENAL CELL CARCINOMA OF RIGHT KIDNEY: ICD-10-CM

## 2022-04-01 PROCEDURE — 97140 MANUAL THERAPY 1/> REGIONS: CPT | Performed by: PHYSICAL THERAPIST

## 2022-04-01 NOTE — PROGRESS NOTES
Physical Therapy Treatment Note     Patient: Holland Phelps                                                                     Visit Date: 2022  :     1970    Referring practitioner:    Sharon Cheney,*  Date of Initial Visit:          Type: THERAPY  Noted: 10/22/2021    Patient seen for 26 sessions    Visit Diagnoses:    ICD-10-CM ICD-9-CM   1. Lymphedema of left leg  I89.0 457.1   2. Lymphedema of genitalia  I89.0 457.1   3. Renal cell carcinoma of right kidney (HCC)  C64.1 189.0       SUBJECTIVE     Subjective: He states his leg looks pretty good.  He states he has been taking hot Epson salt baths and his leg is less numb and the legs is less swollen.        OBJECTIVE     Objective    Lymphedema     Row Name 22 0800             Subjective Pain    Able to rate subjective pain? yes  -AL      Pre-Treatment Pain Level 0  -AL      Subjective Pain Comment No pain, a little numbness in the L shin  -AL              Lymphedema Measurements    Measurement Type(s) Circumferential  -AL      Circumferential Areas Lower extremities  -AL              BUE Circumferential (cm)    Measurement Location 1 BOT  -AL      Left 1 22.4 cm  -AL      Measurement Location 2 +7  -AL      Left 2 24.2 cm  -AL      Measurement Location 3 +7  -AL      Left 3 24.5 cm  -AL      Measurement Location 4 +10  -AL      Left 4 27.5 cm  -AL      Measurement Location 5 +10  -AL      Left 5 36 cm  -AL      Measurement Location 6 +10  -AL      Left 6 39.2 cm  -AL      Measurement Location 7 +10  -AL      Left 7 37.2 cm  -AL      Measurement Location 8 +10  -AL      Left 8 44.5 cm  -AL      Measurement Location 9 +10  -AL      Left 9 51.9 cm  -AL      Measurement Location 10 +10  -AL      Left 10 56.9 cm  -AL              RUE Circumferential (cm)    Measurement Location 1 BOT  -AL      Measurement Location 2 +7  -AL      Measurement Location 3 +7  -AL      Measurement  Location 4 +10  -AL      Measurement Location 5 +10  -AL      Measurement Location 6 +10  -AL      Measurement Location 7 +10  -AL      Measurement Location 8 +10  -AL      Measurement Location 9 +10  -AL      Measurement Location 10 +10  -AL              Manual Lymphatic Drainage    Manual Lymphatic Drainage initial sequence;opened regional lymph nodes;opened anastamoses;extremity treatment  -AL      Initial Sequence short neck;abdomen;diaphragmatic breathing  -AL      Abdomen superficial;deep  -AL      Diaphragmatic Breathing x 9 with superficial abdominals  -AL      Opened Regional Lymph Nodes axillary;inguinal  -AL      Axillary right;left  -AL      Inguinal right;left  -AL      Opened Anastamoses inguino-axillary  -AL      Inguino-Axillary right;left  Supine and prone  -AL      Extremity Treatment MLD to full limb  -AL      MLD to Full Limb L LE  -AL      Manual Lymphatic Drainage Comments IASTM to the L upper leg and calf.  Half foam roll under L hip, stretched the L hamstrings and quads.  -AL      Manual Therapy 75  -AL              Compression/Skin Care    Compression/Skin Care compression garment  -AL      Compression Garment Comments Patient donned his compression on the L LE.  -AL            User Key  (r) = Recorded By, (t) = Taken By, (c) = Cosigned By    Initials Name Provider Type    Eilda Pickering PTA, CLZIGGY-LANNY Physical Therapist Assistant                Therapy Education/Self Care 00536   Details: Continue with CDT and the Epson slat baths   Given edema management   Progress: Reinforced and New   Education provided to:  Patient   Level of understanding Verbalized   Timed Minutes              Total Timed Treatment:    75   mins  Total Time of Visit:            75   mins     ASSESSMENT/PLAN            Goals                                          Progress Note due by 4/15/22                                                      Recert due by 4/19/2022   STG by: 3 weeks Comments Date Status    Patient will have a good basic understanding of lymphedema and its suggested risk reduction practices  Has a good understanding of his lymphedema 12/22/21 Met   Patient will be independent with remedial HEP for lymphedema  He is trying to stretch and do his HEP. 3/16/22 Partially Met   Patient will be independent with self MLD for lymphedema He is working on using the pump more often. 3/16/22 Partially Met             LTG by: 6 weeks         Patient will have appropriate compression garments for lymphedema maintenance Patient has two new compression garments for the L LE that are a good fit. 12/15/21 Met   Patient will have no sign or symptoms of infection  No signs or symptoms of infection 12/1/21 Met   Patient will be independent with comprehensive maintenance program for lymphedema He is working on CDT and taking Epson salt baths. 4/1/22 Progressing                                        Assessment/Plan     ASSESSMENT:  Patient has had another good reduction as compared to last week.  He has not cleaned any cars this week, but he has been taking Epson salt baths.  He does feel like this has helped with the swelling in the L leg as well as softening the dense tissue in the L groin.  She is compliant with CDT as well as using the Flexitouch pump.     PLAN: Cont with POC. Will continue to measure the L LE weekly.    SIGNATURE: Elida Sequeira PTA, YOSI-LANNY, License #: [id number]  Electronically Signed on 4/1/2022        [address] [place] [address]. [address]  [phone]

## 2022-04-06 ENCOUNTER — TREATMENT (OUTPATIENT)
Dept: PHYSICAL THERAPY | Facility: CLINIC | Age: 52
End: 2022-04-06

## 2022-04-06 DIAGNOSIS — C64.1 RENAL CELL CARCINOMA OF RIGHT KIDNEY: ICD-10-CM

## 2022-04-06 DIAGNOSIS — I89.0 LYMPHEDEMA OF LEFT LEG: Primary | ICD-10-CM

## 2022-04-06 DIAGNOSIS — I89.0 LYMPHEDEMA OF GENITALIA: ICD-10-CM

## 2022-04-06 PROCEDURE — 97140 MANUAL THERAPY 1/> REGIONS: CPT | Performed by: PHYSICAL THERAPIST

## 2022-04-06 NOTE — PROGRESS NOTES
Physical Therapy Treatment Note     Patient: Holland Phelps                                                                     Visit Date: 2022  :     1970    Referring practitioner:    Sharon Cheney,*  Date of Initial Visit:          Type: THERAPY  Noted: 10/22/2021    Patient seen for 27 sessions    Visit Diagnoses:    ICD-10-CM ICD-9-CM   1. Lymphedema of left leg  I89.0 457.1   2. Lymphedema of genitalia  I89.0 457.1   3. Renal cell carcinoma of right kidney (HCC)  C64.1 189.0       SUBJECTIVE     Subjective: He feels like the L let is still doing well, he has been taking care of the household and running all the errands since his wife just recently had surgery.  He has not washed any cars in the past week. He went about a day without wearing the compression and his leg only swelled a little bit.        OBJECTIVE     Objective    Lymphedema     Row Name 22 0800             Subjective Pain    Able to rate subjective pain? yes  -AL      Pre-Treatment Pain Level 0  -AL      Subjective Pain Comment Numbness in the L lower leg from the knee down.  -AL              Lymphedema Measurements    Measurement Type(s) Circumferential  -AL      Circumferential Areas Lower extremities  -AL              BUE Circumferential (cm)    Measurement Location 1 BOT  -AL      Left 1 22.4 cm  -AL      Measurement Location 2 +7  -AL      Left 2 24.4 cm  -AL      Measurement Location 3 +7  -AL      Left 3 25.8 cm  -AL      Measurement Location 4 +10  -AL      Left 4 27 cm  -AL      Measurement Location 5 +10  -AL      Left 5 34.5 cm  -AL      Measurement Location 6 +10  -AL      Left 6 40.4 cm  -AL      Measurement Location 7 +10  -AL      Left 7 37.2 cm  -AL      Measurement Location 8 +10  -AL      Left 8 43.4 cm  -AL      Measurement Location 9 +10  -AL      Left 9 52.4 cm  -AL      Measurement Location 10 +10  -AL      Left 10 57.5 cm  -AL               RUE Circumferential (cm)    Measurement Location 1 BOT  -AL      Measurement Location 2 +7  -AL      Measurement Location 3 +7  -AL      Measurement Location 4 +10  -AL      Measurement Location 5 +10  -AL      Measurement Location 6 +10  -AL      Measurement Location 7 +10  -AL      Measurement Location 8 +10  -AL      Measurement Location 9 +10  -AL      Measurement Location 10 +10  -AL              Manual Lymphatic Drainage    Manual Lymphatic Drainage initial sequence;opened regional lymph nodes;opened anastamoses;extremity treatment  -AL      Initial Sequence short neck;abdomen;diaphragmatic breathing  -AL      Abdomen superficial;deep  -AL      Diaphragmatic Breathing x 9 with superficial abdominals  -AL      Opened Regional Lymph Nodes axillary;inguinal  -AL      Axillary right;left  -AL      Inguinal right;left  -AL      Opened Anastamoses inguino-axillary  -AL      Inguino-Axillary right;left  Supine and prone  -AL      Extremity Treatment MLD to full limb  -AL      MLD to Full Limb L LE  -AL      Manual Therapy 75  -AL              Compression/Skin Care    Compression/Skin Care compression garment;skin care  -AL      Skin Care lotion applied  -AL      Compression Garment Comments Patient donned his compression on the L LE.  -AL            User Key  (r) = Recorded By, (t) = Taken By, (c) = Cosigned By    Initials Name Provider Type    Elida Pickering, PTA, CLZIGGY-LANNY Physical Therapist Assistant                Therapy Education/Self Care 99924   Details: Continue with CDT and the Epson salt baths   Given edema management   Progress: Reinforced and New   Education provided to:  Patient   Level of understanding Verbalized   Timed Minutes              Total Timed Treatment:    75   mins  Total Time of Visit:            75   mins     ASSESSMENT/PLAN            Goals                                          Progress Note due by 4/15/22                                                      Recert due by  4/19/2022   STG by: 3 weeks Comments Date Status   Patient will have a good basic understanding of lymphedema and its suggested risk reduction practices  Has a good understanding of his lymphedema 12/22/21 Met   Patient will be independent with remedial HEP for lymphedema  He is trying to stretch and do his HEP. 3/16/22 Partially Met   Patient will be independent with self MLD for lymphedema He has not used the pump as often. 4/6/22 Ongoing             LTG by: 6 weeks         Patient will have appropriate compression garments for lymphedema maintenance Patient has two new compression garments for the L LE that are a good fit. 12/15/21 Met   Patient will have no sign or symptoms of infection  No signs or symptoms of infection 12/1/21 Met   Patient will be independent with comprehensive maintenance program for lymphedema He is working on CDT and taking Epson salt baths. 4/1/22 Progressing                                        Assessment/Plan     ASSESSMENT:  Patient has had only a slight increase in size of the L LE but not a significant overall increase.  The L upper leg is larger and more dense today, he has minimal pitting edema at the shin area.  He has not washed any cars this week but has had to take over all of the household chores since his wife had surgery.  He was able to go one day without compression and did not see a big difference in the size of the L LE.  He will most likely wash some cars next week, will take measurements again to see if there is any change in the size of the L LE.     PLAN: Cont with POC. Will continue to measure the L LE weekly.    SIGNATURE: Elida Sequeira PTA, ZIGGY-LANNY, License #: [id number]  Electronically Signed on 4/6/2022        [address] [place] [address]. [address]  [phone]

## 2022-04-13 ENCOUNTER — TREATMENT (OUTPATIENT)
Dept: PHYSICAL THERAPY | Facility: CLINIC | Age: 52
End: 2022-04-13

## 2022-04-13 DIAGNOSIS — I89.0 LYMPHEDEMA OF LEFT LEG: Primary | ICD-10-CM

## 2022-04-13 DIAGNOSIS — I89.0 LYMPHEDEMA OF GENITALIA: ICD-10-CM

## 2022-04-13 DIAGNOSIS — C64.1 RENAL CELL CARCINOMA OF RIGHT KIDNEY: ICD-10-CM

## 2022-04-13 PROCEDURE — 97140 MANUAL THERAPY 1/> REGIONS: CPT | Performed by: PHYSICAL THERAPIST

## 2022-04-15 NOTE — PROGRESS NOTES
30 Day Progress Note and 90 Day Recertification Addendum      Patient: Holland Phelps           : 1970  Visit Date: 2022  Referring practitioner: Sharon Cheney,*  Date of Initial Visit: Type: THERAPY  Noted: 10/22/2021  Patient seen for 28 sessions  Visit Diagnoses:    ICD-10-CM ICD-9-CM   1. Lymphedema of left leg  I89.0 457.1   2. Lymphedema of genitalia  I89.0 457.1   3. Renal cell carcinoma of right kidney (HCC)  C64.1 189.0          Clinical Progress: improved  Home Program Compliance: Yes  Progress toward previous goals: Partially Met  Prognosis to achieve goals: good    Objective     Assessment & Plan       Plan  Therapy options: will be seen for skilled therapy services  Frequency: 1x week  Duration in weeks: 12  Treatment plan discussed with: patient and PTA        I spent 5 minutes with the patient and Aneta Sequeira PTA, SAHIL, and reviewed their progress and plan of care. The patient is in agreement with this plan.     SIGNATURE: Debbie Morales, PT, DPT, SAHIL, License #: [id number]  Electronically Signed on 4/15/2022      90 Day Recertification  Certification Period: 4/15/2022 through 2022  Based upon review of the patient's progress and continued therapy plan, it is my medical opinion that Holland Phelps should continue physical therapy treatment at [facility]     PHYSICIAN: Sharon Cheney MD PhD (NPI: [id number])    Signature: __________________________________________________DATE: ___________________     Please sign and return via fax to [phone].   Thank you so much for letting us work with [facility]. I appreciate your letting us work with your patients. If you have any questions or concerns, please don't hesitate to contact me.

## 2022-04-20 ENCOUNTER — APPOINTMENT (OUTPATIENT)
Dept: GENERAL RADIOLOGY | Facility: HOSPITAL | Age: 52
End: 2022-04-20

## 2022-04-20 ENCOUNTER — APPOINTMENT (OUTPATIENT)
Dept: ULTRASOUND IMAGING | Facility: HOSPITAL | Age: 52
End: 2022-04-20

## 2022-04-20 ENCOUNTER — APPOINTMENT (OUTPATIENT)
Dept: CT IMAGING | Facility: HOSPITAL | Age: 52
End: 2022-04-20

## 2022-04-20 ENCOUNTER — HOSPITAL ENCOUNTER (INPATIENT)
Facility: HOSPITAL | Age: 52
LOS: 11 days | Discharge: ANOTHER HOSPITAL | End: 2022-05-01
Attending: EMERGENCY MEDICINE | Admitting: FAMILY MEDICINE

## 2022-04-20 DIAGNOSIS — R09.02 HYPOXIA: ICD-10-CM

## 2022-04-20 DIAGNOSIS — Z74.09 IMPAIRED MOBILITY: ICD-10-CM

## 2022-04-20 DIAGNOSIS — D57.00 SICKLE CELL DISEASE WITH CRISIS: ICD-10-CM

## 2022-04-20 DIAGNOSIS — A41.9 SEPSIS, DUE TO UNSPECIFIED ORGANISM, UNSPECIFIED WHETHER ACUTE ORGAN DYSFUNCTION PRESENT: ICD-10-CM

## 2022-04-20 DIAGNOSIS — L03.116 CELLULITIS OF LEFT LOWER EXTREMITY: Primary | ICD-10-CM

## 2022-04-20 LAB
ALBUMIN SERPL-MCNC: 4.4 G/DL (ref 3.5–5.2)
ALBUMIN/GLOB SERPL: 1.4 G/DL
ALP SERPL-CCNC: 75 U/L (ref 39–117)
ALT SERPL W P-5'-P-CCNC: 8 U/L (ref 1–41)
ANION GAP SERPL CALCULATED.3IONS-SCNC: 11 MMOL/L (ref 5–15)
ANISOCYTOSIS BLD QL: ABNORMAL
ARTERIAL PATENCY WRIST A: POSITIVE
AST SERPL-CCNC: 19 U/L (ref 1–40)
ATMOSPHERIC PRESS: 753 MMHG
B PARAPERT DNA SPEC QL NAA+PROBE: NOT DETECTED
B PERT DNA SPEC QL NAA+PROBE: NOT DETECTED
BACTERIA UR QL AUTO: ABNORMAL /HPF
BASE EXCESS BLDA CALC-SCNC: -0.8 MMOL/L (ref 0–2)
BASO STIPL COARSE BLD QL SMEAR: ABNORMAL
BDY SITE: ABNORMAL
BILIRUB SERPL-MCNC: 1.3 MG/DL (ref 0–1.2)
BILIRUB UR QL STRIP: NEGATIVE
BODY TEMPERATURE: 37 C
BUN SERPL-MCNC: 11 MG/DL (ref 6–20)
BUN/CREAT SERPL: 10.7 (ref 7–25)
C PNEUM DNA NPH QL NAA+NON-PROBE: NOT DETECTED
CALCIUM SPEC-SCNC: 7.8 MG/DL (ref 8.6–10.5)
CHLORIDE SERPL-SCNC: 102 MMOL/L (ref 98–107)
CLARITY UR: ABNORMAL
CO2 SERPL-SCNC: 26 MMOL/L (ref 22–29)
COLOR UR: YELLOW
CREAT SERPL-MCNC: 1.03 MG/DL (ref 0.76–1.27)
CRP SERPL-MCNC: 5.23 MG/DL (ref 0–0.5)
D-LACTATE SERPL-SCNC: 1.9 MMOL/L (ref 0.5–2)
DEPRECATED RDW RBC AUTO: 67.6 FL (ref 37–54)
EGFRCR SERPLBLD CKD-EPI 2021: 86.9 ML/MIN/1.73
ERYTHROCYTE [DISTWIDTH] IN BLOOD BY AUTOMATED COUNT: 19.8 % (ref 12.3–15.4)
FLUAV SUBTYP SPEC NAA+PROBE: NOT DETECTED
FLUBV RNA ISLT QL NAA+PROBE: NOT DETECTED
GLOBULIN UR ELPH-MCNC: 3.2 GM/DL
GLUCOSE SERPL-MCNC: 91 MG/DL (ref 65–99)
GLUCOSE UR STRIP-MCNC: NEGATIVE MG/DL
HADV DNA SPEC NAA+PROBE: NOT DETECTED
HCO3 BLDA-SCNC: 24.3 MMOL/L (ref 20–26)
HCOV 229E RNA SPEC QL NAA+PROBE: NOT DETECTED
HCOV HKU1 RNA SPEC QL NAA+PROBE: NOT DETECTED
HCOV NL63 RNA SPEC QL NAA+PROBE: NOT DETECTED
HCOV OC43 RNA SPEC QL NAA+PROBE: NOT DETECTED
HCT VFR BLD AUTO: 27.4 % (ref 37.5–51)
HGB BLD-MCNC: 9.5 G/DL (ref 13–17.7)
HGB UR QL STRIP.AUTO: ABNORMAL
HMPV RNA NPH QL NAA+NON-PROBE: NOT DETECTED
HOWELL-JOLLY BOD BLD QL SMEAR: ABNORMAL
HPIV1 RNA ISLT QL NAA+PROBE: NOT DETECTED
HPIV2 RNA SPEC QL NAA+PROBE: NOT DETECTED
HPIV3 RNA NPH QL NAA+PROBE: NOT DETECTED
HPIV4 P GENE NPH QL NAA+PROBE: NOT DETECTED
HYALINE CASTS UR QL AUTO: ABNORMAL /LPF
INR PPP: 1.07 (ref 0.91–1.09)
KETONES UR QL STRIP: NEGATIVE
LDH SERPL-CCNC: 272 U/L (ref 135–225)
LEUKOCYTE ESTERASE UR QL STRIP.AUTO: NEGATIVE
LYMPHOCYTES # BLD MANUAL: 0.53 10*3/MM3 (ref 0.7–3.1)
LYMPHOCYTES NFR BLD MANUAL: 11.5 % (ref 5–12)
Lab: ABNORMAL
M PNEUMO IGG SER IA-ACNC: NOT DETECTED
MAGNESIUM SERPL-MCNC: 2.1 MG/DL (ref 1.6–2.6)
MCH RBC QN AUTO: 32.6 PG (ref 26.6–33)
MCHC RBC AUTO-ENTMCNC: 34.7 G/DL (ref 31.5–35.7)
MCV RBC AUTO: 94.2 FL (ref 79–97)
MODALITY: ABNORMAL
MONOCYTES # BLD: 1.35 10*3/MM3 (ref 0.1–0.9)
NEUTROPHILS # BLD AUTO: 9.85 10*3/MM3 (ref 1.7–7)
NEUTROPHILS NFR BLD MANUAL: 80.5 % (ref 42.7–76)
NEUTS BAND NFR BLD MANUAL: 3.4 % (ref 0–5)
NEUTS VAC BLD QL SMEAR: ABNORMAL
NITRITE UR QL STRIP: NEGATIVE
NRBC SPEC MANUAL: 49.4 /100 WBC (ref 0–0.2)
PCO2 BLDA: 41.3 MM HG (ref 35–45)
PCO2 TEMP ADJ BLD: 41.3 MM HG (ref 35–45)
PH BLDA: 7.38 PH UNITS (ref 7.35–7.45)
PH UR STRIP.AUTO: 7 [PH] (ref 5–8)
PH, TEMP CORRECTED: 7.38 PH UNITS (ref 7.35–7.45)
PLAT MORPH BLD: NORMAL
PLATELET # BLD AUTO: 246 10*3/MM3 (ref 140–450)
PMV BLD AUTO: 9.7 FL (ref 6–12)
PO2 BLDA: 65.5 MM HG (ref 83–108)
PO2 TEMP ADJ BLD: 65.5 MM HG (ref 83–108)
POLYCHROMASIA BLD QL SMEAR: ABNORMAL
POTASSIUM SERPL-SCNC: 3.5 MMOL/L (ref 3.5–5.2)
PROCALCITONIN SERPL-MCNC: 0.92 NG/ML (ref 0–0.25)
PROT SERPL-MCNC: 7.6 G/DL (ref 6–8.5)
PROT UR QL STRIP: ABNORMAL
PROTHROMBIN TIME: 13.5 SECONDS (ref 11.9–14.6)
RBC # BLD AUTO: 2.91 10*6/MM3 (ref 4.14–5.8)
RBC # UR STRIP: ABNORMAL /HPF
REF LAB TEST METHOD: ABNORMAL
RETICS # AUTO: 0.13 10*6/MM3 (ref 0.02–0.13)
RETICS/RBC NFR AUTO: 4.47 % (ref 0.7–1.9)
RHINOVIRUS RNA SPEC NAA+PROBE: NOT DETECTED
RSV RNA NPH QL NAA+NON-PROBE: NOT DETECTED
SAO2 % BLDCOA: 95.9 % (ref 94–99)
SARS-COV-2 RNA NPH QL NAA+NON-PROBE: NOT DETECTED
SCHISTOCYTES BLD QL SMEAR: ABNORMAL
SODIUM SERPL-SCNC: 139 MMOL/L (ref 136–145)
SP GR UR STRIP: 1.01 (ref 1–1.03)
SQUAMOUS #/AREA URNS HPF: ABNORMAL /HPF
TARGETS BLD QL SMEAR: ABNORMAL
UROBILINOGEN UR QL STRIP: ABNORMAL
VARIANT LYMPHS NFR BLD MANUAL: 1.1 % (ref 0–5)
VARIANT LYMPHS NFR BLD MANUAL: 3.4 % (ref 19.6–45.3)
VENTILATOR MODE: ABNORMAL
WBC # UR STRIP: ABNORMAL /HPF
WBC NRBC COR # BLD: 11.74 10*3/MM3 (ref 3.4–10.8)

## 2022-04-20 PROCEDURE — 25010000002 CEFAZOLIN PER 500 MG: Performed by: FAMILY MEDICINE

## 2022-04-20 PROCEDURE — 83615 LACTATE (LD) (LDH) ENZYME: CPT | Performed by: EMERGENCY MEDICINE

## 2022-04-20 PROCEDURE — 0 IOPAMIDOL PER 1 ML: Performed by: EMERGENCY MEDICINE

## 2022-04-20 PROCEDURE — 82803 BLOOD GASES ANY COMBINATION: CPT

## 2022-04-20 PROCEDURE — 80053 COMPREHEN METABOLIC PANEL: CPT | Performed by: EMERGENCY MEDICINE

## 2022-04-20 PROCEDURE — 81001 URINALYSIS AUTO W/SCOPE: CPT | Performed by: EMERGENCY MEDICINE

## 2022-04-20 PROCEDURE — 93971 EXTREMITY STUDY: CPT

## 2022-04-20 PROCEDURE — 36600 WITHDRAWAL OF ARTERIAL BLOOD: CPT

## 2022-04-20 PROCEDURE — 71045 X-RAY EXAM CHEST 1 VIEW: CPT

## 2022-04-20 PROCEDURE — 86140 C-REACTIVE PROTEIN: CPT | Performed by: EMERGENCY MEDICINE

## 2022-04-20 PROCEDURE — 85007 BL SMEAR W/DIFF WBC COUNT: CPT | Performed by: EMERGENCY MEDICINE

## 2022-04-20 PROCEDURE — 83010 ASSAY OF HAPTOGLOBIN QUANT: CPT | Performed by: FAMILY MEDICINE

## 2022-04-20 PROCEDURE — 71275 CT ANGIOGRAPHY CHEST: CPT

## 2022-04-20 PROCEDURE — 87040 BLOOD CULTURE FOR BACTERIA: CPT | Performed by: EMERGENCY MEDICINE

## 2022-04-20 PROCEDURE — 83605 ASSAY OF LACTIC ACID: CPT | Performed by: EMERGENCY MEDICINE

## 2022-04-20 PROCEDURE — 85045 AUTOMATED RETICULOCYTE COUNT: CPT | Performed by: EMERGENCY MEDICINE

## 2022-04-20 PROCEDURE — 25010000002 ENOXAPARIN PER 10 MG: Performed by: FAMILY MEDICINE

## 2022-04-20 PROCEDURE — 85610 PROTHROMBIN TIME: CPT | Performed by: EMERGENCY MEDICINE

## 2022-04-20 PROCEDURE — 84145 PROCALCITONIN (PCT): CPT | Performed by: EMERGENCY MEDICINE

## 2022-04-20 PROCEDURE — 25010000002 MORPHINE PER 10 MG: Performed by: FAMILY MEDICINE

## 2022-04-20 PROCEDURE — 99285 EMERGENCY DEPT VISIT HI MDM: CPT

## 2022-04-20 PROCEDURE — 83735 ASSAY OF MAGNESIUM: CPT | Performed by: EMERGENCY MEDICINE

## 2022-04-20 PROCEDURE — 93971 EXTREMITY STUDY: CPT | Performed by: SURGERY

## 2022-04-20 PROCEDURE — 0202U NFCT DS 22 TRGT SARS-COV-2: CPT | Performed by: EMERGENCY MEDICINE

## 2022-04-20 PROCEDURE — 85025 COMPLETE CBC W/AUTO DIFF WBC: CPT | Performed by: EMERGENCY MEDICINE

## 2022-04-20 RX ORDER — HYDROXYUREA 500 MG/1
1000 CAPSULE ORAL DAILY
COMMUNITY

## 2022-04-20 RX ORDER — MORPHINE SULFATE 30 MG/1
120 TABLET, FILM COATED, EXTENDED RELEASE ORAL 2 TIMES DAILY
Status: DISCONTINUED | OUTPATIENT
Start: 2022-04-20 | End: 2022-05-01 | Stop reason: HOSPADM

## 2022-04-20 RX ORDER — ESCITALOPRAM OXALATE 10 MG/1
20 TABLET ORAL EVERY EVENING
Status: DISCONTINUED | OUTPATIENT
Start: 2022-04-20 | End: 2022-04-21

## 2022-04-20 RX ORDER — TAMSULOSIN HYDROCHLORIDE 0.4 MG/1
1 CAPSULE ORAL EVERY MORNING
COMMUNITY

## 2022-04-20 RX ORDER — ACETAMINOPHEN 325 MG/1
650 TABLET ORAL EVERY 6 HOURS PRN
Status: DISCONTINUED | OUTPATIENT
Start: 2022-04-20 | End: 2022-05-01 | Stop reason: HOSPADM

## 2022-04-20 RX ORDER — ONDANSETRON 2 MG/ML
4 INJECTION INTRAMUSCULAR; INTRAVENOUS EVERY 6 HOURS PRN
Status: DISCONTINUED | OUTPATIENT
Start: 2022-04-20 | End: 2022-05-01 | Stop reason: HOSPADM

## 2022-04-20 RX ORDER — SODIUM CHLORIDE 0.9 % (FLUSH) 0.9 %
10 SYRINGE (ML) INJECTION AS NEEDED
Status: DISCONTINUED | OUTPATIENT
Start: 2022-04-20 | End: 2022-05-01 | Stop reason: HOSPADM

## 2022-04-20 RX ORDER — ACETAMINOPHEN 500 MG
1000 TABLET ORAL ONCE
Status: COMPLETED | OUTPATIENT
Start: 2022-04-20 | End: 2022-04-20

## 2022-04-20 RX ORDER — FOLIC ACID 1 MG/1
1 TABLET ORAL DAILY
Status: DISCONTINUED | OUTPATIENT
Start: 2022-04-20 | End: 2022-05-01 | Stop reason: HOSPADM

## 2022-04-20 RX ORDER — SODIUM CHLORIDE 9 MG/ML
75 INJECTION, SOLUTION INTRAVENOUS CONTINUOUS
Status: DISCONTINUED | OUTPATIENT
Start: 2022-04-20 | End: 2022-04-21

## 2022-04-20 RX ORDER — LACTULOSE 10 G/15ML
20 SOLUTION ORAL 2 TIMES DAILY PRN
Status: DISCONTINUED | OUTPATIENT
Start: 2022-04-20 | End: 2022-05-01 | Stop reason: HOSPADM

## 2022-04-20 RX ORDER — SENNA PLUS 8.6 MG/1
1 TABLET ORAL DAILY
Status: DISCONTINUED | OUTPATIENT
Start: 2022-04-20 | End: 2022-04-27

## 2022-04-20 RX ORDER — CLINDAMYCIN PHOSPHATE 900 MG/50ML
900 INJECTION INTRAVENOUS ONCE
Status: COMPLETED | OUTPATIENT
Start: 2022-04-20 | End: 2022-04-20

## 2022-04-20 RX ADMIN — ESCITALOPRAM 20 MG: 10 TABLET, FILM COATED ORAL at 18:31

## 2022-04-20 RX ADMIN — MORPHINE SULFATE 120 MG: 30 TABLET, FILM COATED, EXTENDED RELEASE ORAL at 22:31

## 2022-04-20 RX ADMIN — SODIUM CHLORIDE 75 ML/HR: 9 INJECTION, SOLUTION INTRAVENOUS at 17:54

## 2022-04-20 RX ADMIN — ACETAMINOPHEN 1000 MG: 500 TABLET, FILM COATED ORAL at 12:19

## 2022-04-20 RX ADMIN — CEFAZOLIN SODIUM 2 G: 10 INJECTION, POWDER, FOR SOLUTION INTRAVENOUS at 18:32

## 2022-04-20 RX ADMIN — SENNOSIDES 1 TABLET: 8.6 TABLET, FILM COATED ORAL at 18:31

## 2022-04-20 RX ADMIN — ENOXAPARIN SODIUM 40 MG: 100 INJECTION SUBCUTANEOUS at 20:41

## 2022-04-20 RX ADMIN — MORPHINE SULFATE 4 MG: 4 INJECTION, SOLUTION INTRAMUSCULAR; INTRAVENOUS at 18:41

## 2022-04-20 RX ADMIN — NIFEDIPINE 90 MG: 30 TABLET, FILM COATED, EXTENDED RELEASE ORAL at 18:33

## 2022-04-20 RX ADMIN — CLINDAMYCIN IN 5 PERCENT DEXTROSE 900 MG: 18 INJECTION, SOLUTION INTRAVENOUS at 13:20

## 2022-04-20 RX ADMIN — IOPAMIDOL 100 ML: 755 INJECTION, SOLUTION INTRAVENOUS at 14:00

## 2022-04-20 RX ADMIN — SODIUM CHLORIDE, POTASSIUM CHLORIDE, SODIUM LACTATE AND CALCIUM CHLORIDE 2763 ML: 600; 310; 30; 20 INJECTION, SOLUTION INTRAVENOUS at 12:44

## 2022-04-20 RX ADMIN — FOLIC ACID 1 MG: 1 TABLET ORAL at 18:31

## 2022-04-21 LAB
ANION GAP SERPL CALCULATED.3IONS-SCNC: 11 MMOL/L (ref 5–15)
BUN SERPL-MCNC: 11 MG/DL (ref 6–20)
BUN/CREAT SERPL: 8.4 (ref 7–25)
CALCIUM SPEC-SCNC: 7 MG/DL (ref 8.6–10.5)
CHLORIDE SERPL-SCNC: 104 MMOL/L (ref 98–107)
CO2 SERPL-SCNC: 25 MMOL/L (ref 22–29)
CREAT SERPL-MCNC: 1.31 MG/DL (ref 0.76–1.27)
DEPRECATED RDW RBC AUTO: 68.7 FL (ref 37–54)
EGFRCR SERPLBLD CKD-EPI 2021: 65.1 ML/MIN/1.73
ERYTHROCYTE [DISTWIDTH] IN BLOOD BY AUTOMATED COUNT: 20 % (ref 12.3–15.4)
GLUCOSE SERPL-MCNC: 77 MG/DL (ref 65–99)
HAPTOGLOB SERPL-MCNC: 30 MG/DL (ref 30–200)
HCT VFR BLD AUTO: 25.3 % (ref 37.5–51)
HGB BLD-MCNC: 8.5 G/DL (ref 13–17.7)
MCH RBC QN AUTO: 31.7 PG (ref 26.6–33)
MCHC RBC AUTO-ENTMCNC: 33.6 G/DL (ref 31.5–35.7)
MCV RBC AUTO: 94.4 FL (ref 79–97)
PLATELET # BLD AUTO: 246 10*3/MM3 (ref 140–450)
PMV BLD AUTO: 10.4 FL (ref 6–12)
POTASSIUM SERPL-SCNC: 3.7 MMOL/L (ref 3.5–5.2)
RBC # BLD AUTO: 2.68 10*6/MM3 (ref 4.14–5.8)
SODIUM SERPL-SCNC: 140 MMOL/L (ref 136–145)
WBC NRBC COR # BLD: 19.52 10*3/MM3 (ref 3.4–10.8)

## 2022-04-21 PROCEDURE — 36415 COLL VENOUS BLD VENIPUNCTURE: CPT | Performed by: FAMILY MEDICINE

## 2022-04-21 PROCEDURE — 25010000002 CEFAZOLIN PER 500 MG: Performed by: FAMILY MEDICINE

## 2022-04-21 PROCEDURE — 94799 UNLISTED PULMONARY SVC/PX: CPT

## 2022-04-21 PROCEDURE — 25010000002 ENOXAPARIN PER 10 MG: Performed by: FAMILY MEDICINE

## 2022-04-21 PROCEDURE — 25010000002 CALCIUM GLUCONATE PER 10 ML: Performed by: FAMILY MEDICINE

## 2022-04-21 PROCEDURE — 80048 BASIC METABOLIC PNL TOTAL CA: CPT | Performed by: FAMILY MEDICINE

## 2022-04-21 PROCEDURE — 25010000002 MORPHINE PER 10 MG: Performed by: FAMILY MEDICINE

## 2022-04-21 PROCEDURE — 85027 COMPLETE CBC AUTOMATED: CPT | Performed by: FAMILY MEDICINE

## 2022-04-21 PROCEDURE — 94761 N-INVAS EAR/PLS OXIMETRY MLT: CPT

## 2022-04-21 PROCEDURE — 25010000002 DEXAMETHASONE PER 1 MG: Performed by: FAMILY MEDICINE

## 2022-04-21 RX ORDER — HYDROCORTISONE 10 MG/1
20 TABLET ORAL EVERY MORNING
COMMUNITY
End: 2022-05-01 | Stop reason: HOSPADM

## 2022-04-21 RX ORDER — FUROSEMIDE 10 MG/ML
20 INJECTION INTRAMUSCULAR; INTRAVENOUS EVERY 12 HOURS
Status: DISCONTINUED | OUTPATIENT
Start: 2022-04-21 | End: 2022-04-21

## 2022-04-21 RX ORDER — HYDROCORTISONE 10 MG/1
10 TABLET ORAL EVERY EVENING
COMMUNITY
End: 2022-05-01 | Stop reason: HOSPADM

## 2022-04-21 RX ORDER — ESCITALOPRAM OXALATE 10 MG/1
20 TABLET ORAL NIGHTLY
Status: DISCONTINUED | OUTPATIENT
Start: 2022-04-21 | End: 2022-05-01 | Stop reason: HOSPADM

## 2022-04-21 RX ORDER — DEXAMETHASONE SODIUM PHOSPHATE 4 MG/ML
8 INJECTION, SOLUTION INTRA-ARTICULAR; INTRALESIONAL; INTRAMUSCULAR; INTRAVENOUS; SOFT TISSUE ONCE
Status: COMPLETED | OUTPATIENT
Start: 2022-04-21 | End: 2022-04-21

## 2022-04-21 RX ADMIN — CEFAZOLIN SODIUM 2 G: 10 INJECTION, POWDER, FOR SOLUTION INTRAVENOUS at 10:46

## 2022-04-21 RX ADMIN — SODIUM CHLORIDE 75 ML/HR: 9 INJECTION, SOLUTION INTRAVENOUS at 07:21

## 2022-04-21 RX ADMIN — MORPHINE SULFATE 4 MG: 4 INJECTION, SOLUTION INTRAMUSCULAR; INTRAVENOUS at 18:05

## 2022-04-21 RX ADMIN — DEXAMETHASONE SODIUM PHOSPHATE 8 MG: 4 INJECTION, SOLUTION INTRA-ARTICULAR; INTRALESIONAL; INTRAMUSCULAR; INTRAVENOUS; SOFT TISSUE at 15:47

## 2022-04-21 RX ADMIN — ENOXAPARIN SODIUM 40 MG: 100 INJECTION SUBCUTANEOUS at 21:33

## 2022-04-21 RX ADMIN — FOLIC ACID 1 MG: 1 TABLET ORAL at 08:33

## 2022-04-21 RX ADMIN — CEFAZOLIN SODIUM 2 G: 10 INJECTION, POWDER, FOR SOLUTION INTRAVENOUS at 17:59

## 2022-04-21 RX ADMIN — CEFAZOLIN SODIUM 2 G: 10 INJECTION, POWDER, FOR SOLUTION INTRAVENOUS at 01:43

## 2022-04-21 RX ADMIN — ESCITALOPRAM 20 MG: 10 TABLET, FILM COATED ORAL at 21:32

## 2022-04-21 RX ADMIN — CALCIUM GLUCONATE 2 G: 98 INJECTION, SOLUTION INTRAVENOUS at 15:47

## 2022-04-21 RX ADMIN — ACETAMINOPHEN 650 MG: 325 TABLET ORAL at 00:41

## 2022-04-21 RX ADMIN — MORPHINE SULFATE 120 MG: 30 TABLET, FILM COATED, EXTENDED RELEASE ORAL at 21:41

## 2022-04-21 RX ADMIN — NIFEDIPINE 90 MG: 30 TABLET, FILM COATED, EXTENDED RELEASE ORAL at 08:32

## 2022-04-21 RX ADMIN — MORPHINE SULFATE 120 MG: 30 TABLET, FILM COATED, EXTENDED RELEASE ORAL at 08:41

## 2022-04-22 PROBLEM — A41.9 SEPSIS: Status: ACTIVE | Noted: 2022-04-22

## 2022-04-22 PROBLEM — D57.1 SICKLE CELL ANEMIA (HCC): Status: ACTIVE | Noted: 2022-04-22

## 2022-04-22 PROBLEM — N18.31 STAGE 3A CHRONIC KIDNEY DISEASE (HCC): Status: ACTIVE | Noted: 2022-04-22

## 2022-04-22 LAB
ANION GAP SERPL CALCULATED.3IONS-SCNC: 11 MMOL/L (ref 5–15)
BUN SERPL-MCNC: 18 MG/DL (ref 6–20)
BUN/CREAT SERPL: 11.5 (ref 7–25)
CALCIUM SPEC-SCNC: 7.2 MG/DL (ref 8.6–10.5)
CHLORIDE SERPL-SCNC: 101 MMOL/L (ref 98–107)
CO2 SERPL-SCNC: 25 MMOL/L (ref 22–29)
CREAT SERPL-MCNC: 1.56 MG/DL (ref 0.76–1.27)
DEPRECATED RDW RBC AUTO: 66.7 FL (ref 37–54)
EGFRCR SERPLBLD CKD-EPI 2021: 52.8 ML/MIN/1.73
ERYTHROCYTE [DISTWIDTH] IN BLOOD BY AUTOMATED COUNT: 19.9 % (ref 12.3–15.4)
GLUCOSE SERPL-MCNC: 124 MG/DL (ref 65–99)
HCT VFR BLD AUTO: 23.4 % (ref 37.5–51)
HGB BLD-MCNC: 8 G/DL (ref 13–17.7)
LDH SERPL-CCNC: 345 U/L (ref 135–225)
MCH RBC QN AUTO: 32 PG (ref 26.6–33)
MCHC RBC AUTO-ENTMCNC: 34.2 G/DL (ref 31.5–35.7)
MCV RBC AUTO: 93.6 FL (ref 79–97)
PLATELET # BLD AUTO: 233 10*3/MM3 (ref 140–450)
PMV BLD AUTO: 10.4 FL (ref 6–12)
POTASSIUM SERPL-SCNC: 3.8 MMOL/L (ref 3.5–5.2)
RBC # BLD AUTO: 2.5 10*6/MM3 (ref 4.14–5.8)
RETICS # AUTO: 0.07 10*6/MM3 (ref 0.02–0.13)
RETICS/RBC NFR AUTO: 2.93 % (ref 0.7–1.9)
SODIUM SERPL-SCNC: 137 MMOL/L (ref 136–145)
SODIUM UR-SCNC: <20 MMOL/L
WBC NRBC COR # BLD: 21.63 10*3/MM3 (ref 3.4–10.8)

## 2022-04-22 PROCEDURE — 80048 BASIC METABOLIC PNL TOTAL CA: CPT | Performed by: FAMILY MEDICINE

## 2022-04-22 PROCEDURE — 82570 ASSAY OF URINE CREATININE: CPT | Performed by: INTERNAL MEDICINE

## 2022-04-22 PROCEDURE — 85027 COMPLETE CBC AUTOMATED: CPT | Performed by: FAMILY MEDICINE

## 2022-04-22 PROCEDURE — 83615 LACTATE (LD) (LDH) ENZYME: CPT | Performed by: FAMILY MEDICINE

## 2022-04-22 PROCEDURE — 85045 AUTOMATED RETICULOCYTE COUNT: CPT | Performed by: FAMILY MEDICINE

## 2022-04-22 PROCEDURE — 25010000002 ENOXAPARIN PER 10 MG: Performed by: FAMILY MEDICINE

## 2022-04-22 PROCEDURE — 36415 COLL VENOUS BLD VENIPUNCTURE: CPT | Performed by: FAMILY MEDICINE

## 2022-04-22 PROCEDURE — 25010000002 CEFAZOLIN PER 500 MG: Performed by: FAMILY MEDICINE

## 2022-04-22 PROCEDURE — 84300 ASSAY OF URINE SODIUM: CPT | Performed by: INTERNAL MEDICINE

## 2022-04-22 RX ORDER — SODIUM CHLORIDE 9 MG/ML
50 INJECTION, SOLUTION INTRAVENOUS CONTINUOUS
Status: DISCONTINUED | OUTPATIENT
Start: 2022-04-22 | End: 2022-04-26

## 2022-04-22 RX ORDER — NIFEDIPINE 30 MG/1
30 TABLET, EXTENDED RELEASE ORAL DAILY
Status: DISCONTINUED | OUTPATIENT
Start: 2022-04-23 | End: 2022-05-01 | Stop reason: HOSPADM

## 2022-04-22 RX ORDER — TAMSULOSIN HYDROCHLORIDE 0.4 MG/1
0.4 CAPSULE ORAL DAILY
Status: DISCONTINUED | OUTPATIENT
Start: 2022-04-22 | End: 2022-05-01 | Stop reason: HOSPADM

## 2022-04-22 RX ADMIN — MORPHINE SULFATE 120 MG: 30 TABLET, FILM COATED, EXTENDED RELEASE ORAL at 08:04

## 2022-04-22 RX ADMIN — FOLIC ACID 1 MG: 1 TABLET ORAL at 08:04

## 2022-04-22 RX ADMIN — ESCITALOPRAM 20 MG: 10 TABLET, FILM COATED ORAL at 20:04

## 2022-04-22 RX ADMIN — SODIUM CHLORIDE 100 ML/HR: 9 INJECTION, SOLUTION INTRAVENOUS at 21:36

## 2022-04-22 RX ADMIN — MORPHINE SULFATE 120 MG: 30 TABLET, FILM COATED, EXTENDED RELEASE ORAL at 20:04

## 2022-04-22 RX ADMIN — SENNOSIDES 1 TABLET: 8.6 TABLET, FILM COATED ORAL at 08:04

## 2022-04-22 RX ADMIN — ENOXAPARIN SODIUM 40 MG: 100 INJECTION SUBCUTANEOUS at 20:03

## 2022-04-22 RX ADMIN — SODIUM CHLORIDE 100 ML/HR: 9 INJECTION, SOLUTION INTRAVENOUS at 10:12

## 2022-04-22 RX ADMIN — TAMSULOSIN HYDROCHLORIDE 0.4 MG: 0.4 CAPSULE ORAL at 14:12

## 2022-04-22 RX ADMIN — CEFAZOLIN SODIUM 2 G: 10 INJECTION, POWDER, FOR SOLUTION INTRAVENOUS at 03:49

## 2022-04-22 RX ADMIN — CEFAZOLIN SODIUM 2 G: 10 INJECTION, POWDER, FOR SOLUTION INTRAVENOUS at 10:13

## 2022-04-22 RX ADMIN — CEFAZOLIN SODIUM 2 G: 10 INJECTION, POWDER, FOR SOLUTION INTRAVENOUS at 18:05

## 2022-04-22 RX ADMIN — NIFEDIPINE 90 MG: 30 TABLET, FILM COATED, EXTENDED RELEASE ORAL at 08:04

## 2022-04-23 ENCOUNTER — APPOINTMENT (OUTPATIENT)
Dept: MRI IMAGING | Facility: HOSPITAL | Age: 52
End: 2022-04-23

## 2022-04-23 ENCOUNTER — APPOINTMENT (OUTPATIENT)
Dept: ULTRASOUND IMAGING | Facility: HOSPITAL | Age: 52
End: 2022-04-23

## 2022-04-23 LAB
ABO GROUP BLD: NORMAL
ANION GAP SERPL CALCULATED.3IONS-SCNC: 12 MMOL/L (ref 5–15)
BLD GP AB SCN SERPL QL: NEGATIVE
BUN SERPL-MCNC: 25 MG/DL (ref 6–20)
BUN/CREAT SERPL: 15.6 (ref 7–25)
CALCIUM SPEC-SCNC: 6.6 MG/DL (ref 8.6–10.5)
CHLORIDE SERPL-SCNC: 102 MMOL/L (ref 98–107)
CK SERPL-CCNC: 357 U/L (ref 20–200)
CO2 SERPL-SCNC: 23 MMOL/L (ref 22–29)
CREAT SERPL-MCNC: 1.6 MG/DL (ref 0.76–1.27)
CREAT UR-MCNC: 129.2 MG/DL
DEPRECATED RDW RBC AUTO: 65.6 FL (ref 37–54)
EGFRCR SERPLBLD CKD-EPI 2021: 51.2 ML/MIN/1.73
ERYTHROCYTE [DISTWIDTH] IN BLOOD BY AUTOMATED COUNT: 19.8 % (ref 12.3–15.4)
GLUCOSE SERPL-MCNC: 129 MG/DL (ref 65–99)
HCT VFR BLD AUTO: 19.5 % (ref 37.5–51)
HCT VFR BLD AUTO: 20.1 % (ref 37.5–51)
HGB BLD-MCNC: 6.8 G/DL (ref 13–17.7)
HGB BLD-MCNC: 7 G/DL (ref 13–17.7)
LDH SERPL-CCNC: 229 U/L (ref 135–225)
LDH SERPL-CCNC: 240 U/L (ref 135–225)
MCH RBC QN AUTO: 32 PG (ref 26.6–33)
MCHC RBC AUTO-ENTMCNC: 34.8 G/DL (ref 31.5–35.7)
MCV RBC AUTO: 91.8 FL (ref 79–97)
PLATELET # BLD AUTO: 227 10*3/MM3 (ref 140–450)
PMV BLD AUTO: 10.1 FL (ref 6–12)
POTASSIUM SERPL-SCNC: 3.3 MMOL/L (ref 3.5–5.2)
RBC # BLD AUTO: 2.19 10*6/MM3 (ref 4.14–5.8)
RETICS # AUTO: 0.05 10*6/MM3 (ref 0.02–0.13)
RETICS/RBC NFR AUTO: 2.4 % (ref 0.7–1.9)
RH BLD: POSITIVE
SODIUM SERPL-SCNC: 137 MMOL/L (ref 136–145)
T&S EXPIRATION DATE: NORMAL
URATE SERPL-MCNC: 7.2 MG/DL (ref 3.4–7)
WBC NRBC COR # BLD: 20.81 10*3/MM3 (ref 3.4–10.8)

## 2022-04-23 PROCEDURE — 86900 BLOOD TYPING SEROLOGIC ABO: CPT

## 2022-04-23 PROCEDURE — 84550 ASSAY OF BLOOD/URIC ACID: CPT | Performed by: INTERNAL MEDICINE

## 2022-04-23 PROCEDURE — P9016 RBC LEUKOCYTES REDUCED: HCPCS

## 2022-04-23 PROCEDURE — 86901 BLOOD TYPING SEROLOGIC RH(D): CPT | Performed by: FAMILY MEDICINE

## 2022-04-23 PROCEDURE — 85045 AUTOMATED RETICULOCYTE COUNT: CPT | Performed by: FAMILY MEDICINE

## 2022-04-23 PROCEDURE — 83615 LACTATE (LD) (LDH) ENZYME: CPT | Performed by: FAMILY MEDICINE

## 2022-04-23 PROCEDURE — 76775 US EXAM ABDO BACK WALL LIM: CPT

## 2022-04-23 PROCEDURE — 85018 HEMOGLOBIN: CPT | Performed by: FAMILY MEDICINE

## 2022-04-23 PROCEDURE — 86923 COMPATIBILITY TEST ELECTRIC: CPT

## 2022-04-23 PROCEDURE — 73720 MRI LWR EXTREMITY W/O&W/DYE: CPT

## 2022-04-23 PROCEDURE — 86850 RBC ANTIBODY SCREEN: CPT | Performed by: FAMILY MEDICINE

## 2022-04-23 PROCEDURE — 36430 TRANSFUSION BLD/BLD COMPNT: CPT

## 2022-04-23 PROCEDURE — 86900 BLOOD TYPING SEROLOGIC ABO: CPT | Performed by: FAMILY MEDICINE

## 2022-04-23 PROCEDURE — 25010000002 CALCIUM GLUCONATE PER 10 ML: Performed by: FAMILY MEDICINE

## 2022-04-23 PROCEDURE — 85014 HEMATOCRIT: CPT | Performed by: FAMILY MEDICINE

## 2022-04-23 PROCEDURE — 83615 LACTATE (LD) (LDH) ENZYME: CPT | Performed by: INTERNAL MEDICINE

## 2022-04-23 PROCEDURE — 0 GADOBENATE DIMEGLUMINE 529 MG/ML SOLUTION: Performed by: FAMILY MEDICINE

## 2022-04-23 PROCEDURE — A9577 INJ MULTIHANCE: HCPCS | Performed by: FAMILY MEDICINE

## 2022-04-23 PROCEDURE — 82550 ASSAY OF CK (CPK): CPT | Performed by: INTERNAL MEDICINE

## 2022-04-23 PROCEDURE — 80048 BASIC METABOLIC PNL TOTAL CA: CPT | Performed by: FAMILY MEDICINE

## 2022-04-23 PROCEDURE — 25010000002 CEFAZOLIN PER 500 MG: Performed by: FAMILY MEDICINE

## 2022-04-23 PROCEDURE — 99223 1ST HOSP IP/OBS HIGH 75: CPT | Performed by: INTERNAL MEDICINE

## 2022-04-23 PROCEDURE — 85027 COMPLETE CBC AUTOMATED: CPT | Performed by: FAMILY MEDICINE

## 2022-04-23 PROCEDURE — 36415 COLL VENOUS BLD VENIPUNCTURE: CPT | Performed by: FAMILY MEDICINE

## 2022-04-23 RX ORDER — CALCIUM CARBONATE 200(500)MG
1 TABLET,CHEWABLE ORAL 3 TIMES DAILY
Status: DISCONTINUED | OUTPATIENT
Start: 2022-04-23 | End: 2022-05-01 | Stop reason: HOSPADM

## 2022-04-23 RX ORDER — HYDROXYUREA 500 MG/1
1000 CAPSULE ORAL DAILY
Status: DISCONTINUED | OUTPATIENT
Start: 2022-04-23 | End: 2022-05-01 | Stop reason: HOSPADM

## 2022-04-23 RX ADMIN — CALCIUM CARBONATE 1 TABLET: 500 TABLET, CHEWABLE ORAL at 20:15

## 2022-04-23 RX ADMIN — SODIUM CHLORIDE 100 ML/HR: 9 INJECTION, SOLUTION INTRAVENOUS at 07:31

## 2022-04-23 RX ADMIN — SENNOSIDES 1 TABLET: 8.6 TABLET, FILM COATED ORAL at 11:06

## 2022-04-23 RX ADMIN — CEFAZOLIN SODIUM 2 G: 10 INJECTION, POWDER, FOR SOLUTION INTRAVENOUS at 02:13

## 2022-04-23 RX ADMIN — HYDROXYUREA 1000 MG: 500 CAPSULE ORAL at 10:12

## 2022-04-23 RX ADMIN — GLUTAMINE 10 G: 5 POWDER, FOR SOLUTION ORAL at 11:05

## 2022-04-23 RX ADMIN — CALCIUM CARBONATE 1 TABLET: 500 TABLET, CHEWABLE ORAL at 15:45

## 2022-04-23 RX ADMIN — GADOBENATE DIMEGLUMINE 20 ML: 529 INJECTION, SOLUTION INTRAVENOUS at 14:33

## 2022-04-23 RX ADMIN — GLUTAMINE 2 PACKET: 5 POWDER, FOR SOLUTION ORAL at 20:03

## 2022-04-23 RX ADMIN — TAMSULOSIN HYDROCHLORIDE 0.4 MG: 0.4 CAPSULE ORAL at 11:06

## 2022-04-23 RX ADMIN — CALCIUM GLUCONATE 2 G: 98 INJECTION, SOLUTION INTRAVENOUS at 10:12

## 2022-04-23 RX ADMIN — FOLIC ACID 1 MG: 1 TABLET ORAL at 10:15

## 2022-04-23 RX ADMIN — MORPHINE SULFATE 120 MG: 30 TABLET, FILM COATED, EXTENDED RELEASE ORAL at 09:04

## 2022-04-23 RX ADMIN — ESCITALOPRAM 20 MG: 10 TABLET, FILM COATED ORAL at 20:02

## 2022-04-23 RX ADMIN — CEFAZOLIN SODIUM 2 G: 10 INJECTION, POWDER, FOR SOLUTION INTRAVENOUS at 20:02

## 2022-04-23 RX ADMIN — CEFAZOLIN SODIUM 2 G: 10 INJECTION, POWDER, FOR SOLUTION INTRAVENOUS at 10:10

## 2022-04-23 RX ADMIN — MORPHINE SULFATE 120 MG: 30 TABLET, FILM COATED, EXTENDED RELEASE ORAL at 20:02

## 2022-04-24 ENCOUNTER — APPOINTMENT (OUTPATIENT)
Dept: GENERAL RADIOLOGY | Facility: HOSPITAL | Age: 52
End: 2022-04-24

## 2022-04-24 LAB
25(OH)D3 SERPL-MCNC: 16.1 NG/ML (ref 30–100)
ALBUMIN SERPL-MCNC: 3.5 G/DL (ref 3.5–5.2)
ALBUMIN/GLOB SERPL: 1 G/DL
ALP SERPL-CCNC: 83 U/L (ref 39–117)
ALT SERPL W P-5'-P-CCNC: <5 U/L (ref 1–41)
ANION GAP SERPL CALCULATED.3IONS-SCNC: 14 MMOL/L (ref 5–15)
AST SERPL-CCNC: 14 U/L (ref 1–40)
BASOPHILS # BLD AUTO: 0.02 10*3/MM3 (ref 0–0.2)
BASOPHILS NFR BLD AUTO: 0.1 % (ref 0–1.5)
BH BB BLOOD EXPIRATION DATE: NORMAL
BH BB BLOOD TYPE BARCODE: 6200
BH BB DISPENSE STATUS: NORMAL
BH BB PRODUCT CODE: NORMAL
BH BB UNIT NUMBER: NORMAL
BILIRUB SERPL-MCNC: 1.3 MG/DL (ref 0–1.2)
BUN SERPL-MCNC: 21 MG/DL (ref 6–20)
BUN/CREAT SERPL: 15.6 (ref 7–25)
CALCIUM SPEC-SCNC: 7.7 MG/DL (ref 8.6–10.5)
CHLORIDE SERPL-SCNC: 107 MMOL/L (ref 98–107)
CK SERPL-CCNC: 151 U/L (ref 20–200)
CO2 SERPL-SCNC: 22 MMOL/L (ref 22–29)
CREAT SERPL-MCNC: 1.35 MG/DL (ref 0.76–1.27)
CROSSMATCH INTERPRETATION: NORMAL
CRP SERPL-MCNC: 21.83 MG/DL (ref 0–0.5)
DEPRECATED RDW RBC AUTO: 64 FL (ref 37–54)
EGFRCR SERPLBLD CKD-EPI 2021: 62.8 ML/MIN/1.73
EOSINOPHIL # BLD AUTO: 0.2 10*3/MM3 (ref 0–0.4)
EOSINOPHIL NFR BLD AUTO: 1.2 % (ref 0.3–6.2)
ERYTHROCYTE [DISTWIDTH] IN BLOOD BY AUTOMATED COUNT: 19.8 % (ref 12.3–15.4)
FERRITIN SERPL-MCNC: 1639 NG/ML (ref 30–400)
FOLATE SERPL-MCNC: 11.5 NG/ML (ref 4.78–24.2)
GLOBULIN UR ELPH-MCNC: 3.5 GM/DL
GLUCOSE SERPL-MCNC: 109 MG/DL (ref 65–99)
HCT VFR BLD AUTO: 23 % (ref 37.5–51)
HGB BLD-MCNC: 8.3 G/DL (ref 13–17.7)
IRON 24H UR-MRATE: 25 MCG/DL (ref 59–158)
IRON SATN MFR SERPL: 17 % (ref 20–50)
LDH SERPL-CCNC: 228 U/L (ref 135–225)
LYMPHOCYTES # BLD AUTO: 0.55 10*3/MM3 (ref 0.7–3.1)
LYMPHOCYTES NFR BLD AUTO: 3.3 % (ref 19.6–45.3)
MAGNESIUM SERPL-MCNC: 2.3 MG/DL (ref 1.6–2.6)
MCH RBC QN AUTO: 32.5 PG (ref 26.6–33)
MCHC RBC AUTO-ENTMCNC: 36.1 G/DL (ref 31.5–35.7)
MCV RBC AUTO: 90.2 FL (ref 79–97)
MONOCYTES # BLD AUTO: 2.34 10*3/MM3 (ref 0.1–0.9)
MONOCYTES NFR BLD AUTO: 14.2 % (ref 5–12)
NEUTROPHILS NFR BLD AUTO: 13.26 10*3/MM3 (ref 1.7–7)
NEUTROPHILS NFR BLD AUTO: 80.3 % (ref 42.7–76)
PLATELET # BLD AUTO: 281 10*3/MM3 (ref 140–450)
PMV BLD AUTO: 10.1 FL (ref 6–12)
POTASSIUM SERPL-SCNC: 3.3 MMOL/L (ref 3.5–5.2)
PROT SERPL-MCNC: 7 G/DL (ref 6–8.5)
RBC # BLD AUTO: 2.55 10*6/MM3 (ref 4.14–5.8)
RETICS # AUTO: 0.1 10*6/MM3 (ref 0.02–0.13)
RETICS/RBC NFR AUTO: 4.04 % (ref 0.7–1.9)
SODIUM SERPL-SCNC: 143 MMOL/L (ref 136–145)
TIBC SERPL-MCNC: 145 MCG/DL (ref 298–536)
TRANSFERRIN SERPL-MCNC: 97 MG/DL (ref 200–360)
UNIT  ABO: NORMAL
UNIT  RH: NORMAL
WBC NRBC COR # BLD: 16.52 10*3/MM3 (ref 3.4–10.8)

## 2022-04-24 PROCEDURE — 83540 ASSAY OF IRON: CPT | Performed by: INTERNAL MEDICINE

## 2022-04-24 PROCEDURE — 85045 AUTOMATED RETICULOCYTE COUNT: CPT | Performed by: INTERNAL MEDICINE

## 2022-04-24 PROCEDURE — 82746 ASSAY OF FOLIC ACID SERUM: CPT | Performed by: INTERNAL MEDICINE

## 2022-04-24 PROCEDURE — 25010000002 ENOXAPARIN PER 10 MG: Performed by: FAMILY MEDICINE

## 2022-04-24 PROCEDURE — 25010000002 CEFAZOLIN PER 500 MG: Performed by: FAMILY MEDICINE

## 2022-04-24 PROCEDURE — 83735 ASSAY OF MAGNESIUM: CPT | Performed by: FAMILY MEDICINE

## 2022-04-24 PROCEDURE — 80053 COMPREHEN METABOLIC PANEL: CPT | Performed by: INTERNAL MEDICINE

## 2022-04-24 PROCEDURE — 25010000002 MORPHINE PER 10 MG: Performed by: FAMILY MEDICINE

## 2022-04-24 PROCEDURE — 84466 ASSAY OF TRANSFERRIN: CPT | Performed by: INTERNAL MEDICINE

## 2022-04-24 PROCEDURE — 85025 COMPLETE CBC W/AUTO DIFF WBC: CPT | Performed by: INTERNAL MEDICINE

## 2022-04-24 PROCEDURE — 83615 LACTATE (LD) (LDH) ENZYME: CPT | Performed by: INTERNAL MEDICINE

## 2022-04-24 PROCEDURE — 82550 ASSAY OF CK (CPK): CPT | Performed by: INTERNAL MEDICINE

## 2022-04-24 PROCEDURE — 86140 C-REACTIVE PROTEIN: CPT | Performed by: FAMILY MEDICINE

## 2022-04-24 PROCEDURE — 25010000002 MORPHINE (PF) 10 MG/ML SOLUTION: Performed by: FAMILY MEDICINE

## 2022-04-24 PROCEDURE — 82728 ASSAY OF FERRITIN: CPT | Performed by: INTERNAL MEDICINE

## 2022-04-24 PROCEDURE — 71045 X-RAY EXAM CHEST 1 VIEW: CPT

## 2022-04-24 PROCEDURE — 82306 VITAMIN D 25 HYDROXY: CPT | Performed by: INTERNAL MEDICINE

## 2022-04-24 RX ORDER — GUAIFENESIN 600 MG/1
1200 TABLET, EXTENDED RELEASE ORAL EVERY 12 HOURS SCHEDULED
Status: DISCONTINUED | OUTPATIENT
Start: 2022-04-24 | End: 2022-05-01 | Stop reason: HOSPADM

## 2022-04-24 RX ORDER — POTASSIUM CHLORIDE 750 MG/1
40 CAPSULE, EXTENDED RELEASE ORAL ONCE
Status: COMPLETED | OUTPATIENT
Start: 2022-04-24 | End: 2022-04-24

## 2022-04-24 RX ORDER — NALOXONE HCL 0.4 MG/ML
0.1 VIAL (ML) INJECTION
Status: DISCONTINUED | OUTPATIENT
Start: 2022-04-24 | End: 2022-05-01 | Stop reason: HOSPADM

## 2022-04-24 RX ORDER — IPRATROPIUM BROMIDE AND ALBUTEROL SULFATE 2.5; .5 MG/3ML; MG/3ML
3 SOLUTION RESPIRATORY (INHALATION) EVERY 4 HOURS PRN
Status: DISCONTINUED | OUTPATIENT
Start: 2022-04-24 | End: 2022-05-01 | Stop reason: HOSPADM

## 2022-04-24 RX ORDER — MORPHINE SULFATE 1 MG/ML
INJECTION INTRAVENOUS CONTINUOUS
Status: DISPENSED | OUTPATIENT
Start: 2022-04-24 | End: 2022-04-27

## 2022-04-24 RX ADMIN — NIFEDIPINE 30 MG: 30 TABLET, FILM COATED, EXTENDED RELEASE ORAL at 09:26

## 2022-04-24 RX ADMIN — GUAIFENESIN 1200 MG: 600 TABLET, EXTENDED RELEASE ORAL at 22:00

## 2022-04-24 RX ADMIN — CALCIUM CARBONATE 1 TABLET: 500 TABLET, CHEWABLE ORAL at 21:59

## 2022-04-24 RX ADMIN — SODIUM CHLORIDE 100 ML/HR: 9 INJECTION, SOLUTION INTRAVENOUS at 12:21

## 2022-04-24 RX ADMIN — SODIUM CHLORIDE 100 ML/HR: 9 INJECTION, SOLUTION INTRAVENOUS at 02:00

## 2022-04-24 RX ADMIN — GUAIFENESIN 1200 MG: 600 TABLET, EXTENDED RELEASE ORAL at 12:16

## 2022-04-24 RX ADMIN — SENNOSIDES 1 TABLET: 8.6 TABLET, FILM COATED ORAL at 08:41

## 2022-04-24 RX ADMIN — CALCIUM CARBONATE 1 TABLET: 500 TABLET, CHEWABLE ORAL at 16:28

## 2022-04-24 RX ADMIN — MORPHINE SULFATE 120 MG: 30 TABLET, FILM COATED, EXTENDED RELEASE ORAL at 08:40

## 2022-04-24 RX ADMIN — ESCITALOPRAM 20 MG: 10 TABLET, FILM COATED ORAL at 21:59

## 2022-04-24 RX ADMIN — CEFAZOLIN SODIUM 2 G: 10 INJECTION, POWDER, FOR SOLUTION INTRAVENOUS at 09:26

## 2022-04-24 RX ADMIN — MORPHINE SULFATE 4 MG: 4 INJECTION, SOLUTION INTRAMUSCULAR; INTRAVENOUS at 00:32

## 2022-04-24 RX ADMIN — MORPHINE SULFATE: 10 INJECTION, SOLUTION INTRAMUSCULAR; INTRAVENOUS at 10:12

## 2022-04-24 RX ADMIN — CEFAZOLIN SODIUM 2 G: 10 INJECTION, POWDER, FOR SOLUTION INTRAVENOUS at 17:53

## 2022-04-24 RX ADMIN — GLUTAMINE 2 PACKET: 5 POWDER, FOR SOLUTION ORAL at 08:42

## 2022-04-24 RX ADMIN — TAMSULOSIN HYDROCHLORIDE 0.4 MG: 0.4 CAPSULE ORAL at 08:40

## 2022-04-24 RX ADMIN — CEFAZOLIN SODIUM 2 G: 10 INJECTION, POWDER, FOR SOLUTION INTRAVENOUS at 04:14

## 2022-04-24 RX ADMIN — POTASSIUM CHLORIDE 40 MEQ: 10 CAPSULE, COATED, EXTENDED RELEASE ORAL at 12:20

## 2022-04-24 RX ADMIN — ENOXAPARIN SODIUM 40 MG: 100 INJECTION SUBCUTANEOUS at 21:59

## 2022-04-24 RX ADMIN — FOLIC ACID 1 MG: 1 TABLET ORAL at 08:40

## 2022-04-24 RX ADMIN — SODIUM CHLORIDE 100 ML/HR: 9 INJECTION, SOLUTION INTRAVENOUS at 22:07

## 2022-04-24 RX ADMIN — GLUTAMINE 2 PACKET: 5 POWDER, FOR SOLUTION ORAL at 22:00

## 2022-04-24 RX ADMIN — MORPHINE SULFATE 120 MG: 30 TABLET, FILM COATED, EXTENDED RELEASE ORAL at 22:00

## 2022-04-24 RX ADMIN — HYDROXYUREA 1000 MG: 500 CAPSULE ORAL at 08:40

## 2022-04-24 RX ADMIN — CALCIUM CARBONATE 1 TABLET: 500 TABLET, CHEWABLE ORAL at 08:41

## 2022-04-24 RX ADMIN — MORPHINE SULFATE 4 MG: 4 INJECTION, SOLUTION INTRAMUSCULAR; INTRAVENOUS at 04:20

## 2022-04-25 ENCOUNTER — APPOINTMENT (OUTPATIENT)
Dept: CARDIOLOGY | Facility: HOSPITAL | Age: 52
End: 2022-04-25

## 2022-04-25 ENCOUNTER — APPOINTMENT (OUTPATIENT)
Dept: ULTRASOUND IMAGING | Facility: HOSPITAL | Age: 52
End: 2022-04-25

## 2022-04-25 LAB
ALBUMIN SERPL-MCNC: 3.5 G/DL (ref 3.5–5.2)
ALBUMIN/GLOB SERPL: 0.9 G/DL
ALP SERPL-CCNC: 88 U/L (ref 39–117)
ALT SERPL W P-5'-P-CCNC: <5 U/L (ref 1–41)
ANION GAP SERPL CALCULATED.3IONS-SCNC: 11 MMOL/L (ref 5–15)
AST SERPL-CCNC: 15 U/L (ref 1–40)
BACTERIA SPEC AEROBE CULT: NORMAL
BACTERIA SPEC AEROBE CULT: NORMAL
BASOPHILS # BLD AUTO: 0.04 10*3/MM3 (ref 0–0.2)
BASOPHILS NFR BLD AUTO: 0.2 % (ref 0–1.5)
BILIRUB SERPL-MCNC: 2 MG/DL (ref 0–1.2)
BUN SERPL-MCNC: 22 MG/DL (ref 6–20)
BUN/CREAT SERPL: 16.2 (ref 7–25)
CALCIUM SPEC-SCNC: 7.4 MG/DL (ref 8.6–10.5)
CHLORIDE SERPL-SCNC: 109 MMOL/L (ref 98–107)
CO2 SERPL-SCNC: 23 MMOL/L (ref 22–29)
CREAT SERPL-MCNC: 1.36 MG/DL (ref 0.76–1.27)
DEPRECATED RDW RBC AUTO: 66.7 FL (ref 37–54)
EGFRCR SERPLBLD CKD-EPI 2021: 62.2 ML/MIN/1.73
EOSINOPHIL # BLD AUTO: 0.29 10*3/MM3 (ref 0–0.4)
EOSINOPHIL NFR BLD AUTO: 1.4 % (ref 0.3–6.2)
ERYTHROCYTE [DISTWIDTH] IN BLOOD BY AUTOMATED COUNT: 20.1 % (ref 12.3–15.4)
FERRITIN SERPL-MCNC: 2445 NG/ML (ref 30–400)
FOLATE SERPL-MCNC: >20 NG/ML (ref 4.78–24.2)
GLOBULIN UR ELPH-MCNC: 3.7 GM/DL
GLUCOSE SERPL-MCNC: 103 MG/DL (ref 65–99)
HCT VFR BLD AUTO: 23.6 % (ref 37.5–51)
HGB BLD-MCNC: 8.3 G/DL (ref 13–17.7)
IMM GRANULOCYTES # BLD AUTO: 0.4 10*3/MM3 (ref 0–0.05)
IMM GRANULOCYTES NFR BLD AUTO: 2 % (ref 0–0.5)
IRON 24H UR-MRATE: 13 MCG/DL (ref 59–158)
IRON SATN MFR SERPL: 9 % (ref 20–50)
LDH SERPL-CCNC: 245 U/L (ref 135–225)
LYMPHOCYTES # BLD AUTO: 0.67 10*3/MM3 (ref 0.7–3.1)
LYMPHOCYTES NFR BLD AUTO: 3.3 % (ref 19.6–45.3)
MCH RBC QN AUTO: 31.9 PG (ref 26.6–33)
MCHC RBC AUTO-ENTMCNC: 35.2 G/DL (ref 31.5–35.7)
MCV RBC AUTO: 90.8 FL (ref 79–97)
MONOCYTES # BLD AUTO: 2.77 10*3/MM3 (ref 0.1–0.9)
MONOCYTES NFR BLD AUTO: 13.8 % (ref 5–12)
NEUTROPHILS NFR BLD AUTO: 15.95 10*3/MM3 (ref 1.7–7)
NEUTROPHILS NFR BLD AUTO: 79.3 % (ref 42.7–76)
NRBC BLD AUTO-RTO: 1.9 /100 WBC (ref 0–0.2)
PLATELET # BLD AUTO: 286 10*3/MM3 (ref 140–450)
PMV BLD AUTO: 9.5 FL (ref 6–12)
POTASSIUM SERPL-SCNC: 4 MMOL/L (ref 3.5–5.2)
PROT SERPL-MCNC: 7.2 G/DL (ref 6–8.5)
RBC # BLD AUTO: 2.6 10*6/MM3 (ref 4.14–5.8)
RETICS # AUTO: 0.12 10*6/MM3 (ref 0.02–0.13)
RETICS/RBC NFR AUTO: 4.69 % (ref 0.7–1.9)
SODIUM SERPL-SCNC: 143 MMOL/L (ref 136–145)
TIBC SERPL-MCNC: 142 MCG/DL (ref 298–536)
TRANSFERRIN SERPL-MCNC: 95 MG/DL (ref 200–360)
WBC NRBC COR # BLD: 20.12 10*3/MM3 (ref 3.4–10.8)

## 2022-04-25 PROCEDURE — 83540 ASSAY OF IRON: CPT | Performed by: INTERNAL MEDICINE

## 2022-04-25 PROCEDURE — 85045 AUTOMATED RETICULOCYTE COUNT: CPT | Performed by: INTERNAL MEDICINE

## 2022-04-25 PROCEDURE — 83615 LACTATE (LD) (LDH) ENZYME: CPT | Performed by: INTERNAL MEDICINE

## 2022-04-25 PROCEDURE — 25010000002 CEFAZOLIN PER 500 MG: Performed by: FAMILY MEDICINE

## 2022-04-25 PROCEDURE — 25010000002 MORPHINE (PF) 10 MG/ML SOLUTION: Performed by: FAMILY MEDICINE

## 2022-04-25 PROCEDURE — 93306 TTE W/DOPPLER COMPLETE: CPT

## 2022-04-25 PROCEDURE — 85025 COMPLETE CBC W/AUTO DIFF WBC: CPT | Performed by: INTERNAL MEDICINE

## 2022-04-25 PROCEDURE — 84466 ASSAY OF TRANSFERRIN: CPT | Performed by: INTERNAL MEDICINE

## 2022-04-25 PROCEDURE — 82746 ASSAY OF FOLIC ACID SERUM: CPT | Performed by: INTERNAL MEDICINE

## 2022-04-25 PROCEDURE — 80053 COMPREHEN METABOLIC PANEL: CPT | Performed by: INTERNAL MEDICINE

## 2022-04-25 PROCEDURE — 93306 TTE W/DOPPLER COMPLETE: CPT | Performed by: INTERNAL MEDICINE

## 2022-04-25 PROCEDURE — 25010000002 ENOXAPARIN PER 10 MG: Performed by: FAMILY MEDICINE

## 2022-04-25 PROCEDURE — 82728 ASSAY OF FERRITIN: CPT | Performed by: INTERNAL MEDICINE

## 2022-04-25 PROCEDURE — 93971 EXTREMITY STUDY: CPT | Performed by: SURGERY

## 2022-04-25 PROCEDURE — 25010000002 PERFLUTREN 6.52 MG/ML SUSPENSION: Performed by: FAMILY MEDICINE

## 2022-04-25 PROCEDURE — 93971 EXTREMITY STUDY: CPT

## 2022-04-25 RX ADMIN — SODIUM CHLORIDE 75 ML/HR: 9 INJECTION, SOLUTION INTRAVENOUS at 20:10

## 2022-04-25 RX ADMIN — CEFAZOLIN SODIUM 2 G: 10 INJECTION, POWDER, FOR SOLUTION INTRAVENOUS at 18:27

## 2022-04-25 RX ADMIN — GUAIFENESIN 1200 MG: 600 TABLET, EXTENDED RELEASE ORAL at 09:35

## 2022-04-25 RX ADMIN — MORPHINE SULFATE: 10 INJECTION, SOLUTION INTRAMUSCULAR; INTRAVENOUS at 09:48

## 2022-04-25 RX ADMIN — SODIUM CHLORIDE 100 ML/HR: 9 INJECTION, SOLUTION INTRAVENOUS at 08:02

## 2022-04-25 RX ADMIN — NIFEDIPINE 30 MG: 30 TABLET, FILM COATED, EXTENDED RELEASE ORAL at 09:35

## 2022-04-25 RX ADMIN — ESCITALOPRAM 20 MG: 10 TABLET, FILM COATED ORAL at 20:08

## 2022-04-25 RX ADMIN — FOLIC ACID 1 MG: 1 TABLET ORAL at 09:35

## 2022-04-25 RX ADMIN — GLUTAMINE 2 PACKET: 5 POWDER, FOR SOLUTION ORAL at 09:38

## 2022-04-25 RX ADMIN — HYDROXYUREA 1000 MG: 500 CAPSULE ORAL at 09:34

## 2022-04-25 RX ADMIN — GLUTAMINE 2 PACKET: 5 POWDER, FOR SOLUTION ORAL at 20:11

## 2022-04-25 RX ADMIN — MORPHINE SULFATE 120 MG: 30 TABLET, FILM COATED, EXTENDED RELEASE ORAL at 09:55

## 2022-04-25 RX ADMIN — SENNOSIDES 1 TABLET: 8.6 TABLET, FILM COATED ORAL at 09:34

## 2022-04-25 RX ADMIN — MORPHINE SULFATE 120 MG: 30 TABLET, FILM COATED, EXTENDED RELEASE ORAL at 20:08

## 2022-04-25 RX ADMIN — GUAIFENESIN 1200 MG: 600 TABLET, EXTENDED RELEASE ORAL at 20:08

## 2022-04-25 RX ADMIN — CEFAZOLIN SODIUM 2 G: 10 INJECTION, POWDER, FOR SOLUTION INTRAVENOUS at 09:35

## 2022-04-25 RX ADMIN — PERFLUTREN 2 ML: 6.52 INJECTION, SUSPENSION INTRAVENOUS at 15:24

## 2022-04-25 RX ADMIN — TAMSULOSIN HYDROCHLORIDE 0.4 MG: 0.4 CAPSULE ORAL at 09:34

## 2022-04-25 RX ADMIN — CALCIUM CARBONATE 1 TABLET: 500 TABLET, CHEWABLE ORAL at 15:32

## 2022-04-25 RX ADMIN — ENOXAPARIN SODIUM 40 MG: 100 INJECTION SUBCUTANEOUS at 20:08

## 2022-04-25 RX ADMIN — CALCIUM CARBONATE 1 TABLET: 500 TABLET, CHEWABLE ORAL at 09:35

## 2022-04-25 RX ADMIN — CEFAZOLIN SODIUM 2 G: 10 INJECTION, POWDER, FOR SOLUTION INTRAVENOUS at 04:17

## 2022-04-26 ENCOUNTER — APPOINTMENT (OUTPATIENT)
Dept: CT IMAGING | Facility: HOSPITAL | Age: 52
End: 2022-04-26

## 2022-04-26 LAB
ALBUMIN SERPL-MCNC: 3.2 G/DL (ref 3.5–5.2)
ALBUMIN/GLOB SERPL: 0.8 G/DL
ALP SERPL-CCNC: 84 U/L (ref 39–117)
ALT SERPL W P-5'-P-CCNC: <5 U/L (ref 1–41)
ANION GAP SERPL CALCULATED.3IONS-SCNC: 10 MMOL/L (ref 5–15)
AST SERPL-CCNC: 14 U/L (ref 1–40)
BASOPHILS # BLD AUTO: 0.03 10*3/MM3 (ref 0–0.2)
BASOPHILS NFR BLD AUTO: 0.2 % (ref 0–1.5)
BH CV ECHO MEAS - ACS: 2.5 CM
BH CV ECHO MEAS - AO MAX PG: 22.8 MMHG
BH CV ECHO MEAS - AO MEAN PG: 12 MMHG
BH CV ECHO MEAS - AO ROOT DIAM: 3.7 CM
BH CV ECHO MEAS - AO V2 MAX: 239 CM/SEC
BH CV ECHO MEAS - AO V2 VTI: 33.7 CM
BH CV ECHO MEAS - AVA(I,D): 2.6 CM2
BH CV ECHO MEAS - EDV(CUBED): 202.3 ML
BH CV ECHO MEAS - EDV(MOD-SP2): 159 ML
BH CV ECHO MEAS - EDV(MOD-SP4): 195 ML
BH CV ECHO MEAS - EF(MOD-BP): 68.4 %
BH CV ECHO MEAS - EF(MOD-SP2): 74 %
BH CV ECHO MEAS - EF(MOD-SP4): 66.2 %
BH CV ECHO MEAS - ESV(CUBED): 51.9 ML
BH CV ECHO MEAS - ESV(MOD-SP2): 41.4 ML
BH CV ECHO MEAS - ESV(MOD-SP4): 65.9 ML
BH CV ECHO MEAS - FS: 36.5 %
BH CV ECHO MEAS - IVS/LVPW: 1.24 CM
BH CV ECHO MEAS - IVSD: 1.09 CM
BH CV ECHO MEAS - LA DIMENSION: 4.3 CM
BH CV ECHO MEAS - LAT PEAK E' VEL: 7.5 CM/SEC
BH CV ECHO MEAS - LV DIASTOLIC VOL/BSA (35-75): 92.8 CM2
BH CV ECHO MEAS - LV MASS(C)D: 233.1 GRAMS
BH CV ECHO MEAS - LV MAX PG: 5.4 MMHG
BH CV ECHO MEAS - LV MEAN PG: 3 MMHG
BH CV ECHO MEAS - LV SYSTOLIC VOL/BSA (12-30): 31.4 CM2
BH CV ECHO MEAS - LV V1 MAX: 116 CM/SEC
BH CV ECHO MEAS - LV V1 VTI: 16.3 CM
BH CV ECHO MEAS - LVIDD: 5.9 CM
BH CV ECHO MEAS - LVIDS: 3.7 CM
BH CV ECHO MEAS - LVOT AREA: 5.3 CM2
BH CV ECHO MEAS - LVOT DIAM: 2.6 CM
BH CV ECHO MEAS - LVPWD: 0.88 CM
BH CV ECHO MEAS - MED PEAK E' VEL: 11.2 CM/SEC
BH CV ECHO MEAS - MR MAX PG: 90 MMHG
BH CV ECHO MEAS - MR MAX VEL: 473.5 CM/SEC
BH CV ECHO MEAS - MV A MAX VEL: 71.3 CM/SEC
BH CV ECHO MEAS - MV DEC SLOPE: 491.5 CM/SEC2
BH CV ECHO MEAS - MV DEC TIME: 0.18 MSEC
BH CV ECHO MEAS - MV E MAX VEL: 84.4 CM/SEC
BH CV ECHO MEAS - MV E/A: 1.18
BH CV ECHO MEAS - MV MAX PG: 2.8 MMHG
BH CV ECHO MEAS - MV MEAN PG: 2 MMHG
BH CV ECHO MEAS - MV V2 VTI: 22.7 CM
BH CV ECHO MEAS - MVA(VTI): 3.8 CM2
BH CV ECHO MEAS - RAP SYSTOLE: 10 MMHG
BH CV ECHO MEAS - RVDD: 3.5 CM
BH CV ECHO MEAS - RVSP: 22 MMHG
BH CV ECHO MEAS - SI(MOD-SP2): 56 ML/M2
BH CV ECHO MEAS - SI(MOD-SP4): 61.5 ML/M2
BH CV ECHO MEAS - SV(LVOT): 86.5 ML
BH CV ECHO MEAS - SV(MOD-SP2): 117.6 ML
BH CV ECHO MEAS - SV(MOD-SP4): 129.1 ML
BH CV ECHO MEAS - TAPSE (>1.6): 3.4 CM
BH CV ECHO MEAS - TR MAX PG: 12 MMHG
BH CV ECHO MEAS - TR MAX VEL: 173 CM/SEC
BH CV ECHO MEASUREMENTS AVERAGE E/E' RATIO: 9.03
BH CV XLRA - TDI S': 21.9 CM/SEC
BILIRUB SERPL-MCNC: 0.9 MG/DL (ref 0–1.2)
BUN SERPL-MCNC: 25 MG/DL (ref 6–20)
BUN/CREAT SERPL: 17.4 (ref 7–25)
CALCIUM SPEC-SCNC: 7.5 MG/DL (ref 8.6–10.5)
CHLORIDE SERPL-SCNC: 107 MMOL/L (ref 98–107)
CO2 SERPL-SCNC: 23 MMOL/L (ref 22–29)
CREAT SERPL-MCNC: 1.44 MG/DL (ref 0.76–1.27)
DEPRECATED RDW RBC AUTO: 65.8 FL (ref 37–54)
EGFRCR SERPLBLD CKD-EPI 2021: 58.1 ML/MIN/1.73
EOSINOPHIL # BLD AUTO: 0.4 10*3/MM3 (ref 0–0.4)
EOSINOPHIL NFR BLD AUTO: 2.6 % (ref 0.3–6.2)
ERYTHROCYTE [DISTWIDTH] IN BLOOD BY AUTOMATED COUNT: 20 % (ref 12.3–15.4)
FERRITIN SERPL-MCNC: 2240 NG/ML (ref 30–400)
FOLATE SERPL-MCNC: 14.9 NG/ML (ref 4.78–24.2)
GLOBULIN UR ELPH-MCNC: 3.9 GM/DL
GLUCOSE SERPL-MCNC: 99 MG/DL (ref 65–99)
HCT VFR BLD AUTO: 21.6 % (ref 37.5–51)
HGB BLD-MCNC: 7.5 G/DL (ref 13–17.7)
IRON 24H UR-MRATE: 16 MCG/DL (ref 59–158)
IRON SATN MFR SERPL: 12 % (ref 20–50)
LDH SERPL-CCNC: 230 U/L (ref 135–225)
LEFT ATRIUM VOLUME INDEX: 41.1 ML/M2
LEFT ATRIUM VOLUME: 86.4 CM3
LYMPHOCYTES # BLD AUTO: 0.83 10*3/MM3 (ref 0.7–3.1)
LYMPHOCYTES NFR BLD AUTO: 5.4 % (ref 19.6–45.3)
MAXIMAL PREDICTED HEART RATE: 167 BPM
MCH RBC QN AUTO: 31.9 PG (ref 26.6–33)
MCHC RBC AUTO-ENTMCNC: 34.7 G/DL (ref 31.5–35.7)
MCV RBC AUTO: 91.9 FL (ref 79–97)
MONOCYTES # BLD AUTO: 1.96 10*3/MM3 (ref 0.1–0.9)
MONOCYTES NFR BLD AUTO: 12.7 % (ref 5–12)
NEUTROPHILS NFR BLD AUTO: 11.8 10*3/MM3 (ref 1.7–7)
NEUTROPHILS NFR BLD AUTO: 76.4 % (ref 42.7–76)
PLATELET # BLD AUTO: 312 10*3/MM3 (ref 140–450)
PMV BLD AUTO: 10.6 FL (ref 6–12)
POTASSIUM SERPL-SCNC: 4 MMOL/L (ref 3.5–5.2)
PROT SERPL-MCNC: 7.1 G/DL (ref 6–8.5)
RBC # BLD AUTO: 2.35 10*6/MM3 (ref 4.14–5.8)
RETICS # AUTO: 0.08 10*6/MM3 (ref 0.02–0.13)
RETICS/RBC NFR AUTO: 3.54 % (ref 0.7–1.9)
SODIUM SERPL-SCNC: 140 MMOL/L (ref 136–145)
STRESS TARGET HR: 142 BPM
TIBC SERPL-MCNC: 128 MCG/DL (ref 298–536)
TRANSFERRIN SERPL-MCNC: 86 MG/DL (ref 200–360)
WBC NRBC COR # BLD: 15.44 10*3/MM3 (ref 3.4–10.8)

## 2022-04-26 PROCEDURE — 94799 UNLISTED PULMONARY SVC/PX: CPT

## 2022-04-26 PROCEDURE — 94761 N-INVAS EAR/PLS OXIMETRY MLT: CPT

## 2022-04-26 PROCEDURE — 25010000002 CEFAZOLIN PER 500 MG: Performed by: FAMILY MEDICINE

## 2022-04-26 PROCEDURE — 25010000002 ENOXAPARIN PER 10 MG: Performed by: FAMILY MEDICINE

## 2022-04-26 PROCEDURE — 74176 CT ABD & PELVIS W/O CONTRAST: CPT

## 2022-04-26 PROCEDURE — 85045 AUTOMATED RETICULOCYTE COUNT: CPT | Performed by: INTERNAL MEDICINE

## 2022-04-26 PROCEDURE — 83540 ASSAY OF IRON: CPT | Performed by: INTERNAL MEDICINE

## 2022-04-26 PROCEDURE — 85025 COMPLETE CBC W/AUTO DIFF WBC: CPT | Performed by: INTERNAL MEDICINE

## 2022-04-26 PROCEDURE — 84466 ASSAY OF TRANSFERRIN: CPT | Performed by: INTERNAL MEDICINE

## 2022-04-26 PROCEDURE — 82728 ASSAY OF FERRITIN: CPT | Performed by: INTERNAL MEDICINE

## 2022-04-26 PROCEDURE — 25010000002 MORPHINE (PF) 10 MG/ML SOLUTION: Performed by: FAMILY MEDICINE

## 2022-04-26 PROCEDURE — 83615 LACTATE (LD) (LDH) ENZYME: CPT | Performed by: INTERNAL MEDICINE

## 2022-04-26 PROCEDURE — 99232 SBSQ HOSP IP/OBS MODERATE 35: CPT | Performed by: INTERNAL MEDICINE

## 2022-04-26 PROCEDURE — 80053 COMPREHEN METABOLIC PANEL: CPT | Performed by: INTERNAL MEDICINE

## 2022-04-26 PROCEDURE — 82746 ASSAY OF FOLIC ACID SERUM: CPT | Performed by: INTERNAL MEDICINE

## 2022-04-26 RX ORDER — BISACODYL 5 MG/1
10 TABLET, DELAYED RELEASE ORAL DAILY PRN
Status: DISCONTINUED | OUTPATIENT
Start: 2022-04-26 | End: 2022-05-01 | Stop reason: HOSPADM

## 2022-04-26 RX ORDER — ENOXAPARIN SODIUM 100 MG/ML
40 INJECTION SUBCUTANEOUS NIGHTLY
Status: DISCONTINUED | OUTPATIENT
Start: 2022-04-26 | End: 2022-05-01 | Stop reason: HOSPADM

## 2022-04-26 RX ORDER — BUMETANIDE 1 MG/1
1 TABLET ORAL 2 TIMES DAILY WITH MEALS
Status: DISCONTINUED | OUTPATIENT
Start: 2022-04-26 | End: 2022-04-27

## 2022-04-26 RX ADMIN — ENOXAPARIN SODIUM 40 MG: 100 INJECTION SUBCUTANEOUS at 20:26

## 2022-04-26 RX ADMIN — SODIUM CHLORIDE 75 ML/HR: 9 INJECTION, SOLUTION INTRAVENOUS at 09:23

## 2022-04-26 RX ADMIN — ESCITALOPRAM 20 MG: 10 TABLET, FILM COATED ORAL at 20:26

## 2022-04-26 RX ADMIN — MORPHINE SULFATE 120 MG: 30 TABLET, FILM COATED, EXTENDED RELEASE ORAL at 20:26

## 2022-04-26 RX ADMIN — GLUTAMINE 2 PACKET: 5 POWDER, FOR SOLUTION ORAL at 09:22

## 2022-04-26 RX ADMIN — FOLIC ACID 1 MG: 1 TABLET ORAL at 09:23

## 2022-04-26 RX ADMIN — CEFAZOLIN SODIUM 2 G: 10 INJECTION, POWDER, FOR SOLUTION INTRAVENOUS at 18:12

## 2022-04-26 RX ADMIN — CALCIUM CARBONATE 1 TABLET: 500 TABLET, CHEWABLE ORAL at 09:23

## 2022-04-26 RX ADMIN — MORPHINE SULFATE: 10 INJECTION, SOLUTION INTRAMUSCULAR; INTRAVENOUS at 11:32

## 2022-04-26 RX ADMIN — GUAIFENESIN 1200 MG: 600 TABLET, EXTENDED RELEASE ORAL at 09:23

## 2022-04-26 RX ADMIN — CALCIUM CARBONATE 1 TABLET: 500 TABLET, CHEWABLE ORAL at 00:40

## 2022-04-26 RX ADMIN — HYDROXYUREA 1000 MG: 500 CAPSULE ORAL at 09:23

## 2022-04-26 RX ADMIN — CEFAZOLIN SODIUM 2 G: 10 INJECTION, POWDER, FOR SOLUTION INTRAVENOUS at 01:42

## 2022-04-26 RX ADMIN — GLUTAMINE 2 PACKET: 5 POWDER, FOR SOLUTION ORAL at 20:27

## 2022-04-26 RX ADMIN — BISACODYL 10 MG: 5 TABLET, COATED ORAL at 13:49

## 2022-04-26 RX ADMIN — CEFAZOLIN SODIUM 2 G: 10 INJECTION, POWDER, FOR SOLUTION INTRAVENOUS at 09:23

## 2022-04-26 RX ADMIN — CALCIUM CARBONATE 1 TABLET: 500 TABLET, CHEWABLE ORAL at 15:15

## 2022-04-26 RX ADMIN — TAMSULOSIN HYDROCHLORIDE 0.4 MG: 0.4 CAPSULE ORAL at 09:23

## 2022-04-26 RX ADMIN — CALCIUM CARBONATE 1 TABLET: 500 TABLET, CHEWABLE ORAL at 20:26

## 2022-04-26 RX ADMIN — SENNOSIDES 1 TABLET: 8.6 TABLET, FILM COATED ORAL at 09:23

## 2022-04-26 RX ADMIN — GUAIFENESIN 1200 MG: 600 TABLET, EXTENDED RELEASE ORAL at 20:26

## 2022-04-26 RX ADMIN — MORPHINE SULFATE 120 MG: 30 TABLET, FILM COATED, EXTENDED RELEASE ORAL at 09:23

## 2022-04-26 RX ADMIN — BUMETANIDE 1 MG: 1 TABLET ORAL at 18:12

## 2022-04-26 RX ADMIN — NIFEDIPINE 30 MG: 30 TABLET, FILM COATED, EXTENDED RELEASE ORAL at 09:23

## 2022-04-27 LAB
ALBUMIN SERPL-MCNC: 3.4 G/DL (ref 3.5–5.2)
ALBUMIN/GLOB SERPL: 0.9 G/DL
ALP SERPL-CCNC: 100 U/L (ref 39–117)
ALT SERPL W P-5'-P-CCNC: <5 U/L (ref 1–41)
ANION GAP SERPL CALCULATED.3IONS-SCNC: 11 MMOL/L (ref 5–15)
ANISOCYTOSIS BLD QL: ABNORMAL
AST SERPL-CCNC: 20 U/L (ref 1–40)
BASOPHILS # BLD MANUAL: 0.09 10*3/MM3 (ref 0–0.2)
BASOPHILS NFR BLD MANUAL: 1 % (ref 0–1.5)
BILIRUB SERPL-MCNC: 0.8 MG/DL (ref 0–1.2)
BUN SERPL-MCNC: 19 MG/DL (ref 6–20)
BUN/CREAT SERPL: 14.8 (ref 7–25)
CALCIUM SPEC-SCNC: 7.9 MG/DL (ref 8.6–10.5)
CHLORIDE SERPL-SCNC: 107 MMOL/L (ref 98–107)
CO2 SERPL-SCNC: 24 MMOL/L (ref 22–29)
CREAT SERPL-MCNC: 1.28 MG/DL (ref 0.76–1.27)
DEPRECATED RDW RBC AUTO: 64.3 FL (ref 37–54)
EGFRCR SERPLBLD CKD-EPI 2021: 66.9 ML/MIN/1.73
EOSINOPHIL # BLD MANUAL: 0.67 10*3/MM3 (ref 0–0.4)
EOSINOPHIL NFR BLD MANUAL: 7.1 % (ref 0.3–6.2)
ERYTHROCYTE [DISTWIDTH] IN BLOOD BY AUTOMATED COUNT: 19.8 % (ref 12.3–15.4)
FERRITIN SERPL-MCNC: 2416 NG/ML (ref 30–400)
FOLATE SERPL-MCNC: 17.2 NG/ML (ref 4.78–24.2)
GIANT PLATELETS: ABNORMAL
GLOBULIN UR ELPH-MCNC: 3.6 GM/DL
GLUCOSE SERPL-MCNC: 108 MG/DL (ref 65–99)
HCT VFR BLD AUTO: 22.1 % (ref 37.5–51)
HGB BLD-MCNC: 7.6 G/DL (ref 13–17.7)
IRON 24H UR-MRATE: 34 MCG/DL (ref 59–158)
IRON SATN MFR SERPL: 26 % (ref 20–50)
LDH SERPL-CCNC: 209 U/L (ref 135–225)
LYMPHOCYTES # BLD MANUAL: 0.38 10*3/MM3 (ref 0.7–3.1)
LYMPHOCYTES NFR BLD MANUAL: 6.1 % (ref 5–12)
MACROCYTES BLD QL SMEAR: ABNORMAL
MCH RBC QN AUTO: 31.5 PG (ref 26.6–33)
MCHC RBC AUTO-ENTMCNC: 34.4 G/DL (ref 31.5–35.7)
MCV RBC AUTO: 91.7 FL (ref 79–97)
MONOCYTES # BLD: 0.58 10*3/MM3 (ref 0.1–0.9)
NEUTROPHILS # BLD AUTO: 7.75 10*3/MM3 (ref 1.7–7)
NEUTROPHILS NFR BLD MANUAL: 79.8 % (ref 42.7–76)
NEUTS BAND NFR BLD MANUAL: 2 % (ref 0–5)
NRBC SPEC MANUAL: 14.1 /100 WBC (ref 0–0.2)
PLATELET # BLD AUTO: 377 10*3/MM3 (ref 140–450)
PMV BLD AUTO: 10.3 FL (ref 6–12)
POIKILOCYTOSIS BLD QL SMEAR: ABNORMAL
POLYCHROMASIA BLD QL SMEAR: ABNORMAL
POTASSIUM SERPL-SCNC: 3.7 MMOL/L (ref 3.5–5.2)
PROT SERPL-MCNC: 7 G/DL (ref 6–8.5)
RBC # BLD AUTO: 2.41 10*6/MM3 (ref 4.14–5.8)
RETICS # AUTO: 0.04 10*6/MM3 (ref 0.02–0.13)
RETICS/RBC NFR AUTO: 1.79 % (ref 0.7–1.9)
SODIUM SERPL-SCNC: 142 MMOL/L (ref 136–145)
TARGETS BLD QL SMEAR: ABNORMAL
TIBC SERPL-MCNC: 130 MCG/DL (ref 298–536)
TRANSFERRIN SERPL-MCNC: 87 MG/DL (ref 200–360)
VARIANT LYMPHS NFR BLD MANUAL: 1 % (ref 0–5)
VARIANT LYMPHS NFR BLD MANUAL: 3 % (ref 19.6–45.3)
WBC MORPH BLD: NORMAL
WBC NRBC COR # BLD: 9.47 10*3/MM3 (ref 3.4–10.8)

## 2022-04-27 PROCEDURE — 85045 AUTOMATED RETICULOCYTE COUNT: CPT | Performed by: INTERNAL MEDICINE

## 2022-04-27 PROCEDURE — 25010000002 MORPHINE PER 10 MG: Performed by: FAMILY MEDICINE

## 2022-04-27 PROCEDURE — 25010000002 CEFAZOLIN PER 500 MG: Performed by: FAMILY MEDICINE

## 2022-04-27 PROCEDURE — 84466 ASSAY OF TRANSFERRIN: CPT | Performed by: INTERNAL MEDICINE

## 2022-04-27 PROCEDURE — 82728 ASSAY OF FERRITIN: CPT | Performed by: INTERNAL MEDICINE

## 2022-04-27 PROCEDURE — 83615 LACTATE (LD) (LDH) ENZYME: CPT | Performed by: INTERNAL MEDICINE

## 2022-04-27 PROCEDURE — 83540 ASSAY OF IRON: CPT | Performed by: INTERNAL MEDICINE

## 2022-04-27 PROCEDURE — 82746 ASSAY OF FOLIC ACID SERUM: CPT | Performed by: INTERNAL MEDICINE

## 2022-04-27 PROCEDURE — 25010000002 ENOXAPARIN PER 10 MG: Performed by: FAMILY MEDICINE

## 2022-04-27 PROCEDURE — 85007 BL SMEAR W/DIFF WBC COUNT: CPT | Performed by: INTERNAL MEDICINE

## 2022-04-27 PROCEDURE — 80053 COMPREHEN METABOLIC PANEL: CPT | Performed by: INTERNAL MEDICINE

## 2022-04-27 PROCEDURE — 85025 COMPLETE CBC W/AUTO DIFF WBC: CPT | Performed by: INTERNAL MEDICINE

## 2022-04-27 RX ORDER — BISACODYL 10 MG
10 SUPPOSITORY, RECTAL RECTAL DAILY
Status: DISCONTINUED | OUTPATIENT
Start: 2022-04-27 | End: 2022-05-01 | Stop reason: HOSPADM

## 2022-04-27 RX ORDER — SENNA PLUS 8.6 MG/1
2 TABLET ORAL DAILY
Status: DISCONTINUED | OUTPATIENT
Start: 2022-04-28 | End: 2022-05-01 | Stop reason: HOSPADM

## 2022-04-27 RX ORDER — BUMETANIDE 0.25 MG/ML
1 INJECTION INTRAMUSCULAR; INTRAVENOUS 3 TIMES DAILY
Status: DISCONTINUED | OUTPATIENT
Start: 2022-04-27 | End: 2022-05-01 | Stop reason: HOSPADM

## 2022-04-27 RX ADMIN — CEFAZOLIN SODIUM 2 G: 10 INJECTION, POWDER, FOR SOLUTION INTRAVENOUS at 02:21

## 2022-04-27 RX ADMIN — Medication 10 ML: at 09:42

## 2022-04-27 RX ADMIN — ENOXAPARIN SODIUM 40 MG: 100 INJECTION SUBCUTANEOUS at 20:27

## 2022-04-27 RX ADMIN — NIFEDIPINE 30 MG: 30 TABLET, FILM COATED, EXTENDED RELEASE ORAL at 09:37

## 2022-04-27 RX ADMIN — ESCITALOPRAM 20 MG: 10 TABLET, FILM COATED ORAL at 20:27

## 2022-04-27 RX ADMIN — FOLIC ACID 1 MG: 1 TABLET ORAL at 09:37

## 2022-04-27 RX ADMIN — CALCIUM CARBONATE 1 TABLET: 500 TABLET, CHEWABLE ORAL at 20:27

## 2022-04-27 RX ADMIN — CEFAZOLIN SODIUM 2 G: 10 INJECTION, POWDER, FOR SOLUTION INTRAVENOUS at 11:08

## 2022-04-27 RX ADMIN — MORPHINE SULFATE 120 MG: 30 TABLET, FILM COATED, EXTENDED RELEASE ORAL at 09:35

## 2022-04-27 RX ADMIN — GUAIFENESIN 1200 MG: 600 TABLET, EXTENDED RELEASE ORAL at 09:41

## 2022-04-27 RX ADMIN — MORPHINE SULFATE: 10 INJECTION, SOLUTION INTRAMUSCULAR; INTRAVENOUS at 01:08

## 2022-04-27 RX ADMIN — GLUTAMINE 2 PACKET: 5 POWDER, FOR SOLUTION ORAL at 20:32

## 2022-04-27 RX ADMIN — BISACODYL 10 MG: 5 TABLET, COATED ORAL at 09:41

## 2022-04-27 RX ADMIN — MORPHINE SULFATE 120 MG: 30 TABLET, FILM COATED, EXTENDED RELEASE ORAL at 20:27

## 2022-04-27 RX ADMIN — BUMETANIDE 1 MG: 1 TABLET ORAL at 09:38

## 2022-04-27 RX ADMIN — TAMSULOSIN HYDROCHLORIDE 0.4 MG: 0.4 CAPSULE ORAL at 09:40

## 2022-04-27 RX ADMIN — CALCIUM CARBONATE 1 TABLET: 500 TABLET, CHEWABLE ORAL at 09:40

## 2022-04-27 RX ADMIN — BUMETANIDE 1 MG: 0.25 INJECTION INTRAMUSCULAR; INTRAVENOUS at 15:10

## 2022-04-27 RX ADMIN — GLUTAMINE 2 PACKET: 5 POWDER, FOR SOLUTION ORAL at 09:43

## 2022-04-27 RX ADMIN — SENNOSIDES 1 TABLET: 8.6 TABLET, FILM COATED ORAL at 09:37

## 2022-04-27 RX ADMIN — BISACODYL 10 MG: 10 SUPPOSITORY RECTAL at 18:37

## 2022-04-27 RX ADMIN — BUMETANIDE 1 MG: 0.25 INJECTION INTRAMUSCULAR; INTRAVENOUS at 20:27

## 2022-04-27 RX ADMIN — HYDROXYUREA 1000 MG: 500 CAPSULE ORAL at 09:36

## 2022-04-27 RX ADMIN — GUAIFENESIN 1200 MG: 600 TABLET, EXTENDED RELEASE ORAL at 20:27

## 2022-04-27 RX ADMIN — CALCIUM CARBONATE 1 TABLET: 500 TABLET, CHEWABLE ORAL at 15:10

## 2022-04-28 LAB
ALBUMIN SERPL-MCNC: 3.3 G/DL (ref 3.5–5.2)
ALBUMIN/GLOB SERPL: 0.8 G/DL
ALP SERPL-CCNC: 77 U/L (ref 39–117)
ALT SERPL W P-5'-P-CCNC: <5 U/L (ref 1–41)
ANION GAP SERPL CALCULATED.3IONS-SCNC: 9 MMOL/L (ref 5–15)
ANISOCYTOSIS BLD QL: ABNORMAL
AST SERPL-CCNC: 12 U/L (ref 1–40)
BASOPHILS # BLD MANUAL: 0.09 10*3/MM3 (ref 0–0.2)
BASOPHILS NFR BLD MANUAL: 1 % (ref 0–1.5)
BILIRUB SERPL-MCNC: 1 MG/DL (ref 0–1.2)
BUN SERPL-MCNC: 20 MG/DL (ref 6–20)
BUN/CREAT SERPL: 16.7 (ref 7–25)
CALCIUM SPEC-SCNC: 8 MG/DL (ref 8.6–10.5)
CHLORIDE SERPL-SCNC: 103 MMOL/L (ref 98–107)
CO2 SERPL-SCNC: 29 MMOL/L (ref 22–29)
CREAT SERPL-MCNC: 1.2 MG/DL (ref 0.76–1.27)
DEPRECATED RDW RBC AUTO: 63 FL (ref 37–54)
EGFRCR SERPLBLD CKD-EPI 2021: 72.3 ML/MIN/1.73
EOSINOPHIL # BLD MANUAL: 0.46 10*3/MM3 (ref 0–0.4)
EOSINOPHIL NFR BLD MANUAL: 5 % (ref 0.3–6.2)
ERYTHROCYTE [DISTWIDTH] IN BLOOD BY AUTOMATED COUNT: 19.6 % (ref 12.3–15.4)
FERRITIN SERPL-MCNC: 2188 NG/ML (ref 30–400)
FOLATE SERPL-MCNC: >20 NG/ML (ref 4.78–24.2)
GIANT PLATELETS: ABNORMAL
GLOBULIN UR ELPH-MCNC: 4 GM/DL
GLUCOSE SERPL-MCNC: 93 MG/DL (ref 65–99)
HCT VFR BLD AUTO: 22.2 % (ref 37.5–51)
HGB BLD-MCNC: 7.6 G/DL (ref 13–17.7)
HYPOCHROMIA BLD QL: ABNORMAL
IRON 24H UR-MRATE: 37 MCG/DL (ref 59–158)
IRON SATN MFR SERPL: 31 % (ref 20–50)
LDH SERPL-CCNC: 239 U/L (ref 135–225)
LYMPHOCYTES # BLD MANUAL: 0.74 10*3/MM3 (ref 0.7–3.1)
LYMPHOCYTES NFR BLD MANUAL: 7 % (ref 5–12)
MACROCYTES BLD QL SMEAR: ABNORMAL
MAGNESIUM SERPL-MCNC: 2.2 MG/DL (ref 1.6–2.6)
MCH RBC QN AUTO: 31.1 PG (ref 26.6–33)
MCHC RBC AUTO-ENTMCNC: 34.2 G/DL (ref 31.5–35.7)
MCV RBC AUTO: 91 FL (ref 79–97)
MICROCYTES BLD QL: ABNORMAL
MONOCYTES # BLD: 0.65 10*3/MM3 (ref 0.1–0.9)
NEUTROPHILS # BLD AUTO: 7.32 10*3/MM3 (ref 1.7–7)
NEUTROPHILS NFR BLD MANUAL: 76 % (ref 42.7–76)
NEUTS BAND NFR BLD MANUAL: 3 % (ref 0–5)
NRBC SPEC MANUAL: 27 /100 WBC (ref 0–0.2)
PLATELET # BLD AUTO: 407 10*3/MM3 (ref 140–450)
PMV BLD AUTO: 9.9 FL (ref 6–12)
POIKILOCYTOSIS BLD QL SMEAR: ABNORMAL
POLYCHROMASIA BLD QL SMEAR: ABNORMAL
POTASSIUM SERPL-SCNC: 3.4 MMOL/L (ref 3.5–5.2)
PROT SERPL-MCNC: 7.3 G/DL (ref 6–8.5)
RBC # BLD AUTO: 2.44 10*6/MM3 (ref 4.14–5.8)
RETICS # AUTO: 0.04 10*6/MM3 (ref 0.02–0.13)
RETICS/RBC NFR AUTO: 1.71 % (ref 0.7–1.9)
SODIUM SERPL-SCNC: 141 MMOL/L (ref 136–145)
TARGETS BLD QL SMEAR: ABNORMAL
TIBC SERPL-MCNC: 119 MCG/DL (ref 298–536)
TRANSFERRIN SERPL-MCNC: 80 MG/DL (ref 200–360)
VARIANT LYMPHS NFR BLD MANUAL: 8 % (ref 19.6–45.3)
WBC MORPH BLD: NORMAL
WBC NRBC COR # BLD: 9.27 10*3/MM3 (ref 3.4–10.8)

## 2022-04-28 PROCEDURE — 83735 ASSAY OF MAGNESIUM: CPT | Performed by: FAMILY MEDICINE

## 2022-04-28 PROCEDURE — 97162 PT EVAL MOD COMPLEX 30 MIN: CPT | Performed by: PHYSICAL THERAPIST

## 2022-04-28 PROCEDURE — 82728 ASSAY OF FERRITIN: CPT | Performed by: INTERNAL MEDICINE

## 2022-04-28 PROCEDURE — 83615 LACTATE (LD) (LDH) ENZYME: CPT | Performed by: INTERNAL MEDICINE

## 2022-04-28 PROCEDURE — 85025 COMPLETE CBC W/AUTO DIFF WBC: CPT | Performed by: INTERNAL MEDICINE

## 2022-04-28 PROCEDURE — 25010000002 ENOXAPARIN PER 10 MG: Performed by: FAMILY MEDICINE

## 2022-04-28 PROCEDURE — 85007 BL SMEAR W/DIFF WBC COUNT: CPT | Performed by: INTERNAL MEDICINE

## 2022-04-28 PROCEDURE — 85045 AUTOMATED RETICULOCYTE COUNT: CPT | Performed by: INTERNAL MEDICINE

## 2022-04-28 PROCEDURE — 83540 ASSAY OF IRON: CPT | Performed by: INTERNAL MEDICINE

## 2022-04-28 PROCEDURE — 84466 ASSAY OF TRANSFERRIN: CPT | Performed by: INTERNAL MEDICINE

## 2022-04-28 PROCEDURE — 80053 COMPREHEN METABOLIC PANEL: CPT | Performed by: INTERNAL MEDICINE

## 2022-04-28 PROCEDURE — 82746 ASSAY OF FOLIC ACID SERUM: CPT | Performed by: INTERNAL MEDICINE

## 2022-04-28 RX ORDER — POTASSIUM CHLORIDE 750 MG/1
40 CAPSULE, EXTENDED RELEASE ORAL ONCE
Status: COMPLETED | OUTPATIENT
Start: 2022-04-28 | End: 2022-04-28

## 2022-04-28 RX ORDER — MORPHINE SULFATE/0.9% NACL/PF 1 MG/ML
SYRINGE (ML) INJECTION CONTINUOUS
Status: DISCONTINUED | OUTPATIENT
Start: 2022-04-28 | End: 2022-04-28

## 2022-04-28 RX ADMIN — GUAIFENESIN 1200 MG: 600 TABLET, EXTENDED RELEASE ORAL at 09:39

## 2022-04-28 RX ADMIN — CALCIUM CARBONATE 1 TABLET: 500 TABLET, CHEWABLE ORAL at 15:32

## 2022-04-28 RX ADMIN — CALCIUM CARBONATE 1 TABLET: 500 TABLET, CHEWABLE ORAL at 09:40

## 2022-04-28 RX ADMIN — FOLIC ACID 1 MG: 1 TABLET ORAL at 09:39

## 2022-04-28 RX ADMIN — GUAIFENESIN 1200 MG: 600 TABLET, EXTENDED RELEASE ORAL at 20:05

## 2022-04-28 RX ADMIN — GLUTAMINE 2 PACKET: 5 POWDER, FOR SOLUTION ORAL at 09:40

## 2022-04-28 RX ADMIN — BUMETANIDE 1 MG: 0.25 INJECTION INTRAMUSCULAR; INTRAVENOUS at 09:39

## 2022-04-28 RX ADMIN — ESCITALOPRAM 20 MG: 10 TABLET, FILM COATED ORAL at 20:05

## 2022-04-28 RX ADMIN — HYDROXYUREA 1000 MG: 500 CAPSULE ORAL at 09:39

## 2022-04-28 RX ADMIN — MORPHINE SULFATE 120 MG: 30 TABLET, FILM COATED, EXTENDED RELEASE ORAL at 09:38

## 2022-04-28 RX ADMIN — SENNOSIDES 2 TABLET: 8.6 TABLET, FILM COATED ORAL at 09:39

## 2022-04-28 RX ADMIN — NIFEDIPINE 30 MG: 30 TABLET, FILM COATED, EXTENDED RELEASE ORAL at 09:39

## 2022-04-28 RX ADMIN — BUMETANIDE 1 MG: 0.25 INJECTION INTRAMUSCULAR; INTRAVENOUS at 15:32

## 2022-04-28 RX ADMIN — ENOXAPARIN SODIUM 40 MG: 100 INJECTION SUBCUTANEOUS at 20:04

## 2022-04-28 RX ADMIN — TAMSULOSIN HYDROCHLORIDE 0.4 MG: 0.4 CAPSULE ORAL at 09:39

## 2022-04-28 RX ADMIN — BUMETANIDE 1 MG: 0.25 INJECTION INTRAMUSCULAR; INTRAVENOUS at 20:05

## 2022-04-28 RX ADMIN — GLUTAMINE 2 PACKET: 5 POWDER, FOR SOLUTION ORAL at 21:14

## 2022-04-28 RX ADMIN — POTASSIUM CHLORIDE 40 MEQ: 10 CAPSULE, COATED, EXTENDED RELEASE ORAL at 15:32

## 2022-04-28 RX ADMIN — MORPHINE SULFATE 120 MG: 30 TABLET, FILM COATED, EXTENDED RELEASE ORAL at 20:05

## 2022-04-28 RX ADMIN — CALCIUM CARBONATE 1 TABLET: 500 TABLET, CHEWABLE ORAL at 20:05

## 2022-04-29 LAB
ALBUMIN SERPL-MCNC: 3.5 G/DL (ref 3.5–5.2)
ALBUMIN/GLOB SERPL: 0.8 G/DL
ALP SERPL-CCNC: 77 U/L (ref 39–117)
ALT SERPL W P-5'-P-CCNC: <5 U/L (ref 1–41)
ANION GAP SERPL CALCULATED.3IONS-SCNC: 9 MMOL/L (ref 5–15)
ANISOCYTOSIS BLD QL: ABNORMAL
AST SERPL-CCNC: 13 U/L (ref 1–40)
BASOPHILS # BLD MANUAL: 0.11 10*3/MM3 (ref 0–0.2)
BASOPHILS NFR BLD MANUAL: 1.1 % (ref 0–1.5)
BILIRUB SERPL-MCNC: 1.2 MG/DL (ref 0–1.2)
BUN SERPL-MCNC: 20 MG/DL (ref 6–20)
BUN/CREAT SERPL: 17.9 (ref 7–25)
CALCIUM SPEC-SCNC: 8.3 MG/DL (ref 8.6–10.5)
CHLORIDE SERPL-SCNC: 101 MMOL/L (ref 98–107)
CO2 SERPL-SCNC: 30 MMOL/L (ref 22–29)
CREAT SERPL-MCNC: 1.12 MG/DL (ref 0.76–1.27)
DEPRECATED RDW RBC AUTO: 63.3 FL (ref 37–54)
EGFRCR SERPLBLD CKD-EPI 2021: 78.6 ML/MIN/1.73
EOSINOPHIL # BLD MANUAL: 0.11 10*3/MM3 (ref 0–0.4)
EOSINOPHIL NFR BLD MANUAL: 1.1 % (ref 0.3–6.2)
ERYTHROCYTE [DISTWIDTH] IN BLOOD BY AUTOMATED COUNT: 19.1 % (ref 12.3–15.4)
FERRITIN SERPL-MCNC: 2158 NG/ML (ref 30–400)
FOLATE SERPL-MCNC: >20 NG/ML (ref 4.78–24.2)
GIANT PLATELETS: ABNORMAL
GLOBULIN UR ELPH-MCNC: 4.3 GM/DL
GLUCOSE SERPL-MCNC: 90 MG/DL (ref 65–99)
HCT VFR BLD AUTO: 23.1 % (ref 37.5–51)
HGB BLD-MCNC: 8 G/DL (ref 13–17.7)
HYPOCHROMIA BLD QL: ABNORMAL
IRON 24H UR-MRATE: 32 MCG/DL (ref 59–158)
IRON SATN MFR SERPL: 24 % (ref 20–50)
LDH SERPL-CCNC: 228 U/L (ref 135–225)
LYMPHOCYTES # BLD MANUAL: 0.62 10*3/MM3 (ref 0.7–3.1)
LYMPHOCYTES NFR BLD MANUAL: 4.2 % (ref 5–12)
MACROCYTES BLD QL SMEAR: ABNORMAL
MCH RBC QN AUTO: 31.4 PG (ref 26.6–33)
MCHC RBC AUTO-ENTMCNC: 34.6 G/DL (ref 31.5–35.7)
MCV RBC AUTO: 90.6 FL (ref 79–97)
MICROCYTES BLD QL: ABNORMAL
MONOCYTES # BLD: 0.41 10*3/MM3 (ref 0.1–0.9)
NEUTROPHILS # BLD AUTO: 8.54 10*3/MM3 (ref 1.7–7)
NEUTROPHILS NFR BLD MANUAL: 87.4 % (ref 42.7–76)
NRBC SPEC MANUAL: 15.8 /100 WBC (ref 0–0.2)
PLATELET # BLD AUTO: 453 10*3/MM3 (ref 140–450)
PMV BLD AUTO: 10.4 FL (ref 6–12)
POLYCHROMASIA BLD QL SMEAR: ABNORMAL
POTASSIUM SERPL-SCNC: 3.7 MMOL/L (ref 3.5–5.2)
PROT SERPL-MCNC: 7.8 G/DL (ref 6–8.5)
RBC # BLD AUTO: 2.55 10*6/MM3 (ref 4.14–5.8)
RETICS # AUTO: 0.03 10*6/MM3 (ref 0.02–0.13)
RETICS/RBC NFR AUTO: 1.17 % (ref 0.7–1.9)
SMALL PLATELETS BLD QL SMEAR: ABNORMAL
SODIUM SERPL-SCNC: 140 MMOL/L (ref 136–145)
TARGETS BLD QL SMEAR: ABNORMAL
TIBC SERPL-MCNC: 136 MCG/DL (ref 298–536)
TRANSFERRIN SERPL-MCNC: 91 MG/DL (ref 200–360)
VARIANT LYMPHS NFR BLD MANUAL: 6.3 % (ref 19.6–45.3)
WBC MORPH BLD: NORMAL
WBC NRBC COR # BLD: 9.77 10*3/MM3 (ref 3.4–10.8)

## 2022-04-29 PROCEDURE — 82728 ASSAY OF FERRITIN: CPT | Performed by: INTERNAL MEDICINE

## 2022-04-29 PROCEDURE — 80053 COMPREHEN METABOLIC PANEL: CPT | Performed by: INTERNAL MEDICINE

## 2022-04-29 PROCEDURE — 85007 BL SMEAR W/DIFF WBC COUNT: CPT | Performed by: INTERNAL MEDICINE

## 2022-04-29 PROCEDURE — 85045 AUTOMATED RETICULOCYTE COUNT: CPT | Performed by: INTERNAL MEDICINE

## 2022-04-29 PROCEDURE — 25010000002 ENOXAPARIN PER 10 MG: Performed by: FAMILY MEDICINE

## 2022-04-29 PROCEDURE — 82746 ASSAY OF FOLIC ACID SERUM: CPT | Performed by: INTERNAL MEDICINE

## 2022-04-29 PROCEDURE — 84466 ASSAY OF TRANSFERRIN: CPT | Performed by: INTERNAL MEDICINE

## 2022-04-29 PROCEDURE — 85025 COMPLETE CBC W/AUTO DIFF WBC: CPT | Performed by: INTERNAL MEDICINE

## 2022-04-29 PROCEDURE — 99232 SBSQ HOSP IP/OBS MODERATE 35: CPT | Performed by: INTERNAL MEDICINE

## 2022-04-29 PROCEDURE — 83540 ASSAY OF IRON: CPT | Performed by: INTERNAL MEDICINE

## 2022-04-29 PROCEDURE — 97116 GAIT TRAINING THERAPY: CPT

## 2022-04-29 PROCEDURE — 83615 LACTATE (LD) (LDH) ENZYME: CPT | Performed by: INTERNAL MEDICINE

## 2022-04-29 RX ADMIN — CALCIUM CARBONATE 1 TABLET: 500 TABLET, CHEWABLE ORAL at 20:47

## 2022-04-29 RX ADMIN — ESCITALOPRAM 20 MG: 10 TABLET, FILM COATED ORAL at 20:46

## 2022-04-29 RX ADMIN — BUMETANIDE 1 MG: 0.25 INJECTION INTRAMUSCULAR; INTRAVENOUS at 16:44

## 2022-04-29 RX ADMIN — GUAIFENESIN 1200 MG: 600 TABLET, EXTENDED RELEASE ORAL at 20:46

## 2022-04-29 RX ADMIN — GUAIFENESIN 1200 MG: 600 TABLET, EXTENDED RELEASE ORAL at 08:45

## 2022-04-29 RX ADMIN — MORPHINE SULFATE 120 MG: 30 TABLET, FILM COATED, EXTENDED RELEASE ORAL at 08:45

## 2022-04-29 RX ADMIN — BUMETANIDE 1 MG: 0.25 INJECTION INTRAMUSCULAR; INTRAVENOUS at 20:46

## 2022-04-29 RX ADMIN — GLUTAMINE 2 PACKET: 5 POWDER, FOR SOLUTION ORAL at 08:46

## 2022-04-29 RX ADMIN — MORPHINE SULFATE 120 MG: 30 TABLET, FILM COATED, EXTENDED RELEASE ORAL at 20:46

## 2022-04-29 RX ADMIN — HYDROXYUREA 1000 MG: 500 CAPSULE ORAL at 08:45

## 2022-04-29 RX ADMIN — BISACODYL 10 MG: 10 SUPPOSITORY RECTAL at 08:52

## 2022-04-29 RX ADMIN — FOLIC ACID 1 MG: 1 TABLET ORAL at 08:45

## 2022-04-29 RX ADMIN — BUMETANIDE 1 MG: 0.25 INJECTION INTRAMUSCULAR; INTRAVENOUS at 08:45

## 2022-04-29 RX ADMIN — NIFEDIPINE 30 MG: 30 TABLET, FILM COATED, EXTENDED RELEASE ORAL at 08:45

## 2022-04-29 RX ADMIN — SENNOSIDES 2 TABLET: 8.6 TABLET, FILM COATED ORAL at 08:45

## 2022-04-29 RX ADMIN — GLUTAMINE 2 PACKET: 5 POWDER, FOR SOLUTION ORAL at 20:48

## 2022-04-29 RX ADMIN — TAMSULOSIN HYDROCHLORIDE 0.4 MG: 0.4 CAPSULE ORAL at 08:45

## 2022-04-29 RX ADMIN — CALCIUM CARBONATE 1 TABLET: 500 TABLET, CHEWABLE ORAL at 16:44

## 2022-04-29 RX ADMIN — ENOXAPARIN SODIUM 40 MG: 100 INJECTION SUBCUTANEOUS at 20:46

## 2022-04-29 RX ADMIN — CALCIUM CARBONATE 1 TABLET: 500 TABLET, CHEWABLE ORAL at 08:45

## 2022-04-30 LAB
ALBUMIN SERPL-MCNC: 3.4 G/DL (ref 3.5–5.2)
ALBUMIN/GLOB SERPL: 0.9 G/DL
ALP SERPL-CCNC: 69 U/L (ref 39–117)
ALT SERPL W P-5'-P-CCNC: <5 U/L (ref 1–41)
ANION GAP SERPL CALCULATED.3IONS-SCNC: 10 MMOL/L (ref 5–15)
ANISOCYTOSIS BLD QL: ABNORMAL
AST SERPL-CCNC: 13 U/L (ref 1–40)
BILIRUB SERPL-MCNC: 1 MG/DL (ref 0–1.2)
BUN SERPL-MCNC: 18 MG/DL (ref 6–20)
BUN/CREAT SERPL: 18 (ref 7–25)
CALCIUM SPEC-SCNC: 7.8 MG/DL (ref 8.6–10.5)
CHLORIDE SERPL-SCNC: 98 MMOL/L (ref 98–107)
CO2 SERPL-SCNC: 33 MMOL/L (ref 22–29)
CREAT SERPL-MCNC: 1 MG/DL (ref 0.76–1.27)
DEPRECATED RDW RBC AUTO: 61.8 FL (ref 37–54)
EGFRCR SERPLBLD CKD-EPI 2021: 90 ML/MIN/1.73
EOSINOPHIL # BLD MANUAL: 0.27 10*3/MM3 (ref 0–0.4)
EOSINOPHIL NFR BLD MANUAL: 3.1 % (ref 0.3–6.2)
ERYTHROCYTE [DISTWIDTH] IN BLOOD BY AUTOMATED COUNT: 18.9 % (ref 12.3–15.4)
FERRITIN SERPL-MCNC: 1756 NG/ML (ref 30–400)
FOLATE SERPL-MCNC: 18 NG/ML (ref 4.78–24.2)
GIANT PLATELETS: ABNORMAL
GLOBULIN UR ELPH-MCNC: 3.8 GM/DL
GLUCOSE SERPL-MCNC: 125 MG/DL (ref 65–99)
HCT VFR BLD AUTO: 22 % (ref 37.5–51)
HGB BLD-MCNC: 7.6 G/DL (ref 13–17.7)
HYPOCHROMIA BLD QL: ABNORMAL
IRON 24H UR-MRATE: 32 MCG/DL (ref 59–158)
IRON SATN MFR SERPL: 23 % (ref 20–50)
LDH SERPL-CCNC: 192 U/L (ref 135–225)
LYMPHOCYTES # BLD MANUAL: 1.37 10*3/MM3 (ref 0.7–3.1)
LYMPHOCYTES NFR BLD MANUAL: 11.5 % (ref 5–12)
MACROCYTES BLD QL SMEAR: ABNORMAL
MCH RBC QN AUTO: 31.3 PG (ref 26.6–33)
MCHC RBC AUTO-ENTMCNC: 34.5 G/DL (ref 31.5–35.7)
MCV RBC AUTO: 90.5 FL (ref 79–97)
MICROCYTES BLD QL: ABNORMAL
MONOCYTES # BLD: 1.01 10*3/MM3 (ref 0.1–0.9)
NEUTROPHILS # BLD AUTO: 6.11 10*3/MM3 (ref 1.7–7)
NEUTROPHILS NFR BLD MANUAL: 69.8 % (ref 42.7–76)
NRBC BLD AUTO-RTO: 10.2 /100 WBC (ref 0–0.2)
NRBC SPEC MANUAL: 18.8 /100 WBC (ref 0–0.2)
PLATELET # BLD AUTO: 457 10*3/MM3 (ref 140–450)
PMV BLD AUTO: 10.3 FL (ref 6–12)
POIKILOCYTOSIS BLD QL SMEAR: ABNORMAL
POLYCHROMASIA BLD QL SMEAR: ABNORMAL
POTASSIUM SERPL-SCNC: 3.6 MMOL/L (ref 3.5–5.2)
PROT SERPL-MCNC: 7.2 G/DL (ref 6–8.5)
RBC # BLD AUTO: 2.43 10*6/MM3 (ref 4.14–5.8)
RETICS # AUTO: 0.03 10*6/MM3 (ref 0.02–0.13)
RETICS/RBC NFR AUTO: 1.07 % (ref 0.7–1.9)
SMALL PLATELETS BLD QL SMEAR: ABNORMAL
SODIUM SERPL-SCNC: 141 MMOL/L (ref 136–145)
TARGETS BLD QL SMEAR: ABNORMAL
TIBC SERPL-MCNC: 139 MCG/DL (ref 298–536)
TOXIC GRANULATION: ABNORMAL
TRANSFERRIN SERPL-MCNC: 93 MG/DL (ref 200–360)
VARIANT LYMPHS NFR BLD MANUAL: 15.6 % (ref 19.6–45.3)
WBC NRBC COR # BLD: 8.76 10*3/MM3 (ref 3.4–10.8)

## 2022-04-30 PROCEDURE — 80053 COMPREHEN METABOLIC PANEL: CPT | Performed by: INTERNAL MEDICINE

## 2022-04-30 PROCEDURE — 82746 ASSAY OF FOLIC ACID SERUM: CPT | Performed by: INTERNAL MEDICINE

## 2022-04-30 PROCEDURE — 85025 COMPLETE CBC W/AUTO DIFF WBC: CPT | Performed by: INTERNAL MEDICINE

## 2022-04-30 PROCEDURE — 25010000002 ENOXAPARIN PER 10 MG: Performed by: FAMILY MEDICINE

## 2022-04-30 PROCEDURE — 84466 ASSAY OF TRANSFERRIN: CPT | Performed by: INTERNAL MEDICINE

## 2022-04-30 PROCEDURE — 82728 ASSAY OF FERRITIN: CPT | Performed by: INTERNAL MEDICINE

## 2022-04-30 PROCEDURE — 85045 AUTOMATED RETICULOCYTE COUNT: CPT | Performed by: INTERNAL MEDICINE

## 2022-04-30 PROCEDURE — 25010000002 MORPHINE PER 10 MG: Performed by: FAMILY MEDICINE

## 2022-04-30 PROCEDURE — 83615 LACTATE (LD) (LDH) ENZYME: CPT | Performed by: INTERNAL MEDICINE

## 2022-04-30 PROCEDURE — 85007 BL SMEAR W/DIFF WBC COUNT: CPT | Performed by: INTERNAL MEDICINE

## 2022-04-30 PROCEDURE — 97116 GAIT TRAINING THERAPY: CPT

## 2022-04-30 PROCEDURE — 83540 ASSAY OF IRON: CPT | Performed by: INTERNAL MEDICINE

## 2022-04-30 RX ADMIN — CALCIUM CARBONATE 1 TABLET: 500 TABLET, CHEWABLE ORAL at 20:25

## 2022-04-30 RX ADMIN — MORPHINE SULFATE 4 MG: 4 INJECTION, SOLUTION INTRAMUSCULAR; INTRAVENOUS at 03:19

## 2022-04-30 RX ADMIN — NIFEDIPINE 30 MG: 30 TABLET, FILM COATED, EXTENDED RELEASE ORAL at 09:48

## 2022-04-30 RX ADMIN — BISACODYL 10 MG: 10 SUPPOSITORY RECTAL at 09:52

## 2022-04-30 RX ADMIN — GLUTAMINE 2 PACKET: 5 POWDER, FOR SOLUTION ORAL at 20:32

## 2022-04-30 RX ADMIN — CALCIUM CARBONATE 1 TABLET: 500 TABLET, CHEWABLE ORAL at 16:48

## 2022-04-30 RX ADMIN — GLUTAMINE 2 PACKET: 5 POWDER, FOR SOLUTION ORAL at 09:52

## 2022-04-30 RX ADMIN — FOLIC ACID 1 MG: 1 TABLET ORAL at 09:49

## 2022-04-30 RX ADMIN — GUAIFENESIN 1200 MG: 600 TABLET, EXTENDED RELEASE ORAL at 20:25

## 2022-04-30 RX ADMIN — BUMETANIDE 1 MG: 0.25 INJECTION INTRAMUSCULAR; INTRAVENOUS at 20:25

## 2022-04-30 RX ADMIN — ESCITALOPRAM 20 MG: 10 TABLET, FILM COATED ORAL at 20:25

## 2022-04-30 RX ADMIN — BUMETANIDE 1 MG: 0.25 INJECTION INTRAMUSCULAR; INTRAVENOUS at 16:48

## 2022-04-30 RX ADMIN — GUAIFENESIN 1200 MG: 600 TABLET, EXTENDED RELEASE ORAL at 09:49

## 2022-04-30 RX ADMIN — MORPHINE SULFATE 120 MG: 30 TABLET, FILM COATED, EXTENDED RELEASE ORAL at 11:43

## 2022-04-30 RX ADMIN — MORPHINE SULFATE 120 MG: 30 TABLET, FILM COATED, EXTENDED RELEASE ORAL at 20:25

## 2022-04-30 RX ADMIN — ENOXAPARIN SODIUM 40 MG: 100 INJECTION SUBCUTANEOUS at 20:25

## 2022-04-30 RX ADMIN — CALCIUM CARBONATE 1 TABLET: 500 TABLET, CHEWABLE ORAL at 09:48

## 2022-04-30 RX ADMIN — SENNOSIDES 2 TABLET: 8.6 TABLET, FILM COATED ORAL at 09:49

## 2022-04-30 RX ADMIN — BUMETANIDE 1 MG: 0.25 INJECTION INTRAMUSCULAR; INTRAVENOUS at 09:48

## 2022-04-30 RX ADMIN — HYDROXYUREA 1000 MG: 500 CAPSULE ORAL at 09:48

## 2022-04-30 RX ADMIN — TAMSULOSIN HYDROCHLORIDE 0.4 MG: 0.4 CAPSULE ORAL at 09:49

## 2022-05-01 VITALS
TEMPERATURE: 98.4 F | WEIGHT: 203 LBS | HEIGHT: 70 IN | BODY MASS INDEX: 29.06 KG/M2 | OXYGEN SATURATION: 100 % | RESPIRATION RATE: 18 BRPM | DIASTOLIC BLOOD PRESSURE: 64 MMHG | SYSTOLIC BLOOD PRESSURE: 118 MMHG | HEART RATE: 91 BPM

## 2022-05-01 LAB
ANION GAP SERPL CALCULATED.3IONS-SCNC: 9 MMOL/L (ref 5–15)
BASOPHILS # BLD AUTO: 0.03 10*3/MM3 (ref 0–0.2)
BASOPHILS NFR BLD AUTO: 0.4 % (ref 0–1.5)
BUN SERPL-MCNC: 16 MG/DL (ref 6–20)
BUN/CREAT SERPL: 16.3 (ref 7–25)
CALCIUM SPEC-SCNC: 7.7 MG/DL (ref 8.6–10.5)
CHLORIDE SERPL-SCNC: 95 MMOL/L (ref 98–107)
CO2 SERPL-SCNC: 36 MMOL/L (ref 22–29)
CREAT SERPL-MCNC: 0.98 MG/DL (ref 0.76–1.27)
DEPRECATED RDW RBC AUTO: 61 FL (ref 37–54)
EGFRCR SERPLBLD CKD-EPI 2021: 92.2 ML/MIN/1.73
EOSINOPHIL # BLD AUTO: 0.29 10*3/MM3 (ref 0–0.4)
EOSINOPHIL NFR BLD AUTO: 3.8 % (ref 0.3–6.2)
ERYTHROCYTE [DISTWIDTH] IN BLOOD BY AUTOMATED COUNT: 18.7 % (ref 12.3–15.4)
FERRITIN SERPL-MCNC: 1841 NG/ML (ref 30–400)
GLUCOSE SERPL-MCNC: 130 MG/DL (ref 65–99)
HCT VFR BLD AUTO: 22.4 % (ref 37.5–51)
HGB BLD-MCNC: 7.7 G/DL (ref 13–17.7)
IRON 24H UR-MRATE: 25 MCG/DL (ref 59–158)
IRON SATN MFR SERPL: 17 % (ref 20–50)
LDH SERPL-CCNC: 207 U/L (ref 135–225)
LYMPHOCYTES # BLD AUTO: 0.86 10*3/MM3 (ref 0.7–3.1)
LYMPHOCYTES NFR BLD AUTO: 11.1 % (ref 19.6–45.3)
MCH RBC QN AUTO: 31 PG (ref 26.6–33)
MCHC RBC AUTO-ENTMCNC: 34.4 G/DL (ref 31.5–35.7)
MCV RBC AUTO: 90.3 FL (ref 79–97)
MONOCYTES # BLD AUTO: 1.17 10*3/MM3 (ref 0.1–0.9)
MONOCYTES NFR BLD AUTO: 15.2 % (ref 5–12)
NEUTROPHILS NFR BLD AUTO: 5.28 10*3/MM3 (ref 1.7–7)
NEUTROPHILS NFR BLD AUTO: 68.3 % (ref 42.7–76)
PLATELET # BLD AUTO: 501 10*3/MM3 (ref 140–450)
PMV BLD AUTO: 10.4 FL (ref 6–12)
POTASSIUM SERPL-SCNC: 3.4 MMOL/L (ref 3.5–5.2)
RBC # BLD AUTO: 2.48 10*6/MM3 (ref 4.14–5.8)
RETICS # AUTO: 0.02 10*6/MM3 (ref 0.02–0.13)
RETICS/RBC NFR AUTO: 0.61 % (ref 0.7–1.9)
SODIUM SERPL-SCNC: 140 MMOL/L (ref 136–145)
TIBC SERPL-MCNC: 150 MCG/DL (ref 298–536)
TRANSFERRIN SERPL-MCNC: 101 MG/DL (ref 200–360)
WBC NRBC COR # BLD: 7.72 10*3/MM3 (ref 3.4–10.8)

## 2022-05-01 PROCEDURE — 85025 COMPLETE CBC W/AUTO DIFF WBC: CPT | Performed by: INTERNAL MEDICINE

## 2022-05-01 PROCEDURE — 82728 ASSAY OF FERRITIN: CPT | Performed by: INTERNAL MEDICINE

## 2022-05-01 PROCEDURE — 82746 ASSAY OF FOLIC ACID SERUM: CPT | Performed by: INTERNAL MEDICINE

## 2022-05-01 PROCEDURE — 85045 AUTOMATED RETICULOCYTE COUNT: CPT | Performed by: INTERNAL MEDICINE

## 2022-05-01 PROCEDURE — 80048 BASIC METABOLIC PNL TOTAL CA: CPT | Performed by: FAMILY MEDICINE

## 2022-05-01 PROCEDURE — 83615 LACTATE (LD) (LDH) ENZYME: CPT | Performed by: INTERNAL MEDICINE

## 2022-05-01 PROCEDURE — 25010000002 MORPHINE PER 10 MG: Performed by: FAMILY MEDICINE

## 2022-05-01 PROCEDURE — 83540 ASSAY OF IRON: CPT | Performed by: INTERNAL MEDICINE

## 2022-05-01 PROCEDURE — 84466 ASSAY OF TRANSFERRIN: CPT | Performed by: INTERNAL MEDICINE

## 2022-05-01 RX ORDER — POTASSIUM CHLORIDE 750 MG/1
40 CAPSULE, EXTENDED RELEASE ORAL ONCE
Status: COMPLETED | OUTPATIENT
Start: 2022-05-01 | End: 2022-05-01

## 2022-05-01 RX ORDER — BUMETANIDE 0.25 MG/ML
1 INJECTION INTRAMUSCULAR; INTRAVENOUS 3 TIMES DAILY
Start: 2022-05-01 | End: 2023-03-11

## 2022-05-01 RX ORDER — ENOXAPARIN SODIUM 100 MG/ML
40 INJECTION SUBCUTANEOUS NIGHTLY
Start: 2022-05-01 | End: 2023-03-11

## 2022-05-01 RX ORDER — IPRATROPIUM BROMIDE AND ALBUTEROL SULFATE 2.5; .5 MG/3ML; MG/3ML
3 SOLUTION RESPIRATORY (INHALATION) EVERY 4 HOURS PRN
Qty: 360 ML
Start: 2022-05-01 | End: 2023-03-27

## 2022-05-01 RX ORDER — ONDANSETRON 2 MG/ML
4 INJECTION INTRAMUSCULAR; INTRAVENOUS EVERY 6 HOURS PRN
Start: 2022-05-01 | End: 2023-03-11

## 2022-05-01 RX ORDER — BISACODYL 10 MG
10 SUPPOSITORY, RECTAL RECTAL DAILY
Start: 2022-05-02 | End: 2023-03-15 | Stop reason: HOSPADM

## 2022-05-01 RX ORDER — GUAIFENESIN 600 MG/1
1200 TABLET, EXTENDED RELEASE ORAL EVERY 12 HOURS SCHEDULED
Start: 2022-05-01 | End: 2023-03-11

## 2022-05-01 RX ORDER — ACETAMINOPHEN 325 MG/1
650 TABLET ORAL EVERY 6 HOURS PRN
Start: 2022-05-01

## 2022-05-01 RX ORDER — NALOXONE HCL 0.4 MG/ML
0.1 VIAL (ML) INJECTION
Start: 2022-05-01 | End: 2023-03-11

## 2022-05-01 RX ORDER — BISACODYL 5 MG/1
10 TABLET, DELAYED RELEASE ORAL DAILY PRN
Start: 2022-05-01 | End: 2023-03-11

## 2022-05-01 RX ORDER — CALCIUM CARBONATE 200(500)MG
1 TABLET,CHEWABLE ORAL 3 TIMES DAILY
Start: 2022-05-01 | End: 2023-03-11 | Stop reason: ALTCHOICE

## 2022-05-01 RX ADMIN — TAMSULOSIN HYDROCHLORIDE 0.4 MG: 0.4 CAPSULE ORAL at 08:39

## 2022-05-01 RX ADMIN — MORPHINE SULFATE 120 MG: 30 TABLET, FILM COATED, EXTENDED RELEASE ORAL at 08:39

## 2022-05-01 RX ADMIN — MORPHINE SULFATE 4 MG: 4 INJECTION, SOLUTION INTRAMUSCULAR; INTRAVENOUS at 11:51

## 2022-05-01 RX ADMIN — POTASSIUM CHLORIDE 40 MEQ: 10 CAPSULE, COATED, EXTENDED RELEASE ORAL at 11:26

## 2022-05-01 RX ADMIN — GLUTAMINE 2 PACKET: 5 POWDER, FOR SOLUTION ORAL at 08:37

## 2022-05-01 RX ADMIN — BUMETANIDE 1 MG: 0.25 INJECTION INTRAMUSCULAR; INTRAVENOUS at 08:40

## 2022-05-01 RX ADMIN — FOLIC ACID 1 MG: 1 TABLET ORAL at 08:39

## 2022-05-01 RX ADMIN — CALCIUM CARBONATE 1 TABLET: 500 TABLET, CHEWABLE ORAL at 08:39

## 2022-05-01 RX ADMIN — SENNOSIDES 2 TABLET: 8.6 TABLET, FILM COATED ORAL at 08:39

## 2022-05-01 RX ADMIN — GUAIFENESIN 1200 MG: 600 TABLET, EXTENDED RELEASE ORAL at 08:39

## 2022-05-01 RX ADMIN — HYDROXYUREA 1000 MG: 500 CAPSULE ORAL at 08:39

## 2022-05-01 RX ADMIN — NIFEDIPINE 30 MG: 30 TABLET, FILM COATED, EXTENDED RELEASE ORAL at 08:39

## 2022-05-01 NOTE — NURSING NOTE
Report called to DARRIN Mejia at [facility]. Pt discharged via ambulance in stable condition. O2 at 2L/NC.

## 2022-05-01 NOTE — CASE MANAGEMENT/SOCIAL WORK
Continued Stay Note   [facility]     Patient Name: Holland Phelps  MRN: [id number]  Today's Date: 5/1/2022    Admit Date: 4/20/2022     Discharge Plan     Row Name 05/01/22 1121       Plan    Plan Comments Floor RN received call from [facility] that bed is ready today. Pt will be going to room number 74322. Called [facility] to inform that pt is ready for transport to [facility].    Final Discharge Disposition Code 66 - critical access hospital               Discharge Codes    No documentation.               Expected Discharge Date and Time     Expected Discharge Date Expected Discharge Time    May 1, 2022             KVNG Wilkerson

## 2022-05-01 NOTE — NURSING NOTE
Pt has room available at [facility]. Room # 34792. Nurses Station phone # [phone]. Attempted to call report to RN. DARRIN Mejia asked writer to call back in 15-20 minutes.

## 2022-05-01 NOTE — PLAN OF CARE
Goal Outcome Evaluation:  Plan of Care Reviewed With: patient        Progress: no change  Outcome Evaluation: Pt alert and orientated. Wife at BS most of day, assisting in care. Pt rates pain to left leg at 9. Per pt and wife, this is the worst the pain has been. Pt is awaiting transfer to [facility]. This is where all his specialist are located. IV Bumex continues. Left leg remains edematous and tender when he moves it. Telemetry reading today S-ST . Appetite fair to good. Up to chair. Ambulated in hallway with PT. Tolerated well. Safety maintained.

## 2022-05-01 NOTE — THERAPY DISCHARGE NOTE
Acute Care - Physical Therapy Discharge Summary  [facility]       Patient Name: Holland Phelps  : 1970  MRN: [id number]    Today's Date: 2022                 Admit Date: 2022      PT Recommendation and Plan    Visit Dx:    ICD-10-CM ICD-9-CM   1. Cellulitis of left lower extremity  L03.116 682.6   2. Sepsis, due to unspecified organism, unspecified whether acute organ dysfunction present ([facility])  A41.9 038.9     995.91   3. Hypoxia  R09.02 799.02   4. Sickle cell disease with crisis ([facility])  D57.00 282.62   5. Impaired mobility  Z74.09 799.89                PT Rehab Goals     Row Name 22 1431             Bed Mobility Goal 1 (PT)    Activity/Assistive Device (Bed Mobility Goal 1, PT) sit to supine;supine to sit;rolling to left;rolling to right;scooting  -      [place] Level/Cues Needed (Bed Mobility Goal 1, PT) modified independence  -      Time Frame (Bed Mobility Goal 1, PT) long term goal (LTG)  -      Progress/Outcomes (Bed Mobility Goal 1, PT) goal not met  -              Transfer Goal 1 (PT)    Activity/Assistive Device (Transfer Goal 1, PT) sit-to-stand/stand-to-sit;bed-to-chair/chair-to-bed;walker, rolling  -      [place] Level/Cues Needed (Transfer Goal 1, PT) modified independence  -      Time Frame (Transfer Goal 1, PT) long term goal (LTG)  -      Progress/Outcome (Transfer Goal 1, PT) goal not met  -              Gait Training Goal 1 (PT)    Activity/Assistive Device (Gait Training Goal 1, PT) gait (walking locomotion);assistive device use;decrease fall risk;improve balance and speed;increase endurance/gait distance;normalize weight shifts  -      [place] Level (Gait Training Goal 1, PT) supervision required  -      Distance (Gait Training Goal 1, PT) 50'  -      Time Frame (Gait Training Goal 1, PT) long term goal (LTG)  -      Progress/Outcome (Gait Training Goal 1, PT) goal met  -            User Key  (r) = Recorded By, (t) = Taken By, (c) =  Cosigned By    Initials Name Provider Type Discipline     Elida Garcia, PTA Physical Therapist Assistant PT                    PT Discharge Summary  Reason for Discharge: Discharge from facility  Outcomes Achieved: Refer to plan of care for updates on goals achieved  Discharge Destination: other (comment) (trans to [facility])      Elida Garcia, KEILA   5/1/2022

## 2022-05-01 NOTE — DISCHARGE SUMMARY
[facility] Medicine Services  DISCHARGE SUMMARY       Date of Admission: 4/20/2022  Date of Discharge:  5/1/2022  Primary Care Physician: Provider, No Known    Presenting Problem/History of Present Illness:  Cellulitis of left lower extremity [L03.116]     Final Discharge Diagnoses:  Active Hospital Problems    Diagnosis    • **Cellulitis of left lower extremity    • Stage 3a chronic kidney disease (HCC)    • Sickle cell anemia (HCC)    • Sepsis (HCC)    • Anemia of chronic disease    • Renal cell carcinoma of right kidney metastatic to other site (HCC)        Consults: Orthopedics.  Infectious disease.  Hematology . nephrology.    Pertinent Test Results:   Results for orders placed during the hospital encounter of 04/20/22    Adult Transthoracic Echo Complete W/ Cont if Necessary Per Protocol    Interpretation Summary  · Left ventricular ejection fraction appears to be 66 - 70%. Left ventricular systolic function is normal.  · Left ventricular diastolic function was normal.  · Left atrial volume is mildly increased.  · There is a small (<1cm) pericardial effusion.  · There is no evidence of cardiac tamponade.  · Estimated right ventricular systolic pressure from tricuspid regurgitation is normal (<35 mmHg).    .  Imaging Results (All)     Procedure Component Value Units Date/Time    US Venous Doppler Lower Extremity Left (duplex) [[id number]] Collected: 04/26/22 1547     Updated: 04/26/22 1550    Narrative:      History: Pain and swelling       Impression:      Impression: There is no evidence of deep venous thrombosis or  superficial thrombophlebitis of the left lower extremity.     Comments: Left lower extremity venous duplex exam was performed using  color Doppler flow, Doppler wave form analysis, and grayscale imaging,  with and without compression. There is no evidence of deep venous  thrombosis of the common femoral, superficial femoral, popliteal,  posterior tibial, and  peroneal veins. There is no thrombus identified in  the saphenofemoral junction or the greater saphenous vein.      This report was finalized on 04/26/2022 15:47 by Dr. Lei Jones MD.    CT Abdomen Pelvis Without Contrast [[id number]] Collected: 04/26/22 1354     Updated: 04/26/22 1409    Narrative:      EXAMINATION:  CT ABDOMEN PELVIS WO CONTRAST-  4/26/2022 10:32 AM CDT     HISTORY: Kidney cancer follow-up. Swelling in the left groin and left  lower extremity.     TECHNIQUE: Spiral CT was performed of the abdomen and pelvis without  contrast. Multiplanar images were reconstructed.     DLP: 494 mGy-cm. Automated dosage reduction technique was utilized to  reduce patient dosage.     COMPARISON: No comparison study.     LUNG BASES: There is cardiomegaly. There is a small to moderate  pericardial effusion. There are patchy parenchymal infiltrates in both  lung bases with breathing motion artifact. There is minimal pleural  effusion on the left.     LIVER AND SPLEEN: Prior cholecystectomy. There is a 1.6 cm low-density  liver lesion in the left hepatic lobe image 24 series 2. Prior  splenectomy.     PANCREAS: Normal unenhanced appearance of the pancreas.     KIDNEYS AND ADRENALS: The adrenal glands demonstrate a normal unenhanced  appearance. There has been prior right nephrectomy with no evidence of  recurrent mass in the right nephrectomy bed. No focal left kidney  abnormality is seen. There is mild dilatation of the left renal  collecting system and left ureter. No obstructing stone is seen. The  urinary bladder demonstrates no focal abnormality. The prostate is  mildly prominent.     BOWEL: No oral contrast was given. There is fluid and food material in  the stomach. Small bowel loops are nondilated. Moderate to large stool  in the colon. Underdistention of portions of the colon.     OTHER: There is diffuse body wall edema in the lower abdomen and pelvis.  There are prominent lymph nodes in the right groin.  The largest has a  short axis diameter of 1.2 cm. There are smaller lymph nodes in the left  groin. There is mild atheromatous disease of the aortoiliac vessels.  There is a 4.5 x 3.8 cm lytic bone lesion in the left sacrum with a  sclerotic margin. There is a somewhat lobular contour.       Impression:      1. Cardiomegaly. Small to moderate pericardial effusion.  2. Patchy infiltrates in both lung bases with breathing motion artifact.  This may be infectious or inflammatory. Pulmonary edema possible. There  is a minimal left pleural effusion.  3. There is a 1.6 cm low-density liver lesion in the left hepatic lobe  is not fully evaluated. The margins are slightly ill-defined. A cyst is  possible. Primary hepatic lesion and metastatic disease are also  considered.  4. Prior right nephrectomy with no evidence of recurrent mass in the  nephrectomy bed.  5. Mild dilatation of the left renal collecting system and left ureter  to the pelvis. No obstructing stone is seen. The urinary bladder  demonstrates no focal abnormality.  6. There is a 4.5 x 3.8 cm lytic bone lesion in the left sacrum with a  sclerotic margin. This is lobular in contour. This could be a sacral  nerve root sleeve cyst. Other lesions are not ruled out. MRI would be  useful to determine if this is due to a sacral nerve root sleeve cyst.  7. Prominent lymph nodes in the right groin consistent with adenopathy  with the largest having a short axis diameter of 1.2 cm. There are  smaller lymph nodes in the left groin.  8. Diffuse body wall edema in the lower abdomen and pelvis. There is  atheromatous disease of the aortoiliac vessels.  This report was finalized on 04/26/2022 14:06 by Dr. Zbigniew Anaya MD.    XR Chest 1 View [[id number]] Collected: 04/24/22 1048     Updated: 04/24/22 1052    Narrative:      EXAMINATION: Chest 1 view 4/24/2022     HISTORY: Shortness of air.     FINDINGS: Today's exam is compared to previous study of 4/20/2022. There  is  moderate cardiomegaly. There is mild bibasilar atelectasis. Lungs are  otherwise clear. There is no effusion or free air present.       Impression:      1.. Cardiomegaly.  2. Mild bibasilar atelectasis.  This report was finalized on 04/24/2022 10:48 by Dr. Caesar Guillen MD.    US Renal Bilateral [[id number]] Collected: 04/23/22 1520     Updated: 04/23/22 1524    Narrative:      RENAL ULTRASOUND COMPLETE 4/23/2022 3:10 PM CDT     REASON FOR EXAM: KIARRA, eval for obstruction; L03.116-Cellulitis of left  lower limb; A41.9-Sepsis, unspecified organism; R09.02-Hypoxemia;  D57.00-Hb-SS disease with crisis, unspecified       COMPARISON: None       TECHNIQUE: Multiple longitudinal and transverse realtime sonographic  images of the kidneys and urinary bladder are obtained.      FINDINGS:      RIGHT KIDNEY: The right kidney has been surgically removed.     LEFT KIDNEY: 14.0 cm. Normal in size, shape, contour and position. No  solid or cystic masses. The central echo complex is compact with no  evidence for hydronephrosis. No nephrolithiasis or abnormal perinephric  fluid collections . No hydroureter.      PELVIS: The bladder is mildly distended with anechoic urine and  demonstrates no significant wall thickening or internal echogenicities.  There is no surrounding ascites.        Impression:      1. The patient's undergone previous right nephrectomy.  2. Normal appearance of the left kidney..        This report was finalized on 04/23/2022 15:21 by Dr. Caesar Guillen MD.    MRI Tibia Fibula Left With & Without Contrast [[id number]] Collected: 04/23/22 1502     Updated: 04/23/22 1510    Narrative:      EXAMINATION: Left tibia and fibula with and without contrast 4/23/2022     HISTORY: Cellulitis. Evaluate for abscess.     FINDINGS: Multiplanar fast spin echo imaging sequences were obtained of  left tibia and fibula on a high-field magnet both with and without  gadolinium enhancement.     The patient has undergone previous  total knee arthroplasty. There is  susceptibility artifact within the proximal tibia related to the  hardware. There is associated susceptibility artifact. Questionable  signal alteration within the mid shaft of the tibia and fibula is  inconsistent on different sequences and I suspect is artifactual  pressure related to susceptibility as it does appear to extend outside  of the bony structure at this level. There is diffuse subcutaneous edema  at the level of the knee and extending to the level of the ankle. No  discrete fluid collection is identified to suggest abscess. The  underlying musculature within the calf demonstrates normal signal  without evidence of intramuscular hematoma or tear.       Impression:      1. Rather diffuse cellulitis involving the knee and soft tissues of the  more distal left lower extremity. There is no evidence of involvement of  the underlying musculature or bony structures. I do not see evidence of  a discrete localized fluid collection to suggest abscess.  2. The patient has undergone previous knee arthroplasty. There is  susceptibility artifact within the proximal tibia related to the  hardware. This results in some distortion of the adjacent bony  structures related to susceptibility.  3. Questionable abnormal signal within the mid shaft of the tibia and  fibula is noted at approximately the same level without evidence of  overlying bony changes or mass. I suspect this is artifactual in nature  as it is inconsistent on different sequences. There is no evidence of  occult fracture or marrow edema. No radiographic evidence of acute  osteomyelitis.:  This report was finalized on 04/23/2022 15:07 by Dr. Caesar Guillen MD.    US Venous Doppler Lower Extremity Left (duplex) [[id number]] Collected: 04/20/22 1558     Updated: 04/20/22 1602    Narrative:      History: Pain and swelling       Impression:      Impression: There is no evidence of deep venous thrombosis or  superficial  thrombophlebitis of the left lower extremity.     Comments: Left lower extremity venous duplex exam was performed using  color Doppler flow, Doppler wave form analysis, and grayscale imaging,  with and without compression. There is no evidence of deep venous  thrombosis of the common femoral, superficial femoral, popliteal,  posterior tibial, and peroneal veins. There is no thrombus identified in  the saphenofemoral junction or the greater saphenous vein.      This report was finalized on 04/20/2022 15:58 by Dr. Lei Jones MD.    CT Angiogram Chest [[id number]] Collected: 04/20/22 1418     Updated: 04/20/22 1430    Narrative:      CT ANGIOGRAM CHEST- 4/20/2022 1:55 PM CDT     HISTORY: hx of cancer. Fever, hypoxia, tachycardia, dyspnea, history  metastatic renal cell carcinoma to the bones.     COMPARISON: 11/4/2021     DOSE LENGTH PRODUCT: 373 mGy cm. Automated exposure control was also  utilized to decrease patient radiation dose.     TECHNIQUE: Axial images the chest are obtained following IV contrast.  2-D and maximal intensity projection images reconstructed and reviewed     FINDINGS:  Contrast bolus limited to with contrast of greatest  attenuation within the SVC. No central pulmonary emboli identified.  Cardiomegaly. No thoracic aortic aneurysm. No pathologic mediastinal or  hilar adenopathy. No axillary adenopathy. Similar trace pericardial  fluid. No pleural effusions.     Images the upper abdomen demonstrating a right nephrectomy changes. No  adrenal nodule. 1.6 cm hypodense lateral left hepatic lesion has  Hounsfield units in the 40s, but remain similar to the 11/4/2021 exam.  Hemangioma considered. A stable 6 mm hypodensity within the dome of the  liver, too small to definitively characterize. Moderate fecal stasis.     Linear bibasilar atelectasis and/or scarring. No consolidation. Stable 4  mm subpleural right middle lobe nodule image 90 stable 3 to 4 mm right  upper lobe nodule image 80. No new  or suspicious pulmonary nodules  identified. No pneumothorax. No endobronchial lesion.     Avascular necrosis considered of the humeral heads. No focal destructive  osseous lesions identified.       Impression:      1. Contrast bolus slightly limited, no discrete pulmonary emboli.  2. Cardiomegaly. Similar trace pericardial fluid.  3. Basilar atelectasis and/or scarring. No new or suspicious pulmonary  nodules. No consolidation.  4.Hypodensities liver as described above. Hemangioma considered within  the lateral left hepatic lobe. Right nephrectomy changes. Moderate fecal  stasis.  This report was finalized on 04/20/2022 14:27 by Dr. Latricia Houser MD.    XR Chest 1 View [[id number]] Collected: 04/20/22 1326     Updated: 04/20/22 1330    Narrative:      EXAMINATION: XR CHEST 1 VW- 4/20/2022 1:26 PM CDT     HISTORY: Hypoxia.     REPORT: A frontal view of the chest was obtained.     COMPARISON: Chest x-ray 11/4/2021.     The lungs are moderately hypoaerated, there are central basilar vascular  crowding and mild cardiomegaly, without evidence of overt CHF. No focal  infiltrate or consolidation is identified. There is no pneumothorax or  pleural effusion. The osseous structures and upper abdomen appear  unremarkable.       Impression:      Shallow inspiration with central basilar vascular crowding  and cardiomegaly, without overt evidence of CHF. No consolidating  pulmonary infiltrates.  This report was finalized on 04/20/2022 13:27 by Dr. Charanjit Garcia MD.        Chief Complaint on Day of Discharge: none    History of Present Illness on Day of Discharge:    is a 51 year old gentleman who resides in [place], [place].  He has a history of sickle cell anemia and Renal Cell Carcionoma.  He recently completed chemotherapy and radiation therapy.       He presents to the ER with pain in his LLE.  The has been going on for 1 day.  The pain is severe.  It is constant.  It is a throbbing pain.       In the ER, he is found  to have a LLE cellulitis.  He has a leukocytosis of 11.74.  He has fever to 102.1.       His hemoglobin has improved from when his most recent labs here from 5 months ago.      Hospital Course:  The patient is a 51 y.o. male who presented to [facility] with low extremity cellulitis/leukocytosis/history of renal cell carcinoma with mets to liver lung bone lymph node/1.6 cm liver lesion/4.5 cm lytic lesion of left sacrum/adenopathy of lymph node left groin/acute on chronic renal failure/edema/history of sickle cell anemia/sickle cell disease/chronic pain.       Left lower extremity cellulitis/fever/leukocytosis.  Leukocytes resolved.  DC morphine PCA 4/28/2022.  Cefazolin off 4/27/2022.  Infectious disease signed off. Orthopedic consult.  MRI tib-fib left-Rather diffuse cellulitis involving the knee and soft tissues of the more distal left lower extremity, no evidence of involvement of the underlying musculature or bony structures- do not see evidence of a discrete localized fluid collection to suggest abscess, patient has undergone previous knee arthroplasty,  artifact within the proximal tibia related to the hardware- some distortion of the adjacent bony, abnormal signal within the mid shaft of the tibia and fibula is noted at approximately the same level without evidence of overlying bony changes or mass- suspect this is artifactual in nature as it is inconsistent on different sequences,  no evidence of occult fracture or marrow edema, No radiographic evidence of acute osteomyelitis.  Doppler ultrasound lower left extremity-no evidence of deep venous thrombosis or superficial thrombophlebitis of the left lower extremity.  CT scan abdomen pelvic-Cardiomegaly, Small to moderate pericardial effusion, Patchy infiltrates in both lung bases with breathing motion artifact- may be infectious or inflammatory, Pulmonary edema possible- minimal left pleural effusion, 1.6 cm low-density liver lesion in the left  hepatic lobe- Primary hepatic lesion and metastatic disease are also considered, Prior right nephrectomy with no evidence of recurrent mass in the nephrectomy bed, mild dilatation of the left renal collecting system and left ureter to the pelvis, No obstructing stone is seen, 4.5 x 3.8 cm lytic bone lesion in the left sacrum with a sclerotic margin- could be a sacral  nerve root sleeve cyst, Prominent lymph nodes in the right groin consistent with adenopathy with the largest having a short axis diameter of 1.2 cm,   smaller lymph nodes in the left groin, Diffuse body wall edema in the lower abdomen and pelvis-atheromatous disease of the aortoiliac vessels.     History of renal cell carcinoma metastatic cancer to bone and liver and lung and lymph node and spine/1.6 liver lesion/4.5 cm lytic lesion left sacrum/adenopathy lymph node left groin..  Status post right nephrectomy.  Received completed chemotherapy and radiation therapy.       Chronic renal failure, stage  3a.  Creatinine improving.  Nephrology consult.     Intermittent Bumex by nephrology.  Ultrasound the kidney-previous right nephrectomy, Normal appearance of the left kidney..     History of sickle cell anemia/sickle cell disease.  Hematology/oncology consult . Sickle cell pack.  Status post 1 units of blood transfusion.  Hydroxyurea.  Folate acid.  Hemoglobin stable.  No sign of acute bleed.     Hypertension.  Procardia.    Bumex started by nephrology.     Sleep apnea/cardiomegaly.  Patient uses CPAP and 2 L of oxygen at night at home.  Mucinex.  Duo nebs as needed.  Chest x-ray-Cardiomegaly, Mild bibasilar atelectasis.  CTA of the chest-Contrast bolus slightly limited-  no discrete pulmonary emboli, Cardiomegaly- trace pericardial fluid, Basilar atelectasis and/or scarring, No new or suspicious pulmonary nodules,  No consolidation, hypodensities liver-Hemangioma considered within the lateral left hepatic lobe, Right nephrectomy changes, moderate fecal  "stasis.  Echocardiogram-ejection fraction 66 to 70%, atrial volume mildly increased, small less than 1 cm pericardial effusion-no evidence of cardiac tamponade, tricuspid regurgitation.  Currently on 3 L of oxygen.     Chronic pain.  MS Contin .  DC morphine PCA 04/28/2022.  Morphine as needed.     Hypocalcemia.  Tums.     Hypokalemia.  P.o. potassium.  Magnesium level-normal.     Reflux.  Tums.  Zofran as needed.     Constipation.  Senokot.  Lactulose as needed.     Prostate hypertrophy.     Depression/anxiety.  Lexapro.     Constipated.  Dulcolax p.o. daily.  Senokot 2 tablet daily.  Dulcolax suppository daily.     Nutrition.  Folic acid.  Regular diet.     Lovenox prophylaxis.     Deconditioning.  PT consult.     Blood culture-no growth in 5 days.  Respiratory PCR-negative.     Vital signs stable, afebrile.  Patient has [facility] home [facility] in the past.  Patient is on a wait list for [facility].  Patient been accepted by Dr. Liza Felton at [facility].  Discharged to [facility].     Condition on Discharge: Stable.    Physical Exam on Discharge:  /64 (BP Location: Right arm, Patient Position: Lying)   Pulse 91   Temp 98.4 °F (36.9 °C) (Oral)   Resp 18   Ht 177.8 cm (70\")   Wt 92.1 kg (203 lb)   SpO2 100%   BMI 29.13 kg/m²   Physical Exam  Vitals and nursing note reviewed.   Constitutional:       Appearance: He is well-developed.   HENT:      Head: Normocephalic.   Eyes:      Conjunctiva/sclera: Conjunctivae normal.      Pupils: Pupils are equal, round, and reactive to light.   Neck:      Vascular: No JVD.   Cardiovascular:      Rate and Rhythm: Regular rhythm.  Regular rate.     Heart sounds: Normal heart sounds. No murmur heard.    No friction rub. No gallop.   Pulmonary:      Effort: No respiratory distress.      Breath sounds: No wheezing or rales.      Comments: On 2 L of oxygen, diminished breath sound bilateral, clear .  Chest:      Chest wall: No tenderness.   Abdominal:      General: Bowel " sounds are normal. There is no distension.      Palpations: Abdomen is soft.      Tenderness: There is no abdominal tenderness. There is no guarding or rebound.      Comments: Overweight.   Musculoskeletal:         General: No tenderness or deformity. Normal range of motion.      Cervical back: Neck supple.      Left lower leg: Edema present.      Comments: Left leg tender with any pressure.  Edema, erythematous, warm.   Skin:     General: Skin is warm and dry.      Findings: No rash.   Neurological:      Mental Status: He is alert and oriented to person, place, and time.      Cranial Nerves: No cranial nerve deficit.      Motor: Weakness present. No abnormal muscle tone.      Coordination: Coordination abnormal.      Gait: Gait abnormal.      Deep Tendon Reflexes: Reflexes normal.   Psychiatric:         Behavior: Behavior normal.         Thought Content: Thought content normal.         Judgment: Judgment normal.     Discharge Disposition: Discharge to [facility].      Discharge Medications:     Discharge Medications      New Medications      Instructions Start Date   acetaminophen 325 MG tablet  Commonly known as: TYLENOL   650 mg, Oral, Every 6 Hours PRN      bisacodyl 5 MG EC tablet  Commonly known as: DULCOLAX   10 mg, Oral, Daily PRN      bisacodyl 10 MG suppository  Commonly known as: DULCOLAX   10 mg, Rectal, Daily   Start Date: May 2, 2022     bumetanide 0.25 MG/ML injection  Commonly known as: BUMEX   1 mg, Intravenous, 3 Times Daily      calcium carbonate 500 MG chewable tablet  Commonly known as: TUMS   1 tablet, Oral, 3 Times Daily      Enoxaparin Sodium 40 MG/0.4ML solution prefilled syringe syringe  Commonly known as: LOVENOX   40 mg, Subcutaneous, Nightly      guaiFENesin 600 MG 12 hr tablet  Commonly known as: MUCINEX   1,200 mg, Oral, Every 12 Hours Scheduled      ipratropium-albuterol 0.5-2.5 mg/3 ml nebulizer  Commonly known as: DUO-NEB   3 mL, Nebulization, Every 4 Hours PRN      naloxone 0.4  MG/ML injection  Commonly known as: NARCAN   0.1 mg, Intravenous, Every 5 Minutes PRN      ondansetron 2 mg/mL injection  Commonly known as: ZOFRAN   4 mg, Intravenous, Every 6 Hours PRN         Continue These Medications      Instructions Start Date   Diclofenac Sodium 1 % gel gel  Commonly known as: VOLTAREN   2 g, Topical, 2 Times Daily      Endari 5 g pack  Generic drug: Glutamine (Sickle Cell)   10 g, Oral, 2 Times Daily      escitalopram 20 MG tablet  Commonly known as: LEXAPRO   20 mg, Oral, Every Evening      folic acid 1 MG tablet  Commonly known as: FOLVITE   1 mg, Oral, Daily      hydroxyurea 500 MG capsule  Commonly known as: HYDREA   1,000 mg, Oral, Daily      lactulose 10 GM/15ML solution  Commonly known as: CHRONULAC   20 g, Oral, 2 Times Daily PRN      linaclotide 145 MCG capsule capsule  Commonly known as: LINZESS   145 mcg, Oral, Daily PRN      Morphine 60 MG 12 hr tablet  Commonly known as: MS CONTIN   120 mg, Oral, 2 Times Daily      NIFEdipine XL 90 MG 24 hr tablet  Commonly known as: PROCARDIA XL   90 mg, Oral, Daily      senna 8.6 MG tablet  Commonly known as: SENOKOT   1 tablet, Oral, Daily      tamsulosin 0.4 MG capsule 24 hr capsule  Commonly known as: FLOMAX   1 capsule, Oral, Daily         Stop These Medications    hydrocortisone 10 MG tablet  Commonly known as: CORTEF            Discharge Diet:   Diet Instructions     Advance Diet As Tolerated            Activity at Discharge:   Activity Instructions     Activity as Tolerated            Discharge Care Plan/Instructions: Discharged to [facility].    Follow-up Appointments:   Future Appointments   Date Time Provider Department Center   5/4/2022  8:00 AM Elida Sequeira P, PTA, [facility]   5/11/2022  8:00 AM Elida Sequeira P, PTA, [facility]   5/18/2022  8:00 AM Elida Sequeira P, PTA, [facility]   5/25/2022  8:00 AM Elida Sequeira P, PTA, [facility]   6/1/2022  8:00 AM Elida Sequeira P, PTA,  [facility]   6/8/2022  8:00 AM Elida Sequeira PTA, [facility]     Follow-up with [facility] physician as soon as able.    Electronically signed by Jaxon Clifford MD, 05/01/22, 09:25 CDT.    Time: Greater than 30 minutes.

## 2022-05-01 NOTE — PLAN OF CARE
Goal Outcome Evaluation:  Plan of Care Reviewed With: patient        Progress: no change  Pt discharged to [facility] for further treatment. Ambulance transporting. Pt discharged in stable condition. O2 at 2L/NC. Alert and oriented. Pt medicated with Morphine 4 mg slow IVP prior to discharge. Pain in  LLE rated at 8 on 0-10 scale. Safety maintained.

## 2022-05-01 NOTE — PLAN OF CARE
Problem: Adult Inpatient Plan of Care  Goal: Plan of Care Review  Outcome: Ongoing, Progressing   Goal Outcome Evaluation:      No acute events overnight. VS stable. Patient states he feels better. No needs overnight.

## 2022-05-02 LAB — FOLATE SERPL-MCNC: 17.5 NG/ML (ref 4.78–24.2)

## 2022-05-05 ENCOUNTER — TELEPHONE (OUTPATIENT)
Dept: PHYSICAL THERAPY | Facility: CLINIC | Age: 52
End: 2022-05-05

## 2022-05-05 NOTE — TELEPHONE ENCOUNTER
Patient wanted to know if he could wear his compression garment on the L LE as the swelling is down from when he was in the hospital but the leg is still swollen and he cannot bear weight through the L leg.  He does still have minor genital swelling.  I did instruct patient to wear compression underwear and use foam padding over the genitals and slowly work into wearing the compression garment on the L LE.  If he notices and increase in size of the genitals he is to stop wearing the compression garment on the L LE.

## 2022-05-23 ENCOUNTER — DOCUMENTATION (OUTPATIENT)
Dept: CT IMAGING | Facility: HOSPITAL | Age: 52
End: 2022-05-23

## 2022-09-01 ENCOUNTER — TRANSCRIBE ORDERS (OUTPATIENT)
Dept: PHYSICAL THERAPY | Facility: CLINIC | Age: 52
End: 2022-09-01

## 2022-09-01 DIAGNOSIS — I89.0 LYMPHEDEMA: Primary | ICD-10-CM

## 2022-10-12 ENCOUNTER — TREATMENT (OUTPATIENT)
Dept: PHYSICAL THERAPY | Facility: CLINIC | Age: 52
End: 2022-10-12

## 2022-10-12 DIAGNOSIS — C64.1 RENAL CELL CARCINOMA OF RIGHT KIDNEY: ICD-10-CM

## 2022-10-12 DIAGNOSIS — I89.0 LYMPHEDEMA OF LEFT LEG: Primary | ICD-10-CM

## 2022-10-12 PROCEDURE — 97140 MANUAL THERAPY 1/> REGIONS: CPT | Performed by: PHYSICAL THERAPIST

## 2022-10-12 PROCEDURE — 97161 PT EVAL LOW COMPLEX 20 MIN: CPT | Performed by: PHYSICAL THERAPIST

## 2022-10-12 NOTE — PROGRESS NOTES
Physical Therapy Initial Evaluation and Plan of Care  [address] [place] [address]    Patient: Holland Phelps             : 1970  Today's Date: 10/12/2022  Referring practitioner: GILBERTO Ornelas  Date of Initial Visit: 10/12/2022  Patient seen for 1 sessions    Visit Diagnoses:    ICD-10-CM ICD-9-CM   1. Lymphedema of left leg  I89.0 457.1   2. 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 He also has been wrapping the leg.Pain  Current pain ratin  Location: worst at the posterior and medial knee.                 OBJECTIVE     Objective    Lymphedema     Row Name 10/12/22 1400             Subjective Pain    Able to rate subjective pain? yes  -HR      Pre-Treatment Pain Level 3  -HR      Subjective Pain Comment Mainly inner knee  -HR         Lymphedema Assessment    Lymphedema Classification LLE:;secondary;stage 2 (Spontaneously Irreversible)  -HR      Lymphedema Cancer Related Sx left;inguinal node dissection  -HR      Lymphedema Surgery Comments The entire LLE was reconstructed due to renal cancer mets.  -HR      Stage of Cancer Stage IV  -HR      Chemo Received yes  -HR      Radiation Therapy Received yes  -HR      Infections or Cellulitis? yes  -HR      Infection/Cellulitis Treatment Had cellulitis and required long hospitalizations as well as 2 surgeries to the L knee to clear the infection and repair a loose segment.  -HR         Lymphedema Edema Assessment    Ptting Edema Category  By grade out of 3  -HR      Pitting Edema + 2/3  -HR      Edema Assessment Comment pitting along shin  -HR         Lymphedema Measurements    Measurement Type(s) Circumferential  -HR      Circumferential Areas Lower extremities  -HR         BUE Circumferential (cm)    Measurement Location 1 --  -HR      Left 1 --  -HR      Right 1 --  -HR      Measurement Location 2 --  -HR      Left 2 --  -HR      Right 2 --  -HR      Measurement Location 3 --  -HR      Left 3 --  -HR      Right 3 --  -HR      Measurement Location 4 --  -HR      Left 4 --  -HR      Right 4 --  -HR      Measurement Location 5 --  -HR      Left 5 --  -HR      Right 5 --  -HR      Measurement Location 6 --  -HR      Left 6 --  -HR      Right 6 --  -HR      Measurement Location 7 --  -HR      Left 7 --  -HR      Right 7 --  -HR      Measurement Location 8 --  -HR      Left 8 --  -HR      Right 8 --  -HR      Measurement Location 9 --  -HR      Left 9 --  -HR      Right 9 --  -HR      Measurement Location 10 --  -HR      Left 10 --  -HR      Right 10 --  -HR      LUE Circumferential Total --  -HR      RUE Circumferential Total --  -HR         BLE Circumferential (cm)    Measurement Location 1 BOT  -HR      Left 1 22.6 cm  -HR      Right 1 23 cm  -HR      Measurement Location 2 +7  -HR      Left 2 24 cm  -HR      Right 2 24.5 cm  -HR      Measurement Location 3 +7  -HR      Left 3 27.8 cm  -HR      Right 3 24 cm  -HR      Measurement Location 4 +10  -HR      Left 4 30.6 cm  -HR      Right 4 24.9 cm  -HR      Measurement Location 5 +10  -HR      Left 5 38.5 cm  -HR      Right 5 33.5 cm  -HR      Measurement Location 6 +10  -HR      Left 6 44.3 cm  -HR      Right 6 37.8 cm  -HR      Measurement Location 7 +10  -HR      Left 7 43.5 cm  -HR      Right 7 38 cm  -HR      Measurement Location 8 +10  -HR      Left 8 48 cm  -HR      Right 8 40.5 cm  -HR      Measurement Location 9 +10  -HR      Left 9 54 cm  -HR      Right 9 47 cm  -HR      Measurement Location  10 +10  -HR      Left 10 59.8 cm  -HR      Right 10 54.3 cm  -HR      LLE Circumferential Total 393.1 cm  -HR      RLE Circumferential Total 347.5 cm  -HR         Manual Lymphatic Drainage    Manual Lymphatic Drainage extremity treatment  -HR      Extremity Treatment extremity treatment focus on  -HR      Extremity Treatment Focus On medial and posterior knee and distal leg.  -HR      Manual Lymphatic Drainage Comments Used MLD to try to soften up the tissues in the lower leg.  -HR      Manual Therapy 25 min  -HR            User Key  (r) = Recorded By, (t) = Taken By, (c) = Cosigned By    Initials Name Provider Type    HR Debbie Morales, PT, DPT, CLT-LANNY Physical Therapist              Therapy Education/Self Care [id number]   Education offered today Resuming POC. Possible use of chip bag/pad along areas of increased density when using pump   [employer] Code    Ongoing HEP   Continue the maintenance program that he has been working on for his lymphedema   Timed Minutes        Total Timed Treatment:     25   mins  Total Time of Visit:            60   mins    ASSESSMENT/PLAN     Goals                                          Progress Note due by 11/11/22                                                      Recert due by 1/9/23   LTG by: 12 weeks Comments Date Status   Patient will have a reduction in LLE of at least 20 cm to allow more comfortable walking and mobility.       Patient will be independent with specific HEP for lymphedema      He will have no s/s infection.       He will have no complaint of increased edema in the groin.      Patient will be independent with all tools available to manage his LLE lymphedema.        Assessment & Plan     Assessment  Impairments: abnormal gait, abnormal or restricted ROM, lacks appropriate home exercise program and pain with function  Functional Limitations: walking, uncomfortable because of pain and standing  Assessment details: Karin returns for lymphedema care after  having a hospitalization and surgery due to cellulitis and sepsis in the LLE. His lymphedema has gotten worse even with the care he and his wife have continued to perform at home. He will benefit from treatment so we can focus on the problem areas and try to find different ways to get the swelling back under control.  Prognosis: good    Plan  Therapy options: will be seen for skilled therapy services  Planned therapy interventions: manual therapy, compression, soft tissue mobilization, stretching, therapeutic activities, home exercise program and functional ROM exercises  Frequency: 1x week  Duration in weeks: 12  Treatment plan discussed with: patient  Plan details: PT to resume full CDT treatment for LLE lymphedema. Look at making chip bags for dense tissue to increase massage effect with use of flexitouch pump.           SIGNATURE: Debbie Morales, PT, DPT, [facility] [place] License #: [id number]  Electronically Signed on 10/12/2022    Initial Certification  Certification Period: 10/12/2022 through 1/9/2023  I certify that the therapy services are furnished while this patient is under my care.  The services outlined above are required by this patient, and will be reviewed every 90 days.     PHYSICIAN: Meredith Miller APRN (NPI: [id number])    Signature:_________________________________________DATE: ________      Please sign and return via fax to [phone].   Thank you so much for letting us work with Gene. I appreciate your letting us work with your patients. If you have any questions or concerns, please don't hesitate to contact me.          [address] [address]  [phone]

## 2022-10-26 ENCOUNTER — TREATMENT (OUTPATIENT)
Dept: PHYSICAL THERAPY | Facility: CLINIC | Age: 52
End: 2022-10-26

## 2022-10-26 DIAGNOSIS — C64.1 RENAL CELL CARCINOMA OF RIGHT KIDNEY: ICD-10-CM

## 2022-10-26 DIAGNOSIS — I89.0 LYMPHEDEMA OF LEFT LEG: Primary | ICD-10-CM

## 2022-10-26 PROCEDURE — 97140 MANUAL THERAPY 1/> REGIONS: CPT | Performed by: PHYSICAL THERAPIST

## 2022-10-26 NOTE — PROGRESS NOTES
Physical Therapy Treatment Note  [address] [place] [address]    Patient: Holland Phelps                                                 Visit Date: 10/26/2022  :     1970    Referring practitioner:    GILBERTO Ornelas  Date of Initial Visit:          Type: THERAPY  Noted: 10/12/2022    Patient seen for 2 sessions    Visit Diagnoses:    ICD-10-CM ICD-9-CM   1. Lymphedema of left leg  I89.0 457.1   2. Renal cell carcinoma of right kidney (HCC)  C64.1 189.0     SUBJECTIVE     Subjective He has a pain that shoots in the upper leg.  He states he has been sleeping in the compression garment L LE.  He continues to use the pump.     PAIN: 2/10 numbness in L LE          OBJECTIVE     Objective    Lymphedema     Row Name 10/26/22 0800             Subjective Pain    Able to rate subjective pain? yes  -AL      Pre-Treatment Pain Level 2  -AL      Subjective Pain Comment Knee  -AL         Lymphedema Measurements    Measurement Type(s) Circumferential  -AL      Circumferential Areas Lower extremities  -AL         BLE Circumferential (cm)    Measurement Location 1 BOT  -AL      Left 1 21.5 cm  -AL      Measurement Location 2 +7  -AL      Left 2 23.6 cm  -AL      Measurement Location 3 +7  -AL      Left 3 24.3 cm  -AL      Measurement Location 4 +10  -AL      Left 4 26.6 cm  -AL      Measurement Location 5 +10  -AL      Left 5 34.5 cm  -AL      Measurement Location 6 +10  -AL      Left 6 40 cm  -AL      Measurement Location 7 +10  -AL      Left 7 38.6 cm  -AL      Measurement Location 8 +10  -AL      Left 8 43.5 cm  -AL      Measurement Location 9 +10  -AL      Left 9 49.5 cm  -AL      Measurement Location 10 +10  -AL      Left 10 55.5 cm  -AL      LLE Circumferential Total 357.6 cm  -AL         Manual Lymphatic Drainage    Manual Lymphatic Drainage initial sequence;opened regional lymph nodes;opened anastamoses;extremity treatment  -AL      Initial  Sequence short neck;abdomen;diaphragmatic breathing  -AL      Diaphragmatic Breathing x 9 with superficial abdominals  -AL      Opened Regional Lymph Nodes axillary;inguinal  -AL      Axillary left  -AL      Inguinal left  -AL      Opened Anastamoses inguino-axillary  -AL      Extremity Treatment MLD to full limb  -AL      MLD to Full Limb L LE  -AL      Extremity Treatment Focus On L upper leg  -AL      Manual Lymphatic Drainage Comments IASTM using the red ridge roller to the upper leg and calf.  Stretched the L quads in prone, L hamstring in supine.  -AL      Manual Therapy 70  -AL            User Key  (r) = Recorded By, (t) = Taken By, (c) = Cosigned By    Initials Name Provider Type    Elida Pickering, KEILA, SAHIL Physical Therapist Assistant                Therapy Education/Self Care [id number]   Education offered today Continue to work on [employer] Code    Ongoing HEP   Wear compression garment and use the Flexitouch pump   Timed Minutes        Total Timed Treatment:     70   mins  Total Time of Visit:             70   mins         ASSESSMENT/PLAN     GOALS  Goals                                          Progress Note due by 11/11/22                                                      Recert due by 1/9/23   LTG by: 12 weeks Comments Date Status   Patient will have a reduction in LLE of at least 20 cm to allow more comfortable walking and mobility.   He ha had a 35.5 cm reduction since his last visit. 10/26 Met   Patient will be independent with specific HEP for lymphedema         He will have no s/s infection.          He will have no complaint of increased edema in the groin.         Patient will be independent with all tools available to manage his LLE lymphedema.                Assessment/Plan     ASSESSMENT: Patient has had a great reduction since his initial visit, he has decreased 35.5 cm.  He has started wearing the compression garment at night, he knows this is not what is normally done but is  keeping a close eye on his leg to make sure everything is ok. He does have dense tissue in the L LE mostly in the L upper leg.     PLAN: Continue to work on CDT, will see patient 1 x per week.     SIGNATURE: Elida Sequeira PTA, [facility], [place] License #: [id number]  Electronically Signed on 10/26/2022        [address] [place] [address]. [address]  [phone]

## 2022-11-02 ENCOUNTER — TREATMENT (OUTPATIENT)
Dept: PHYSICAL THERAPY | Facility: CLINIC | Age: 52
End: 2022-11-02

## 2022-11-02 DIAGNOSIS — I89.0 LYMPHEDEMA OF LEFT LEG: Primary | ICD-10-CM

## 2022-11-02 DIAGNOSIS — C64.1 RENAL CELL CARCINOMA OF RIGHT KIDNEY: ICD-10-CM

## 2022-11-02 PROCEDURE — 97140 MANUAL THERAPY 1/> REGIONS: CPT | Performed by: PHYSICAL THERAPIST

## 2022-11-02 NOTE — PROGRESS NOTES
Physical Therapy Treatment Note  [address] [place] [address]    Patient: Holland Phelps                                                 Visit Date: 2022  :     1970    Referring practitioner:    GILBERTO Ornelas  Date of Initial Visit:          Type: THERAPY  Noted: 10/12/2022    Patient seen for 3 sessions    Visit Diagnoses:    ICD-10-CM ICD-9-CM   1. Lymphedema of left leg  I89.0 457.1   2. Renal cell carcinoma of right kidney (HCC)  C64.1 189.0     SUBJECTIVE     Subjective He has not had the shooting pain in the L upper leg.  He states the swelling is still pretty decent.     PAIN: 0/10 numbness in L LE          OBJECTIVE     Objective    Lymphedema     Row Name 22 0800             Subjective Pain    Able to rate subjective pain? yes  -AL      Pre-Treatment Pain Level 0  -AL         BLE Circumferential (cm)    Measurement Location 1 BOT  -AL      Left 1 21.4 cm  -AL      Measurement Location 2 +7  -AL      Left 2 24 cm  -AL      Measurement Location 3 +7  -AL      Left 3 23.6 cm  -AL      Measurement Location 4 +10  -AL      Left 4 28.5 cm  -AL      Measurement Location 5 +10  -AL      Left 5 36.4 cm  -AL      Measurement Location 6 +10  -AL      Left 6 40.2 cm  -AL      Measurement Location 7 +10  -AL      Left 7 38.2 cm  -AL      Measurement Location 8 +10  -AL      Left 8 43.2 cm  -AL      Measurement Location 9 +10  -AL      Left 9 50.9 cm  -AL      Measurement Location 10 +10  -AL      Left 10 54.6 cm  -AL      LLE Circumferential Total 361 cm  -AL         RLE Circumferential (cm)    Measurement Location 1 BOT  -AL      Measurement Location 2 +7  -AL      Measurement Location 3 +7  -AL      Measurement Location 4 +10  -AL      Measurement Location 5 +10  -AL      Measurement Location 6 +10  -AL      Measurement Location 7 +10  -AL      Measurement Location 8 +10  -AL      Measurement Location 9 +10  -AL       Measurement Location 10 +10  -AL         Manual Lymphatic Drainage    Manual Lymphatic Drainage initial sequence;opened regional lymph nodes;opened anastamoses;extremity treatment  -AL      Initial Sequence short neck;abdomen;diaphragmatic breathing  -AL      Diaphragmatic Breathing x 9 with superficial abdominals  -AL      Opened Regional Lymph Nodes axillary;inguinal  -AL      Axillary left  -AL      Inguinal left  -AL      Opened Anastamoses inguino-axillary  -AL      Extremity Treatment MLD to full limb  -AL      MLD to Full Limb L LE  -AL      Extremity Treatment Focus On L upper leg/lateral L thigh  -AL      Manual Lymphatic Drainage Comments IASTM using the red ridge roller to the upper leg and calf.  Stretched the L quads in prone, L hamstring in supine.  -AL      Manual Therapy 70  -AL            User Key  (r) = Recorded By, (t) = Taken By, (c) = Cosigned By    Initials Name Provider Type    Elida Pickering, KEILA, SAHIL Physical Therapist Assistant                Therapy Education/Self Care [id number]   Education offered today Continue to work on [employer]   [employer] Code    Ongoing HEP   Wear compression garment and use the Flexitouch pump   Timed Minutes        Total Timed Treatment:     70   mins  Total Time of Visit:             70   mins         ASSESSMENT/PLAN     GOALS  Goals                                          Progress Note due by 11/11/22                                                      Recert due by 1/9/23   LTG by: 12 weeks Comments Date Status   Patient will have a reduction in LLE of at least 20 cm to allow more comfortable walking and mobility.   He ha had a 35.5 cm reduction since his last visit. 10/26 Met   Patient will be independent with specific HEP for lymphedema         He will have no s/s infection.   No s/s of infection 11/2 Ongoing   He will have no complaint of increased edema in the groin. Improving 11/2 Ongoing   Patient will be independent with all tools available to manage  his LLE lymphedema.                Assessment/Plan     ASSESSMENT: Patient has had a slight increase in the L LE as compared to last week but still much better as compared to his measurements when he returned to treatment a few weeks ago. 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      PLAN: Continue to work on CDT, will see patient 1 x per week.     SIGNATURE: Elida Sequeira PTA, [facility] [place] License #: [id number]  Electronically Signed on 11/2/2022        [address] [place] [address] [place]. [address]  [phone]

## 2022-11-09 ENCOUNTER — TREATMENT (OUTPATIENT)
Dept: PHYSICAL THERAPY | Facility: CLINIC | Age: 52
End: 2022-11-09

## 2022-11-09 DIAGNOSIS — C64.1 RENAL CELL CARCINOMA OF RIGHT KIDNEY: ICD-10-CM

## 2022-11-09 DIAGNOSIS — I89.0 LYMPHEDEMA OF LEFT LEG: Primary | ICD-10-CM

## 2022-11-09 PROCEDURE — 97140 MANUAL THERAPY 1/> REGIONS: CPT | Performed by: PHYSICAL THERAPIST

## 2022-11-09 NOTE — PROGRESS NOTES
Physical Therapy Treatment Note and 30 Day Progress Note  [address] [place] [address]    Patient: Holland Phelps                                                 Visit Date: 2022  :     1970    Referring practitioner:    GILBERTO Ornelas  Date of Initial Visit:          Type: THERAPY  Noted: 10/12/2022    Patient seen for 4 sessions    Visit Diagnoses:    ICD-10-CM ICD-9-CM   1. Lymphedema of left leg  I89.0 457.1   2. Renal cell carcinoma of right kidney (HCC)  C64.1 189.0     SUBJECTIVE     Subjective He can tell the L leg is swollen today and the L leg has been stiff. He thinks the compression thigh high is not fitting well.     PAIN: 4/10 discomfort from the leg being stiff, numbness in L LE          OBJECTIVE     Objective    Lymphedema     Row Name 22 0800             Subjective Pain    Able to rate subjective pain? yes  -AL      Pre-Treatment Pain Level 4  -AL      Subjective Pain Comment L LE  -AL         Lymphedema Measurements    Measurement Type(s) Circumferential  -AL      Circumferential Areas Lower extremities  -AL         BLE Circumferential (cm)    Measurement Location 1 BOT  -AL      Left 1 22.2 cm  -AL      Measurement Location 2 +7  -AL      Left 2 24.6 cm  -AL      Measurement Location 3 +7  -AL      Left 3 26.2 cm  -AL      Measurement Location 4 +10  -AL      Left 4 29.7 cm  -AL      Measurement Location 5 +10  -AL      Left 5 38.1 cm  -AL      Measurement Location 6 +10  -AL      Left 6 41.5 cm  -AL      Measurement Location 7 +10  -AL      Left 7 40.9 cm  -AL      Measurement Location 8 +10  -AL      Left 8 47 cm  -AL      Measurement Location 9 +10  -AL      Left 9 54.8 cm  -AL      Measurement Location 10 +10  -AL      Left 10 57.5 cm  -AL      LLE Circumferential Total 382.5 cm  -AL         Manual Lymphatic Drainage    Manual Lymphatic Drainage initial sequence;opened regional lymph nodes;opened  anastamoses;extremity treatment  -AL      Initial Sequence short neck;abdomen;diaphragmatic breathing  -AL      Abdomen deep  -AL      Diaphragmatic Breathing x 9 with deep abdominals  -AL      Opened Regional Lymph Nodes axillary;inguinal  -AL      Axillary left  -AL      Inguinal left  -AL      Opened Anastamoses inguino-axillary  -AL      Extremity Treatment MLD to full limb  -AL      MLD to Full Limb L LE  -AL      Extremity Treatment Focus On L upper leg/lateral L thigh  -AL      Manual Lymphatic Drainage Comments IASTM using the red ridge roller to the upper leg and calf.  Stretched the L quads in prone.  -AL      Manual Therapy 75  -AL         Compression/Skin Care    Compression/Skin Care compression garment  -AL      Compression Garment Comments Call Yane from [facility] to discuss compression pantyhose  -AL            User Key  (r) = Recorded By, (t) = Taken By, (c) = Cosigned By    Initials Name Provider Type    Elida Pickering PTA, CLT-LANA Physical Therapist Assistant                Therapy Education/Self Care [id number]   Education offered today Continue to work on [employer]   LogMeIn Code    Ongoing HEP   Wear compression garment and use the Flexitouch pump   Timed Minutes        Total Timed Treatment:     75   mins  Total Time of Visit:             75   mins         ASSESSMENT/PLAN     GOALS  Goals                                          Progress Note due by 12/09/22                                                      Recert due by 1/9/23   LTG by: 12 weeks Comments Date Status   Patient will have a reduction in LLE of at least 20 cm to allow more comfortable walking and mobility.   He has had a reduction of 10.6 cm since his initial visit. 11/9 Ongoing   Patient will be independent with specific HEP for lymphedema  He is working on his HEP 11/9 Ongoing   He will have no s/s infection.  No signs or symptoms of infection 11/9 Ongoing   He will have no complaint of increased edema in the  groin. Continues to improve 11/9 Ongoing   Patient will be independent with all tools available to manage his LLE lymphedema.  Patient is using the pump and wearing compression. 11/9 Ongoing          Assessment/Plan     ASSESSMENT: Patient had an increase in the L LE as compared to his last visit.  He still has had a reduction of 10.6 cm as compared to his initial eval.  He does not feel like the compression thigh high fits as well as it should and there is dense tissue at the most proximal leg where the fluid is not going through the lymph system.  I do believe patient would benefit from compression panty hose so the fluid does not collect in the proximal thigh.  Patient will call Yane from [facility] and set up an appointment to be measured for compression.     PLAN: Continue to work on CDT, will see patient 1 x per week.     SIGNATURE: Elida Sequeira PTA, [facility] [place] License #: [id number]  Electronically Signed on 11/9/2022        [address] [place] [address]. [address]  [phone]

## 2022-11-09 NOTE — PROGRESS NOTES
Progress Note Addendum      Patient: Holland Phelps           : 1970  Visit Date: 2022  Referring practitioner: GILBERTO Ornelas  Date of Initial Visit: Type: THERAPY  Noted: 10/12/2022  Patient seen for 4 sessions  Visit Diagnoses:    ICD-10-CM ICD-9-CM   1. Lymphedema of left leg  I89.0 457.1   2. Renal cell carcinoma of right kidney (HCC)  C64.1 189.0          Clinical Progress: improved  Home Program Compliance: Yes  Progress toward previous goals: Partially Met  Prognosis to achieve goals: good    Objective     Assessment & Plan     Assessment  Impairments: abnormal gait, abnormal or restricted ROM, lacks appropriate home exercise program and pain with function  Functional Limitations: walking, uncomfortable because of pain and standingPrognosis: good    Plan  Therapy options: will be seen for skilled therapy services  Planned therapy interventions: manual therapy, compression, soft tissue mobilization, stretching, therapeutic activities, home exercise program and functional ROM exercises  Frequency: 1x week  Duration in weeks: 12  Treatment plan discussed with: patient        I am familiar with Karin and reviewed their progress and plan of care per SAHIL Gil PTA. The patient is in agreement with this plan.     SIGNATURE: Debbie Morales, PT, DPT, SAHIL, License #: [id number]  Electronically Signed on 2022

## 2022-11-16 ENCOUNTER — TREATMENT (OUTPATIENT)
Dept: PHYSICAL THERAPY | Facility: CLINIC | Age: 52
End: 2022-11-16

## 2022-11-16 DIAGNOSIS — C64.1 RENAL CELL CARCINOMA OF RIGHT KIDNEY: ICD-10-CM

## 2022-11-16 DIAGNOSIS — I89.0 LYMPHEDEMA OF GENITALIA: ICD-10-CM

## 2022-11-16 DIAGNOSIS — I89.0 LYMPHEDEMA OF LEFT LEG: Primary | ICD-10-CM

## 2022-11-16 PROCEDURE — 97140 MANUAL THERAPY 1/> REGIONS: CPT | Performed by: PHYSICAL THERAPIST

## 2022-11-16 NOTE — PROGRESS NOTES
Physical Therapy Treatment Note  [address] [place] [address]    Patient: Holland Phelps                                                 Visit Date: 2022  :     1970    Referring practitioner:    GILBERTO Ornelas  Date of Initial Visit:          Type: THERAPY  Noted: 10/12/2022    Patient seen for 5 sessions    Visit Diagnoses:    ICD-10-CM ICD-9-CM   1. Lymphedema of left leg  I89.0 457.1   2. Renal cell carcinoma of right kidney (HCC)  C64.1 189.0   3. Lymphedema of genitalia  I89.0 457.1     SUBJECTIVE     Subjective He states he thinks the L leg is down.  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  The leg was not hurting, he did soak in the tub yesterday and his wife wrapped the leg.      PAIN: 2/10 discomfort from the leg being stiff, with a little numbness in L shin.          OBJECTIVE     Objective    Lymphedema     Row Name 22 0800             Subjective Pain    Able to rate subjective pain? yes  -AL      Pre-Treatment Pain Level 2  -AL      Subjective Pain Comment L LE  -AL         BLE Circumferential (cm)    Measurement Location 1 BOT  -AL      Left 1 22 cm  -AL      Measurement Location 2 +7  -AL      Left 2 24 cm  -AL      Measurement Location 3 +7  -AL      Left 3 24.9 cm  -AL      Measurement Location 4 +10  -AL      Left 4 29.6 cm  -AL      Measurement Location 5 +10  -AL      Left 5 36.9 cm  -AL      Measurement Location 6 +10  -AL      Left 6 41.8 cm  -AL      Measurement Location 7 +10  -AL      Left 7 41 cm  -AL      Measurement Location 8 +10  -AL      Left 8 45.9 cm  -AL      Measurement Location 9 +10  -AL      Left 9 54.7 cm  -AL      Measurement Location 10 +10  -AL      Left 10 59.1 cm  -AL      LLE Circumferential Total 379.9 cm  -AL         RLE Circumferential (cm)    Measurement Location 1 BOT  -AL      Measurement Location 2 +7  -AL       Measurement Location 3 +7  -AL      Measurement Location 4 +10  -AL      Measurement Location 5 +10  -AL      Measurement Location 6 +10  -AL      Measurement Location 7 +10  -AL      Measurement Location 8 +10  -AL      Measurement Location 9 +10  -AL      Measurement Location 10 +10  -AL         Manual Lymphatic Drainage    Manual Lymphatic Drainage initial sequence;opened regional lymph nodes;opened anastamoses;extremity treatment  -AL      Initial Sequence short neck;abdomen;diaphragmatic breathing  -AL      Abdomen deep  -AL      Diaphragmatic Breathing x 9 with deep abdominals  -AL      Opened Regional Lymph Nodes axillary;inguinal  -AL      Axillary left  -AL      Inguinal left  -AL      Opened Anastamoses inguino-axillary  -AL      Extremity Treatment MLD to full limb  -AL      MLD to Full Limb L LE  -AL      Extremity Treatment Focus On L upper leg/lateral L thigh  -AL      Manual Lymphatic Drainage Comments IASTM using the red ridge roller to the upper leg and calf.  Stretched the L quads in prone.  Stretched L hamstings  -AL      Manual Therapy 75  -AL         Compression/Skin Care    Compression/Skin Care compression garment  -AL      Compression Garment Comments He will wait until after Thanksgiving to call [employer] about compression garments.  -AL            User Key  (r) = Recorded By, (t) = Taken By, (c) = Cosigned By    Initials Name Provider Type    Elida Pickering, PTA, CLT-LANNY Physical Therapist Assistant                Therapy Education/Self Care 23919   Education offered today Continue to work on [employer]   [employer] Code    Ongoing HEP   Wear compression garment and use the Flexitouch pump   Timed Minutes        Total Timed Treatment:     75   mins  Total Time of Visit:             75   mins         ASSESSMENT/PLAN     GOALS  Goals                                          Progress Note due by 12/09/22                                                      Recert due by 1/9/23   LTG by: 12 weeks  Comments Date Status   Patient will have a reduction in LLE of at least 20 cm to allow more comfortable walking and mobility.   He has had a reduction of 10.6 cm since his initial visit. 11/9 Ongoing   Patient will be independent with specific HEP for lymphedema  He is working on his HEP 11/9 Ongoing   He will have no s/s infection.  No signs or symptoms of infection 11/16 Met   He will have no complaint of increased edema in the groin. Continues to improve 11/9 Ongoing   Patient will be independent with all tools available to manage his LLE lymphedema.  Patient is using the pump and wearing compression. 11/9 Ongoing          Assessment/Plan     ASSESSMENT: He has had an overall reduction in the L LE but an increase in size in the most proximal measurement. He would like to wait until after Thanksgiving for new compression garments as he would like to work on reducing the L LE more.  His pain level is down today, he continues to have tightness L quads.     PLAN: Continue to work on CDT, will see patient 1 x per week.     SIGNATURE: Elida Sequeira PTA, [facility] [place] License #: [id number]  Electronically Signed on 11/16/2022        [address] [place] [address] [place] [address]  [phone]

## 2022-11-22 ENCOUNTER — TREATMENT (OUTPATIENT)
Dept: PHYSICAL THERAPY | Facility: CLINIC | Age: 52
End: 2022-11-22

## 2022-11-22 DIAGNOSIS — C64.1 RENAL CELL CARCINOMA OF RIGHT KIDNEY: ICD-10-CM

## 2022-11-22 DIAGNOSIS — I89.0 LYMPHEDEMA OF LEFT LEG: Primary | ICD-10-CM

## 2022-11-22 PROCEDURE — 97140 MANUAL THERAPY 1/> REGIONS: CPT | Performed by: PHYSICAL THERAPIST

## 2022-11-22 NOTE — PROGRESS NOTES
Physical Therapy Treatment Note  [address] [place] [address]    Patient: Holland Phelps                                                 Visit Date: 2022  :     1970    Referring practitioner:    GILBERTO Ornelas  Date of Initial Visit:          Type: THERAPY  Noted: 10/12/2022    Patient seen for 6 sessions    Visit Diagnoses:    ICD-10-CM ICD-9-CM   1. Lymphedema of left leg  I89.0 457.1   2. Renal cell carcinoma of right kidney (HCC)  C64.1 189.0     SUBJECTIVE     Subjective He reports he had to go to ER on Saturday due to taking too much of his stool softener and laxative medication. He had to get fluids and K+ IVs. He just was able to start eating normally again yesterday.     PAIN: 2/10 discomfort from the leg being stiff, with a little numbness in L shin.          OBJECTIVE     Objective    Lymphedema     Row Name 22 0800             Subjective Pain    Able to rate subjective pain? yes  -HR      Pre-Treatment Pain Level 2  -HR         Lymphedema Measurements    Measurement Type(s) Circumferential  -HR      Circumferential Areas Lower extremities  -HR         BLE Circumferential (cm)    Measurement Location 1 BOT  -HR      Left 1 22.6 cm  -HR      Measurement Location 2 +7  -HR      Left 2 24 cm  -HR      Measurement Location 3 +7  -HR      Left 3 24.1 cm  -HR      Measurement Location 4 +10  -HR      Left 4 26.9 cm  -HR      Measurement Location 5 +10  -HR      Left 5 35.2 cm  -HR      Measurement Location 6 +10  -HR      Left 6 40.6 cm  -HR      Left 7 39 cm  -HR      Left 8 44 cm  -HR      Left 9 50.5 cm  -HR      Left 10 55.5 cm  -HR      LLE Circumferential Total 362.4 cm  -HR         Manual Lymphatic Drainage    Manual Lymphatic Drainage initial sequence;opened regional lymph nodes;opened anastamoses;extremity treatment  -HR      Initial Sequence short neck;abdomen;diaphragmatic breathing  -HR      Abdomen deep   -HR      Opened Regional Lymph Nodes axillary;inguinal  -HR      Axillary left  -HR      Inguinal left  -HR      Opened Anastamoses inguino-axillary  -HR      Extremity Treatment MLD to full limb  -HR      MLD to Full Limb LLE  -HR      Extremity Treatment Focus On L upper leg/lateral L thigh  -HR      Manual Lymphatic Drainage Comments IASTM using the red ridge roller to the upper leg and calf.  Stretched the L quads in prone.  Stretched L hamstings  -HR      Manual Therapy 85  -HR            User Key  (r) = Recorded By, (t) = Taken By, (c) = Cosigned By    Initials Name Provider Type    HR Debbie Morales, PT, DPT, CLT-LANNY Physical Therapist                Therapy Education/Self Care 18190   Education offered today Continue to work on [employer]    Ongoing HEP   Wear compression garment and use the Flexitouch pump   Timed Minutes        Total Timed Treatment:     85   mins  Total Time of Visit:             85   mins         ASSESSMENT/PLAN     GOALS  Goals                                          Progress Note due by 12/09/22                                                      Recert due by 1/9/23   LTG by: 12 weeks Comments Date Status   Patient will have a reduction in LLE of at least 20 cm to allow more comfortable walking and mobility.   Today he is down 31 cm from eval 11/22 Met, Ongoing   Patient will be independent with specific HEP for lymphedema  He is working on his HEP 11/9 Ongoing   He will have no s/s infection.  No signs or symptoms of infection 11/16 Met   He will have no complaint of increased edema in the groin. Continues to improve 11/9 Ongoing   Patient will be independent with all tools available to manage his LLE lymphedema.  Patient is using the pump and wearing compression. 11/9 Ongoing          Assessment/Plan     ASSESSMENT: He has had an overall reduction in the L LE which is most likely due to his bout of diarrhea that depleted his fluids and required hospital visit.  He was not able to eat or drink normally for 2 days but did resume eating regular food yesterday.     PLAN: Continue to work on CDT, will see patient 1 x per week.     SIGNATURE: Debbie Morales, PT, DPT, [facility] [place] License #: [id number]  Electronically Signed on 11/22/2022        [address] [place] [address]. [address]  [phone]

## 2022-11-30 ENCOUNTER — TREATMENT (OUTPATIENT)
Dept: PHYSICAL THERAPY | Facility: CLINIC | Age: 52
End: 2022-11-30

## 2022-11-30 DIAGNOSIS — C64.1 RENAL CELL CARCINOMA OF RIGHT KIDNEY: ICD-10-CM

## 2022-11-30 DIAGNOSIS — I89.0 LYMPHEDEMA OF LEFT LEG: Primary | ICD-10-CM

## 2022-11-30 PROCEDURE — 97140 MANUAL THERAPY 1/> REGIONS: CPT | Performed by: PHYSICAL THERAPIST

## 2022-11-30 NOTE — PROGRESS NOTES
Physical Therapy Treatment Note  [address] [place] [address]    Patient: Holland Phelps                                                 Visit Date: 2022  :     1970    Referring practitioner:    GILBERTO Ornelas  Date of Initial Visit:          Type: THERAPY  Noted: 10/12/2022    Patient seen for 7 sessions    Visit Diagnoses:    ICD-10-CM ICD-9-CM   1. Lymphedema of left leg  I89.0 457.1   2. Renal cell carcinoma of right kidney (HCC)  C64.1 189.0     SUBJECTIVE     Subjective He has one more potassium pill to take, he is feeling better.  He is back to a normal diet now. He has not used the pump as much due to the Thanksgiving Holiday.     PAIN: 2/10 discomfort from the leg being stiff, with a little numbness in L shin.          OBJECTIVE     Objective    Lymphedema     Row Name 22 0800             Subjective Pain    Able to rate subjective pain? yes  -AL      Pre-Treatment Pain Level 2  -AL      Subjective Pain Comment L LE  -AL         Lymphedema Measurements    Measurement Type(s) Circumferential  -AL      Circumferential Areas Lower extremities  -AL         BLE Circumferential (cm)    Measurement Location 1 BOT  -AL      Left 1 22.6 cm  -AL      Measurement Location 2 +7  -AL      Left 2 26.1 cm  -AL      Measurement Location 3 +7  -AL      Left 3 26.9 cm  -AL      Measurement Location 4 +10  -AL      Left 4 31.2 cm  -AL      Measurement Location 5 +10  -AL      Left 5 39.8 cm  -AL      Measurement Location 6 +10  -AL      Left 6 43.2 cm  -AL      Measurement Location 7 +10  -AL      Left 7 42 cm  -AL      Measurement Location 8 +10  -AL      Left 8 46 cm  -AL      Measurement Location 9 +10  -AL      Left 9 52.6 cm  -AL      Measurement Location 10 +10  -AL      Left 10 56.2 cm  -AL      LLE Circumferential Total 386.6 cm  -AL         Manual Lymphatic Drainage    Manual Lymphatic Drainage initial sequence;opened  regional lymph nodes;opened anastamoses;extremity treatment  -AL      Initial Sequence short neck;abdomen;diaphragmatic breathing  -AL      Abdomen deep  -AL      Diaphragmatic Breathing x 9 with deep abdominals  -AL      Opened Regional Lymph Nodes axillary;inguinal  -AL      Axillary left  -AL      Inguinal left  -AL      Opened Anastamoses inguino-axillary  -AL      Extremity Treatment MLD to full limb  -AL      MLD to Full Limb L LE  -AL      Extremity Treatment Focus On L upper leg/lateral L thigh and L ankle  -AL      Manual Lymphatic Drainage Comments IASTM using the red ridge roller to the upper leg and calf.  Stretched the L quads in prone.  Stretched L hamstings  -AL      Manual Therapy 75  -AL         Compression/Skin Care    Compression/Skin Care compression garment  -AL      Compression Garment Comments Patient donned his compression thigh high  -AL      Compression/Skin Care Comments He has not been to [employer] yet about compression pantyhose.  -AL            User Key  (r) = Recorded By, (t) = Taken By, (c) = Cosigned By    Initials Name Provider Type    AL Elida Sequeira, PTA, CLZIGGY-LANNY Physical Therapist Assistant                Therapy Education/Self Care 80707   Education offered today Continue to work on CDT, get back into routine for using the pump.   [employer] Code    Ongoing HEP   Wear compression garment and use the Flexitouch pump   Timed Minutes        Total Timed Treatment:     75   mins  Total Time of Visit:             75   mins         ASSESSMENT/PLAN     GOALS  Goals                                          Progress Note due by 12/09/22                                                      Recert due by 1/9/23   LTG by: 12 weeks Comments Date Status   Patient will have a reduction in LLE of at least 20 cm to allow more comfortable walking and mobility.   Today he is down 31 cm from eval 11/22 Met, Ongoing   Patient will be independent with specific HEP for lymphedema  He  is working on his HEP 11/9 Ongoing   He will have no s/s infection.  No signs or symptoms of infection 11/16 Met   He will have no complaint of increased edema in the groin. Continues to improve 11/9 Ongoing   Patient will be independent with all tools available to manage his LLE lymphedema. He will get back into a routine for the Flexitouch pump. 11/30 Ongoing          Assessment/Plan     ASSESSMENT:  Patient has had an increase in size of the L LE as compared to his last visit.  He has had more salty and sugary food during the Thanksgiving holiday.  He also has not been using the Flexitouch pump like he had due to the holiday.  He will get back into his normal routine for CDT.  He still has dense tissue L upper leg above where the compression thigh high stops, he will need to check into the compression pantyhose.     PLAN: Continue to work on CDT, will see patient 1 x per week.     SIGNATURE: Elida Sequeira PTA, [facility] [place] License #: [id number]  Electronically Signed on 11/30/2022        [address] [place] [address] [place] [address]  [phone]

## 2022-12-07 ENCOUNTER — TREATMENT (OUTPATIENT)
Dept: PHYSICAL THERAPY | Facility: CLINIC | Age: 52
End: 2022-12-07

## 2022-12-07 DIAGNOSIS — I89.0 LYMPHEDEMA OF LEFT LEG: Primary | ICD-10-CM

## 2022-12-07 DIAGNOSIS — I89.0 LYMPHEDEMA OF GENITALIA: ICD-10-CM

## 2022-12-07 DIAGNOSIS — C64.1 RENAL CELL CARCINOMA OF RIGHT KIDNEY: ICD-10-CM

## 2022-12-07 PROCEDURE — 97110 THERAPEUTIC EXERCISES: CPT | Performed by: PHYSICAL THERAPIST

## 2022-12-07 PROCEDURE — 97140 MANUAL THERAPY 1/> REGIONS: CPT | Performed by: PHYSICAL THERAPIST

## 2022-12-07 NOTE — PROGRESS NOTES
Physical Therapy Treatment Note and 30 Day Progress Note  [address] [place] [address]    Patient: Holland Phelps                                                 Visit Date: 2022  :     1970    Referring practitioner:    GILBERTO Ornelas  Date of Initial Visit:          Type: THERAPY  Noted: 10/12/2022    Patient seen for 8 sessions    Visit Diagnoses:    ICD-10-CM ICD-9-CM   1. Lymphedema of left leg  I89.0 457.1   2. Renal cell carcinoma of right kidney (HCC)  C64.1 189.0   3. Lymphedema of genitalia  I89.0 457.1     SUBJECTIVE     Subjective He has some stiffness in the L knee and L upper leg.  He is walking better and the leg doesn't feel as bad as it used to, he has more mobility in the L leg.  He has taken a break from the compression and feels like this has helped some.  He is still using the pump, he continues to have numbness L lower leg.     PAIN: 0/10          OBJECTIVE     Objective    Lymphedema     Row Name 22 0800             Subjective Pain    Able to rate subjective pain? yes  -AL      Pre-Treatment Pain Level 0  -AL         Lymphedema Measurements    Measurement Type(s) Circumferential  -AL      Circumferential Areas Lower extremities  -AL         BLE Circumferential (cm)    Measurement Location 1 BOT  -AL      Left 1 22.4 cm  -AL      Measurement Location 2 +7  -AL      Left 2 25 cm  -AL      Measurement Location 3 +7  -AL      Left 3 25.9 cm  -AL      Measurement Location 4 +10  -AL      Left 4 30.7 cm  -AL      Measurement Location 5 +10  -AL      Left 5 38.7 cm  -AL      Measurement Location 6 +10  -AL      Left 6 43 cm  -AL      Measurement Location 7 +10  -AL      Left 7 40.9 cm  -AL      Measurement Location 8 +10  -AL      Left 8 46.4 cm  -AL      Measurement Location 9 +10  -AL      Left 9 52.4 cm  -AL      Measurement Location 10 +10  -AL      Left 10 56.9 cm  -AL      LLE Circumferential Total  382.3 cm  -AL         RLE Circumferential (cm)    Measurement Location 1 BOT  -AL      Measurement Location 2 +7  -AL      Measurement Location 3 +7  -AL      Measurement Location 4 +10  -AL      Measurement Location 5 +10  -AL      Measurement Location 6 +10  -AL      Measurement Location 7 +10  -AL      Measurement Location 8 +10  -AL      Measurement Location 9 +10  -AL      Measurement Location 10 +10  -AL         Manual Lymphatic Drainage    Manual Lymphatic Drainage initial sequence;opened regional lymph nodes;opened anastamoses;extremity treatment  -AL      Initial Sequence short neck;abdomen;diaphragmatic breathing  -AL      Abdomen deep  -AL      Diaphragmatic Breathing x 9 with deep abdominals  -AL      Opened Regional Lymph Nodes axillary;inguinal  -AL      Axillary left  -AL      Inguinal left  -AL      Opened Anastamoses inguino-axillary  -AL      Extremity Treatment MLD to full limb  -AL      MLD to Full Limb L LE  -AL      Extremity Treatment Focus On L upper leg/lateral L thigh and L ankle  -AL      Manual Lymphatic Drainage Comments Spent extra time working on MLD and IASTM to the L upper leg.  USed the red ridged roller for IASTM to the L upper leg and calf.  -AL      Manual Therapy 80  -AL         Compression/Skin Care    Compression/Skin Care compression garment  -AL            User Key  (r) = Recorded By, (t) = Taken By, (c) = Cosigned By    Initials Name Provider Type    AL Elida Sequeira, PTA, CLT-LANNY Physical Therapist Assistant                 Therapeutic Exercises    33793 Units Comments   Stretched L hip in supine with half foam roll under ischial tuberosity 5 min    L hamstring stretch     Prone L quad stretch               Timed Minutes 10        Therapy Education/Self Care 14200   Education offered today Check into a roller for the dense tissue L leg, can also use a hand massage.    [employer] Code    Ongoing HEP   Wear compression garment and use the Flexitouch pump   Timed Minutes         Total Timed Treatment:     90   mins  Total Time of Visit:             90   mins         ASSESSMENT/PLAN     GOALS  Goals                                          Progress Note due by 1/06/23                                                      Recert due by 1/9/23   LTG by: 12 weeks Comments Date Status   Patient will have a reduction in LLE of at least 20 cm to allow more comfortable walking and mobility.   Today he is down 31 cm from eval 11/22 Met, Ongoing   Patient will be independent with specific HEP for lymphedema  He is walking more. 12/7 Ongoing   He will have no s/s infection.  No signs or symptoms of infection 11/16 Met   He will have no complaint of increased edema in the groin. Much improved 12/7 Progressing   Patient will be independent with all tools available to manage his LLE lymphedema. He is using the pump more. 12/7 Ongoing          Assessment/Plan     ASSESSMENT:  Patient has had a reduction in size of the L UE as compared to his last visit.  He continues to have dense tissue L upper leg, this does soften with the IASTM and the MLD.  He will look into purchasing a roller to help soften the dense tissue L upper leg, he will also use a hand massager he has at home over the dense tissue.  He has been taking a break for a couple of days from the compression but still using the Flexitouch pump.  He is not having any pain today, he always feels better after treatment stating he is more relaxed.  He does feel like is more mobile.     PLAN: Continue to work on CDT, will see patient 1 x per week.     SIGNATURE: Elida Sequeira PTA, [facility] [place] License #: [id number]  Electronically Signed on 12/7/2022        [address] [place] [address]. [address]  [phone]

## 2022-12-09 NOTE — PROGRESS NOTES
Progress Note Addendum      Patient: Holland Phelps           : 1970  Visit Date: 2022  Referring practitioner: GILBERTO Ornelas  Date of Initial Visit: Type: THERAPY  Noted: 10/12/2022  Patient seen for 8 sessions  Visit Diagnoses:    ICD-10-CM ICD-9-CM   1. Lymphedema of left leg  I89.0 457.1   2. Renal cell carcinoma of right kidney (HCC)  C64.1 189.0   3. Lymphedema of genitalia  I89.0 457.1          Clinical Progress: improved  Home Program Compliance: Yes  Progress toward previous goals: Partially Met  Prognosis to achieve goals: good    Objective     Assessment & Plan     Assessment  Impairments: abnormal gait, abnormal or restricted ROM, lacks appropriate home exercise program and pain with function  Functional Limitations: walking, uncomfortable because of pain and standingPrognosis: good    Plan  Therapy options: will be seen for skilled therapy services  Planned therapy interventions: manual therapy, compression, soft tissue mobilization, stretching, therapeutic activities, home exercise program and functional ROM exercises  Frequency: 1x week  Duration in weeks: 12  Treatment plan discussed with: patient        I spent 5 minutes with the patient and SAHIL Gil PTA,    and reviewed their progress and plan of care. The patient is in agreement with this plan.     SIGNATURE: Debbie Morales, PT, DPT, SAHIL, License #: [id number]  Electronically Signed on 2022

## 2022-12-14 ENCOUNTER — TREATMENT (OUTPATIENT)
Dept: PHYSICAL THERAPY | Facility: CLINIC | Age: 52
End: 2022-12-14

## 2022-12-14 DIAGNOSIS — I89.0 LYMPHEDEMA OF LEFT LEG: Primary | ICD-10-CM

## 2022-12-14 DIAGNOSIS — C64.1 RENAL CELL CARCINOMA OF RIGHT KIDNEY: ICD-10-CM

## 2022-12-14 PROCEDURE — 97140 MANUAL THERAPY 1/> REGIONS: CPT | Performed by: PHYSICAL THERAPIST

## 2022-12-14 PROCEDURE — 97110 THERAPEUTIC EXERCISES: CPT | Performed by: PHYSICAL THERAPIST

## 2022-12-14 NOTE — PROGRESS NOTES
Physical Therapy Treatment Note    [address] [place] [address]    Patient: Holland Phelps                                                 Visit Date: 2022  :     1970    Referring practitioner:    GILBERTO Ornelas  Date of Initial Visit:          Type: THERAPY  Noted: 10/12/2022    Patient seen for 9 sessions    Visit Diagnoses:    ICD-10-CM ICD-9-CM   1. Lymphedema of left leg  I89.0 457.1   2. Renal cell carcinoma of right kidney (HCC)  C64.1 189.0     SUBJECTIVE     Subjective He has some ankle swelling not as much at the proximal leg, overall his L leg feels good.  He is afraid to step up on it because he thinks the L knee is too fragile.  He does feel the L leg is strong enough just that because of his past surgery. He has been wearing the compression and using the pump.     PAIN: 0/10 no pain but a little leg stiffness and leg numbness         OBJECTIVE     Objective    Lymphedema     Row Name 22 0800             Subjective Pain    Able to rate subjective pain? yes  -AL      Pre-Treatment Pain Level 0  -AL         Lymphedema Measurements    Measurement Type(s) Circumferential  -AL      Circumferential Areas Lower extremities  -AL         BLE Circumferential (cm)    Measurement Location 1 BOT  -AL      Left 1 22.2 cm  -AL      Measurement Location 2 +7  -AL      Left 2 25 cm  -AL      Measurement Location 3 +7  -AL      Left 3 27.4 cm  -AL      Measurement Location 4 +10  -AL      Left 4 30.4 cm  -AL      Measurement Location 5 +10  -AL      Left 5 38.8 cm  -AL      Measurement Location 6 +10  -AL      Left 6 44 cm  -AL      Measurement Location 7 +10  -AL      Left 7 41.2 cm  -AL      Measurement Location 8 +10  -AL      Left 8 45.5 cm  -AL      Measurement Location 9 +10  -AL      Left 9 51 cm  -AL      Measurement Location 10 +10  -AL      Left 10 55.4 cm  -AL      LLE Circumferential Total 380.9 cm  -AL          RLE Circumferential (cm)    Measurement Location 1 BOT  -AL      Measurement Location 2 +7  -AL      Measurement Location 3 +7  -AL      Measurement Location 4 +10  -AL      Measurement Location 5 +10  -AL      Measurement Location 6 +10  -AL      Measurement Location 7 +10  -AL      Measurement Location 8 +10  -AL      Measurement Location 9 +10  -AL      Measurement Location 10 +10  -AL         Manual Lymphatic Drainage    Manual Lymphatic Drainage initial sequence;opened regional lymph nodes;opened anastamoses;extremity treatment  -AL      Initial Sequence short neck;abdomen;diaphragmatic breathing  -AL      Abdomen deep  -AL      Diaphragmatic Breathing x 9 with deep abdominals  -AL      Opened Regional Lymph Nodes axillary;inguinal  -AL      Axillary left  -AL      Inguinal left  -AL      Opened Anastamoses inguino-axillary  -AL      Extremity Treatment MLD to full limb  -AL      MLD to Full Limb L LE  -AL      Extremity Treatment Focus On L ankle and lower leg  -AL      Manual Lymphatic Drainage Comments Spent extra time working on MLD and IASTM to the L upper leg.  Used the red ridged roller for IASTM to the L upper leg and calf.  -AL      Manual Therapy 65  -AL         Compression/Skin Care    Compression/Skin Care compression garment  -AL      Compression/Skin Care Comments Continue to wear compression and use the pump.  -AL            User Key  (r) = Recorded By, (t) = Taken By, (c) = Cosigned By    Initials Name Provider Type    AL Elida Sequeira, PTA, CLT-LANNY Physical Therapist Assistant                 Therapeutic Exercises    21255 Units Comments   Stretched L hip in supine with half foam roll under ischial tuberosity 5 min    L hamstring stretch     Prone L quad stretch               Timed Minutes 10        Therapy Education/Self Care 76514   Education offered today Check into a roller for the dense tissue L leg, can also use a hand massage.    Medbridge Code    Ongoing HEP   Wear compression garment  and use the Flexitouch pump   Timed Minutes        Total Timed Treatment:     75   mins  Total Time of Visit:             75   mins         ASSESSMENT/PLAN     GOALS  Goals                                          Progress Note due by 1/06/23                                                      Recert due by 1/9/23   LTG by: 12 weeks Comments Date Status   Patient will have a reduction in LLE of at least 20 cm to allow more comfortable walking and mobility.   Today he is down 31 cm from eval 11/22 Met, Ongoing   Patient will be independent with specific HEP for lymphedema  He is walking more. 12/7 Ongoing   He will have no s/s infection.  No signs or symptoms of infection 11/16 Met   He will have no complaint of increased edema in the groin. Much improved 12/7 Progressing   Patient will be independent with all tools available to manage his LLE lymphedema. He is using the pump more and wearing the compression more. 12/14 Ongoing          Assessment/Plan     ASSESSMENT:  He has had a reduction in the L LE as compared to the last visit.  He is able to walk better and do household chores.  He is compliant with wearing the L LE compression garment and using the pump.  He is not having any pain today but always feels better after his treatments.      PLAN: Continue to work on CDT, will see patient 1 x per week.     SIGNATURE: lEida Sequeira PTA, [facility] [place] License #: [id number]  Electronically Signed on 12/14/2022        [address] [place] [address]. [address]  [phone]

## 2022-12-21 ENCOUNTER — TREATMENT (OUTPATIENT)
Dept: PHYSICAL THERAPY | Facility: CLINIC | Age: 52
End: 2022-12-21

## 2022-12-21 DIAGNOSIS — C64.1 RENAL CELL CARCINOMA OF RIGHT KIDNEY: ICD-10-CM

## 2022-12-21 DIAGNOSIS — I89.0 LYMPHEDEMA OF LEFT LEG: Primary | ICD-10-CM

## 2022-12-21 PROCEDURE — 97110 THERAPEUTIC EXERCISES: CPT | Performed by: PHYSICAL THERAPIST

## 2022-12-21 PROCEDURE — 97140 MANUAL THERAPY 1/> REGIONS: CPT | Performed by: PHYSICAL THERAPIST

## 2022-12-21 NOTE — PROGRESS NOTES
Physical Therapy Treatment Note    [address] [place] [address]    Patient: Holland Phelps                                                 Visit Date: 2022  :     1970    Referring practitioner:    GILBERTO Ornelas  Date of Initial Visit:          Type: THERAPY  Noted: 10/12/2022    Patient seen for 10 sessions    Visit Diagnoses:    ICD-10-CM ICD-9-CM   1. Lymphedema of left leg  I89.0 457.1   2. Renal cell carcinoma of right kidney (HCC)  C64.1 189.0     SUBJECTIVE     Subjective He states he still has numbness in the L lower leg.  He went to [place] earlier in the week, his scans and blood levels are all good.  He will go back in 4 months, he requested to have his pain medication decreased.     PAIN: 0/10 no pain leg numbness         OBJECTIVE     Objective    Lymphedema     Row Name 22 0800             Subjective Pain    Able to rate subjective pain? yes  -AL      Pre-Treatment Pain Level 0  -AL         Lymphedema Measurements    Measurement Type(s) Circumferential  -AL      Circumferential Areas Lower extremities  -AL         BLE Circumferential (cm)    Measurement Location 1 BOT  -AL      Left 1 22.5 cm  -AL      Measurement Location 2 +7  -AL      Left 2 24.8 cm  -AL      Measurement Location 3 +7  -AL      Left 3 26 cm  -AL      Measurement Location 4 +10  -AL      Left 4 31.2 cm  -AL      Measurement Location 5 +10  -AL      Left 5 39 cm  -AL      Measurement Location 6 +10  -AL      Left 6 42.5 cm  -AL      Measurement Location 7 +10  -AL      Left 7 41.6 cm  -AL      Measurement Location 8 +10  -AL      Left 8 46 cm  -AL      Measurement Location 9 +10  -AL      Left 9 52 cm  -AL      Measurement Location 10 +10  -AL      Left 10 54.9 cm  -AL      LLE Circumferential Total 380.5 cm  -AL         RLE Circumferential (cm)    Measurement Location 1 BOT  -AL      Measurement Location 2 +7  -AL      Measurement  Location 3 +7  -AL      Measurement Location 4 +10  -AL      Measurement Location 5 +10  -AL      Measurement Location 6 +10  -AL      Measurement Location 7 +10  -AL      Measurement Location 8 +10  -AL      Measurement Location 9 +10  -AL      Measurement Location 10 +10  -AL         Manual Lymphatic Drainage    Manual Lymphatic Drainage initial sequence;opened regional lymph nodes;opened anastamoses;extremity treatment  -AL      Initial Sequence short neck;abdomen;diaphragmatic breathing  -AL      Abdomen deep  -AL      Diaphragmatic Breathing x 9 with deep abdominals  -AL      Opened Regional Lymph Nodes axillary;inguinal  -AL      Axillary left  -AL      Inguinal left  -AL      Opened Anastamoses inguino-axillary  -AL      Extremity Treatment MLD to full limb  -AL      MLD to Full Limb L LE  -AL      Extremity Treatment Focus On Extra time working on MLD to the upper leg.  -AL      Manual Lymphatic Drainage Comments Spent extra time working on MLD and IASTM to the L upper leg.  Used the red ridged roller for IASTM to the L upper leg and calf.  -AL      Manual Therapy 65  -AL         Compression/Skin Care    Compression/Skin Care compression garment  -AL      Compression/Skin Care Comments He donned his compression garments  -AL            User Key  (r) = Recorded By, (t) = Taken By, (c) = Cosigned By    Initials Name Provider Type    AL Elida Sequeira, PTA, CLT-LANNY Physical Therapist Assistant                 Therapeutic Exercises    98193 Units Comments   Stretched L hip in supine with half foam roll under ischial tuberosity 5 min    L hamstring stretch     Prone L quad stretch               Timed Minutes 10        Therapy Education/Self Care 81517   Education offered today Check into a roller for the dense tissue L leg, can also use a hand massage.    Medbridge Code    Ongoing HEP   Wear compression garment and use the Flexitouch pump   Timed Minutes        Total Timed Treatment:     75   mins  Total Time of  Visit:             75   mins         ASSESSMENT/PLAN     GOALS  Goals                                          Progress Note due by 1/06/23                                                      Recert due by 1/9/23   LTG by: 12 weeks Comments Date Status   Patient will have a reduction in LLE of at least 20 cm to allow more comfortable walking and mobility.   Today he is down 31 cm from eval 11/22 Met, Ongoing   Patient will be independent with specific HEP for lymphedema  He is walking more. 12/7 Ongoing   He will have no s/s infection.  No signs or symptoms of infection 11/16 Met   He will have no complaint of increased edema in the groin. No complaints of groin edema. 12/21 Progressing   Patient will be independent with all tools available to manage his LLE lymphedema. He is using the pump more and wearing the compression more. 12/14 Ongoing          Assessment/Plan     ASSESSMENT:  No significant change in the size of the L LE as compared to his last visit.  He continues to be active at home and is able to clean cars and do household chores.  He is compliant with CDT and using the pump.    PLAN: Continue to work on CDT, will see patient 1 x per week.     SIGNATURE: Elida Sequeira PTA, [facility] [place] License #: [id number]  Electronically Signed on 12/21/2022        [address] [place] [address]. [address]  [phone]

## 2022-12-28 ENCOUNTER — TREATMENT (OUTPATIENT)
Dept: PHYSICAL THERAPY | Facility: CLINIC | Age: 52
End: 2022-12-28

## 2022-12-28 DIAGNOSIS — I89.0 LYMPHEDEMA OF LEFT LEG: Primary | ICD-10-CM

## 2022-12-28 DIAGNOSIS — C64.1 RENAL CELL CARCINOMA OF RIGHT KIDNEY: ICD-10-CM

## 2022-12-28 PROCEDURE — 97110 THERAPEUTIC EXERCISES: CPT | Performed by: PHYSICAL THERAPIST

## 2022-12-28 PROCEDURE — 97140 MANUAL THERAPY 1/> REGIONS: CPT | Performed by: PHYSICAL THERAPIST

## 2022-12-28 NOTE — PROGRESS NOTES
Physical Therapy Treatment Note    [address] [place] [address]    Patient: Holland Phelps                                                 Visit Date: 2022  :     1970    Referring practitioner:    GILBERTO Ornelas  Date of Initial Visit:          Type: THERAPY  Noted: 10/12/2022    Patient seen for 11 sessions    Visit Diagnoses:    ICD-10-CM ICD-9-CM   1. Lymphedema of left leg  I89.0 457.1   2. Renal cell carcinoma of right kidney (HCC)  C64.1 189.0     SUBJECTIVE     Subjective He is doing well, he is tired from the holidays and taking care of the grand kids.  No pain today, he does have the numbness in the shin.  He feels like the L upper leg is swollen some.     PAIN: 0/10 no pain leg numbness         OBJECTIVE     Objective    Lymphedema     Row Name 22 0800             Subjective Pain    Able to rate subjective pain? yes  -AL      Pre-Treatment Pain Level 0  -AL         Lymphedema Measurements    Measurement Type(s) Circumferential  -AL      Circumferential Areas Lower extremities  -AL         BLE Circumferential (cm)    Measurement Location 1 BOT  -AL      Left 1 21.9 cm  -AL      Measurement Location 2 +7  -AL      Left 2 24 cm  -AL      Measurement Location 3 +7  -AL      Left 3 25 cm  -AL      Measurement Location 4 +10  -AL      Left 4 30.5 cm  -AL      Measurement Location 5 +10  -AL      Left 5 39 cm  -AL      Measurement Location 6 +10  -AL      Left 6 42.5 cm  -AL      Measurement Location 7 +10  -AL      Left 7 41 cm  -AL      Measurement Location 8 +10  -AL      Left 8 46 cm  -AL      Measurement Location 9 +10  -AL      Left 9 52.4 cm  -AL      Measurement Location 10 +10  -AL      Left 10 57.6 cm  -AL      LLE Circumferential Total 379.9 cm  -AL         RLE Circumferential (cm)    Measurement Location 1 BOT  -AL      Measurement Location 2 +7  -AL      Measurement Location 3 +7  -AL      Measurement  Location 4 +10  -AL      Measurement Location 5 +10  -AL      Measurement Location 6 +10  -AL      Measurement Location 7 +10  -AL      Measurement Location 8 +10  -AL      Measurement Location 9 +10  -AL      Measurement Location 10 +10  -AL         Manual Lymphatic Drainage    Manual Lymphatic Drainage initial sequence;opened regional lymph nodes;opened anastamoses;extremity treatment  -AL      Initial Sequence short neck;abdomen;diaphragmatic breathing  -AL      Abdomen deep  -AL      Diaphragmatic Breathing x 9 with deep abdominals  -AL      Opened Regional Lymph Nodes axillary;inguinal  -AL      Axillary left  -AL      Inguinal left  -AL      Opened Anastamoses inguino-axillary  -AL      Extremity Treatment MLD to full limb  -AL      MLD to Full Limb L LE  -AL      Extremity Treatment Focus On MLD to the L groin and upper leg.  -AL      Manual Lymphatic Drainage Comments Spent extra time working on MLD and IASTM to the L upper leg.  Used the red ridged roller for IASTM to the L upper leg and calf.  -AL      Manual Therapy 65  -AL         Compression/Skin Care    Compression/Skin Care compression garment  -AL      Compression/Skin Care Comments Continue to wear compression daily.  -AL            User Key  (r) = Recorded By, (t) = Taken By, (c) = Cosigned By    Initials Name Provider Type    AL Elida Sequeira, PTA, CLT-LANNY Physical Therapist Assistant                 Therapeutic Exercises    94058 Units Comments   Stretched L hip in supine with half foam roll under ischial tuberosity 5 min    L hamstring stretch     Prone L quad stretch     Supine L hip flexor stretch in Sang Test position          Timed Minutes 10        Therapy Education/Self Care 96436   Education offered today Check into a roller for the dense tissue L leg, can also use a hand massage.    Medbridge Code    Ongoing HEP   Wear compression garment and use the Flexitouch pump   Timed Minutes        Total Timed Treatment:     75   mins  Total  Time of Visit:             75   mins         ASSESSMENT/PLAN     GOALS  Goals                                          Progress Note due by 1/06/23                                                      Recert due by 1/9/23   LTG by: 12 weeks Comments Date Status   Patient will have a reduction in LLE of at least 20 cm to allow more comfortable walking and mobility.   Today he is down 31 cm from eval 11/22 Met, Ongoing   Patient will be independent with specific HEP for lymphedema  He is walking more. 12/7 Ongoing   He will have no s/s infection.  No signs or symptoms of infection 11/16 Met   He will have no complaint of increased edema in the groin. No complaints of groin edema. 12/21 Progressing   Patient will be independent with all tools available to manage his LLE lymphedema. His lower leg has reduced in size.  12/28 Ongoing          Assessment/Plan     ASSESSMENT:  He has had a slight overall reduction in the L LE, the L lower leg has reduced.  The L upper leg has increased in size.  He continues to work on CDT and use the pump.  Patient did feel a good stretch with the hip flexor stretch today.  He does have minimal dense tissue L groin.     PLAN: Continue to work on CDT, will see patient 1 x per week.     SIGNATURE: Elida Sequeira PTA, [facility] [place] License #: [id number]  Electronically Signed on 12/28/2022        [address]  [place] [address]  [phone]

## 2023-01-04 ENCOUNTER — TREATMENT (OUTPATIENT)
Dept: PHYSICAL THERAPY | Facility: CLINIC | Age: 53
End: 2023-01-04
Payer: MEDICARE

## 2023-01-04 DIAGNOSIS — I89.0 LYMPHEDEMA OF LEFT LEG: Primary | ICD-10-CM

## 2023-01-04 DIAGNOSIS — C64.1 RENAL CELL CARCINOMA OF RIGHT KIDNEY: ICD-10-CM

## 2023-01-04 DIAGNOSIS — I89.0 LYMPHEDEMA OF GENITALIA: ICD-10-CM

## 2023-01-04 PROCEDURE — 97110 THERAPEUTIC EXERCISES: CPT | Performed by: PHYSICAL THERAPIST

## 2023-01-04 PROCEDURE — 97140 MANUAL THERAPY 1/> REGIONS: CPT | Performed by: PHYSICAL THERAPIST

## 2023-01-04 NOTE — PROGRESS NOTES
Physical Therapy Treatment Note and 90 Day Recertification Note    [address] [place] [address]    Patient: Holland Phelps                                                 Visit Date: 2023  :     1970    Referring practitioner:    GILBERTO Ornelas  Date of Initial Visit:          Type: THERAPY  Noted: 10/12/2022    Patient seen for 12 sessions    Visit Diagnoses:    ICD-10-CM ICD-9-CM   1. Lymphedema of left leg  I89.0 457.1   2. Renal cell carcinoma of right kidney (HCC)  C64.1 189.0   3. Lymphedema of genitalia  I89.0 457.1     SUBJECTIVE     Subjective He has stiffness and numbness in the L lower leg.  He thinks his L ankle is up today.      PAIN: 0/10 no pain leg numbness         OBJECTIVE     Objective    Lymphedema     Row Name 23 0800             Subjective Pain    Able to rate subjective pain? yes  -AL      Pre-Treatment Pain Level 0  -AL         BLE Circumferential (cm)    Measurement Location 1 BOT  -AL      Left 1 22.8 cm  -AL      Measurement Location 2 +7  -AL      Left 2 24.5 cm  -AL      Measurement Location 3 +7  -AL      Left 3 25.5 cm  -AL      Measurement Location 4 +10  -AL      Left 4 31.9 cm  -AL      Measurement Location 5 +10  -AL      Left 5 41.6 cm  -AL      Measurement Location 6 +10  -AL      Left 6 44.6 cm  -AL      Measurement Location 7 +10  -AL      Left 7 42 cm  -AL      Measurement Location 8 +10  -AL      Left 8 45.9 cm  -AL      Measurement Location 9 +10  -AL      Left 9 52.6 cm  -AL      Measurement Location 10 +10  -AL      Left 10 56.5 cm  -AL      LLE Circumferential Total 387.9 cm  -AL         RLE Circumferential (cm)    Measurement Location 1 BOT  -AL      Measurement Location 2 +7  -AL      Measurement Location 3 +7  -AL      Measurement Location 4 +10  -AL      Measurement Location 5 +10  -AL      Measurement Location 6 +10  -AL      Measurement Location 7 +10  -AL      Measurement  Location 8 +10  -AL      Measurement Location 9 +10  -AL      Measurement Location 10 +10  -AL         Manual Lymphatic Drainage    Manual Lymphatic Drainage initial sequence;opened regional lymph nodes;opened anastamoses;extremity treatment  -AL      Initial Sequence short neck;abdomen;diaphragmatic breathing  -AL      Abdomen deep  -AL      Diaphragmatic Breathing x 9 with deep abdominals  -AL      Opened Regional Lymph Nodes axillary;inguinal  -AL      Axillary left  -AL      Inguinal left  -AL      Opened Anastamoses inguino-axillary  -AL      Extremity Treatment MLD to full limb  -AL      MLD to Full Limb L LE  -AL      Extremity Treatment Focus On Also MLD to the L groin.  -AL      Manual Lymphatic Drainage Comments Extra time  spent working on MLD to the L ankle and lower leg.  -AL      Manual Therapy 65  -AL         Compression/Skin Care    Compression/Skin Care compression garment  -AL      Compression/Skin Care Comments Wear compression daily.  -AL            User Key  (r) = Recorded By, (t) = Taken By, (c) = Cosigned By    Initials Name Provider Type    AL Elida Sequeira, PTA, CLT-LANNY Physical Therapist Assistant                 Therapeutic Exercises    40977 Units Comments   Stretched L hip in supine with half foam roll under ischial tuberosity 5 min    L hamstring stretch     Prone L quad stretch     Supine L hip flexor stretch in Sang Test position          Timed Minutes 10        Therapy Education/Self Care 25904   Education offered today Check into a roller for the dense tissue L leg, can also use a hand massage.    Medbridge Code    Ongoing HEP   Wear compression garment and use the Flexitouch pump   Timed Minutes        Total Timed Treatment:     75   mins  Total Time of Visit:             75   mins         ASSESSMENT/PLAN     GOALS  Goals                                          Progress Note due by 2/3/23                                                      Recert due by 4/4/23   LTG by: 12  weeks Comments Date Status   Patient will have a reduction in LLE of at least 20 cm to allow more comfortable walking and mobility.   Today he is down 31 cm from eval 11/22 Met, Ongoing   Patient will be independent with specific HEP for lymphedema  He knows he needs to exercise more 1/4 Ongoing   He will have no s/s infection.  No signs or symptoms of infection 11/16 Met   He will have no complaint of increased edema in the groin. No complaints of groin edema. 12/21 Progressing   Patient will be independent with all tools available to manage his LLE lymphedema. He plans on  exercising more.  1/4 Ongoing          Assessment/Plan     ASSESSMENT:  Patient has had an increase in size of the L LE, more so in the lower leg this week.  There is pitting edema in the lower leg and dense tissue in the L upper leg.  He will work on exercising more and using the pump more often. He continues to wear the compression daily.    PLAN: Continue to work on CDT, will see patient 1 x per week.     SIGNATURE: Elida Sequeira PTA, [place], [place] License #: [id number]  Electronically Signed on 1/4/2023        [address] [place] [address]. [address]  [phone]

## 2023-01-12 ENCOUNTER — APPOINTMENT (OUTPATIENT)
Dept: CT IMAGING | Facility: HOSPITAL | Age: 53
End: 2023-01-12
Payer: MEDICARE

## 2023-01-12 ENCOUNTER — HOSPITAL ENCOUNTER (EMERGENCY)
Facility: HOSPITAL | Age: 53
Discharge: HOME | End: 2023-01-12
Attending: FAMILY MEDICINE | Admitting: FAMILY MEDICINE
Payer: MEDICARE

## 2023-01-12 VITALS
HEART RATE: 68 BPM | DIASTOLIC BLOOD PRESSURE: 86 MMHG | OXYGEN SATURATION: 96 % | HEIGHT: 70 IN | BODY MASS INDEX: 28.63 KG/M2 | SYSTOLIC BLOOD PRESSURE: 152 MMHG | WEIGHT: 200 LBS | TEMPERATURE: 98.6 F | RESPIRATION RATE: 20 BRPM

## 2023-01-12 DIAGNOSIS — R19.7 DIARRHEA, UNSPECIFIED TYPE: Primary | ICD-10-CM

## 2023-01-12 DIAGNOSIS — K52.9 ENTERITIS: ICD-10-CM

## 2023-01-12 LAB
ALBUMIN SERPL-MCNC: 4.4 G/DL (ref 3.5–5.2)
ALBUMIN/GLOB SERPL: 1 G/DL
ALP SERPL-CCNC: 61 U/L (ref 39–117)
ALT SERPL W P-5'-P-CCNC: 10 U/L (ref 1–41)
ANION GAP SERPL CALCULATED.3IONS-SCNC: 11 MMOL/L (ref 5–15)
ANISOCYTOSIS BLD QL: ABNORMAL
APTT PPP: 30 SECONDS (ref 24.1–35)
AST SERPL-CCNC: 20 U/L (ref 1–40)
BASO STIPL COARSE BLD QL SMEAR: ABNORMAL
BILIRUB SERPL-MCNC: 1.9 MG/DL (ref 0–1.2)
BUN SERPL-MCNC: 8 MG/DL (ref 6–20)
BUN/CREAT SERPL: 10.7 (ref 7–25)
CALCIUM SPEC-SCNC: 9 MG/DL (ref 8.6–10.5)
CHLORIDE SERPL-SCNC: 106 MMOL/L (ref 98–107)
CK SERPL-CCNC: 99 U/L (ref 20–200)
CO2 SERPL-SCNC: 27 MMOL/L (ref 22–29)
CREAT SERPL-MCNC: 0.75 MG/DL (ref 0.76–1.27)
CRP SERPL-MCNC: 1.27 MG/DL (ref 0–0.5)
D-LACTATE SERPL-SCNC: 1.4 MMOL/L (ref 0.5–2)
DEPRECATED RDW RBC AUTO: 70 FL (ref 37–54)
DEVELOPER EXPIRATION DATE: NORMAL
DEVELOPER LOT NUMBER: 225
EGFRCR SERPLBLD CKD-EPI 2021: 107.2 ML/MIN/1.73
EOSINOPHIL # BLD MANUAL: 0.18 10*3/MM3 (ref 0–0.4)
EOSINOPHIL NFR BLD MANUAL: 3 % (ref 0.3–6.2)
ERYTHROCYTE [DISTWIDTH] IN BLOOD BY AUTOMATED COUNT: 20.7 % (ref 12.3–15.4)
ERYTHROCYTE [SEDIMENTATION RATE] IN BLOOD: 28 MM/HR (ref 0–20)
EXPIRATION DATE: NORMAL
FECAL OCCULT BLOOD SCREEN, POC: NEGATIVE
GIANT PLATELETS: ABNORMAL
GLOBULIN UR ELPH-MCNC: 4.3 GM/DL
GLUCOSE SERPL-MCNC: 89 MG/DL (ref 65–99)
HCT VFR BLD AUTO: 32.8 % (ref 37.5–51)
HGB BLD-MCNC: 11.3 G/DL (ref 13–17.7)
HOWELL-JOLLY BOD BLD QL SMEAR: ABNORMAL
HYPOCHROMIA BLD QL: ABNORMAL
INR PPP: 1.04 (ref 0.91–1.09)
LIPASE SERPL-CCNC: 70 U/L (ref 13–60)
LYMPHOCYTES # BLD MANUAL: 1.01 10*3/MM3 (ref 0.7–3.1)
LYMPHOCYTES NFR BLD MANUAL: 11 % (ref 5–12)
Lab: 225
MAGNESIUM SERPL-MCNC: 2.4 MG/DL (ref 1.6–2.6)
MCH RBC QN AUTO: 32.3 PG (ref 26.6–33)
MCHC RBC AUTO-ENTMCNC: 34.5 G/DL (ref 31.5–35.7)
MCV RBC AUTO: 93.7 FL (ref 79–97)
MICROCYTES BLD QL: ABNORMAL
MONOCYTES # BLD: 0.66 10*3/MM3 (ref 0.1–0.9)
NEGATIVE CONTROL: NEGATIVE
NEUTROPHILS # BLD AUTO: 4.12 10*3/MM3 (ref 1.7–7)
NEUTROPHILS NFR BLD MANUAL: 69 % (ref 42.7–76)
NRBC SPEC MANUAL: 45 /100 WBC (ref 0–0.2)
PLATELET # BLD AUTO: 326 10*3/MM3 (ref 140–450)
PMV BLD AUTO: 9.9 FL (ref 6–12)
POIKILOCYTOSIS BLD QL SMEAR: ABNORMAL
POLYCHROMASIA BLD QL SMEAR: ABNORMAL
POSITIVE CONTROL: POSITIVE
POTASSIUM SERPL-SCNC: 3.4 MMOL/L (ref 3.5–5.2)
PROT SERPL-MCNC: 8.7 G/DL (ref 6–8.5)
PROTHROMBIN TIME: 13.7 SECONDS (ref 11.8–14.8)
RBC # BLD AUTO: 3.5 10*6/MM3 (ref 4.14–5.8)
SODIUM SERPL-SCNC: 144 MMOL/L (ref 136–145)
TARGETS BLD QL SMEAR: ABNORMAL
VARIANT LYMPHS NFR BLD MANUAL: 13 % (ref 19.6–45.3)
VARIANT LYMPHS NFR BLD MANUAL: 4 % (ref 0–5)
WBC MORPH BLD: NORMAL
WBC NRBC COR # BLD: 5.97 10*3/MM3 (ref 3.4–10.8)

## 2023-01-12 PROCEDURE — 25010000002 IOPAMIDOL 61 % SOLUTION: Performed by: FAMILY MEDICINE

## 2023-01-12 PROCEDURE — 85652 RBC SED RATE AUTOMATED: CPT | Performed by: FAMILY MEDICINE

## 2023-01-12 PROCEDURE — 85610 PROTHROMBIN TIME: CPT | Performed by: FAMILY MEDICINE

## 2023-01-12 PROCEDURE — 85730 THROMBOPLASTIN TIME PARTIAL: CPT | Performed by: FAMILY MEDICINE

## 2023-01-12 PROCEDURE — 85007 BL SMEAR W/DIFF WBC COUNT: CPT | Performed by: FAMILY MEDICINE

## 2023-01-12 PROCEDURE — 83605 ASSAY OF LACTIC ACID: CPT | Performed by: FAMILY MEDICINE

## 2023-01-12 PROCEDURE — 83735 ASSAY OF MAGNESIUM: CPT | Performed by: FAMILY MEDICINE

## 2023-01-12 PROCEDURE — 86140 C-REACTIVE PROTEIN: CPT | Performed by: FAMILY MEDICINE

## 2023-01-12 PROCEDURE — 83690 ASSAY OF LIPASE: CPT | Performed by: FAMILY MEDICINE

## 2023-01-12 PROCEDURE — 82270 OCCULT BLOOD FECES: CPT | Performed by: FAMILY MEDICINE

## 2023-01-12 PROCEDURE — 82550 ASSAY OF CK (CPK): CPT | Performed by: FAMILY MEDICINE

## 2023-01-12 PROCEDURE — 80053 COMPREHEN METABOLIC PANEL: CPT | Performed by: FAMILY MEDICINE

## 2023-01-12 PROCEDURE — 99283 EMERGENCY DEPT VISIT LOW MDM: CPT

## 2023-01-12 PROCEDURE — 74177 CT ABD & PELVIS W/CONTRAST: CPT

## 2023-01-12 PROCEDURE — 85025 COMPLETE CBC W/AUTO DIFF WBC: CPT | Performed by: FAMILY MEDICINE

## 2023-01-12 RX ORDER — SODIUM CHLORIDE 0.9 % (FLUSH) 0.9 %
10 SYRINGE (ML) INJECTION AS NEEDED
Status: DISCONTINUED | OUTPATIENT
Start: 2023-01-12 | End: 2023-01-12 | Stop reason: HOSPADM

## 2023-01-12 RX ADMIN — SODIUM CHLORIDE, POTASSIUM CHLORIDE, SODIUM LACTATE AND CALCIUM CHLORIDE 1000 ML: 600; 310; 30; 20 INJECTION, SOLUTION INTRAVENOUS at 11:37

## 2023-01-12 RX ADMIN — IOPAMIDOL 100 ML: 612 INJECTION, SOLUTION INTRAVENOUS at 12:56

## 2023-01-18 ENCOUNTER — TREATMENT (OUTPATIENT)
Dept: PHYSICAL THERAPY | Facility: CLINIC | Age: 53
End: 2023-01-18
Payer: MEDICARE

## 2023-01-18 DIAGNOSIS — I89.0 LYMPHEDEMA OF GENITALIA: ICD-10-CM

## 2023-01-18 DIAGNOSIS — C64.1 RENAL CELL CARCINOMA OF RIGHT KIDNEY: ICD-10-CM

## 2023-01-18 DIAGNOSIS — I89.0 LYMPHEDEMA OF LEFT LEG: Primary | ICD-10-CM

## 2023-01-18 PROCEDURE — 97110 THERAPEUTIC EXERCISES: CPT | Performed by: PHYSICAL THERAPIST

## 2023-01-18 PROCEDURE — 97140 MANUAL THERAPY 1/> REGIONS: CPT | Performed by: PHYSICAL THERAPIST

## 2023-01-18 NOTE — PROGRESS NOTES
Physical Therapy Treatment Note    [address], [place] [address]    Patient: Holland Phelps                                                 Visit Date: 2023  :     1970    Referring practitioner:    GILBERTO Ornelas  Date of Initial Visit:          Type: THERAPY  Noted: 10/12/2022    Patient seen for 13 sessions    Visit Diagnoses:    ICD-10-CM ICD-9-CM   1. Lymphedema of left leg  I89.0 457.1   2. Renal cell carcinoma of right kidney (HCC)  C64.1 189.0   3. Lymphedema of genitalia  I89.0 457.1     SUBJECTIVE     Subjective He had bloody stools and went to the ER, everything test wise was ok and he is no longer has the bloody stools. He had diarrhea for a day and a half, he is now back to his normal diet.     PAIN: 0/10 no pain leg numbness         OBJECTIVE     Objective    Lymphedema     Row Name [id number]             Subjective Pain    Able to rate subjective pain? yes  -AL      Pre-Treatment Pain Level 0  -AL         Lymphedema Measurements    Measurement Type(s) Circumferential  -AL      Circumferential Areas Lower extremities  -AL         BLE Circumferential (cm)    Measurement Location 1 BOT  -AL      Left 1 23 cm  -AL      Measurement Location 2 +7  -AL      Left 2 23.9 cm  -AL      Measurement Location 3 +7  -AL      Left 3 23.8 cm  -AL      Measurement Location 4 +10  -AL      Left 4 31 cm  -AL      Measurement Location 5 +10  -AL      Left 5 39.4 cm  -AL      Measurement Location 6 +10  -AL      Left 6 43.2 cm  -AL      Measurement Location 7 +10  -AL      Left 7 41.7 cm  -AL      Measurement Location 8 +10  -AL      Left 8 45.9 cm  -AL      Measurement Location 9 +10  -AL      Left 9 51.7 cm  -AL      Measurement Location 10 +10  -AL      Left 10 55.8 cm  -AL      LLE Circumferential Total 379.4 cm  -AL         Manual Lymphatic Drainage    Manual Lymphatic Drainage initial sequence;opened regional lymph  nodes;opened anastamoses;extremity treatment  -AL      Initial Sequence short neck;abdomen;diaphragmatic breathing  -AL      Abdomen deep  -AL      Diaphragmatic Breathing x 9 with deep abdominals  -AL      Opened Regional Lymph Nodes axillary;inguinal  -AL      Axillary left  -AL      Inguinal left  -AL      Opened Anastamoses inguino-axillary  -AL      Extremity Treatment MLD to full limb  -AL      MLD to Full Limb L LE  -AL      Manual Lymphatic Drainage Comments IASTM to both anterior/posterior thighs and both calves  -AL      Manual Therapy 65  -AL         Compression/Skin Care    Compression/Skin Care compression garment  -AL      Compression/Skin Care Comments Continue to wear compression daily.  -AL            User Key  (r) = Recorded By, (t) = Taken By, (c) = Cosigned By    Initials Name Provider Type    Elida Pickering, KEILA, SAHIL Physical Therapist Assistant                 Therapeutic Exercises    56634 Units Comments   Stretched L hip in supine with half foam roll under ischial tuberosity 5 min    L hamstring stretch     Prone L calf stretch     Supine L hip flexor stretch in Sang Test position          Timed Minutes 10        Therapy Education/Self Care 52817   Education offered today Get back into the routine for CDT and using the pump.    Medbridge Code    Ongoing HEP   Wear compression garment and use the Flexitouch pump   Timed Minutes        Total Timed Treatment:     75   mins  Total Time of Visit:             75   mins         ASSESSMENT/PLAN     GOALS  Goals                                          Progress Note due by 2/3/23                                                      Recert due by 4/4/23   LTG by: 12 weeks Comments Date Status   Patient will have a reduction in LLE of at least 20 cm to allow more comfortable walking and mobility.   Today he is down 31 cm from eval 11/22 Met, Ongoing   Patient will be independent with specific HEP for lymphedema He has been ill and not able to  work on the HEP 1/18 Ongoing   He will have no s/s infection.  No signs or symptoms of infection 11/16 Met   He will have no complaint of increased edema in the groin. No complaints of groin edema. 12/21 Progressing   Patient will be independent with all tools available to manage his LLE lymphedema. He has not felt well and has not been able to use the pump as much. 1/4 Ongoing          Assessment/Plan     ASSESSMENT:  He was not feeling well for a couple of days and his leg went down in size. He has had an 8.5 cm reduction in the L LE as compared to his last visit. He was not able to use the pump as much but this was a good idea since he was having diarrhea. He is now able to tolerate a normal diet, he will get back into the routine for CDT and using the pump.     PLAN: Continue to work on CDT, will see patient 1 x per week.     SIGNATURE: Elida Sequeira PTA, [place], [place] License #: [id number]  Electronically Signed on 1/18/2023        [address] [place] [address]. [address]  [phone]

## 2023-01-20 NOTE — PROGRESS NOTES
30 Day Progress Note and 90 Day Recertification Addendum      Patient: Holland Phelps           : 1970  Visit Date: 2023  Referring practitioner: GILBERTO Ornelas  Date of Initial Visit: Type: THERAPY  Noted: 10/12/2022  Patient seen for 12 sessions  Visit Diagnoses:    ICD-10-CM ICD-9-CM   1. Lymphedema of left leg  I89.0 457.1   2. Renal cell carcinoma of right kidney (HCC)  C64.1 189.0   3. Lymphedema of genitalia  I89.0 457.1          Clinical Progress: improved  Home Program Compliance: Yes  Progress toward previous goals: Partially Met  Prognosis to achieve goals: good    Objective     Assessment & Plan     Assessment  Impairments: abnormal gait, abnormal or restricted ROM, lacks appropriate home exercise program and pain with function  Functional Limitations: walking, uncomfortable because of pain and standing  Assessment details: Patient has had an increase in size of the L LE, more so in the lower leg this week.  There is pitting edema in the lower leg and dense tissue in the L upper leg.  He will work on exercising more and using the pump more often. He continues to wear the compression daily.  Prognosis: good    Plan  Therapy options: will be seen for skilled therapy services  Planned therapy interventions: manual therapy, compression, soft tissue mobilization, stretching, therapeutic activities, home exercise program and functional ROM exercises  Frequency: 1x week  Duration in weeks: 12  Treatment plan discussed with: patient         The patient is in agreement with this plan.     SIGNATURE: Debbie Morales, PT, DPT, CLT-ALNNY, License #: [id number]  Electronically Signed on 2023      90 Day Recertification  Certification Period: 2023 through 2023  Based upon review of the patient's progress and continued therapy plan, it is my medical opinion that Holland Phelps should continue physical therapy treatment at [facility]     PHYSICIAN: Meredith Miller,  GILBERTO (NPI: [id number])    Signature: __________________________________________________DATE: ___________________     Please sign and return via fax to [phone].   Thank you so much for letting us work with Gene. I appreciate your letting us work with your patients. If you have any questions or concerns, please don't hesitate to contact me.

## 2023-02-08 ENCOUNTER — TREATMENT (OUTPATIENT)
Dept: PHYSICAL THERAPY | Facility: CLINIC | Age: 53
End: 2023-02-08
Payer: MEDICARE

## 2023-02-08 DIAGNOSIS — I89.0 LYMPHEDEMA OF GENITALIA: ICD-10-CM

## 2023-02-08 DIAGNOSIS — I89.0 LYMPHEDEMA OF LEFT LEG: Primary | ICD-10-CM

## 2023-02-08 DIAGNOSIS — C64.1 RENAL CELL CARCINOMA OF RIGHT KIDNEY: ICD-10-CM

## 2023-02-08 PROCEDURE — 97110 THERAPEUTIC EXERCISES: CPT | Performed by: PHYSICAL THERAPIST

## 2023-02-08 PROCEDURE — 97140 MANUAL THERAPY 1/> REGIONS: CPT | Performed by: PHYSICAL THERAPIST

## 2023-02-08 NOTE — PROGRESS NOTES
Progress Note Addendum      Patient: Holland Phelps           : 1970  Visit Date: 2023  Referring practitioner: GILBERTO Ornelas  Date of Initial Visit: Type: THERAPY  Noted: 10/12/2022  Patient seen for 14 sessions  Visit Diagnoses:    ICD-10-CM ICD-9-CM   1. Lymphedema of left leg  I89.0 457.1   2. Renal cell carcinoma of right kidney (HCC)  C64.1 189.0   3. Lymphedema of genitalia  I89.0 457.1          Clinical Progress: improved  Home Program Compliance: Yes  Progress toward previous goals: Partially Met  Prognosis to achieve goals: good    Objective     Assessment & Plan     Assessment  Impairments: abnormal gait, abnormal or restricted ROM, lacks appropriate home exercise program and pain with function  Functional Limitations: walking, uncomfortable because of pain and standing  Assessment details: Patient has had a 3.4 cm reduction in the L LE as compared to his last visit.  He has not been as active because he was ill.  He has not washed as many cars due to the weather so he was able to rest more.  I used the smaller red roller on the L LE today to work on the hamstrings and medial knee.   Prognosis: good    Plan  Therapy options: will be seen for skilled therapy services  Planned therapy interventions: manual therapy, compression, soft tissue mobilization, stretching, therapeutic activities, home exercise program and functional ROM exercises  Frequency: 1x week  Duration in weeks: 12  Treatment plan discussed with: patient        The patient is in agreement with this plan.     SIGNATURE: Debbie Morales, PT, DPT, CLT-LANNY, License #: [id number]  Electronically Signed on 2023

## 2023-02-08 NOTE — PROGRESS NOTES
Physical Therapy Treatment Note and 30 Day Progress Note    [address] [place] [address]    Patient: Holland Phelps                                                 Visit Date: 2023  :     1970    Referring practitioner:    GILBERTO Ornelas  Date of Initial Visit:          Type: THERAPY  Noted: 10/12/2022    Patient seen for 14 sessions    Visit Diagnoses:    ICD-10-CM ICD-9-CM   1. Lymphedema of left leg  I89.0 457.1   2. Renal cell carcinoma of right kidney (HCC)  C64.1 189.0   3. Lymphedema of genitalia  I89.0 457.1     SUBJECTIVE     Subjective He was sick with a stomach virus but he is better now.  His leg is a little better today. He has been using the pump and wearing the compression. He has occasional numbness in the L shin. He quit smoking, he hasn't smoked for 2 weeks.     PAIN: 0/10 no pain          OBJECTIVE     Objective    Lymphedema     Row Name 23 0800             Subjective Pain    Able to rate subjective pain? yes  -AL      Pre-Treatment Pain Level 0  -AL         Lymphedema Measurements    Measurement Type(s) Circumferential  -AL      Circumferential Areas Lower extremities  -AL         BLE Circumferential (cm)    Measurement Location 1 BOT  -AL      Left 1 23.3 cm  -AL      Measurement Location 2 +7  -AL      Left 2 24 cm  -AL      Measurement Location 3 +7  -AL      Left 3 24.3 cm  -AL      Measurement Location 4 +10  -AL      Left 4 29.8 cm  -AL      Measurement Location 5 +10  -AL      Left 5 37.4 cm  -AL      Measurement Location 6 +10  -AL      Left 6 42.1 cm  -AL      Measurement Location 7 +10  -AL      Left 7 41 cm  -AL      Measurement Location 8 +10  -AL      Left 8 45.9 cm  -AL      Measurement Location 9 +10  -AL      Left 9 51.4 cm  -AL      Measurement Location 10 +10  -AL      Left 10 56.8 cm  -AL      LLE Circumferential Total 376 cm  -AL         RLE Circumferential (cm)    Measurement  Location 1 BOT  -AL      Measurement Location 2 +7  -AL      Measurement Location 3 +7  -AL      Measurement Location 4 +10  -AL      Measurement Location 5 +10  -AL      Measurement Location 6 +10  -AL      Measurement Location 7 +10  -AL      Measurement Location 8 +10  -AL      Measurement Location 9 +10  -AL      Measurement Location 10 +10  -AL         Manual Lymphatic Drainage    Manual Lymphatic Drainage initial sequence;opened regional lymph nodes;opened anastamoses;extremity treatment  -AL      Initial Sequence short neck;abdomen;diaphragmatic breathing  -AL      Abdomen deep  -AL      Diaphragmatic Breathing x 9 with deep abdominals  -AL      Opened Regional Lymph Nodes axillary;inguinal  -AL      Axillary left  -AL      Inguinal left  -AL      Opened Anastamoses inguino-axillary  -AL      Extremity Treatment MLD to full limb  -AL      MLD to Full Limb L LE  -AL      Manual Lymphatic Drainage Comments I used the red ridged roller and the small red roller for IASTM to both anterior/posterior thighs and both calves  -AL      Manual Therapy 65  -AL         Compression/Skin Care    Compression/Skin Care compression garment  -AL      Compression/Skin Care Comments Wear compression daily.  -AL            User Key  (r) = Recorded By, (t) = Taken By, (c) = Cosigned By    Initials Name Provider Type    AL Elida Sequeira, PTA, CLT-LANNY Physical Therapist Assistant                 Therapeutic Exercises    09222 Units Comments   Stretched L hip in supine with half foam roll under ischial tuberosity 5 min    L hamstring stretch     Prone L calf stretch               Timed Minutes 10        Therapy Education/Self Care 40545   Education offered today Work on CDT and use the Flexitouch pump.    Medbridge Code    Ongoing HEP   Wear compression garment and use the Flexitouch pump   Timed Minutes        Total Timed Treatment:     75   mins  Total Time of Visit:             75   mins         ASSESSMENT/PLAN     GOALS  Goals                                           Progress Note due by 3/10/23                                                      Recert due by 4/4/23   LTG by: 12 weeks Comments Date Status   Patient will have a reduction in LLE of at least 20 cm to allow more comfortable walking and mobility.   Today he is down 31 cm from eval 11/22 Met, Ongoing   Patient will be independent with specific HEP for lymphedema He was sick and was not able to work on his HEP. 2/8 Ongoing   He will have no s/s infection.  No signs or symptoms of infection 11/16 Met   He will have no complaint of increased edema in the groin. Minor groin edema. 2/8 Progressing   Patient will be independent with all tools available to manage his LLE lymphedema. He will get back into his routine for CDT. 2/7 Ongoing          Assessment/Plan     ASSESSMENT:  Patient has had a 3.4 cm reduction in the L LE as compared to his last visit.  He has not been as active because he was ill.  He has not washed as many cars due to the weather so he was able to rest more.  I used the smaller red roller on the L LE today to work on the hamstrings and medial knee.     PLAN: Continue to work on CDT, will see patient 1 x per week.     SIGNATURE: Elida Sequeira PTA, [place], [place] License #: [id number]  Electronically Signed on 2/8/2023        [address] [place] [address]. [address]  [phone]

## 2023-02-15 ENCOUNTER — TREATMENT (OUTPATIENT)
Dept: PHYSICAL THERAPY | Facility: CLINIC | Age: 53
End: 2023-02-15
Payer: MEDICARE

## 2023-02-15 DIAGNOSIS — C64.1 RENAL CELL CARCINOMA OF RIGHT KIDNEY: ICD-10-CM

## 2023-02-15 DIAGNOSIS — I89.0 LYMPHEDEMA OF GENITALIA: ICD-10-CM

## 2023-02-15 DIAGNOSIS — I89.0 LYMPHEDEMA OF LEFT LEG: Primary | ICD-10-CM

## 2023-02-15 PROCEDURE — 97140 MANUAL THERAPY 1/> REGIONS: CPT | Performed by: PHYSICAL THERAPIST

## 2023-02-15 PROCEDURE — 97110 THERAPEUTIC EXERCISES: CPT | Performed by: PHYSICAL THERAPIST

## 2023-02-22 ENCOUNTER — TREATMENT (OUTPATIENT)
Dept: PHYSICAL THERAPY | Facility: CLINIC | Age: 53
End: 2023-02-22
Payer: MEDICARE

## 2023-02-22 DIAGNOSIS — C64.1 RENAL CELL CARCINOMA OF RIGHT KIDNEY: ICD-10-CM

## 2023-02-22 DIAGNOSIS — I89.0 LYMPHEDEMA OF GENITALIA: ICD-10-CM

## 2023-02-22 DIAGNOSIS — I89.0 LYMPHEDEMA OF LEFT LEG: Primary | ICD-10-CM

## 2023-02-22 PROCEDURE — 97140 MANUAL THERAPY 1/> REGIONS: CPT | Performed by: PHYSICAL THERAPIST

## 2023-02-22 PROCEDURE — 97110 THERAPEUTIC EXERCISES: CPT | Performed by: PHYSICAL THERAPIST

## 2023-02-22 NOTE — PROGRESS NOTES
Physical Therapy Treatment Note    [address] [place] [address]    Patient: Holland Phelps                                                 Visit Date: 2023  :     1970    Referring practitioner:    GILBERTO Ornelas  Date of Initial Visit:          Type: THERAPY  Noted: 10/12/2022    Patient seen for 16 sessions    Visit Diagnoses:    ICD-10-CM ICD-9-CM   1. Lymphedema of left leg  I89.0 457.1   2. Renal cell carcinoma of right kidney (HCC)  C64.1 189.0   3. Lymphedema of genitalia  I89.0 457.1     SUBJECTIVE     Subjective He is still smoking 85-90% less than normal.  He now has an adjustable bed and he was able to elevate the L LE. Numbness from the L knee to the ankle. He is going to start working out 3-4 x a week 2 x per day.     PAIN: 0/10 no pain          OBJECTIVE     Objective    Lymphedema     Row Name 23 [id number]             Subjective Pain    Able to rate subjective pain? yes  -AL      Pre-Treatment Pain Level 0  -AL         Lymphedema Measurements    Measurement Type(s) Circumferential  -AL      Circumferential Areas Lower extremities  -AL         BLE Circumferential (cm)    Measurement Location 1 BOT  -AL      Left 1 22.5 cm  -AL      Measurement Location 2 +7  -AL      Left 2 24.6 cm  -AL      Measurement Location 3 +7  -AL      Left 3 26 cm  -AL      Measurement Location 4 +10  -AL      Left 4 30.1 cm  -AL      Measurement Location 5 +10  -AL      Left 5 38.7 cm  -AL      Measurement Location 6 +10  -AL      Left 6 43.6 cm  -AL      Measurement Location 7 +10  -AL      Left 7 41 cm  -AL      Measurement Location 8 +10  -AL      Left 8 45.8 cm  -AL      Measurement Location 9 +10  -AL      Left 9 52.1 cm  -AL      Measurement Location 10 +10  -AL      Left 10 56.4 cm  -AL      LLE Circumferential Total 380.8 cm  -AL         Manual Lymphatic Drainage    Manual Lymphatic Drainage initial sequence;opened regional  lymph nodes;opened anastamoses;extremity treatment  -AL      Initial Sequence short neck;abdomen;diaphragmatic breathing  -AL      Abdomen deep  -AL      Diaphragmatic Breathing x 9 with deep abdominals  -AL      Opened Regional Lymph Nodes axillary;inguinal  -AL      Axillary left  -AL      Inguinal left  -AL      Opened Anastamoses inguino-axillary  -AL      Extremity Treatment MLD to full limb  -AL      MLD to Full Limb L LE  -AL      Extremity Treatment Focus On Extra time spent working on MLD to the L upper leg.  -AL      Manual Lymphatic Drainage Comments IASTM to both anterior/posterior thighs and both calves  -AL      Manual Therapy 60  -AL         Compression/Skin Care    Compression/Skin Care compression garment  -AL      Compression/Skin Care Comments Wear compression on the L LE  -AL            User Key  (r) = Recorded By, (t) = Taken By, (c) = Cosigned By    Initials Name Provider Type    Elida Pickering, KEILA, CLZIGGY-LANNY Physical Therapist Assistant                 Therapeutic Exercises    58710 Units Comments   Stretched L hip in supine with half foam roll under ischial tuberosity 5 min    L hamstring stretch     Prone L quad stretch     Stretched L hip flexors in Sang Test position          Timed Minutes 15        Therapy Education/Self Care 72913   Education offered today Work on CDT and use the Flexitouch pump.    Medbridge Code    Ongoing HEP   Wear compression garment and use the Flexitouch pump   Timed Minutes        Total Timed Treatment:     75   mins  Total Time of Visit:             75   mins         ASSESSMENT/PLAN     GOALS  Goals                                          Progress Note due by 3/10/23                                                      Recert due by 4/4/23   LTG by: 12 weeks Comments Date Status   Patient will have a reduction in LLE of at least 20 cm to allow more comfortable walking and mobility.   Today he is down 31 cm from eval 11/22 Met, Ongoing   Patient will be  independent with specific HEP for lymphedema He was sick and was not able to work on his HEP. 2/8 Ongoing   He will have no s/s infection.  No signs or symptoms of infection 11/16 Met   He will have no complaint of increased edema in the groin. Minor groin edema. 2/8 Progressing   Patient will be independent with all tools available to manage his LLE lymphedema. He is working on the hip flexor stretch at home.  2/22 Ongoing          Assessment/Plan     ASSESSMENT:  Patient has had a reduction in the L LE as compared to his last measurements.  He is going to start working out slowly with light weights, he will wear his compression garment.  Patient felt a good stretch in the L hip flexors as well as his lower abdomen with the hip flexor stretch.     PLAN: Continue to work on CDT, will see patient 1 x per week.     SIGNATURE: Elida Sequeira PTA, [place], [place] License #: [id number]  Electronically Signed on 2/22/2023        [address] [place] [address]. [address]  [phone]

## 2023-03-01 ENCOUNTER — TREATMENT (OUTPATIENT)
Dept: PHYSICAL THERAPY | Facility: CLINIC | Age: 53
End: 2023-03-01
Payer: MEDICARE

## 2023-03-01 DIAGNOSIS — C64.1 RENAL CELL CARCINOMA OF RIGHT KIDNEY: ICD-10-CM

## 2023-03-01 DIAGNOSIS — I89.0 LYMPHEDEMA OF GENITALIA: ICD-10-CM

## 2023-03-01 DIAGNOSIS — I89.0 LYMPHEDEMA OF LEFT LEG: Primary | ICD-10-CM

## 2023-03-01 PROCEDURE — 97110 THERAPEUTIC EXERCISES: CPT | Performed by: PHYSICAL THERAPIST

## 2023-03-01 PROCEDURE — 97140 MANUAL THERAPY 1/> REGIONS: CPT | Performed by: PHYSICAL THERAPIST

## 2023-03-01 NOTE — PROGRESS NOTES
Physical Therapy Treatment Note    [address] [place] [address]    Patient: Holland Phelps                                                 Visit Date: 3/1/2023  :     1970    Referring practitioner:    GILBERTO Ornelas  Date of Initial Visit:          Type: THERAPY  Noted: 10/12/2022    Patient seen for 17 sessions    Visit Diagnoses:    ICD-10-CM ICD-9-CM   1. Lymphedema of left leg  I89.0 457.1   2. Renal cell carcinoma of right kidney (HCC)  C64.1 189.0   3. Lymphedema of genitalia  I89.0 457.1     SUBJECTIVE     Subjective He has been working on the MLD and using the pump. He is still smoking about one small cigar a day. He did great after his last appointment.       PAIN: 0/10 no pain, numbness from the knee to the ankle         OBJECTIVE     Objective    Lymphedema     Row Name 23 0755             Subjective Pain    Able to rate subjective pain? yes  -AL      Pre-Treatment Pain Level 0  -AL         Lymphedema Measurements    Measurement Type(s) Circumferential  -AL      Circumferential Areas Lower extremities  -AL         BLE Circumferential (cm)    Measurement Location 1 BOT  -AL      Left 1 22.1 cm  -AL      Measurement Location 2 +7  -AL      Left 2 24.4 cm  -AL      Measurement Location 3 +7  -AL      Left 3 24.4 cm  -AL      Measurement Location 4 +10  -AL      Left 4 30.2 cm  -AL      Measurement Location 5 +10  -AL      Left 5 38.5 cm  -AL      Measurement Location 6 +10  -AL      Left 6 42 cm  -AL      Measurement Location 7 +10  -AL      Left 7 39.5 cm  -AL      Measurement Location 8 +10  -AL      Left 8 45.5 cm  -AL      Measurement Location 9 +10  -AL      Left 9 52 cm  -AL      Measurement Location 10 +10  -AL      Left 10 56.7 cm  -AL      LLE Circumferential Total 375.3 cm  -AL         Manual Lymphatic Drainage    Manual Lymphatic Drainage initial sequence;opened regional lymph nodes;opened  anastamoses;extremity treatment  -AL      Initial Sequence short neck;abdomen;diaphragmatic breathing  -AL      Abdomen deep  -AL      Diaphragmatic Breathing x 9 with deep abdominals  -AL      Opened Regional Lymph Nodes axillary;inguinal  -AL      Axillary left  -AL      Inguinal left  -AL      Opened Anastamoses inguino-axillary  -AL      Extremity Treatment MLD to full limb  -AL      MLD to Full Limb L LE  -AL      Extremity Treatment Focus On Continue to spend extra time working on MLD to the L upper leg.  -AL      Manual Lymphatic Drainage Comments IASTM to both anterior/posterior thighs and both calves  -AL      Manual Therapy 50  -AL         Compression/Skin Care    Compression/Skin Care compression garment  -AL      Compression/Skin Care Comments He donned his compression garment  -AL            User Key  (r) = Recorded By, (t) = Taken By, (c) = Cosigned By    Initials Name Provider Type    Elida Pickering, KEILA, CLT-LANNY Physical Therapist Assistant                 Therapeutic Exercises    93426 Units Comments   Stretched L hip in supine with half foam roll under ischial tuberosity 5 min    L hamstring stretch     Prone L quad stretch     Stretched L hip flexors in Sang Test position          Timed Minutes 25        Therapy Education/Self Care 43812   Education offered today Work on CDT and use the Flexitouch pump.    Medbridge Code    Ongoing HEP   Wear compression garment and use the Flexitouch pump   Timed Minutes        Total Timed Treatment:     75   mins  Total Time of Visit:             75   mins         ASSESSMENT/PLAN     GOALS  Goals                                          Progress Note due by 3/10/23                                                      Recert due by 4/4/23   LTG by: 12 weeks Comments Date Status   Patient will have a reduction in LLE of at least 20 cm to allow more comfortable walking and mobility.   Today he is down 31 cm from eval 11/22 Met, Ongoing   Patient will be  independent with specific HEP for lymphedema He has been more consistent with the HEP 3/1 Ongoing   He will have no s/s infection.  No signs or symptoms of infection 11/16 Met   He will have no complaint of increased edema in the groin. Minor groin edema. 2/8 Progressing   Patient will be independent with all tools available to manage his LLE lymphedema. He is working on the hip flexor stretch at home.  2/22 Ongoing          Assessment/Plan     ASSESSMENT:  Patient has had a reduction in the L LE as compared to his last measurements.  Today I spent more time working on stretching, especially to the L hip flexor. He is able to feel this stretch into the lower abdomen as well as the groin.  He is back into a routine for working on his HEP and using the pump.  He has less dense tissue in the L hamstring area today.     PLAN: Continue to work on CDT, will see patient 1 x per week.     SIGNATURE: Elida Sequeira PTA, [place], [place] License #: [id number]  Electronically Signed on 3/1/2023        [address] [place] [address]. [address]  [phone]

## 2023-03-08 ENCOUNTER — TREATMENT (OUTPATIENT)
Dept: PHYSICAL THERAPY | Facility: CLINIC | Age: 53
End: 2023-03-08
Payer: MEDICARE

## 2023-03-08 DIAGNOSIS — I89.0 LYMPHEDEMA OF GENITALIA: ICD-10-CM

## 2023-03-08 DIAGNOSIS — C64.1 RENAL CELL CARCINOMA OF RIGHT KIDNEY: ICD-10-CM

## 2023-03-08 DIAGNOSIS — I89.0 LYMPHEDEMA OF LEFT LEG: Primary | ICD-10-CM

## 2023-03-08 PROCEDURE — 97110 THERAPEUTIC EXERCISES: CPT | Performed by: PHYSICAL THERAPIST

## 2023-03-08 PROCEDURE — 97140 MANUAL THERAPY 1/> REGIONS: CPT | Performed by: PHYSICAL THERAPIST

## 2023-03-08 NOTE — PROGRESS NOTES
Physical Therapy Treatment Note and 30 Day Progress Note    [address] [place] [address]    Patient: Holland Phelps                                                 Visit Date: 3/8/2023  :     1970    Referring practitioner:    GILBERTO Ornelas  Date of Initial Visit:          Type: THERAPY  Noted: 10/12/2022    Patient seen for 18 sessions    Visit Diagnoses:    ICD-10-CM ICD-9-CM   1. Lymphedema of left leg  I89.0 457.1   2. Renal cell carcinoma of right kidney (HCC)  C64.1 189.0   3. 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   and Monday he had a lot of pain and swelling.  He still has a lot of swelling today but the pain is down slightly. He states the L proximal leg is harder and he feels like the swelling doesn't have anywhere to go.        PAIN: 4/10 L LE         OBJECTIVE     Objective    Lymphedema     Row Name 23 0800             Subjective Pain    Able to rate subjective pain? yes  -AL      Pre-Treatment Pain Level 4  -AL      Subjective Pain Comment L LE  -AL         Lymphedema Measurements    Measurement Type(s) Circumferential  -AL      Circumferential Areas Lower extremities  -AL         BLE Circumferential (cm)    Measurement Location 1 BOT  -AL      Left 1 23.4 cm  -AL      Measurement Location 2 +7  -AL      Left 2 29.1 cm  -AL      Measurement Location 3 +7  -AL      Left 3 30 cm  -AL      Measurement Location 4 +10  -AL      Left 4 35.1 cm  -AL      Measurement Location 5 +10  -AL      Left 5 44.9 cm  -AL      Measurement Location 6 +10  -AL      Left 6 47.9 cm  -AL      Measurement Location 7 +10  -AL      Left 7 48 cm  -AL      Measurement Location 8 +10  -AL      Left 8 52.2 cm  -AL      Measurement Location 9 +10  -AL      Left 9 58.5 cm  -AL      Measurement Location 10 +10  -AL      Left 10  64.5 cm  -AL      LLE Circumferential Total 433.6 cm  -AL         Manual Lymphatic Drainage    Manual Lymphatic Drainage initial sequence;opened regional lymph nodes;opened anastamoses;extremity treatment  -AL      Initial Sequence short neck;abdomen;diaphragmatic breathing  -AL      Abdomen deep  -AL      Diaphragmatic Breathing x 9 with deep abdominals  -AL      Opened Regional Lymph Nodes axillary;inguinal  -AL      Axillary left  -AL      Inguinal left  -AL      Opened Anastamoses inguino-axillary  -AL      Extremity Treatment MLD to full limb  -AL      MLD to Full Limb L LE  -AL      Extremity Treatment Focus On L upper leg and abdomen  -AL      Manual Lymphatic Drainage Comments IASTM to both anterior/posterior thighs and L calves  -AL      Manual Therapy 50  -AL         Compression/Skin Care    Compression/Skin Care compression garment  -AL      Compression/Skin Care Comments He donned his compression garment  -AL            User Key  (r) = Recorded By, (t) = Taken By, (c) = Cosigned By    Initials Name Provider Type    AL Elida Sequeira, PTA, CLT-LANNY Physical Therapist Assistant                 Therapeutic Exercises    25316 Units Comments   Stretched L hip in supine with half foam roll under ischial tuberosity 5 min    L hamstring stretch     Prone L quad stretch     Stretched L hip flexors in Sang Test position          Timed Minutes 10        Therapy Education/Self Care 33063   Education offered today Work on CDT and use the Flexitouch pump. Watch for s/s of cellulitis   Medbridge Code    Ongoing HEP   Wear compression garment and use the Flexitouch pump   Timed Minutes        Total Timed Treatment:     70   mins  Total Time of Visit:             70   mins         ASSESSMENT/PLAN     GOALS  Goals                                          Progress Note due by 3/10/23                                                      Recert due by 4/4/23   LTG by: 12 weeks Comments Date Status   Patient will have a  reduction in LLE of at least 20 cm to allow more comfortable walking and mobility.  He has had a large increase in size of the L LE 3/8 Ongoing   Patient will be independent with specific HEP for lymphedema He has been working on the HEP 3/8 Ongoing   He will have no s/s infection.  No signs or symptoms of infection 11/16 Met   He will have no complaint of increased edema in the groin. He has dense tissue L groin 3/8 Ongoing   Patient will be independent with all tools available to manage his LLE lymphedema. He is working on his home maintenance program. 3/8 Ongoing          Assessment/Plan     ASSESSMENT:  Patient washed 6 cars on Saturday and has had a large increase in size of the L LE.  The tissue is very dense in the L upper leg before treatment, this dose soften with the MLD and IASTM.  The lower leg presents with pitting edema.  L knee flexion is more restricted today due to the swelling and he is walking with an antalgic gait. I did spend more time working on MLD to the abdomen and L upper leg.  He is still working on CDT and using the pump.  He has not noticed and genitale swelling.      PLAN: Continue to work on CDT, will see patient 1-2  x per week x 8 weeks.   Measure the L LE next treatment.     SIGNATURE: Eliad Sequeira PTA, [place], [place] License #: [id number]  Electronically Signed on 3/8/2023        [address] [place] [address]. [id number]  [phone]

## 2023-03-10 ENCOUNTER — APPOINTMENT (OUTPATIENT)
Dept: ULTRASOUND IMAGING | Facility: HOSPITAL | Age: 53
DRG: 270 | End: 2023-03-10
Payer: MEDICARE

## 2023-03-10 ENCOUNTER — HOSPITAL ENCOUNTER (INPATIENT)
Facility: HOSPITAL | Age: 53
LOS: 4 days | Discharge: HOME | DRG: 270 | End: 2023-03-15
Attending: STUDENT IN AN ORGANIZED HEALTH CARE EDUCATION/TRAINING PROGRAM | Admitting: INTERNAL MEDICINE
Payer: MEDICARE

## 2023-03-10 ENCOUNTER — APPOINTMENT (OUTPATIENT)
Dept: CT IMAGING | Facility: HOSPITAL | Age: 53
DRG: 270 | End: 2023-03-10
Payer: MEDICARE

## 2023-03-10 ENCOUNTER — APPOINTMENT (OUTPATIENT)
Dept: GENERAL RADIOLOGY | Facility: HOSPITAL | Age: 53
DRG: 270 | End: 2023-03-10
Payer: MEDICARE

## 2023-03-10 DIAGNOSIS — I87.1 STENOSIS OF LEFT ILIAC VEIN: ICD-10-CM

## 2023-03-10 DIAGNOSIS — D57.00 SICKLE CELL PAIN CRISIS: ICD-10-CM

## 2023-03-10 DIAGNOSIS — L03.116 CELLULITIS OF LEFT LOWER EXTREMITY: ICD-10-CM

## 2023-03-10 DIAGNOSIS — G89.4 CHRONIC PAIN SYNDROME: ICD-10-CM

## 2023-03-10 DIAGNOSIS — I82.412 ACUTE DEEP VEIN THROMBOSIS (DVT) OF FEMORAL VEIN OF LEFT LOWER EXTREMITY: Primary | ICD-10-CM

## 2023-03-10 LAB
ALBUMIN SERPL-MCNC: 3.7 G/DL (ref 3.5–5.2)
ALBUMIN/GLOB SERPL: 1.1 G/DL
ALP SERPL-CCNC: 51 U/L (ref 39–117)
ALT SERPL W P-5'-P-CCNC: 8 U/L (ref 1–41)
ANION GAP SERPL CALCULATED.3IONS-SCNC: 10 MMOL/L (ref 5–15)
AST SERPL-CCNC: 16 U/L (ref 1–40)
BASOPHILS # BLD AUTO: 0.03 10*3/MM3 (ref 0–0.2)
BASOPHILS NFR BLD AUTO: 0.5 % (ref 0–1.5)
BILIRUB SERPL-MCNC: 1 MG/DL (ref 0–1.2)
BUN SERPL-MCNC: 9 MG/DL (ref 6–20)
BUN/CREAT SERPL: 12.2 (ref 7–25)
CALCIUM SPEC-SCNC: 7.8 MG/DL (ref 8.6–10.5)
CHLORIDE SERPL-SCNC: 105 MMOL/L (ref 98–107)
CO2 SERPL-SCNC: 27 MMOL/L (ref 22–29)
CREAT SERPL-MCNC: 0.74 MG/DL (ref 0.76–1.27)
CRP SERPL-MCNC: 7.15 MG/DL (ref 0–0.5)
D-LACTATE SERPL-SCNC: 1 MMOL/L (ref 0.5–2)
DEPRECATED RDW RBC AUTO: 66.5 FL (ref 37–54)
EGFRCR SERPLBLD CKD-EPI 2021: 107.7 ML/MIN/1.73
EOSINOPHIL # BLD AUTO: 0.31 10*3/MM3 (ref 0–0.4)
EOSINOPHIL NFR BLD AUTO: 5.4 % (ref 0.3–6.2)
ERYTHROCYTE [DISTWIDTH] IN BLOOD BY AUTOMATED COUNT: 20.5 % (ref 12.3–15.4)
ERYTHROCYTE [SEDIMENTATION RATE] IN BLOOD: 56 MM/HR (ref 0–20)
FLUAV RNA RESP QL NAA+PROBE: NOT DETECTED
FLUBV RNA RESP QL NAA+PROBE: NOT DETECTED
GLOBULIN UR ELPH-MCNC: 3.4 GM/DL
GLUCOSE SERPL-MCNC: 95 MG/DL (ref 65–99)
HCT VFR BLD AUTO: 22.8 % (ref 37.5–51)
HGB BLD-MCNC: 7.8 G/DL (ref 13–17.7)
LYMPHOCYTES # BLD AUTO: 1.21 10*3/MM3 (ref 0.7–3.1)
LYMPHOCYTES NFR BLD AUTO: 21.2 % (ref 19.6–45.3)
MCH RBC QN AUTO: 31.5 PG (ref 26.6–33)
MCHC RBC AUTO-ENTMCNC: 34.2 G/DL (ref 31.5–35.7)
MCV RBC AUTO: 91.9 FL (ref 79–97)
MONOCYTES # BLD AUTO: 0.95 10*3/MM3 (ref 0.1–0.9)
MONOCYTES NFR BLD AUTO: 16.7 % (ref 5–12)
NEUTROPHILS NFR BLD AUTO: 3.18 10*3/MM3 (ref 1.7–7)
NEUTROPHILS NFR BLD AUTO: 55.8 % (ref 42.7–76)
PLATELET # BLD AUTO: 275 10*3/MM3 (ref 140–450)
PMV BLD AUTO: 9.9 FL (ref 6–12)
POTASSIUM SERPL-SCNC: 3.3 MMOL/L (ref 3.5–5.2)
PROT SERPL-MCNC: 7.1 G/DL (ref 6–8.5)
RBC # BLD AUTO: 2.48 10*6/MM3 (ref 4.14–5.8)
RSV RNA NPH QL NAA+NON-PROBE: NOT DETECTED
SARS-COV-2 RNA RESP QL NAA+PROBE: NOT DETECTED
SODIUM SERPL-SCNC: 142 MMOL/L (ref 136–145)
WBC NRBC COR # BLD: 5.7 10*3/MM3 (ref 3.4–10.8)

## 2023-03-10 PROCEDURE — 99285 EMERGENCY DEPT VISIT HI MDM: CPT

## 2023-03-10 PROCEDURE — 73562 X-RAY EXAM OF KNEE 3: CPT

## 2023-03-10 PROCEDURE — 0 HYDROMORPHONE 1 MG/ML SOLUTION: Performed by: STUDENT IN AN ORGANIZED HEALTH CARE EDUCATION/TRAINING PROGRAM

## 2023-03-10 PROCEDURE — 25510000001 IOPAMIDOL PER 1 ML: Performed by: STUDENT IN AN ORGANIZED HEALTH CARE EDUCATION/TRAINING PROGRAM

## 2023-03-10 PROCEDURE — 83605 ASSAY OF LACTIC ACID: CPT | Performed by: STUDENT IN AN ORGANIZED HEALTH CARE EDUCATION/TRAINING PROGRAM

## 2023-03-10 PROCEDURE — 25010000002 VANCOMYCIN 10 G RECONSTITUTED SOLUTION: Performed by: STUDENT IN AN ORGANIZED HEALTH CARE EDUCATION/TRAINING PROGRAM

## 2023-03-10 PROCEDURE — 85045 AUTOMATED RETICULOCYTE COUNT: CPT | Performed by: STUDENT IN AN ORGANIZED HEALTH CARE EDUCATION/TRAINING PROGRAM

## 2023-03-10 PROCEDURE — 93971 EXTREMITY STUDY: CPT | Performed by: SURGERY

## 2023-03-10 PROCEDURE — 85652 RBC SED RATE AUTOMATED: CPT | Performed by: STUDENT IN AN ORGANIZED HEALTH CARE EDUCATION/TRAINING PROGRAM

## 2023-03-10 PROCEDURE — 80053 COMPREHEN METABOLIC PANEL: CPT | Performed by: STUDENT IN AN ORGANIZED HEALTH CARE EDUCATION/TRAINING PROGRAM

## 2023-03-10 PROCEDURE — 86140 C-REACTIVE PROTEIN: CPT | Performed by: STUDENT IN AN ORGANIZED HEALTH CARE EDUCATION/TRAINING PROGRAM

## 2023-03-10 PROCEDURE — 93971 EXTREMITY STUDY: CPT

## 2023-03-10 PROCEDURE — 87040 BLOOD CULTURE FOR BACTERIA: CPT | Performed by: STUDENT IN AN ORGANIZED HEALTH CARE EDUCATION/TRAINING PROGRAM

## 2023-03-10 PROCEDURE — 71275 CT ANGIOGRAPHY CHEST: CPT

## 2023-03-10 PROCEDURE — 85025 COMPLETE CBC W/AUTO DIFF WBC: CPT | Performed by: STUDENT IN AN ORGANIZED HEALTH CARE EDUCATION/TRAINING PROGRAM

## 2023-03-10 PROCEDURE — 87637 SARSCOV2&INF A&B&RSV AMP PRB: CPT | Performed by: STUDENT IN AN ORGANIZED HEALTH CARE EDUCATION/TRAINING PROGRAM

## 2023-03-10 RX ADMIN — VANCOMYCIN HYDROCHLORIDE 1750 MG: 10 INJECTION, POWDER, LYOPHILIZED, FOR SOLUTION INTRAVENOUS at 22:11

## 2023-03-10 RX ADMIN — HYDROMORPHONE HYDROCHLORIDE 1 MG: 1 INJECTION, SOLUTION INTRAMUSCULAR; INTRAVENOUS; SUBCUTANEOUS at 21:45

## 2023-03-10 RX ADMIN — IOPAMIDOL 100 ML: 755 INJECTION, SOLUTION INTRAVENOUS at 23:26

## 2023-03-10 RX ADMIN — HYDROMORPHONE HYDROCHLORIDE 1 MG: 1 INJECTION, SOLUTION INTRAMUSCULAR; INTRAVENOUS; SUBCUTANEOUS at 23:47

## 2023-03-11 ENCOUNTER — APPOINTMENT (OUTPATIENT)
Dept: CT IMAGING | Facility: HOSPITAL | Age: 53
DRG: 270 | End: 2023-03-11
Payer: MEDICARE

## 2023-03-11 PROBLEM — I87.1: Status: ACTIVE | Noted: 2023-03-10

## 2023-03-11 PROBLEM — I10 PRIMARY HYPERTENSION: Status: ACTIVE | Noted: 2023-03-11

## 2023-03-11 PROBLEM — G89.4 CHRONIC PAIN SYNDROME: Status: ACTIVE | Noted: 2023-03-11

## 2023-03-11 PROBLEM — I82.412 ACUTE DEEP VEIN THROMBOSIS (DVT) OF FEMORAL VEIN OF LEFT LOWER EXTREMITY: Status: ACTIVE | Noted: 2023-03-11

## 2023-03-11 PROBLEM — E87.6 HYPOKALEMIA: Status: ACTIVE | Noted: 2023-03-11

## 2023-03-11 LAB
ABO GROUP BLD: NORMAL
APTT PPP: 124.6 SECONDS (ref 24.1–35)
APTT PPP: 185.2 SECONDS (ref 24.1–35)
APTT PPP: 37.4 SECONDS (ref 24.1–35)
APTT PPP: 67.3 SECONDS (ref 24.1–35)
BASOPHILS # BLD AUTO: 0.03 10*3/MM3 (ref 0–0.2)
BASOPHILS NFR BLD AUTO: 0.5 % (ref 0–1.5)
BLD GP AB SCN SERPL QL: NEGATIVE
DEPRECATED RDW RBC AUTO: 64.6 FL (ref 37–54)
EOSINOPHIL # BLD AUTO: 0.33 10*3/MM3 (ref 0–0.4)
EOSINOPHIL NFR BLD AUTO: 5.1 % (ref 0.3–6.2)
ERYTHROCYTE [DISTWIDTH] IN BLOOD BY AUTOMATED COUNT: 20.5 % (ref 12.3–15.4)
HCT VFR BLD AUTO: 21.2 % (ref 37.5–51)
HGB BLD-MCNC: 7.5 G/DL (ref 13–17.7)
INR PPP: 1.13 (ref 0.91–1.09)
LYMPHOCYTES # BLD AUTO: 1.46 10*3/MM3 (ref 0.7–3.1)
LYMPHOCYTES NFR BLD AUTO: 22.5 % (ref 19.6–45.3)
MCH RBC QN AUTO: 31.6 PG (ref 26.6–33)
MCHC RBC AUTO-ENTMCNC: 35.4 G/DL (ref 31.5–35.7)
MCV RBC AUTO: 89.5 FL (ref 79–97)
MONOCYTES # BLD AUTO: 0.9 10*3/MM3 (ref 0.1–0.9)
MONOCYTES NFR BLD AUTO: 13.9 % (ref 5–12)
NEUTROPHILS NFR BLD AUTO: 3.74 10*3/MM3 (ref 1.7–7)
NEUTROPHILS NFR BLD AUTO: 57.5 % (ref 42.7–76)
PLATELET # BLD AUTO: 275 10*3/MM3 (ref 140–450)
PMV BLD AUTO: 10.2 FL (ref 6–12)
PROTHROMBIN TIME: 14.6 SECONDS (ref 11.8–14.8)
RBC # BLD AUTO: 2.37 10*6/MM3 (ref 4.14–5.8)
RETICS # AUTO: 0.03 10*6/MM3 (ref 0.02–0.13)
RETICS/RBC NFR AUTO: 1.3 % (ref 0.7–1.9)
RH BLD: POSITIVE
T&S EXPIRATION DATE: NORMAL
WBC NRBC COR # BLD: 6.49 10*3/MM3 (ref 3.4–10.8)

## 2023-03-11 PROCEDURE — 86901 BLOOD TYPING SEROLOGIC RH(D): CPT

## 2023-03-11 PROCEDURE — 85610 PROTHROMBIN TIME: CPT | Performed by: STUDENT IN AN ORGANIZED HEALTH CARE EDUCATION/TRAINING PROGRAM

## 2023-03-11 PROCEDURE — 25510000001 IOPAMIDOL PER 1 ML: Performed by: STUDENT IN AN ORGANIZED HEALTH CARE EDUCATION/TRAINING PROGRAM

## 2023-03-11 PROCEDURE — 85730 THROMBOPLASTIN TIME PARTIAL: CPT | Performed by: STUDENT IN AN ORGANIZED HEALTH CARE EDUCATION/TRAINING PROGRAM

## 2023-03-11 PROCEDURE — 85730 THROMBOPLASTIN TIME PARTIAL: CPT | Performed by: INTERNAL MEDICINE

## 2023-03-11 PROCEDURE — 25010000002 HEPARIN (PORCINE) 25000-0.45 UT/250ML-% SOLUTION: Performed by: STUDENT IN AN ORGANIZED HEALTH CARE EDUCATION/TRAINING PROGRAM

## 2023-03-11 PROCEDURE — 86900 BLOOD TYPING SEROLOGIC ABO: CPT

## 2023-03-11 PROCEDURE — 0 HYDROMORPHONE 1 MG/ML SOLUTION: Performed by: STUDENT IN AN ORGANIZED HEALTH CARE EDUCATION/TRAINING PROGRAM

## 2023-03-11 PROCEDURE — 85025 COMPLETE CBC W/AUTO DIFF WBC: CPT | Performed by: STUDENT IN AN ORGANIZED HEALTH CARE EDUCATION/TRAINING PROGRAM

## 2023-03-11 PROCEDURE — 74174 CTA ABD&PLVS W/CONTRAST: CPT

## 2023-03-11 PROCEDURE — 86923 COMPATIBILITY TEST ELECTRIC: CPT

## 2023-03-11 PROCEDURE — 25010000002 HEPARIN (PORCINE) PER 1000 UNITS: Performed by: STUDENT IN AN ORGANIZED HEALTH CARE EDUCATION/TRAINING PROGRAM

## 2023-03-11 PROCEDURE — 86850 RBC ANTIBODY SCREEN: CPT | Performed by: SURGERY

## 2023-03-11 PROCEDURE — 99221 1ST HOSP IP/OBS SF/LOW 40: CPT | Performed by: SURGERY

## 2023-03-11 PROCEDURE — 86900 BLOOD TYPING SEROLOGIC ABO: CPT | Performed by: SURGERY

## 2023-03-11 PROCEDURE — 86901 BLOOD TYPING SEROLOGIC RH(D): CPT | Performed by: SURGERY

## 2023-03-11 RX ORDER — HYDROXYUREA 500 MG/1
1000 CAPSULE ORAL DAILY
Status: DISCONTINUED | OUTPATIENT
Start: 2023-03-11 | End: 2023-03-15 | Stop reason: HOSPADM

## 2023-03-11 RX ORDER — ACETAMINOPHEN 325 MG/1
650 TABLET ORAL EVERY 6 HOURS PRN
Status: DISCONTINUED | OUTPATIENT
Start: 2023-03-11 | End: 2023-03-15 | Stop reason: HOSPADM

## 2023-03-11 RX ORDER — CEFADROXIL 500 MG/1
500 CAPSULE ORAL 2 TIMES DAILY
COMMUNITY

## 2023-03-11 RX ORDER — MORPHINE SULFATE 60 MG/1
60 TABLET, FILM COATED, EXTENDED RELEASE ORAL 2 TIMES DAILY
COMMUNITY
End: 2023-03-15 | Stop reason: HOSPADM

## 2023-03-11 RX ORDER — TAMSULOSIN HYDROCHLORIDE 0.4 MG/1
0.4 CAPSULE ORAL DAILY
Status: DISCONTINUED | OUTPATIENT
Start: 2023-03-11 | End: 2023-03-15 | Stop reason: HOSPADM

## 2023-03-11 RX ORDER — HYDROCORTISONE 10 MG/1
20 TABLET ORAL EVERY MORNING
COMMUNITY
Start: 2023-02-21

## 2023-03-11 RX ORDER — CALCIUM CARBONATE/VITAMIN D3 500 MG-10
2 TABLET ORAL EVERY MORNING
COMMUNITY

## 2023-03-11 RX ORDER — GUAIFENESIN 600 MG/1
1200 TABLET, EXTENDED RELEASE ORAL EVERY 12 HOURS SCHEDULED
Status: DISCONTINUED | OUTPATIENT
Start: 2023-03-11 | End: 2023-03-15 | Stop reason: HOSPADM

## 2023-03-11 RX ORDER — POTASSIUM CHLORIDE 750 MG/1
40 CAPSULE, EXTENDED RELEASE ORAL ONCE
Status: COMPLETED | OUTPATIENT
Start: 2023-03-11 | End: 2023-03-11

## 2023-03-11 RX ORDER — MORPHINE SULFATE 30 MG/1
120 TABLET, FILM COATED, EXTENDED RELEASE ORAL 2 TIMES DAILY
Status: DISCONTINUED | OUTPATIENT
Start: 2023-03-11 | End: 2023-03-11

## 2023-03-11 RX ORDER — HYDROCORTISONE 10 MG/1
20 TABLET ORAL
Status: DISCONTINUED | OUTPATIENT
Start: 2023-03-11 | End: 2023-03-15 | Stop reason: HOSPADM

## 2023-03-11 RX ORDER — HEPARIN SODIUM 1000 [USP'U]/ML
80 INJECTION, SOLUTION INTRAVENOUS; SUBCUTANEOUS ONCE
Status: COMPLETED | OUTPATIENT
Start: 2023-03-11 | End: 2023-03-11

## 2023-03-11 RX ORDER — CALCIUM CARBONATE 200(500)MG
1 TABLET,CHEWABLE ORAL 3 TIMES DAILY
Status: DISCONTINUED | OUTPATIENT
Start: 2023-03-11 | End: 2023-03-15 | Stop reason: HOSPADM

## 2023-03-11 RX ORDER — MORPHINE SULFATE 15 MG/1
30 TABLET ORAL EVERY 4 HOURS PRN
Status: DISCONTINUED | OUTPATIENT
Start: 2023-03-11 | End: 2023-03-15 | Stop reason: HOSPADM

## 2023-03-11 RX ORDER — HYDROCORTISONE 10 MG/1
10 TABLET ORAL EVERY EVENING
Status: DISCONTINUED | OUTPATIENT
Start: 2023-03-11 | End: 2023-03-15 | Stop reason: HOSPADM

## 2023-03-11 RX ORDER — BISACODYL 10 MG
10 SUPPOSITORY, RECTAL RECTAL DAILY
Status: DISCONTINUED | OUTPATIENT
Start: 2023-03-11 | End: 2023-03-15 | Stop reason: HOSPADM

## 2023-03-11 RX ORDER — IPRATROPIUM BROMIDE AND ALBUTEROL SULFATE 2.5; .5 MG/3ML; MG/3ML
3 SOLUTION RESPIRATORY (INHALATION) EVERY 4 HOURS PRN
Status: DISCONTINUED | OUTPATIENT
Start: 2023-03-11 | End: 2023-03-15 | Stop reason: HOSPADM

## 2023-03-11 RX ORDER — LOSARTAN POTASSIUM 50 MG/1
25 TABLET ORAL DAILY
Status: DISCONTINUED | OUTPATIENT
Start: 2023-03-11 | End: 2023-03-15 | Stop reason: HOSPADM

## 2023-03-11 RX ORDER — ESCITALOPRAM OXALATE 10 MG/1
20 TABLET ORAL EVERY EVENING
Status: DISCONTINUED | OUTPATIENT
Start: 2023-03-11 | End: 2023-03-15 | Stop reason: HOSPADM

## 2023-03-11 RX ORDER — FOLIC ACID 1 MG/1
1 TABLET ORAL DAILY
Status: DISCONTINUED | OUTPATIENT
Start: 2023-03-11 | End: 2023-03-15 | Stop reason: HOSPADM

## 2023-03-11 RX ORDER — LOSARTAN POTASSIUM 25 MG/1
25 TABLET ORAL EVERY MORNING
COMMUNITY

## 2023-03-11 RX ORDER — MORPHINE SULFATE 15 MG/1
15 TABLET, FILM COATED, EXTENDED RELEASE ORAL EVERY 8 HOURS SCHEDULED
Status: DISCONTINUED | OUTPATIENT
Start: 2023-03-11 | End: 2023-03-15 | Stop reason: HOSPADM

## 2023-03-11 RX ORDER — SENNA PLUS 8.6 MG/1
1 TABLET ORAL EVERY EVENING
Status: DISCONTINUED | OUTPATIENT
Start: 2023-03-11 | End: 2023-03-12

## 2023-03-11 RX ORDER — NIFEDIPINE 90 MG/1
90 TABLET, FILM COATED, EXTENDED RELEASE ORAL DAILY
Status: DISCONTINUED | OUTPATIENT
Start: 2023-03-11 | End: 2023-03-15 | Stop reason: HOSPADM

## 2023-03-11 RX ORDER — HYDROCORTISONE 10 MG/1
10 TABLET ORAL EVERY EVENING
COMMUNITY

## 2023-03-11 RX ORDER — HEPARIN SODIUM 1000 [USP'U]/ML
40 INJECTION, SOLUTION INTRAVENOUS; SUBCUTANEOUS AS NEEDED
Status: DISCONTINUED | OUTPATIENT
Start: 2023-03-11 | End: 2023-03-14

## 2023-03-11 RX ORDER — MORPHINE SULFATE 15 MG/1
15 TABLET, FILM COATED, EXTENDED RELEASE ORAL 3 TIMES DAILY
COMMUNITY
End: 2023-03-11

## 2023-03-11 RX ORDER — MORPHINE SULFATE 15 MG/1
30 TABLET, FILM COATED, EXTENDED RELEASE ORAL DAILY PRN
COMMUNITY
End: 2023-03-11

## 2023-03-11 RX ORDER — MORPHINE SULFATE 30 MG/1
30 TABLET ORAL EVERY 4 HOURS PRN
COMMUNITY

## 2023-03-11 RX ORDER — HEPARIN SODIUM 1000 [USP'U]/ML
80 INJECTION, SOLUTION INTRAVENOUS; SUBCUTANEOUS AS NEEDED
Status: DISCONTINUED | OUTPATIENT
Start: 2023-03-11 | End: 2023-03-14

## 2023-03-11 RX ORDER — HEPARIN SODIUM 10000 [USP'U]/100ML
16.4 INJECTION, SOLUTION INTRAVENOUS
Status: DISCONTINUED | OUTPATIENT
Start: 2023-03-11 | End: 2023-03-14

## 2023-03-11 RX ADMIN — POTASSIUM CHLORIDE 40 MEQ: 10 CAPSULE, COATED, EXTENDED RELEASE ORAL at 15:27

## 2023-03-11 RX ADMIN — GUAIFENESIN 1200 MG: 600 TABLET, EXTENDED RELEASE ORAL at 21:06

## 2023-03-11 RX ADMIN — HYDROCORTISONE 20 MG: 10 TABLET ORAL at 10:17

## 2023-03-11 RX ADMIN — IOPAMIDOL 100 ML: 755 INJECTION, SOLUTION INTRAVENOUS at 01:02

## 2023-03-11 RX ADMIN — HYDROCORTISONE 10 MG: 10 TABLET ORAL at 21:07

## 2023-03-11 RX ADMIN — HEPARIN SODIUM 7300 UNITS: 1000 INJECTION, SOLUTION INTRAVENOUS; SUBCUTANEOUS at 02:14

## 2023-03-11 RX ADMIN — MORPHINE SULFATE 120 MG: 30 TABLET, FILM COATED, EXTENDED RELEASE ORAL at 04:37

## 2023-03-11 RX ADMIN — HYDROMORPHONE HYDROCHLORIDE 1 MG: 1 INJECTION, SOLUTION INTRAMUSCULAR; INTRAVENOUS; SUBCUTANEOUS at 02:23

## 2023-03-11 RX ADMIN — SENNOSIDES 1 TABLET: 8.6 TABLET, FILM COATED ORAL at 21:07

## 2023-03-11 RX ADMIN — TAMSULOSIN HYDROCHLORIDE 0.4 MG: 0.4 CAPSULE ORAL at 10:18

## 2023-03-11 RX ADMIN — HEPARIN SODIUM 3650 UNITS: 1000 INJECTION, SOLUTION INTRAVENOUS; SUBCUTANEOUS at 16:00

## 2023-03-11 RX ADMIN — HYDROXYUREA 1000 MG: 500 CAPSULE ORAL at 10:17

## 2023-03-11 RX ADMIN — HEPARIN SODIUM 16.4 UNITS/KG/HR: 10000 INJECTION, SOLUTION INTRAVENOUS at 02:16

## 2023-03-11 RX ADMIN — MORPHINE SULFATE 15 MG: 15 TABLET, FILM COATED, EXTENDED RELEASE ORAL at 15:27

## 2023-03-11 RX ADMIN — MORPHINE SULFATE 30 MG: 15 TABLET ORAL at 17:15

## 2023-03-11 RX ADMIN — LOSARTAN POTASSIUM 25 MG: 50 TABLET, FILM COATED ORAL at 10:18

## 2023-03-11 RX ADMIN — GUAIFENESIN 1200 MG: 600 TABLET, EXTENDED RELEASE ORAL at 04:37

## 2023-03-11 RX ADMIN — ANTACID TABLETS 1 TABLET: 500 TABLET, CHEWABLE ORAL at 21:07

## 2023-03-11 RX ADMIN — MORPHINE SULFATE 15 MG: 15 TABLET, FILM COATED, EXTENDED RELEASE ORAL at 21:06

## 2023-03-11 RX ADMIN — HEPARIN SODIUM 14.25 UNITS/KG/HR: 10000 INJECTION, SOLUTION INTRAVENOUS at 21:06

## 2023-03-11 RX ADMIN — FOLIC ACID 1 MG: 1 TABLET ORAL at 10:18

## 2023-03-11 NOTE — H&P
[facility] Medicine Services  HISTORY AND PHYSICAL    Date of Admission: 3/10/2023  Primary Care Physician: Meredith Miller APRN Subjective   Primary Historian: Patient      Chief Complaint: Left leg pain and swelling      History of Present Illness  Patient is a 52-year-old male with medical history of sickle cell, right renal cell carcinoma status post nephrectomy, systemic chemotherapy and left sacral radiation being. He also has chronic left lower extremity lymphedema due to his radiation treatment. Patient reports that he wears compression socks regularly. 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  Over the past few days the swelling has gotten worse with difficulty with ambulation and he came to the ED for evaluation.   On ED eval, he is found to have extensive DVT involving his left lower extremity.  He is admitted for further medical management.      Review of Systems   Otherwise complete ROS reviewed and negative except as mentioned in the HPI.    Past Medical History:   Past Medical History:   Diagnosis Date   • Cancer (HCC) 2016    Kidney cancer with METS to bone   • Gallstones    • Pneumonia    • Sickle cell      Past Surgical History:  Past Surgical History:   Procedure Laterality Date   • CHOLECYSTECTOMY     • LEG SURGERY     • NEPHRECTOMY Right      Social History:  reports that he has quit smoking. He does not have any smokeless tobacco history on file. He reports that he does not currently use alcohol. He reports current drug use. Drug: Marijuana.    Family History: family history includes Anemia in his cousin; Heart disease in his mother; Hypertension in his father and mother; Kidney failure in his mother; Leukemia in his maternal grandmother and another family member; Sickle cell trait in his cousin, daughter, and father.        Allergies:  No Known Allergies    Medications:  Prior to Admission medications    Medication Sig Start Date End Date Taking? Authorizing Provider   acetaminophen (TYLENOL) 325 MG tablet Take 2 tablets by mouth Every 6 (Six) Hours As Needed for Mild Pain  or Fever. 5/1/22   Jaxon Clifford MD   bisacodyl (DULCOLAX) 10 MG suppository Insert 1 suppository into the rectum Daily. 5/2/22   Jaxon Clifford MD   bisacodyl (DULCOLAX) 5 MG EC tablet Take 2 tablets by mouth Daily As Needed for Constipation. 5/1/22   Jaxon Clifford MD   bumetanide (BUMEX) 0.25 MG/ML injection Infuse 4 mL into a venous catheter 3 (Three) Times a Day. 5/1/22   Jaxon Clifford MD   calcium carbonate (TUMS) 500 MG chewable tablet Chew 1 tablet 3 (Three) Times a Day. 5/1/22   Jaxon Clifford MD   Diclofenac Sodium (VOLTAREN) 1 % gel gel Apply 2 g topically to the appropriate area as directed 2 (Two) Times a Day.    Sam Reis MD   Enoxaparin Sodium (LOVENOX) 40 MG/0.4ML solution prefilled syringe syringe Inject 0.4 mL under the skin into the appropriate area as directed Every Night. Indications: Prevention of Unwanted Clot in Veins 5/1/22   Jaxon Clifford MD   escitalopram (LEXAPRO) 20 MG tablet Take 20 mg by mouth Every Evening.    Sam Reis MD   folic acid (FOLVITE) 1 MG tablet Take 1 mg by mouth Daily.    Sam Reis MD   Glutamine, Sickle Cell, (Endari) 5 g pack Take 10 g by mouth 2 (Two) Times a Day.    Sam Reis MD   guaiFENesin (MUCINEX) 600 MG 12 hr tablet Take 2 tablets by mouth Every 12 (Twelve) Hours. 5/1/22   Jaxon Clifford MD   hydroxyurea (HYDREA) 500 MG capsule Take 1,000 mg by mouth Daily.    Sam Reis MD   ipratropium-albuterol (DUO-NEB) 0.5-2.5 mg/3 ml nebulizer Take 3 mL by nebulization Every 4 (Four) Hours As Needed for Shortness of Air or Wheezing. 5/1/22   Jaxon Clifford MD   lactulose (CHRONULAC) 10 GM/15ML solution Take 20 g by mouth 2 (Two) Times a Day As  "Needed.    Sam Reis MD   linaclotide (LINZESS) 145 MCG capsule capsule Take 145 mcg by mouth Daily As Needed.    Sam Reis MD   Morphine (MS CONTIN) 60 MG 12 hr tablet Take 120 mg by mouth 2 (Two) Times a Day.    Sam Reis MD   naloxone (NARCAN) 0.4 MG/ML injection Infuse 0.25 mL into a venous catheter Every 5 (Five) Minutes As Needed for Opioid Reversal or Respiratory Depression (see administration instructions). 5/1/22   Jaxon Clifford MD   NIFEdipine XL (PROCARDIA XL) 90 MG 24 hr tablet Take 90 mg by mouth Daily.    Sam Reis MD   ondansetron (ZOFRAN) 2 mg/mL injection Infuse 2 mL into a venous catheter Every 6 (Six) Hours As Needed for Nausea or Vomiting. 5/1/22   Jaxon Clifford MD   senna (SENOKOT) 8.6 MG tablet Take 1 tablet by mouth Daily.    Sam Reis MD   tamsulosin (FLOMAX) 0.4 MG capsule 24 hr capsule Take 1 capsule by mouth Daily.    Sam Reis MD     I have utilized all available immediate resources to obtain, update, or review the patient's current medications (including all prescriptions, over-the-counter products, herbals, cannabis/cannabidiol products, and vitamin/mineral/dietary (nutritional) supplements).    Objective     Vital Signs: /76   Pulse 78   Temp 98.2 °F (36.8 °C) (Oral)   Resp 18   Ht 177.8 cm (70\")   Wt 91.2 kg (201 lb)   SpO2 94%   BMI 28.84 kg/m²   Physical Exam:   Temp:  [98.2 °F (36.8 °C)] 98.2 °F (36.8 °C)  Heart Rate:  [71-85] 77  Resp:  [18] 18  BP: (133-147)/(76-83) 133/79  Physical Exam  General: NC/AT, appears stated age, alert and oriented x3  HEENT: PERRLA, EOMI, no scleral icterus, no conjunctival injection,   NECK:  Neck is supple, no JVD, no supraclavicular lymphadenopathy  CV: S1 S2 RRR, no murmurs, no gallops, no heaves, pulses palpable.  LUNG: CTA, no wheezing crackles or rhonchi  ABD: Nondistended, nontender, bowel sounds present, no guarding or rebound, organomegaly not " appreciated.   EXT: FROM, significant swelling of the left lower extremity with lymphedema and limited range of motion, pulses palpable, pitting edema left lower extremity.  Neuro: CN 2-12, grossly intact,no  focal weakness appreciated  Skin: Warm and dry, no rash or lesions.    Results Reviewed:  Lab Results (last 24 hours)     Procedure Component Value Units Date/Time    Reticulocytes [[id number]]  (Normal) Collected: 03/10/23 2140    Specimen: Blood Updated: 03/11/23 0015     Reticulocyte % 1.30 %      Reticulocyte Absolute 0.0319 10*6/mm3     COVID PRE-OP / PRE-PROCEDURE SCREENING ORDER (NO ISOLATION) - Swab, Nasopharynx [[id number]]  (Normal) Collected: 03/10/23 2141    Specimen: Swab from Nasopharynx Updated: 03/10/23 2228    Narrative:      The following orders were created for panel order COVID PRE-OP / PRE-PROCEDURE SCREENING ORDER (NO ISOLATION) - Swab, Nasopharynx.  Procedure                               Abnormality         Status                     ---------                               -----------         ------                     COVID-19, FLU A/B, RSV P...[[id number]]  Normal              Final result                 Please view results for these tests on the individual orders.    COVID-19, FLU A/B, RSV PCR - Swab, Nasopharynx [[id number]]  (Normal) Collected: 03/10/23 2141    Specimen: Swab from Nasopharynx Updated: 03/10/23 2228     COVID19 Not Detected     Influenza A PCR Not Detected     Influenza B PCR Not Detected     RSV, PCR Not Detected    Narrative:      Fact sheet for providers: https://www.fda.gov/media/252207/download    Fact sheet for patients: https://www.fda.gov/media/535772/download    Test performed by PCR.    C-reactive Protein [[id number]]  (Abnormal) Collected: 03/10/23 2140    Specimen: Blood Updated: 03/10/23 2212     C-Reactive Protein 7.15 mg/dL     Comprehensive Metabolic Panel [[id number]]  (Abnormal) Collected: 03/10/23 2140    Specimen: Blood Updated: 03/10/23 2210      Glucose 95 mg/dL      BUN 9 mg/dL      Creatinine 0.74 mg/dL      Sodium 142 mmol/L      Potassium 3.3 mmol/L      Chloride 105 mmol/L      CO2 27.0 mmol/L      Calcium 7.8 mg/dL      Total Protein 7.1 g/dL      Albumin 3.7 g/dL      ALT (SGPT) 8 U/L      AST (SGOT) 16 U/L      Alkaline Phosphatase 51 U/L      Total Bilirubin 1.0 mg/dL      Globulin 3.4 gm/dL      A/G Ratio 1.1 g/dL      BUN/Creatinine Ratio 12.2     Anion Gap 10.0 mmol/L      eGFR 107.7 mL/min/1.73     Narrative:      GFR Normal >60  Chronic Kidney Disease <60  Kidney Failure <15      Lactic Acid, Plasma [[id number]]  (Normal) Collected: 03/10/23 2140    Specimen: Blood Updated: 03/10/23 2207     Lactate 1.0 mmol/L     Sedimentation Rate [[id number]]  (Abnormal) Collected: 03/10/23 2140    Specimen: Blood Updated: 03/10/23 2202     Sed Rate 56 mm/hr     CBC & Differential [[id number]]  (Abnormal) Collected: 03/10/23 2140    Specimen: Blood Updated: 03/10/23 2158    Narrative:      The following orders were created for panel order CBC & Differential.  Procedure                               Abnormality         Status                     ---------                               -----------         ------                     CBC Auto Differential[[id number]]        Abnormal            Final result                 Please view results for these tests on the individual orders.    CBC Auto Differential [[id number]]  (Abnormal) Collected: 03/10/23 2140    Specimen: Blood Updated: 03/10/23 2158     WBC 5.70 10*3/mm3      RBC 2.48 10*6/mm3      Hemoglobin 7.8 g/dL      Hematocrit 22.8 %      MCV 91.9 fL      MCH 31.5 pg      MCHC 34.2 g/dL      RDW 20.5 %      RDW-SD 66.5 fl      MPV 9.9 fL      Platelets 275 10*3/mm3      Neutrophil % 55.8 %      Lymphocyte % 21.2 %      Monocyte % 16.7 %      Eosinophil % 5.4 %      Basophil % 0.5 %      Neutrophils, Absolute 3.18 10*3/mm3      Lymphocytes, Absolute 1.21 10*3/mm3      Monocytes, Absolute 0.95 10*3/mm3       Eosinophils, Absolute 0.31 10*3/mm3      Basophils, Absolute 0.03 10*3/mm3     Blood Culture - Blood, Arm, Right [[id number]] Collected: 03/10/23 2140    Specimen: Blood from Arm, Right Updated: 03/10/23 2153    Blood Culture - Blood, Hand, Left [[id number]] Collected: 03/10/23 2140    Specimen: Blood from Hand, Left Updated: 03/10/23 2153        Imaging Results (Last 24 Hours)     Procedure Component Value Units Date/Time    CT Angiogram Chest [[id number]] Resulted: 03/10/23 2323     Updated: 03/10/23 2328    US Venous Doppler Lower Extremity Left (duplex) [[id number]] Resulted: 03/10/23 2217     Updated: 03/10/23 2221    XR Knee 3 View Left [[id number]] Collected: 03/10/23 2137     Updated: 03/10/23 2141    Narrative:      EXAMINATION: XR KNEE 3 VW LEFT-     3/10/2023 9:33 PM CST     HISTORY: knee pain     Left knee 3 view exam.     No comparison.     Long distal femur prosthesis.     Diffuse soft tissue edema about the lower thigh and knee.     No occult fracture is seen.     The extent visualized the prosthetic components are intact.     Summary:  1. Diffuse soft tissue edema.  2. No acute bony abnormality is seen.  This report was finalized on 03/10/2023 21:38 by Dr. Peña Moscoso MD.        I have personally reviewed and interpreted the radiology studies and ECG obtained at time of admission.     Assessment / Plan   Assessment and plan:   52-year-old male with history of sickle cell disease, chronic pain, renal cell carcinoma status post nephrectomy, radiation treatment with chronic left lower extremity lymphedema, presenting worsening left lower extremity swelling and difficulty with ambulation found to have extensive DVT of the left lower extremity.  He is admitted for further medical care.      Problem list:  Active Hospital Problems    Diagnosis    • **Acute deep vein thrombosis (DVT) of femoral vein of left lower extremity (HCC)    Left lower extremity lymphedema  Chronic pain  Sickle cell        Treatment  Plan  The patient will be admitted to my service here at [facility].  -Admit to inpatient  - Pain control  - Heparin drip for anticoagulation  - Vascular surgery consult for eval and recommendation  Review home medications, restart those appropriate for management of his other comorbid conditions.    Patient is a full code  Time spent on admission greater than 30 minutes.  Anticipated length of stay greater than 2 nights.    Medical Decision Making  As discussed above      Conditions and Status  Patient is clinically stable     MDM Data  External documents reviewed: As discussed above  Cardiac tracing (EKG, telemetry) interpretation: As discussed above  Radiology interpretation: As discussed above  Labs reviewed: As discussed above         Care Planning  Patient is a full code    Disposition  Patient can discharge home after resolution of acute condition and returned to baseline.  No  needed identified at this time.            Electronically signed by Adrien Moeller MD, 03/11/23, 00:42 CST.

## 2023-03-11 NOTE — ED PROVIDER NOTES
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  States that he has been having redness over his left leg.  States that he has a history of renal cell carcinoma.  States that he sees people at [facility] for his kidney cancer.  States that he has a history of lymphedema as well that he has been getting physical therapy and usually his leg gets better on the left side however this time the leg has persisted with that swelling over the past week.  Denies any fevers or chills.  Denies any current chest pain or shortness of breath.  States that he has some mild groin pain on the left side as well as leg swelling and pain distal to his knee.  Given this and recent lab work as an outpatient that he had that showed elevated inflammatory markers he was encouraged to come to the hospital for further evaluation.  Has never had any history of DVTs.  No other skin changes and no other joints are tender or hurting.        Review of Systems   All other systems reviewed and are negative.      Past Medical History:   Diagnosis Date   • Cancer (HCC) 2016    Kidney cancer with METS to bone   • Gallstones    • Pneumonia    • Sickle cell        No Known Allergies    Past Surgical History:   Procedure Laterality Date   • CHOLECYSTECTOMY     • LEG SURGERY     • NEPHRECTOMY Right    • VENOGRAM PERIPHERAL N/A 3/13/2023    Procedure: VENOGRAM LOWER EXTREMITY, MECHANICAL THROMBECTOMY, BALLOON ANGIOPLASTY, STENT PLACEMENT;  Surgeon: Dipak Moya MD;  Location: [place] [address];  Service: Vascular;  Laterality: N/A;  REP FROM [facility]       Family History   Problem Relation Age of Onset   • Leukemia Maternal Grandmother    • Kidney failure Mother    • Hypertension Mother    • Heart disease Mother    • Sickle cell trait Father    • Hypertension Father    • Sickle cell trait Daughter    • Sickle cell trait Cousin    • Anemia Cousin    • Leukemia Other        Social History      Socioeconomic History   • Marital status:    Tobacco Use   • Smoking status: Former   Vaping Use   • Vaping Use: Never used   Substance and Sexual Activity   • Alcohol use: Not Currently   • Drug use: Yes     Types: Marijuana           Objective   Physical Exam  Vitals and nursing note reviewed.   Constitutional:       General: He is not in acute distress.     Appearance: Normal appearance. He is not toxic-appearing or diaphoretic.   HENT:      Head: Normocephalic and atraumatic.      Nose: Nose normal.   Eyes:      General:         Right eye: No discharge.         Left eye: No discharge.      Extraocular Movements: Extraocular movements intact.      Conjunctiva/sclera: Conjunctivae normal.   Cardiovascular:      Rate and Rhythm: Normal rate.   Pulmonary:      Effort: Pulmonary effort is normal. No respiratory distress.      Breath sounds: No rhonchi.   Abdominal:      General: Abdomen is flat.      Tenderness: There is no abdominal tenderness. There is no guarding or rebound.   Musculoskeletal:         General: Swelling and tenderness present.   Skin:     General: Skin is warm.      Capillary Refill: Capillary refill takes less than 2 seconds.      Findings: Erythema (to LLE) present.      Comments: Able to palpate DP and PT over LLE   Neurological:      General: No focal deficit present.      Mental Status: He is alert and oriented to person, place, and time. Mental status is at baseline.   Psychiatric:         Mood and Affect: Mood normal.         Behavior: Behavior normal.         Thought Content: Thought content normal.         Judgment: Judgment normal.         Procedures           ED Course  ED Course as of 03/14/23 1257   Sat Mar 11, 2023   0001 Discussed the patient case with the vascular surgeon on-call, Dr. Moya, who recommended a CT abdomen pelvis with venous phase timing as well as a heparin drip and they will consult in the morning [NP]      ED Course User Index  [NP] Chad Garcia MD                                            Medical Decision Making  Holland Phelps is a 52 y.o. male who presents to the ED for leg swelling and wound infection.     Patient was non-toxic appearing on arrival.    Vital signs stable on arrival.     Patient's presentation raises suspicion for differentials including, but not limited to, cellulitis, DVT, sickle cell disease, septic joint.     Given this, Gene was placed on the monitor and IV access was obtained as needed. Laboratory studies and imaging studies were ordered.     Given findings described above, patient's presentation is most consistent with likely massive DVT of LLE without PE in the setting of cancer and sickle cell diseas. I have a low suspicion for septic joint given lack of warmth around left knee joint and lack severe tenderness at that area and the fact that patient is already on antibiotics. He will likely need an evaluation by orthopedic surgery as an inpatient if warranted or if further swelling arises.    The patient was given pain medication and started on a heparin drip for symptom control. On re-evaluation, patient remained hemodynamically stable.     Discussion with consultants: with Dr. Moya from vascular surgery who recommended CT scan of A/P and heparin drip      Shared decision making: with the patients wife at bedside    I reassessed the patient and discussed the findings of the work up so far with everyone in the room. I said that the next step in treatment would be admission to the hospital for further workup and care. I also said that there is always some diagnostic uncertainty in the ER, that symptoms may change, and new things may be found after being admitted. The hospitalist service assumed primary care of the patient and the patient was admitted to their service in stable condition.    Acute deep vein thrombosis (DVT) of femoral vein of left lower extremity (HCC): chronic illness or injury  Cellulitis of left lower extremity:  chronic illness or injury  Sickle cell pain crisis (HCC): acute illness or injury  Amount and/or Complexity of Data Reviewed  Labs: ordered.  Radiology: ordered.      Risk  Prescription drug management.  Decision regarding hospitalization.          Final diagnoses:   Acute deep vein thrombosis (DVT) of femoral vein of left lower extremity (HCC)   Cellulitis of left lower extremity   Sickle cell pain crisis (HCC)       ED Disposition  ED Disposition     ED Disposition   Decision to Admit    Condition   --    Comment   Level of Care: Telemetry [5]   Diagnosis: Acute deep vein thrombosis (DVT) of femoral vein of left lower extremity (HCC) [[id number]]   Admitting Physician: KRISTEN GARCIA [[id number]]   Attending Physician: KRISTEN GARCIA [[id number]]   Certification: I Certify That Inpatient Hospital Services Are Medically Necessary For Greater Than 2 Midnights               No follow-up provider specified.       Medication List      ASK your doctor about these medications    bisacodyl 10 MG suppository  Commonly known as: DULCOLAX  Insert 1 suppository into the rectum Daily.  Ask about: Which instructions should I use?     * Morphine 30 MG tablet  Commonly known as: MSIR  Ask about: Which instructions should I use?     * Morphine 60 MG 12 hr tablet  Commonly known as: MS CONTIN  Ask about: Which instructions should I use?         * This list has 2 medication(s) that are the same as other medications prescribed for you. Read the directions carefully, and ask your doctor or other care provider to review them with you.                 Chad Garcia MD  03/14/23 1257

## 2023-03-11 NOTE — CONSULTS
Holland Phelps  [id number]  [id number]  42/42  Demarcus Britt, DO  3/10/2023    Chief Complaint   Patient presents with   • Sickle Cell Pain Crisis   • Wound Infection       HPI: Holland Phelps is a 52 y.o. male who presented with a 1 week history of left lower extremity swelling.  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  He previously had a right nephrectomy as well as partial left femur excision with hardware reconstruction.  He also underwent radiation to the left pelvis and the left femoral region due to those metastasis.  He also has a history of sickle cell disease with chronic anemia.  He follows at [facility] for this.  He notes a baseline degree of left lower extremity edema due to the previous radiation and surgery but this is usually fairly well controlled with some compression and leg elevation.  However he notes last Saturday he had developed increasing left lower extremity edema and it persisted which is not normal for him.  As such she presented to the ER yesterday and duplex showed significant DVT in the left lower extremity extending from the common femoral through the tibial vessels.  He was started on heparin and admitted to the medical service.  CT of the abdomen/pelvis with venous phase contrast was also requested after I was contacted by the ER and I was able to review that.  It does show patency of the IVC and the bilateral common iliac veins.  On the left the external iliac appears occluded but is not dilated and this may be consistent with chronic scarring from the previous radiation as well as previous DVT.  There does appear to be more acute appearing thrombus in the distal left external iliac and common femoral veins with significant edema in that tissue.  The left lower extremity venous duplex showed what appears to be acute DVT extending from the common femoral through the tibials.  He reports heaviness to that left lower  extremity but no significant pain at present.  He otherwise is without acute complaint.    Past Medical History:   Diagnosis Date   • Cancer (HCC) 2016    Kidney cancer with METS to bone   • Gallstones    • Pneumonia    • Sickle cell        Past Surgical History:   Procedure Laterality Date   • CHOLECYSTECTOMY     • LEG SURGERY     • NEPHRECTOMY Right        Family History   Problem Relation Age of Onset   • Leukemia Maternal Grandmother    • Kidney failure Mother    • Hypertension Mother    • Heart disease Mother    • Sickle cell trait Father    • Hypertension Father    • Sickle cell trait Daughter    • Sickle cell trait Cousin    • Anemia Cousin    • Leukemia Other        Social History     Socioeconomic History   • Marital status:    Tobacco Use   • Smoking status: Former   Substance and Sexual Activity   • Alcohol use: Not Currently   • Drug use: Yes     Types: Marijuana       No Known Allergies    Hospital Medications (active)       Dose Frequency Start End    acetaminophen (TYLENOL) tablet 650 mg 650 mg Every 6 Hours PRN 3/11/2023     Admin Instructions: Based on patient request - if ordered for moderate or severe pain, provider allows for administration of a medication prescribed for a lower pain scale.  Do not exceed 4 grams of acetaminophen in a 24 hr period. Max dose of 2gm for AST/ALT greater than 120 units/L    If given for fever, use fever parameter: fever greater than 100.4 °F.    If given for pain, use the following pain scale:   Mild Pain = Pain Score of 1-3, CPOT 1-2  Moderate Pain = Pain Score of 4-6, CPOT 3-4  Severe Pain = Pain Score of 7-10, CPOT 5-8    Route: Oral    bisacodyl (DULCOLAX) suppository 10 mg 10 mg Daily 3/11/2023     Admin Instructions: Hold for diarrhea    Route: Rectal    calcium carbonate (TUMS) chewable tablet 500 mg (200 mg elemental) 1 tablet 3 Times Daily 3/11/2023     Admin Instructions: One tablet contains 200 mg elemental calcium.  Take with food.    Route: Oral     escitalopram (LEXAPRO) tablet 20 mg 20 mg Every Evening 3/11/2023     Admin Instructions: Caution: Look alike/sound alike drug alert.    Route: Oral    folic acid (FOLVITE) tablet 1 mg 1 mg Daily 3/11/2023     Route: Oral    guaiFENesin (MUCINEX) 12 hr tablet 1,200 mg 1,200 mg Every 12 Hours Scheduled 3/11/2023     Admin Instructions: Caution: Look alike/sound alike drug alert               Do not crush, split, or chew.    Route: Oral    heparin (porcine) injection 3,650 Units 40 Units/kg × 91.2 kg As Needed 3/11/2023     Admin Instructions: Bolus for aPTT 58-67 seconds.    Route: Intravenous    heparin (porcine) injection 7,300 Units 80 Units/kg × 91.2 kg As Needed 3/11/2023     Admin Instructions: Bolus for aPTT 57 seconds or less.    Route: Intravenous    heparin 04117 units/250 mL (100 units/mL) in 0.45 % NaCl infusion 16.4 Units/kg/hr × 91.2 kg Titrated 3/11/2023     Admin Instructions: Protocol A:  Weight based heparin nomogram    Max initial infusion rate 1500 units/hr.    aPTT 57 sec and below: Bolus same as initial bolus. Increase rate by 2 mL/hr (200 units/hr). Next aPTT in 6 hours.   aPTT 58-67 sec: Bolus 1/2 initial bolus. Increase rate by 1 mL/hr (100 units/hr). Next aPTT in 6 hours.   aPTT 68-99 sec: No change. Next aPTT in 6 hours.   aPTT 100-118 sec: Hold infusion 30 minutes. Decrease rate by 1 mL/hr (100 units/hr). Next aPTT in 6 hours.   aPTT 119-168 sec: Hold infusion 60 minutes. Decrease rate by 2 mL/hr (200 units/hr). Next aPTT in 6 hours.   aPTT 169 sec and above: Hold infusion 90 minutes. Decrease rate by 3 mL/hr (300 units/hr). Next aPTT in 6 hours.    Route: Intravenous    hydroxyurea (HYDREA) capsule 1,000 mg 1,000 mg Daily 3/11/2023     Admin Instructions: Do not crush, split, or chew.  HAZARDOUS - Handle with care    Route: Oral    ipratropium-albuterol (DUO-NEB) nebulizer solution 3 mL 3 mL Every 4 Hours PRN 3/11/2023     Admin Instructions: Include Respiratory Treatment Education     Route: Nebulization    Morphine (MS CONTIN) 12 hr tablet 120 mg 120 mg 2 Times Daily 3/11/2023     Admin Instructions: Swallow whole. Do not crush, split or chew. Based on patient request - if ordered for moderate or severe pain, provider allows for administration of a medication prescribed for a lower pain scale.      Caution: Look alike/sound alike drug alert    If given for pain, use the following pain scale:  Mild Pain = Pain Score of 1-3, CPOT 1-2  Moderate Pain = Pain Score of 4-6, CPOT 3-4  Severe Pain = Pain Score of 7-10, CPOT 5-8    Route: Oral    NIFEdipine CC (ADALAT CC) 24 hr tablet 90 mg 90 mg Daily 3/11/2023     Admin Instructions: Caution: Look alike/sound alike drug alert.   Avoid grapefruit juice. Swallow whole; do not crush, split, or chew.    Route: Oral    tamsulosin (FLOMAX) 24 hr capsule 0.4 mg 0.4 mg Daily 3/11/2023     Admin Instructions: Swallow whole. Do not crush or chew.    Route: Oral          Review of Systems   Constitutional: Negative.  Negative for activity change, appetite change, chills, diaphoresis, fatigue and fever.   HENT: Negative.  Negative for congestion, sneezing, sore throat and trouble swallowing.    Eyes: Negative.  Negative for visual disturbance.   Respiratory: Negative.  Negative for chest tightness and shortness of breath.    Cardiovascular: Positive for leg swelling (Left leg swelling for 1 week). Negative for chest pain and palpitations.   Gastrointestinal: Negative.  Negative for abdominal distention, abdominal pain, nausea and vomiting.   Endocrine: Negative.    Genitourinary: Negative.    Musculoskeletal: Negative.    Skin: Negative.    Allergic/Immunologic: Negative.    Neurological: Negative.    Hematological: Negative.    Psychiatric/Behavioral: Negative.        Physical Exam  Vitals reviewed.   Constitutional:       Appearance: Normal appearance.   HENT:      Head: Normocephalic and atraumatic.      Nose: Nose normal.      Mouth/Throat:      Mouth: Mucous  membranes are moist.   Eyes:      Extraocular Movements: Extraocular movements intact.      Pupils: Pupils are equal, round, and reactive to light.   Cardiovascular:      Rate and Rhythm: Normal rate and regular rhythm.      Pulses: Normal pulses.           Carotid pulses are 2+ on the right side and 2+ on the left side.       Radial pulses are 2+ on the right side and 2+ on the left side.        Femoral pulses are 2+ on the right side and 2+ on the left side.       Popliteal pulses are 2+ on the right side and 2+ on the left side.        Dorsalis pedis pulses are 2+ on the right side and 2+ on the left side.        Posterior tibial pulses are 2+ on the right side and 2+ on the left side.      Comments: Significant edema of the left lower extremity extending from proximal thigh to foot.  No significant skin changes.  Foot is warm and has palpable pulses on the left.  He has palpable pulses in the right foot and no significant edema of the right lower extremity.  He has a large, well-healed incision over the left anterior mid/distal thigh as well as the anterior knee from previous partial femur resection and reconstruction with hardware.  Pulmonary:      Effort: Pulmonary effort is normal. No respiratory distress.   Abdominal:      General: There is no distension.      Palpations: Abdomen is soft. There is no mass.      Tenderness: There is no abdominal tenderness.   Musculoskeletal:         General: Normal range of motion.      Cervical back: Normal range of motion and neck supple.      Right lower leg: No edema.      Left lower leg: Edema present.   Skin:     General: Skin is warm and dry.      Capillary Refill: Capillary refill takes less than 2 seconds.   Neurological:      General: No focal deficit present.      Mental Status: He is alert and oriented to person, place, and time.   Psychiatric:         Mood and Affect: Mood normal.         Behavior: Behavior normal.         Thought Content: Thought content  normal.         Judgment: Judgment normal.         Laboratory Data:  Results from last 7 days   Lab Units 03/11/23  0040 03/10/23  2140   WBC 10*3/mm3 6.49 5.70   HEMOGLOBIN g/dL 7.5* 7.8*   HEMATOCRIT % 21.2* 22.8*   PLATELETS 10*3/mm3 275 275       Results from last 7 days   Lab Units 03/10/23  2140   SODIUM mmol/L 142   POTASSIUM mmol/L 3.3*   CHLORIDE mmol/L 105   CO2 mmol/L 27.0   BUN mg/dL 9   CREATININE mg/dL 0.74*   CALCIUM mg/dL 7.8*   BILIRUBIN mg/dL 1.0   ALK PHOS U/L 51   ALT (SGPT) U/L 8   AST (SGOT) U/L 16   GLUCOSE mg/dL 95     Results from last 7 days   Lab Units 03/11/23  0136   INR  1.13*   APTT seconds 37.4*         Diagnostic Data:  Imaging Results (Last 24 Hours)     Procedure Component Value Units Date/Time    CT Angiogram Abdomen Pelvis [[id number]] Collected: 03/11/23 0751     Updated: 03/11/23 0809    Narrative:      EXAMINATION: CT angiogram of the abdomen and pelvis with and without  contrast. 03/11/2023     HISTORY: DVT.     DOSE: 1924 mGycm. All CT scans are performed using dose optimization  techniques as appropriate to the performed exam and including at least  one of the following: Automated exposure control, adjustment of the mA  and/or kV according to size, and the use of the iterative reconstruction  technique.     FINDINGS: Multiple contiguous axial images are obtained through the  abdomen and pelvis both with and without contrast enhancement including  delayed imaging. MIPS are also obtained.     There is moderate cardiomegaly with a small pericardial effusion.  Bibasilar atelectasis is present. No pleural effusion is present. There  is contrast within the left renal collecting system related to earlier  intravenous contrast infusion. There is no evidence of left-sided  obstructive uropathy.     There is a benign hepatic cyst within the lateral segment of the left  lobe of the liver. Liver is otherwise homogeneous in density. There is  normal enhancement of the hepatic  vasculature.     The gallbladder is surgically absent. Moderate extrahepatic biliary  dilatation is likely related to reservoir effect. There is no evidence  of choledocholithiasis.     The pancreas is normal in appearance with no mass or ductal dilatation.  No evidence of acute pancreatitis.     The spleen is surgically absent.     The right kidney has been resected. Both adrenal glands are  unremarkable. There is retained contrast within the left renal  collecting system without evidence of hydronephrosis or obstructive  uropathy. There is homogeneous enhancement of the left kidney. No  perinephric fluid collections are present.     The abdominal aorta and IVC are normal in caliber and demonstrate normal  enhancement. There is a normal bowel gas pattern with no obstruction or  free air. There is diastases of the abdominal musculature at the  umbilicus. The patient has undergone previous femur fixation  bilaterally. No acute or suspicious bony abnormalities are present.     The abdominal aorta and iliac arteries are normal in caliber with no  dissection or aneurysm. No focal plaquing or rate limiting stenosis is  present. The left renal artery is widely patent. The mesenteric vessels  are widely patent.     There is no free fluid within the pelvis. The urinary bladder is normal  in appearance.     There is diffuse edema within the left lower extremity extending into  the left hemipelvis. Early delayed imaging demonstrates the IVC as well  as a bilateral common iliac veins are widely patent demonstrating normal  enhancement. There is asymmetry of the left external iliac vein in  comparison with the left with no focal enhancement demonstrated. This  suggest thrombosis within the left external iliac vein. There is no  enlargement of the external iliac vein suggesting this may represent a  more chronic process. There is also asymmetry of enhancement of the left  common femoral vein at the level of the groin.  Correlation is  recommended with the patient's duplex exam. There is extensive stranding  noted within the left hemipelvis and proximal left thigh-likely related  to venous obstruction. I do not see associated discrete mass or mass  effect on the left pelvic sidewall venous structures.       Impression:      1.. Diffuse edema of the left lower extremity and left hemipelvis. This  is likely related to obstruction of venous outflow given the history.  There is asymmetry of enhancement within the left external iliac vein  but without evidence of enlargement which would be more typical for an  acute deep venous thrombosis. This may represent a more chronic finding  and should be correlated with the patient's clinical presentation and  history. I do feel that there is enhancement and a rather normal  appearance of the left common iliac vein. IVC is widely patent.  2. Normal appearance of the abdominal aorta and branch vessels including  the pelvic arterial tree. No evidence of stenosis or plaquing. No  evidence of dissection.  3. The patient has undergone previous right nephrectomy. The left kidney  is hypertrophied but otherwise normal in appearance. No evidence of  left-sided obstructive uropathy.  4. Diastases of the abdominal musculature at the umbilicus with a small  ventral wall hernia containing fat. No herniated bowel loop or  obstruction.  5. Benign hepatic cyst left lobe of liver.  6. Cardiomegaly with a small pericardial effusion. Bibasilar atelectasis  or scarring is present.  This report was finalized on 03/11/2023 08:06 by Dr. Caesar Guillen MD.    CT Angiogram Chest [[id number]] Collected: 03/11/23 0735     Updated: 03/11/23 0747    Narrative:      Exam: CT angiogram of the of the chest with intravenous contrast.     CLINICAL HISTORY:  DVT of left lower extremity.     DOSE:  210 mGycm. All CT scans are performed using dose optimization  techniques as appropriate to the performed exam and including at  least  one of the following: Automated exposure control, adjustment of the mA  and/or kV according to size, and the use of the iterative reconstruction  technique.     TECHNIQUE: Contiguous axial images were obtained through the thorax  following intravenous contrast administration with reformatted images  obtained in the sagittal and coronal projections from the original data  set. Three-dimensional reconstructions are also obtained.     COMPARISON:  04/20/2022     FINDINGS:     Pulmonary arteries:  There is normal enhancement of the pulmonary  arteries with no evidence of pulmonary thromboembolic disease.     Aorta:  The thoracic aorta and proximal great vessels are normal in  appearance. There is no evidence of aortic dissection or aneurysm.     LUNGS:  Bibasilar atelectasis is present. There is a 4 mm nodule in the  right middle lobe. This has a broad-based interface with the minor  fissure and likely represents an intrafissural node. This is stable from  the previous exam. An additional 3 mm right middle lobe subpleural  nodule on image 96 of the axial sequence is also stable.     Pleural spaces: Unremarkable. No evidence of pleural effusion or  pneumothorax.     HEART: There is moderate cardiomegaly. A small pericardial effusion is  present. No evidence of right ventricular dysfunction. No coronary  calcifications are present.     Bones: No acute osseous abnormalities are demonstrated.     CHEST WALL: No chest wall abnormalities are demonstrated.     Lymph nodes: No enlarged mediastinal or axillary lymphadenopathy is  present.     Upper abdomen: The patient has undergone previous right nephrectomy.  There is a hepatic cyst or hemangioma within the lateral segment of the  left lobe of liver.       Impression:      1. No evidence of pulmonary thromboembolic disease. The thoracic aorta  and great vessels are normal in caliber with no dissection or aneurysm.  2. Cardiomegaly with a small pericardial effusion. No  right ventricular  dysfunction or coronary calcification.  3. Bibasilar atelectasis. Stable right middle lobe nodules from previous  exam of 04/20/2022. No evidence of acute consolidative pneumonia or  pleural effusion.  4. The patient's undergone previous right nephrectomy. There is a benign  hepatic cyst or hemangioma within the lateral segment of the left lobe  of the liver.  This report was finalized on 03/11/2023 07:44 by Dr. Caesar Guillen MD.    US Venous Doppler Lower Extremity Left (duplex) [[id number]] Resulted: 03/10/23 2217     Updated: 03/10/23 2221    XR Knee 3 View Left [[id number]] Collected: 03/10/23 2137     Updated: 03/10/23 2141    Narrative:      EXAMINATION: XR KNEE 3 VW LEFT-     3/10/2023 9:33 PM CST     HISTORY: knee pain     Left knee 3 view exam.     No comparison.     Long distal femur prosthesis.     Diffuse soft tissue edema about the lower thigh and knee.     No occult fracture is seen.     The extent visualized the prosthetic components are intact.     Summary:  1. Diffuse soft tissue edema.  2. No acute bony abnormality is seen.  This report was finalized on 03/10/2023 21:38 by Dr. Peña Moscoso MD.          Impression:    Acute deep vein thrombosis (DVT) of femoral vein of left lower extremity (HCC)      Plan: After thoroughly evaluating Holland Phelps, I believe the best course of action is to ultimately plan to proceed with left lower extremity and IVC venogram, mechanical thrombectomy, and potential venoplasty and stenting of the common/external iliac veins in the OR on Monday morning.  Patient presents with swelling of the left lower extremity more so than normal and was found to have extensive DVT in the left lower extremity.  CT of the abdomen/pelvis with venous phase contrast timing appears to show some thrombotic occlusion of the left external iliac vein but this may be somewhat chronic as the vein is somewhat decompressed and this may be due to the previous radiation  that he had along with scarring.  His chronic edema may also be a result of impaired venous drainage from his previous radiation causing scarring and issues with the iliac vein system on the left.  I think he would benefit from thrombectomy of that left lower extremity from a popliteal approach to operate his clot burden with potentially crossing that iliac system and investigating it with intravascular ultrasound and performing venoplasty and stent to improve outflow and decrease his swelling.  I will make arrangements for this for Monday morning and for now he should continue on heparin infusion.  Otherwise continue supportive care as per the medical team and I will continue to follow him with you. Thank you for allowing me to see Holland Phelps in consult. Please feel free to reach out with any questions or concerns.    Electronically signed by Dipak Moya MD, 03/11/23, 9:09 AM CST.

## 2023-03-11 NOTE — PROGRESS NOTES
[facility] Medicine Services  INPATIENT PROGRESS NOTE    Patient Name: Holland Phelps  Date of Admission: 3/10/2023  Today's Date: 03/11/23  Length of Stay: 0  Primary Care Physician: Meredith Miller APRN    Subjective   Chief Complaint: Left lower extremity pain and swelling  HPI    presented to ER 3/10/2023 with left lower leg pain and swelling.  He has a history of sickle cell anemia, right renal cell carcinoma status post nephrectomy, systemic chemotherapy, left sacral radiation, chronic left lower extremity lymphedema due to radiation treatment.  Patient wears compression stockings regularly.  In addition, he uses lymphedema pump 1 hour daily and is followed by lymphedema clinic weekly.  He reports 1 week history of worsening lower extremity edema.  He has chronic left lower extremity edema but worsened than normal.  He is followed by [facility] for sickle cell disease, chronic anemia and chronic pain syndrome.  He has baseline lower extremity edema from previous radiation surgery.  Patient reported 1 week ago last Saturday, 3/4 he had increasing left lower extremity edema which worsened.  Venous Doppler left lower extremity positive for DVT thrombus in the left  CFV, prox SFV, Pop V and PTV. VERY minimal visualization throughout due to edema and limb size.  CT abdomen pelvis noted diffuse edema left lower extremity and left hemopelvis likely related to obstruction of venous outflow.  Asymmetry enhancement within the left external iliac vein but without evidence of enlargement which would be more typical for acute deep vein thrombosis.  May represent a chronic finding.  Normal appearance abdominal aorta.  Heparin bolus and drip, vancomycin given in ER.    Patient seen in ER 42.  Wife in room.  No oxygen use.  Left lower extremity edematous from groin to foot.  Have discussed with Dr. Moya who plans for IVC venogram, mechanical thrombectomy, potential  venoplasty and stenting of common external iliacs and surgery on Monday.  Patient and wife agreeable.  Continue heparin drip.  Patient denies nausea, vomiting or abdominal pain.  No complaints of chest pain, palpitations or shortness of breath.  He is followed by [facility] and pain management.  Have adjusted home medications according to most recent update.    Review of Systems   Constitutional: Negative for chills, fatigue and fever.   HENT: Negative for congestion and trouble swallowing.    Eyes: Negative for photophobia and visual disturbance.   Respiratory: Negative for cough, shortness of breath and wheezing.    Cardiovascular: Positive for leg swelling (Left lower extremity). Negative for chest pain.   Gastrointestinal: Negative for constipation, diarrhea, nausea and vomiting.   Endocrine: Negative for cold intolerance, heat intolerance and polyuria.   Genitourinary: Negative for dysuria, frequency and urgency.   Musculoskeletal: Positive for gait problem.   Skin: Negative for color change, pallor, rash and wound.   Allergic/Immunologic: Negative for immunocompromised state.   Neurological: Positive for weakness. Negative for light-headedness.   Hematological: Negative for adenopathy. Does not bruise/bleed easily.   Psychiatric/Behavioral: Negative for agitation, behavioral problems and confusion.      All pertinent negatives and positives are as above. All other systems have been reviewed and are negative unless otherwise stated.     Objective    Temp:  [97.8 °F (36.6 °C)-98.2 °F (36.8 °C)] 97.8 °F (36.6 °C)  Heart Rate:  [62-85] 62  Resp:  [14-18] 14  BP: (127-147)/(72-92) 134/72  Physical Exam  Vitals and nursing note reviewed.   Constitutional:       Comments: Sitting up in bed.  No oxygen use.  Wife in room.   HENT:      Head: Normocephalic and atraumatic.      Nose: No congestion.      Mouth/Throat:      Pharynx: Oropharynx is clear. No oropharyngeal exudate or posterior oropharyngeal erythema.    Eyes:      Extraocular Movements: Extraocular movements intact.      Pupils: Pupils are equal, round, and reactive to light.   Cardiovascular:      Rate and Rhythm: Normal rate and regular rhythm.   Pulmonary:      Breath sounds: No wheezing, rhonchi or rales.      Comments: No oxygen in use.  Abdominal:      Palpations: Abdomen is soft.      Tenderness: There is no abdominal tenderness.   Genitourinary:     Comments: Voiding.  Musculoskeletal:         General: Swelling (Left lower extremity from groin down to foot.) and tenderness (Left lower extremity) present.      Cervical back: Normal range of motion and neck supple.   Skin:     General: Skin is warm and dry.   Neurological:      General: No focal deficit present.      Mental Status: He is alert and oriented to person, place, and time.   Psychiatric:         Mood and Affect: Mood normal.         Behavior: Behavior normal.         Thought Content: Thought content normal.         Judgment: Judgment normal.       Results Review:  I have reviewed the labs, radiology results, and diagnostic studies.    Laboratory Data:   Results from last 7 days   Lab Units 03/11/23  0040 03/10/23  2140   WBC 10*3/mm3 6.49 5.70   HEMOGLOBIN g/dL 7.5* 7.8*   HEMATOCRIT % 21.2* 22.8*   PLATELETS 10*3/mm3 275 275     Results from last 7 days   Lab Units 03/10/23  2140   SODIUM mmol/L 142   POTASSIUM mmol/L 3.3*   CHLORIDE mmol/L 105   CO2 mmol/L 27.0   BUN mg/dL 9   CREATININE mg/dL 0.74*   CALCIUM mg/dL 7.8*   BILIRUBIN mg/dL 1.0   ALK PHOS U/L 51   ALT (SGPT) U/L 8   AST (SGOT) U/L 16   GLUCOSE mg/dL 95     Culture Data:   No results found for: BLOODCX, URINECX, WOUNDCX, MRSACX, RESPCX, STOOLCX    Radiology Data:   Imaging Results (Last 24 Hours)     Procedure Component Value Units Date/Time    CT Angiogram Abdomen Pelvis [[id number]] Collected: 03/11/23 0751     Updated: 03/11/23 0809    Narrative:      EXAMINATION: CT angiogram of the abdomen and pelvis with and without  contrast.  03/11/2023     HISTORY: DVT.     DOSE: 1924 mGycm. All CT scans are performed using dose optimization  techniques as appropriate to the performed exam and including at least  one of the following: Automated exposure control, adjustment of the mA  and/or kV according to size, and the use of the iterative reconstruction  technique.     FINDINGS: Multiple contiguous axial images are obtained through the  abdomen and pelvis both with and without contrast enhancement including  delayed imaging. MIPS are also obtained.     There is moderate cardiomegaly with a small pericardial effusion.  Bibasilar atelectasis is present. No pleural effusion is present. There  is contrast within the left renal collecting system related to earlier  intravenous contrast infusion. There is no evidence of left-sided  obstructive uropathy.     There is a benign hepatic cyst within the lateral segment of the left  lobe of the liver. Liver is otherwise homogeneous in density. There is  normal enhancement of the hepatic vasculature.     The gallbladder is surgically absent. Moderate extrahepatic biliary  dilatation is likely related to reservoir effect. There is no evidence  of choledocholithiasis.     The pancreas is normal in appearance with no mass or ductal dilatation.  No evidence of acute pancreatitis.     The spleen is surgically absent.     The right kidney has been resected. Both adrenal glands are  unremarkable. There is retained contrast within the left renal  collecting system without evidence of hydronephrosis or obstructive  uropathy. There is homogeneous enhancement of the left kidney. No  perinephric fluid collections are present.     The abdominal aorta and IVC are normal in caliber and demonstrate normal  enhancement. There is a normal bowel gas pattern with no obstruction or  free air. There is diastases of the abdominal musculature at the  umbilicus. The patient has undergone previous femur fixation  bilaterally. No acute or  suspicious bony abnormalities are present.     The abdominal aorta and iliac arteries are normal in caliber with no  dissection or aneurysm. No focal plaquing or rate limiting stenosis is  present. The left renal artery is widely patent. The mesenteric vessels  are widely patent.     There is no free fluid within the pelvis. The urinary bladder is normal  in appearance.     There is diffuse edema within the left lower extremity extending into  the left hemipelvis. Early delayed imaging demonstrates the IVC as well  as a bilateral common iliac veins are widely patent demonstrating normal  enhancement. There is asymmetry of the left external iliac vein in  comparison with the left with no focal enhancement demonstrated. This  suggest thrombosis within the left external iliac vein. There is no  enlargement of the external iliac vein suggesting this may represent a  more chronic process. There is also asymmetry of enhancement of the left  common femoral vein at the level of the groin. Correlation is  recommended with the patient's duplex exam. There is extensive stranding  noted within the left hemipelvis and proximal left thigh-likely related  to venous obstruction. I do not see associated discrete mass or mass  effect on the left pelvic sidewall venous structures.       Impression:      1.. Diffuse edema of the left lower extremity and left hemipelvis. This  is likely related to obstruction of venous outflow given the history.  There is asymmetry of enhancement within the left external iliac vein  but without evidence of enlargement which would be more typical for an  acute deep venous thrombosis. This may represent a more chronic finding  and should be correlated with the patient's clinical presentation and  history. I do feel that there is enhancement and a rather normal  appearance of the left common iliac vein. IVC is widely patent.  2. Normal appearance of the abdominal aorta and branch vessels including  the  pelvic arterial tree. No evidence of stenosis or plaquing. No  evidence of dissection.  3. The patient has undergone previous right nephrectomy. The left kidney  is hypertrophied but otherwise normal in appearance. No evidence of  left-sided obstructive uropathy.  4. Diastases of the abdominal musculature at the umbilicus with a small  ventral wall hernia containing fat. No herniated bowel loop or  obstruction.  5. Benign hepatic cyst left lobe of liver.  6. Cardiomegaly with a small pericardial effusion. Bibasilar atelectasis  or scarring is present.  This report was finalized on 03/11/2023 08:06 by Dr. Caesar Guillen MD.    CT Angiogram Chest [[id number]] Collected: 03/11/23 0735     Updated: 03/11/23 0747    Narrative:      Exam: CT angiogram of the of the chest with intravenous contrast.     CLINICAL HISTORY:  DVT of left lower extremity.     DOSE:  210 mGycm. All CT scans are performed using dose optimization  techniques as appropriate to the performed exam and including at least  one of the following: Automated exposure control, adjustment of the mA  and/or kV according to size, and the use of the iterative reconstruction  technique.     TECHNIQUE: Contiguous axial images were obtained through the thorax  following intravenous contrast administration with reformatted images  obtained in the sagittal and coronal projections from the original data  set. Three-dimensional reconstructions are also obtained.     COMPARISON:  04/20/2022     FINDINGS:     Pulmonary arteries:  There is normal enhancement of the pulmonary  arteries with no evidence of pulmonary thromboembolic disease.     Aorta:  The thoracic aorta and proximal great vessels are normal in  appearance. There is no evidence of aortic dissection or aneurysm.     LUNGS:  Bibasilar atelectasis is present. There is a 4 mm nodule in the  right middle lobe. This has a broad-based interface with the minor  fissure and likely represents an intrafissural node.  This is stable from  the previous exam. An additional 3 mm right middle lobe subpleural  nodule on image 96 of the axial sequence is also stable.     Pleural spaces: Unremarkable. No evidence of pleural effusion or  pneumothorax.     HEART: There is moderate cardiomegaly. A small pericardial effusion is  present. No evidence of right ventricular dysfunction. No coronary  calcifications are present.     Bones: No acute osseous abnormalities are demonstrated.     CHEST WALL: No chest wall abnormalities are demonstrated.     Lymph nodes: No enlarged mediastinal or axillary lymphadenopathy is  present.     Upper abdomen: The patient has undergone previous right nephrectomy.  There is a hepatic cyst or hemangioma within the lateral segment of the  left lobe of liver.       Impression:      1. No evidence of pulmonary thromboembolic disease. The thoracic aorta  and great vessels are normal in caliber with no dissection or aneurysm.  2. Cardiomegaly with a small pericardial effusion. No right ventricular  dysfunction or coronary calcification.  3. Bibasilar atelectasis. Stable right middle lobe nodules from previous  exam of 04/20/2022. No evidence of acute consolidative pneumonia or  pleural effusion.  4. The patient's undergone previous right nephrectomy. There is a benign  hepatic cyst or hemangioma within the lateral segment of the left lobe  of the liver.  This report was finalized on 03/11/2023 07:44 by Dr. Caesar Guillen MD.    US Venous Doppler Lower Extremity Left (duplex) [[id number]] Resulted: 03/10/23 2217     Updated: 03/10/23 2221    XR Knee 3 View Left [[id number]] Collected: 03/10/23 2137     Updated: 03/10/23 2141    Narrative:      EXAMINATION: XR KNEE 3 VW LEFT-     3/10/2023 9:33 PM CST     HISTORY: knee pain     Left knee 3 view exam.     No comparison.     Long distal femur prosthesis.     Diffuse soft tissue edema about the lower thigh and knee.     No occult fracture is seen.     The extent  visualized the prosthetic components are intact.     Summary:  1. Diffuse soft tissue edema.  2. No acute bony abnormality is seen.  This report was finalized on 03/10/2023 21:38 by Dr. Peña Moscoso MD.        Results for orders placed during the hospital encounter of 04/20/22    Adult Transthoracic Echo Complete W/ Cont if Necessary Per Protocol    Interpretation Summary  · Left ventricular ejection fraction appears to be 66 - 70%. Left ventricular systolic function is normal.  · Left ventricular diastolic function was normal.  · Left atrial volume is mildly increased.  · There is a small (<1cm) pericardial effusion.  · There is no evidence of cardiac tamponade.  · Estimated right ventricular systolic pressure from tricuspid regurgitation is normal (<35 mmHg).    Schedule medications  bisacodyl, 10 mg, Rectal, Daily  calcium carbonate, 1 tablet, Oral, TID  escitalopram, 20 mg, Oral, Q PM  folic acid, 1 mg, Oral, Daily  guaiFENesin, 1,200 mg, Oral, Q12H  hydrocortisone, 10 mg, Oral, Q PM  hydrocortisone, 20 mg, Oral, Q AM  hydroxyurea, 1,000 mg, Oral, Daily  losartan, 25 mg, Oral, Daily  Morphine, 15 mg, Oral, Q8H  NIFEdipine CC, 90 mg, Oral, Daily  potassium chloride, 40 mEq, Oral, Once  senna, 1 tablet, Oral, Q PM  tamsulosin, 0.4 mg, Oral, Daily      I have reviewed the patient's current medications.     Assessment/Plan   Assessment  Active Hospital Problems    Diagnosis    • **Acute deep vein thrombosis (DVT) of femoral vein of left lower extremity (HCC)    • Chronic pain syndrome    • Primary hypertension    • Hypokalemia    • Stenosis of left iliac vein    • Acute pain    • Anemia of chronic disease    • Sickle cell beta thalassemia (HCC)      Treatment Plan  Acute DVT femoral vein left lower extremity.  Patient has history of chronic lymphedema left lower extremity but  noted worsening swelling over the past week.  Venous Dopplers positive for DVT.  CTA negative for pulmonary emboli.  Heparin drip started.   Vascular surgery consulted and Dr. Moya plans to proceed with left lower extremity IVC venogram, mechanical thrombectomy, potential venoplasty and stenting of common/external iliac veins Monday.  Patient agreeable.    Stenosis left iliac vein.  Dr. Moya following and plans for stenting on Monday.    Acute on chronic left lower extremity pain.  Reviewed home medications.  Have resumed home medications morphine.  Patient followed by pain management [place].    Anemia of chronic disease.  Hemoglobin 7.8 on admission.  Hemoglobin 7.5 today.  Repeat CBC daily.  Monitor closely as patient on heparin drip.    Primary hypertension.  Blood pressure 134/72.  Losartan resumed.    History of sickle cell beta thalassemia followed by [place] [facility]ology/oncology.    Hypokalemia.  Potassium 3.3.  Replace potassium 40 mEq x 1 dose.  Repeat BMP in AM.    Medical Decision Making  Number and Complexity of problems: 7  Acute DVT femoral vein left lower extremity: Acute, high complexity  Stenosis left iliac vein: Acute, high complexity  Acute on chronic left lower extremity pain: Acute on chronic, high complexity  Anemia of chronic disease: Acute on chronic, high complexity  Primary hypertension: Chronic, moderate complexity  Sickle cell thalassemia: Chronic, moderate complexity  Hypokalemia: Acute, moderate complexity    Additional 35 minutes spent discussing results with patient and wife, discussed with Dr. Moya, reviewed home medications and restarted medications after list updated, replaced potassium, ordered follow-up lab work for tomorrow.    Differential Diagnosis: Pulmonary emboli    Conditions and Status        Condition is worsening.     [facility] Data  External documents reviewed: Discussed with Dr. Moya and viewed consultation  Cardiac tracing (EKG, telemetry) interpretation: Normal sinus rhythm  Radiology interpretation: CT angiogram abdomen pelvis, CTA chest, venous Doppler  Labs reviewed:   CBC 3/11/2023  CMP  3/10/2023  PTT    Any tests that were considered but not ordered: None     Decision rules/scores evaluated (example [id number], [place], etc): None     Discussed with: Dr. Britt, Dr. Moya, patient, and wife     Care Planning  Shared decision making: Dr. Britt, Dr. Moya, patient, and wife.  Patient agrees to vascular surgery consultation and surgery as recommended by Dr. Moya.  Continue heparin drip.  Code status and discussions: Full code  The patient surrogate decision maker is his wife, Primo Barakat  Social Determinants of Health that impact treatment or disposition: None  I expect the patient to be discharged to home in 3 days.     Electronically signed by GILBERTO Zeng, 03/11/23, 13:22 CST.    This visit was performed by both a physician and an APC. I performed all aspects of the MDM as documented.    Admitted after midnight by Dr. Moeller.    Discussed with his ER boarding nurse, Kaci.    Amanda discussed the case with Dr. Moya.  He plans potential intervention on Monday.  Continue heparin drip and activity for now.    Electronically signed by Demarcus Britt DO, 3/11/2023, 13:36 CST.

## 2023-03-12 LAB
ANION GAP SERPL CALCULATED.3IONS-SCNC: 9 MMOL/L (ref 5–15)
APTT PPP: 68.8 SECONDS (ref 24.1–35)
APTT PPP: 69.6 SECONDS (ref 24.1–35)
APTT PPP: 70.3 SECONDS (ref 24.1–35)
APTT PPP: 81.7 SECONDS (ref 24.1–35)
BASOPHILS # BLD AUTO: 0.04 10*3/MM3 (ref 0–0.2)
BASOPHILS NFR BLD AUTO: 0.5 % (ref 0–1.5)
BUN SERPL-MCNC: 9 MG/DL (ref 6–20)
BUN/CREAT SERPL: 12.3 (ref 7–25)
CALCIUM SPEC-SCNC: 7.8 MG/DL (ref 8.6–10.5)
CHLORIDE SERPL-SCNC: 106 MMOL/L (ref 98–107)
CO2 SERPL-SCNC: 26 MMOL/L (ref 22–29)
CREAT SERPL-MCNC: 0.73 MG/DL (ref 0.76–1.27)
DEPRECATED RDW RBC AUTO: 68.2 FL (ref 37–54)
EGFRCR SERPLBLD CKD-EPI 2021: 108.1 ML/MIN/1.73
EOSINOPHIL # BLD AUTO: 0.3 10*3/MM3 (ref 0–0.4)
EOSINOPHIL NFR BLD AUTO: 4 % (ref 0.3–6.2)
ERYTHROCYTE [DISTWIDTH] IN BLOOD BY AUTOMATED COUNT: 20.9 % (ref 12.3–15.4)
GLUCOSE SERPL-MCNC: 98 MG/DL (ref 65–99)
HCT VFR BLD AUTO: 22.6 % (ref 37.5–51)
HGB BLD-MCNC: 7.7 G/DL (ref 13–17.7)
IMM GRANULOCYTES # BLD AUTO: 0.05 10*3/MM3 (ref 0–0.05)
IMM GRANULOCYTES NFR BLD AUTO: 0.7 % (ref 0–0.5)
LYMPHOCYTES # BLD AUTO: 1.17 10*3/MM3 (ref 0.7–3.1)
LYMPHOCYTES NFR BLD AUTO: 15.5 % (ref 19.6–45.3)
MCH RBC QN AUTO: 31.2 PG (ref 26.6–33)
MCHC RBC AUTO-ENTMCNC: 34.1 G/DL (ref 31.5–35.7)
MCV RBC AUTO: 91.5 FL (ref 79–97)
MONOCYTES # BLD AUTO: 0.96 10*3/MM3 (ref 0.1–0.9)
MONOCYTES NFR BLD AUTO: 12.7 % (ref 5–12)
NEUTROPHILS NFR BLD AUTO: 5.02 10*3/MM3 (ref 1.7–7)
NEUTROPHILS NFR BLD AUTO: 66.6 % (ref 42.7–76)
NRBC BLD AUTO-RTO: 4.8 /100 WBC (ref 0–0.2)
PLATELET # BLD AUTO: 301 10*3/MM3 (ref 140–450)
PMV BLD AUTO: 9.8 FL (ref 6–12)
POTASSIUM SERPL-SCNC: 4.1 MMOL/L (ref 3.5–5.2)
RBC # BLD AUTO: 2.47 10*6/MM3 (ref 4.14–5.8)
SODIUM SERPL-SCNC: 141 MMOL/L (ref 136–145)
WBC NRBC COR # BLD: 7.54 10*3/MM3 (ref 3.4–10.8)

## 2023-03-12 PROCEDURE — 25010000002 HEPARIN (PORCINE) 25000-0.45 UT/250ML-% SOLUTION: Performed by: STUDENT IN AN ORGANIZED HEALTH CARE EDUCATION/TRAINING PROGRAM

## 2023-03-12 PROCEDURE — 99232 SBSQ HOSP IP/OBS MODERATE 35: CPT | Performed by: SURGERY

## 2023-03-12 PROCEDURE — 85730 THROMBOPLASTIN TIME PARTIAL: CPT | Performed by: INTERNAL MEDICINE

## 2023-03-12 PROCEDURE — 85025 COMPLETE CBC W/AUTO DIFF WBC: CPT | Performed by: NURSE PRACTITIONER

## 2023-03-12 PROCEDURE — 36415 COLL VENOUS BLD VENIPUNCTURE: CPT | Performed by: NURSE PRACTITIONER

## 2023-03-12 PROCEDURE — 80048 BASIC METABOLIC PNL TOTAL CA: CPT | Performed by: NURSE PRACTITIONER

## 2023-03-12 RX ORDER — SENNA PLUS 8.6 MG/1
2 TABLET ORAL NIGHTLY
Status: DISCONTINUED | OUTPATIENT
Start: 2023-03-12 | End: 2023-03-15 | Stop reason: HOSPADM

## 2023-03-12 RX ADMIN — MORPHINE SULFATE 15 MG: 15 TABLET, FILM COATED, EXTENDED RELEASE ORAL at 06:00

## 2023-03-12 RX ADMIN — SENNOSIDES 2 TABLET: 8.6 TABLET, FILM COATED ORAL at 21:06

## 2023-03-12 RX ADMIN — HYDROCORTISONE 20 MG: 10 TABLET ORAL at 06:00

## 2023-03-12 RX ADMIN — MORPHINE SULFATE 30 MG: 15 TABLET ORAL at 08:52

## 2023-03-12 RX ADMIN — SENNOSIDES 1 TABLET: 8.6 TABLET, FILM COATED ORAL at 17:08

## 2023-03-12 RX ADMIN — NIFEDIPINE 90 MG: 90 TABLET, EXTENDED RELEASE ORAL at 08:52

## 2023-03-12 RX ADMIN — MORPHINE SULFATE 30 MG: 15 TABLET ORAL at 00:22

## 2023-03-12 RX ADMIN — MORPHINE SULFATE 30 MG: 15 TABLET ORAL at 17:07

## 2023-03-12 RX ADMIN — ANTACID TABLETS 1 TABLET: 500 TABLET, CHEWABLE ORAL at 08:51

## 2023-03-12 RX ADMIN — HYDROXYUREA 1000 MG: 500 CAPSULE ORAL at 08:52

## 2023-03-12 RX ADMIN — ANTACID TABLETS 1 TABLET: 500 TABLET, CHEWABLE ORAL at 21:06

## 2023-03-12 RX ADMIN — HEPARIN SODIUM 12.06 UNITS/KG/HR: 10000 INJECTION, SOLUTION INTRAVENOUS at 21:05

## 2023-03-12 RX ADMIN — TAMSULOSIN HYDROCHLORIDE 0.4 MG: 0.4 CAPSULE ORAL at 08:51

## 2023-03-12 RX ADMIN — LOSARTAN POTASSIUM 25 MG: 50 TABLET, FILM COATED ORAL at 09:15

## 2023-03-12 RX ADMIN — FOLIC ACID 1 MG: 1 TABLET ORAL at 08:52

## 2023-03-12 RX ADMIN — GUAIFENESIN 1200 MG: 600 TABLET, EXTENDED RELEASE ORAL at 21:06

## 2023-03-12 RX ADMIN — MORPHINE SULFATE 15 MG: 15 TABLET, FILM COATED, EXTENDED RELEASE ORAL at 21:06

## 2023-03-12 RX ADMIN — ESCITSLOPRAM 20 MG: 10 TABLET ORAL at 17:07

## 2023-03-12 RX ADMIN — GUAIFENESIN 1200 MG: 600 TABLET, EXTENDED RELEASE ORAL at 08:52

## 2023-03-12 RX ADMIN — HYDROCORTISONE 10 MG: 10 TABLET ORAL at 17:07

## 2023-03-12 RX ADMIN — ANTACID TABLETS 1 TABLET: 500 TABLET, CHEWABLE ORAL at 17:08

## 2023-03-12 RX ADMIN — MORPHINE SULFATE 15 MG: 15 TABLET, FILM COATED, EXTENDED RELEASE ORAL at 14:35

## 2023-03-12 NOTE — PLAN OF CARE
Goal Outcome Evaluation:  Plan of Care Reviewed With: patient        Progress: no change  Outcome Evaluation: Resting with HOB up, medicated for c/o leg pain per order with relief. no injury up to B/R with steady gait. family at bedside. blood and procedure teaching finished. NPO at midnight. Tele reads NSR at 81. Will cont to monitor.

## 2023-03-12 NOTE — PROGRESS NOTES
LOS: 1 day   Patient Care Team:  Meredith Miller APRN as PCP - General (Nurse Practitioner)    Chief Complaint: Left lower extremity DVT    Subjective     Patient seen and examined at bedside.  No acute events overnight.  Slight improvement in left lower extremity swelling.  Remains on heparin drip.  Minimal pain to the left lower extremity.  Plan is for OR tomorrow morning for venogram and mechanical thrombectomy, and possible venoplasty/stenting of the iliac vein.    Objective     Vital Signs  Temp:  [97.6 °F (36.4 °C)-98.5 °F (36.9 °C)] 98.5 °F (36.9 °C)  Heart Rate:  [62-83] 64  Resp:  [14-16] 15  BP: (125-145)/(65-80) 141/70    Physical Exam  Vitals reviewed.   Constitutional:       Appearance: Normal appearance.   HENT:      Head: Normocephalic and atraumatic.      Nose: Nose normal.      Mouth/Throat:      Mouth: Mucous membranes are moist.   Eyes:      Extraocular Movements: Extraocular movements intact.      Pupils: Pupils are equal, round, and reactive to light.   Cardiovascular:      Rate and Rhythm: Normal rate and regular rhythm.      Pulses:           Carotid pulses are 2+ on the right side and 2+ on the left side.       Radial pulses are 2+ on the right side and 2+ on the left side.        Femoral pulses are 2+ on the right side and 2+ on the left side.       Popliteal pulses are 2+ on the right side and 2+ on the left side.        Dorsalis pedis pulses are 2+ on the right side and 2+ on the left side.        Posterior tibial pulses are 2+ on the right side and 2+ on the left side.      Comments: Edema of the left lower extremity from proximal thigh to foot.  Palpable pulses throughout the bilateral lower extremities.  Both feet are warm and motor/sensation intact.  Pulmonary:      Effort: Pulmonary effort is normal. No respiratory distress.   Abdominal:      General: There is no distension.      Palpations: Abdomen is soft. There is no mass.      Tenderness: There is no abdominal tenderness.    Musculoskeletal:         General: Normal range of motion.      Cervical back: Normal range of motion and neck supple.      Right lower leg: No edema.      Left lower leg: Edema present.   Skin:     General: Skin is warm and dry.      Capillary Refill: Capillary refill takes less than 2 seconds.   Neurological:      General: No focal deficit present.      Mental Status: He is alert and oriented to person, place, and time.   Psychiatric:         Mood and Affect: Mood normal.         Behavior: Behavior normal.         Thought Content: Thought content normal.         Judgment: Judgment normal.         Laboratory Data:   Results from last 7 days   Lab Units 03/12/23  0553 03/11/23  0040 03/10/23  2140   WBC 10*3/mm3 7.54 6.49 5.70   HEMOGLOBIN g/dL 7.7* 7.5* 7.8*   HEMATOCRIT % 22.6* 21.2* 22.8*   PLATELETS 10*3/mm3 301 275 275       Results from last 7 days   Lab Units 03/12/23  0553 03/10/23  2140   SODIUM mmol/L 141 142   POTASSIUM mmol/L 4.1 3.3*   CHLORIDE mmol/L 106 105   CO2 mmol/L 26.0 27.0   BUN mg/dL 9 9   CREATININE mg/dL 0.73* 0.74*   CALCIUM mg/dL 7.8* 7.8*   BILIRUBIN mg/dL  --  1.0   ALK PHOS U/L  --  51   ALT (SGPT) U/L  --  8   AST (SGOT) U/L  --  16   GLUCOSE mg/dL 98 95     Results from last 7 days   Lab Units 03/12/23  0553 03/11/23  2351 03/11/23  2151 03/11/23  0830 03/11/23  0136   INR   --   --   --   --  1.13*   APTT seconds 81.7* 70.3* 124.6*   < > 37.4*    < > = values in this interval not displayed.            Medication Review: Reviewed.  Remains on heparin drip.    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  He presents with increasing left lower extremity swelling over the past week and was found to have extensive DVT in the left lower extremity.  CT of the abdomen/pelvis with venous phase contrast that showed some thrombus in the external iliac vein but also evidence of some likely chronic scarring of that vein due to previous radiation.  This point plan is to proceed to the OR tomorrow for left lower extremity and IVC venogram with likely mechanical thrombectomy, as well as potential venoplasty and stenting of the iliac vein system. Risks of the procedure were discussed.  These include, but are not limited to, bleeding, infection, vessel damage, nerve damage, pulmonary embolus, MI, and loss of limb.  The patient understands these risks and wishes to proceed with procedure.  He will be n.p.o. after midnight tonight.  Plan will be to stop heparin infusion at 2 AM.  Otherwise I will also order 2 units of packed red blood cells on hold for the OR given his chronic anemia from sickle cell disease.  Further care as per the medical team.  I will continue to follow with you.  Please reach out with any questions or concerns.    Electronically signed by Dipak Moya MD, 03/12/23, 10:25 AM CDT.

## 2023-03-12 NOTE — PROGRESS NOTES
[facility] Medicine Services  INPATIENT PROGRESS NOTE    Patient Name: Holland Phelps  Date of Admission: 3/10/2023  Today's Date: 03/12/23  Length of Stay: 1  Primary Care Physician: Meredith Miller APRN    Subjective   Chief Complaint: Left lower extremity pain and swelling  HPI   presented to ER 3/10/2023 with left lower leg pain and swelling.  He has a history of sickle cell anemia, right renal cell carcinoma status post nephrectomy, systemic chemotherapy, left sacral radiation, chronic left lower extremity lymphedema due to radiation treatment.  Patient wears compression stockings regularly.  In addition, he uses lymphedema pump 1 hour daily and is followed by lymphedema clinic weekly.  He reports 1 week history of worsening lower extremity edema.  He has chronic left lower extremity edema but worsened than normal.  He is followed by [place] for sickle cell disease, chronic anemia and chronic pain syndrome.  He has baseline lower extremity edema from previous radiation surgery.  Patient reported 1 week ago last Saturday, 3/4 he had increasing left lower extremity edema which worsened.  Venous Doppler left lower extremity positive for DVT thrombus in the left  CFV, prox SFV, Pop V and PTV. VERY minimal visualization throughout due to edema and limb size.  CT abdomen pelvis noted diffuse edema left lower extremity and left hemopelvis likely related to obstruction of venous outflow.  Asymmetry enhancement within the left external iliac vein but without evidence of enlargement which would be more typical for acute deep vein thrombosis.  May represent a chronic finding.  Normal appearance abdominal aorta.  Heparin bolus and drip, vancomycin given in ER.    Patient sitting up in bed.  No oxygen use.  Wife in room.  Left lower extremity edema essentially unchanged.  Remains on heparin drip.  Discussed with Dr. Moya and plans for IVC venogram, mechanical  thrombectomy and potential venoplasty with stenting of common external iliacs tomorrow morning.  Patient tells me surgery scheduled for 7 AM.  Resumed home pain medication yesterday and patient reports pain controlled.  No complaints of nausea, vomiting or abdominal pain.  No complaints of chest pain or palpitations.    Review of Systems   Constitutional: Negative for chills, fatigue and fever.   HENT: Negative for congestion and trouble swallowing.    Eyes: Negative for photophobia and visual disturbance.   Respiratory: Negative for cough, shortness of breath and wheezing.    Cardiovascular: Positive for leg swelling (Left lower extremity). Negative for chest pain.   Gastrointestinal: Negative for constipation, diarrhea, nausea and vomiting.   Endocrine: Negative for cold intolerance, heat intolerance and polyuria.   Genitourinary: Negative for dysuria, frequency and urgency.   Musculoskeletal: Positive for gait problem.   Skin: Negative for color change, pallor, rash and wound.   Allergic/Immunologic: Negative for immunocompromised state.   Neurological: Positive for weakness. Negative for light-headedness.   Hematological: Negative for adenopathy. Does not bruise/bleed easily.   Psychiatric/Behavioral: Negative for agitation, behavioral problems and confusion.      All pertinent negatives and positives are as above. All other systems have been reviewed and are negative unless otherwise stated.     Objective    Temp:  [97.6 °F (36.4 °C)-98.5 °F (36.9 °C)] 98.5 °F (36.9 °C)  Heart Rate:  [64-83] 64  Resp:  [15-16] 15  BP: (125-145)/(65-80) 141/70  Physical Exam  Vitals and nursing note reviewed.   Constitutional:       Comments: Sitting up in bed.  No oxygen use.  Wife in room.   HENT:      Head: Normocephalic and atraumatic.      Nose: No congestion.      Mouth/Throat:      Pharynx: Oropharynx is clear. No oropharyngeal exudate or posterior oropharyngeal erythema.   Eyes:      Extraocular Movements: Extraocular  movements intact.      Pupils: Pupils are equal, round, and reactive to light.   Cardiovascular:      Rate and Rhythm: Normal rate and regular rhythm.   Pulmonary:      Breath sounds: No wheezing, rhonchi or rales.      Comments: No oxygen in use.  Abdominal:      Palpations: Abdomen is soft.      Tenderness: There is no abdominal tenderness.   Genitourinary:     Comments: Voiding.  Musculoskeletal:         General: Swelling (Left lower extremity from groin down to foot.) and tenderness (Left lower extremity) present.      Cervical back: Normal range of motion and neck supple.   Skin:     General: Skin is warm and dry.   Neurological:      General: No focal deficit present.      Mental Status: He is alert and oriented to person, place, and time.   Psychiatric:         Mood and Affect: Mood normal.         Behavior: Behavior normal.         Thought Content: Thought content normal.         Judgment: Judgment normal.       Results Review:  I have reviewed the labs, radiology results, and diagnostic studies.    Laboratory Data:   Results from last 7 days   Lab Units 03/12/23  0553 03/11/23  0040 03/10/23  2140   WBC 10*3/mm3 7.54 6.49 5.70   HEMOGLOBIN g/dL 7.7* 7.5* 7.8*   HEMATOCRIT % 22.6* 21.2* 22.8*   PLATELETS 10*3/mm3 301 275 275     Results from last 7 days   Lab Units 03/12/23  0553 03/10/23  2140   SODIUM mmol/L 141 142   POTASSIUM mmol/L 4.1 3.3*   CHLORIDE mmol/L 106 105   CO2 mmol/L 26.0 27.0   BUN mg/dL 9 9   CREATININE mg/dL 0.73* 0.74*   CALCIUM mg/dL 7.8* 7.8*   BILIRUBIN mg/dL  --  1.0   ALK PHOS U/L  --  51   ALT (SGPT) U/L  --  8   AST (SGOT) U/L  --  16   GLUCOSE mg/dL 98 95     Imaging Results (All)     Procedure Component Value Units Date/Time    CT Angiogram Abdomen Pelvis [[id number]] Collected: 03/11/23 0751     Updated: 03/11/23 0809    Narrative:      EXAMINATION: CT angiogram of the abdomen and pelvis with and without  contrast. 03/11/2023     HISTORY: DVT.     DOSE: 1924 mGycm. All CT scans  are performed using dose optimization  techniques as appropriate to the performed exam and including at least  one of the following: Automated exposure control, adjustment of the mA  and/or kV according to size, and the use of the iterative reconstruction  technique.     FINDINGS: Multiple contiguous axial images are obtained through the  abdomen and pelvis both with and without contrast enhancement including  delayed imaging. MIPS are also obtained.     There is moderate cardiomegaly with a small pericardial effusion.  Bibasilar atelectasis is present. No pleural effusion is present. There  is contrast within the left renal collecting system related to earlier  intravenous contrast infusion. There is no evidence of left-sided  obstructive uropathy.     There is a benign hepatic cyst within the lateral segment of the left  lobe of the liver. Liver is otherwise homogeneous in density. There is  normal enhancement of the hepatic vasculature.     The gallbladder is surgically absent. Moderate extrahepatic biliary  dilatation is likely related to reservoir effect. There is no evidence  of choledocholithiasis.     The pancreas is normal in appearance with no mass or ductal dilatation.  No evidence of acute pancreatitis.     The spleen is surgically absent.     The right kidney has been resected. Both adrenal glands are  unremarkable. There is retained contrast within the left renal  collecting system without evidence of hydronephrosis or obstructive  uropathy. There is homogeneous enhancement of the left kidney. No  perinephric fluid collections are present.     The abdominal aorta and IVC are normal in caliber and demonstrate normal  enhancement. There is a normal bowel gas pattern with no obstruction or  free air. There is diastases of the abdominal musculature at the  umbilicus. The patient has undergone previous femur fixation  bilaterally. No acute or suspicious bony abnormalities are present.     The abdominal  aorta and iliac arteries are normal in caliber with no  dissection or aneurysm. No focal plaquing or rate limiting stenosis is  present. The left renal artery is widely patent. The mesenteric vessels  are widely patent.     There is no free fluid within the pelvis. The urinary bladder is normal  in appearance.     There is diffuse edema within the left lower extremity extending into  the left hemipelvis. Early delayed imaging demonstrates the IVC as well  as a bilateral common iliac veins are widely patent demonstrating normal  enhancement. There is asymmetry of the left external iliac vein in  comparison with the left with no focal enhancement demonstrated. This  suggest thrombosis within the left external iliac vein. There is no  enlargement of the external iliac vein suggesting this may represent a  more chronic process. There is also asymmetry of enhancement of the left  common femoral vein at the level of the groin. Correlation is  recommended with the patient's duplex exam. There is extensive stranding  noted within the left hemipelvis and proximal left thigh-likely related  to venous obstruction. I do not see associated discrete mass or mass  effect on the left pelvic sidewall venous structures.       Impression:      1.. Diffuse edema of the left lower extremity and left hemipelvis. This  is likely related to obstruction of venous outflow given the history.  There is asymmetry of enhancement within the left external iliac vein  but without evidence of enlargement which would be more typical for an  acute deep venous thrombosis. This may represent a more chronic finding  and should be correlated with the patient's clinical presentation and  history. I do feel that there is enhancement and a rather normal  appearance of the left common iliac vein. IVC is widely patent.  2. Normal appearance of the abdominal aorta and branch vessels including  the pelvic arterial tree. No evidence of stenosis or plaquing.  No  evidence of dissection.  3. The patient has undergone previous right nephrectomy. The left kidney  is hypertrophied but otherwise normal in appearance. No evidence of  left-sided obstructive uropathy.  4. Diastases of the abdominal musculature at the umbilicus with a small  ventral wall hernia containing fat. No herniated bowel loop or  obstruction.  5. Benign hepatic cyst left lobe of liver.  6. Cardiomegaly with a small pericardial effusion. Bibasilar atelectasis  or scarring is present.  This report was finalized on 03/11/2023 08:06 by Dr. Caesar Guillen MD.    CT Angiogram Chest [[id number]] Collected: 03/11/23 0735     Updated: 03/11/23 0747    Narrative:      Exam: CT angiogram of the of the chest with intravenous contrast.     CLINICAL HISTORY:  DVT of left lower extremity.     DOSE:  210 mGycm. All CT scans are performed using dose optimization  techniques as appropriate to the performed exam and including at least  one of the following: Automated exposure control, adjustment of the mA  and/or kV according to size, and the use of the iterative reconstruction  technique.     TECHNIQUE: Contiguous axial images were obtained through the thorax  following intravenous contrast administration with reformatted images  obtained in the sagittal and coronal projections from the original data  set. Three-dimensional reconstructions are also obtained.     COMPARISON:  04/20/2022     FINDINGS:     Pulmonary arteries:  There is normal enhancement of the pulmonary  arteries with no evidence of pulmonary thromboembolic disease.     Aorta:  The thoracic aorta and proximal great vessels are normal in  appearance. There is no evidence of aortic dissection or aneurysm.     LUNGS:  Bibasilar atelectasis is present. There is a 4 mm nodule in the  right middle lobe. This has a broad-based interface with the minor  fissure and likely represents an intrafissural node. This is stable from  the previous exam. An additional 3 mm  right middle lobe subpleural  nodule on image 96 of the axial sequence is also stable.     Pleural spaces: Unremarkable. No evidence of pleural effusion or  pneumothorax.     HEART: There is moderate cardiomegaly. A small pericardial effusion is  present. No evidence of right ventricular dysfunction. No coronary  calcifications are present.     Bones: No acute osseous abnormalities are demonstrated.     CHEST WALL: No chest wall abnormalities are demonstrated.     Lymph nodes: No enlarged mediastinal or axillary lymphadenopathy is  present.     Upper abdomen: The patient has undergone previous right nephrectomy.  There is a hepatic cyst or hemangioma within the lateral segment of the  left lobe of liver.       Impression:      1. No evidence of pulmonary thromboembolic disease. The thoracic aorta  and great vessels are normal in caliber with no dissection or aneurysm.  2. Cardiomegaly with a small pericardial effusion. No right ventricular  dysfunction or coronary calcification.  3. Bibasilar atelectasis. Stable right middle lobe nodules from previous  exam of 04/20/2022. No evidence of acute consolidative pneumonia or  pleural effusion.  4. The patient's undergone previous right nephrectomy. There is a benign  hepatic cyst or hemangioma within the lateral segment of the left lobe  of the liver.  This report was finalized on 03/11/2023 07:44 by Dr. Caesar Guillen MD.    US Venous Doppler Lower Extremity Left (duplex) [[id number]] Resulted: 03/10/23 2217     Updated: 03/10/23 2221    XR Knee 3 View Left [[id number]] Collected: 03/10/23 2137     Updated: 03/10/23 2141    Narrative:      EXAMINATION: XR KNEE 3 VW LEFT-     3/10/2023 9:33 PM CST     HISTORY: knee pain     Left knee 3 view exam.     No comparison.     Long distal femur prosthesis.     Diffuse soft tissue edema about the lower thigh and knee.     No occult fracture is seen.     The extent visualized the prosthetic components are intact.     Summary:  1.  Diffuse soft tissue edema.  2. No acute bony abnormality is seen.  This report was finalized on 03/10/2023 21:38 by Dr. Peña Moscoso MD.        Results for orders placed during the hospital encounter of 04/20/22    Adult Transthoracic Echo Complete W/ Cont if Necessary Per Protocol    Interpretation Summary  · Left ventricular ejection fraction appears to be 66 - 70%. Left ventricular systolic function is normal.  · Left ventricular diastolic function was normal.  · Left atrial volume is mildly increased.  · There is a small (<1cm) pericardial effusion.  · There is no evidence of cardiac tamponade.  · Estimated right ventricular systolic pressure from tricuspid regurgitation is normal (<35 mmHg).    Schedule medications  bisacodyl, 10 mg, Rectal, Daily  calcium carbonate, 1 tablet, Oral, TID  escitalopram, 20 mg, Oral, Q PM  folic acid, 1 mg, Oral, Daily  guaiFENesin, 1,200 mg, Oral, Q12H  hydrocortisone, 10 mg, Oral, Q PM  hydrocortisone, 20 mg, Oral, Q AM  hydroxyurea, 1,000 mg, Oral, Daily  losartan, 25 mg, Oral, Daily  Morphine, 15 mg, Oral, Q8H  NIFEdipine CC, 90 mg, Oral, Daily  senna, 1 tablet, Oral, Q PM  tamsulosin, 0.4 mg, Oral, Daily      I have reviewed the patient's current medications.     Assessment/Plan   Assessment  Active Hospital Problems    Diagnosis    • **Acute deep vein thrombosis (DVT) of femoral vein of left lower extremity (HCC)    • Chronic pain syndrome    • Primary hypertension    • Hypokalemia    • Stenosis of left iliac vein    • Acute pain    • Anemia of chronic disease    • Sickle cell beta thalassemia (HCC)      Treatment Plan  Acute DVT femoral vein left lower extremity.  Patient has history of chronic lymphedema left lower extremity but noted worsening swelling over the past week.  Venous Dopplers positive for DVT.  CTA negative for pulmonary emboli.  Heparin drip.  Vascular surgery consulted and Dr. Moya plans to proceed with left lower extremity IVC venogram, mechanical  thrombectomy, potential venoplasty and stenting of common/external iliac vein tomorrow.  Continue heparin drip.    Stenosis left iliac vein.  Dr. Moya following and plans for stenting tomorrow.    Acute on chronic left lower extremity pain. Resumed home medication morphine.  Patient followed by pain management at [facility].  He tells me pain is adequately controlled on current medication.    Anemia of chronic disease.  Hemoglobin 7.8 on admission.  Hemoglobin 7.7 today. CBC daily.  Monitor closely as patient on heparin drip.    Primary hypertension.  Blood pressure 141/72.  Continue losartan    History of sickle cell beta thalassemia followed by [facility] hematology/oncology.    Hypokalemia.  Potassium 3.3.  Replaced potassium 40 mEq.  Potassium 4.1 today.    Medical Decision Making  Number and Complexity of problems: 7  Acute DVT femoral vein left lower extremity: Acute, high complexity  Stenosis left iliac vein: Acute, high complexity  Acute on chronic left lower extremity pain: Acute on chronic, high complexity  Anemia of chronic disease: Acute on chronic, high complexity  Primary hypertension: Chronic, moderate complexity  Sickle cell thalassemia: Chronic, moderate complexity  Hypokalemia: Acute, moderate complexity    Differential Diagnosis: Pulmonary emboli    Conditions and Status        Condition is unchanged     MDM Data  External documents reviewed: Discussed with Dr. Moya and viewed consultation  Cardiac tracing (EKG, telemetry) interpretation: Normal sinus rhythm  Radiology interpretation: CT angiogram abdomen pelvis, CTA chest, venous Doppler  Labs reviewed:   BMP 3/12/2023  CBC 3/12/2023  PTT    Any tests that were considered but not ordered: None     Decision rules/scores evaluated (example [id number], [place], etc): None     Discussed with: Dr. Griffin Chavis, patient, and wife     Care Planning  Shared decision making: Dr. Griffin Chavis, patient, and wife.  Patient agrees to  surgery tomorrow as recommended by Dr. Moya.   Code status and discussions: Full code  The patient surrogate decision maker is his wife, Primo Barakat  Social Determinants of Health that impact treatment or disposition: None  I expect the patient to be discharged to home in 2- 3 days.     Electronically signed by GILBERTO Zeng, 03/12/23, 11:30 CDT.    This visit was performed by both a physician and an APC. I personally evaluated and examined the patient. I performed all aspects of the MDM as documented.    Discussed with his nurse, Michelle.    Seen and discussed with his wife.    Dr. Moya plans to take him for venogram of the IVC and left lower extremity with mechanical thrombectomy and potential venoplasty/stenting of the iliac vein system.  Heparin drip to be stopped at 0200.  Nothing by mouth after midnight.    His risk factor for developing DVT of the left lower extremity is chronic venous insufficiency and poor lymph return of the left lower extremity.  He was previously treated with radiation therapy at the time of his nephrectomy and ever since then has had increased swelling of the left lower extremity.  He may actually get some benefit from the procedure tomorrow if he does have an iliac stent placed as this could promote improved venous return of the limb.    No self history of VTE or family history.    Electronically signed by Demarcus Britt DO, 3/12/2023, 14:49 CDT.

## 2023-03-13 ENCOUNTER — ANESTHESIA (OUTPATIENT)
Dept: PERIOP | Facility: HOSPITAL | Age: 53
DRG: 270 | End: 2023-03-13
Payer: MEDICARE

## 2023-03-13 ENCOUNTER — ANESTHESIA EVENT (OUTPATIENT)
Dept: PERIOP | Facility: HOSPITAL | Age: 53
DRG: 270 | End: 2023-03-13
Payer: MEDICARE

## 2023-03-13 ENCOUNTER — APPOINTMENT (OUTPATIENT)
Dept: INTERVENTIONAL RADIOLOGY/VASCULAR | Facility: HOSPITAL | Age: 53
DRG: 270 | End: 2023-03-13
Payer: MEDICARE

## 2023-03-13 LAB
ANION GAP SERPL CALCULATED.3IONS-SCNC: 9 MMOL/L (ref 5–15)
ANISOCYTOSIS BLD QL: ABNORMAL
ANISOCYTOSIS BLD QL: ABNORMAL
APTT PPP: 37.9 SECONDS (ref 24.1–35)
APTT PPP: 43.4 SECONDS (ref 24.1–35)
APTT PPP: 54.5 SECONDS (ref 24.1–35)
APTT PPP: 80.7 SECONDS (ref 24.1–35)
APTT PPP: >200 SECONDS (ref 24.1–35)
BUN SERPL-MCNC: 9 MG/DL (ref 6–20)
BUN/CREAT SERPL: 12.7 (ref 7–25)
CALCIUM SPEC-SCNC: 8.5 MG/DL (ref 8.6–10.5)
CHLORIDE SERPL-SCNC: 105 MMOL/L (ref 98–107)
CO2 SERPL-SCNC: 28 MMOL/L (ref 22–29)
CREAT SERPL-MCNC: 0.71 MG/DL (ref 0.76–1.27)
DEPRECATED RDW RBC AUTO: 66.5 FL (ref 37–54)
DEPRECATED RDW RBC AUTO: 68 FL (ref 37–54)
EGFRCR SERPLBLD CKD-EPI 2021: 109 ML/MIN/1.73
EOSINOPHIL # BLD MANUAL: 0.19 10*3/MM3 (ref 0–0.4)
EOSINOPHIL # BLD MANUAL: 0.6 10*3/MM3 (ref 0–0.4)
EOSINOPHIL NFR BLD MANUAL: 2 % (ref 0.3–6.2)
EOSINOPHIL NFR BLD MANUAL: 6.1 % (ref 0.3–6.2)
ERYTHROCYTE [DISTWIDTH] IN BLOOD BY AUTOMATED COUNT: 20.5 % (ref 12.3–15.4)
ERYTHROCYTE [DISTWIDTH] IN BLOOD BY AUTOMATED COUNT: 20.9 % (ref 12.3–15.4)
GIANT PLATELETS: ABNORMAL
GIANT PLATELETS: ABNORMAL
GLUCOSE SERPL-MCNC: 98 MG/DL (ref 65–99)
HCT VFR BLD AUTO: 20.3 % (ref 37.5–51)
HCT VFR BLD AUTO: 22.3 % (ref 37.5–51)
HCT VFR BLD AUTO: 24.1 % (ref 37.5–51)
HGB BLD-MCNC: 6.9 G/DL (ref 13–17.7)
HGB BLD-MCNC: 7.7 G/DL (ref 13–17.7)
HGB BLD-MCNC: 8.3 G/DL (ref 13–17.7)
LYMPHOCYTES # BLD MANUAL: 1.19 10*3/MM3 (ref 0.7–3.1)
LYMPHOCYTES # BLD MANUAL: 1.65 10*3/MM3 (ref 0.7–3.1)
LYMPHOCYTES NFR BLD MANUAL: 12.1 % (ref 5–12)
LYMPHOCYTES NFR BLD MANUAL: 8.1 % (ref 5–12)
MACROCYTES BLD QL SMEAR: ABNORMAL
MACROCYTES BLD QL SMEAR: ABNORMAL
MCH RBC QN AUTO: 30.8 PG (ref 26.6–33)
MCH RBC QN AUTO: 30.9 PG (ref 26.6–33)
MCHC RBC AUTO-ENTMCNC: 34 G/DL (ref 31.5–35.7)
MCHC RBC AUTO-ENTMCNC: 34.5 G/DL (ref 31.5–35.7)
MCV RBC AUTO: 89.6 FL (ref 79–97)
MCV RBC AUTO: 90.6 FL (ref 79–97)
MICROCYTES BLD QL: ABNORMAL
MONOCYTES # BLD: 0.8 10*3/MM3 (ref 0.1–0.9)
MONOCYTES # BLD: 1.16 10*3/MM3 (ref 0.1–0.9)
NEUTROPHILS # BLD AUTO: 6.57 10*3/MM3 (ref 1.7–7)
NEUTROPHILS # BLD AUTO: 7.25 10*3/MM3 (ref 1.7–7)
NEUTROPHILS NFR BLD MANUAL: 67.7 % (ref 42.7–76)
NEUTROPHILS NFR BLD MANUAL: 71.7 % (ref 42.7–76)
NEUTS BAND NFR BLD MANUAL: 1 % (ref 0–5)
NEUTS BAND NFR BLD MANUAL: 2 % (ref 0–5)
NEUTS VAC BLD QL SMEAR: ABNORMAL
NRBC SPEC MANUAL: 23.2 /100 WBC (ref 0–0.2)
NRBC SPEC MANUAL: 26.3 /100 WBC (ref 0–0.2)
OVALOCYTES BLD QL SMEAR: ABNORMAL
PLATELET # BLD AUTO: 278 10*3/MM3 (ref 140–450)
PLATELET # BLD AUTO: 343 10*3/MM3 (ref 140–450)
PMV BLD AUTO: 9.9 FL (ref 6–12)
PMV BLD AUTO: 9.9 FL (ref 6–12)
POIKILOCYTOSIS BLD QL SMEAR: ABNORMAL
POIKILOCYTOSIS BLD QL SMEAR: ABNORMAL
POLYCHROMASIA BLD QL SMEAR: ABNORMAL
POLYCHROMASIA BLD QL SMEAR: ABNORMAL
POTASSIUM SERPL-SCNC: 3.7 MMOL/L (ref 3.5–5.2)
RBC # BLD AUTO: 2.24 10*6/MM3 (ref 4.14–5.8)
RBC # BLD AUTO: 2.49 10*6/MM3 (ref 4.14–5.8)
SODIUM SERPL-SCNC: 142 MMOL/L (ref 136–145)
TARGETS BLD QL SMEAR: ABNORMAL
TARGETS BLD QL SMEAR: ABNORMAL
VARIANT LYMPHS NFR BLD MANUAL: 1 % (ref 0–5)
VARIANT LYMPHS NFR BLD MANUAL: 1 % (ref 0–5)
VARIANT LYMPHS NFR BLD MANUAL: 11.1 % (ref 19.6–45.3)
VARIANT LYMPHS NFR BLD MANUAL: 16.2 % (ref 19.6–45.3)
WBC MORPH BLD: NORMAL
WBC NRBC COR # BLD: 9.57 10*3/MM3 (ref 3.4–10.8)
WBC NRBC COR # BLD: 9.83 10*3/MM3 (ref 3.4–10.8)

## 2023-03-13 PROCEDURE — L1830 KO IMMOB CANVAS LONG PRE OTS: HCPCS | Performed by: SURGERY

## 2023-03-13 PROCEDURE — 37238 OPEN/PERQ PLACE STENT SAME: CPT | Performed by: SURGERY

## 2023-03-13 PROCEDURE — 25010000002 HYDROMORPHONE PER 4 MG: Performed by: ANESTHESIOLOGY

## 2023-03-13 PROCEDURE — 25010000002 HEPARIN (PORCINE) PER 1000 UNITS: Performed by: SURGERY

## 2023-03-13 PROCEDURE — 067D3DZ DILATION OF LEFT COMMON ILIAC VEIN WITH INTRALUMINAL DEVICE, PERCUTANEOUS APPROACH: ICD-10-PCS | Performed by: SURGERY

## 2023-03-13 PROCEDURE — 25010000002 DEXAMETHASONE PER 1 MG

## 2023-03-13 PROCEDURE — 067G3DZ DILATION OF LEFT EXTERNAL ILIAC VEIN WITH INTRALUMINAL DEVICE, PERCUTANEOUS APPROACH: ICD-10-PCS | Performed by: SURGERY

## 2023-03-13 PROCEDURE — C1894 INTRO/SHEATH, NON-LASER: HCPCS | Performed by: SURGERY

## 2023-03-13 PROCEDURE — C1769 GUIDE WIRE: HCPCS | Performed by: SURGERY

## 2023-03-13 PROCEDURE — C1876 STENT, NON-COA/NON-COV W/DEL: HCPCS | Performed by: SURGERY

## 2023-03-13 PROCEDURE — 06CN3ZZ EXTIRPATION OF MATTER FROM LEFT FEMORAL VEIN, PERCUTANEOUS APPROACH: ICD-10-PCS | Performed by: SURGERY

## 2023-03-13 PROCEDURE — 25010000002 HEPARIN (PORCINE) PER 1000 UNITS

## 2023-03-13 PROCEDURE — 36430 TRANSFUSION BLD/BLD COMPNT: CPT

## 2023-03-13 PROCEDURE — 06CD3ZZ EXTIRPATION OF MATTER FROM LEFT COMMON ILIAC VEIN, PERCUTANEOUS APPROACH: ICD-10-PCS | Performed by: SURGERY

## 2023-03-13 PROCEDURE — 25010000002 DROPERIDOL PER 5 MG: Performed by: ANESTHESIOLOGY

## 2023-03-13 PROCEDURE — 25010000002 PROPOFOL 10 MG/ML EMULSION

## 2023-03-13 PROCEDURE — 25010000002 FENTANYL CITRATE (PF) 50 MCG/ML SOLUTION: Performed by: ANESTHESIOLOGY

## 2023-03-13 PROCEDURE — 85018 HEMOGLOBIN: CPT | Performed by: INTERNAL MEDICINE

## 2023-03-13 PROCEDURE — 37185 PRIM ART M-THRMBC SBSQ VSL: CPT | Performed by: SURGERY

## 2023-03-13 PROCEDURE — 25010000002 ONDANSETRON PER 1 MG

## 2023-03-13 PROCEDURE — 25510000001 IOPAMIDOL 61 % SOLUTION: Performed by: SURGERY

## 2023-03-13 PROCEDURE — 86900 BLOOD TYPING SEROLOGIC ABO: CPT

## 2023-03-13 PROCEDURE — C1725 CATH, TRANSLUMIN NON-LASER: HCPCS | Performed by: SURGERY

## 2023-03-13 PROCEDURE — 85730 THROMBOPLASTIN TIME PARTIAL: CPT | Performed by: SURGERY

## 2023-03-13 PROCEDURE — C1757 CATH, THROMBECTOMY/EMBOLECT: HCPCS

## 2023-03-13 PROCEDURE — C1753 CATH, INTRAVAS ULTRASOUND: HCPCS | Performed by: SURGERY

## 2023-03-13 PROCEDURE — 36415 COLL VENOUS BLD VENIPUNCTURE: CPT | Performed by: NURSE PRACTITIONER

## 2023-03-13 PROCEDURE — 25010000002 HEPARIN (PORCINE) 25000-0.45 UT/250ML-% SOLUTION: Performed by: SURGERY

## 2023-03-13 PROCEDURE — 80048 BASIC METABOLIC PNL TOTAL CA: CPT | Performed by: NURSE PRACTITIONER

## 2023-03-13 PROCEDURE — 25010000002 CEFAZOLIN PER 500 MG: Performed by: SURGERY

## 2023-03-13 PROCEDURE — 37252 INTRVASC US NONCORONARY 1ST: CPT | Performed by: SURGERY

## 2023-03-13 PROCEDURE — 067N3ZZ DILATION OF LEFT FEMORAL VEIN, PERCUTANEOUS APPROACH: ICD-10-PCS | Performed by: SURGERY

## 2023-03-13 PROCEDURE — 85730 THROMBOPLASTIN TIME PARTIAL: CPT | Performed by: INTERNAL MEDICINE

## 2023-03-13 PROCEDURE — 85014 HEMATOCRIT: CPT | Performed by: INTERNAL MEDICINE

## 2023-03-13 PROCEDURE — P9016 RBC LEUKOCYTES REDUCED: HCPCS

## 2023-03-13 PROCEDURE — B5191ZZ FLUOROSCOPY OF INFERIOR VENA CAVA USING LOW OSMOLAR CONTRAST: ICD-10-PCS | Performed by: SURGERY

## 2023-03-13 PROCEDURE — 75820 VEIN X-RAY ARM/LEG: CPT

## 2023-03-13 PROCEDURE — 25010000002 FENTANYL CITRATE (PF) 100 MCG/2ML SOLUTION

## 2023-03-13 PROCEDURE — 76000 FLUOROSCOPY <1 HR PHYS/QHP: CPT

## 2023-03-13 PROCEDURE — C1757 CATH, THROMBECTOMY/EMBOLECT: HCPCS | Performed by: SURGERY

## 2023-03-13 PROCEDURE — 85007 BL SMEAR W/DIFF WBC COUNT: CPT | Performed by: NURSE PRACTITIONER

## 2023-03-13 PROCEDURE — C1887 CATHETER, GUIDING: HCPCS | Performed by: SURGERY

## 2023-03-13 PROCEDURE — 37239 OPEN/PERQ PLACE STENT EA ADD: CPT | Performed by: SURGERY

## 2023-03-13 PROCEDURE — 06CG3ZZ EXTIRPATION OF MATTER FROM LEFT EXTERNAL ILIAC VEIN, PERCUTANEOUS APPROACH: ICD-10-PCS | Performed by: SURGERY

## 2023-03-13 PROCEDURE — 85007 BL SMEAR W/DIFF WBC COUNT: CPT | Performed by: SURGERY

## 2023-03-13 PROCEDURE — 37253 INTRVASC US NONCORONARY ADDL: CPT | Performed by: SURGERY

## 2023-03-13 PROCEDURE — 85025 COMPLETE CBC W/AUTO DIFF WBC: CPT | Performed by: NURSE PRACTITIONER

## 2023-03-13 PROCEDURE — 85025 COMPLETE CBC W/AUTO DIFF WBC: CPT | Performed by: SURGERY

## 2023-03-13 DEVICE — IMPLANTABLE DEVICE: Type: IMPLANTABLE DEVICE | Site: GROIN | Status: FUNCTIONAL

## 2023-03-13 RX ORDER — FLUMAZENIL 0.1 MG/ML
0.2 INJECTION INTRAVENOUS AS NEEDED
Status: DISCONTINUED | OUTPATIENT
Start: 2023-03-13 | End: 2023-03-13 | Stop reason: HOSPADM

## 2023-03-13 RX ORDER — SODIUM CHLORIDE 0.9 % (FLUSH) 0.9 %
3 SYRINGE (ML) INJECTION EVERY 12 HOURS SCHEDULED
Status: DISCONTINUED | OUTPATIENT
Start: 2023-03-13 | End: 2023-03-13 | Stop reason: HOSPADM

## 2023-03-13 RX ORDER — ONDANSETRON 2 MG/ML
4 INJECTION INTRAMUSCULAR; INTRAVENOUS
Status: DISCONTINUED | OUTPATIENT
Start: 2023-03-13 | End: 2023-03-13 | Stop reason: HOSPADM

## 2023-03-13 RX ORDER — BUPIVACAINE HCL/0.9 % NACL/PF 0.1 %
2 PLASTIC BAG, INJECTION (ML) EPIDURAL ONCE
Status: COMPLETED | OUTPATIENT
Start: 2023-03-13 | End: 2023-03-13

## 2023-03-13 RX ORDER — MIDAZOLAM HYDROCHLORIDE 1 MG/ML
1 INJECTION INTRAMUSCULAR; INTRAVENOUS
Status: DISCONTINUED | OUTPATIENT
Start: 2023-03-13 | End: 2023-03-13 | Stop reason: HOSPADM

## 2023-03-13 RX ORDER — ONDANSETRON 2 MG/ML
INJECTION INTRAMUSCULAR; INTRAVENOUS AS NEEDED
Status: DISCONTINUED | OUTPATIENT
Start: 2023-03-13 | End: 2023-03-13 | Stop reason: SURG

## 2023-03-13 RX ORDER — FENTANYL CITRATE 50 UG/ML
25 INJECTION, SOLUTION INTRAMUSCULAR; INTRAVENOUS
Status: DISCONTINUED | OUTPATIENT
Start: 2023-03-13 | End: 2023-03-13 | Stop reason: HOSPADM

## 2023-03-13 RX ORDER — BUPIVACAINE HYDROCHLORIDE 5 MG/ML
INJECTION, SOLUTION EPIDURAL; INTRACAUDAL AS NEEDED
Status: DISCONTINUED | OUTPATIENT
Start: 2023-03-13 | End: 2023-03-13 | Stop reason: HOSPADM

## 2023-03-13 RX ORDER — LIDOCAINE HYDROCHLORIDE 20 MG/ML
INJECTION, SOLUTION EPIDURAL; INFILTRATION; INTRACAUDAL; PERINEURAL AS NEEDED
Status: DISCONTINUED | OUTPATIENT
Start: 2023-03-13 | End: 2023-03-13 | Stop reason: SURG

## 2023-03-13 RX ORDER — DEXAMETHASONE SODIUM PHOSPHATE 4 MG/ML
INJECTION, SOLUTION INTRA-ARTICULAR; INTRALESIONAL; INTRAMUSCULAR; INTRAVENOUS; SOFT TISSUE AS NEEDED
Status: DISCONTINUED | OUTPATIENT
Start: 2023-03-13 | End: 2023-03-13 | Stop reason: SURG

## 2023-03-13 RX ORDER — NALOXONE HCL 0.4 MG/ML
0.04 VIAL (ML) INJECTION AS NEEDED
Status: DISCONTINUED | OUTPATIENT
Start: 2023-03-13 | End: 2023-03-13 | Stop reason: HOSPADM

## 2023-03-13 RX ORDER — GLYCOPYRROLATE 0.2 MG/ML
INJECTION INTRAMUSCULAR; INTRAVENOUS AS NEEDED
Status: DISCONTINUED | OUTPATIENT
Start: 2023-03-13 | End: 2023-03-13 | Stop reason: SURG

## 2023-03-13 RX ORDER — LABETALOL HYDROCHLORIDE 5 MG/ML
5 INJECTION, SOLUTION INTRAVENOUS
Status: DISCONTINUED | OUTPATIENT
Start: 2023-03-13 | End: 2023-03-13 | Stop reason: HOSPADM

## 2023-03-13 RX ORDER — ROCURONIUM BROMIDE 10 MG/ML
INJECTION, SOLUTION INTRAVENOUS AS NEEDED
Status: DISCONTINUED | OUTPATIENT
Start: 2023-03-13 | End: 2023-03-13 | Stop reason: SURG

## 2023-03-13 RX ORDER — LIDOCAINE HYDROCHLORIDE 10 MG/ML
0.5 INJECTION, SOLUTION EPIDURAL; INFILTRATION; INTRACAUDAL; PERINEURAL ONCE AS NEEDED
Status: DISCONTINUED | OUTPATIENT
Start: 2023-03-13 | End: 2023-03-13 | Stop reason: HOSPADM

## 2023-03-13 RX ORDER — IBUPROFEN 600 MG/1
600 TABLET ORAL ONCE AS NEEDED
Status: DISCONTINUED | OUTPATIENT
Start: 2023-03-13 | End: 2023-03-13 | Stop reason: HOSPADM

## 2023-03-13 RX ORDER — HYDROMORPHONE HYDROCHLORIDE 1 MG/ML
0.5 INJECTION, SOLUTION INTRAMUSCULAR; INTRAVENOUS; SUBCUTANEOUS
Status: DISCONTINUED | OUTPATIENT
Start: 2023-03-13 | End: 2023-03-13 | Stop reason: HOSPADM

## 2023-03-13 RX ORDER — SODIUM CHLORIDE, SODIUM LACTATE, POTASSIUM CHLORIDE, CALCIUM CHLORIDE 600; 310; 30; 20 MG/100ML; MG/100ML; MG/100ML; MG/100ML
100 INJECTION, SOLUTION INTRAVENOUS CONTINUOUS
Status: DISCONTINUED | OUTPATIENT
Start: 2023-03-13 | End: 2023-03-13

## 2023-03-13 RX ORDER — HEPARIN SODIUM 1000 [USP'U]/ML
INJECTION, SOLUTION INTRAVENOUS; SUBCUTANEOUS AS NEEDED
Status: DISCONTINUED | OUTPATIENT
Start: 2023-03-13 | End: 2023-03-13 | Stop reason: SURG

## 2023-03-13 RX ORDER — NEOSTIGMINE METHYLSULFATE 5 MG/5 ML
SYRINGE (ML) INTRAVENOUS AS NEEDED
Status: DISCONTINUED | OUTPATIENT
Start: 2023-03-13 | End: 2023-03-13 | Stop reason: SURG

## 2023-03-13 RX ORDER — DROPERIDOL 2.5 MG/ML
0.62 INJECTION, SOLUTION INTRAMUSCULAR; INTRAVENOUS ONCE AS NEEDED
Status: COMPLETED | OUTPATIENT
Start: 2023-03-13 | End: 2023-03-13

## 2023-03-13 RX ORDER — SODIUM CHLORIDE 0.9 % (FLUSH) 0.9 %
3-10 SYRINGE (ML) INJECTION AS NEEDED
Status: DISCONTINUED | OUTPATIENT
Start: 2023-03-13 | End: 2023-03-13 | Stop reason: HOSPADM

## 2023-03-13 RX ORDER — FENTANYL CITRATE 50 UG/ML
INJECTION, SOLUTION INTRAMUSCULAR; INTRAVENOUS AS NEEDED
Status: DISCONTINUED | OUTPATIENT
Start: 2023-03-13 | End: 2023-03-13 | Stop reason: SURG

## 2023-03-13 RX ORDER — SODIUM CHLORIDE 9 MG/ML
40 INJECTION, SOLUTION INTRAVENOUS AS NEEDED
Status: DISCONTINUED | OUTPATIENT
Start: 2023-03-13 | End: 2023-03-13 | Stop reason: HOSPADM

## 2023-03-13 RX ORDER — OXYCODONE AND ACETAMINOPHEN 10; 325 MG/1; MG/1
1 TABLET ORAL ONCE AS NEEDED
Status: DISCONTINUED | OUTPATIENT
Start: 2023-03-13 | End: 2023-03-13 | Stop reason: HOSPADM

## 2023-03-13 RX ORDER — PROPOFOL 10 MG/ML
VIAL (ML) INTRAVENOUS AS NEEDED
Status: DISCONTINUED | OUTPATIENT
Start: 2023-03-13 | End: 2023-03-13 | Stop reason: SURG

## 2023-03-13 RX ADMIN — ANTACID TABLETS 1 TABLET: 500 TABLET, CHEWABLE ORAL at 21:41

## 2023-03-13 RX ADMIN — PROPOFOL INJECTABLE EMULSION 200 MG: 10 INJECTION, EMULSION INTRAVENOUS at 07:04

## 2023-03-13 RX ADMIN — FENTANYL CITRATE 100 MCG: 50 INJECTION INTRAMUSCULAR; INTRAVENOUS at 07:03

## 2023-03-13 RX ADMIN — LOSARTAN POTASSIUM 25 MG: 50 TABLET, FILM COATED ORAL at 17:46

## 2023-03-13 RX ADMIN — FENTANYL CITRATE 25 MCG: 50 INJECTION, SOLUTION INTRAMUSCULAR; INTRAVENOUS at 09:42

## 2023-03-13 RX ADMIN — MORPHINE SULFATE 15 MG: 15 TABLET, FILM COATED, EXTENDED RELEASE ORAL at 14:46

## 2023-03-13 RX ADMIN — MORPHINE SULFATE 30 MG: 15 TABLET ORAL at 02:06

## 2023-03-13 RX ADMIN — HEPARIN SODIUM 12.06 UNITS/KG/HR: 10000 INJECTION, SOLUTION INTRAVENOUS at 10:05

## 2023-03-13 RX ADMIN — FENTANYL CITRATE 25 MCG: 50 INJECTION, SOLUTION INTRAMUSCULAR; INTRAVENOUS at 09:54

## 2023-03-13 RX ADMIN — HYDROCORTISONE 10 MG: 10 TABLET ORAL at 17:45

## 2023-03-13 RX ADMIN — HEPARIN SODIUM 3000 UNITS: 1000 INJECTION, SOLUTION INTRAVENOUS; SUBCUTANEOUS at 07:35

## 2023-03-13 RX ADMIN — PROPOFOL INJECTABLE EMULSION 50 MG: 10 INJECTION, EMULSION INTRAVENOUS at 08:12

## 2023-03-13 RX ADMIN — HYDROMORPHONE HYDROCHLORIDE 0.5 MG: 1 INJECTION, SOLUTION INTRAMUSCULAR; INTRAVENOUS; SUBCUTANEOUS at 09:40

## 2023-03-13 RX ADMIN — MORPHINE SULFATE 15 MG: 15 TABLET, FILM COATED, EXTENDED RELEASE ORAL at 21:41

## 2023-03-13 RX ADMIN — SODIUM CHLORIDE, POTASSIUM CHLORIDE, SODIUM LACTATE AND CALCIUM CHLORIDE: 600; 310; 30; 20 INJECTION, SOLUTION INTRAVENOUS at 08:43

## 2023-03-13 RX ADMIN — DROPERIDOL 0.62 MG: 2.5 INJECTION, SOLUTION INTRAMUSCULAR; INTRAVENOUS at 09:41

## 2023-03-13 RX ADMIN — SENNOSIDES 2 TABLET: 8.6 TABLET, FILM COATED ORAL at 21:41

## 2023-03-13 RX ADMIN — SODIUM CHLORIDE, POTASSIUM CHLORIDE, SODIUM LACTATE AND CALCIUM CHLORIDE 100 ML/HR: 600; 310; 30; 20 INJECTION, SOLUTION INTRAVENOUS at 06:59

## 2023-03-13 RX ADMIN — ROCURONIUM BROMIDE 35 MG: 10 INJECTION, SOLUTION INTRAVENOUS at 07:04

## 2023-03-13 RX ADMIN — Medication 2 G: at 07:11

## 2023-03-13 RX ADMIN — PROPOFOL INJECTABLE EMULSION 100 MG: 10 INJECTION, EMULSION INTRAVENOUS at 08:54

## 2023-03-13 RX ADMIN — HEPARIN SODIUM 1000 UNITS: 1000 INJECTION, SOLUTION INTRAVENOUS; SUBCUTANEOUS at 08:33

## 2023-03-13 RX ADMIN — HYDROMORPHONE HYDROCHLORIDE 0.5 MG: 1 INJECTION, SOLUTION INTRAMUSCULAR; INTRAVENOUS; SUBCUTANEOUS at 09:53

## 2023-03-13 RX ADMIN — GLYCOPYRROLATE 0.4 MG: 0.2 INJECTION INTRAMUSCULAR; INTRAVENOUS at 08:42

## 2023-03-13 RX ADMIN — GUAIFENESIN 1200 MG: 600 TABLET, EXTENDED RELEASE ORAL at 21:41

## 2023-03-13 RX ADMIN — Medication 3 MG: at 08:42

## 2023-03-13 RX ADMIN — FENTANYL CITRATE 25 MCG: 50 INJECTION, SOLUTION INTRAMUSCULAR; INTRAVENOUS at 09:47

## 2023-03-13 RX ADMIN — DEXAMETHASONE SODIUM PHOSPHATE 4 MG: 4 INJECTION, SOLUTION INTRA-ARTICULAR; INTRALESIONAL; INTRAMUSCULAR; INTRAVENOUS; SOFT TISSUE at 08:52

## 2023-03-13 RX ADMIN — ANTACID TABLETS 1 TABLET: 500 TABLET, CHEWABLE ORAL at 16:03

## 2023-03-13 RX ADMIN — ONDANSETRON 4 MG: 2 INJECTION INTRAMUSCULAR; INTRAVENOUS at 08:42

## 2023-03-13 RX ADMIN — ESCITSLOPRAM 20 MG: 10 TABLET ORAL at 17:45

## 2023-03-13 RX ADMIN — HEPARIN SODIUM 7300 UNITS: 1000 INJECTION, SOLUTION INTRAVENOUS; SUBCUTANEOUS at 15:56

## 2023-03-13 RX ADMIN — LIDOCAINE HYDROCHLORIDE 100 MG: 20 INJECTION, SOLUTION EPIDURAL; INFILTRATION; INTRACAUDAL; PERINEURAL at 07:04

## 2023-03-13 NOTE — ANESTHESIA PREPROCEDURE EVALUATION
Anesthesia Evaluation     no history of anesthetic complications:  NPO Solid Status: > 8 hours  NPO Liquid Status: > 8 hours           Airway   Mallampati: I  No difficulty expected  Dental      Pulmonary    (+) a smoker (occassioonal cigar),   Cardiovascular   Exercise tolerance: good (4-7 METS)    (+) hypertension, DVT,       Neuro/Psych  GI/Hepatic/Renal/Endo    (+)   renal disease CRI,   (-) liver disease, diabetes    Musculoskeletal     Abdominal    Substance History      OB/GYN          Other   blood dyscrasia (sickle cell) anemia,   history of cancer (renal cell carcinoma s/p surgery in remission) remission                    Anesthesia Plan    ASA 3     MAC     intravenous induction     Anesthetic plan, risks, benefits, and alternatives have been provided, discussed and informed consent has been obtained with: patient.        CODE STATUS:    Level Of Support Discussed With: Patient  Code Status (Patient has no pulse and is not breathing): CPR (Attempt to Resuscitate)  Medical Interventions (Patient has pulse or is breathing): Full Support  Release to patient: Routine Release

## 2023-03-13 NOTE — ANESTHESIA POSTPROCEDURE EVALUATION
"Patient: Holland Phelps    Procedure Summary     Date: 03/13/23 Room / Location: [facility] OR  /  PAD HYBRID OR 12    Anesthesia Start: 0701 Anesthesia Stop: 0905    Procedure: VENOGRAM LOWER EXTREMITY, MECHANICAL THROMBECTOMY, BALLOON ANGIOPLASTY, STENT PLACEMENT (Groin) Diagnosis:       Acute deep vein thrombosis (DVT) of femoral vein of left lower extremity (HCC)      Stenosis of left iliac vein      (Acute deep vein thrombosis (DVT) of femoral vein of left lower extremity (HCC) [I82.412])      (Stenosis of left iliac vein [I87.1])    Surgeons: Dipak Moya MD Provider: Michaela Alexander CRNA    Anesthesia Type: MAC ASA Status: 3          Anesthesia Type: MAC    Vitals  Vitals Value Taken Time   /84 03/13/23 1022   Temp 98.1 °F (36.7 °C) 03/13/23 0903   Pulse 93 03/13/23 1022   Resp 18 03/13/23 1015   SpO2 95 % 03/13/23 1022   Vitals shown include unvalidated device data.        Post Anesthesia Care and Evaluation    Patient location during evaluation: PACU  Patient participation: complete - patient participated  Level of consciousness: awake and alert  Pain management: adequate    Airway patency: patent  Anesthetic complications: No anesthetic complications    Cardiovascular status: acceptable  Respiratory status: acceptable  Hydration status: acceptable    Comments: Blood pressure 163/86, pulse 108, temperature 97.5 °F (36.4 °C), temperature source Axillary, resp. rate 18, height 177.8 cm (70\"), weight 95.2 kg (209 lb 12.8 oz), SpO2 93 %.    Pt discharged from PACU based on rosalba score >8      "

## 2023-03-13 NOTE — NURSING NOTE
"Pt oriented to self only at this time. When pt asked where he is he states \"[facility].\" Pt reminded he is in [facility] in [facility] and that he is waking up from surgery. Pt keeps attempting to roll over and sit up stating \"I need to get up.\" Pt reminded he needs to stay in bed and keep his left leg still at this time.   "

## 2023-03-13 NOTE — OP NOTE
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  Prone positioning  2.  Ultrasound-guided access of the left popliteal vein  3.  Left lower extremity and IVC venogram  4.  Mechanical thrombectomy of the left common iliac, external iliac, common femoral, and superficial femoral veins using the [employer] Clotrevier mechanical thrombectomy device  5.  Intravascular ultrasound of the IVC, left common iliac, external iliac, and common femoral veins  6.  Venoplasty of the left common and external iliac veins using a 12 x 40 mm [place] balloon  7.  Venoplasty of the left common femoral vein using a 10 x 40 [place] balloon  8.  Stenting of the distal left common iliac and external iliac veins using a 14 x 100 Abre self-expanding venous stent  9.  Post dilation of that stent using a 12 x 40 mm [place] balloon  10.  Completion intravascular ultrasound of the IVC, left common iliac, external iliac, and common femoral veins  11.  Completion venogram  12.  Radiographic supervision and interpretation     SURGEON: Dipak Moya MD     ANESTHESIA: General.    PREPARATION: Routine.    STAFF: Circulator: Funmilayo Torres RN  Scrub Person: Leticia June  Vendor Representative: Hiren Staley  Assistant: Esau Palm  Vascular Radiology Technician: Mona Pool    Estimated Blood Loss: minimal    SPECIMENS: None    COMPLICATIONS: None apparent    INDICATIONS: Holland Phelps is a 52 y.o. male with history of sickle cell disease as well as previous renal cell carcinoma status post right nephrectomy.  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  He had some baseline left lower extremity edema but presented with a 1 week history of increased left lower extremity edema and some discomfort.  This extended from the groin to the foot.  Venous duplex had shown significant thrombus extending from common femoral vein distally.  CT of the abdomen/pelvis with IV contrast had shown evidence of thrombus within the external iliac and common femoral veins and some chronic narrowing and scarring most likely of the left distal common iliac and external iliac veins.  Given these findings he was started on anticoagulation and is now brought for mechanical thrombectomy with venogram and possible stenting.  The indications, risks, and possible complications of the procedure were explained to the patient, who voiced understanding and wished to proceed with surgery.     PROCEDURE IN DETAIL: The patient was taken to the operating room and general anesthesia was induced on his hospital bed.  He was then transferred to the operating table in a prone position.  Great care was taken to pad all bony prominences and ensure good positioning to avoid pressure points.  The bilateral popliteal fossae were then prepped in sterile surgical manner.  The left popliteal vein was then accessed under direct ultrasound guidance using a micropuncture technique and microsheath was placed.  Venogram through the sheath showed I was within the vein.  Glidewire advantage was then introduced up into the common femoral vein and the micro sheath was exchanged for a short 6 [place] sheath.  Now using a Sylvester cross catheter and the wire I was able to traverse the left lower extremity venous system and placed the catheter up into the IVC.  Venogram showed I was in true lumen.  The Glidewire advantage was then reintroduced under fluoroscopic guidance up into the internal jugular  vein.  Venogram through the sheath showed significant thrombus extending from the sheath in the popliteal vein all the way up into the common femoral and iliac veins.  As such decision was made to proceed with mechanical thrombectomy.  The 6 [place] sheath was removed and a skin nick was made at the wire exit site and then the sheath for the Inari Clotreiver was advanced over the wire under fluoroscopic guidance to just above the confluence of the common iliac veins.  A total of 4000 units of IV heparin was given throughout the case.  Mechanical thrombectomy was then performed with a total of 5 passes made with a significant amount of fresh thrombus retrieved.  The device was then removed and venogram now showed good patency of the superficial femoral, common femoral, and iliac veins.  However there is significant narrowing likely due to previous radiation of the distal common iliac and external iliac veins.  As such over the wire initially venoplasty was performed using a 12 x 40 mm balloon for the common and external iliac veins, and a 10 x 40 mm balloon for the common femoral vein.  At this point over the wire the intravascular ultrasound catheter was advanced up into the IVC and intravascular ultrasound performed of the IVC, left common iliac, external iliac, and common femoral veins.  It demonstrated the narrowing of that distal common and external iliac vein.  Measurements were taken and decision was made to stent the area.  As such over the wire a 14 x 100 mm Abre self-expanding venous stent was advanced and deployed extending from the distal common iliac vein into the external iliac.  That stent was then postdilated using a 12 x 40 mm [place] balloon.  There was good patency of this area on venogram.  Completion intravascular ultrasound was also performed which showed patency of this area extending from the common iliac vein and out into the common femoral vein.  This point I felt no further intervention was  necessary.  As such all wires and catheters were then removed and the sheath from the popliteal vein was then removed.  Manual pressure was held for additional 10 to 15 minutes to ensure hemostasis.  Sterile dressings were applied. The patient tolerated the procedure well. Sponge and needle counts were correct. The patient was then awakened and extubated in the operating room and taken to the recovery room in good condition.    Dipak Moya MD  Date: 3/13/2023 Time: 08:50 CDT

## 2023-03-13 NOTE — PROGRESS NOTES
[facility] Medicine Services  INPATIENT PROGRESS NOTE    Patient Name: Holland Phelps  Date of Admission: 3/10/2023  Today's Date: 03/13/23  Length of Stay: 2  Primary Care Physician: Meredith Miller APRN    Subjective   Chief Complaint: Left lower extremity pain and swelling  HPI   presented to ER 3/10/2023 with left lower leg pain and swelling.  He has a history of sickle cell anemia, right renal cell carcinoma status post nephrectomy, systemic chemotherapy, left sacral radiation, chronic left lower extremity lymphedema due to radiation treatment.  Patient wears compression stockings regularly.  In addition, he uses lymphedema pump 1 hour daily and is followed by lymphedema clinic weekly.  He reports 1 week history of worsening lower extremity edema.  He has chronic left lower extremity edema but worsened than normal.  He is followed by [facility] for sickle cell disease, chronic anemia and chronic pain syndrome.  He has baseline lower extremity edema from previous radiation surgery.  Patient reported 1 week ago last Saturday, 3/4 he had increasing left lower extremity edema which worsened.  Venous Doppler left lower extremity positive for DVT thrombus in the left  CFV, prox SFV, Pop V and PTV. VERY minimal visualization throughout due to edema and limb size.  CT abdomen pelvis noted diffuse edema left lower extremity and left hemopelvis likely related to obstruction of venous outflow.  Asymmetry enhancement within the left external iliac vein but without evidence of enlargement which would be more typical for acute deep vein thrombosis.  May represent a chronic finding.  Normal appearance abdominal aorta.  Heparin bolus and drip, vancomycin given in ER.    Patient was seen prior to going to surgery by Dr. Moya.  Patient sitting up in bed.  No oxygen use.  Wife in room.  Left lower extremity edema essentially unchanged.  Heparin turned off during the night per  Dr. Moya orders.  He has no complaints of chest pain, palpitations or shortness of breath.    He went to surgery today by Dr. Moya:  PROCEDURE PERFORMED:  1.  Prone positioning  2.  Ultrasound-guided access of the left popliteal vein  3.  Left lower extremity and IVC venogram  4.  Mechanical thrombectomy of the left common iliac, external iliac, common femoral, and superficial femoral veins using the Inari Clotrevier mechanical thrombectomy device  5.  Intravascular ultrasound of the IVC, left common iliac, external iliac, and common femoral veins  6.  Venoplasty of the left common and external iliac veins using a 12 x 40 mm [place] balloon  7.  Venoplasty of the left common femoral vein using a 10 x 40 [place] balloon  8.  Stenting of the distal left common iliac and external iliac veins using a 14 x 100 Abre self-expanding venous stent  9.  Post dilation of that stent using a 12 x 40 mm [place] balloon  10.  Completion intravascular ultrasound of the IVC, left common iliac, external iliac, and common femoral veins  11.  Completion venogram  12.  Radiographic supervision and interpretation    Review of Systems   Constitutional: Negative for chills, fatigue and fever.   HENT: Negative for congestion and trouble swallowing.    Eyes: Negative for photophobia and visual disturbance.   Respiratory: Negative for cough, shortness of breath and wheezing.    Cardiovascular: Positive for leg swelling (Left lower extremity). Negative for chest pain.   Gastrointestinal: Negative for constipation, diarrhea, nausea and vomiting.   Endocrine: Negative for cold intolerance, heat intolerance and polyuria.   Genitourinary: Negative for dysuria, frequency and urgency.   Musculoskeletal: Positive for gait problem.   Skin: Negative for color change, pallor, rash and wound.   Allergic/Immunologic: Negative for immunocompromised state.   Neurological: Positive for weakness. Negative for light-headedness.   Hematological: Negative for  adenopathy. Does not bruise/bleed easily.   Psychiatric/Behavioral: Negative for agitation, behavioral problems and confusion.      All pertinent negatives and positives are as above. All other systems have been reviewed and are negative unless otherwise stated.     Objective    Temp:  [98 °F (36.7 °C)-98.2 °F (36.8 °C)] 98.1 °F (36.7 °C)  Heart Rate:  [] 83  Resp:  [8-19] 10  BP: (109-149)/(52-79) 140/71  Physical Exam  Vitals and nursing note reviewed.   Constitutional:       Comments: Sitting up in bed.  No oxygen use.  Wife in room.   HENT:      Head: Normocephalic and atraumatic.      Nose: No congestion.      Mouth/Throat:      Pharynx: Oropharynx is clear. No oropharyngeal exudate or posterior oropharyngeal erythema.   Eyes:      Extraocular Movements: Extraocular movements intact.      Pupils: Pupils are equal, round, and reactive to light.   Cardiovascular:      Rate and Rhythm: Normal rate and regular rhythm.   Pulmonary:      Breath sounds: No wheezing, rhonchi or rales.      Comments: No oxygen in use.  Abdominal:      Palpations: Abdomen is soft.      Tenderness: There is no abdominal tenderness.   Genitourinary:     Comments: Voiding.  Musculoskeletal:         General: Swelling (Left lower extremity from groin down to foot.) and tenderness (Left lower extremity) present.      Cervical back: Normal range of motion and neck supple.   Skin:     General: Skin is warm and dry.   Neurological:      General: No focal deficit present.      Mental Status: He is alert and oriented to person, place, and time.   Psychiatric:         Mood and Affect: Mood normal.         Behavior: Behavior normal.         Thought Content: Thought content normal.         Judgment: Judgment normal.       Results Review:  I have reviewed the labs, radiology results, and diagnostic studies.    Laboratory Data:   Results from last 7 days   Lab Units 03/13/23  0919 03/13/23  0539 03/12/23  0553   WBC 10*3/mm3 9.83 9.57 7.54    HEMOGLOBIN g/dL 6.9* 7.7* 7.7*   HEMATOCRIT % 20.3* 22.3* 22.6*   PLATELETS 10*3/mm3 278 343 301     Results from last 7 days   Lab Units 03/13/23  0539 03/12/23  0553 03/10/23  2140   SODIUM mmol/L 142 141 142   POTASSIUM mmol/L 3.7 4.1 3.3*   CHLORIDE mmol/L 105 106 105   CO2 mmol/L 28.0 26.0 27.0   BUN mg/dL 9 9 9   CREATININE mg/dL 0.71* 0.73* 0.74*   CALCIUM mg/dL 8.5* 7.8* 7.8*   BILIRUBIN mg/dL  --   --  1.0   ALK PHOS U/L  --   --  51   ALT (SGPT) U/L  --   --  8   AST (SGOT) U/L  --   --  16   GLUCOSE mg/dL 98 98 95     Imaging Results (All)     Procedure Component Value Units Date/Time    CT Angiogram Abdomen Pelvis [[id number]] Collected: 03/11/23 0751     Updated: 03/11/23 0809    Narrative:      EXAMINATION: CT angiogram of the abdomen and pelvis with and without  contrast. 03/11/2023     HISTORY: DVT.     DOSE: 1924 mGycm. All CT scans are performed using dose optimization  techniques as appropriate to the performed exam and including at least  one of the following: Automated exposure control, adjustment of the mA  and/or kV according to size, and the use of the iterative reconstruction  technique.     FINDINGS: Multiple contiguous axial images are obtained through the  abdomen and pelvis both with and without contrast enhancement including  delayed imaging. MIPS are also obtained.     There is moderate cardiomegaly with a small pericardial effusion.  Bibasilar atelectasis is present. No pleural effusion is present. There  is contrast within the left renal collecting system related to earlier  intravenous contrast infusion. There is no evidence of left-sided  obstructive uropathy.     There is a benign hepatic cyst within the lateral segment of the left  lobe of the liver. Liver is otherwise homogeneous in density. There is  normal enhancement of the hepatic vasculature.     The gallbladder is surgically absent. Moderate extrahepatic biliary  dilatation is likely related to reservoir effect. There is  no evidence  of choledocholithiasis.     The pancreas is normal in appearance with no mass or ductal dilatation.  No evidence of acute pancreatitis.     The spleen is surgically absent.     The right kidney has been resected. Both adrenal glands are  unremarkable. There is retained contrast within the left renal  collecting system without evidence of hydronephrosis or obstructive  uropathy. There is homogeneous enhancement of the left kidney. No  perinephric fluid collections are present.     The abdominal aorta and IVC are normal in caliber and demonstrate normal  enhancement. There is a normal bowel gas pattern with no obstruction or  free air. There is diastases of the abdominal musculature at the  umbilicus. The patient has undergone previous femur fixation  bilaterally. No acute or suspicious bony abnormalities are present.     The abdominal aorta and iliac arteries are normal in caliber with no  dissection or aneurysm. No focal plaquing or rate limiting stenosis is  present. The left renal artery is widely patent. The mesenteric vessels  are widely patent.     There is no free fluid within the pelvis. The urinary bladder is normal  in appearance.     There is diffuse edema within the left lower extremity extending into  the left hemipelvis. Early delayed imaging demonstrates the IVC as well  as a bilateral common iliac veins are widely patent demonstrating normal  enhancement. There is asymmetry of the left external iliac vein in  comparison with the left with no focal enhancement demonstrated. This  suggest thrombosis within the left external iliac vein. There is no  enlargement of the external iliac vein suggesting this may represent a  more chronic process. There is also asymmetry of enhancement of the left  common femoral vein at the level of the groin. Correlation is  recommended with the patient's duplex exam. There is extensive stranding  noted within the left hemipelvis and proximal left thigh-likely  related  to venous obstruction. I do not see associated discrete mass or mass  effect on the left pelvic sidewall venous structures.       Impression:      1.. Diffuse edema of the left lower extremity and left hemipelvis. This  is likely related to obstruction of venous outflow given the history.  There is asymmetry of enhancement within the left external iliac vein  but without evidence of enlargement which would be more typical for an  acute deep venous thrombosis. This may represent a more chronic finding  and should be correlated with the patient's clinical presentation and  history. I do feel that there is enhancement and a rather normal  appearance of the left common iliac vein. IVC is widely patent.  2. Normal appearance of the abdominal aorta and branch vessels including  the pelvic arterial tree. No evidence of stenosis or plaquing. No  evidence of dissection.  3. The patient has undergone previous right nephrectomy. The left kidney  is hypertrophied but otherwise normal in appearance. No evidence of  left-sided obstructive uropathy.  4. Diastases of the abdominal musculature at the umbilicus with a small  ventral wall hernia containing fat. No herniated bowel loop or  obstruction.  5. Benign hepatic cyst left lobe of liver.  6. Cardiomegaly with a small pericardial effusion. Bibasilar atelectasis  or scarring is present.  This report was finalized on 03/11/2023 08:06 by Dr. Caesar Guillen MD.    CT Angiogram Chest [[id number]] Collected: 03/11/23 0735     Updated: 03/11/23 0747    Narrative:      Exam: CT angiogram of the of the chest with intravenous contrast.     CLINICAL HISTORY:  DVT of left lower extremity.     DOSE:  210 mGycm. All CT scans are performed using dose optimization  techniques as appropriate to the performed exam and including at least  one of the following: Automated exposure control, adjustment of the mA  and/or kV according to size, and the use of the iterative  reconstruction  technique.     TECHNIQUE: Contiguous axial images were obtained through the thorax  following intravenous contrast administration with reformatted images  obtained in the sagittal and coronal projections from the original data  set. Three-dimensional reconstructions are also obtained.     COMPARISON:  04/20/2022     FINDINGS:     Pulmonary arteries:  There is normal enhancement of the pulmonary  arteries with no evidence of pulmonary thromboembolic disease.     Aorta:  The thoracic aorta and proximal great vessels are normal in  appearance. There is no evidence of aortic dissection or aneurysm.     LUNGS:  Bibasilar atelectasis is present. There is a 4 mm nodule in the  right middle lobe. This has a broad-based interface with the minor  fissure and likely represents an intrafissural node. This is stable from  the previous exam. An additional 3 mm right middle lobe subpleural  nodule on image 96 of the axial sequence is also stable.     Pleural spaces: Unremarkable. No evidence of pleural effusion or  pneumothorax.     HEART: There is moderate cardiomegaly. A small pericardial effusion is  present. No evidence of right ventricular dysfunction. No coronary  calcifications are present.     Bones: No acute osseous abnormalities are demonstrated.     CHEST WALL: No chest wall abnormalities are demonstrated.     Lymph nodes: No enlarged mediastinal or axillary lymphadenopathy is  present.     Upper abdomen: The patient has undergone previous right nephrectomy.  There is a hepatic cyst or hemangioma within the lateral segment of the  left lobe of liver.       Impression:      1. No evidence of pulmonary thromboembolic disease. The thoracic aorta  and great vessels are normal in caliber with no dissection or aneurysm.  2. Cardiomegaly with a small pericardial effusion. No right ventricular  dysfunction or coronary calcification.  3. Bibasilar atelectasis. Stable right middle lobe nodules from previous  exam  of 04/20/2022. No evidence of acute consolidative pneumonia or  pleural effusion.  4. The patient's undergone previous right nephrectomy. There is a benign  hepatic cyst or hemangioma within the lateral segment of the left lobe  of the liver.  This report was finalized on 03/11/2023 07:44 by Dr. Caesar Guillen MD.    US Venous Doppler Lower Extremity Left (duplex) [[id number]] Resulted: 03/10/23 2217     Updated: 03/10/23 2221    XR Knee 3 View Left [[id number]] Collected: 03/10/23 2137     Updated: 03/10/23 2141    Narrative:      EXAMINATION: XR KNEE 3 VW LEFT-     3/10/2023 9:33 PM CST     HISTORY: knee pain     Left knee 3 view exam.     No comparison.     Long distal femur prosthesis.     Diffuse soft tissue edema about the lower thigh and knee.     No occult fracture is seen.     The extent visualized the prosthetic components are intact.     Summary:  1. Diffuse soft tissue edema.  2. No acute bony abnormality is seen.  This report was finalized on 03/10/2023 21:38 by Dr. Peña Moscoso MD.        Results for orders placed during the hospital encounter of 04/20/22    Adult Transthoracic Echo Complete W/ Cont if Necessary Per Protocol    Interpretation Summary  · Left ventricular ejection fraction appears to be 66 - 70%. Left ventricular systolic function is normal.  · Left ventricular diastolic function was normal.  · Left atrial volume is mildly increased.  · There is a small (<1cm) pericardial effusion.  · There is no evidence of cardiac tamponade.  · Estimated right ventricular systolic pressure from tricuspid regurgitation is normal (<35 mmHg).    Schedule medications  [MAR Hold] bisacodyl, 10 mg, Rectal, Daily  [MAR Hold] calcium carbonate, 1 tablet, Oral, TID  [MAR Hold] escitalopram, 20 mg, Oral, Q PM  [MAR Hold] folic acid, 1 mg, Oral, Daily  [MAR Hold] guaiFENesin, 1,200 mg, Oral, Q12H  [MAR Hold] hydrocortisone, 10 mg, Oral, Q PM  [MAR Hold] hydrocortisone, 20 mg, Oral, Q AM  [MAR Hold] hydroxyurea,  1,000 mg, Oral, Daily  losartan, 25 mg, Oral, Daily  [MAR Hold] Morphine, 15 mg, Oral, Q8H  NIFEdipine CC, 90 mg, Oral, Daily  [MAR Hold] senna, 2 tablet, Oral, Nightly  [MAR Hold] tamsulosin, 0.4 mg, Oral, Daily      I have reviewed the patient's current medications.     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  Patient has history of chronic lymphedema left lower extremity but noted worsening swelling over the past week.  Venous Dopplers positive for DVT.  CTA negative for pulmonary emboli.  Heparin drip.  Vascular surgery consulted and Dr. Moya took him to surgery today 3/13 for left lower extremity IVC venogram, mechanical thrombectomy left common iliac, external iliac, common femoral, superficial veins.  Intravascular ultrasound of the IVC left common iliac external iliac and common femoral veins.  Venoplasty left common and external iliacs.  Stenting distal left common iliac and external iliac.    Stenosis left iliac vein.  Dr. Moya took to surgery 3/13 for stenting left common iliac and external iliac.    Acute on chronic left lower extremity pain. Resumed home medication morphine.  Patient followed by pain management at [place]. Pain is adequately controlled on current medication.    Anemia of chronic disease.  Hemoglobin 7.8 on admission.  Hemoglobin 6.9 today postsurgery.   Consider transfusion.    Primary hypertension.  Blood pressure 140/71.  Continue losartan    History of sickle cell beta thalassemia followed by [facility] hematology/oncology.    Hypokalemia.  Potassium 3. 3.  Replaced potassium 40 mEq.  Potassium 3.7  today.    Medical Decision Making  Number and Complexity of problems: 7  Acute DVT femoral vein left lower extremity: Acute, high complexity  Stenosis left iliac vein: Acute, high complexity  Acute on chronic left lower extremity pain: Acute on chronic, high complexity  Anemia of chronic disease: Acute on chronic, high complexity  Primary hypertension: Chronic, moderate complexity  Sickle cell thalassemia: Chronic, moderate complexity  Hypokalemia: Acute, moderate complexity    Differential Diagnosis: Pulmonary emboli    Conditions and Status        Condition is unchanged     MDM Data  External documents reviewed: Discussed with Dr. Moya and viewed consultation  Cardiac tracing (EKG, telemetry) interpretation: Normal sinus rhythm  Radiology interpretation: CT angiogram abdomen pelvis, CTA chest, venous Doppler  Labs reviewed:   BMP 3/13/2023  CBC 3/13/2023  PTT    Any tests that were considered but not ordered: None     Decision rules/scores evaluated (example [id number], [place], etc): None     Discussed with: Dr. Leslie, Dr. Moya, patient, and wife     Care Planning  Shared decision making: Dr. Griffin Alford, patient, and wife.  Patient agrees to surgery  as recommended by Dr. Moya.   Code status and discussions: Full code  The patient surrogate decision maker is his wife, Primo Barakat  Social Determinants of Health that impact treatment or disposition: None  I expect the patient to be discharged to home in 2- 3 days.     Electronically signed by GILBERTO Zeng, 03/13/23, 10:09 CDT.

## 2023-03-13 NOTE — ANESTHESIA PROCEDURE NOTES
Airway  Urgency: elective    Date/Time: 3/13/2023 7:06 AM  Airway not difficult    General Information and Staff    Patient location during procedure: OR  CRNA/CAA: Michaela Alexander CRNA    Indications and Patient Condition  Indications for airway management: airway protection    Preoxygenated: yes  Mask difficulty assessment: 1 - vent by mask    Final Airway Details  Final airway type: endotracheal airway      Successful airway: ETT  Cuffed: yes   Successful intubation technique: direct laryngoscopy  Endotracheal tube insertion site: oral  Blade: Ramos  Blade size: 2  ETT size (mm): 7.5  Cormack-Lehane Classification: grade I - full view of glottis  Placement verified by: capnometry   Cuff volume (mL): 5  Measured from: teeth  ETT/EBT  to teeth (cm): 23  Number of attempts at approach: 1  Assessment: lips, teeth, and gum same as pre-op and atraumatic intubation

## 2023-03-14 LAB
ANION GAP SERPL CALCULATED.3IONS-SCNC: 9 MMOL/L (ref 5–15)
ANISOCYTOSIS BLD QL: ABNORMAL
APTT PPP: 55.7 SECONDS (ref 24.1–35)
BUN SERPL-MCNC: 10 MG/DL (ref 6–20)
BUN/CREAT SERPL: 12.5 (ref 7–25)
CALCIUM SPEC-SCNC: 8.4 MG/DL (ref 8.6–10.5)
CHLORIDE SERPL-SCNC: 107 MMOL/L (ref 98–107)
CO2 SERPL-SCNC: 27 MMOL/L (ref 22–29)
CREAT SERPL-MCNC: 0.8 MG/DL (ref 0.76–1.27)
DEPRECATED RDW RBC AUTO: 64.5 FL (ref 37–54)
EGFRCR SERPLBLD CKD-EPI 2021: 105.2 ML/MIN/1.73
ERYTHROCYTE [DISTWIDTH] IN BLOOD BY AUTOMATED COUNT: 19.7 % (ref 12.3–15.4)
GIANT PLATELETS: ABNORMAL
GLUCOSE SERPL-MCNC: 104 MG/DL (ref 65–99)
HCT VFR BLD AUTO: 24.4 % (ref 37.5–51)
HGB BLD-MCNC: 8 G/DL (ref 13–17.7)
LYMPHOCYTES # BLD MANUAL: 1.25 10*3/MM3 (ref 0.7–3.1)
LYMPHOCYTES NFR BLD MANUAL: 9 % (ref 5–12)
MCH RBC QN AUTO: 30 PG (ref 26.6–33)
MCHC RBC AUTO-ENTMCNC: 32.8 G/DL (ref 31.5–35.7)
MCV RBC AUTO: 91.4 FL (ref 79–97)
MONOCYTES # BLD: 1.12 10*3/MM3 (ref 0.1–0.9)
NEUTROPHILS # BLD AUTO: 10.11 10*3/MM3 (ref 1.7–7)
NEUTROPHILS NFR BLD MANUAL: 80 % (ref 42.7–76)
NEUTS BAND NFR BLD MANUAL: 1 % (ref 0–5)
NRBC BLD AUTO-RTO: 11.6 /100 WBC (ref 0–0.2)
NRBC SPEC MANUAL: 33 /100 WBC (ref 0–0.2)
PAPPENHEIMER BOD BLD QL SMEAR: PRESENT
PLATELET # BLD AUTO: 352 10*3/MM3 (ref 140–450)
PMV BLD AUTO: 10.1 FL (ref 6–12)
POIKILOCYTOSIS BLD QL SMEAR: ABNORMAL
POLYCHROMASIA BLD QL SMEAR: ABNORMAL
POTASSIUM SERPL-SCNC: 4 MMOL/L (ref 3.5–5.2)
RBC # BLD AUTO: 2.67 10*6/MM3 (ref 4.14–5.8)
SODIUM SERPL-SCNC: 143 MMOL/L (ref 136–145)
SPHEROCYTES BLD QL SMEAR: ABNORMAL
STOMATOCYTES BLD QL SMEAR: ABNORMAL
TARGETS BLD QL SMEAR: ABNORMAL
VARIANT LYMPHS NFR BLD MANUAL: 2 % (ref 0–5)
VARIANT LYMPHS NFR BLD MANUAL: 8 % (ref 19.6–45.3)
WBC MORPH BLD: NORMAL
WBC NRBC COR # BLD: 12.48 10*3/MM3 (ref 3.4–10.8)

## 2023-03-14 PROCEDURE — 80048 BASIC METABOLIC PNL TOTAL CA: CPT | Performed by: SURGERY

## 2023-03-14 PROCEDURE — 85730 THROMBOPLASTIN TIME PARTIAL: CPT | Performed by: SURGERY

## 2023-03-14 PROCEDURE — 85007 BL SMEAR W/DIFF WBC COUNT: CPT | Performed by: SURGERY

## 2023-03-14 PROCEDURE — 25010000002 HEPARIN (PORCINE) PER 1000 UNITS: Performed by: SURGERY

## 2023-03-14 PROCEDURE — 85025 COMPLETE CBC W/AUTO DIFF WBC: CPT | Performed by: SURGERY

## 2023-03-14 PROCEDURE — 99232 SBSQ HOSP IP/OBS MODERATE 35: CPT | Performed by: SURGERY

## 2023-03-14 PROCEDURE — 36415 COLL VENOUS BLD VENIPUNCTURE: CPT | Performed by: SURGERY

## 2023-03-14 RX ADMIN — HYDROCORTISONE 10 MG: 10 TABLET ORAL at 18:12

## 2023-03-14 RX ADMIN — MORPHINE SULFATE 30 MG: 15 TABLET ORAL at 11:25

## 2023-03-14 RX ADMIN — ESCITSLOPRAM 20 MG: 10 TABLET ORAL at 18:12

## 2023-03-14 RX ADMIN — GUAIFENESIN 1200 MG: 600 TABLET, EXTENDED RELEASE ORAL at 08:59

## 2023-03-14 RX ADMIN — SENNOSIDES 2 TABLET: 8.6 TABLET, FILM COATED ORAL at 21:11

## 2023-03-14 RX ADMIN — LOSARTAN POTASSIUM 25 MG: 50 TABLET, FILM COATED ORAL at 08:59

## 2023-03-14 RX ADMIN — TAMSULOSIN HYDROCHLORIDE 0.4 MG: 0.4 CAPSULE ORAL at 08:59

## 2023-03-14 RX ADMIN — ANTACID TABLETS 1 TABLET: 500 TABLET, CHEWABLE ORAL at 15:28

## 2023-03-14 RX ADMIN — FOLIC ACID 1 MG: 1 TABLET ORAL at 09:00

## 2023-03-14 RX ADMIN — MORPHINE SULFATE 15 MG: 15 TABLET, FILM COATED, EXTENDED RELEASE ORAL at 06:07

## 2023-03-14 RX ADMIN — GUAIFENESIN 1200 MG: 600 TABLET, EXTENDED RELEASE ORAL at 21:11

## 2023-03-14 RX ADMIN — HEPARIN SODIUM 7300 UNITS: 1000 INJECTION, SOLUTION INTRAVENOUS; SUBCUTANEOUS at 03:54

## 2023-03-14 RX ADMIN — NIFEDIPINE 90 MG: 90 TABLET, EXTENDED RELEASE ORAL at 08:59

## 2023-03-14 RX ADMIN — MORPHINE SULFATE 15 MG: 15 TABLET, FILM COATED, EXTENDED RELEASE ORAL at 15:27

## 2023-03-14 RX ADMIN — ANTACID TABLETS 1 TABLET: 500 TABLET, CHEWABLE ORAL at 09:00

## 2023-03-14 RX ADMIN — HYDROXYUREA 1000 MG: 500 CAPSULE ORAL at 09:00

## 2023-03-14 RX ADMIN — HYDROCORTISONE 20 MG: 10 TABLET ORAL at 06:09

## 2023-03-14 RX ADMIN — APIXABAN 10 MG: 5 TABLET, FILM COATED ORAL at 21:10

## 2023-03-14 RX ADMIN — BISACODYL 10 MG: 10 SUPPOSITORY RECTAL at 10:04

## 2023-03-14 RX ADMIN — APIXABAN 10 MG: 5 TABLET, FILM COATED ORAL at 08:59

## 2023-03-14 RX ADMIN — MORPHINE SULFATE 15 MG: 15 TABLET, FILM COATED, EXTENDED RELEASE ORAL at 21:11

## 2023-03-14 RX ADMIN — ANTACID TABLETS 1 TABLET: 500 TABLET, CHEWABLE ORAL at 21:11

## 2023-03-14 NOTE — PLAN OF CARE
Goal Outcome Evaluation:  Plan of Care Reviewed With: patient        Progress: improving  Outcome Evaluation: VSS, on RA but wears 2L/NC with sleep, HR S-ST , prn morphine and scheduled morphine given, ace wrap intact to left leg, vascular in to see pt today,possbile home tomorrow, safety maintained

## 2023-03-14 NOTE — PLAN OF CARE
Goal Outcome Evaluation:    Problem: Adult Inpatient Plan of Care  Goal: Plan of Care Review  Outcome: Ongoing, Progressing  Flowsheets (Taken 3/14/2023 1352)  Progress: improving  Plan of Care Reviewed With: patient  Outcome Evaluation: Heparin gtt infuing, adjusted per protocal. Hgb no 8.3. Weak puls in RLE, used doppler +1. NSR 65-81 on tele. Family to remain at bedside. Will update MD as needed.

## 2023-03-14 NOTE — CASE MANAGEMENT/SOCIAL WORK
Discharge Planning Assessment  [facility]     Patient Name: Holland Phelps  MRN: [id number]  Today's Date: 3/14/2023    Admit Date: 3/10/2023    Plan: Spoke with patient and spouse for dc planning. Patient independent prior to illness. Has been going to outpatient therapy with [facility] 1x/wk for lymphedema. Plans to dc home. Will continue to follow for dc needs.   Discharge Needs Assessment     Row Name 03/14/23 1431       Living Environment    People in Home spouse    Current Living Arrangements home    Primary Care Provided by [facility]    Quality of Family Relationships supportive    Able to Return to Prior Arrangements yes       Transition Planning    Patient/Family Anticipates Transition to home       Discharge Needs Assessment    Equipment Currently Used at Home cane, straight;walker, rolling    Outpatient/Agency/Support Group Needs outpatient therapy               Discharge Plan     Row Name 03/14/23 1434       Plan    Plan Spoke with patient and spouse for dc planning. Patient independent prior to illness. Has been going to outpatient therapy with [facility] 1x/wk for lymphedema. Plans to dc home. Will continue to follow for dc needs.              Continued Care and Services - Admitted Since 3/10/2023    Coordination has not been started for this encounter.       Expected Discharge Date and Time     Expected Discharge Date Expected Discharge Time    Mar 15, 2023          Demographic Summary    No documentation.                Functional Status    No documentation.                Psychosocial    No documentation.                Abuse/Neglect    No documentation.                Legal    No documentation.                Substance Abuse    No documentation.                Patient Forms    No documentation.                   Merlina A Fletcher, RN

## 2023-03-14 NOTE — PROGRESS NOTES
[facility] Medicine [facility]  INPATIENT PROGRESS NOTE    Patient Name: Holland Phelps  Date of Admission: 3/10/2023  Today's Date: 03/14/23  Length of Stay: 3  Primary Care Physician: Meredith Miller APRN    Subjective   Chief Complaint: Left lower extremity pain and swelling  HPI   presented to ER 3/10/2023 with left lower leg pain and swelling.  He has a history of sickle cell anemia, right renal cell carcinoma status post nephrectomy, systemic chemotherapy, left sacral radiation, chronic left lower extremity lymphedema due to radiation treatment.  Patient wears compression stockings regularly.  In addition, he uses lymphedema pump 1 hour daily and is followed by lymphedema clinic weekly.  He reports 1 week history of worsening lower extremity edema.  He has chronic left lower extremity edema but worsened than normal.  He is followed by [facility] for sickle cell disease, chronic anemia and chronic pain syndrome.  He has baseline lower extremity edema from previous radiation surgery.  Patient reported 1 week ago last Saturday, 3/4 he had increasing left lower extremity edema which worsened.  Venous Doppler left lower extremity positive for DVT thrombus in the left  CFV, prox SFV, Pop V and PTV. VERY minimal visualization throughout due to edema and limb size.  CT abdomen pelvis noted diffuse edema left lower extremity and left hemopelvis likely related to obstruction of venous outflow.  Asymmetry enhancement within the left external iliac vein but without evidence of enlargement which would be more typical for acute deep vein thrombosis.  May represent a chronic finding.  Normal appearance abdominal aorta.  Heparin bolus and drip, vancomycin given in ER.    Patient seen earlier this morning sitting up in chair.  I discussed with Dr. Moya and plans to discontinue heparin today and begin Eliquis.  Monitor hemoglobin another 24 hours.  Potentially discharge  tomorrow.  Patient has ACE dressing left lower extremity from foot up to groin.  Leg does not seem to be as tight today.  Dressing was removed by Dr. Moya and inspected.  No complaints of chest pain, palpitations or shortness of breath.    He went to surgery today by Dr. Moya: 3/13/23  PROCEDURE PERFORMED:  1.  Prone positioning  2.  Ultrasound-guided access of the left popliteal vein  3.  Left lower extremity and IVC venogram  4.  Mechanical thrombectomy of the left common iliac, external iliac, common femoral, and superficial femoral veins using the orangutrans [employer] mechanical thrombectomy device  5.  Intravascular ultrasound of the IVC, left common iliac, external iliac, and common femoral veins  6.  Venoplasty of the left common and external iliac veins using a 12 x 40 mm [place] balloon  7.  Venoplasty of the left common femoral vein using a 10 x 40 [place] balloon  8.  Stenting of the distal left common iliac and external iliac veins using a 14 x 100 Abre self-expanding venous stent  9.  Post dilation of that stent using a 12 x 40 mm [place] balloon  10.  Completion intravascular ultrasound of the IVC, left common iliac, external iliac, and common femoral veins  11.  Completion venogram  12.  Radiographic supervision and interpretation    Review of Systems   Constitutional: Negative for chills, fatigue and fever.   HENT: Negative for congestion and trouble swallowing.    Eyes: Negative for photophobia and visual disturbance.   Respiratory: Negative for cough, shortness of breath and wheezing.    Cardiovascular: Positive for leg swelling (Left lower extremity). Negative for chest pain.   Gastrointestinal: Negative for constipation, diarrhea, nausea and vomiting.   Endocrine: Negative for cold intolerance, heat intolerance and polyuria.   Genitourinary: Negative for dysuria, frequency and urgency.   Musculoskeletal: Positive for gait problem.   Skin: Negative for color change, pallor, rash and wound.    Allergic/Immunologic: Negative for immunocompromised state.   Neurological: Positive for weakness. Negative for light-headedness.   Hematological: Negative for adenopathy. Does not bruise/bleed easily.   Psychiatric/Behavioral: Negative for agitation, behavioral problems and confusion.      All pertinent negatives and positives are as above. All other systems have been reviewed and are negative unless otherwise stated.     Objective    Temp:  [92 °F (33.3 °C)-99.7 °F (37.6 °C)] 92 °F (33.3 °C)  Heart Rate:  [66-88] 88  Resp:  [16-18] 16  BP: (144-176)/(64-89) 144/64  Physical Exam  Vitals and nursing note reviewed.   Constitutional:       Comments: Sitting up in chair.  No oxygen use.  Wife in room.   HENT:      Head: Normocephalic and atraumatic.      Nose: No congestion.      Mouth/Throat:      Pharynx: Oropharynx is clear. No oropharyngeal exudate or posterior oropharyngeal erythema.   Eyes:      Extraocular Movements: Extraocular movements intact.      Pupils: Pupils are equal, round, and reactive to light.   Cardiovascular:      Rate and Rhythm: Normal rate and regular rhythm.   Pulmonary:      Breath sounds: No wheezing, rhonchi or rales.      Comments: No oxygen in use.  Abdominal:      Palpations: Abdomen is soft.      Tenderness: There is no abdominal tenderness.   Genitourinary:     Comments: Voiding.  Musculoskeletal:         General: Swelling (Left lower extremity from groin down to foot.) and tenderness (Left lower extremity) present.      Cervical back: Normal range of motion and neck supple.   Skin:     General: Skin is warm and dry.   Neurological:      General: No focal deficit present.      Mental Status: He is alert and oriented to person, place, and time.   Psychiatric:         Mood and Affect: Mood normal.         Behavior: Behavior normal.         Thought Content: Thought content normal.         Judgment: Judgment normal.       Results Review:  I have reviewed the labs, radiology results, and  diagnostic studies.    Laboratory Data:   Results from last 7 days   Lab Units 03/14/23  0429 03/13/23  1906 03/13/23  0919 03/13/23  0539   WBC 10*3/mm3 12.48*  --  9.83 9.57   HEMOGLOBIN g/dL 8.0* 8.3* 6.9* 7.7*   HEMATOCRIT % 24.4* 24.1* 20.3* 22.3*   PLATELETS 10*3/mm3 352  --  278 343     Results from last 7 days   Lab Units 03/14/23  0429 03/13/23  0539 03/12/23  0553 03/10/23  2140   SODIUM mmol/L 143 142 141 142   POTASSIUM mmol/L 4.0 3.7 4.1 3.3*   CHLORIDE mmol/L 107 105 106 105   CO2 mmol/L 27.0 28.0 26.0 27.0   BUN mg/dL 10 9 9 9   CREATININE mg/dL 0.80 0.71* 0.73* 0.74*   CALCIUM mg/dL 8.4* 8.5* 7.8* 7.8*   BILIRUBIN mg/dL  --   --   --  1.0   ALK PHOS U/L  --   --   --  51   ALT (SGPT) U/L  --   --   --  8   AST (SGOT) U/L  --   --   --  16   GLUCOSE mg/dL 104* 98 98 95     Imaging Results (All)     Procedure Component Value Units Date/Time    CT Angiogram Abdomen Pelvis [[id number]] Collected: 03/11/23 0751     Updated: 03/11/23 0809    Narrative:      EXAMINATION: CT angiogram of the abdomen and pelvis with and without  contrast. 03/11/2023     HISTORY: DVT.     DOSE: 1924 mGycm. All CT scans are performed using dose optimization  techniques as appropriate to the performed exam and including at least  one of the following: Automated exposure control, adjustment of the mA  and/or kV according to size, and the use of the iterative reconstruction  technique.     FINDINGS: Multiple contiguous axial images are obtained through the  abdomen and pelvis both with and without contrast enhancement including  delayed imaging. MIPS are also obtained.     There is moderate cardiomegaly with a small pericardial effusion.  Bibasilar atelectasis is present. No pleural effusion is present. There  is contrast within the left renal collecting system related to earlier  intravenous contrast infusion. There is no evidence of left-sided  obstructive uropathy.     There is a benign hepatic cyst within the lateral segment of  the left  lobe of the liver. Liver is otherwise homogeneous in density. There is  normal enhancement of the hepatic vasculature.     The gallbladder is surgically absent. Moderate extrahepatic biliary  dilatation is likely related to reservoir effect. There is no evidence  of choledocholithiasis.     The pancreas is normal in appearance with no mass or ductal dilatation.  No evidence of acute pancreatitis.     The spleen is surgically absent.     The right kidney has been resected. Both adrenal glands are  unremarkable. There is retained contrast within the left renal  collecting system without evidence of hydronephrosis or obstructive  uropathy. There is homogeneous enhancement of the left kidney. No  perinephric fluid collections are present.     The abdominal aorta and IVC are normal in caliber and demonstrate normal  enhancement. There is a normal bowel gas pattern with no obstruction or  free air. There is diastases of the abdominal musculature at the  umbilicus. The patient has undergone previous femur fixation  bilaterally. No acute or suspicious bony abnormalities are present.     The abdominal aorta and iliac arteries are normal in caliber with no  dissection or aneurysm. No focal plaquing or rate limiting stenosis is  present. The left renal artery is widely patent. The mesenteric vessels  are widely patent.     There is no free fluid within the pelvis. The urinary bladder is normal  in appearance.     There is diffuse edema within the left lower extremity extending into  the left hemipelvis. Early delayed imaging demonstrates the IVC as well  as a bilateral common iliac veins are widely patent demonstrating normal  enhancement. There is asymmetry of the left external iliac vein in  comparison with the left with no focal enhancement demonstrated. This  suggest thrombosis within the left external iliac vein. There is no  enlargement of the external iliac vein suggesting this may represent a  more chronic  process. There is also asymmetry of enhancement of the left  common femoral vein at the level of the groin. Correlation is  recommended with the patient's duplex exam. There is extensive stranding  noted within the left hemipelvis and proximal left thigh-likely related  to venous obstruction. I do not see associated discrete mass or mass  effect on the left pelvic sidewall venous structures.       Impression:      1.. Diffuse edema of the left lower extremity and left hemipelvis. This  is likely related to obstruction of venous outflow given the history.  There is asymmetry of enhancement within the left external iliac vein  but without evidence of enlargement which would be more typical for an  acute deep venous thrombosis. This may represent a more chronic finding  and should be correlated with the patient's clinical presentation and  history. I do feel that there is enhancement and a rather normal  appearance of the left common iliac vein. IVC is widely patent.  2. Normal appearance of the abdominal aorta and branch vessels including  the pelvic arterial tree. No evidence of stenosis or plaquing. No  evidence of dissection.  3. The patient has undergone previous right nephrectomy. The left kidney  is hypertrophied but otherwise normal in appearance. No evidence of  left-sided obstructive uropathy.  4. Diastases of the abdominal musculature at the umbilicus with a small  ventral wall hernia containing fat. No herniated bowel loop or  obstruction.  5. Benign hepatic cyst left lobe of liver.  6. Cardiomegaly with a small pericardial effusion. Bibasilar atelectasis  or scarring is present.  This report was finalized on 03/11/2023 08:06 by Dr. Caesar Guillen MD.    CT Angiogram Chest [[id number]] Collected: 03/11/23 0735     Updated: 03/11/23 0747    Narrative:      Exam: CT angiogram of the of the chest with intravenous contrast.     CLINICAL HISTORY:  DVT of left lower extremity.     DOSE:  210 mGycm. All CT scans  are performed using dose optimization  techniques as appropriate to the performed exam and including at least  one of the following: Automated exposure control, adjustment of the mA  and/or kV according to size, and the use of the iterative reconstruction  technique.     TECHNIQUE: Contiguous axial images were obtained through the thorax  following intravenous contrast administration with reformatted images  obtained in the sagittal and coronal projections from the original data  set. Three-dimensional reconstructions are also obtained.     COMPARISON:  04/20/2022     FINDINGS:     Pulmonary arteries:  There is normal enhancement of the pulmonary  arteries with no evidence of pulmonary thromboembolic disease.     Aorta:  The thoracic aorta and proximal great vessels are normal in  appearance. There is no evidence of aortic dissection or aneurysm.     LUNGS:  Bibasilar atelectasis is present. There is a 4 mm nodule in the  right middle lobe. This has a broad-based interface with the minor  fissure and likely represents an intrafissural node. This is stable from  the previous exam. An additional 3 mm right middle lobe subpleural  nodule on image 96 of the axial sequence is also stable.     Pleural spaces: Unremarkable. No evidence of pleural effusion or  pneumothorax.     HEART: There is moderate cardiomegaly. A small pericardial effusion is  present. No evidence of right ventricular dysfunction. No coronary  calcifications are present.     Bones: No acute osseous abnormalities are demonstrated.     CHEST WALL: No chest wall abnormalities are demonstrated.     Lymph nodes: No enlarged mediastinal or axillary lymphadenopathy is  present.     Upper abdomen: The patient has undergone previous right nephrectomy.  There is a hepatic cyst or hemangioma within the lateral segment of the  left lobe of liver.       Impression:      1. No evidence of pulmonary thromboembolic disease. The thoracic aorta  and great vessels are  normal in caliber with no dissection or aneurysm.  2. Cardiomegaly with a small pericardial effusion. No right ventricular  dysfunction or coronary calcification.  3. Bibasilar atelectasis. Stable right middle lobe nodules from previous  exam of 04/20/2022. No evidence of acute consolidative pneumonia or  pleural effusion.  4. The patient's undergone previous right nephrectomy. There is a benign  hepatic cyst or hemangioma within the lateral segment of the left lobe  of the liver.  This report was finalized on 03/11/2023 07:44 by Dr. Caesar Guillen MD.    US Venous Doppler Lower Extremity Left (duplex) [[id number]] Resulted: 03/10/23 2217     Updated: 03/10/23 2221    XR Knee 3 View Left [[id number]] Collected: 03/10/23 2137     Updated: 03/10/23 2141    Narrative:      EXAMINATION: XR KNEE 3 VW LEFT-     3/10/2023 9:33 PM CST     HISTORY: knee pain     Left knee 3 view exam.     No comparison.     Long distal femur prosthesis.     Diffuse soft tissue edema about the lower thigh and knee.     No occult fracture is seen.     The extent visualized the prosthetic components are intact.     Summary:  1. Diffuse soft tissue edema.  2. No acute bony abnormality is seen.  This report was finalized on 03/10/2023 21:38 by Dr. Peña Moscoso MD.        Results for orders placed during the hospital encounter of 04/20/22    Adult Transthoracic Echo Complete W/ Cont if Necessary Per Protocol    Interpretation Summary  · Left ventricular ejection fraction appears to be 66 - 70%. Left ventricular systolic function is normal.  · Left ventricular diastolic function was normal.  · Left atrial volume is mildly increased.  · There is a small (<1cm) pericardial effusion.  · There is no evidence of cardiac tamponade.  · Estimated right ventricular systolic pressure from tricuspid regurgitation is normal (<35 mmHg).    Schedule medications  apixaban, 10 mg, Oral, Q12H   Followed by  [START ON 3/21/2023] apixaban, 5 mg, Oral,  Q12H  bisacodyl, 10 mg, Rectal, Daily  calcium carbonate, 1 tablet, Oral, TID  escitalopram, 20 mg, Oral, Q PM  folic acid, 1 mg, Oral, Daily  guaiFENesin, 1,200 mg, Oral, Q12H  hydrocortisone, 10 mg, Oral, Q PM  hydrocortisone, 20 mg, Oral, Q AM  hydroxyurea, 1,000 mg, Oral, Daily  losartan, 25 mg, Oral, Daily  Morphine, 15 mg, Oral, Q8H  NIFEdipine CC, 90 mg, Oral, Daily  senna, 2 tablet, Oral, Nightly  tamsulosin, 0.4 mg, Oral, Daily      I have reviewed the patient's current medications.     Assessment/Plan   Assessment  Active Hospital Problems    Diagnosis    • **Acute deep vein thrombosis (DVT) of femoral vein of left lower extremity (HCC)    • Chronic pain syndrome    • Primary hypertension    • Hypokalemia    • Stenosis of left iliac vein    • Acute pain    • Anemia of chronic disease    • Sickle cell beta thalassemia (HCC)      Treatment Plan  Acute DVT femoral vein left lower extremity.  Patient has history of chronic lymphedema left lower extremity but noted worsening swelling over the past week.  Venous Dopplers positive for DVT.  CTA negative for pulmonary emboli.  Heparin drip.  Vascular surgery consulted and Dr. Moya took him to surgery 3/13 for left lower extremity IVC venogram, mechanical thrombectomy left common iliac, external iliac, common femoral, superficial veins.  Intravascular ultrasound of the IVC left common iliac external iliac and common femoral veins.  Venoplasty left common and external iliacs.  Stenting distal left common iliac and external iliac.    Stenosis left iliac vein.  Dr. Moya took him to surgery 3/13/23 for stenting left common iliac and external iliac.    Discussed with Dr. Moya today.  We will discontinue heparin drip and begin Eliquis 10 mg twice daily for 7 days then Eliquis 5 mg twice daily.  Dr. Moya informed patient he will need anticoagulation for minimal of 6 months and could eventually be lifelong.    Acute on chronic left lower extremity pain. Resumed  home medication morphine.  Patient followed by pain management at [facility]. Pain controlled on current medication.    Anemia of chronic disease.  Hemoglobin 7.8 on admission.  Hemoglobin 6.9 on 3/13 postop.  1 unit packed red blood cells transfused.  Hemoglobin 8.3 posttransfusion.  Hemoglobin 8 today.  CBC in AM.     Primary hypertension.  Blood pressure 144/64.  Continue losartan    History of sickle cell beta thalassemia followed by [facility] hematology/oncology.    Hypokalemia.  Potassium 3. 3.  Replaced potassium 40 mEq.  Potassium 4 today.    Medical Decision Making  Number and Complexity of problems: 7  Acute DVT femoral vein left lower extremity: Acute, high complexity  Stenosis left iliac vein: Acute, high complexity  Acute on chronic left lower extremity pain: Acute on chronic, high complexity  Anemia of chronic disease: Acute on chronic, high complexity  Primary hypertension: Chronic, moderate complexity  Sickle cell thalassemia: Chronic, moderate complexity  Hypokalemia: Acute, moderate complexity    Differential Diagnosis: Pulmonary emboli    Conditions and Status        Condition is unchanged     MDM Data  External documents reviewed: Discussed with Dr. Moya and viewed consultation  Cardiac tracing (EKG, telemetry) interpretation: Normal sinus rhythm  Radiology interpretation: CT angiogram abdomen pelvis, CTA chest, venous Doppler  Labs reviewed:   BMP 3/14/2023  CBC 3/14/2023    Any tests that were considered but not ordered: None     Decision rules/scores evaluated (example [id number], [place], etc): None     Discussed with: Dr. Leslie, Dr. Moya, patient, and wife     Care Planning  Shared decision making: Dr. Leslie, Dr. Moya, patient, and wife.  Patient agrees to position heparin to Eliquis.  Code status and discussions: Full code  The patient surrogate decision maker is his wife, Primo Barakat  Social Determinants of Health that impact treatment or disposition: None  I expect  the patient to be discharged to home in 1-2 days.     Electronically signed by GILBERTO Zeng, 03/14/23, 13:05 CDT.

## 2023-03-14 NOTE — PROGRESS NOTES
LOS: 3 days   Patient Care Team:  Meredith Miller APRN as PCP - General (Nurse Practitioner)    Chief Complaint: Left lower extremity DVT    Subjective     Patient seen and examined at bedside.  No acute events overnight.  Now postoperative day #1 status post left lower extremity venogram with thrombectomy of the iliofemoral system as well as venoplasty and stenting of the left common and external iliac veins.  He tolerated the procedure well.  Postprocedure labs had shown a hemoglobin of 6.9 and so he did receive 1 unit of packed red blood cells yesterday.  Hemoglobin up to 8 this morning.  He notes leg feels less swollen and heavy with less discomfort today.  He remained on heparin drip overnight with no issues.  He is voiding well.  No other acute complaints.    Objective     Vital Signs  Temp:  [92 °F (33.3 °C)-99.7 °F (37.6 °C)] 92 °F (33.3 °C)  Heart Rate:  [66-88] 88  Resp:  [16-18] 16  BP: (144-176)/(64-89) 144/64    Physical Exam  Vitals reviewed.   Constitutional:       Appearance: Normal appearance.   HENT:      Head: Normocephalic and atraumatic.      Nose: Nose normal.      Mouth/Throat:      Mouth: Mucous membranes are moist.   Eyes:      Extraocular Movements: Extraocular movements intact.      Pupils: Pupils are equal, round, and reactive to light.   Cardiovascular:      Rate and Rhythm: Normal rate and regular rhythm.      Pulses:           Carotid pulses are 2+ on the right side and 2+ on the left side.       Radial pulses are 2+ on the right side and 2+ on the left side.        Femoral pulses are 2+ on the right side and 2+ on the left side.       Popliteal pulses are 2+ on the right side and 2+ on the left side.        Dorsalis pedis pulses are 2+ on the right side and 2+ on the left side.        Posterior tibial pulses are 2+ on the right side and 2+ on the left side.      Comments: Edema of the left lower extremity from proximal thigh to foot.  Palpable pulses throughout the bilateral  lower extremities.  Both feet are warm and motor/sensation intact.  Ace wrap was in place to the left lower extremity from mid foot to proximal thigh.  This was removed this morning.  The access site to the left popliteal vein in the popliteal fossa is healing well with dressing in place.  No evidence of any significant hematoma.  Ace wrap was reapplied for compression.  Pulmonary:      Effort: Pulmonary effort is normal. No respiratory distress.   Abdominal:      General: There is no distension.      Palpations: Abdomen is soft. There is no mass.      Tenderness: There is no abdominal tenderness.   Musculoskeletal:         General: Normal range of motion.      Cervical back: Normal range of motion and neck supple.      Right lower leg: No edema.      Left lower leg: Edema present.   Skin:     General: Skin is warm and dry.      Capillary Refill: Capillary refill takes less than 2 seconds.   Neurological:      General: No focal deficit present.      Mental Status: He is alert and oriented to person, place, and time.   Psychiatric:         Mood and Affect: Mood normal.         Behavior: Behavior normal.         Thought Content: Thought content normal.         Judgment: Judgment normal.         Laboratory Data:   Results from last 7 days   Lab Units 03/14/23  0429 03/13/23  1906 03/13/23  0919 03/13/23  0539   WBC 10*3/mm3 12.48*  --  9.83 9.57   HEMOGLOBIN g/dL 8.0* 8.3* 6.9* 7.7*   HEMATOCRIT % 24.4* 24.1* 20.3* 22.3*   PLATELETS 10*3/mm3 352  --  278 343       Results from last 7 days   Lab Units 03/14/23  0429 03/13/23  0539 03/12/23  0553 03/10/23  2140   SODIUM mmol/L 143 142 141 142   POTASSIUM mmol/L 4.0 3.7 4.1 3.3*   CHLORIDE mmol/L 107 105 106 105   CO2 mmol/L 27.0 28.0 26.0 27.0   BUN mg/dL 10 9 9 9   CREATININE mg/dL 0.80 0.71* 0.73* 0.74*   CALCIUM mg/dL 8.4* 8.5* 7.8* 7.8*   BILIRUBIN mg/dL  --   --   --  1.0   ALK PHOS U/L  --   --   --  51   ALT (SGPT) U/L  --   --   --  8   AST (SGOT) U/L  --   --    --  16   GLUCOSE mg/dL 104* 98 98 95     Results from last 7 days   Lab Units 03/14/23  0222 03/13/23  1741 03/13/23  1205 03/11/23  0830 03/11/23  0136   INR   --   --   --   --  1.13*   APTT seconds 55.7* >200.0* 43.4*   < > 37.4*    < > = values in this interval not displayed.            Medication Review: Reviewed.  Heparin DC'd and he has been transitioned to Eliquis.    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  He presents with increasing left lower extremity swelling over the past week and was found to have extensive DVT in the left lower extremity.  CT of the abdomen/pelvis with venous phase contrast that showed some thrombus in the external iliac vein but also evidence of some likely chronic scarring of that vein due to previous radiation.  Now postoperative day #1 status post left lower extremity venogram, thrombectomy of the left iliofemoral system, and venoplasty/stenting of the left common and external iliac veins.  Left lower extremity edema is improving and overall he reports his leg feels better.  Discussed with GILBERTO Garcia with the hospitalist team this morning.  Plan will be to transition him off the heparin drip to Eliquis and he will continue this moving forward.  Otherwise I have reapplied an Ace wrap for compression to that left lower leg and he should continue with compression moving forward as well as leg elevation to further help reduce edema.  He will be monitored in the  hospital today and if remains stable with no other issues then can plan for likely discharge home tomorrow.  Otherwise further care as per the medical team.  I will continue to follow him with you.  Please feel free to reach out with any questions or concerns.    Electronically signed by Dipak Moya MD, 03/14/23, 1:29 PM CDT.

## 2023-03-15 ENCOUNTER — READMISSION MANAGEMENT (OUTPATIENT)
Dept: CALL CENTER | Facility: HOSPITAL | Age: 53
End: 2023-03-15
Payer: MEDICARE

## 2023-03-15 VITALS
DIASTOLIC BLOOD PRESSURE: 77 MMHG | BODY MASS INDEX: 29.69 KG/M2 | SYSTOLIC BLOOD PRESSURE: 138 MMHG | HEIGHT: 70 IN | TEMPERATURE: 98.2 F | HEART RATE: 80 BPM | OXYGEN SATURATION: 91 % | RESPIRATION RATE: 16 BRPM | WEIGHT: 207.38 LBS

## 2023-03-15 PROBLEM — D62 POSTOPERATIVE ANEMIA DUE TO ACUTE BLOOD LOSS: Status: ACTIVE | Noted: 2023-03-15

## 2023-03-15 LAB
ANION GAP SERPL CALCULATED.3IONS-SCNC: 12 MMOL/L (ref 5–15)
ANISOCYTOSIS BLD QL: ABNORMAL
BACTERIA SPEC AEROBE CULT: NORMAL
BACTERIA SPEC AEROBE CULT: NORMAL
BASO STIPL COARSE BLD QL SMEAR: ABNORMAL
BASOPHILS # BLD MANUAL: 0.11 10*3/MM3 (ref 0–0.2)
BASOPHILS NFR BLD MANUAL: 1 % (ref 0–1.5)
BH BB BLOOD EXPIRATION DATE: NORMAL
BH BB BLOOD EXPIRATION DATE: NORMAL
BH BB BLOOD TYPE BARCODE: 6200
BH BB BLOOD TYPE BARCODE: 6200
BH BB DISPENSE STATUS: NORMAL
BH BB DISPENSE STATUS: NORMAL
BH BB PRODUCT CODE: NORMAL
BH BB PRODUCT CODE: NORMAL
BH BB UNIT NUMBER: NORMAL
BH BB UNIT NUMBER: NORMAL
BUN SERPL-MCNC: 9 MG/DL (ref 6–20)
BUN/CREAT SERPL: 11.8 (ref 7–25)
CALCIUM SPEC-SCNC: 8.8 MG/DL (ref 8.6–10.5)
CHLORIDE SERPL-SCNC: 101 MMOL/L (ref 98–107)
CO2 SERPL-SCNC: 27 MMOL/L (ref 22–29)
CREAT SERPL-MCNC: 0.76 MG/DL (ref 0.76–1.27)
CROSSMATCH INTERPRETATION: NORMAL
CROSSMATCH INTERPRETATION: NORMAL
DEPRECATED RDW RBC AUTO: 65.2 FL (ref 37–54)
EGFRCR SERPLBLD CKD-EPI 2021: 106.8 ML/MIN/1.73
EOSINOPHIL # BLD MANUAL: 0.34 10*3/MM3 (ref 0–0.4)
EOSINOPHIL NFR BLD MANUAL: 3.1 % (ref 0.3–6.2)
ERYTHROCYTE [DISTWIDTH] IN BLOOD BY AUTOMATED COUNT: 19.9 % (ref 12.3–15.4)
GIANT PLATELETS: ABNORMAL
GLUCOSE SERPL-MCNC: 85 MG/DL (ref 65–99)
HCT VFR BLD AUTO: 24.6 % (ref 37.5–51)
HGB BLD-MCNC: 8.2 G/DL (ref 13–17.7)
HYPOCHROMIA BLD QL: ABNORMAL
LYMPHOCYTES # BLD MANUAL: 2.11 10*3/MM3 (ref 0.7–3.1)
LYMPHOCYTES NFR BLD MANUAL: 4.1 % (ref 5–12)
MACROCYTES BLD QL SMEAR: ABNORMAL
MCH RBC QN AUTO: 30.4 PG (ref 26.6–33)
MCHC RBC AUTO-ENTMCNC: 33.3 G/DL (ref 31.5–35.7)
MCV RBC AUTO: 91.1 FL (ref 79–97)
MONOCYTES # BLD: 0.45 10*3/MM3 (ref 0.1–0.9)
NEUTROPHILS # BLD AUTO: 7.89 10*3/MM3 (ref 1.7–7)
NEUTROPHILS NFR BLD MANUAL: 70.4 % (ref 42.7–76)
NEUTS BAND NFR BLD MANUAL: 2 % (ref 0–5)
NRBC SPEC MANUAL: 68.4 /100 WBC (ref 0–0.2)
PLATELET # BLD AUTO: 356 10*3/MM3 (ref 140–450)
PMV BLD AUTO: 10.1 FL (ref 6–12)
POIKILOCYTOSIS BLD QL SMEAR: ABNORMAL
POLYCHROMASIA BLD QL SMEAR: ABNORMAL
POTASSIUM SERPL-SCNC: 3.6 MMOL/L (ref 3.5–5.2)
RBC # BLD AUTO: 2.7 10*6/MM3 (ref 4.14–5.8)
SODIUM SERPL-SCNC: 140 MMOL/L (ref 136–145)
TARGETS BLD QL SMEAR: ABNORMAL
UNIT  ABO: NORMAL
UNIT  ABO: NORMAL
UNIT  RH: NORMAL
UNIT  RH: NORMAL
VARIANT LYMPHS NFR BLD MANUAL: 16.3 % (ref 19.6–45.3)
VARIANT LYMPHS NFR BLD MANUAL: 3.1 % (ref 0–5)
WBC MORPH BLD: NORMAL
WBC NRBC COR # BLD: 10.89 10*3/MM3 (ref 3.4–10.8)

## 2023-03-15 PROCEDURE — 80048 BASIC METABOLIC PNL TOTAL CA: CPT | Performed by: SURGERY

## 2023-03-15 PROCEDURE — 85007 BL SMEAR W/DIFF WBC COUNT: CPT | Performed by: SURGERY

## 2023-03-15 PROCEDURE — 85025 COMPLETE CBC W/AUTO DIFF WBC: CPT | Performed by: SURGERY

## 2023-03-15 PROCEDURE — 36415 COLL VENOUS BLD VENIPUNCTURE: CPT | Performed by: SURGERY

## 2023-03-15 PROCEDURE — 99232 SBSQ HOSP IP/OBS MODERATE 35: CPT | Performed by: SURGERY

## 2023-03-15 RX ORDER — MORPHINE SULFATE 15 MG/1
15 TABLET, FILM COATED, EXTENDED RELEASE ORAL 3 TIMES DAILY
Start: 2023-03-15

## 2023-03-15 RX ORDER — POTASSIUM CHLORIDE 750 MG/1
40 CAPSULE, EXTENDED RELEASE ORAL ONCE
Status: COMPLETED | OUTPATIENT
Start: 2023-03-15 | End: 2023-03-15

## 2023-03-15 RX ORDER — POTASSIUM CHLORIDE 750 MG/1
CAPSULE, EXTENDED RELEASE ORAL
Status: DISCONTINUED
Start: 2023-03-15 | End: 2023-03-15 | Stop reason: HOSPADM

## 2023-03-15 RX ORDER — APIXABAN 5 MG (74)
5 KIT ORAL TAKE AS DIRECTED
Qty: 74 TABLET | Refills: 0 | Status: SHIPPED | OUTPATIENT
Start: 2023-03-15 | End: 2023-03-27

## 2023-03-15 RX ADMIN — POTASSIUM CHLORIDE 40 MEQ: 10 CAPSULE, COATED, EXTENDED RELEASE ORAL at 08:49

## 2023-03-15 RX ADMIN — APIXABAN 10 MG: 5 TABLET, FILM COATED ORAL at 08:41

## 2023-03-15 RX ADMIN — HYDROCORTISONE 20 MG: 10 TABLET ORAL at 05:35

## 2023-03-15 RX ADMIN — MORPHINE SULFATE 15 MG: 15 TABLET, FILM COATED, EXTENDED RELEASE ORAL at 05:36

## 2023-03-15 RX ADMIN — TAMSULOSIN HYDROCHLORIDE 0.4 MG: 0.4 CAPSULE ORAL at 08:41

## 2023-03-15 RX ADMIN — MORPHINE SULFATE 30 MG: 15 TABLET ORAL at 08:49

## 2023-03-15 RX ADMIN — NIFEDIPINE 90 MG: 90 TABLET, EXTENDED RELEASE ORAL at 08:42

## 2023-03-15 RX ADMIN — ANTACID TABLETS 1 TABLET: 500 TABLET, CHEWABLE ORAL at 08:42

## 2023-03-15 RX ADMIN — FOLIC ACID 1 MG: 1 TABLET ORAL at 08:42

## 2023-03-15 RX ADMIN — LOSARTAN POTASSIUM 25 MG: 50 TABLET, FILM COATED ORAL at 08:42

## 2023-03-15 RX ADMIN — HYDROXYUREA 1000 MG: 500 CAPSULE ORAL at 08:42

## 2023-03-15 RX ADMIN — GUAIFENESIN 1200 MG: 600 TABLET, EXTENDED RELEASE ORAL at 08:42

## 2023-03-15 NOTE — PROGRESS NOTES
LOS: 4 days   Patient Care Team:  Meredith Miller APRN as PCP - General (Nurse Practitioner)    Chief Complaint: Left lower extremity DVT    Subjective     Patient seen and examined at bedside.  No acute events overnight.  Now postoperative day #2 status post left lower extremity venogram with thrombectomy of the iliofemoral system as well as venoplasty and stenting of the left common and external iliac veins.  Significant reduction in his left lower extremity edema.  Denies any significant pain this morning.  Was transitioned to Eliis yesterday and tolerating well.  No other acute complaints.    Objective     Vital Signs  Temp:  [92 °F (33.3 °C)-99.7 °F (37.6 °C)] 97.7 °F (36.5 °C)  Heart Rate:  [67-89] 82  Resp:  [16-18] 18  BP: (123-151)/(64-87) 132/65    Physical Exam  Vitals reviewed.   Constitutional:       Appearance: Normal appearance.   HENT:      Head: Normocephalic and atraumatic.      Nose: Nose normal.      Mouth/Throat:      Mouth: Mucous membranes are moist.   Eyes:      Extraocular Movements: Extraocular movements intact.      Pupils: Pupils are equal, round, and reactive to light.   Cardiovascular:      Rate and Rhythm: Normal rate and regular rhythm.      Pulses:           Carotid pulses are 2+ on the right side and 2+ on the left side.       Radial pulses are 2+ on the right side and 2+ on the left side.        Femoral pulses are 2+ on the right side and 2+ on the left side.       Popliteal pulses are 2+ on the right side and 2+ on the left side.        Dorsalis pedis pulses are 2+ on the right side and 2+ on the left side.        Posterior tibial pulses are 2+ on the right side and 2+ on the left side.      Comments: Edema of the left lower extremity from proximal thigh to foot.  Much improved from previous.  Palpable pulses throughout the bilateral lower extremities.  Both feet are warm and motor/sensation intact.  Ace wrap was in place to the left lower extremity from mid foot to  proximal thigh.  This was removed this morning.  The access site to the left popliteal vein in the popliteal fossa is healing well and dressing was removed.  No evidence of any significant hematoma.  Patient will begin wearing his own compression sleeve moving forward.  Pulmonary:      Effort: Pulmonary effort is normal. No respiratory distress.   Abdominal:      General: There is no distension.      Palpations: Abdomen is soft. There is no mass.      Tenderness: There is no abdominal tenderness.   Musculoskeletal:         General: Normal range of motion.      Cervical back: Normal range of motion and neck supple.      Right lower leg: No edema.      Left lower leg: Edema present.   Skin:     General: Skin is warm and dry.      Capillary Refill: Capillary refill takes less than 2 seconds.   Neurological:      General: No focal deficit present.      Mental Status: He is alert and oriented to person, place, and time.   Psychiatric:         Mood and Affect: Mood normal.         Behavior: Behavior normal.         Thought Content: Thought content normal.         Judgment: Judgment normal.         Laboratory Data:   Results from last 7 days   Lab Units 03/15/23  0401 03/14/23  0429 03/13/23  1906 03/13/23  0919   WBC 10*3/mm3 10.89* 12.48*  --  9.83   HEMOGLOBIN g/dL 8.2* 8.0* 8.3* 6.9*   HEMATOCRIT % 24.6* 24.4* 24.1* 20.3*   PLATELETS 10*3/mm3 356 352  --  278       Results from last 7 days   Lab Units 03/15/23  0401 03/14/23  0429 03/13/23  0539 03/12/23  0553 03/10/23  2140   SODIUM mmol/L 140 143 142   < > 142   POTASSIUM mmol/L 3.6 4.0 3.7   < > 3.3*   CHLORIDE mmol/L 101 107 105   < > 105   CO2 mmol/L 27.0 27.0 28.0   < > 27.0   BUN mg/dL 9 10 9   < > 9   CREATININE mg/dL 0.76 0.80 0.71*   < > 0.74*   CALCIUM mg/dL 8.8 8.4* 8.5*   < > 7.8*   BILIRUBIN mg/dL  --   --   --   --  1.0   ALK PHOS U/L  --   --   --   --  51   ALT (SGPT) U/L  --   --   --   --  8   AST (SGOT) U/L  --   --   --   --  16   GLUCOSE mg/dL 85  104* 98   < > 95    < > = values in this interval not displayed.     Results from last 7 days   Lab Units 03/14/23  0222 03/13/23  1741 03/13/23  1205 03/11/23  0830 03/11/23  0136   INR   --   --   --   --  1.13*   APTT seconds 55.7* >200.0* 43.4*   < > 37.4*    < > = values in this interval not displayed.            Medication Review: Reviewed.  Heparin DC'd and he has been transitioned to Eliquis.    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  He presents with increasing left lower extremity swelling over the past week and was found to have extensive DVT in the left lower extremity.  CT of the abdomen/pelvis with venous phase contrast that showed some thrombus in the external iliac vein but also evidence of some likely chronic scarring of that vein due to previous radiation.  Now postoperative day #2 status post left lower extremity venogram, thrombectomy of the left iliofemoral system, and venoplasty/stenting of the left common and external iliac veins.  Left lower extremity edema is improving and overall he reports his leg feels better.  Discussed with GILBERTO Garcia with the hospitalist team this morning.  Patient is now tolerating Eliquis.  He should continue wearing his previous 20 to 30 mmHg compression sleeve to that left lower extremity and continue also with leg elevation to further help reduce edema. He is cleared for discharge from a vascular surgery standpoint.  He should follow-up with  me in the office in 2 weeks.    Electronically signed by Dipak Moya MD, 03/15/23, 7:58 AM CDT.

## 2023-03-15 NOTE — PLAN OF CARE
Goal Outcome Evaluation:    Problem: Adult Inpatient Plan of Care  Goal: Plan of Care Review  Outcome: Ongoing, Progressing  Flowsheets (Taken 3/15/2023 0160)  Progress: improving  Plan of Care Reviewed With: patient  Outcome Evaluation: No c/o pain this shift, scheduled morphine given. Change from heparin to eliquis tolerated well.  On RA with 2L NC with sleep. NSR 69-94 on tele. Ace wrap to left leg maintained. Planning to d/c in AM. Will updat MD as needed.

## 2023-03-15 NOTE — DISCHARGE SUMMARY
[facility] Medicine Services  DISCHARGE SUMMARY     Date of Admission: 3/10/2023  Date of Discharge:  3/15/2023  Primary Care Physician: Meredith Miller APRN    Presenting Problem/History of Present Illness:  Pain and swelling left lower extremity    Final Discharge Diagnoses:  Active Hospital Problems    Diagnosis    • **Acute deep vein thrombosis (DVT) of femoral vein of left lower extremity (HCC)    • Postoperative anemia due to acute blood loss    • Chronic pain syndrome    • Primary hypertension    • Hypokalemia    • Stenosis of left iliac vein    • Acute pain    • Anemia of chronic disease    • Sickle cell beta thalassemia (HCC)      Consults: Dr. Moya, vascular surgery    Procedures Performed: 3/13/2023 Dr. Moya  1.  Prone positioning  2.  Ultrasound-guided access of the left popliteal vein  3.  Left lower extremity and IVC venogram  4.  Mechanical thrombectomy of the left common iliac, external iliac, common femoral, and superficial femoral veins using the Inari Clotrevier mechanical thrombectomy device  5.  Intravascular ultrasound of the IVC, left common iliac, external iliac, and common femoral veins  6.  Venoplasty of the left common and external iliac veins using a 12 x 40 mm [place] balloon  7.  Venoplasty of the left common femoral vein using a 10 x 40 [place] balloon  8.  Stenting of the distal left common iliac and external iliac veins using a 14 x 100 Abre self-expanding venous stent  9.  Post dilation of that stent using a 12 x 40 mm [place] balloon  10.  Completion intravascular ultrasound of the IVC, left common iliac, external iliac, and common femoral veins  11.  Completion venogram  12.  Radiographic supervision and interpretation    Pertinent Test Results:   Results for orders placed during the hospital encounter of 04/20/22    Adult Transthoracic Echo Complete W/ Cont if Necessary Per Protocol    Interpretation Summary  · Left ventricular ejection  fraction appears to be 66 - 70%. Left ventricular systolic function is normal.  · Left ventricular diastolic function was normal.  · Left atrial volume is mildly increased.  · There is a small (<1cm) pericardial effusion.  · There is no evidence of cardiac tamponade.  · Estimated right ventricular systolic pressure from tricuspid regurgitation is normal (<35 mmHg).      Imaging Results (All)     Procedure Component Value Units Date/Time    IR Venogram Extremity [[id number]] Collected: 03/13/23 0912     Updated: 03/13/23 1545    Narrative:      Performed by Dr. Moya. Please see procedure note.  This report was finalized on 03/13/2023 15:42 by Dr. Dipak Moya MD.    FL C Arm During Surgery [[id number]] Collected: 03/13/23 0912     Updated: 03/13/23 1545    Narrative:      Performed by Dr. Moya. Please see procedure note.  This report was finalized on 03/13/2023 15:42 by Dr. Dipak Moya MD.    CT Angiogram Abdomen Pelvis [[id number]] Collected: 03/11/23 0751     Updated: 03/11/23 0809    Narrative:      EXAMINATION: CT angiogram of the abdomen and pelvis with and without  contrast. 03/11/2023     HISTORY: DVT.     DOSE: 1924 mGycm. All CT scans are performed using dose optimization  techniques as appropriate to the performed exam and including at least  one of the following: Automated exposure control, adjustment of the mA  and/or kV according to size, and the use of the iterative reconstruction  technique.     FINDINGS: Multiple contiguous axial images are obtained through the  abdomen and pelvis both with and without contrast enhancement including  delayed imaging. MIPS are also obtained.     There is moderate cardiomegaly with a small pericardial effusion.  Bibasilar atelectasis is present. No pleural effusion is present. There  is contrast within the left renal collecting system related to earlier  intravenous contrast infusion. There is no evidence of left-sided  obstructive uropathy.     There is a  benign hepatic cyst within the lateral segment of the left  lobe of the liver. Liver is otherwise homogeneous in density. There is  normal enhancement of the hepatic vasculature.     The gallbladder is surgically absent. Moderate extrahepatic biliary  dilatation is likely related to reservoir effect. There is no evidence  of choledocholithiasis.     The pancreas is normal in appearance with no mass or ductal dilatation.  No evidence of acute pancreatitis.     The spleen is surgically absent.     The right kidney has been resected. Both adrenal glands are  unremarkable. There is retained contrast within the left renal  collecting system without evidence of hydronephrosis or obstructive  uropathy. There is homogeneous enhancement of the left kidney. No  perinephric fluid collections are present.     The abdominal aorta and IVC are normal in caliber and demonstrate normal  enhancement. There is a normal bowel gas pattern with no obstruction or  free air. There is diastases of the abdominal musculature at the  umbilicus. The patient has undergone previous femur fixation  bilaterally. No acute or suspicious bony abnormalities are present.     The abdominal aorta and iliac arteries are normal in caliber with no  dissection or aneurysm. No focal plaquing or rate limiting stenosis is  present. The left renal artery is widely patent. The mesenteric vessels  are widely patent.     There is no free fluid within the pelvis. The urinary bladder is normal  in appearance.     There is diffuse edema within the left lower extremity extending into  the left hemipelvis. Early delayed imaging demonstrates the IVC as well  as a bilateral common iliac veins are widely patent demonstrating normal  enhancement. There is asymmetry of the left external iliac vein in  comparison with the left with no focal enhancement demonstrated. This  suggest thrombosis within the left external iliac vein. There is no  enlargement of the external iliac  vein suggesting this may represent a  more chronic process. There is also asymmetry of enhancement of the left  common femoral vein at the level of the groin. Correlation is  recommended with the patient's duplex exam. There is extensive stranding  noted within the left hemipelvis and proximal left thigh-likely related  to venous obstruction. I do not see associated discrete mass or mass  effect on the left pelvic sidewall venous structures.       Impression:      1.. Diffuse edema of the left lower extremity and left hemipelvis. This  is likely related to obstruction of venous outflow given the history.  There is asymmetry of enhancement within the left external iliac vein  but without evidence of enlargement which would be more typical for an  acute deep venous thrombosis. This may represent a more chronic finding  and should be correlated with the patient's clinical presentation and  history. I do feel that there is enhancement and a rather normal  appearance of the left common iliac vein. IVC is widely patent.  2. Normal appearance of the abdominal aorta and branch vessels including  the pelvic arterial tree. No evidence of stenosis or plaquing. No  evidence of dissection.  3. The patient has undergone previous right nephrectomy. The left kidney  is hypertrophied but otherwise normal in appearance. No evidence of  left-sided obstructive uropathy.  4. Diastases of the abdominal musculature at the umbilicus with a small  ventral wall hernia containing fat. No herniated bowel loop or  obstruction.  5. Benign hepatic cyst left lobe of liver.  6. Cardiomegaly with a small pericardial effusion. Bibasilar atelectasis  or scarring is present.  This report was finalized on 03/11/2023 08:06 by Dr. Caesar Guillen MD.    CT Angiogram Chest [[id number]] Collected: 03/11/23 0735     Updated: 03/11/23 0747    Narrative:      Exam: CT angiogram of the of the chest with intravenous contrast.     CLINICAL HISTORY:  DVT of left  lower extremity.     DOSE:  210 mGycm. All CT scans are performed using dose optimization  techniques as appropriate to the performed exam and including at least  one of the following: Automated exposure control, adjustment of the mA  and/or kV according to size, and the use of the iterative reconstruction  technique.     TECHNIQUE: Contiguous axial images were obtained through the thorax  following intravenous contrast administration with reformatted images  obtained in the sagittal and coronal projections from the original data  set. Three-dimensional reconstructions are also obtained.     COMPARISON:  04/20/2022     FINDINGS:     Pulmonary arteries:  There is normal enhancement of the pulmonary  arteries with no evidence of pulmonary thromboembolic disease.     Aorta:  The thoracic aorta and proximal great vessels are normal in  appearance. There is no evidence of aortic dissection or aneurysm.     LUNGS:  Bibasilar atelectasis is present. There is a 4 mm nodule in the  right middle lobe. This has a broad-based interface with the minor  fissure and likely represents an intrafissural node. This is stable from  the previous exam. An additional 3 mm right middle lobe subpleural  nodule on image 96 of the axial sequence is also stable.     Pleural spaces: Unremarkable. No evidence of pleural effusion or  pneumothorax.     HEART: There is moderate cardiomegaly. A small pericardial effusion is  present. No evidence of right ventricular dysfunction. No coronary  calcifications are present.     Bones: No acute osseous abnormalities are demonstrated.     CHEST WALL: No chest wall abnormalities are demonstrated.     Lymph nodes: No enlarged mediastinal or axillary lymphadenopathy is  present.     Upper abdomen: The patient has undergone previous right nephrectomy.  There is a hepatic cyst or hemangioma within the lateral segment of the  left lobe of liver.       Impression:      1. No evidence of pulmonary thromboembolic  disease. The thoracic aorta  and great vessels are normal in caliber with no dissection or aneurysm.  2. Cardiomegaly with a small pericardial effusion. No right ventricular  dysfunction or coronary calcification.  3. Bibasilar atelectasis. Stable right middle lobe nodules from previous  exam of 04/20/2022. No evidence of acute consolidative pneumonia or  pleural effusion.  4. The patient's undergone previous right nephrectomy. There is a benign  hepatic cyst or hemangioma within the lateral segment of the left lobe  of the liver.  This report was finalized on 03/11/2023 07:44 by Dr. Caesar Guillen MD.    US Venous Doppler Lower Extremity Left (duplex) [[id number]] Resulted: 03/10/23 2217     Updated: 03/10/23 2221    XR Knee 3 View Left [[id number]] Collected: 03/10/23 2137     Updated: 03/10/23 2141    Narrative:      EXAMINATION: XR KNEE 3 VW LEFT-     3/10/2023 9:33 PM CST     HISTORY: knee pain     Left knee 3 view exam.     No comparison.     Long distal femur prosthesis.     Diffuse soft tissue edema about the lower thigh and knee.     No occult fracture is seen.     The extent visualized the prosthetic components are intact.     Summary:  1. Diffuse soft tissue edema.  2. No acute bony abnormality is seen.  This report was finalized on 03/10/2023 21:38 by Dr. Peña Moscoso MD.        LAB RESULTS:      Lab 03/15/23  0401 03/14/23  0429 03/14/23  0222 03/13/23  1906 03/13/23  1741 03/13/23  1205 03/13/23  0919 03/13/23  0539 03/12/23  1150 03/12/23  0553 03/11/23  0830 03/11/23  0136 03/11/23  0040 03/10/23  2140   WBC 10.89* 12.48*  --   --   --   --  9.83 9.57  --  7.54  --   --  6.49 5.70   HEMOGLOBIN 8.2* 8.0*  --  8.3*  --   --  6.9* 7.7*  --  7.7*  --   --  7.5* 7.8*   HEMATOCRIT 24.6* 24.4*  --  24.1*  --   --  20.3* 22.3*  --  22.6*  --   --  21.2* 22.8*   PLATELETS 356 352  --   --   --   --  278 343  --  301  --   --  275 275   NEUTROS ABS 7.89* 10.11*  --   --   --   --  7.25* 6.57  --  5.02  --    --  3.74 3.18   IMMATURE GRANS (ABS)  --   --   --   --   --   --   --   --   --  0.05  --   --   --   --    LYMPHS ABS  --   --   --   --   --   --   --   --   --  1.17  --   --  1.46 1.21   MONOS ABS  --   --   --   --   --   --   --   --   --  0.96*  --   --  0.90 0.95*   EOS ABS 0.34  --   --   --   --   --  0.60* 0.19  --  0.30  --   --  0.33 0.31   MCV 91.1 91.4  --   --   --   --  90.6 89.6  --  91.5  --   --  89.5 91.9   SED RATE  --   --   --   --   --   --   --   --   --   --   --   --   --  56*   CRP  --   --   --   --   --   --   --   --   --   --   --   --   --  7.15*   LACTATE  --   --   --   --   --   --   --   --   --   --   --   --   --  1.0   PROTIME  --   --   --   --   --   --   --   --   --   --   --  14.6  --   --    APTT  --   --  55.7*  --  >200.0* 43.4* 80.7* 37.9*   < > 81.7*   < > 37.4*  --   --     < > = values in this interval not displayed.         Lab 03/15/23  0401 03/14/23  0429 03/13/23  0539 03/12/23  0553 03/10/23  2140   SODIUM 140 143 142 141 142   POTASSIUM 3.6 4.0 3.7 4.1 3.3*   CHLORIDE 101 107 105 106 105   CO2 27.0 27.0 28.0 26.0 27.0   ANION GAP 12.0 9.0 9.0 9.0 10.0   BUN 9 10 9 9 9   CREATININE 0.76 0.80 0.71* 0.73* 0.74*   EGFR 106.8 105.2 109.0 108.1 107.7   GLUCOSE 85 104* 98 98 95   CALCIUM 8.8 8.4* 8.5* 7.8* 7.8*         Lab 03/10/23  2140   TOTAL PROTEIN 7.1   ALBUMIN 3.7   GLOBULIN 3.4   ALT (SGPT) 8   AST (SGOT) 16   BILIRUBIN 1.0   ALK PHOS 51         Lab 03/11/23  0136   PROTIME 14.6   INR 1.13*             Lab 03/11/23  1430   ABO TYPING A   RH TYPING Positive   ANTIBODY SCREEN Negative         Brief Urine Lab Results  (Last result in the past 365 days)      Color   Clarity   Blood   Leuk Est   Nitrite   Protein   CREAT   Urine HCG        04/22/22 1828             129.2             Microbiology Results (last 10 days)     Procedure Component Value - Date/Time    COVID PRE-OP / PRE-PROCEDURE SCREENING ORDER (NO ISOLATION) - Swab, Nasopharynx [[id number]]   (Normal) Collected: 03/10/23 2141    Lab Status: Final result Specimen: Swab from Nasopharynx Updated: 03/10/23 2228    Narrative:      The following orders were created for panel order COVID PRE-OP / PRE-PROCEDURE SCREENING ORDER (NO ISOLATION) - Swab, Nasopharynx.  Procedure                               Abnormality         Status                     ---------                               -----------         ------                     COVID-19, FLU A/B, RSV P...[[id number]]  Normal              Final result                 Please view results for these tests on the individual orders.    COVID-19, FLU A/B, RSV PCR - Swab, Nasopharynx [[id number]]  (Normal) Collected: 03/10/23 2141    Lab Status: Final result Specimen: Swab from Nasopharynx Updated: 03/10/23 2228     COVID19 Not Detected     Influenza A PCR Not Detected     Influenza B PCR Not Detected     RSV, PCR Not Detected    Narrative:      Fact sheet for providers: https://www.fda.gov/media/268860/download    Fact sheet for patients: https://www.fda.gov/media/663863/download    Test performed by PCR.    Blood Culture - Blood, Hand, Left [[id number]]  (Normal) Collected: 03/10/23 2140    Lab Status: Preliminary result Specimen: Blood from Hand, Left Updated: 03/14/23 2300     Blood Culture No growth at 4 days    Blood Culture - Blood, Arm, Right [[id number]]  (Normal) Collected: 03/10/23 2140    Lab Status: Preliminary result Specimen: Blood from Arm, Right Updated: 03/14/23 2300     Blood Culture No growth at 4 days        Hospital Course:  presented to [facility] emergency room 3/10/2023 with left lower leg pain and swelling.  He has a history of sickle cell anemia, right renal cell carcinoma status post nephrectomy, systemic chemotherapy, left sacral radiation, chronic left lower extremity lymphedema due to radiation treatment.  Patient wears compression stockings regularly.  In addition, he uses lymphedema pump 1 hour daily and is  followed by lymphedema clinic weekly.  He reports 1 week history of worsening lower extremity edema.  He has chronic left lower extremity edema but worsened than normal.  He is followed by [facility] for sickle cell disease, chronic anemia and chronic pain syndrome.  He has baseline lower extremity edema from previous radiation surgery.  Patient reported 1 week ago last Saturday, 3/4 he had increasing left lower extremity edema which worsened.  Venous Doppler left lower extremity positive for DVT thrombus in the left  CFV, prox SFV, Pop Vein and PTV. VERY minimal visualization throughout due to edema and limb size.  CT abdomen pelvis noted diffuse edema left lower extremity and left hemopelvis likely related to obstruction of venous outflow.  Asymmetry enhancement within the left external iliac vein but without evidence of enlargement which would be more typical for acute deep vein thrombosis.  May represent a chronic finding.  Normal appearance abdominal aorta.  Heparin bolus and drip, vancomycin given in ER.    He was admitted to the medical floor with acute DVT  femoral vein left lower extremity.  Patient has history of chronic lymphedema left lower extremity but noted worsening swelling over the past week.  Venous Dopplers positive for DVT.  CTA negative for pulmonary emboli.  Heparin drip ordered on admission.  Vascular surgery consulted and Dr. Moya took him to surgery 3/13/23 for left lower extremity IVC venogram, mechanical thrombectomy left common iliac, external iliac, common femoral, superficial veins.  Intravascular ultrasound of the IVC left common iliac external iliac and common femoral veins.  Venoplasty left common and external iliacs.  Stenting distal left common iliac and external iliac.  Postoperatively, heparin drip continued and patient was transitioned to Eliquis 10 mg twice daily for 7 days beginning 3/14/2023 then Eliquis 5 mg daily.  Postoperatively, left lower extremity edema continued  "to improve and by discharge significantly improved and leg not tight.  Ace dressing in place.  Dr. Moya recommended patient to continue wearing previous 20-30 mmHg compression sleeve to left lower extremity and continue with leg elevation to help further reduce edema.  Patient was cleared for discharge to follow-up with Dr. Moya on 3/31/2023.    Patient also found to have stenosis left iliac vein and Dr. Moya performed stenting left common iliac and external iliac on 3/13/2023.    Patient has anemia of chronic disease.  Hemoglobin 7.8 on admission.  Hemoglobin 6.9 postoperatively on 3/13.  1 unit packed red blood cells transfused and hemoglobin 8.3 post transfusion.  Hemoglobin stable at 8.2 on date of discharge.  No increased bruising or bleeding noted at left groin site.    Patient has acute on chronic left lower extremity pain.  Home medications morphine resumed.  Patient is followed by pain management at [place].  Patient reported pain controlled on current home medication.    Losartan continued for primary hypertension.    He has a history of sickle cell beta thalassemia followed by [place] hematology/oncology.    Potassium 3.3, replaced.  Potassium 3.6 on date of discharge.    On 3/15/2023, he is stable for discharge.  He was evaluated by Dr. Moya today and cleared for discharge to follow-up on 3/31/2023.  Patient was transitioned to Eliquis on 3/15.  Dr. Moya informed patient and wife that he would need to remain on anticoagulation for a minimum of 6 months but may be lifelong.  This will be further discussed in the future.  Patient to follow-up with GILBERTO Ornelas on 3/22/2023.    Physical Exam on Discharge:  /77 (BP Location: Left arm, Patient Position: Sitting)   Pulse 80   Temp 98.2 °F (36.8 °C) (Oral)   Resp 16   Ht 177.8 cm (70\")   Wt 94.1 kg (207 lb 6 oz)   SpO2 91%   BMI 29.76 kg/m²   Physical Exam  Vitals and nursing note reviewed.   Constitutional:       " Comments: Lying in bed.  Wife in room.   HENT:      Head: Normocephalic and atraumatic.      Nose: No congestion.      Mouth/Throat:      Pharynx: Oropharynx is clear. No oropharyngeal exudate or posterior oropharyngeal erythema.   Eyes:      Extraocular Movements: Extraocular movements intact.      Pupils: Pupils are equal, round, and reactive to light.   Cardiovascular:      Rate and Rhythm: Normal rate and regular rhythm.      Heart sounds: No murmur heard.     Comments: Normal sinus rhythm 75 on telemetry.  Pulmonary:      Breath sounds: No wheezing, rhonchi or rales.      Comments: Oxygen off.  Abdominal:      Palpations: Abdomen is soft.      Tenderness: There is no abdominal tenderness.   Genitourinary:     Comments: Voiding.  Musculoskeletal:         General: Swelling (Swelling left lower extremity significantly improved postprocedure.  Leg not as tight.) and tenderness (Left lower extremity) present.      Cervical back: Normal range of motion and neck supple.      Comments: Ace wrap left lower extremity per Dr. Moya   Skin:     General: Skin is warm and dry.   Neurological:      General: No focal deficit present.      Mental Status: He is alert and oriented to person, place, and time.   Psychiatric:         Mood and Affect: Mood normal.         Behavior: Behavior normal.         Thought Content: Thought content normal.         Judgment: Judgment normal.       Condition on Discharge: Stable for discharge home    Discharge Disposition:  Home or Self Care    Discharge Medications:     Discharge Medications      New Medications      Instructions Start Date   Eliquis DVT/PE Starter Pack tablet therapy pack  Generic drug: Apixaban Starter Pack   Take two tablets by mouth every 12 hours for 7 days. Followed by one tablet every 12 hours.         Changes to Medications      Instructions Start Date   Morphine 30 MG tablet  Commonly known as: MSIR  What changed: Another medication with the same name was removed.  Continue taking this medication, and follow the directions you see here.   30 mg, Oral, Every 4 Hours PRN      Morphine 15 MG 12 hr tablet  Commonly known as: MS CONTIN  What changed: Another medication with the same name was removed. Continue taking this medication, and follow the directions you see here.   15 mg, Oral, 3 Times Daily, scheduled         Continue These Medications      Instructions Start Date   acetaminophen 325 MG tablet  Commonly known as: TYLENOL   650 mg, Oral, Every 6 Hours PRN      cefadroxil 500 MG capsule  Commonly known as: DURICEF   500 mg, Oral, 2 Times Daily      folic acid 1 MG tablet  Commonly known as: FOLVITE   1 mg, Oral, Every Morning      hydrocortisone 10 MG tablet  Commonly known as: CORTEF   10 mg, Oral, Every Evening      hydrocortisone 10 MG tablet  Commonly known as: CORTEF   20 mg, Oral, Every Morning      hydroxyurea 500 MG capsule  Commonly known as: HYDREA   1,000 mg, Oral, Daily      ipratropium-albuterol 0.5-2.5 mg/3 ml nebulizer  Commonly known as: DUO-NEB   3 mL, Nebulization, Every 4 Hours PRN      lactulose 10 GM/15ML solution  Commonly known as: CHRONULAC   20 g, Oral, 2 Times Daily PRN      linaclotide 145 MCG capsule capsule  Commonly known as: LINZESS   145 mcg, Oral, Every Morning      losartan 25 MG tablet  Commonly known as: COZAAR   25 mg, Oral, Every Morning      Oyster Shell Calcium/D 500-10 MG-MCG tablet tablet   2 tablets, Oral, Every Morning      senna 8.6 MG tablet  Commonly known as: SENOKOT   1 tablet, Oral, Every Evening      tamsulosin 0.4 MG capsule 24 hr capsule  Commonly known as: FLOMAX   1 capsule, Oral, Every Morning         Stop These Medications    bisacodyl 10 MG suppository  Commonly known as: DULCOLAX          Discharge Diet:   Diet Instructions     Advance Diet As Tolerated -Target Diet: Regular diet      Target Diet: Regular diet        Activity at Discharge:   Activity Instructions     Other Activity Instructions      Activity  Instructions: Lobate left lower extremity.  Resume compression stocking left lower extremity per Dr. Moya         Discharge instructions:  1.  For worsening pain, swelling left lower extremity seek medical attention.  2.  Eliquis 10 mg twice daily for 7 days begin 3/14/2023 then Eliquis 5 mg twice daily.  Starter pack filled at discharge by [facility].  3.  Per Dr. Moya continue wearing previous 20-30 mmHg compression sleeve to left lower extremity and continue with leg elevation to reduce edema.  4.  Follow-up with Dr. Moya 2 weeks.  3/31/2023.  5.  Follow-up with GILBERTO Ornelas in 1 week, 3/22/2023.    Follow-up Appointments:   Future Appointments   Date Time Provider Department Center   3/22/2023  8:00 AM Elida Sequeira PTA, CLT-LANNY [facility]   3/29/2023  8:00 AM Elida Sequeira, PTA, CLT[facility]   3/31/2023 10:30 AM Dipak Moya MD [facility]     Test Results Pending at Discharge: None    Electronically signed by GILBERTO Zeng, 03/15/23, 09:56 CDT.    Time: 35 minutes.  Discussed with Dr. Leslie, Dr. Moya, patient and wife.    The above documentation resulted from a face-to-face encounter by me Amanda MACIAS, [facility].

## 2023-03-16 NOTE — OUTREACH NOTE
Prep Survey    Flowsheet Row Responses   [facility] facility patient discharged from? [place]   Is LACE score < 7 ? No   Eligibility Readm Mgmt   Discharge diagnosis **Acute deep vein thrombosis (DVT) of femoral vein of left lower extremity    Does the patient have one of the following disease processes/diagnoses(primary or secondary)? General Surgery   Does the patient have Home health ordered? No   Is there a DME ordered? No   Prep survey completed? Yes          Allyson TIRADO - Registered Nurse
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
## 2023-03-20 ENCOUNTER — READMISSION MANAGEMENT (OUTPATIENT)
Dept: CALL CENTER | Facility: HOSPITAL | Age: 53
End: 2023-03-20
Payer: MEDICARE

## 2023-03-20 NOTE — OUTREACH NOTE
General Surgery Week 1 Survey    Flowsheet Row Responses   [facility] patient discharged from? [place]   Does the patient have one of the following disease processes/diagnoses(primary or secondary)? General Surgery   Week 1 attempt successful? Yes   Call start time 1912   Call end time 1915   Discharge diagnosis **Acute deep vein thrombosis (DVT) of femoral vein of left lower extremity    Meds reviewed with patient/caregiver? Yes   Is the patient having any side effects they believe may be caused by any medication additions or changes? No   Does the patient have all medications related to this admission filled (includes all antibiotics, pain medications, etc.) Yes   Is the patient taking all medications as directed (includes completed medication regime)? Yes   Does the patient have a follow up appointment scheduled with their surgeon? Yes   Has the patient kept scheduled appointments due by today? N/A   Has home health visited the patient within 72 hours of discharge? N/A   Psychosocial issues? No   Did the patient receive a copy of their discharge instructions? Yes   Nursing interventions Reviewed instructions with patient   What is the patient's perception of their health status since discharge? New symptoms unrelated to diagnosis   Nursing interventions Nurse provided patient education   Is the patient /caregiver able to teach back basic post-op care? Practice 'cough and deep breath', No tub bath, swimming, or hot tub until instructed by MD, Lifting as instructed by MD in discharge instructions   Is the patient/caregiver able to teach back signs and symptoms of incisional infection? Increased redness, swelling or pain at the incisonal site, Increased drainage or bleeding, Incisional warmth, Pus or odor from incision, Fever   Is the patient/caregiver able to teach back steps to recovery at home? Set small, achievable goals for return to baseline health, Rest and rebuild strength, gradually increase activity,  "Make a list of questions for surgeon's appointment, Eat a well-balance diet   If the patient is a current smoker, are they able to teach back resources for cessation? Not a smoker   Is the patient/caregiver able to teach back the hierarchy of who to call/visit for symptoms/problems? PCP, Specialist, Home health nurse, Urgent Care, ED, 911 Yes   Additional teach back comments States his leg feels \"tight\".  Swelling had gone down when he had first gotten home but it has increased.  He has keep it elevated.  Has appt next week with surgeon.  Advised to contact surgeon's office regarding increase swelling.  Denies any pain, redness or drainage.    Week 1 call completed? Yes   Wrap up additional comments Pt to contact surgeon's office regarding increase swelling.           Isabel DAVISON - Licensed Nurse  "

## 2023-03-27 ENCOUNTER — APPOINTMENT (OUTPATIENT)
Dept: CT IMAGING | Facility: HOSPITAL | Age: 53
End: 2023-03-27
Payer: MEDICARE

## 2023-03-27 ENCOUNTER — APPOINTMENT (OUTPATIENT)
Dept: ULTRASOUND IMAGING | Facility: HOSPITAL | Age: 53
End: 2023-03-27
Payer: MEDICARE

## 2023-03-27 ENCOUNTER — HOSPITAL ENCOUNTER (EMERGENCY)
Facility: HOSPITAL | Age: 53
Discharge: HOME | End: 2023-03-28
Admitting: EMERGENCY MEDICINE
Payer: MEDICARE

## 2023-03-27 DIAGNOSIS — I51.7 CARDIOMEGALY: ICD-10-CM

## 2023-03-27 DIAGNOSIS — R07.89 CHEST PRESSURE: ICD-10-CM

## 2023-03-27 DIAGNOSIS — K76.89 HEPATIC CYST: ICD-10-CM

## 2023-03-27 DIAGNOSIS — R60.0 LEG EDEMA, LEFT: Primary | ICD-10-CM

## 2023-03-27 DIAGNOSIS — R77.8 ELEVATED TROPONIN: ICD-10-CM

## 2023-03-27 DIAGNOSIS — F12.90 MARIJUANA USE: ICD-10-CM

## 2023-03-27 LAB
ALBUMIN SERPL-MCNC: 3.9 G/DL (ref 3.5–5.2)
ALBUMIN/GLOB SERPL: 1.1 G/DL
ALP SERPL-CCNC: 47 U/L (ref 39–117)
ALT SERPL W P-5'-P-CCNC: 6 U/L (ref 1–41)
ANION GAP SERPL CALCULATED.3IONS-SCNC: 11 MMOL/L (ref 5–15)
ANISOCYTOSIS BLD QL: ABNORMAL
APTT PPP: 34.6 SECONDS (ref 24.1–35)
AST SERPL-CCNC: 20 U/L (ref 1–40)
BILIRUB SERPL-MCNC: 0.9 MG/DL (ref 0–1.2)
BUN SERPL-MCNC: 14 MG/DL (ref 6–20)
BUN/CREAT SERPL: 16.1 (ref 7–25)
CALCIUM SPEC-SCNC: 8.9 MG/DL (ref 8.6–10.5)
CHLORIDE SERPL-SCNC: 107 MMOL/L (ref 98–107)
CLUMPED PLATELETS: PRESENT
CO2 SERPL-SCNC: 28 MMOL/L (ref 22–29)
CREAT SERPL-MCNC: 0.87 MG/DL (ref 0.76–1.27)
DACRYOCYTES BLD QL SMEAR: ABNORMAL
DEPRECATED RDW RBC AUTO: 65.6 FL (ref 37–54)
EGFRCR SERPLBLD CKD-EPI 2021: 102.5 ML/MIN/1.73
EOSINOPHIL # BLD MANUAL: 0.29 10*3/MM3 (ref 0–0.4)
EOSINOPHIL NFR BLD MANUAL: 3.1 % (ref 0.3–6.2)
ERYTHROCYTE [DISTWIDTH] IN BLOOD BY AUTOMATED COUNT: 19.9 % (ref 12.3–15.4)
GIANT PLATELETS: ABNORMAL
GLOBULIN UR ELPH-MCNC: 3.4 GM/DL
GLUCOSE SERPL-MCNC: 111 MG/DL (ref 65–99)
HCT VFR BLD AUTO: 25.5 % (ref 37.5–51)
HGB BLD-MCNC: 8.8 G/DL (ref 13–17.7)
INR PPP: 1.12 (ref 0.91–1.09)
LYMPHOCYTES # BLD MANUAL: 2.76 10*3/MM3 (ref 0.7–3.1)
LYMPHOCYTES NFR BLD MANUAL: 9.2 % (ref 5–12)
MACROCYTES BLD QL SMEAR: ABNORMAL
MCH RBC QN AUTO: 32 PG (ref 26.6–33)
MCHC RBC AUTO-ENTMCNC: 34.5 G/DL (ref 31.5–35.7)
MCV RBC AUTO: 92.7 FL (ref 79–97)
MONOCYTES # BLD: 0.86 10*3/MM3 (ref 0.1–0.9)
NEUTROPHILS # BLD AUTO: 5.32 10*3/MM3 (ref 1.7–7)
NEUTROPHILS NFR BLD MANUAL: 57.1 % (ref 42.7–76)
NRBC SPEC MANUAL: 38.8 /100 WBC (ref 0–0.2)
NT-PROBNP SERPL-MCNC: 163.1 PG/ML (ref 0–900)
PAPPENHEIMER BOD BLD QL SMEAR: PRESENT
PLASMA CELL PREC NFR BLD MANUAL: 1 % (ref 0–0)
PLATELET # BLD AUTO: 402 10*3/MM3 (ref 140–450)
PMV BLD AUTO: 9.1 FL (ref 6–12)
POIKILOCYTOSIS BLD QL SMEAR: ABNORMAL
POLYCHROMASIA BLD QL SMEAR: ABNORMAL
POTASSIUM SERPL-SCNC: 3.2 MMOL/L (ref 3.5–5.2)
PROT SERPL-MCNC: 7.3 G/DL (ref 6–8.5)
PROTHROMBIN TIME: 14.5 SECONDS (ref 11.8–14.8)
RBC # BLD AUTO: 2.75 10*6/MM3 (ref 4.14–5.8)
SICKLE CELLS: ABNORMAL
SODIUM SERPL-SCNC: 146 MMOL/L (ref 136–145)
SPHEROCYTES BLD QL SMEAR: ABNORMAL
STOMATOCYTES BLD QL SMEAR: ABNORMAL
TARGETS BLD QL SMEAR: ABNORMAL
TROPONIN T SERPL HS-MCNC: 20 NG/L
VARIANT LYMPHS NFR BLD MANUAL: 26.5 % (ref 19.6–45.3)
VARIANT LYMPHS NFR BLD MANUAL: 3.1 % (ref 0–5)
WBC MORPH BLD: NORMAL
WBC NRBC COR # BLD: 9.32 10*3/MM3 (ref 3.4–10.8)

## 2023-03-27 PROCEDURE — 93010 ELECTROCARDIOGRAM REPORT: CPT | Performed by: HOSPITALIST

## 2023-03-27 PROCEDURE — 25510000001 IOPAMIDOL PER 1 ML: Performed by: PHYSICIAN ASSISTANT

## 2023-03-27 PROCEDURE — 80053 COMPREHEN METABOLIC PANEL: CPT | Performed by: PHYSICIAN ASSISTANT

## 2023-03-27 PROCEDURE — 93970 EXTREMITY STUDY: CPT | Performed by: SURGERY

## 2023-03-27 PROCEDURE — 84484 ASSAY OF TROPONIN QUANT: CPT | Performed by: PHYSICIAN ASSISTANT

## 2023-03-27 PROCEDURE — 85045 AUTOMATED RETICULOCYTE COUNT: CPT | Performed by: PHYSICIAN ASSISTANT

## 2023-03-27 PROCEDURE — 99284 EMERGENCY DEPT VISIT MOD MDM: CPT

## 2023-03-27 PROCEDURE — 99283 EMERGENCY DEPT VISIT LOW MDM: CPT

## 2023-03-27 PROCEDURE — 85730 THROMBOPLASTIN TIME PARTIAL: CPT | Performed by: PHYSICIAN ASSISTANT

## 2023-03-27 PROCEDURE — 36415 COLL VENOUS BLD VENIPUNCTURE: CPT

## 2023-03-27 PROCEDURE — 93005 ELECTROCARDIOGRAM TRACING: CPT | Performed by: PHYSICIAN ASSISTANT

## 2023-03-27 PROCEDURE — 85610 PROTHROMBIN TIME: CPT | Performed by: PHYSICIAN ASSISTANT

## 2023-03-27 PROCEDURE — 85025 COMPLETE CBC W/AUTO DIFF WBC: CPT | Performed by: PHYSICIAN ASSISTANT

## 2023-03-27 PROCEDURE — 71275 CT ANGIOGRAPHY CHEST: CPT

## 2023-03-27 PROCEDURE — 75635 CT ANGIO ABDOMINAL ARTERIES: CPT

## 2023-03-27 PROCEDURE — 93970 EXTREMITY STUDY: CPT

## 2023-03-27 PROCEDURE — 83880 ASSAY OF NATRIURETIC PEPTIDE: CPT | Performed by: PHYSICIAN ASSISTANT

## 2023-03-27 PROCEDURE — 85007 BL SMEAR W/DIFF WBC COUNT: CPT | Performed by: PHYSICIAN ASSISTANT

## 2023-03-27 RX ORDER — SODIUM CHLORIDE 0.9 % (FLUSH) 0.9 %
10 SYRINGE (ML) INJECTION AS NEEDED
Status: DISCONTINUED | OUTPATIENT
Start: 2023-03-27 | End: 2023-03-28 | Stop reason: HOSPADM

## 2023-03-27 RX ADMIN — IOPAMIDOL 125 ML: 755 INJECTION, SOLUTION INTRAVENOUS at 23:17

## 2023-03-28 VITALS
RESPIRATION RATE: 16 BRPM | DIASTOLIC BLOOD PRESSURE: 79 MMHG | OXYGEN SATURATION: 94 % | BODY MASS INDEX: 29.49 KG/M2 | WEIGHT: 206 LBS | TEMPERATURE: 97.8 F | SYSTOLIC BLOOD PRESSURE: 170 MMHG | HEART RATE: 55 BPM | HEIGHT: 70 IN

## 2023-03-28 LAB
AMPHET+METHAMPHET UR QL: NEGATIVE
AMPHETAMINES UR QL: NEGATIVE
BARBITURATES UR QL SCN: NEGATIVE
BENZODIAZ UR QL SCN: NEGATIVE
BUPRENORPHINE SERPL-MCNC: NEGATIVE NG/ML
CANNABINOIDS SERPL QL: POSITIVE
COCAINE UR QL: NEGATIVE
GEN 5 2HR TROPONIN T REFLEX: 24 NG/L
LDH SERPL-CCNC: 211 U/L (ref 135–225)
METHADONE UR QL SCN: NEGATIVE
OPIATES UR QL: POSITIVE
OXYCODONE UR QL SCN: NEGATIVE
PCP UR QL SCN: NEGATIVE
PROPOXYPH UR QL: NEGATIVE
QT INTERVAL: 396 MS
QTC INTERVAL: 418 MS
RETICS # AUTO: 0.05 10*6/MM3 (ref 0.02–0.13)
RETICS/RBC NFR AUTO: 1.83 % (ref 0.7–1.9)
TRICYCLICS UR QL SCN: NEGATIVE
TROPONIN T DELTA: 4 NG/L

## 2023-03-28 PROCEDURE — 80306 DRUG TEST PRSMV INSTRMNT: CPT | Performed by: PHYSICIAN ASSISTANT

## 2023-03-28 PROCEDURE — 84484 ASSAY OF TROPONIN QUANT: CPT | Performed by: PHYSICIAN ASSISTANT

## 2023-03-28 PROCEDURE — 83615 LACTATE (LD) (LDH) ENZYME: CPT | Performed by: PHYSICIAN ASSISTANT

## 2023-03-28 RX ORDER — POTASSIUM CHLORIDE 750 MG/1
40 CAPSULE, EXTENDED RELEASE ORAL ONCE
Status: COMPLETED | OUTPATIENT
Start: 2023-03-28 | End: 2023-03-28

## 2023-03-28 RX ADMIN — POTASSIUM CHLORIDE 40 MEQ: 10 CAPSULE, COATED, EXTENDED RELEASE ORAL at 01:43

## 2023-03-28 NOTE — ED PROVIDER NOTES
Subjective   History of Present Illness    Patient is a 52-year-old male presenting to ED with left lower extremity pain and swelling.  PMH significant for long-term use Eliquis, long-term use MS Contin, Sickle cell, renal cell carcinoma with bone metastases, venogram of left lower extremity with mechanical thrombectomy/balloon angioplasty/stent placement.  Patient states that he has a history of renal cell carcinoma for which she received chemotherapy and radiation for which she has developed lower extremity lymphedema.  Patient reports 17 days ago he was seen in the ED and diagnosed with a blood clot for which she has been taking his Eliquis and had improvement in his symptoms until a few days ago when he had sudden return of swelling and tightness most notable in the left lower extremity.  Patient did state that today he drove to [place] and back for a total of 4 hours in the car for a routine hematology oncology appointment.  Patient states 1 week ago he was cleared to use his lymphedema circulation machine which she has been using however despite this the symptoms are worsening.  Patient notes that today he developed a mid chest pressure with slight shortness of breath with exertion and due to the return of symptoms he presents for further evaluation.  Patient denies use of any tobacco products, hormone replacement.  Patient states that he is not currently receiving any cancer treatment including no radiation, no chemo.    Patient scheduled for vascular follow up on 3/31.    Records reviewed show patient was last seen in the ED on 3/10/2023 for acute DVT left lower extremity, cellulitis left lower extremity, sickle cell crisis pain, stent left iliac artery, chronic pain syndrome.    Patient was recently seen outpatient at the hematology/oncology office earlier today for renal cell carcinoma right kidney, neoplasm related pain, pain of left lower extremity, long-term use opiate analgesics, secondary malignant  neoplasm of bone.    Review of Systems   Constitutional: Negative.  Negative for diaphoresis and fever.   HENT: Negative.    Eyes: Negative.    Respiratory: Positive for shortness of breath. Negative for chest tightness.    Cardiovascular: Positive for chest pain (central pressure) and leg swelling (left).   Gastrointestinal: Negative.  Negative for nausea and vomiting.   Genitourinary: Negative.    Musculoskeletal: Positive for myalgias (LLE). Negative for gait problem (increased pain with ambulatio but no difficulty with gait).   Skin: Negative.  Negative for rash and wound.   Neurological: Negative.  Negative for weakness and numbness.   Psychiatric/Behavioral: Negative.    All other systems reviewed and are negative.      Past Medical History:   Diagnosis Date   • Cancer (HCC) 2016    Kidney cancer with METS to bone   • Gallstones    • Pneumonia    • Sickle cell        No Known Allergies    Past Surgical History:   Procedure Laterality Date   • CHOLECYSTECTOMY     • LEG SURGERY     • NEPHRECTOMY Right    • VENOGRAM PERIPHERAL N/A 3/13/2023    Procedure: VENOGRAM LOWER EXTREMITY, MECHANICAL THROMBECTOMY, BALLOON ANGIOPLASTY, STENT PLACEMENT;  Surgeon: Dipak Moya MD;  Location: [place] [address];  Service: Vascular;  Laterality: N/A;  RE[facility] FROM [facility]       Family History   Problem Relation Age of Onset   • Leukemia Maternal Grandmother    • Kidney failure Mother    • Hypertension Mother    • Heart disease Mother    • Sickle cell trait Father    • Hypertension Father    • Sickle cell trait Daughter    • Sickle cell trait Cousin    • Anemia Cousin    • Leukemia Other        Social History     Socioeconomic History   • Marital status:    Tobacco Use   • Smoking status: Former   Vaping Use   • Vaping Use: Never used   Substance and Sexual Activity   • Alcohol use: Not Currently   • Drug use: Yes     Types: Marijuana           Objective   Physical Exam  Vitals and nursing note reviewed.    Constitutional:       General: He is not in acute distress.     Appearance: Normal appearance. He is well-developed, well-groomed and overweight. He is not toxic-appearing or diaphoretic.   HENT:      Head: Normocephalic.      Mouth/Throat:      Mouth: Mucous membranes are moist.      Pharynx: Oropharynx is clear.   Eyes:      Conjunctiva/sclera: Conjunctivae normal.      Pupils: Pupils are equal, round, and reactive to light.   Cardiovascular:      Rate and Rhythm: Normal rate.      Comments: Left calf tenderness to palpitation   Pulmonary:      Effort: Pulmonary effort is normal.      Breath sounds: Normal breath sounds.   Abdominal:      Palpations: Abdomen is soft.   Musculoskeletal:      Cervical back: Neck supple.      Right lower le+ Edema present.      Left lower leg: 3+ Pitting Edema present.   Skin:     General: Skin is warm and dry.      Findings: No signs of injury, rash or wound.   Neurological:      Mental Status: He is alert and oriented to person, place, and time.      Gait: Gait normal.   Psychiatric:         Mood and Affect: Mood normal.         Speech: Speech normal.         Behavior: Behavior normal. Behavior is cooperative.         Procedures           ED Course  ED Course as of 23 0147   Mon Mar 27, 2023   2253 In person discussion with Liza vascular tech.  Reports inability to compress or visualize any of patient's vessels in the left lower extremity including inability to visualize the reported iliac stent.    CTA with run off added for further evaluation.  [JS]      ED Course User Index  [JS] Darren Ashley PA-C                                           Medical Decision Making  Cardiomegaly: acute illness or injury  Chest pressure: acute illness or injury  Elevated troponin: acute illness or injury  Hepatic cyst: acute illness or injury  Leg edema, left: acute illness or injury  Marijuana use: acute illness or injury  Amount and/or Complexity of Data Reviewed  Independent  Historian: spouse     Details: Wife  External Data Reviewed: labs, radiology and notes.  Labs: ordered. Decision-making details documented in ED Course.  Radiology: ordered. Decision-making details documented in ED Course.  ECG/medicine tests: ordered. Decision-making details documented in ED Course.  Discussion of management or test interpretation with external provider(s): Dr. Brianna Mendoza (attending)    Risk  Prescription drug management.            Patient is a 52-year-old male presenting to ED with left lower extremity pain and swelling.  PMH significant for long-term use Eliquis, long-term use MS Contin, Sickle cell, renal cell carcinoma with bone metastases, venogram of left lower extremity with mechanical thrombectomy/balloon angioplasty/stent placement.  Lab work revealed normal white blood cell count, H&H 8.8/25.5 increased from patient's baseline over the past 2 weeks. 1+ relative plasma cells with no other acute abnormalities.  CMP with hypokalemia 3.2 and otherwise no electrolyte disturbances.  Normal renal and hepatic function.  PT/INR WNL.  BNP WNL at 163.1.  Initial troponin elevated at 20 with repeat having a positive delta of 4 and increasing to 24.  EKG with no acute abnormality.  LDH WNL at 211.  Reticulocytes 1.83 with absolute reticulocyte count 0.0501.  UDS positive for THC and opiates but otherwise unremarkable with opiates c/w medication history. Right lower extremity venous Doppler showed no thrombus visualized.  Left lower extremity venous Doppler showed noncompression of CFV, SFJ, proximal FV, prof, POP V, no flow in CFV or pop V, unable to visualize mid/distal FV, PTV's, or peroneal veins due to swelling, patient has lymphedema and recently had stent in L IV.  Chest CT a with IV contrast showed: No evidence of acute pulmonary embolus in the main/lobar/segmental arteries, possible mild pulmonary vascular congestion, no focal areas of consolidation.  Chest CTA without contrast showed: No  focal infiltrate, pleural effusion, pneumothorax, aneurysm, dissection of the thoracic aorta.  Cardiomegaly.  No pericardial effusion.  CTA of the abdomen and pelvis showed: Hepatic cyst, cholecystectomy, right nephrectomy, nonaneurysmal aorta with no aortic dissection, left iliac vein stent.  CTA of the right lower extremity showed: No stenosis or occlusion of RLE arteries, three-vessel runoff below the knee with poor visualization of the distal peroneal artery to correlate clinically with Doppler ultrasound.  CTA of the left lower extremity showed: Diffuse soft tissue swelling of the left lower extremity consisting of circumferential edema throughout the thigh/calf/ankle, no stenosis or occlusion in the left lower extremity arteries, three-vessel runoff below the knee.    Discussed with patient need for admission for further evaluation and treatment as well as work-up of his elevated troponins with a stress test.  Shared decision-making reviewing risk, benefits, and alternatives.  Patient states that he is scheduled to see the vascular surgeon on 3/31 and is scheduled for a stress Pankaj at [place] on 4/13.  After reviewing risk, benefits, and alternatives which patient is able to verbalize he states that he would prefer to continue his work-up on an outpatient basis.  Discussed with patient need for follow-up with his PCP as well within the next 48 hours.  Advised strict return precautions and need for immediate return to the ED should he develop any new or worsening symptoms.  Throughout evaluation patient has pulses noted distally in the left lower extremity as well as a warm extremity.  Patient declined any medications for pain or discomfort.  Patient is able to ambulate at his baseline without difficulty. Stable vital signs Patient and wife with no further questions, concerns, or needs at this time and patient is stable for discharge. Case discussed with Dr. Brianna Mendoza, attending, who is in agreement with  treatment plan.      Final diagnoses:   Leg edema, left   Elevated troponin   Cardiomegaly   Hepatic cyst   Chest pressure   Marijuana use       ED Disposition  ED Disposition     ED Disposition   Discharge    Condition   Stable    Comment   --             Meredith Miller APRN  [address] [facility] [address]  [phone]    Schedule an appointment as soon as possible for a visit in 2 days      Dipak Moya MD  [address]  [facility] [id number] [phone]      Follow-up as previously scheduled on Friday, 3/31/2023    [place] - Stress Test      Follow up as previously scheduled    [facility] Emergency Department  [address] [facility] [address] [phone]    As needed         Medication List      Changed    * Morphine 30 MG tablet  Commonly known as: MSIR  What changed: Another medication with the same name was changed. Make sure you understand how and when to take each.     * Morphine 15 MG 12 hr tablet  Commonly known as: MS CONTIN  Take 1 tablet by mouth 3 (Three) Times a Day. scheduled  What changed:   · how much to take  · when to take this         * This list has 2 medication(s) that are the same as other medications prescribed for you. Read the directions carefully, and ask your doctor or other care provider to review them with you.                 Darren Ashley PA-C  03/28/23 0147

## 2023-03-28 NOTE — DISCHARGE INSTRUCTIONS
Please continue to use your lymphedema compressive machine as advised.  Please rest, elevate your leg is much as possible.  Please use compressive stockings.  Please follow-up with your vascular surgeon as previously scheduled on 3/31 as well as for your stress test at [facility] or see information below to call and schedule one locally.  Please follow-up with your primary care provider within the next 48 hours for close reevaluation.  Should you develop any new or worsening symptoms please return to the ER for further evaluation.

## 2023-03-29 ENCOUNTER — READMISSION MANAGEMENT (OUTPATIENT)
Dept: CALL CENTER | Facility: HOSPITAL | Age: 53
End: 2023-03-29
Payer: MEDICARE

## 2023-03-29 NOTE — OUTREACH NOTE
General Surgery Week 2 Survey    Flowsheet Row Responses   [facility] facility patient discharged from? [place]   Does the patient have one of the following disease processes/diagnoses(primary or secondary)? General Surgery   Week 2 attempt successful? No   Unsuccessful attempts Attempt 1          Isabel Fair Licensed Nurse

## 2023-03-30 ENCOUNTER — TELEPHONE (OUTPATIENT)
Dept: VASCULAR SURGERY | Facility: CLINIC | Age: 53
End: 2023-03-30
Payer: MEDICARE

## 2023-03-31 ENCOUNTER — OFFICE VISIT (OUTPATIENT)
Dept: VASCULAR SURGERY | Facility: CLINIC | Age: 53
End: 2023-03-31
Payer: MEDICARE

## 2023-03-31 ENCOUNTER — READMISSION MANAGEMENT (OUTPATIENT)
Dept: CALL CENTER | Facility: HOSPITAL | Age: 53
End: 2023-03-31
Payer: MEDICARE

## 2023-03-31 VITALS
BODY MASS INDEX: 29.35 KG/M2 | OXYGEN SATURATION: 94 % | HEIGHT: 70 IN | DIASTOLIC BLOOD PRESSURE: 80 MMHG | WEIGHT: 205 LBS | HEART RATE: 72 BPM | SYSTOLIC BLOOD PRESSURE: 144 MMHG

## 2023-03-31 DIAGNOSIS — I10 ESSENTIAL HYPERTENSION: ICD-10-CM

## 2023-03-31 DIAGNOSIS — I87.1 ILIAC VEIN STENOSIS, LEFT: ICD-10-CM

## 2023-03-31 DIAGNOSIS — M79.89 LEFT LEG SWELLING: Primary | ICD-10-CM

## 2023-03-31 NOTE — OUTREACH NOTE
General Surgery Week 2 Survey    Flowsheet Row Responses   [facility] patient discharged from? [place]   Does the patient have one of the following disease processes/diagnoses(primary or secondary)? General Surgery   Week 2 attempt successful? Yes   Call start time 1416   Call end time 1417   Discharge diagnosis Acute deep vein thrombosis of femoral vein of left lower extremity    Meds reviewed with patient/caregiver? Yes   Is the patient taking all medications as directed (includes completed medication regime)? Yes   Does the patient have a follow up appointment scheduled with their surgeon? Yes  [3/31/23]   Has the patient kept scheduled appointments due by today? Yes   Comments CT scan is on 4/5/23   What is the patient's perception of their health status since discharge? Improving   Is the patient/caregiver able to teach back steps to recovery at home? Rest and rebuild strength, gradually increase activity   Week 2 call completed? Yes   Is the patient interested in additional calls from an ambulatory ?  NOTE:  applies to high risk patients requiring additional follow-up. No          Sagrario TIRADO - Registered Nurse

## 2023-03-31 NOTE — PROGRESS NOTES
03/31/2023      Meredith Miller APRN  [address] [facility] [place] [address]        Holland Phelps  1970    Chief Complaint   Patient presents with   • Post-op     2 week hospital follow up/post op. Left venogram done 3/13/23. Patient states that he had been having pain and swelling since surgery, but is now fine. Went to ED 3/27/23.        Dear Meredith Miller APRN:    HPI     I had the pleasure of seeing your patient in the office today for follow up.  As you recall, the patient is a 52 y.o. male who we are currently following for recent presentation for left lower extremity DVT with iliac vein stenosis.  He has a history of renal cell carcinoma and previously had personal left femur excision with hardware placement and radiation to the left femur as well as the left pelvis.  He developed chronic lymphedema due to this but had been having increasing left lower extremity swelling and was found to have DVT in the left lower extremity extending from the iliac veins down into the tibial veins.  There is some evidence of likely chronic scarring of the external iliac vein and so he was taken to the operating room for mechanical thrombectomy with venogram and ultimately stenting of the distal common iliac and external iliac veins.  He had tolerated that procedure well with improvement in his edema and was discharged home on Eliquis.  He had continued this postoperatively but then had been on a car trip to [place] a few days after being discharged from the hospital and subsequent to that and developed some increased swelling in the left leg.  He had been seen here in the ER on 3/27 and venous duplex had shown some thrombus in the left common femoral vein and popliteal vein with some scarring noted.  CTA has been ordered which showed patency of the arterial system but was not time to evaluate the veins.  It did show his previously placed venous stent but again there was no real filling of the venous system.  He  "notes since being in the ER the swelling has somewhat improved and he is wearing a compression sleeve his previous today.  He overall feels that his leg swelling is better from when he initially presented prior to the venogram.  He otherwise is without any significant acute complaints today.  He is alone for exam.    Review of Systems   Constitutional: Negative.  Negative for activity change, appetite change, chills, diaphoresis, fatigue and fever.   HENT: Negative.  Negative for congestion, sneezing, sore throat and trouble swallowing.    Eyes: Negative.  Negative for visual disturbance.   Respiratory: Negative.  Negative for chest tightness and shortness of breath.    Cardiovascular: Positive for leg swelling. Negative for chest pain and palpitations.   Gastrointestinal: Negative.  Negative for abdominal distention, abdominal pain, nausea and vomiting.   Endocrine: Negative.    Genitourinary: Negative.    Musculoskeletal: Negative.    Skin: Negative.    Allergic/Immunologic: Negative.    Neurological: Negative.    Hematological: Negative.    Psychiatric/Behavioral: Negative.        /80   Pulse 72   Ht 177.8 cm (70\")   Wt 93 kg (205 lb)   SpO2 94%   BMI 29.41 kg/m²   Physical Exam  Vitals reviewed.   Constitutional:       Appearance: Normal appearance.   HENT:      Head: Normocephalic and atraumatic.      Nose: Nose normal.      Mouth/Throat:      Mouth: Mucous membranes are moist.   Eyes:      Extraocular Movements: Extraocular movements intact.      Pupils: Pupils are equal, round, and reactive to light.   Cardiovascular:      Rate and Rhythm: Normal rate and regular rhythm.      Pulses: Normal pulses.           Carotid pulses are 2+ on the right side and 2+ on the left side.       Radial pulses are 2+ on the right side and 2+ on the left side.        Femoral pulses are 2+ on the right side and 2+ on the left side.       Popliteal pulses are 2+ on the right side and 2+ on the left side.        Dorsalis " pedis pulses are 2+ on the right side and 2+ on the left side.        Posterior tibial pulses are 2+ on the right side and 2+ on the left side.      Comments: Palpable pulses throughout the bilateral lower extremities.  He does have continued edema of the left lower extremity from the groin to the foot.  This is persistent but is improved from his previous presentation at recent hospitalization.  Pulmonary:      Effort: Pulmonary effort is normal. No respiratory distress.   Abdominal:      General: There is no distension.      Palpations: Abdomen is soft. There is no mass.      Tenderness: There is no abdominal tenderness.   Musculoskeletal:         General: Normal range of motion.      Cervical back: Normal range of motion and neck supple.      Right lower leg: No edema.      Left lower leg: Edema present.   Skin:     General: Skin is warm and dry.      Capillary Refill: Capillary refill takes less than 2 seconds.   Neurological:      General: No focal deficit present.      Mental Status: He is alert and oriented to person, place, and time.   Psychiatric:         Mood and Affect: Mood normal.         Behavior: Behavior normal.         Thought Content: Thought content normal.         Judgment: Judgment normal.         DIAGNOSTIC DATA:    XR Knee 3 View Left    Result Date: 3/10/2023  Narrative: EXAMINATION: XR KNEE 3 VW LEFT-  3/10/2023 9:33 PM CST  HISTORY: knee pain  Left knee 3 view exam.  No comparison.  Long distal femur prosthesis.  Diffuse soft tissue edema about the lower thigh and knee.  No occult fracture is seen.  The extent visualized the prosthetic components are intact.  Summary: 1. Diffuse soft tissue edema. 2. No acute bony abnormality is seen. This report was finalized on 03/10/2023 21:38 by Dr. Peña Moscoso MD.    CT Angiogram Abdomen Pelvis    Result Date: 3/11/2023  Narrative: EXAMINATION: CT angiogram of the abdomen and pelvis with and without contrast. 03/11/2023  HISTORY: DVT.  DOSE: 1924  mGycm. All CT scans are performed using dose optimization techniques as appropriate to the performed exam and including at least one of the following: Automated exposure control, adjustment of the mA and/or kV according to size, and the use of the iterative reconstruction technique.  FINDINGS: Multiple contiguous axial images are obtained through the abdomen and pelvis both with and without contrast enhancement including delayed imaging. MIPS are also obtained.  There is moderate cardiomegaly with a small pericardial effusion. Bibasilar atelectasis is present. No pleural effusion is present. There is contrast within the left renal collecting system related to earlier intravenous contrast infusion. There is no evidence of left-sided obstructive uropathy.  There is a benign hepatic cyst within the lateral segment of the left lobe of the liver. Liver is otherwise homogeneous in density. There is normal enhancement of the hepatic vasculature.  The gallbladder is surgically absent. Moderate extrahepatic biliary dilatation is likely related to reservoir effect. There is no evidence of choledocholithiasis.  The pancreas is normal in appearance with no mass or ductal dilatation. No evidence of acute pancreatitis.  The spleen is surgically absent.  The right kidney has been resected. Both adrenal glands are unremarkable. There is retained contrast within the left renal collecting system without evidence of hydronephrosis or obstructive uropathy. There is homogeneous enhancement of the left kidney. No perinephric fluid collections are present.  The abdominal aorta and IVC are normal in caliber and demonstrate normal enhancement. There is a normal bowel gas pattern with no obstruction or free air. There is diastases of the abdominal musculature at the umbilicus. The patient has undergone previous femur fixation bilaterally. No acute or suspicious bony abnormalities are present.  The abdominal aorta and iliac arteries are  normal in caliber with no dissection or aneurysm. No focal plaquing or rate limiting stenosis is present. The left renal artery is widely patent. The mesenteric vessels are widely patent.  There is no free fluid within the pelvis. The urinary bladder is normal in appearance.  There is diffuse edema within the left lower extremity extending into the left hemipelvis. Early delayed imaging demonstrates the IVC as well as a bilateral common iliac veins are widely patent demonstrating normal enhancement. There is asymmetry of the left external iliac vein in comparison with the left with no focal enhancement demonstrated. This suggest thrombosis within the left external iliac vein. There is no enlargement of the external iliac vein suggesting this may represent a more chronic process. There is also asymmetry of enhancement of the left common femoral vein at the level of the groin. Correlation is recommended with the patient's duplex exam. There is extensive stranding noted within the left hemipelvis and proximal left thigh-likely related to venous obstruction. I do not see associated discrete mass or mass effect on the left pelvic sidewall venous structures.      Impression: 1.. Diffuse edema of the left lower extremity and left hemipelvis. This is likely related to obstruction of venous outflow given the history. There is asymmetry of enhancement within the left external iliac vein but without evidence of enlargement which would be more typical for an acute deep venous thrombosis. This may represent a more chronic finding and should be correlated with the patient's clinical presentation and history. I do feel that there is enhancement and a rather normal appearance of the left common iliac vein. IVC is widely patent. 2. Normal appearance of the abdominal aorta and branch vessels including the pelvic arterial tree. No evidence of stenosis or plaquing. No evidence of dissection. 3. The patient has undergone previous right  nephrectomy. The left kidney is hypertrophied but otherwise normal in appearance. No evidence of left-sided obstructive uropathy. 4. Diastases of the abdominal musculature at the umbilicus with a small ventral wall hernia containing fat. No herniated bowel loop or obstruction. 5. Benign hepatic cyst left lobe of liver. 6. Cardiomegaly with a small pericardial effusion. Bibasilar atelectasis or scarring is present. This report was finalized on 03/11/2023 08:06 by Dr. Caesar Guillen MD.    IR Venogram Extremity, FL C Arm During Surgery    Result Date: 3/13/2023  Narrative: Performed by Dr. Moya. Please see procedure note. This report was finalized on 03/13/2023 15:42 by Dr. Dipak Moya MD.    CT Angio Abdominal Aorta Bilateral Iliofem Runoff    Result Date: 3/28/2023  Narrative: CT ANGIO ABDOMINAL AORTA BILAT ILIOFEM RUNOFF- 3/27/2023 11:00 PM CDT  HISTORY: increased LE edema, calf pain; history of renal cell carcinoma  COMPARISON: 03/10/2023  DOSE LENGTH PRODUCT: 561 mGy cm. Automated exposure control was also utilized to decrease patient radiation dose.  TECHNIQUE: Axial images of the abdomen and pelvis are performed following IV contrast with images obtained through the bilateral lower extremities following contrast during early arterial phase of enhancement. With early arterial imaging, there is suboptimal evaluation of the venous structures for venous thrombosis.  FINDINGS:  The abdominal aorta is normal in caliber with no aneurysm or dissection. The celiac trunk, superior mesenteric, and left renal arteries are patent. Evidence of a right nephrectomy. Inferior mesenteric artery is patent. The bilateral common, internal, and external iliac arteries appear patent. The bilateral common femoral, deep femoral, superficial femoral arteries are widely patent with no focal stenosis. Streak artifact within the left popliteal fossa from the left knee prosthesis. Visible popliteal arteries appear patent. Normal  three-vessel runoff of the distal bilateral lower extremities.  There is diffuse soft tissue edema of the left lower extremity there is a vascular stent within the left common and external iliac vein. Assessment for venous patency is suboptimal arterial phase imaging.  There is cardiomegaly. Pericardial effusion. Please refer to CT chest report.  Hepatic and portal veins are not opacified during image acquisition. There is a stable 1.3 cm low-density left hepatic lesion favoring cyst. The spleen is absent. No pancreatic cyst or mass. No discrete enhancing left renal mass. Minimal prominence of the left renal collecting system with a moderately distended bladder. Diffuse bladder wall thickening considered which may be artifactual due to incomplete distention. Muscle wall hypertrophy or cystitis is considered. The prostate measures 4.2 cm in width.  There is no free intraperitoneal air loculated abscess. There is moderate fecal stasis. There is no small bowel dilatation. No pathologic lymphadenopathy.  There is a heterogeneous appearance to the bony marrow of the pelvis or proximal femurs representing a change from the 04/26/2022 exam. There is a persistent 2.9 cm expansile lesion of the left sacrum with sclerotic margins, however there is disruption of the posterior left sacral cortex. Hardware present within the bilateral femurs with a left knee prosthesis.      Impression: 1. No abdominal aortic aneurysm or dissection. Widely patent abdominal aorta and iliac arteries as described above. Patent bilateral lower extremity arteries with appropriate three-vessel runoff. No focal high-grade stenosis. Limited assessment of the venous structures during arterial phase of imaging. Left common iliac and external venous stent. 2. Right nephrectomy. 3. Stable low-density left hepatic lesion. 4. Nonspecific heterogeneous appearance to the bony marrow of the pelvis and proximal femurs. Persistent 2.9 cm expansile lesion sacrum  with sclerotic margins with disruption of the posterior left sacral cortex. Patient treated for metastatic renal cell carcinoma [facility]. Outside report does indicate metastasis to the sacrum and femurs with external beam radiotherapy.  Comments: A preliminary report is issued to the ER by the [facility] radiology service. This report was finalized on 03/28/2023 07:22 by Dr. Latricia Houser MD.    US Venous Doppler Lower Extremity Left (duplex)    Result Date: 3/15/2023  Narrative: History: Pain and swelling      Impression: Impression: There is evidence of deep venous thrombosis of the left lower extremity.  Comments: Left lower extremity venous duplex exam was performed using color Doppler flow, Doppler wave form analysis, and grayscale imaging, with and without compression. There is no evidence of deep venous thrombosis of the common femoral, superficial femoral, popliteal, posterior tibial, and peroneal veins. There is no thrombus identified in the saphenofemoral junction or the greater saphenous vein.  This report was finalized on 03/15/2023 16:54 by Dr. Lei Jones MD.    US Venous Doppler Lower Extremity Bilateral (duplex)    Result Date: 3/28/2023  Narrative: History: Swelling      Impression: Impression: There is evidence of deep venous thrombosis in the left lower extremity.  Comments: Bilateral lower extremity venous duplex exam was performed using color Doppler flow, Doppler waveform analysis, and grayscale imaging, with and without compression. There is evidence of deep venous thrombosis in the common femoral, proximal superficial femoral, and popliteal veins in the left lower extremity. The tibial veins were not visualized due to swelling. There is no evidence of deep venous thrombosis of the right lower extremity.   This report was finalized on 03/28/2023 15:34 by Dr. Lei Jones MD.    CT Angiogram Chest    Addendum Date: 3/28/2023 Addendum:   Addendum: 1. Please change it  technique as follows: The 3-D maximum intensity projection images reconstruction was performed in addition to 2-D coronal and sagittal plane imaging sequence. This report was finalized on 03/28/2023 07:40 by Dr. Eneida Bustamante MD.    Result Date: 3/28/2023  Narrative: EXAMINATION: CT ANGIOGRAM CHEST-   3/27/2023 11:00 PM CDT  HISTORY: chest pressure, hx recent dvt, long travel, sob, with exertion  In order to have a CT radiation dose as low as reasonably achievable Automated Exposure Control was utilized for adjustment of the mA and/or KV according to patient size.  DLP in mGycm= 1173  The CT angiography of the chest is performed after intravenous contrast enhancement.  Images are acquired in axial plane with subsequent reconstruction in coronal and sagittal plane.  The Comparison is made with the previous study dated 03/10/2023.  There is normal opacification of the pulmonary arteries and branches bilaterally. There are no filling defects in the visualized/opacified pulmonary arterial bed.  RV/LV ratio is 32/46 which is normal. No finding to suggest right heart strain.  Normal thoracic aorta is seen. Normal origin course and caliber of the right brachiocephalic, left common carotid and left subclavian arteries.  Limited visualized coronary arteries appear unremarkable.  There is moderate cardiomegaly. A pericardial effusion similar to the previous study.  There is no evidence of mediastinal or hilar mass or lymphadenopathy.  Limited visualized soft tissues of the neck are unremarkable. No mass or lymphadenopathy.  The lungs are poorly expanded.  There is evidence of pulmonary vascular congestion.  Atelectatic changes are seen in the lungs bilaterally.  Emphysematous changes and or pneumatoceles are seen more pronounced in the right middle and lower lobes.  A well-defined nodule is seen in the right middle lobe, image #81 and series 5, measuring 4 mm in diameter. No change. A pleural-based or subpleural nodule is  seen in the right middle lobe laterally, image #92 and series 5, measuring 3 mm. No change. No additional nodules.  Tracheobronchial structures are patent. No endobronchial abnormality or mucous plugging.  Limited visualized abdomen is unremarkable. The kidneys are incompletely visualized and not evaluated.  Images reviewed in bone window show no acute bony abnormality.      Impression: 1. No evidence of pulmonary embolism. No aortic aneurysm or dissection. 2. Pulmonary vascular congestion or pulmonary edema. 3. Moderate cardiomegaly. Pericardial effusion. 4. Small noncalcified nodules in the right middle lobe are similar to the previous study. No change.  The above study was initially reviewed and reported by [facility]. I do not find any discrepancies.            This report was finalized on 03/28/2023 06:18 by Dr. Eneida Bustamante MD.    CT Angiogram Chest    Result Date: 3/11/2023  Narrative: Exam: CT angiogram of the of the chest with intravenous contrast.  CLINICAL HISTORY:  DVT of left lower extremity.  DOSE:  210 mGycm. All CT scans are performed using dose optimization techniques as appropriate to the performed exam and including at least one of the following: Automated exposure control, adjustment of the mA and/or kV according to size, and the use of the iterative reconstruction technique.  TECHNIQUE: Contiguous axial images were obtained through the thorax following intravenous contrast administration with reformatted images obtained in the sagittal and coronal projections from the original data set. Three-dimensional reconstructions are also obtained.  COMPARISON:  04/20/2022  FINDINGS:  Pulmonary arteries:  There is normal enhancement of the pulmonary arteries with no evidence of pulmonary thromboembolic disease.  Aorta:  The thoracic aorta and proximal great vessels are normal in appearance. There is no evidence of aortic dissection or aneurysm.  LUNGS:  Bibasilar atelectasis is present. There is a 4 mm  nodule in the right middle lobe. This has a broad-based interface with the minor fissure and likely represents an intrafissural node. This is stable from the previous exam. An additional 3 mm right middle lobe subpleural nodule on image 96 of the axial sequence is also stable.  Pleural spaces: Unremarkable. No evidence of pleural effusion or pneumothorax.  HEART: There is moderate cardiomegaly. A small pericardial effusion is present. No evidence of right ventricular dysfunction. No coronary calcifications are present.  Bones: No acute osseous abnormalities are demonstrated.  CHEST WALL: No chest wall abnormalities are demonstrated.  Lymph nodes: No enlarged mediastinal or axillary lymphadenopathy is present.  Upper abdomen: The patient has undergone previous right nephrectomy. There is a hepatic cyst or hemangioma within the lateral segment of the left lobe of liver.      Impression: 1. No evidence of pulmonary thromboembolic disease. The thoracic aorta and great vessels are normal in caliber with no dissection or aneurysm. 2. Cardiomegaly with a small pericardial effusion. No right ventricular dysfunction or coronary calcification. 3. Bibasilar atelectasis. Stable right middle lobe nodules from previous exam of 04/20/2022. No evidence of acute consolidative pneumonia or pleural effusion. 4. The patient's undergone previous right nephrectomy. There is a benign hepatic cyst or hemangioma within the lateral segment of the left lobe of the liver. This report was finalized on 03/11/2023 07:44 by Dr. Caesar Guillen MD.      Patient Active Problem List   Diagnosis   • Renal cell carcinoma of right kidney metastatic to other site (HCC)   • Iron overload   • Sickle cell beta thalassemia (HCC)   • Sickle cell crisis (HCC)   • Anemia of chronic disease   • Acute pain   • Hyperbilirubinemia   • Hypoxia   • Single kidney, left   • Cellulitis of left lower extremity   • Stage 3a chronic kidney disease (HCC)   • Sickle cell  anemia (HCC)   • Sepsis (HCC)   • Acute deep vein thrombosis (DVT) of femoral vein of left lower extremity (HCC)   • Stenosis of left iliac vein   • Chronic pain syndrome   • Primary hypertension   • Hypokalemia   • Postoperative anemia due to acute blood loss         ICD-10-CM ICD-9-CM   1. Left leg swelling  M79.89 729.81   2. Iliac vein stenosis, left  I87.1 459.2   3. Essential hypertension  I10 401.9       Lab Frequency Next Occurrence   Adult Stress Echo W/ Cont or Stress Agent if Necessary Per Protocol Once 04/02/2023   CT abdomen pelvis w contrast Once 04/05/2023       PLAN: After thoroughly evaluating Holland Phelps, I believe the best course of action is to obtain further imaging.  He had presented recently with extensive left lower extremity DVT with extension up into the iliac system with findings of likely iliac vein scarring due to previous radiation therapy on the left.  He had mechanical thrombectomy with venoplasty and stenting which improved his lower extremity edema.  He also has a component of lymphedema due to the previous radiation as well as partial replacement of his femur with hardware due to metastasis.  However he had had some increased swelling recently and was seen in the ER and venous duplex had shown some recurrent thrombus in the common femoral and popliteal veins.  It is unclear if this is some residual thrombus left from the procedure versus new thrombus although he is on Eliquis.  CTA had been done of the abdominal aorta with runoff but it was time for arterial phase and there was no good filling of the venous system to evaluate his stent.  As such I ordered a CT of the abdomen/pelvis with venous phase contrast to further evaluate the iliac system and his iliac vein stent to ensure its patency.  We will get that done in the next few days and see him back here in the office next week.  Otherwise he should continue his Eliquis as well as compression and leg elevation to that left  lower extremity as previous.  The patient is to continue taking their medications as previously discussed.   I did discuss vascular risk factors as they pertain to the progression of vascular disease including controlling hypertension.  Blood pressure is somewhat elevated today at 144 systolic.  I will have him follow with his primary care physician for further evaluation of his antihypertensive regimen BMI is >= 25 and <30. (Overweight) The following options were offered after discussion;: exercise counseling/recommendations and nutrition counseling/recommendations.   This was all discussed in full with complete understanding.  Thank you for allowing me to participate in the care of your patient.  Please do not hesitate to call with any questions or concerns.  We will keep you aware of any further encounters with Holland Phelps.      Sincerely Yours,      Dipak Moya MD

## 2023-04-05 ENCOUNTER — HOSPITAL ENCOUNTER (OUTPATIENT)
Dept: CT IMAGING | Facility: HOSPITAL | Age: 53
Discharge: HOME | End: 2023-04-05
Admitting: SURGERY
Payer: MEDICARE

## 2023-04-05 ENCOUNTER — OFFICE VISIT (OUTPATIENT)
Dept: VASCULAR SURGERY | Facility: CLINIC | Age: 53
End: 2023-04-05
Payer: MEDICARE

## 2023-04-05 VITALS
HEIGHT: 70 IN | DIASTOLIC BLOOD PRESSURE: 80 MMHG | SYSTOLIC BLOOD PRESSURE: 142 MMHG | OXYGEN SATURATION: 95 % | BODY MASS INDEX: 29.63 KG/M2 | HEART RATE: 57 BPM | WEIGHT: 207 LBS

## 2023-04-05 DIAGNOSIS — I87.1 ILIAC VEIN STENOSIS, LEFT: ICD-10-CM

## 2023-04-05 DIAGNOSIS — I10 ESSENTIAL HYPERTENSION: ICD-10-CM

## 2023-04-05 DIAGNOSIS — I87.1 ILIAC VEIN STENOSIS, LEFT: Primary | ICD-10-CM

## 2023-04-05 DIAGNOSIS — M79.89 LEFT LEG SWELLING: ICD-10-CM

## 2023-04-05 PROCEDURE — 25510000001 IOPAMIDOL 61 % SOLUTION: Performed by: SURGERY

## 2023-04-05 PROCEDURE — 74177 CT ABD & PELVIS W/CONTRAST: CPT

## 2023-04-05 RX ADMIN — IOPAMIDOL 100 ML: 612 INJECTION, SOLUTION INTRAVENOUS at 09:48

## 2023-04-12 ENCOUNTER — TREATMENT (OUTPATIENT)
Dept: PHYSICAL THERAPY | Facility: CLINIC | Age: 53
End: 2023-04-12
Payer: MEDICARE

## 2023-04-12 DIAGNOSIS — I89.0 LYMPHEDEMA OF GENITALIA: ICD-10-CM

## 2023-04-12 DIAGNOSIS — I89.0 LYMPHEDEMA OF LEFT LEG: Primary | ICD-10-CM

## 2023-04-12 DIAGNOSIS — C64.1 RENAL CELL CARCINOMA OF RIGHT KIDNEY: ICD-10-CM

## 2023-04-12 PROCEDURE — 97140 MANUAL THERAPY 1/> REGIONS: CPT | Performed by: PHYSICAL THERAPIST

## 2023-04-12 NOTE — PROGRESS NOTES
Physical Therapy Treatment Note, 30 Day Progress Note and 90 Day Recertification Note    [address] [place] [address]    Patient: Holland Phelps                                                 Visit Date: 2023  :     1970    Referring practitioner:    GILBERTO Ornelas  Date of Initial Visit:          Type: THERAPY  Noted: 10/12/2022    Patient seen for 19 sessions    Visit Diagnoses:    ICD-10-CM ICD-9-CM   1. Lymphedema of left leg  I89.0 457.1   2. Lymphedema of genitalia  I89.0 457.1   3. Renal cell carcinoma of right kidney  C64.1 189.0     SUBJECTIVE     Subjective  Patient states since he had the blood clots and then surgery for the clots the swelling is different.  He has swelling again and it's more tight.  He elevates the leg but he thinks he had it too high because he was getting a lump of hard fluid in the buttock region.        PAIN: 4/10 L LE         OBJECTIVE     Objective    Lymphedema     Row Name 23 0800             Subjective Pain    Able to rate subjective pain? yes  -AL      Pre-Treatment Pain Level 0  -AL      Subjective Pain Comment L LE uncomfortable tightness, tingle and odd sensation L shin  -AL         Lymphedema Measurements    Measurement Type(s) Circumferential  -AL      Circumferential Areas Lower extremities  -AL         BLE Circumferential (cm)    Measurement Location 1 BOT  -AL      Left 1 23.2 cm  -AL      Measurement Location 2 +7  -AL      Left 2 26 cm  -AL      Measurement Location 3 +7  -AL      Left 3 28.9 cm  -AL      Measurement Location 4 +10  -AL      Left 4 35 cm  -AL      Measurement Location 5 +10  -AL      Left 5 44 cm  -AL      Measurement Location 6 +10  -AL      Left 6 47.9 cm  -AL      Measurement Location 7 +10  -AL      Left 7 47 cm  -AL      Measurement Location 8 +10  -AL      Left 8 53.7 cm  -AL      Measurement Location 9 +10  -AL      Left 9 60 cm  -AL       Measurement Location 10 +10  -AL      Left 10 68 cm  -AL      LLE Circumferential Total 433.7 cm  -AL         Manual Lymphatic Drainage    Manual Lymphatic Drainage initial sequence;opened regional lymph nodes;opened anastamoses;extremity treatment  -AL      Initial Sequence short neck;abdomen;diaphragmatic breathing  -AL      Abdomen deep  -AL      Diaphragmatic Breathing x 9 with deep abdominals  -AL      Opened Regional Lymph Nodes axillary;inguinal  -AL      Axillary left  -AL      Inguinal left  -AL      Opened Anastamoses inguino-axillary  -AL      Extremity Treatment MLD to full limb  -AL      MLD to Full Limb L LE  -AL      Extremity Treatment Focus On L upper leg and abdomen  -AL      Manual Lymphatic Drainage Comments IASTM to L anterior/posterior thigh and L calf  -AL      Manual Therapy 57  -AL         Compression/Skin Care    Compression/Skin Care compression garment  -AL      Compression/Skin Care Comments He donned his compression garment  -AL            User Key  (r) = Recorded By, (t) = Taken By, (c) = Cosigned By    Initials Name Provider Type    AL Elida Sequeira, PTA, CLT-LANNY Physical Therapist Assistant                 Therapeutic Exercises    87128 Units Comments   L hamstring stretch     Small foam roll under L ischial tuberosity     Timed Minutes 3        Therapy Education/Self Care 20919   Education offered today Work on CDT and use the Flexitouch pump. Don't elevate the leg very high.    [employer] Code    Ongoing HEP   Wear compression garment and use the Flexitouch pump   Timed Minutes        Total Timed Treatment:      60  mins  Total Time of Visit:             75  mins         ASSESSMENT/PLAN     GOALS  Goals                                          Progress Note due by 5/12/23                                                      Recert due by 7/11/23   LTG by: 12 weeks Comments Date Status   Patient will have a reduction in LLE of at least 20 cm to allow more comfortable walking and  mobility.  His leg remains up in size. 4/12 Ongoing   Patient will be independent with specific HEP for lymphedema He has been working on the HEP, he is trying to move around more. 4/12 Ongoing   He will have no s/s infection.  No signs or symptoms of infection 11/16 Met   He will have no complaint of increased edema in the groin. More dense tissue L groin 4/12 Ongoing   Patient will be independent with all tools available to manage his LLE lymphedema. He has been cleared by the Dr. To resume CDT. 4/12 Ongoing          Assessment/Plan     ASSESSMENT:  Patient still has swelling L LE, but now the tissue is more dense.  We were not able to see patient for several weeks as he was hospitalized and had surgery for blood clots.  He has just been released by his vascular Dr. To resume treatment with us. The overall size of the L LE has not changed significantly since his last visit, but his has had an increase in size locally in the L proximal thigh.  Today I spent time using IASTM and MLD to help soften the dense tissue.  With the damage to the vascular system from the blood clots, his swelling is thicker making the tissue more dense.     PLAN: Continue to work on CDT, will see patient 1-2  x per week x 8 weeks.       SIGNATURE: Elida Sequeira PTA, [place], [place] License #: [id number]  Electronically Signed on 4/12/2023        [address] [place] [address] [place]  [place] [address]  [phone]

## 2023-04-14 NOTE — PROGRESS NOTES
04/05/2023      Meredith Miller APRN  [address] [facility] [place] [address]        Holland Phelps  1970    Chief Complaint   Patient presents with   • Follow-up     1 week follow up w/ testing. Last seen 3/31/23. Patient states that things are about the same.        Dear Meredith Miller APRN:    HPI     I had the pleasure of seeing your patient in the office today for follow up.  As you recall, the patient is a 52 y.o. male who we are currently following for recent presentation for left lower extremity DVT in the setting of previous renal cell carcinoma with metastasis to the left femur status post prior partial femur resection with hardware reconstruction as well as radiation to the left pelvis along with lymph node dissection.  He has chronic scarring to the veins in that left pelvic region and left lower extremity.  He had undergone venogram with thrombectomy and stenting of the left common and external iliac vein.  He had had some improvement in his edema but continued to have persistent edema but also has known lymphedema.  CT of the abdomen/pelvis with contrast was performed which does appear to show patency of the common iliac vein as well as a previously placed stent.  In the external iliac vein there is again chronic scarring with residual thrombus and residual thrombus noted in the visualized portions of the common femoral and superficial femoral veins.  The stent itself does appear to opacify with contrast to its distal end.  Patient continues wearing compression and notes overall his edema is better than preprocedure.  He continues to ambulate.  He continues to use leg elevation along with compression and exercise to help reduce edema.  He is wishing to return to physical therapy..      Review of Systems   Constitutional: Negative.  Negative for activity change, appetite change, chills, diaphoresis, fatigue and fever.   HENT: Negative.  Negative for congestion, sneezing, sore throat and trouble  "swallowing.    Eyes: Negative.  Negative for visual disturbance.   Respiratory: Negative.  Negative for chest tightness and shortness of breath.    Cardiovascular: Positive for leg swelling. Negative for chest pain and palpitations.   Gastrointestinal: Negative.  Negative for abdominal distention, abdominal pain, nausea and vomiting.   Endocrine: Negative.    Genitourinary: Negative.    Musculoskeletal: Negative.    Skin: Negative.    Allergic/Immunologic: Negative.    Neurological: Negative.    Hematological: Negative.    Psychiatric/Behavioral: Negative.        /80   Pulse 57   Ht 177.8 cm (70\")   Wt 93.9 kg (207 lb)   SpO2 95%   BMI 29.70 kg/m²   Physical Exam  Vitals reviewed.   Constitutional:       Appearance: Normal appearance.   HENT:      Head: Normocephalic and atraumatic.      Nose: Nose normal.      Mouth/Throat:      Mouth: Mucous membranes are moist.   Eyes:      Extraocular Movements: Extraocular movements intact.      Pupils: Pupils are equal, round, and reactive to light.   Cardiovascular:      Rate and Rhythm: Normal rate and regular rhythm.      Pulses: Normal pulses.           Carotid pulses are 2+ on the right side and 2+ on the left side.       Radial pulses are 2+ on the right side and 2+ on the left side.        Femoral pulses are 2+ on the right side and 2+ on the left side.       Popliteal pulses are 2+ on the right side and 2+ on the left side.        Dorsalis pedis pulses are 2+ on the right side and 2+ on the left side.        Posterior tibial pulses are 2+ on the right side and 2+ on the left side.      Comments: Palpable pulses throughout the bilateral lower extremities.  He does have continued edema of the left lower extremity from the groin to the foot.  This is persistent but is improved from his previous presentation at recent hospitalization.  He continues wearing thigh-high compression.  Pulmonary:      Effort: Pulmonary effort is normal. No respiratory distress. "   Abdominal:      General: There is no distension.      Palpations: Abdomen is soft. There is no mass.      Tenderness: There is no abdominal tenderness.   Musculoskeletal:         General: Normal range of motion.      Cervical back: Normal range of motion and neck supple.      Right lower leg: No edema.      Left lower leg: Edema present.   Skin:     General: Skin is warm and dry.      Capillary Refill: Capillary refill takes less than 2 seconds.   Neurological:      General: No focal deficit present.      Mental Status: He is alert and oriented to person, place, and time.   Psychiatric:         Mood and Affect: Mood normal.         Behavior: Behavior normal.         Thought Content: Thought content normal.         Judgment: Judgment normal.         DIAGNOSTIC DATA:    CT Abdomen Pelvis With Contrast    Result Date: 4/5/2023  Narrative: EXAMINATION: CT ABDOMEN PELVIS W CONTRAST-   4/5/2023 9:28 AM CDT  HISTORY: Leg swelling, s/p L iliac vein stent. Please use VENOUS PHASE contrast timing; M79.89-Other specified soft tissue disorders; I87.1-Compression of vein  In order to have a CT radiation dose as low as reasonably achievable Automated Exposure Control was utilized for adjustment of the mA and/or KV according to patient size.  DLP in mGycm= 833.  Abdomen/pelvis CT with IV contrast injection. Axial, sagittal, and coronal sequences.  Delayed postcontrast imaging for venous phase enhancement.  Prominent subcutaneous edema about the upper left thigh.  Thrombus is visualized within the mid and proximal left superficial femoral vein, left common femoral vein, and within the left external iliac vein extending into the left iliac vein stent over a distance of 23 mm. The left common iliac vein is patent.  The right side pelvis and thigh venous structures are patent and the inferior vena cava is normally patent.  Cardiomegaly. Diffuse prominence of lung markings at the lung bases.  Cholecystectomy clips. 14 mm left hepatic  lobe cyst. Mild diffuse prominence of the common bile duct with no sign of obstruction. Normal pancreas. The spleen is absent.  Normal adrenal glands. The right kidney is absent. The left kidney is mildly hypertrophic and enhances normally. No left renal or ureteral stone is seen.  Normal size aorta. No bowel dilation or free fluid.  Multiple pelvic phleboliths are noted.  Summary: 1. Left lower extremity thrombus extending from the mid left superficial femoral vein (the flexor not visualized below this level) to the left external iliac vein. Thrombus extends into the distal aspect of the left iliac vein stent over a distance of 23 mm. 2. Thrombus within the distal aspect of the stent is well visualized on the coronal sequence.            This report was finalized on 04/05/2023 10:17 by Dr. Peña Moscoso MD.    CT Angio Abdominal Aorta Bilateral Iliofem Runoff    Result Date: 3/28/2023  Narrative: CT ANGIO ABDOMINAL AORTA BILAT ILIOFEM RUNOFF- 3/27/2023 11:00 PM CDT  HISTORY: increased LE edema, calf pain; history of renal cell carcinoma  COMPARISON: 03/10/2023  DOSE LENGTH PRODUCT: 561 mGy cm. Automated exposure control was also utilized to decrease patient radiation dose.  TECHNIQUE: Axial images of the abdomen and pelvis are performed following IV contrast with images obtained through the bilateral lower extremities following contrast during early arterial phase of enhancement. With early arterial imaging, there is suboptimal evaluation of the venous structures for venous thrombosis.  FINDINGS:  The abdominal aorta is normal in caliber with no aneurysm or dissection. The celiac trunk, superior mesenteric, and left renal arteries are patent. Evidence of a right nephrectomy. Inferior mesenteric artery is patent. The bilateral common, internal, and external iliac arteries appear patent. The bilateral common femoral, deep femoral, superficial femoral arteries are widely patent with no focal stenosis. Streak artifact  within the left popliteal fossa from the left knee prosthesis. Visible popliteal arteries appear patent. Normal three-vessel runoff of the distal bilateral lower extremities.  There is diffuse soft tissue edema of the left lower extremity there is a vascular stent within the left common and external iliac vein. Assessment for venous patency is suboptimal arterial phase imaging.  There is cardiomegaly. Pericardial effusion. Please refer to CT chest report.  Hepatic and portal veins are not opacified during image acquisition. There is a stable 1.3 cm low-density left hepatic lesion favoring cyst. The spleen is absent. No pancreatic cyst or mass. No discrete enhancing left renal mass. Minimal prominence of the left renal collecting system with a moderately distended bladder. Diffuse bladder wall thickening considered which may be artifactual due to incomplete distention. Muscle wall hypertrophy or cystitis is considered. The prostate measures 4.2 cm in width.  There is no free intraperitoneal air loculated abscess. There is moderate fecal stasis. There is no small bowel dilatation. No pathologic lymphadenopathy.  There is a heterogeneous appearance to the bony marrow of the pelvis or proximal femurs representing a change from the 04/26/2022 exam. There is a persistent 2.9 cm expansile lesion of the left sacrum with sclerotic margins, however there is disruption of the posterior left sacral cortex. Hardware present within the bilateral femurs with a left knee prosthesis.      Impression: 1. No abdominal aortic aneurysm or dissection. Widely patent abdominal aorta and iliac arteries as described above. Patent bilateral lower extremity arteries with appropriate three-vessel runoff. No focal high-grade stenosis. Limited assessment of the venous structures during arterial phase of imaging. Left common iliac and external venous stent. 2. Right nephrectomy. 3. Stable low-density left hepatic lesion. 4. Nonspecific  heterogeneous appearance to the bony marrow of the pelvis and proximal femurs. Persistent 2.9 cm expansile lesion sacrum with sclerotic margins with disruption of the posterior left sacral cortex. Patient treated for metastatic renal cell carcinoma [facility]. Outside report does indicate metastasis to the sacrum and femurs with external beam radiotherapy.  Comments: A preliminary report is issued to the ER by the [facility] radiology service. This report was finalized on 03/28/2023 07:22 by Dr. Latricia Houser MD.    US Venous Doppler Lower Extremity Bilateral (duplex)    Result Date: 3/28/2023  Narrative: History: Swelling      Impression: Impression: There is evidence of deep venous thrombosis in the left lower extremity.  Comments: Bilateral lower extremity venous duplex exam was performed using color Doppler flow, Doppler waveform analysis, and grayscale imaging, with and without compression. There is evidence of deep venous thrombosis in the common femoral, proximal superficial femoral, and popliteal veins in the left lower extremity. The tibial veins were not visualized due to swelling. There is no evidence of deep venous thrombosis of the right lower extremity.   This report was finalized on 03/28/2023 15:34 by Dr. Lei Jones MD.    CT Angiogram Chest    Addendum Date: 3/28/2023 Addendum:   Addendum: 1. Please change it technique as follows: The 3-D maximum intensity projection images reconstruction was performed in addition to 2-D coronal and sagittal plane imaging sequence. This report was finalized on 03/28/2023 07:40 by Dr. Eneida Bustamante MD.    Result Date: 3/28/2023  Narrative: EXAMINATION: CT ANGIOGRAM CHEST-   3/27/2023 11:00 PM CDT  HISTORY: chest pressure, hx recent dvt, long travel, sob, with exertion  In order to have a CT radiation dose as low as reasonably achievable Automated Exposure Control was utilized for adjustment of the mA and/or KV according to patient size.  DLP in  mGycm= 1173  The CT angiography of the chest is performed after intravenous contrast enhancement.  Images are acquired in axial plane with subsequent reconstruction in coronal and sagittal plane.  The Comparison is made with the previous study dated 03/10/2023.  There is normal opacification of the pulmonary arteries and branches bilaterally. There are no filling defects in the visualized/opacified pulmonary arterial bed.  RV/LV ratio is 32/46 which is normal. No finding to suggest right heart strain.  Normal thoracic aorta is seen. Normal origin course and caliber of the right brachiocephalic, left common carotid and left subclavian arteries.  Limited visualized coronary arteries appear unremarkable.  There is moderate cardiomegaly. A pericardial effusion similar to the previous study.  There is no evidence of mediastinal or hilar mass or lymphadenopathy.  Limited visualized soft tissues of the neck are unremarkable. No mass or lymphadenopathy.  The lungs are poorly expanded.  There is evidence of pulmonary vascular congestion.  Atelectatic changes are seen in the lungs bilaterally.  Emphysematous changes and or pneumatoceles are seen more pronounced in the right middle and lower lobes.  A well-defined nodule is seen in the right middle lobe, image #81 and series 5, measuring 4 mm in diameter. No change. A pleural-based or subpleural nodule is seen in the right middle lobe laterally, image #92 and series 5, measuring 3 mm. No change. No additional nodules.  Tracheobronchial structures are patent. No endobronchial abnormality or mucous plugging.  Limited visualized abdomen is unremarkable. The kidneys are incompletely visualized and not evaluated.  Images reviewed in bone window show no acute bony abnormality.      Impression: 1. No evidence of pulmonary embolism. No aortic aneurysm or dissection. 2. Pulmonary vascular congestion or pulmonary edema. 3. Moderate cardiomegaly. Pericardial effusion. 4. Small  noncalcified nodules in the right middle lobe are similar to the previous study. No change.  The above study was initially reviewed and reported by [facility]. I do not find any discrepancies.            This report was finalized on 03/28/2023 06:18 by Dr. Eneida Bustamante MD.      Patient Active Problem List   Diagnosis   • Renal cell carcinoma of right kidney metastatic to other site (HCC)   • Iron overload   • Sickle cell beta thalassemia (HCC)   • Sickle cell crisis (HCC)   • Anemia of chronic disease   • Acute pain   • Hyperbilirubinemia   • Hypoxia   • Single kidney, left   • Cellulitis of left lower extremity   • Stage 3a chronic kidney disease   • Sickle cell anemia   • Sepsis   • Acute deep vein thrombosis (DVT) of femoral vein of left lower extremity   • Stenosis of left iliac vein   • Chronic pain syndrome   • Primary hypertension   • Hypokalemia   • Postoperative anemia due to acute blood loss         ICD-10-CM ICD-9-CM   1. Iliac vein stenosis, left  I87.1 459.2   2. Left leg swelling  M79.89 729.81   3. Essential hypertension  I10 401.9       Lab Frequency Next Occurrence   Adult Stress Echo W/ Cont or Stress Agent if Necessary Per Protocol Once 04/02/2023       PLAN: After thoroughly evaluating Holland Phelps, I believe the best course of action is to initially remain conservative from a vascular standpoint.  He returns after CT of the abdomen/pelvis.  It shows patency of the previously placed venous stent however the distal aspect of this and through the remaining external iliac there is extensive scarring and contracture of this segment of the vein.  There is residual thrombus within that external iliac and some residual thrombus in the common femoral and superficial femoral.  Overall his edema is improved from prehospital/preprocedure.  As such he should continue with leg elevation and compression is much as possible and can also continue exercise to help further reduce edema.  He can return to  physical therapy and has no restrictions.  He should continue his Eliquis as previous.  I will see him back in 1 month for ongoing surveillance.  The patient is to continue taking their medications as previously discussed.   I did discuss vascular risk factors as they pertain to the progression of vascular disease including controlling hypertension.  Blood pressure is somewhat elevated today at 142/80.  I will have him follow with his primary care physician regarding further evaluation of his antihypertensive regimen. BMI is >= 25 and <30. (Overweight) The following options were offered after discussion;: exercise counseling/recommendations and nutrition counseling/recommendations. This was all discussed in full with complete understanding.  Thank you for allowing me to participate in the care of your patient.  Please do not hesitate to call with any questions or concerns.  We will keep you aware of any further encounters with Holland Phelps.      Sincerely Yours,      Dipak Moya MD

## 2023-04-17 NOTE — PROGRESS NOTES
Progress Note Addendum      Patient: Holland Phelps           : 1970  Visit Date: 2023  Referring practitioner: GILBERTO Ornelas  Date of Initial Visit: Type: THERAPY  Noted: 10/12/2022  Patient seen for 19 sessions  Visit Diagnoses:    ICD-10-CM ICD-9-CM   1. Lymphedema of left leg  I89.0 457.1   2. Lymphedema of genitalia  I89.0 457.1   3. Renal cell carcinoma of right kidney  C64.1 189.0          Clinical Progress: Swelling is different. He had extensive DVTs in the LLE.  Home Program Compliance: Yes  Progress toward previous goals: Partially Met  Prognosis to achieve goals: good    Objective   Therapy Education/Self Care 51290   Education offered today Changes in the vascular system with recent DVTs   Medbride Code    Ongoing HEP   Cont using pump and compression. Don't elevate the leg so high when resting.    Timed Minutes 15     Assessment & Plan     Assessment  Impairments: abnormal gait, abnormal or restricted ROM, lacks appropriate home exercise program and pain with function  Functional Limitations: walking, uncomfortable because of pain and standing  Assessment details: Patient still has swelling L LE, but now the tissue is more dense.  We were not able to see patient for several weeks as he was hospitalized and had surgery for blood clots.  He has just been released by his vascular Dr. To resume treatment with us. The overall size of the L LE has not changed significantly since his last visit, but he has had an increase in size locally in the L proximal thigh.  With the damage to the vascular system from the blood clots, his swelling is thicker making the tissue more dense.   Prognosis: good    Plan  Therapy options: will be seen for skilled therapy services  Planned therapy interventions: manual therapy, compression, soft tissue mobilization, stretching, therapeutic activities, home exercise program and functional ROM exercises  Frequency: 2x week (1-2 X/wk)  Duration in weeks:  12  Treatment plan discussed with: patient        I spent 15 minutes with the patient and SAHIL Kaminski PTA, and reviewed his recent hospitalization and blood clots and his plan of care. The patient is in agreement with this plan.     SIGNATURE: Debbie Morales, PT, DPT, SAHIL, License #: [id number]  Electronically Signed on 4/17/2023

## 2023-04-20 ENCOUNTER — TREATMENT (OUTPATIENT)
Dept: PHYSICAL THERAPY | Facility: CLINIC | Age: 53
End: 2023-04-20
Payer: MEDICARE

## 2023-04-20 DIAGNOSIS — C64.1 RENAL CELL CARCINOMA OF RIGHT KIDNEY: ICD-10-CM

## 2023-04-20 DIAGNOSIS — I89.0 LYMPHEDEMA OF LEFT LEG: Primary | ICD-10-CM

## 2023-04-20 DIAGNOSIS — I89.0 LYMPHEDEMA OF GENITALIA: ICD-10-CM

## 2023-04-20 PROCEDURE — 97110 THERAPEUTIC EXERCISES: CPT | Performed by: PHYSICAL THERAPIST

## 2023-04-20 PROCEDURE — 97140 MANUAL THERAPY 1/> REGIONS: CPT | Performed by: PHYSICAL THERAPIST

## 2023-04-20 NOTE — PROGRESS NOTES
Physical Therapy Treatment Note    [address], [place] [address]    Patient: Holland Phelps                                                 Visit Date: 2023  :     1970    Referring practitioner:    GILBERTO Ornelas  Date of Initial Visit:          Type: THERAPY  Noted: 10/12/2022    Patient seen for 20 sessions    Visit Diagnoses:    ICD-10-CM ICD-9-CM   1. Lymphedema of left leg  I89.0 457.1   2. Lymphedema of genitalia  I89.0 457.1   3. Renal cell carcinoma of right kidney  C64.1 189.0     SUBJECTIVE     Subjective  Patient states his L leg stays swollen all the time now.  He doesn't have any pain but he does have tightness in the L LE. 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     PAIN: 0/10 but does as tightness in the L leg.         OBJECTIVE     Objective    Lymphedema     Row Name 23 0803 23 0800          Subjective Pain    Able to rate subjective pain? -- yes  -AL     Pre-Treatment Pain Level -- 0  -AL     Subjective Pain Comment -- Tightness L LE with numbness L lower leg  -AL        Lymphedema Measurements    Measurement Type(s) --  -AL Circumferential  -AL     Circumferential Areas --  -AL Lower extremities  -AL        BLE Circumferential (cm)    Measurement Location 1 -- BOT  -AL     Left 1 -- 24 cm  -AL     Measurement Location 2 -- +7  -AL     Left 2 -- 28 cm  -AL     Measurement Location 3 -- +7  -AL     Left 3 -- 29.2 cm  -AL     Measurement Location 4 -- +10  -AL     Left 4 -- 34.9 cm  -AL     Measurement Location 5 -- +10  -AL     Left 5 -- 45.6 cm  -AL     Measurement Location 6 -- +10  -AL     Left 6 -- 47.9 cm  -AL     Measurement Location 7 -- +10  -AL     Left 7 -- 46.8 cm  -AL     Measurement Location 8 -- +10  -AL     Left 8 -- 53 cm  -AL     Measurement Location 9 -- +10  -AL     Left 9 -- 61 cm  -AL     Measurement Location 10 -- +10  -AL     Left 10 -- 66.2 cm   -AL     LLE Circumferential Total -- 436.6 cm  -AL        Manual Lymphatic Drainage    Manual Lymphatic Drainage -- initial sequence;opened regional lymph nodes;opened anastamoses;extremity treatment  -AL     Initial Sequence -- short neck;abdomen;diaphragmatic breathing  -AL     Abdomen -- deep  -AL     Diaphragmatic Breathing -- x 9 with deep abdominals  -AL     Opened Regional Lymph Nodes -- axillary;inguinal  -AL     Axillary -- left  -AL     Inguinal -- left  -AL     Opened Anastamoses -- inguino-axillary  -AL     Extremity Treatment -- MLD to full limb  -AL     MLD to Full Limb -- L LE  -AL     Extremity Treatment Focus On -- L upper leg and abdomen  -AL     Manual Lymphatic Drainage Comments -- IASTM to L anterior/posterior thigh and L calf  -AL     Manual Therapy -- 80  -AL        Compression/Skin Care    Compression/Skin Care -- compression garment  -AL     Compression/Skin Care Comments -- He donned his compression garment.  I did give patient new short stretch bandages to use at home.  -AL           User Key  (r) = Recorded By, (t) = Taken By, (c) = Cosigned By    Initials Name Provider Type    Elida Pickering, KEILA, CLTHUMPHREY Physical Therapist Assistant                 Therapeutic Exercises    21620 Units Comments   Prone quad stretch  Very tight   L hip flexor stretch in the John Test position  Felt stretch L groin and L side of abdomen        L hamstring stretch     Small foam roll under L ischial tuberosity     Timed Minutes 10        Therapy Education/Self Care 65671   Education offered today Work on CDT and use the Flexitouch pump. Don't elevate the leg very high.    Medbridge Code    Ongoing HEP   Wear compression garment and use the Flexitouch pump   Timed Minutes        Total Timed Treatment:      90  mins  Total Time of Visit:             90  mins         ASSESSMENT/PLAN     GOALS  Goals                                          Progress Note due by 5/12/23                                                       Recert due by 7/11/23   LTG by: 12 weeks Comments Date Status   Patient will have a reduction in LLE of at least 20 cm to allow more comfortable walking and mobility.  Felt looser after MLD today 4/20 Ongoing   Patient will be independent with specific HEP for lymphedema He has been working on the HEP, he is trying to move around more. 4/12 Ongoing   He will have no s/s infection.  No signs or symptoms of infection 11/16 Met   He will have no complaint of increased edema in the groin. More dense tissue L groin 4/12 Ongoing   Patient will be independent with all tools available to manage his LLE lymphedema. He has been cleared by the DrPriti To resume CDT. 4/12 Ongoing          Assessment/Plan     ASSESSMENT:  Patient still has swelling L LE, but the areas of swelling are up and down.  The most proximal leg is down in size and the leg is softer this week.  His wife does wrap the leg at night but the wraps are stretched out.  I did give patient new short stretch bandages to use at home.  I will wrap the L LE next week as well.  Once the leg has reduced he needs to purchase a new compression garments as it is stretched out too.     PLAN: Continue to work on CDT, will see patient 1-2  x per week x 8 weeks.       SIGNATURE: Elida Sequeira PTA, [facility]-[place] License #: [id number]  Electronically Signed on 4/20/2023        [address] [place] [address]. [address]  [phone]

## 2023-04-26 ENCOUNTER — TREATMENT (OUTPATIENT)
Dept: PHYSICAL THERAPY | Facility: CLINIC | Age: 53
End: 2023-04-26
Payer: MEDICARE

## 2023-04-26 DIAGNOSIS — I89.0 LYMPHEDEMA OF GENITALIA: ICD-10-CM

## 2023-04-26 DIAGNOSIS — C64.1 RENAL CELL CARCINOMA OF RIGHT KIDNEY: ICD-10-CM

## 2023-04-26 DIAGNOSIS — I89.0 LYMPHEDEMA OF LEFT LEG: Primary | ICD-10-CM

## 2023-04-26 PROCEDURE — 97110 THERAPEUTIC EXERCISES: CPT | Performed by: PHYSICAL THERAPIST

## 2023-04-26 PROCEDURE — 97140 MANUAL THERAPY 1/> REGIONS: CPT | Performed by: PHYSICAL THERAPIST

## 2023-04-26 NOTE — PROGRESS NOTES
Physical Therapy Treatment Note    [address], [place] [address]    Patient: Holland Phelps                                                 Visit Date: 2023  :     1970    Referring practitioner:    GILBERTO Ornelas  Date of Initial Visit:          Type: THERAPY  Noted: 10/12/2022    Patient seen for 21 sessions    Visit Diagnoses:    ICD-10-CM ICD-9-CM   1. Lymphedema of left leg  I89.0 457.1   2. Lymphedema of genitalia  I89.0 457.1   3. Renal cell carcinoma of right kidney  C64.1 189.0     SUBJECTIVE     Subjective  Patient states his wife has been using the short stretch bandages and he feels like the fluid has moved.  He has been busy washing cars.     PAIN: 0/10 but does as tightness in the L leg.         OBJECTIVE     Objective    Lymphedema     Row Name 23 0915             Subjective Pain    Able to rate subjective pain? yes  -AL      Pre-Treatment Pain Level 0  -AL         Lymphedema Measurements    Measurement Type(s) Circumferential  -AL      Circumferential Areas Lower extremities  -AL         BLE Circumferential (cm)    Measurement Location 1 BOT  -AL      Left 1 23.4 cm  -AL      Measurement Location 2 +7  -AL      Left 2 27 cm  -AL      Measurement Location 3 +7  -AL      Left 3 29 cm  -AL      Measurement Location 4 +10  -AL      Left 4 34.8 cm  -AL      Measurement Location 5 +10  -AL      Left 5 43.2 cm  -AL      Measurement Location 6 +10  -AL      Left 6 48.2 cm  -AL      Measurement Location 7 +10  -AL      Left 7 46.5 cm  -AL      Measurement Location 8 +10  -AL      Left 8 52.6 cm  -AL      Measurement Location 9 +10  -AL      Left 9 61 cm  -AL      Measurement Location 10 +10  -AL      Left 10 67 cm  -AL      LLE Circumferential Total 432.7 cm  -AL         Manual Lymphatic Drainage    Manual Lymphatic Drainage initial sequence;opened regional lymph nodes;opened anastamoses;extremity treatment  -AL       Initial Sequence short neck;abdomen;diaphragmatic breathing  -AL      Abdomen deep  -AL      Diaphragmatic Breathing x 9 with deep abdominals  -AL      Opened Regional Lymph Nodes axillary;inguinal  -AL      Axillary left  -AL      Inguinal left  -AL      Opened Anastamoses inguino-axillary  -AL      Extremity Treatment MLD to full limb  -AL      Extremity Treatment Focus On L upper leg and abdomen  -AL      Manual Lymphatic Drainage Comments IASTM to L anterior/posterior thigh and L calf  -AL      Manual Therapy 65  -AL         Compression/Skin Care    Compression/Skin Care compression garment  -AL      Compression/Skin Care Comments He donned his compression garment.  I did give patient new short stretch bandages to use at home.  -AL            User Key  (r) = Recorded By, (t) = Taken By, (c) = Cosigned By    Initials Name Provider Type    Elida Pickering PTA, CLT-LANA Physical Therapist Assistant                 Therapeutic Exercises    52617 Units Comments   Prone quad stretch  Very tight   L hip flexor stretch in the John Test position  Felt stretch L groin and L side of abdomen   L hamstring stretch     Small foam roll under L ischial tuberosity     Timed Minutes 10        Therapy Education/Self Care 31232   Education offered today Try to use the compression wraps at night more often.   Medbridge Code    Ongoing HEP   Wear compression garment and use the Flexitouch pump   Timed Minutes        Total Timed Treatment:      75  mins  Total Time of Visit:             75  mins         ASSESSMENT/PLAN     GOALS  Goals                                          Progress Note due by 5/12/23                                                      Recert due by 7/11/23   LTG by: 12 weeks Comments Date Status   Patient will have a reduction in LLE of at least 20 cm to allow more comfortable walking and mobility.  Felt looser after MLD today 4/20 Ongoing   Patient will be independent with specific HEP for lymphedema He  has been working on the HEP, he is trying to move around more. 4/12 Ongoing   He will have no s/s infection.  No signs or symptoms of infection 11/16 Met   He will have no complaint of increased edema in the groin. More dense tissue L groin 4/12 Ongoing   Patient will be independent with all tools available to manage his LLE lymphedema. His wife has been wrapping the L LE a few times.  4/26 Ongoing          Assessment/Plan     ASSESSMENT:  The L LE has had a reduction since his last visit.  His wife has been able to wrap the L LE with the short stretch bandages and he feels like this has helped.  He forgot his wraps today but will bring them next treatment.  I will also use gray foam on the L LE to help break up the dense tissue.     PLAN: Continue to work on CDT, will see patient 1-2  x per week x 8 weeks.       SIGNATURE: Elida Sequeira PTA, [facility] License #: [id number]  Electronically Signed on 4/26/2023        [address] [place] [address] [address]  [place] [address]  [phone]

## 2023-04-28 ENCOUNTER — TREATMENT (OUTPATIENT)
Dept: PHYSICAL THERAPY | Facility: CLINIC | Age: 53
End: 2023-04-28
Payer: MEDICARE

## 2023-04-28 DIAGNOSIS — I89.0 LYMPHEDEMA OF GENITALIA: ICD-10-CM

## 2023-04-28 DIAGNOSIS — C64.1 RENAL CELL CARCINOMA OF RIGHT KIDNEY: ICD-10-CM

## 2023-04-28 DIAGNOSIS — I89.0 LYMPHEDEMA OF LEFT LEG: Primary | ICD-10-CM

## 2023-04-28 PROCEDURE — 97140 MANUAL THERAPY 1/> REGIONS: CPT | Performed by: PHYSICAL THERAPIST

## 2023-04-28 NOTE — PROGRESS NOTES
Physical Therapy Treatment Note    [address], [place] [address]    Patient: Holland Phelps                                                 Visit Date: 2023  :     1970    Referring practitioner:    GILBERTO Ornelas  Date of Initial Visit:          Type: THERAPY  Noted: 10/12/2022    Patient seen for 22 sessions    Visit Diagnoses:    ICD-10-CM ICD-9-CM   1. Lymphedema of left leg  I89.0 457.1   2. Lymphedema of genitalia  I89.0 457.1   3. Renal cell carcinoma of right kidney  C64.1 189.0     SUBJECTIVE     Subjective  Patient states his wife has been using the short stretch bandages and he feels like the fluid has moved.  He has been busy washing cars.     PAIN: /10 but does as tightness in the L leg.         OBJECTIVE     Objective    Lymphedema     Row Name 23 1045             Subjective Pain    Able to rate subjective pain? yes  -AL      Pre-Treatment Pain Level 2  -AL         Manual Lymphatic Drainage    Manual Lymphatic Drainage initial sequence;opened regional lymph nodes;opened anastamoses;extremity treatment  -AL      Initial Sequence short neck;abdomen;diaphragmatic breathing  -AL      Abdomen deep  -AL      Diaphragmatic Breathing x 9 with deep abdominals  -AL      Opened Regional Lymph Nodes axillary;inguinal  -AL      Axillary left  -AL      Inguinal left  -AL      Opened Anastamoses inguino-axillary  -AL      Extremity Treatment MLD to full limb  -AL      Extremity Treatment Focus On L upper leg  -AL      Manual Lymphatic Drainage Comments IASTM to L anterior/posterior thigh and L calf  -AL      Manual Therapy 60  -AL         Compression/Skin Care    Compression/Skin Care compression garment;skin care;wrapping location;remove bandages;bandaging  -AL      Skin Care lotion applied  -AL      Wrapping Location lower extremity  -AL      Wrapping Location LE left:;toes to groin  -AL      Bandaging Technique  circumferential/spiral;figure-eight;moderate compression  -AL      Compression Garment Comments Used 4 in stockinette, 2 Comprifoam, Comprilan short stretch bandages:  one 8 cm, two 10 cm, two 12 cm.  -AL      Remove Bandages Remove in the morning and wear compression garment, re-wrap each night after using pump, before going to bed.  -AL            User Key  (r) = Recorded By, (t) = Taken By, (c) = Cosigned By    Initials Name Provider Type    Elida Pickering, PTA, SAHIL Physical Therapist Assistant                    Therapy Education/Self Care 69540   Education offered today Remove the wraps in the morning, re-wrap each night after using the pump but before going to bed.    Medbridge Code    Ongoing HEP   Wear compression garment and use the Flexitouch pump   Timed Minutes        Total Timed Treatment:      60  mins  Total Time of Visit:             60  mins         ASSESSMENT/PLAN     GOALS  Goals                                          Progress Note due by 5/12/23                                                      Recert due by 7/11/23   LTG by: 12 weeks Comments Date Status   Patient will have a reduction in LLE of at least 20 cm to allow more comfortable walking and mobility.  Felt looser after MLD today 4/20 Ongoing   Patient will be independent with specific HEP for lymphedema He has been working on the HEP, he is trying to move around more. 4/12 Ongoing   He will have no s/s infection.  No signs or symptoms of infection 11/16 Met   He will have no complaint of increased edema in the groin. More dense tissue L groin 4/12 Ongoing   Patient will be independent with all tools available to manage his LLE lymphedema. Used the short stretch bandages and Comprifoam 4/28 Ongoing          Assessment/Plan     ASSESSMENT:  Today I used the Comprifoam and the short stretch bandages.  Patient felt he could walk easier once the leg was wrapped. I do believe this will help to break up the dense tissue in the L LE.   I have ordered 1/2 in gray foam, when this comes in I will try gray foam on the L LE with the short stretch bandages.     PLAN: Continue to work on CDT, will see patient 1-2  x per week x 8 weeks.       SIGNATURE: Elida Sequeira PTA, [place], [place] License #: [id number]  Electronically Signed on 4/28/2023        [address] [place] [address]. [address]  [phone]

## 2023-05-04 ENCOUNTER — TREATMENT (OUTPATIENT)
Dept: PHYSICAL THERAPY | Facility: CLINIC | Age: 53
End: 2023-05-04
Payer: MEDICARE

## 2023-05-04 DIAGNOSIS — I89.0 LYMPHEDEMA OF LEFT LEG: Primary | ICD-10-CM

## 2023-05-04 DIAGNOSIS — I89.0 LYMPHEDEMA OF GENITALIA: ICD-10-CM

## 2023-05-04 DIAGNOSIS — C64.1 RENAL CELL CARCINOMA OF RIGHT KIDNEY: ICD-10-CM

## 2023-05-04 PROCEDURE — 97110 THERAPEUTIC EXERCISES: CPT | Performed by: PHYSICAL THERAPIST

## 2023-05-04 PROCEDURE — 97140 MANUAL THERAPY 1/> REGIONS: CPT | Performed by: PHYSICAL THERAPIST

## 2023-05-04 NOTE — PROGRESS NOTES
Physical Therapy Treatment Note    [address], [place] [address]    Patient: Holland Phelps                                                 Visit Date: 2023  :     1970    Referring practitioner:    GILBERTO Ornelas  Date of Initial Visit:          Type: THERAPY  Noted: 10/12/2022    Patient seen for 23 sessions    Visit Diagnoses:    ICD-10-CM ICD-9-CM   1. Lymphedema of left leg  I89.0 457.1   2. Lymphedema of genitalia  I89.0 457.1   3. Renal cell carcinoma of right kidney  C64.1 189.0     SUBJECTIVE     Subjective  Patient states the roll of foam then the compression wraps helped to soften up the upper leg a lot.  He states the leg is down in size as well.  He really likes how the compression feels on his leg, he feels like he can walk better too.  He did not wrap the L leg last night but he did use the pump.  His wife has been wrapping the leg every night except last night since his last visit.      PAIN: 0/10  Just numb         OBJECTIVE     Objective    Lymphedema     Row Name 23 0800             Subjective Pain    Able to rate subjective pain? yes  -AL      Pre-Treatment Pain Level 0  -AL         BLE Circumferential (cm)    Measurement Location 1 BOT  -AL      Left 1 23.6 cm  -AL      Measurement Location 2 +7  -AL      Left 2 26.4 cm  -AL      Measurement Location 3 +7  -AL      Left 3 28.6 cm  -AL      Measurement Location 4 +10  -AL      Left 4 33.2 cm  -AL      Measurement Location 5 +10  -AL      Left 5 42.8 cm  -AL      Measurement Location 6 +10  -AL      Left 6 47.2 cm  -AL      Measurement Location 7 +10  -AL      Left 7 44.1 cm  -AL      Measurement Location 8 +10  -AL      Left 8 49.1 cm  -AL      Measurement Location 9 +10  -AL      Left 9 57.9 cm  -AL      Measurement Location 10 +10  -AL      Left 10 65 cm  -AL      LLE Circumferential Total 417.9 cm  -AL         Manual Lymphatic Drainage    Manual  Lymphatic Drainage initial sequence;opened regional lymph nodes;opened anastamoses;extremity treatment  -AL      Initial Sequence short neck;abdomen;diaphragmatic breathing  -AL      Abdomen deep  -AL      Diaphragmatic Breathing x 9 with deep abdominals  -AL      Opened Regional Lymph Nodes axillary;inguinal  -AL      Axillary left  -AL      Inguinal left  -AL      Opened Anastamoses inguino-axillary  -AL      Extremity Treatment MLD to full limb  -AL      Extremity Treatment Focus On L LE  -AL      Manual Lymphatic Drainage Comments IASTM to L anterior/posterior thigh and L calf  -AL      Manual Therapy 65  -AL         Compression/Skin Care    Compression/Skin Care compression garment;skin care;wrapping location;remove bandages;bandaging  -AL      Skin Care lotion applied  -AL      Wrapping Location lower extremity  -AL      Wrapping Location LE left:;toes to groin  -AL      Bandaging Technique circumferential/spiral;figure-eight;moderate compression  -AL      Compression Garment Comments Used 4 in stockinette, 2 lower leg 1/2 in foam pieces and 2 upper leg half in foam pieces all secured with Artiflex, Comprifoam on the L foot, Comprilan short stretch bandages:  one 8 cm, two 10 cm, two 12 cm.  -AL      Remove Bandages Remove in the morning and wear compression garment, re-wrap each night after using pump, before going to bed.  -AL            User Key  (r) = Recorded By, (t) = Taken By, (c) = Cosigned By    Initials Name Provider Type    Elida Pickering PTA, SAHIL Physical Therapist Assistant                  Therapeutic Exercises    44232 Units Comments   L hip flexor stretch in the John Test position      L hamstring stretch       Small foam roll under L ischial tuberosity       Timed Minutes 10          Therapy Education/Self Care 20076   Education offered today Remove the wraps in the morning, re-wrap each night after using the pump but before going to bed.    Medbridge Code    Ongoing HEP   Wear  compression garment and use the Flexitouch pump   Timed Minutes        Total Timed Treatment:      75  mins  Total Time of Visit:             75  mins         ASSESSMENT/PLAN     GOALS  Goals                                          Progress Note due by 5/12/23                                                      Recert due by 7/11/23   LTG by: 12 weeks Comments Date Status   Patient will have a reduction in LLE of at least 20 cm to allow more comfortable walking and mobility.  Felt looser after MLD today 4/20 Ongoing   Patient will be independent with specific HEP for lymphedema He has been working on the HEP, he is trying to move around more. 4/12 Ongoing   He will have no s/s infection.  No signs or symptoms of infection 11/16 Met   He will have no complaint of increased edema in the groin. More dense tissue L groin 4/12 Ongoing   Patient will be independent with all tools available to manage his LLE lymphedema. Used the short stretch bandages and 1/2 in gray foam 5/4 Ongoing          Assessment/Plan     ASSESSMENT:  Today I used the Comprifoam on the foot, gray foam on the lower and upper legs,  and the short stretch bandages.  The Comprifoam on the L LE I used last week has helped to soften the L leg and he has had a reduction of 14.8 cm in the L LE as compared to last week. I am hoping the more dense gray foam helps to soften the tissue more and move the fluid out of the L LE.  Patient's wife has been very helpful as she has been wrapping patient's leg for him at night. Once the L LE has leveled out as far as reduction, will have patient purchase a new compression thigh high.     PLAN: Continue to work on CDT, will see patient 1-2  x per week x 8 weeks. Patient will need a progress note next treatment.       SIGNATURE: Elida Sequeira PTA, [place] License #: [id number]  Electronically Signed on 5/4/2023        [facility] [address]  [phone]

## 2023-05-05 ENCOUNTER — OFFICE VISIT (OUTPATIENT)
Dept: VASCULAR SURGERY | Facility: CLINIC | Age: 53
End: 2023-05-05
Payer: MEDICARE

## 2023-05-05 VITALS
BODY MASS INDEX: 29.92 KG/M2 | OXYGEN SATURATION: 94 % | DIASTOLIC BLOOD PRESSURE: 80 MMHG | HEIGHT: 70 IN | HEART RATE: 76 BPM | SYSTOLIC BLOOD PRESSURE: 162 MMHG | WEIGHT: 209 LBS

## 2023-05-05 DIAGNOSIS — I82.5Y2 CHRONIC DEEP VEIN THROMBOSIS (DVT) OF PROXIMAL VEIN OF LEFT LOWER EXTREMITY: ICD-10-CM

## 2023-05-05 DIAGNOSIS — I10 ESSENTIAL HYPERTENSION: ICD-10-CM

## 2023-05-05 DIAGNOSIS — I89.0 LYMPHEDEMA: Primary | ICD-10-CM

## 2023-05-05 NOTE — PROGRESS NOTES
05/05/2023      Meredith Miller APRN  [address] [facility] [place] [address]        Holland Phelps  1970    Chief Complaint   Patient presents with   • Follow-up     1 Month follow up for Iliac Vein stenosis left. Pt was last seen in office on 4/5/23.  Left lower extremity edema continues to improve.       Dear Meredith Miller APRN:    HPI     I had the pleasure of seeing your patient in the office today for follow up.  As you recall, the patient is a 52 y.o. male who we are currently following for chronic lower extremity edema due to lymphedema after previous renal cell carcinoma with metastasis to the femur requiring partial femur resection as well as radiation to the left hemipelvis and left lower extremity.  He also had more recently presented with left lower extremity DVT and underwent venogram with mechanical thrombectomy and stenting of the common and external iliac veins.  He has continued following with physical therapy/lymphedema clinic since our last visit and has restarted using his lymphedema pumps.  With continued foam wrapping as well as use of compression stockings and lymphedema pumps his edema continues to slowly improve.  He notes less tightness and denies any pain today.  He is able to ambulate more easily.  He otherwise denies any arterial symptoms with no claudication or rest pain.  He has no other acute complaints today.  He remains on Eliquis as previous.      Review of Systems   Constitutional: Negative.  Negative for activity change, appetite change, chills, diaphoresis, fatigue and fever.   HENT: Negative.  Negative for congestion, sneezing, sore throat and trouble swallowing.    Eyes: Negative.  Negative for visual disturbance.   Respiratory: Negative.  Negative for chest tightness and shortness of breath.    Cardiovascular: Positive for leg swelling. Negative for chest pain and palpitations.   Gastrointestinal: Negative.  Negative for abdominal distention, abdominal pain, nausea and  "vomiting.   Endocrine: Negative.    Genitourinary: Negative.    Musculoskeletal: Negative.    Skin: Negative.    Allergic/Immunologic: Negative.    Neurological: Negative.    Hematological: Negative.    Psychiatric/Behavioral: Negative.        /80   Pulse 76   Ht 177.8 cm (70\")   Wt 94.8 kg (209 lb)   SpO2 94%   BMI 29.99 kg/m²   Physical Exam  Vitals reviewed.   Constitutional:       Appearance: Normal appearance.   HENT:      Head: Normocephalic and atraumatic.      Nose: Nose normal.      Mouth/Throat:      Mouth: Mucous membranes are moist.   Eyes:      Extraocular Movements: Extraocular movements intact.      Pupils: Pupils are equal, round, and reactive to light.   Cardiovascular:      Rate and Rhythm: Normal rate and regular rhythm.      Pulses: Normal pulses.           Carotid pulses are 2+ on the right side and 2+ on the left side.       Radial pulses are 2+ on the right side and 2+ on the left side.        Femoral pulses are 2+ on the right side and 2+ on the left side.       Popliteal pulses are 2+ on the right side and 2+ on the left side.        Dorsalis pedis pulses are 2+ on the right side and 2+ on the left side.        Posterior tibial pulses are 2+ on the right side and 2+ on the left side.      Comments: Palpable pulses throughout the bilateral lower extremities.  He does have continued edema of the left lower extremity from the groin to the foot.  This is persistent but is improved from previous. He continues wearing thigh-high compression.  Pulmonary:      Effort: Pulmonary effort is normal. No respiratory distress.   Abdominal:      General: There is no distension.      Palpations: Abdomen is soft. There is no mass.      Tenderness: There is no abdominal tenderness.   Musculoskeletal:         General: Normal range of motion.      Cervical back: Normal range of motion and neck supple.      Right lower leg: No edema.      Left lower leg: Edema present.   Skin:     General: Skin is warm " and dry.      Capillary Refill: Capillary refill takes less than 2 seconds.   Neurological:      General: No focal deficit present.      Mental Status: He is alert and oriented to person, place, and time.   Psychiatric:         Mood and Affect: Mood normal.         Behavior: Behavior normal.         Thought Content: Thought content normal.         Judgment: Judgment normal.         DIAGNOSTIC DATA:    Nuclear Medicine bone scan whole body    Result Date: 4/13/2023  1.  No evidence of new osseous metastatic disease. The known sacral lesion is without osteoblastic response and better seen on CT. 2.  Left lower extremity edema with swelling. The estimated dose to any other individual from exposure to the released individual meets the requirements of [facility] Regulation 1200-02-07-.35(2) and [facility] 10 CFR 35.75; the patient was released without written instructions. Electronically signed by Bia Ramos MD on 4/13/2023 2:26 PM IAshu MD, have reviewed the images and verify the above interpretation on 4/13/2023 3:43 PM. Electronically signed by  Ashu Alvares MD on 4/13/2023 3:43 PM    CT chest abdomen pelvis wo contrast    Result Date: 4/13/2023  1.  Compared with 12/19/2022, no evidence of new or worsening metastatic disease in the chest, abdomen or pelvis. 2.  Treated lytic metastasis in the left hemisacrum is unchanged. 3.  Small liver lesions and pulmonary nodules remain unchanged. 4.  Similar soft tissue thickening along the left pelvis sidewall and iliac vasculature. 5.  There is a new left iliac venous stent, the patency of which cannot be assessed without intravenous contrast. Marked asymmetric subcutaneous edema in the proximal left thigh is similar, perhaps slightly 6.  Other unchanged findings as above worsened from prior and may be related to venous outflow obstruction. Electronically signed by  Nando Pool MD on 4/13/2023 1:10 PM      Patient Active Problem List   Diagnosis    • Renal cell carcinoma of right kidney metastatic to other site (HCC)   • Iron overload   • Sickle cell beta thalassemia (HCC)   • Sickle cell crisis (HCC)   • Anemia of chronic disease   • Acute pain   • Hyperbilirubinemia   • Hypoxia   • Single kidney, left   • Cellulitis of left lower extremity   • Stage 3a chronic kidney disease   • Sickle cell anemia   • Sepsis   • Acute deep vein thrombosis (DVT) of femoral vein of left lower extremity   • Stenosis of left iliac vein   • Chronic pain syndrome   • Primary hypertension   • Hypokalemia   • Postoperative anemia due to acute blood loss         ICD-10-CM ICD-9-CM   1. Lymphedema  I89.0 457.1   2. Chronic deep vein thrombosis (DVT) of proximal vein of left lower extremity  I82.5Y2 453.51   3. Essential hypertension  I10 401.9       Lab Frequency Next Occurrence   Adult Stress Echo W/ Cont or Stress Agent if Necessary Per Protocol Once 04/02/2023   US Venous Doppler Lower Extremity Left (duplex) Once 11/01/2023       PLAN: After thoroughly evaluating Holland Phelps, I believe the best course of action is to remain conservative from a vascular standpoint.  He is continuing to show improvement in his left lower extremity edema.  His main issue is lymphedema which is chronic which was exacerbated by his previous DVT.  With continued compression and physical therapy through the lymphedema clinic with foam wrapping things are improving.  He is able to ambulate more easily and overall notes his leg feels better.  As such plan will be for continued conservative management with continued compression and foam wrapping, continued follow-up with the physical therapy/lymphedema clinic, and continued use of his lymphedema pumps.  He will also continue on Eliquis.  Plan will be to see him back here in the vascular office in 6 months with a repeat left lower extremity venous duplex.  The patient is to continue taking their medications as previously discussed.   I did discuss  vascular risk factors as they pertain to the progression of vascular disease including controlling hypertension.  Blood pressure is somewhat elevated today at 162/80.  He will continue following with his primary care physician for further evaluation of his antihypertensive regimen. BMI is >= 25 and <30. (Overweight) The following options were offered after discussion;: exercise counseling/recommendations and nutrition counseling/recommendations. This was all discussed in full with complete understanding.  Thank you for allowing me to participate in the care of your patient.  Please do not hesitate to call with any questions or concerns.  We will keep you aware of any further encounters with Holland Phelps.      Sincerely Yours,      Dipak Moya MD

## 2023-05-09 ENCOUNTER — TREATMENT (OUTPATIENT)
Dept: PHYSICAL THERAPY | Facility: CLINIC | Age: 53
End: 2023-05-09
Payer: MEDICARE

## 2023-05-09 DIAGNOSIS — C64.1 RENAL CELL CARCINOMA OF RIGHT KIDNEY: ICD-10-CM

## 2023-05-09 DIAGNOSIS — I89.0 LYMPHEDEMA OF GENITALIA: ICD-10-CM

## 2023-05-09 DIAGNOSIS — I89.0 LYMPHEDEMA OF LEFT LEG: Primary | ICD-10-CM

## 2023-05-09 PROCEDURE — 97140 MANUAL THERAPY 1/> REGIONS: CPT | Performed by: PHYSICAL THERAPIST

## 2023-05-09 NOTE — PROGRESS NOTES
Physical Therapy Treatment Note and 30 Day Progress Note    [address] [place] [address]    Patient: Holland Phelps                                                 Visit Date: 2023  :     1970    Referring practitioner:    GILBERTO Ornelas  Date of Initial Visit:          Type: THERAPY  Noted: 10/12/2022    Patient seen for 24 sessions    Visit Diagnoses:    ICD-10-CM ICD-9-CM   1. Lymphedema of left leg  I89.0 457.1   2. Lymphedema of genitalia  I89.0 457.1   3. Renal cell carcinoma of right kidney  C64.1 189.0     SUBJECTIVE     Subjective  Patient states he is not having any pain and the numbness is getting better.  The upper leg is softer and the hardness in the L groin area is softness.  He is getting around better as well.  Dr. Moya is pleased with patient's progress.     PAIN: 0/10  Just numb         OBJECTIVE     Objective    Lymphedema     Row Name 23 0800             Subjective Pain    Able to rate subjective pain? yes  -AL      Pre-Treatment Pain Level 0  -AL         BLE Circumferential (cm)    Measurement Location 1 BOT  -AL      Left 1 23.8 cm  -AL      Measurement Location 2 +7  -AL      Left 2 26 cm  -AL      Measurement Location 3 +7  -AL      Left 3 28 cm  -AL      Measurement Location 4 +10  -AL      Left 4 32.6 cm  -AL      Measurement Location 5 +10  -AL      Left 5 42 cm  -AL      Measurement Location 6 +10  -AL      Left 6 46.5 cm  -AL      Measurement Location 7 +10  -AL      Left 7 43.7 cm  -AL      Measurement Location 8 +10  -AL      Left 8 50 cm  -AL      Measurement Location 9 +10  -AL      Left 9 57.5 cm  -AL      Measurement Location 10 +10  -AL      Left 10 61.6 cm  -AL      LLE Circumferential Total 411.7 cm  -AL         Manual Lymphatic Drainage    Manual Lymphatic Drainage initial sequence;opened regional lymph nodes;opened anastamoses;extremity treatment  -AL      Initial Sequence  short neck;abdomen;diaphragmatic breathing  -AL      Abdomen deep  -AL      Diaphragmatic Breathing x 9 with deep abdominals  -AL      Opened Regional Lymph Nodes axillary;inguinal  -AL      Axillary left  -AL      Inguinal left  -AL      Opened Anastamoses inguino-axillary  -AL      Extremity Treatment MLD to full limb  -AL      Extremity Treatment Focus On L LE  -AL      Manual Lymphatic Drainage Comments IASTM to L anterior/posterior thigh and L calf and L lower leg.  Used both the medium and small rollers.  -AL      Manual Therapy 70  -AL         Compression/Skin Care    Compression/Skin Care compression garment;skin care;wrapping location;remove bandages;bandaging  -AL      Skin Care lotion applied  -AL      Wrapping Location lower extremity  -AL      Wrapping Location LE left:;toes to groin  -AL      Bandaging Technique circumferential/spiral;figure-eight;moderate compression  -AL      Compression Garment Comments Used 4 in stockinette, 2 lower leg 1/2 in foam pieces and 2 upper leg half in foam pieces all secured with Artiflex, Comprifoam on the L foot, Comprilan short stretch bandages:  one 8 cm, two 10 cm, three 12 cm.  -AL      Remove Bandages Remove in the morning and wear compression garment, re-wrap each night after using pump, before going to bed.  -AL            User Key  (r) = Recorded By, (t) = Taken By, (c) = Cosigned By    Initials Name Provider Type    Elida Pickering, PTA, RDT-LANNY Physical Therapist Assistant                  Therapeutic Exercises    00002 Units Comments   L hip flexor stretch in the John Test position      Small foam roll under L ischial tuberosity       Timed Minutes 5          Therapy Education/Self Care [id number]   Education offered today Remove the wraps in the morning, re-wrap each night after using the pump but before going to bed.    [employer] Code    Ongoing HEP   Wear compression garment and use the Flexitouch pump   Timed Minutes        Total Timed Treatment:      75   mins  Total Time of Visit:             75  mins         ASSESSMENT/PLAN     GOALS  Goals                                          Progress Note due by 6/8/23                                                      Recert due by 7/11/23   LTG by: 12 weeks Comments Date Status   Patient will have a reduction in LLE of at least 20 cm to allow more comfortable walking and mobility.  Patient has had another reduction L LE 5/9 Ongoing   Patient will be independent with specific HEP for lymphedema He is able to move around much better. 5/9 Ongoing   He will have no s/s infection.  No signs or symptoms of infection 11/16 Met   He will have no complaint of increased edema in the groin. The dense tissue has started to soften since using the foam and short stretch bandages 5/9 Progressing   Patient will be independent with all tools available to manage his LLE lymphedema. Patient's wife is using the Comprifoam with the short stretch bandages at home. 5/9 Ongoing          Assessment/Plan     ASSESSMENT:  Patient has had a reduction of 6.2 cm as compared to last week. The dense tissue in the L UE has softened a lot.  The L lower leg tissue is softer but is now more dense than the upper leg.  He is complaint with CDT and using the pump.  Patient is pleased he is able to move around so much better and yesterday was on his feet the whole day running errands and doing housework.  Even with the increase activity he did yesterday the leg continues to improve. Patient's wife has difficulty wrapping the leg with the 1/2 in gray foam, she is able to use the Comprifoam when wrapping her 's leg with no issues.     PLAN: Continue to work on CDT, will see patient 1-2  x per week x 8 weeks.     SIGNATURE: Elida Sequeira PTA, [facility] [place] License #: [id number]  Electronically Signed on 5/9/2023        [facility] [place] [address]  [phone]

## 2023-05-09 NOTE — PROGRESS NOTES
Progress Note Addendum      Patient: Holland Phelps           : 1970  Visit Date: 2023  Referring practitioner: GILBERTO Ornelas  Date of Initial Visit: Type: THERAPY  Noted: 10/12/2022  Patient seen for 24 sessions  Visit Diagnoses:    ICD-10-CM ICD-9-CM   1. Lymphedema of left leg  I89.0 457.1   2. Lymphedema of genitalia  I89.0 457.1   3. Renal cell carcinoma of right kidney  C64.1 189.0          Clinical Progress: improved  Home Program Compliance: Yes  Progress toward previous goals: Partially Met  Prognosis to achieve goals: good    Objective     Assessment & Plan     Assessment  Impairments: abnormal gait, abnormal or restricted ROM, lacks appropriate home exercise program and pain with function  Functional Limitations: walking, uncomfortable because of pain and standing  Assessment details: He overall is making improvements, had a 6.2 cm improvement today. He is compliant with his HEP, pump and garment use. He is able to move around more as well.   Prognosis: good    Plan  Therapy options: will be seen for skilled therapy services  Planned therapy interventions: manual therapy, compression, soft tissue mobilization, stretching, therapeutic activities, home exercise program and functional ROM exercises  Frequency: 2x week (1-2 X/wk)  Duration in weeks: 8  Treatment plan discussed with: patient and PTA  Plan details: Continue with CDT.         I spent 8 minutes with the patient and Elida Sequeira PTA, CLT-LANA and reviewed their progress and plan of care. The patient is in agreement with this plan.     SIGNATURE: Phyllis Trevino, PT DPT, License #: [id number]  Electronically Signed on 2023

## 2023-05-16 ENCOUNTER — TREATMENT (OUTPATIENT)
Dept: PHYSICAL THERAPY | Facility: CLINIC | Age: 53
End: 2023-05-16
Payer: MEDICARE

## 2023-05-16 DIAGNOSIS — C64.1 RENAL CELL CARCINOMA OF RIGHT KIDNEY: ICD-10-CM

## 2023-05-16 DIAGNOSIS — I89.0 LYMPHEDEMA OF LEFT LEG: Primary | ICD-10-CM

## 2023-05-16 DIAGNOSIS — I89.0 LYMPHEDEMA OF GENITALIA: ICD-10-CM

## 2023-05-16 PROCEDURE — 97140 MANUAL THERAPY 1/> REGIONS: CPT | Performed by: PHYSICAL THERAPIST

## 2023-05-16 PROCEDURE — 97110 THERAPEUTIC EXERCISES: CPT | Performed by: PHYSICAL THERAPIST

## 2023-05-16 NOTE — H&P
[facility] ER Medicine Services  HISTORY AND PHYSICAL    Date of Admission: 7/9/2021  Primary Care Physician: Mallory Mccarthy MD    Subjective     Chief Complaint: Sickle cell crisis    History of Present Illness  Can Phelps is a 50-year-old -American male with past medical history of sickle cell disease, right renal cell carcinoma now with mets to bone.  Patient is followed for all his conditions at [facility].  He was seen by his hematologist today for routine follow-up.  Patient takes long-acting and short acting morphine for cancer related pain.  Lexapro was added for depression.  While there patient underwent MRI of the head and neck which was without acute findings.  He reports during that MRI he developed chest and back pain described as tightness.  While on the way home pain worsened to the point it was unbearable.  Patient and wife did stop at [facility] emergency department.  She states they were there approximately 5-1/2 hours.  Troponins and D-dimer were negative.  Patient did undergo CT angiogram of the chest with contrast which was negative for PE and revealed diffuse cardiomegaly.  He was subsequently transferred to [facility] emergency department for further evaluation and treatment.  EMS documented saturation on room air of 78% in the ambulance and therefore oxygen was added.  Currently saturation is within normal limits.  Currently he is complaining of pain 8 on a scale of 1-10.  He does drop off to sleep during assessment.  Wife provides majority of information.  Labs reviewed from outlying facilities-WBC normal at [facility], 15,000 at [facility] ER and 16.86 at this facility.  Hemoglobin is 7.8.  Reticular count sent has risen from 1 at [facility] to 2.17 at this facility.  Creatinine 0.96.  Will monitor kidney function closely as patient right kidney removed 2016.  Wife states usual treatment for sickle cell crisis is morphine PCA.   "Patient has no other complaints.  He is admitted for further evaluation treatment.    Review of Systems   A 10 point review of systems was completed, all negative except for those discussed in HPI    Past Medical History:   Past Medical History:   Diagnosis Date   • Cancer (CMS/HCC) 2016    Kidney cancer with METS to bone   • Gallstones    • Pneumonia    • Sickle cell        Past Surgical History:   Past Surgical History:   Procedure Laterality Date   • CHOLECYSTECTOMY     • LEG SURGERY     • NEPHRECTOMY Right        Family History: family history includes Anemia in his cousin; Heart disease in his mother; Hypertension in his father and mother; Kidney failure in his mother; Leukemia in his maternal grandmother and another family member; Sickle cell trait in his cousin, daughter, and father.    Social History:  reports that he has quit smoking. He does not have any smokeless tobacco history on file. He reports previous alcohol use.    Code Status: Full, if unable speak for himself his wife Primo will speak for him      Allergies:  No Known Allergies    Medications:   No current facility-administered medications on file prior to encounter.     Current Outpatient Medications on File Prior to Encounter   Medication Sig Dispense Refill   • escitalopram (LEXAPRO) 20 MG tablet Take 20 mg by mouth Daily.     • folic acid (FOLVITE) 1 MG tablet Take 1 mg by mouth Daily.     • lactulose (CHRONULAC) 10 GM/15ML solution Take 20 g by mouth 2 (Two) Times a Day As Needed.     • Morphine (MS CONTIN) 60 MG 12 hr tablet Take 120 mg by mouth 3 (Three) Times a Day.     • Morphine (MSIR) 30 MG tablet Take 30 mg by mouth Every 3 (Three) Hours As Needed for Severe Pain .             Objective     /82   Pulse 86   Temp 97.6 °F (36.4 °C) (Oral)   Resp 18   Ht 180.3 cm (71\")   Wt 87.5 kg (193 lb)   SpO2 95%   BMI 26.92 kg/m²   Physical Exam  Vitals reviewed.   Constitutional:       Appearance: He is ill-appearing.   HENT:      " Head: Normocephalic and atraumatic.      Mouth/Throat:      Mouth: Mucous membranes are moist.      Pharynx: Oropharynx is clear.   Eyes:      Extraocular Movements: Extraocular movements intact.      Conjunctiva/sclera: Conjunctivae normal.   Cardiovascular:      Rate and Rhythm: Normal rate and regular rhythm.   Pulmonary:      Effort: Pulmonary effort is normal.      Breath sounds: Normal breath sounds.   Abdominal:      General: Bowel sounds are normal.      Palpations: Abdomen is soft.   Musculoskeletal:         General: Normal range of motion.      Cervical back: Normal range of motion and neck supple.      Right lower leg: No edema.      Left lower leg: No edema.      Comments: Complaints of back and chest pain with deep breath   Skin:     General: Skin is warm and dry.   Neurological:      General: No focal deficit present.      Mental Status: He is alert and oriented to person, place, and time.   Psychiatric:         Mood and Affect: Mood normal.         Behavior: Behavior normal.       Pertinent Data:   Lab Results (last 72 hours)     Procedure Component Value Units Date/Time    Lactate Dehydrogenase [[id number]]  (Abnormal) Collected: 07/09/21 1938    Specimen: Blood Updated: 07/09/21 2208      U/L     Reticulocytes [[id number]]  (Abnormal) Collected: 07/09/21 1938    Specimen: Blood Updated: 07/09/21 2201     Reticulocyte % 2.17 %      Reticulocyte Absolute 0.0710 10*6/mm3     COVID-19,[facility] IN-HOUSE,Nasal Swab No Transport Media 3-4 HR TAT - Swab, Nasal Cavity [[id number]]  (Normal) Collected: 07/09/21 1940    Specimen: Swab from Nasal Cavity Updated: 07/09/21 2040     COVID19 Not Detected    Manual Differential [[id number]]  (Abnormal) Collected: 07/09/21 1938    Specimen: Blood Updated: 07/09/21 2036     Neutrophil % 90.0 %      Lymphocyte % 3.0 %      Monocyte % 4.0 %      Basophil % 1.0 %      Bands %  1.0 %      Atypical Lymphocyte % 1.0 %      Neutrophils Absolute 15.34 10*3/mm3       Lymphocytes Absolute 0.67 10*3/mm3      Monocytes Absolute 0.67 10*3/mm3      Basophils Absolute 0.17 10*3/mm3      nRBC 24.0 /100 WBC      Anisocytosis Mod/2+     Macrocytes Mod/2+     Pappenheimer Bodies Present     Polychromasia Mod/2+     Target Cells Mod/2+     WBC Morphology Normal     Giant Platelets Slight/1+    Urine Drug Screen - Urine, Clean Catch [[id number]]  (Abnormal) Collected: 07/09/21 2004    Specimen: Urine, Clean Catch Updated: 07/09/21 2024     THC, Screen, Urine Positive     Phencyclidine (PCP), Urine Negative     Cocaine Screen, Urine Negative     Methamphetamine, Ur Negative     Opiate Screen Positive     Amphetamine Screen, Urine Negative     Benzodiazepine Screen, Urine Negative     Tricyclic Antidepressants Screen Negative     Methadone Screen, Urine Negative     Barbiturates Screen, Urine Negative     Oxycodone Screen, Urine Negative     Propoxyphene Screen Negative     Buprenorphine, Screen, Urine Negative    Procalcitonin [[id number]]  (Abnormal) Collected: 07/09/21 1938    Specimen: Blood Updated: 07/09/21 2024     Procalcitonin 0.52 ng/mL     Urinalysis, Microscopic Only - Urine, Clean Catch [[id number]]  (Abnormal) Collected: 07/09/21 2004    Specimen: Urine, Clean Catch Updated: 07/09/21 2020     RBC, UA 0-2 /HPF      WBC, UA None Seen /HPF      Bacteria, UA None Seen /HPF      Squamous Epithelial Cells, UA None Seen /HPF      Hyaline Casts, UA 0-2 /LPF      Methodology Automated Microscopy    Urinalysis With Culture If Indicated - Urine, Clean Catch [[id number]]  (Abnormal) Collected: 07/09/21 2004    Specimen: Urine, Clean Catch Updated: 07/09/21 2020     Color, UA Yellow     Appearance, UA Clear     pH, UA 6.0     Specific Gravity, UA >1.030     Glucose, UA Negative     Ketones, UA Negative     Bilirubin, UA Negative     Blood, UA Trace     Protein,  mg/dL (2+)     Leuk Esterase, UA Negative     Nitrite, UA Negative     Urobilinogen, UA 1.0 E.U./dL    Comprehensive Metabolic  Panel [[id number]]  (Abnormal) Collected: 07/09/21 1938    Specimen: Blood Updated: 07/09/21 2019     Glucose 108 mg/dL      BUN 12 mg/dL      Creatinine 0.96 mg/dL      Sodium 141 mmol/L      Potassium 3.8 mmol/L      Chloride 103 mmol/L      CO2 27.0 mmol/L      Calcium 9.3 mg/dL      Total Protein 8.0 g/dL      Albumin 4.90 g/dL      ALT (SGPT) 7 U/L      AST (SGOT) 17 U/L      Alkaline Phosphatase 82 U/L      Total Bilirubin 2.4 mg/dL      eGFR  African Amer 100 mL/min/1.73      Globulin 3.1 gm/dL      A/G Ratio 1.6 g/dL      BUN/Creatinine Ratio 12.5     Anion Gap 11.0 mmol/L     CK [[id number]]  (Normal) Collected: 07/09/21 1938    Specimen: Blood Updated: 07/09/21 2019     Creatine Kinase 75 U/L     Troponin [[id number]]  (Normal) Collected: 07/09/21 1938    Specimen: Blood Updated: 07/09/21 2017     Troponin T <0.010 ng/mL     Lactic Acid, Plasma [[id number]]  (Normal) Collected: 07/09/21 1938    Specimen: Blood Updated: 07/09/21 2017     Lactate 1.6 mmol/L     BNP [[id number]]  (Normal) Collected: 07/09/21 1938    Specimen: Blood Updated: 07/09/21 2017     proBNP 89.4 pg/mL     Lipase [[id number]]  (Normal) Collected: 07/09/21 1938    Specimen: Blood Updated: 07/09/21 2015     Lipase 30 U/L     Protime-INR [[id number]]  (Abnormal) Collected: 07/09/21 1938    Specimen: Blood Updated: 07/09/21 2013     Protime 14.1 Seconds      INR 1.18    aPTT [[id number]]  (Normal) Collected: 07/09/21 1938    Specimen: Blood Updated: 07/09/21 2013     PTT 30.6 seconds     CBC Auto Differential [[id number]]  (Abnormal) Collected: 07/09/21 1938    Specimen: Blood Updated: 07/09/21 2008     WBC 16.86 10*3/mm3      RBC 3.27 10*6/mm3      Hemoglobin 7.8 g/dL      Hematocrit 23.0 %      MCV 70.3 fL      MCH 23.9 pg      MCHC 33.9 g/dL      RDW 21.3 %      RDW-SD 52.1 fl      MPV 9.5 fL      Platelets 278 10*3/mm3      nRBC 12.6 /100 WBC     Blood Culture - Blood, Arm, Right [[id number]] Collected: 07/09/21 1930    Specimen: Blood from Arm,  Right Updated: 07/09/21 1955    Blood Culture - Blood, Wrist, Right [[id number]] Collected: 07/09/21 1935    Specimen: Blood from Wrist, Right Updated: 07/09/21 1955    Blood Gas, Arterial - [[id number]]  (Abnormal) Collected: 07/09/21 1930    Specimen: Arterial Blood Updated: 07/09/21 1936     Site Left Radial     Luis Carlos's Test Positive     pH, Arterial 7.409 pH units      pCO2, Arterial 44.7 mm Hg      pO2, Arterial 73.5 mm Hg      Comment: 84 Value below reference range        HCO3, Arterial 28.3 mmol/L      Comment: 83 Value above reference range        Base Excess, Arterial 3.2 mmol/L      Comment: 83 Value above reference range        O2 Saturation, Arterial 96.0 %      Temperature 37.0 C      Barometric Pressure for Blood Gas 751 mmHg      Modality Room Air     Ventilator Mode NA     Collected by [id number]     Comment: Meter: [id number]     :  [id number]        pCO2, Temperature Corrected 44.7 mm Hg      pH, Temp Corrected 7.409 pH Units      pO2, Temperature Corrected 73.5 mm Hg         Imaging Results (Last 24 Hours)     Procedure Component Value Units Date/Time    CT Angiogram Chest [[id number]] Collected: 07/09/21 2110     Updated: 07/09/21 2118    Narrative:      EXAMINATION: CT ANGIOGRAM CHEST- 7/9/2021 9:10 PM CDT     HISTORY: Chest and back pain, shortness of breath, history of renal  cancer with bone metastases     COMPARISON: None     DOSE: 342 mGy-cm     TECHNIQUE: Sequential imaging was performed from the thoracic inlet  through the upper abdomen following the administration of IV contrast.   Sagittal and coronal reformations were made from the original source  data and reviewed. Additionally, 3-D MIPS reconstructions of the vessels  were made per CTA protocol. Automated exposure control was also utilized  to decrease patient radiation dose.     FINDINGS:   Thyroid gland is unremarkable. Trachea and main bronchi appear widely  patent and in normal anatomic position. The esophagus is grossly  normal  in appearance.     No pathologically enlarged axillary lymph nodes are identified. No  mediastinal or hilar lymphadenopathy is appreciated. Small areas of  nodularity are seen throughout the anterior mediastinum without  pathologic enlargement, etiology unclear.     The heart is enlarged. There is trace pericardial fluid. The ascending  thoracic aorta appears normal in caliber. Main pulmonary artery appears  normal in caliber. There is poor opacification of the pulmonary  arteries, which limits evaluation. No large central or proximal  segmental filling defects are identified.     There is mild diffuse bronchial wall thickening. There is some  interlobular septal thickening at the lung bases. There are dependent  changes in the lung bases. No focal pulmonary nodules are identified.     Review of the visualized portion of the upper abdomen demonstrates  evidence of prior right nephrectomy. There is contrast in the left renal  collecting system.     Review of the visualized osseous structures demonstrates somewhat of a  heterogeneous appearance of the sternum without a discrete lesion  identified. The ribs also appear somewhat heterogeneous bilaterally.       Impression:      1. Poor evaluation of the pulmonary arteries due to contrast bolus  timing. Allowing for this, no PE is identified.     2. Cardiomegaly with trace pericardial fluid.  3. Mild bronchial wall thickening. Some degree of chronic interstitial  change not excluded.  4. No pathologically enlarged lymph nodes identified, though several  tiny nodules are seen in the anterior mediastinum, possibly reflecting  lymph nodes.           This report was finalized on 07/09/2021 21:15 by Dr. Macario Ramos MD.    CT Thoracic Spine Without Contrast [[id number]] Collected: 07/09/21 2106     Updated: 07/09/21 2113    Narrative:      EXAMINATION: CT THORACIC SPINE WO CONTRAST- 7/9/2021 9:06 PM CDT     HISTORY: Chest and back pain, history of renal cell  carcinoma with bone  metastases     COMPARISON: None     DOSE: 758 mGy-cm     TECHNIQUE: Sequential imaging was performed through the thoracic spine  without the use of IV contrast.  Sagittal and coronal reformations were  made from the original source data and reviewed. Automated exposure  control was also utilized to decrease patient radiation dose.     FINDINGS:   Alignment of the thoracic spine appears normal. Vertebral body heights  appear well maintained. There is no evidence of perched facet. The  spinous processes appear intact. Endplate degenerative spurring is  evident. A few tiny foci of sclerosis are evident. No lytic lesions are  identified. There is no evidence of acute fracture. There is no evidence  of critical spinal canal stenosis. No severe neuroforaminal narrowing is  identified.     Review of the visualized paraspinal soft tissues demonstrates no acute  abnormalities.           Impression:      1. No acute findings in the thoracic spine.  2. No metastatic disease identified.     This report was finalized on 07/09/2021 21:10 by Dr. Macario Ramos MD.    XR Chest 1 View [[id number]] Collected: 07/09/21 2011     Updated: 07/09/21 2015    Narrative:      EXAMINATION: XR CHEST 1 VW- 7/9/2021 8:11 PM CDT     HISTORY: Shortness of breath     COMPARISON: None     FINDINGS:  There are patchy groundglass airspace opacities in the left lung base.  There is hypoaeration. Heart is magnified but felt to be mildly  enlarged. Mediastinal contours otherwise appear normal. There is no  appreciable pneumothorax or pleural effusion. There are surgical clips  in the right upper quadrant of the abdomen.       Impression:      Airspace opacities in the left lung base concerning for  inflammation or early infection.  This report was finalized on 07/09/2021 20:12 by Dr. Macario Ramos MD.          I have personally reviewed and interpreted the radiology studies and ECG obtained at time of admission.     Assessment / Plan      Assessment:   Active Hospital Problems    Diagnosis    • **Sickle cell crisis (CMS/HCC)    • Anemia of chronic disease    • Acute pain    • Hyperbilirubinemia    • Hypoxia    • Single kidney, left    • Renal cell carcinoma of right kidney metastatic to other site (CMS/HCC)        Plan:   1.  Admit as inpatient  2.  Continue hydration with LR at 125 mL/hour  3.  Home medications reviewed and restarted as appropriate to include narcotics  4.  DVT prophylaxis with SCDs  5.  Morphine PCA  6.  Labs in a.m., monitor kidney function closely  7.  Continuous pulse oximetry, supplemental oxygen, incentive spirometry      I discussed the patient's findings and my recommendations with: Wale Galloway MD  Time spent: 35 minutes    Patient seen and examined by me on 7/9/2021 at 10:31 PM.    Electronically signed by GILBERTO Montanez, 07/09/21, 23:34 CDT.   No

## 2023-05-16 NOTE — PROGRESS NOTES
Physical Therapy Treatment Note    [address], [place] [address]    Patient: Holland Phelps                                                 Visit Date: 2023  :     1970    Referring practitioner:    GILBERTO Ornelas  Date of Initial Visit:          Type: THERAPY  Noted: 10/12/2022    Patient seen for 25 sessions    Visit Diagnoses:    ICD-10-CM ICD-9-CM   1. Lymphedema of left leg  I89.0 457.1   2. Lymphedema of genitalia  I89.0 457.1   3. Renal cell carcinoma of right kidney  C64.1 189.0     SUBJECTIVE     Subjective  Patient states he feels like the L upper leg is better but the L lower leg is not softening as much as he would like. His wife continues to wrap the L leg nightly using the foam roll.     PAIN: 0/10  Just numb         OBJECTIVE     Objective    Lymphedema       Row Name 23 [id number]             Subjective Pain    Able to rate subjective pain? yes  -AL      Pre-Treatment Pain Level 0  -AL         Lymphedema Measurements    Measurement Type(s) Circumferential  -AL      Circumferential Areas Lower extremities  -AL         BLE Circumferential (cm)    Measurement Location 1 BOT  -AL      Left 1 23.5 cm  -AL      Measurement Location 2 +7  -AL      Left 2 25.6 cm  -AL      Measurement Location 3 +7  -AL      Left 3 28 cm  -AL      Measurement Location 4 +10  -AL      Left 4 32 cm  -AL      Measurement Location 5 +10  -AL      Left 5 40.2 cm  -AL      Measurement Location 6 +10  -AL      Left 6 46 cm  -AL      Measurement Location 7 +10  -AL      Left 7 43 cm  -AL      Measurement Location 8 +10  -AL      Left 8 48.9 cm  -AL      Measurement Location 9 +10  -AL      Left 9 56.7 cm  -AL      Measurement Location 10 +10  -AL      Left 10 61.9 cm  -AL      LLE Circumferential Total 405.8 cm  -AL         Manual Lymphatic Drainage    Manual Lymphatic Drainage initial sequence;opened regional lymph nodes;opened  anastamoses;extremity treatment  -AL      Initial Sequence short neck;abdomen;diaphragmatic breathing  -AL      Abdomen deep  -AL      Diaphragmatic Breathing x 9 with deep abdominals  -AL      Opened Regional Lymph Nodes axillary;inguinal  -AL      Axillary left  -AL      Inguinal left  -AL      Opened Anastamoses inguino-axillary  -AL      Extremity Treatment MLD to full limb  -AL      Extremity Treatment Focus On L LE  -AL      Manual Lymphatic Drainage Comments IASTM to L anterior/posterior thigh and L calf and L lower leg.  Used both the medium and small rollers.  -AL      Manual Therapy 75  -AL         Compression/Skin Care    Compression/Skin Care compression garment;skin care;wrapping location;remove bandages;bandaging  -AL      Skin Care lotion applied  -AL      Wrapping Location lower extremity  -AL      Wrapping Location LE left:;toes to groin  -AL      Bandaging Technique circumferential/spiral;figure-eight;moderate compression;strong compression  -AL      Compression Garment Comments Used 4 in stockinette, 2 lower leg 1/2 in foam pieces and 2 upper leg half in foam pieces all secured with Artiflex, Comprifoam on the L foot, Comprilan short stretch bandages:  one 8 cm, two 10 cm, three 12 cm.  -AL      Remove Bandages Remove in the morning and wear compression garment, re-wrap each night after using pump, before going to bed.  -AL                User Key  (r) = Recorded By, (t) = Taken By, (c) = Cosigned By      Initials Name Provider Type    Elida Pickering PTA, SAHIL Physical Therapist Assistant                      Therapeutic Exercises    29919 Units Comments   Prone L quad stretch      L hamstring stretch with L ankle dorsi flexion     L hip flexor stretch in the John Test position      Small foam roll under L ischial tuberosity       Timed Minutes 15          Therapy Education/Self Care 04499   Education offered today Try using the gray foam on the lower leg and the Comprifoam on the upper  leg at home.    [employer] Code    Ongoing HEP   Wear compression garment and use the Flexitouch pump   Timed Minutes        Total Timed Treatment:      90  mins  Total Time of Visit:             90  mins         ASSESSMENT/PLAN     GOALS  Goals                                          Progress Note due by 6/8/23                                                      Recert due by 7/11/23   LTG by: 12 weeks Comments Date Status   Patient will have a reduction in LLE of at least 20 cm to allow more comfortable walking and mobility.  Patient has had another reduction L LE 5/9 Ongoing   Patient will be independent with specific HEP for lymphedema He is able to move around much better. 5/9 Ongoing   He will have no s/s infection.  No signs or symptoms of infection 11/16 Met   He will have no complaint of increased edema in the groin. The dense tissue continues to soften. 5/16 Progressing   Patient will be independent with all tools available to manage his LLE lymphedema. Patient's wife is using the Comprifoam with the short stretch bandages at home. 5/9 Ongoing          Assessment/Plan     ASSESSMENT:  Patient has had a reduction of 5.9 cm as compared to last week. The lower leg is softer and has reduced.  Patient will try the gray foam at home on the lower leg to see if this helps to soften the leg more. Overall he is doing much better as far as getting around with less difficulty.  Since using the different types of foam he has had a reduction each week.    PLAN: Continue to work on CDT, will see patient 1-2  x per week x 8 weeks.     SIGNATURE: Elida Sequeira PTA, [place] License #: [id number]  Electronically Signed on 5/16/2023        [address]  [place] [address]  [phone]

## 2023-05-23 ENCOUNTER — TREATMENT (OUTPATIENT)
Dept: PHYSICAL THERAPY | Facility: CLINIC | Age: 53
End: 2023-05-23
Payer: MEDICARE

## 2023-05-23 DIAGNOSIS — C64.1 RENAL CELL CARCINOMA OF RIGHT KIDNEY: ICD-10-CM

## 2023-05-23 DIAGNOSIS — I89.0 LYMPHEDEMA OF LEFT LEG: Primary | ICD-10-CM

## 2023-05-23 DIAGNOSIS — I89.0 LYMPHEDEMA OF GENITALIA: ICD-10-CM

## 2023-05-23 PROCEDURE — 97140 MANUAL THERAPY 1/> REGIONS: CPT | Performed by: PHYSICAL THERAPIST

## 2023-05-23 PROCEDURE — 97110 THERAPEUTIC EXERCISES: CPT | Performed by: PHYSICAL THERAPIST

## 2023-05-23 NOTE — PROGRESS NOTES
Physical Therapy Treatment Note    [address], [place] [address]    Patient: Holland Phelps                                                 Visit Date: 2023  :     1970    Referring practitioner:    GILBERTO Ornelas  Date of Initial Visit:          Type: THERAPY  Noted: 10/12/2022    Patient seen for 26 sessions    Visit Diagnoses:    ICD-10-CM ICD-9-CM   1. Lymphedema of left leg  I89.0 457.1   2. Lymphedema of genitalia  I89.0 457.1   3. Renal cell carcinoma of right kidney  C64.1 189.0     SUBJECTIVE     Subjective  Patient states he did too much yesterday working in the yard then going to a softball game last night.  He can tell the leg is swollen today.  His wife is wrapping the lower leg with the gray foam and this helps. He is still getting around much better and was able to weed eat his yard.    PAIN: 0/10  Just numb         OBJECTIVE     Objective    Lymphedema       Row Name 23 0800             Subjective Pain    Able to rate subjective pain? yes  -AL      Pre-Treatment Pain Level 0  -AL         Lymphedema Measurements    Measurement Type(s) Circumferential  -AL      Circumferential Areas Lower extremities  -AL         BLE Circumferential (cm)    Measurement Location 1 BOT  -AL      Left 1 23.9 cm  -AL      Measurement Location 2 +7  -AL      Left 2 25.9 cm  -AL      Measurement Location 3 +7  -AL      Left 3 27.9 cm  -AL      Measurement Location 4 +10  -AL      Left 4 33.4 cm  -AL      Measurement Location 5 +10  -AL      Left 5 41.5 cm  -AL      Measurement Location 6 +10  -AL      Left 6 47.7 cm  -AL      Measurement Location 7 +10  -AL      Left 7 44.5 cm  -AL      Measurement Location 8 +10  -AL      Left 8 50.4 cm  -AL      Measurement Location 9 +10  -AL      Left 9 59 cm  -AL      Measurement Location 10 +10  -AL      Left 10 64 cm  -AL      LLE Circumferential Total 418.2 cm  -AL         Manual  Lymphatic Drainage    Manual Lymphatic Drainage initial sequence;opened regional lymph nodes;opened anastamoses;extremity treatment  -AL      Initial Sequence short neck;abdomen;diaphragmatic breathing  -AL      Abdomen deep  -AL      Diaphragmatic Breathing x 9 with deep abdominals  -AL      Opened Regional Lymph Nodes axillary;inguinal  -AL      Axillary left  -AL      Inguinal left  -AL      Opened Anastamoses inguino-axillary  -AL      Extremity Treatment MLD to full limb  -AL      Extremity Treatment Focus On L LE  -AL      Manual Lymphatic Drainage Comments IASTM to L anterior/posterior thigh and L calf and L lower leg.  Used both the medium and small rollers.  -AL      Manual Therapy 65  -AL         Compression/Skin Care    Compression/Skin Care compression garment  -AL      Skin Care --  -AL      Wrapping Location --  -AL      Wrapping Location LE --  -AL      Bandaging Technique --  -AL      Compression Garment Comments --  -AL      Remove Bandages --  -AL      Compression/Skin Care Comments Patient donned his compression stocking  -AL                User Key  (r) = Recorded By, (t) = Taken By, (c) = Cosigned By      Initials Name Provider Type    Elida Pickering PTA, SAHIL Physical Therapist Assistant                      Therapeutic Exercises    16939 Units Comments   L hamstring stretch with L ankle dorsi flexion     L hip flexor stretch in the John Test position      Small foam roll under L ischial tuberosity       Timed Minutes 10          Therapy Education/Self Care 16092   Education offered today Try using the gray foam on the lower leg and the Comprifoam on the upper leg at home.    Medbridge Code    Ongoing HEP   Wear compression garment and use the Flexitouch pump   Timed Minutes        Total Timed Treatment:      75  mins  Total Time of Visit:             75  mins         ASSESSMENT/PLAN     GOALS  Goals                                          Progress Note due by 6/8/23                                                       Recert due by 7/11/23   LTG by: 12 weeks Comments Date Status   Patient will have a reduction in LLE of at least 20 cm to allow more comfortable walking and mobility.  Patient has had another reduction L LE 5/9 Ongoing   Patient will be independent with specific HEP for lymphedema He is able to move around much better. 5/9 Ongoing   He will have no s/s infection.  No signs or symptoms of infection 11/16 Met   He will have no complaint of increased edema in the groin. Patient had more dense tissue today 5/23 Progressing   Patient will be independent with all tools available to manage his LLE lymphedema. Patient's wife is using the Comprifoam with the short stretch bandages at home. 5/9 Ongoing          Assessment/Plan     ASSESSMENT:  Patient has had an increase in size of 12.4 cm as compared to his last visit.  He did a lot of yard work yesterday and I do believe this contributed to the swelling. The tissue is more dense today, this does soften with the MLD and the IASTM.  He forgot the compression wraps so I was not able to wrap patient today.     PLAN: Continue to work on CDT, will see patient 1-2  x per week x 8 weeks.     SIGNATURE: Elida Sequeira PTA, [facility], [place] License #: [id number]  Electronically Signed on 5/23/2023        [facility] [address]  [place] [address]  [phone]

## 2023-05-30 ENCOUNTER — TREATMENT (OUTPATIENT)
Dept: PHYSICAL THERAPY | Facility: CLINIC | Age: 53
End: 2023-05-30

## 2023-05-30 DIAGNOSIS — I89.0 LYMPHEDEMA OF LEFT LEG: Primary | ICD-10-CM

## 2023-05-30 DIAGNOSIS — C64.1 RENAL CELL CARCINOMA OF RIGHT KIDNEY: ICD-10-CM

## 2023-05-30 DIAGNOSIS — I89.0 LYMPHEDEMA OF GENITALIA: ICD-10-CM

## 2023-05-30 PROCEDURE — 97110 THERAPEUTIC EXERCISES: CPT | Performed by: PHYSICAL THERAPIST

## 2023-05-30 PROCEDURE — 97140 MANUAL THERAPY 1/> REGIONS: CPT | Performed by: PHYSICAL THERAPIST

## 2023-05-30 NOTE — PROGRESS NOTES
Physical Therapy Treatment Note    [address], [place] [address]    Patient: Holland Phelps                                                 Visit Date: 2023  :     1970    Referring practitioner:    GILBERTO Ornelas  Date of Initial Visit:          Type: THERAPY  Noted: 10/12/2022    Patient seen for 27 sessions    Visit Diagnoses:    ICD-10-CM ICD-9-CM   1. Lymphedema of left leg  I89.0 457.1   2. Lymphedema of genitalia  I89.0 457.1   3. Renal cell carcinoma of right kidney  C64.1 189.0     SUBJECTIVE     Subjective  He states he did pretty good, he grilled yesterday and had a lot of company so he kept his leg wrapped.     PAIN: 0/10  a little numbness anterior L lower leg.          OBJECTIVE     Objective    Lymphedema       Row Name 23 0800             Subjective Pain    Able to rate subjective pain? yes  -AL      Pre-Treatment Pain Level 0  -AL         Lymphedema Measurements    Measurement Type(s) Circumferential  -AL      Circumferential Areas Lower extremities  -AL         BLE Circumferential (cm)    Measurement Location 1 BOT  -AL      Left 1 24.1 cm  -AL      Measurement Location 2 +7  -AL      Left 2 26.9 cm  -AL      Measurement Location 3 +7  -AL      Left 3 29 cm  -AL      Measurement Location 4 +10  -AL      Left 4 32.8 cm  -AL      Measurement Location 5 +10  -AL      Left 5 41.5 cm  -AL      Measurement Location 6 +10  -AL      Left 6 46.6 cm  -AL      Measurement Location 7 +10  -AL      Left 7 44.5 cm  -AL      Measurement Location 8 +10  -AL      Left 8 51 cm  -AL      Measurement Location 9 +10  -AL      Left 9 60.2 cm  -AL      Measurement Location 10 +10  -AL      Left 10 66 cm  -AL      LLE Circumferential Total 422.6 cm  -AL         Manual Lymphatic Drainage    Manual Lymphatic Drainage initial sequence;opened regional lymph nodes;opened anastamoses;extremity treatment  -AL      Initial Sequence  short neck;abdomen;diaphragmatic breathing  -AL      Abdomen deep  -AL      Diaphragmatic Breathing x 9 with deep abdominals  -AL      Opened Regional Lymph Nodes axillary;inguinal  -AL      Axillary left  -AL      Inguinal left  -AL      Opened Anastamoses inguino-axillary  -AL      Extremity Treatment MLD to full limb  -AL      Extremity Treatment Focus On L LE  -AL      Manual Lymphatic Drainage Comments IASTM to L anterior/posterior thigh and L calf and L lower leg.  Used both the medium and small rollers.  -AL      Manual Therapy 65  -AL         Compression/Skin Care    Compression/Skin Care compression garment;skin care;wrapping location;remove bandages;bandaging  -AL      Skin Care lotion applied  -AL      Wrapping Location lower extremity  -AL      Wrapping Location LE left:;toes to groin  -AL      Bandaging Technique circumferential/spiral;figure-eight;moderate compression;strong compression  -AL      Compression Garment Comments Used 4 in stockinette, Comprifoam, Comprilan short stretch bandages:  one 8 cm, two 10 cm, three 12 cm.  -AL      Remove Bandages Remove in the morning and wear compression garment, re-wrap each night after using pump, before going to bed.  -AL                User Key  (r) = Recorded By, (t) = Taken By, (c) = Cosigned By      Initials Name Provider Type    AL Elida Sequeira, PTA, CLT-LANNY Physical Therapist Assistant                      Therapeutic Exercises    94661 Units Comments   Prone quad stretch  ROM is limited due to knee surgeries.    L hamstring stretch with L ankle dorsi flexion     L hip flexor stretch in the John Test position      Small foam roll under L ischial tuberosity       Timed Minutes 10          Therapy Education/Self Care 55817   Education offered today Try using the gray foam on the lower leg and the Comprifoam on the upper leg at home.    Medbridge Code    Ongoing HEP   Wear compression garment and use the Flexitouch pump   Timed Minutes        Total  Timed Treatment:      75  mins  Total Time of Visit:             75  mins         ASSESSMENT/PLAN     GOALS  Goals                                          Progress Note due by 6/8/23                                                      Recert due by 7/11/23   LTG by: 12 weeks Comments Date Status   Patient will have a reduction in LLE of at least 20 cm to allow more comfortable walking and mobility.  Patient has had an increase in size the last two visits.  5/30 Ongoing   Patient will be independent with specific HEP for lymphedema He is able to move around much better. 5/9 Ongoing   He will have no s/s infection.  No signs or symptoms of infection 11/16 Met   He will have no complaint of increased edema in the groin. Patient had more dense tissue today 5/23 Progressing   Patient will be independent with all tools available to manage his LLE lymphedema. Patient's wife is using the Comprifoam with the short stretch bandages at home. 5/9 Ongoing          Assessment/Plan     ASSESSMENT:  Patient has had an increase of 4.4 cm, he was on his legs a lot yesterday.  I do think if he did not wear the short stretch bandages he would have had more of an increase. He is not scheduled until 6/20 due to lack of availability on our schedule.  He will work on CDT at home, I would like his wife to come in for one of his treatments so we can go over techniques for wrapping.     PLAN: Continue to work on CDT, will see patient 1-2  x per week x 8 weeks. Patient will need a progress note next treatment.     SIGNATURE: Elida Sequeira PTA, [place] License #: [id number]  Electronically Signed on 5/30/2023        [address]  [place] [address]  [phone]

## 2023-07-28 ENCOUNTER — TREATMENT (OUTPATIENT)
Dept: PHYSICAL THERAPY | Facility: CLINIC | Age: 53
End: 2023-07-28
Payer: MEDICARE

## 2023-07-28 DIAGNOSIS — C64.1 RENAL CELL CARCINOMA OF RIGHT KIDNEY: ICD-10-CM

## 2023-07-28 DIAGNOSIS — I89.0 LYMPHEDEMA OF LEFT LEG: Primary | ICD-10-CM

## 2023-07-28 DIAGNOSIS — I89.0 LYMPHEDEMA OF GENITALIA: ICD-10-CM

## 2023-07-28 PROCEDURE — 97535 SELF CARE MNGMENT TRAINING: CPT | Performed by: PHYSICAL THERAPIST

## 2023-07-28 PROCEDURE — 97110 THERAPEUTIC EXERCISES: CPT | Performed by: PHYSICAL THERAPIST

## 2023-07-28 PROCEDURE — 97140 MANUAL THERAPY 1/> REGIONS: CPT | Performed by: PHYSICAL THERAPIST

## 2023-07-28 NOTE — PROGRESS NOTES
Physical Therapy Treatment Note    [address], [place] [address]    Patient: Holland Phelps                                                 Visit Date: 2023  :     1970    Referring practitioner:    GILBERTO Ornelas  Date of Initial Visit:          Type: THERAPY  Noted: 10/12/2022    Patient seen for 34 sessions    Visit Diagnoses:    ICD-10-CM ICD-9-CM   1. Lymphedema of left leg  I89.0 457.1   2. Lymphedema of genitalia  I89.0 457.1   3. 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  He thinks his leg is a little better today.  He did not bring the gray foam with him today.    PAIN: 0/10  a little numbness anterior L lower leg.          OBJECTIVE     Objective    Lymphedema       Row Name 23 0800             Subjective Pain    Able to rate subjective pain? yes  -AL      Pre-Treatment Pain Level 0  -AL         Lymphedema Measurements    Measurement Type(s) Circumferential  -AL      Circumferential Areas Lower extremities  -AL         BLE Circumferential (cm)    Measurement Location 1 BOT  -AL      Left 1 22.5 cm  -AL      Measurement Location 2 +7  -AL      Left 2 25.9 cm  -AL      Measurement Location 3 +7  -AL      Left 3 27.8 cm  -AL      Measurement Location 4 +10  -AL      Left 4 33 cm  -AL      Measurement Location 5 +10  -AL      Left 5 42.5 cm  -AL      Measurement Location 6 +10  -AL      Left 6 45.1 cm  -AL      Measurement Location 7 +10  -AL      Left 7 43.2 cm  -AL      Measurement Location 8 +10  -AL      Left 8 49.4 cm  -AL      Measurement Location 9 +10  -AL      Left 9 57 cm  -AL      Measurement Location 10 +10  -AL      Left 10 61.9 cm  -AL      LLE Circumferential Total 408.3 cm  -AL         Manual Lymphatic Drainage    Manual Lymphatic Drainage initial sequence;opened regional  lymph nodes;opened anastamoses;extremity treatment  -AL      Initial Sequence short neck;abdomen;diaphragmatic breathing  -AL      Abdomen superficial;deep  -AL      Diaphragmatic Breathing x 9 with superficial abdominals  -AL      Opened Regional Lymph Nodes axillary;inguinal  -AL      Axillary right;left  -AL      Inguinal right;left  -AL      Opened Anastamoses inguino-axillary  -AL      Inguino-Axillary right;left  Supine and prone  -AL      Extremity Treatment MLD to full limb  -AL      MLD to Full Limb L LE  -AL      Manual Lymphatic Drainage Comments IASTM using hand held massager to the L Upper leg and calf in prone and the L upper leg in supine.  -AL      Manual Therapy 50  -AL         Compression/Skin Care    Compression/Skin Care compression garment;skin care  -AL      Skin Care lotion applied  -AL      Wrapping Location lower extremity  -AL      Wrapping Location LE left:;toes to groin  -AL      Bandaging Technique circumferential/spiral;figure-eight;moderate compression;strong compression  -AL      Compression Garment Comments Used 4 in stockinette, Comprifoam, Comprilan short stretch bandages: one 8 cm, two 10 cm, three 12 cm.  -AL                User Key  (r) = Recorded By, (t) = Taken By, (c) = Cosigned By      Initials Name Provider Type    Elida Pickering, PTA, CLT[facility]LANNY Physical Therapist Assistant                        Therapeutic Exercises    51351 Units Comments   Prone quad stretch   ROM is limited due to knee surgeries.    Half foam roll under L hip     L hip flexor stretch in the Sang Test position     L hamstring stretch with L ankle dorsi flexion       Timed Minutes 15             Therapy Education/Self Care 40114   Education offered today Educated about the Edema Wear and when to wear it.  Stressed it is ok if he needs a break from the sleeve or wraps, but not a replacement for them.    Medbridge Code    Ongoing HEP   Wear compression garment and use the Flexitouch pump   Timed  Minutes 10       Total Timed Treatment:      75  mins  Total Time of Visit:             75  mins         ASSESSMENT/PLAN     GOALS  Goals                                          Progress Note due by 8/21/23                                                      Recert due by 10/19/23   LTG by: 12 weeks Comments Date Status   Patient will have a reduction in LLE of at least 20 cm to allow more comfortable walking and mobility.  He has reduced as compared to his last measurements 7/21 Ongoing   Patient will be independent with specific HEP for lymphedema He is able to move around much better. 6/20 Met   He will have no s/s infection.  No signs or symptoms of infection 11/16 Met   He will have no complaint of increased edema in the groin. The tissue is still soft L LE. 7/21 Progressing   Patient will be independent with all tools available to manage his LLE lymphedema. He will order new thigh highs, he has an Edema Wear garment to wear now. 7/28 Ongoing             ASSESSMENT:  He has had a slight overall reduction in the L LE, he has stayed down in size the last couple of treatments so I do want him to order more 20-30 mmHg compression garments.  He now has an Edema Wear garment to wear as needed.  He wanted something to have if he needs  a break from the more containing compression.  He will still wear the wraps or thigh high daily.  The only area the leg was up in size was the L thigh.    PLAN: Continue to work on CDT, will see patient 1-2  x per week.      SIGNATURE: Elida Sequeira PTA, [place], [place] License #  [id number]  Electronically Signed on 7/28/2023        [address] [place] [address]. [address]  [phone]

## 2023-08-02 ENCOUNTER — TREATMENT (OUTPATIENT)
Dept: PHYSICAL THERAPY | Facility: CLINIC | Age: 53
End: 2023-08-02
Payer: MEDICARE

## 2023-08-02 DIAGNOSIS — I89.0 LYMPHEDEMA OF LEFT LEG: Primary | ICD-10-CM

## 2023-08-02 DIAGNOSIS — C64.1 RENAL CELL CARCINOMA OF RIGHT KIDNEY: ICD-10-CM

## 2023-08-02 DIAGNOSIS — I89.0 LYMPHEDEMA OF GENITALIA: ICD-10-CM

## 2023-08-02 PROCEDURE — 97110 THERAPEUTIC EXERCISES: CPT | Performed by: PHYSICAL THERAPIST

## 2023-08-02 PROCEDURE — 97140 MANUAL THERAPY 1/> REGIONS: CPT | Performed by: PHYSICAL THERAPIST

## 2023-08-09 ENCOUNTER — TREATMENT (OUTPATIENT)
Dept: PHYSICAL THERAPY | Facility: CLINIC | Age: 53
End: 2023-08-09
Payer: MEDICARE

## 2023-08-09 DIAGNOSIS — I89.0 LYMPHEDEMA OF LEFT LEG: Primary | ICD-10-CM

## 2023-08-09 DIAGNOSIS — I89.0 LYMPHEDEMA OF GENITALIA: ICD-10-CM

## 2023-08-09 DIAGNOSIS — C64.1 RENAL CELL CARCINOMA OF RIGHT KIDNEY: ICD-10-CM

## 2023-08-09 NOTE — PROGRESS NOTES
Physical Therapy Treatment Note    [address], [place] [address]    Patient: Holland Phelps                                                 Visit Date: 2023  :     1970    Referring practitioner:    GILBERTO Ornelas  Date of Initial Visit:          Type: THERAPY  Noted: 10/12/2022    Patient seen for 36 sessions    Visit Diagnoses:    ICD-10-CM ICD-9-CM   1. Lymphedema of left leg  I89.0 457.1   2. Lymphedema of genitalia  I89.0 457.1   3. Renal cell carcinoma of right kidney  C64.1 189.0     SUBJECTIVE     Subjective  He has been busy at home getting on the roof and cleaning gutters. He continues to clean cars.     PAIN: 0/10  a little numbness ankle and 5th toe.          OBJECTIVE     Objective    Lymphedema       Row Name 23 0800             Subjective Pain    Able to rate subjective pain? yes  -AL      Pre-Treatment Pain Level 0  -AL         Lymphedema Measurements    Measurement Type(s) Circumferential  -AL      Circumferential Areas Lower extremities  -AL         BLE Circumferential (cm)    Measurement Location 1 BOT  -AL      Left 1 22.5 cm  -AL      Measurement Location 2 +7  -AL      Left 2 25 cm  -AL      Measurement Location 3 +7  -AL      Left 3 27 cm  -AL      Measurement Location 4 +10  -AL      Left 4 31.5 cm  -AL      Measurement Location 5 +10  -AL      Left 5 39.8 cm  -AL      Measurement Location 6 +10  -AL      Left 6 45.5 cm  -AL      Measurement Location 7 +10  -AL      Left 7 42.3 cm  -AL      Measurement Location 8 +10  -AL      Left 8 46.6 cm  -AL      Measurement Location 9 +10  -AL      Left 9 53.2 cm  -AL      Measurement Location 10 +10  -AL      Left 10 60 cm  -AL      LLE Circumferential Total 393.4 cm  -AL         Manual Lymphatic Drainage    Manual Lymphatic Drainage initial sequence;opened regional lymph nodes;opened anastamoses;extremity treatment  -AL      Initial Sequence short  neck;abdomen;diaphragmatic breathing  -AL      Abdomen superficial;deep  -AL      Diaphragmatic Breathing x 9 with superficial abdominals  -AL      Opened Regional Lymph Nodes axillary;inguinal  -AL      Axillary right;left  -AL      Inguinal right;left  -AL      Opened Anastamoses inguino-axillary  -AL      Inguino-Axillary right;left  Supine and prone  -AL      Extremity Treatment MLD to full limb  -AL      MLD to Full Limb L LE  -AL      Manual Lymphatic Drainage Comments IASTM using hand held massager to the L Upper leg and calf in prone and the L upper leg in supine.  -AL      Manual Therapy 55  -AL         Compression/Skin Care    Compression/Skin Care compression garment;skin care  -AL      Skin Care lotion applied  -AL      Wrapping Location lower extremity  -AL      Wrapping Location LE left:;toes to groin  -AL      Bandaging Technique circumferential/spiral;figure-eight;moderate compression;strong compression  -AL      Compression Garment Comments Used 4 in stockinette, Comprifoam, Comprilan short stretch bandages: one 8 cm, two 10 cm, three 12 cm.  -AL                User Key  (r) = Recorded By, (t) = Taken By, (c) = Cosigned By      Initials Name Provider Type    Elida Pickering PTA, SAHIL Physical Therapist Assistant                        Therapeutic Exercises    40452 Units Comments   L Prone quad stretch   ROM is limited due to knee surgeries.    Half foam roll under L hip     B hip flexor stretch in the Sang Test position     B hamstring stretch with L ankle dorsi flexion       Timed Minutes 15             Therapy Education/Self Care 10641   Education offered today Work on CDT and use the pump.   Medbridge Code    Ongoing HEP   Wear compression garment and use the Flexitouch pump   Timed Minutes        Total Timed Treatment:      70  mins  Total Time of Visit:             70  mins         ASSESSMENT/PLAN     GOALS  Goals                                          Progress Note due by 8/21/23                                                       Recert due by 10/19/23   LTG by: 12 weeks Comments Date Status   Patient will have a reduction in LLE of at least 20 cm to allow more comfortable walking and mobility.  He has had an increase in the L LE today. 8/9 Ongoing   Patient will be independent with specific HEP for lymphedema He is able to move around much better. 6/20 Met   He will have no s/s infection.  No signs or symptoms of infection 11/16 Met   He will have no complaint of increased edema in the groin. The tissue is still soft L LE. 7/21 Progressing   Patient will be independent with all tools available to manage his LLE lymphedema. He has new Mediven Comfort compression thigh highs Size 5, 20-30 mmHg.  8/2 Ongoing             ASSESSMENT:  Patient has had an increase in  size of the L LE of 2.4 cm as compared to his last measurement.  He has been doing more work around the house on the outside.  He and his wife are diligent about wrapping the leg every night.     PLAN: Continue to work on CDT, will see patient 1-2  x per week.      SIGNATURE: Elida Sequeira PTA, [place], [place] License #  [id number]  Electronically Signed on 8/9/2023        [address] [place] [address]. [address]  [phone]

## 2023-08-16 ENCOUNTER — TREATMENT (OUTPATIENT)
Dept: PHYSICAL THERAPY | Facility: CLINIC | Age: 53
End: 2023-08-16
Payer: MEDICARE

## 2023-08-16 DIAGNOSIS — C64.1 RENAL CELL CARCINOMA OF RIGHT KIDNEY: ICD-10-CM

## 2023-08-16 DIAGNOSIS — I89.0 LYMPHEDEMA OF GENITALIA: ICD-10-CM

## 2023-08-16 DIAGNOSIS — I89.0 LYMPHEDEMA OF LEFT LEG: Primary | ICD-10-CM

## 2023-08-16 NOTE — PROGRESS NOTES
Physical Therapy Treatment Note    [address], [place] [address]    Patient: Holland Phelps                                                 Visit Date: 2023  :     1970    Referring practitioner:    GILBERTO Ornelas  Date of Initial Visit:          Type: THERAPY  Noted: 10/12/2022    Patient seen for 37 sessions    Visit Diagnoses:    ICD-10-CM ICD-9-CM   1. Lymphedema of left leg  I89.0 457.1   2. Lymphedema of genitalia  I89.0 457.1   3. Renal cell carcinoma of right kidney  C64.1 189.0     SUBJECTIVE     Subjective  He has been washing more cars, the leg is tight below the knee but he feels like the leg is better.  He had the R foot x-rayed because he has had a bruise type pain.  The x-ray was normal.    PAIN: 0/10  a little numbness ankle and 5th toe.          OBJECTIVE     Objective    Lymphedema       Row Name [id number] 0800             Subjective Pain    Able to rate subjective pain? yes  -AL      Pre-Treatment Pain Level 3  -AL      Subjective Pain Comment L knee to foot  -AL         Lymphedema Measurements    Measurement Type(s) Circumferential  -AL      Circumferential Areas Lower extremities  -AL         BUE Circumferential (cm)    Measurement Location 1 BOT  -AL      Left 1 22.3 cm  -AL      Measurement Location 2 +7  -AL      Left 2 24.5 cm  -AL      Measurement Location 3 +7  -AL      Left 3 25 cm  -AL      Measurement Location 4 +10  -AL      Left 4 30.2 cm  -AL      Measurement Location 5 +10  -AL      Left 5 38.6 cm  -AL      Measurement Location 6 +10  -AL      Left 6 45 cm  -AL      Measurement Location 7 +10  -AL      Left 7 42 cm  -AL      Measurement Location 8 +10  -AL      Left 8 45.8 cm  -AL      Measurement Location 9 +10  -AL      Left 9 53.2 cm  -AL      Measurement Location 10 +10  -AL      Left 10 62 cm  -AL         BLE Circumferential (cm)    Measurement Location 1 BOT  -AL      Measurement  Location 2 +7  -AL      Measurement Location 3 +7  -AL      Measurement Location 4 +10  -AL      Measurement Location 5 +10  -AL      Measurement Location 6 +10  -AL      Measurement Location 7 +10  -AL      Measurement Location 8 +10  -AL      Measurement Location 9 +10  -AL      Measurement Location 10 +10  -AL         Manual Lymphatic Drainage    Manual Lymphatic Drainage initial sequence;opened regional lymph nodes;opened anastamoses;extremity treatment  -AL      Initial Sequence short neck;abdomen;diaphragmatic breathing  -AL      Abdomen superficial;deep  -AL      Diaphragmatic Breathing x 9 with superficial abdominals  -AL      Opened Regional Lymph Nodes axillary;inguinal  -AL      Axillary right;left  -AL      Inguinal right;left  -AL      Opened Anastamoses inguino-axillary  -AL      Inguino-Axillary right;left  Supine and prone  -AL      Extremity Treatment MLD to full limb  -AL      MLD to Full Limb L LE  -AL      Manual Lymphatic Drainage Comments IASTM using hand held massager to the L Upper leg and calf in prone and the L upper leg in supine.  -AL      Manual Therapy 55  -AL         Compression/Skin Care    Compression/Skin Care compression garment;skin care  -AL      Compression/Skin Care Comments Patient donned his new Mediven Comfort compression thigh high, size 5, 20-30 mmHg.  -AL                User Key  (r) = Recorded By, (t) = Taken By, (c) = Cosigned By      Initials Name Provider Type    AL Elida Sequeira, PTA, CLT-LANNY Physical Therapist Assistant                        Therapeutic Exercises    42301 Units Comments   L Prone quad stretch   ROM is limited due to knee surgeries.    Half foam roll under L hip     B hip flexor stretch in the Sang Test position     B hamstring stretch with L ankle dorsi flexion       Timed Minutes 15             Therapy Education/Self Care 99875   Education offered today Work on CDT and use the pump.   [employer] Code    Ongoing HEP   Wear compression garment  and use the Flexitouch pump   Timed Minutes        Total Timed Treatment:      70  mins  Total Time of Visit:             70  mins         ASSESSMENT/PLAN     GOALS  Goals                                          Progress Note due by 8/21/23                                                      Recert due by 10/19/23   LTG by: 12 weeks Comments Date Status   Patient will have a reduction in LLE of at least 20 cm to allow more comfortable walking and mobility.  The L LE has had a good reduction. 8/16 Ongoing   Patient will be independent with specific HEP for lymphedema He is able to move around much better. 6/20 Met   He will have no s/s infection.  No signs or symptoms of infection 11/16 Met   He will have no complaint of increased edema in the groin. The tissue is still soft L LE. 7/21 Progressing   Patient will be independent with all tools available to manage his LLE lymphedema. He has new Mediven Comfort compression thigh highs Size 5, 20-30 mmHg.  8/2 Ongoing             ASSESSMENT:  Patient has had a reduction of 13.6 cm in the L LE.  He has been wearing the new compression which is helping to contain the fluid much better.  He continues to work on his normal routine for CDT which wrapping in the L LE at night and using the pump.   Overall patient has had good results from treatment.     PLAN: Continue to work on CDT, will see patient 1-2  x per week.      SIGNATURE: Elida Sequeira PTA, [place], [place] License #  [id number]  Electronically Signed on 8/16/2023        [address] [place] [address]. [address]  [phone]

## 2023-08-23 ENCOUNTER — TREATMENT (OUTPATIENT)
Dept: PHYSICAL THERAPY | Facility: CLINIC | Age: 53
End: 2023-08-23
Payer: MEDICARE

## 2023-08-23 DIAGNOSIS — C64.1 RENAL CELL CARCINOMA OF RIGHT KIDNEY: ICD-10-CM

## 2023-08-23 DIAGNOSIS — I89.0 LYMPHEDEMA OF GENITALIA: ICD-10-CM

## 2023-08-23 DIAGNOSIS — I89.0 LYMPHEDEMA OF LEFT LEG: Primary | ICD-10-CM

## 2023-08-23 NOTE — PROGRESS NOTES
Physical Therapy Treatment Note and 30 Day Progress Note    [address] [place] [address]    Patient: Holland Phelps                                                 Visit Date: 2023  :     1970    Referring practitioner:    GILBERTO Ornelas  Date of Initial Visit:          Type: THERAPY  Noted: 10/12/2022    Patient seen for 38 sessions    Visit Diagnoses:    ICD-10-CM ICD-9-CM   1. Lymphedema of left leg  I89.0 457.1   2. Lymphedema of genitalia  I89.0 457.1   3. 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  He also mowed the lawn yesterday.  He also played golf this week.      PAIN: 0/10          OBJECTIVE     Objective    Lymphedema       Row Name 23 0800             Subjective Pain    Able to rate subjective pain? yes  -AL      Pre-Treatment Pain Level 0  -AL         Lymphedema Measurements    Measurement Type(s) Circumferential  -AL      Circumferential Areas Lower extremities  -AL         BLE Circumferential (cm)    Measurement Location 1 BOT  -AL      Left 1 22.2 cm  -AL      Measurement Location 2 +7  -AL      Left 2 25 cm  -AL      Measurement Location 3 +7  -AL      Left 3 25.5 cm  -AL      Measurement Location 4 +10  -AL      Left 4 32 cm  -AL      Measurement Location 5 +10  -AL      Left 5 40 cm  -AL      Measurement Location 6 +10  -AL      Left 6 45.1 cm  -AL      Measurement Location 7 +10  -AL      Left 7 42.3 cm  -AL      Measurement Location 8 +10  -AL      Left 8 46.6 cm  -AL      Measurement Location 9 +10  -AL      Left 9 53 cm  -AL      Measurement Location 10 +10  -AL      Left 10 59.5 cm  -AL      LLE Circumferential Total 391.2 cm  -AL         Manual Lymphatic Drainage    Manual Lymphatic Drainage initial sequence;opened regional lymph nodes;opened anastamoses;extremity  treatment  -AL      Initial Sequence short neck;abdomen;diaphragmatic breathing  -AL      Abdomen superficial;deep  -AL      Diaphragmatic Breathing x 9 with superficial abdominals  -AL      Opened Regional Lymph Nodes axillary;inguinal  -AL      Axillary right;left  -AL      Inguinal right;left  -AL      Opened Anastamoses inguino-axillary  -AL      Inguino-Axillary right;left  Supine and prone  -AL      Extremity Treatment MLD to full limb  -AL      MLD to Full Limb L LE  -AL      Manual Lymphatic Drainage Comments IASTM using hand held massager to the L Upper leg and calf in prone and the L upper leg in supine.  -AL      Manual Therapy 45  -AL         Compression/Skin Care    Compression/Skin Care compression garment;skin care  -AL      Compression/Skin Care Comments Patient donned his new Mediven Comfort compression thigh high, size 5, 20-30 mmHg.  -AL                User Key  (r) = Recorded By, (t) = Taken By, (c) = Cosigned By      Initials Name Provider Type    Elida Pickering PTA, CLT-LANNY Physical Therapist Assistant                          Therapeutic Exercises    69085 Units Comments   L Prone quad stretch   ROM is limited due to knee surgeries.    Half foam roll under L hip     B hip flexor stretch in the Sang Test position     B hamstring stretch with L ankle dorsi flexion     B Lower trunk rotation  X 2     B Open book stretch X 2           Timed Minutes 30             Therapy Education/Self Care 02130   Education offered today Work on CDT and use the pump.   Medbridge Code    Ongoing HEP   Wear compression garment and use the Flexitouch pump   Timed Minutes        Total Timed Treatment:      75  mins  Total Time of Visit:             75  mins         ASSESSMENT/PLAN     GOALS  Goals                                          Progress Note due by 9/22/23                                                      Recert due by 10/19/23   LTG by: 12 weeks Comments Date Status   Patient will have a  reduction in LLE of at least 20 cm to allow more comfortable walking and mobility.  Slight reduction today. 8/23 Ongoing   Patient will be independent with specific HEP for lymphedema He is able to move around much better. 6/20 Met   He will have no s/s infection.  No signs or symptoms of infection 11/16 Met   He will have no complaint of increased edema in the groin. Only minor swelling in the L groin area. 8/23 Progressing   Patient will be independent with all tools available to manage his LLE lymphedema. He will work on more stretching for home maintenance program. 8/23 Ongoing             ASSESSMENT:  Patient has had a reduction of 2.2 cm in the L LE.  The shin area L leg is up in size, this is an area that is more difficulty to reduce.  He will have his wife wrap the L lower leg a little tighter to see if this helps reduce the lower leg more.  He will add more stretching to his home maintenance program.  He continues to have tightness in the L hip and knee.     PLAN: Continue to work on CDT, will see patient 1-2  x per week.      SIGNATURE: Elida Sequeira PTA, [place], [place] License #  [id number]  Electronically Signed on 8/23/2023        [address] [place] [address]. [address]  [phone]

## 2023-08-25 NOTE — PROGRESS NOTES
Progress Note Addendum      Patient: Holland Phelps           : 1970  Visit Date: 2023  Referring practitioner: GILBERTO Ornelas  Date of Initial Visit: Type: THERAPY  Noted: 10/12/2022  Patient seen for 38 sessions  Visit Diagnoses:    ICD-10-CM ICD-9-CM   1. Lymphedema of left leg  I89.0 457.1   2. Lymphedema of genitalia  I89.0 457.1   3. Renal cell carcinoma of right kidney  C64.1 189.0          Clinical Progress: improved  Home Program Compliance: Yes  Progress toward previous goals: Partially Met  Prognosis to achieve goals: good    Objective     Assessment & Plan       Assessment  Impairments: abnormal gait, abnormal or restricted ROM, lacks appropriate home exercise program and pain with function   Functional limitations: walking, uncomfortable because of pain and standing   Assessment details:  Patient has had a reduction of 2.2 cm in the L LE.  The shin area L leg is up in size, this is an area that is more difficulty to reduce.  He will have his wife wrap the L lower leg a little tighter to see if this helps reduce the lower leg more.  He will add more stretching to his home maintenance program.  He continues to have tightness in the L hip and knee.   Prognosis: good    Plan  Therapy options: will be seen for skilled therapy services  Planned therapy interventions: manual therapy, compression, soft tissue mobilization, stretching, therapeutic activities, home exercise program and functional ROM exercises  Frequency: 2x week (1-2 X/wk)  Duration in weeks: 12  Treatment plan discussed with: patient and PTA  Plan details: Continue with CDT.       I reviewed the treatment and goals with SAHIL Gil PTA and agree with the POC.    SIGNATURE: Debbie Morales, PT, DPT, SAHIL, License #: [id number]  Electronically Signed on 2023

## 2023-08-30 ENCOUNTER — TREATMENT (OUTPATIENT)
Dept: PHYSICAL THERAPY | Facility: CLINIC | Age: 53
End: 2023-08-30
Payer: MEDICARE

## 2023-08-30 DIAGNOSIS — C64.1 RENAL CELL CARCINOMA OF RIGHT KIDNEY: ICD-10-CM

## 2023-08-30 DIAGNOSIS — I89.0 LYMPHEDEMA OF GENITALIA: ICD-10-CM

## 2023-08-30 DIAGNOSIS — I89.0 LYMPHEDEMA OF LEFT LEG: Primary | ICD-10-CM

## 2023-08-30 NOTE — PROGRESS NOTES
Physical Therapy Treatment Note    [address], [place] [address]    Patient: Holland Phelps                                                 Visit Date: 2023  :     1970    Referring practitioner:    GILBERTO Ornelas  Date of Initial Visit:          Type: THERAPY  Noted: 10/12/2022    Patient seen for 39 sessions    Visit Diagnoses:    ICD-10-CM ICD-9-CM   1. Lymphedema of left leg  I89.0 457.1   2. Lymphedema of genitalia  I89.0 457.1   3. Renal cell carcinoma of right kidney  C64.1 189.0     SUBJECTIVE     Subjective  He been busy with mowing and washing cars.  He feels like the L legs is doing good, around the calf he thinks is better.  His wife has been wrapping the calf tighter. He switched mattresses and his back is not bothering as much now.     PAIN: 0/10          OBJECTIVE     Objective    Lymphedema       Row Name 23 0800             Lymphedema Measurements    Measurement Type(s) Circumferential  -AL      Circumferential Areas Lower extremities  -AL         BLE Circumferential (cm)    Measurement Location 1 BOT  -AL      Left 1 21.5 cm  -AL      Measurement Location 2 +7  -AL      Left 2 25 cm  -AL      Measurement Location 3 +7  -AL      Left 3 25.4 cm  -AL      Measurement Location 4 +10  -AL      Left 4 31.7 cm  -AL      Measurement Location 5 +10  -AL      Left 5 39.4 cm  -AL      Measurement Location 6 +10  -AL      Left 6 44.8 cm  -AL      Measurement Location 7 +10  -AL      Left 7 41.5 cm  -AL      Measurement Location 8 +10  -AL      Left 8 45.8 cm  -AL      Measurement Location 9 +10  -AL      Left 9 53 cm  -AL      Measurement Location 10 +10  -AL      Left 10 59.4 cm  -AL      LLE Circumferential Total 387.5 cm  -AL         Manual Lymphatic Drainage    Manual Lymphatic Drainage initial sequence;opened regional lymph nodes;opened anastamoses;extremity treatment  -AL      Initial Sequence short  neck;abdomen;diaphragmatic breathing  -AL      Abdomen superficial;deep  -AL      Diaphragmatic Breathing x 9 with superficial abdominals  -AL      Opened Regional Lymph Nodes axillary;inguinal  -AL      Axillary right;left  -AL      Inguinal right;left  -AL      Opened Anastamoses inguino-axillary  -AL      Inguino-Axillary right;left  Supine and prone  -AL      Extremity Treatment MLD to full limb  -AL      MLD to Full Limb L LE  -AL      Manual Lymphatic Drainage Comments IASTM using hand held massager to the L Upper leg and calf in prone and the L upper leg in supine.  -AL      Manual Therapy 60  -AL         Compression/Skin Care    Compression/Skin Care compression garment;skin care  -AL      Compression/Skin Care Comments Patient donned his new Mediven Comfort compression thigh high, size 5, 20-30 mmHg.  -AL                User Key  (r) = Recorded By, (t) = Taken By, (c) = Cosigned By      Initials Name Provider Type    Elida Pickering PTA, CLT-LANA Physical Therapist Assistant                          Therapeutic Exercises    79811 Units Comments   L Prone quad stretch   ROM is limited due to knee surgeries.    Half foam roll under L hip     B hip flexor stretch in the Sang Test position     B hamstring stretch with L ankle dorsi flexion            Timed Minutes 10             Therapy Education/Self Care 72038   Education offered today Work on CDT and use the pump.   Medbridge Code    Ongoing HEP   Wear compression garment and use the Flexitouch pump   Timed Minutes        Total Timed Treatment:      70  mins  Total Time of Visit:             70  mins         ASSESSMENT/PLAN     GOALS  Goals                                          Progress Note due by 9/22/23                                                      Recert due by 10/19/23   LT by: 12 weeks Comments Date Status   Patient will have a reduction in LLE of at least 20 cm to allow more comfortable walking and mobility.  Slight reduction today.  8/23 Ongoing   Patient will be independent with specific HEP for lymphedema He is able to move around much better. 6/20 Met   He will have no s/s infection.  No signs or symptoms of infection 11/16 Met   He will have no complaint of increased edema in the groin. Only minor swelling in the L groin area. 8/23 Progressing   Patient will be independent with all tools available to manage his LLE lymphedema. His wife has been wrapping the L lower leg tighter. 8/30 Ongoing             ASSESSMENT:  Patient has had a reduction of 3.7 cm in the L LE.  The shin area L leg is down in size, his wife has been wrapping the lower leg tighter.  He is still trying to do more activity in the morning so he is on his legs less in the afternoon and evenings.     SIGNATURE: Elida Sequeira PTA, [place], [place] License #  [id number]  Electronically Signed on 8/30/2023        [address] [place] [address]. [address]  [phone]

## 2023-09-06 ENCOUNTER — TREATMENT (OUTPATIENT)
Dept: PHYSICAL THERAPY | Facility: CLINIC | Age: 53
End: 2023-09-06
Payer: MEDICARE

## 2023-09-06 DIAGNOSIS — I89.0 LYMPHEDEMA OF GENITALIA: ICD-10-CM

## 2023-09-06 DIAGNOSIS — I89.0 LYMPHEDEMA OF LEFT LEG: Primary | ICD-10-CM

## 2023-09-06 DIAGNOSIS — C64.1 RENAL CELL CARCINOMA OF RIGHT KIDNEY: ICD-10-CM

## 2023-09-06 NOTE — PROGRESS NOTES
Physical Therapy Treatment Note    [address] [place] [address]    Patient: Holland Phelps                                                 Visit Date: 2023  :     1970    Referring practitioner:    IGLBERTO Ornelas  Date of Initial Visit:          Type: THERAPY  Noted: 10/12/2022    Patient seen for 40 sessions    Visit Diagnoses:    ICD-10-CM ICD-9-CM   1. Lymphedema of left leg  I89.0 457.1   2. Lymphedema of genitalia  I89.0 457.1   3. Renal cell carcinoma of right kidney  C64.1 189.0     SUBJECTIVE     Subjective  His leg is good, he will be busy this week.      PAIN: 0/10          OBJECTIVE     Objective    Lymphedema       Row Name 23 0800             Subjective Pain    Able to rate subjective pain? yes  -AL      Pre-Treatment Pain Level 0  -AL         BLE Circumferential (cm)    Measurement Location 1 BOT  -AL      Left 1 21.4 cm  -AL      Measurement Location 2 +7  -AL      Left 2 24.6 cm  -AL      Measurement Location 3 +7  -AL      Left 3 25.8 cm  -AL      Measurement Location 4 +10  -AL      Left 4 31 cm  -AL      Measurement Location 5 +10  -AL      Left 5 39.2 cm  -AL      Measurement Location 6 +10  -AL      Left 6 44.5 cm  -AL      Measurement Location 7 +10  -AL      Left 7 41.8 cm  -AL      Measurement Location 8 +10  -AL      Left 8 45.5 cm  -AL      Measurement Location 9 +10  -AL      Left 9 53 cm  -AL      Measurement Location 10 +10  -AL      Left 10 59.8 cm  -AL      LLE Circumferential Total 386.6 cm  -AL         Manual Lymphatic Drainage    Manual Lymphatic Drainage initial sequence;opened regional lymph nodes;opened anastamoses;extremity treatment  -AL      Initial Sequence short neck;abdomen;diaphragmatic breathing  -AL      Abdomen superficial;deep  -AL      Diaphragmatic Breathing x 9 with superficial abdominals  -AL      Opened Regional Lymph Nodes axillary;inguinal  -AL      Axillary  right;left  -AL      Inguinal right;left  -AL      Opened Anastamoses inguino-axillary  -AL      Inguino-Axillary right;left  Supine and prone  -AL      Extremity Treatment MLD to full limb  -AL      MLD to Full Limb L LE  -AL      Manual Lymphatic Drainage Comments IASTM using hand held massager to the L Upper leg and calf in prone and the L upper leg in supine.  -AL      Manual Therapy 60  -AL         Compression/Skin Care    Compression/Skin Care compression garment;skin care  -AL      Compression/Skin Care Comments Patient donned his new Mediven Comfort compression thigh high, size 5, 20-30 mmHg.  -AL                User Key  (r) = Recorded By, (t) = Taken By, (c) = Cosigned By      Initials Name Provider Type    Elida Pickering, KEILA, SAHIL Physical Therapist Assistant                          Therapeutic Exercises    33626 Units Comments   L Prone quad stretch   ROM is limited due to knee surgeries.    Half foam roll under L hip     B hip flexor stretch in the Sang Test position     B hamstring stretch with L ankle dorsi flexion            Timed Minutes 10             Therapy Education/Self Care 21496   Education offered today Work on CDT and use the pump.   Medbridge Code    Ongoing HEP   Wear compression garment and use the Flexitouch pump   Timed Minutes        Total Timed Treatment:      70  mins  Total Time of Visit:             70  mins         ASSESSMENT/PLAN     GOALS  Goals                                          Progress Note due by 9/22/23                                                      Recert due by 10/19/23   LTG by: 12 weeks Comments Date Status   Patient will have a reduction in LLE of at least 20 cm to allow more comfortable walking and mobility.  Slight reduction today. 9/6 Ongoing   Patient will be independent with specific HEP for lymphedema He is able to move around much better. 6/20 Met   He will have no s/s infection.  No signs or symptoms of infection 11/16 Met   He will have  no complaint of increased edema in the groin. Only minor swelling in the L groin area. 8/23 Progressing   Patient will be independent with all tools available to manage his LLE lymphedema. His wife has been wrapping the L lower leg tighter. 8/30 Ongoing             ASSESSMENT:  Patient has had a small reduction of .9 cm in the L LE.  He will use a new 12 x 10 cm short stretch bandage, this should help reduce the leg more.  He is maintaining the size of the L leg with the new compression thigh high he has been wearing.      SIGNATURE: Elida Sequeira PTA, [place], [place] License #  [id number]  Electronically Signed on 9/6/2023        [address] [place] [address]. [address]  [phone]

## 2023-09-13 ENCOUNTER — TREATMENT (OUTPATIENT)
Dept: PHYSICAL THERAPY | Facility: CLINIC | Age: 53
End: 2023-09-13
Payer: MEDICARE

## 2023-09-13 DIAGNOSIS — C64.1 RENAL CELL CARCINOMA OF RIGHT KIDNEY: ICD-10-CM

## 2023-09-13 DIAGNOSIS — I89.0 LYMPHEDEMA OF GENITALIA: ICD-10-CM

## 2023-09-13 DIAGNOSIS — I89.0 LYMPHEDEMA OF LEFT LEG: Primary | ICD-10-CM

## 2023-09-13 NOTE — PROGRESS NOTES
Physical Therapy Treatment Note    [address], [place] [address]    Patient: Holland Phelps                                                 Visit Date: 2023  :     1970    Referring practitioner:    GILBERTO Ornelas  Date of Initial Visit:          Type: THERAPY  Noted: 10/12/2022    Patient seen for 41 sessions    Visit Diagnoses:    ICD-10-CM ICD-9-CM   1. Lymphedema of left leg  I89.0 457.1   2. Lymphedema of genitalia  I89.0 457.1   3. Renal cell carcinoma of right kidney  C64.1 189.0     SUBJECTIVE     Subjective  He thinks the L lower leg around the calf area may be swollen.  No pain but a little numbness.       PAIN: 0/10          OBJECTIVE     Objective    Lymphedema       Row Name 23 0800             Subjective Pain    Able to rate subjective pain? yes  -AL      Pre-Treatment Pain Level 0  -AL         Lymphedema Measurements    Measurement Type(s) Circumferential  -AL      Circumferential Areas Lower extremities  -AL         BLE Circumferential (cm)    Measurement Location 1 BOT  -AL      Left 1 21.6 cm  -AL      Measurement Location 2 +7  -AL      Left 2 26.5 cm  -AL      Measurement Location 3 +7  -AL      Left 3 27.5 cm  -AL      Measurement Location 4 +10  -AL      Left 4 32 cm  -AL      Measurement Location 5 +10  -AL      Left 5 41 cm  -AL      Measurement Location 6 +10  -AL      Left 6 46.4 cm  -AL      Measurement Location 7 +10  -AL      Left 7 43 cm  -AL      Measurement Location 8 +10  -AL      Left 8 47.5 cm  -AL      Measurement Location 9 +10  -AL      Left 9 55.5 cm  -AL      Measurement Location 10 +10  -AL      Left 10 60.4 cm  -AL      LLE Circumferential Total 401.4 cm  -AL         Manual Lymphatic Drainage    Manual Lymphatic Drainage initial sequence;opened regional lymph nodes;opened anastamoses;extremity treatment  -AL      Initial Sequence short neck;abdomen;diaphragmatic breathing  -AL       Abdomen superficial;deep  -AL      Diaphragmatic Breathing x 9 with superficial abdominals  -AL      Opened Regional Lymph Nodes axillary;inguinal  -AL      Axillary right;left  -AL      Inguinal right;left  -AL      Opened Anastamoses inguino-axillary  -AL      Inguino-Axillary right;left  Supine and prone  -AL      Extremity Treatment MLD to full limb  -AL      MLD to Full Limb L LE  -AL      Manual Lymphatic Drainage Comments IASTM using hand held massager to the L Upper leg and calf in prone and the L upper leg in supine.  -AL      Manual Therapy 75  -AL         Compression/Skin Care    Compression/Skin Care compression garment;skin care  -AL      Compression/Skin Care Comments Patient donned his new Mediven Comfort compression thigh high, size 5, 20-30 mmHg.  -AL                User Key  (r) = Recorded By, (t) = Taken By, (c) = Cosigned By      Initials Name Provider Type    Elida Pickering PTA, CLT-LANA Physical Therapist Assistant                          Therapeutic Exercises    65892 Units Comments   L Prone quad stretch   ROM is limited due to knee surgeries.    Half foam roll under L hip     B hip flexor stretch in the Sang Test position     B hamstring stretch with L ankle dorsi flexion            Timed Minutes 10             Therapy Education/Self Care 71574   Education offered today Work on CDT and use the pump.   Medbridge Code    Ongoing HEP   Wear compression garment and use the Flexitouch pump   Timed Minutes        Total Timed Treatment:      85  mins  Total Time of Visit:             85  mins         ASSESSMENT/PLAN     GOALS  Goals                                          Progress Note due by 9/22/23                                                      Recert due by 10/19/23   LTG by: 12 weeks Comments Date Status   Patient will have a reduction in LLE of at least 20 cm to allow more comfortable walking and mobility.  He has had an increase in size of the L LE. 9/13 Ongoing   Patient  will be independent with specific HEP for lymphedema He is able to move around much better. 6/20 Met   He will have no s/s infection.  No signs or symptoms of infection 11/16 Met   He will have no complaint of increased edema in the groin. Only minor swelling in the L groin area. 8/23 Progressing   Patient will be independent with all tools available to manage his LLE lymphedema. His wife has been wrapping the L lower leg tighter. 8/30 Ongoing             ASSESSMENT:  Patient has had an increase is size of 14.8 cm in the L LE.  He has been on his leg more the past few days, he continues to work on CDT and use the pump.  I did spend extra time working on MLD to the L lower leg.     SIGNATURE: Elida Sequeira PTA, [place], [place] License #  [id number]  Electronically Signed on 9/13/2023        [address] [place] [address]. [address]  [phone]

## 2023-09-20 ENCOUNTER — TREATMENT (OUTPATIENT)
Dept: PHYSICAL THERAPY | Facility: CLINIC | Age: 53
End: 2023-09-20
Payer: MEDICARE

## 2023-09-20 DIAGNOSIS — C64.1 RENAL CELL CARCINOMA OF RIGHT KIDNEY: ICD-10-CM

## 2023-09-20 DIAGNOSIS — I89.0 LYMPHEDEMA OF GENITALIA: ICD-10-CM

## 2023-09-20 DIAGNOSIS — I89.0 LYMPHEDEMA OF LEFT LEG: Primary | ICD-10-CM

## 2023-09-20 NOTE — PROGRESS NOTES
Physical Therapy Treatment Note and 30 Day Progress Note    [address] [place] [address]    Patient: Holland Phelps                                                 Visit Date: 2023  :     1970    Referring practitioner:    GILBERTO Ornelas  Date of Initial Visit:          Type: THERAPY  Noted: 10/12/2022    Patient seen for 42 sessions    Visit Diagnoses:    ICD-10-CM ICD-9-CM   1. Lymphedema of left leg  I89.0 457.1   2. Lymphedema of genitalia  I89.0 457.1   3. Renal cell carcinoma of right kidney  C64.1 189.0     SUBJECTIVE     Subjective  He has been washing more cars, he states his L leg is probably more swollen.     PAIN: 0/10          OBJECTIVE     Objective    Lymphedema       Row Name 23 0800             Subjective Pain    Able to rate subjective pain? yes  -AL      Pre-Treatment Pain Level 0  -AL         Lymphedema Measurements    Measurement Type(s) Circumferential  -AL      Circumferential Areas Lower extremities  -AL         BLE Circumferential (cm)    Measurement Location 1 BOT  -AL      Left 1 22.6 cm  -AL      Measurement Location 2 +7  -AL      Left 2 26 cm  -AL      Measurement Location 3 +7  -AL      Left 3 29.4 cm  -AL      Measurement Location 4 +10  -AL      Left 4 33.9 cm  -AL      Measurement Location 5 +10  -AL      Left 5 42.4 cm  -AL      Measurement Location 6 +10  -AL      Left 6 47.5 cm  -AL      Measurement Location 7 +10  -AL      Left 7 44 cm  -AL      Measurement Location 8 +10  -AL      Left 8 48 cm  -AL      Measurement Location 9 +10  -AL      Left 9 55 cm  -AL      Measurement Location 10 +10  -AL      Left 10 60 cm  -AL      LLE Circumferential Total 408.8 cm  -AL         Manual Lymphatic Drainage    Manual Lymphatic Drainage initial sequence;opened regional lymph nodes;opened anastamoses;extremity treatment  -AL      Initial Sequence short neck;abdomen;diaphragmatic breathing  -AL       Abdomen superficial;deep  -AL      Diaphragmatic Breathing x 9 with superficial abdominals  -AL      Opened Regional Lymph Nodes axillary;inguinal  -AL      Axillary right;left  -AL      Inguinal right;left  -AL      Opened Anastamoses inguino-axillary  -AL      Inguino-Axillary right;left  Supine and prone  -AL      Extremity Treatment MLD to full limb  -AL      MLD to Full Limb L LE  -AL      Extremity Treatment Focus On Spent extra time working on MLD to the L lower leg  -AL      Manual Lymphatic Drainage Comments IASTM using hand held massager to the L Upper leg and calf in prone and the L upper leg in supine.  -AL      Manual Therapy 65  -AL         Compression/Skin Care    Compression/Skin Care compression garment;skin care  -AL      Compression/Skin Care Comments Patient donned his new Mediven Comfort compression thigh high, size 5, 20-30 mmHg.  -AL                User Key  (r) = Recorded By, (t) = Taken By, (c) = Cosigned By      Initials Name Provider Type    Elida Pickering, PTA, CLT-LANNY Physical Therapist Assistant                          Therapeutic Exercises    42588 Units Comments   L Prone quad stretch   ROM is limited due to knee surgeries.    Half foam roll under L hip     B hip flexor stretch in the Sang Test position     B hamstring stretch with L ankle dorsi flexion            Timed Minutes 10             Therapy Education/Self Care 11986   Education offered today Work on CDT and use the pump.   Medbridge Code    Ongoing HEP   Wear compression garment and use the Flexitouch pump   Timed Minutes        Total Timed Treatment:      75  mins  Total Time of Visit:             75  mins         ASSESSMENT/PLAN     GOALS  Goals                                          Progress Note due by 10/20/23                                                      Recert due by 10/19/23   LTG by: 12 weeks Comments Date Status   Patient will have a reduction in LLE of at least 20 cm to allow more comfortable  walking and mobility.  He has had an increase in size of the L LE. 9/20 Ongoing   Patient will be independent with specific HEP for lymphedema He is able to move around much better. 6/20 Met   He will have no s/s infection.  No signs or symptoms of infection 11/16 Met   He will have no complaint of increased edema in the groin. Only minor swelling in the L groin area. 9/20 Progressing   Patient will be independent with all tools available to manage his LLE lymphedema. He will start wrapping the L leg more during the day. 9/20 Ongoing             ASSESSMENT:  Patient has had an increase is size of 7.4 cm in the L LE and a total increase of 22.2 cm in the past week. He has been on his leg more washing cars, he has been wearing the compression thigh high on the L LE but it is not enough to contain the fluid.  He will start wrapping the L lower leg more using the foam to help reduce the L LE.     SIGNATURE: Elida Sequeira PTA, [place], [place] License #  [id number]  Electronically Signed on 9/20/2023        [address] [place] [address]. [address]  [phone]

## 2023-09-27 ENCOUNTER — TREATMENT (OUTPATIENT)
Dept: PHYSICAL THERAPY | Facility: CLINIC | Age: 53
End: 2023-09-27
Payer: MEDICARE

## 2023-09-27 DIAGNOSIS — C64.1 RENAL CELL CARCINOMA OF RIGHT KIDNEY: ICD-10-CM

## 2023-09-27 DIAGNOSIS — I89.0 LYMPHEDEMA OF GENITALIA: ICD-10-CM

## 2023-09-27 DIAGNOSIS — I89.0 LYMPHEDEMA OF LEFT LEG: Primary | ICD-10-CM

## 2023-09-27 NOTE — PROGRESS NOTES
Physical Therapy Treatment Note    [address] [place] [address]    Patient: Holland Phelps                                                 Visit Date: 2023  :     1970    Referring practitioner:    GILBERTO Ornelas  Date of Initial Visit:          Type: THERAPY  Noted: 10/12/2022    Patient seen for 43 sessions    Visit Diagnoses:    ICD-10-CM ICD-9-CM   1. Lymphedema of left leg  I89.0 457.1   2. Lymphedema of genitalia  I89.0 457.1   3. 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      PAIN: 0/10          OBJECTIVE     Objective    Lymphedema       Row Name 23 0900 23 0800          Subjective Pain    Able to rate subjective pain? -- yes  -HR     Pre-Treatment Pain Level -- 0  -HR        Lymphedema Measurements    Measurement Type(s) -- Circumferential  -HR     Circumferential Areas -- Lower extremities  -HR        BLE Circumferential (cm)    Measurement Location 1 BOT  -HR --     Left 1 21.9 cm  -HR --     Measurement Location 2 +7  -HR --     Left 2 24 cm  -HR --     Measurement Location 3 +7  -HR --     Left 3 27.4 cm  -HR --     Measurement Location 4 +10  -HR --     Left 4 32 cm  -HR --     Measurement Location 5 +10  -HR --     Left 5 39.5 cm  -HR --     Measurement Location 6 +10  -HR --     Left 6 45.1 cm  -HR --     Measurement Location 7 +10  -HR --     Left 7 43 cm  -HR --     Measurement Location 8 +10  -HR --     Left 8 48.7 cm  -HR --     Measurement Location 9 +10  -HR --     Left 9 56.8 cm  -HR --     Measurement Location 10 +10  -HR --     Left 10 62.5 cm  -HR --     LLE Circumferential Total 400.9 cm  -HR --        Manual Lymphatic Drainage    Manual Lymphatic Drainage initial sequence;opened regional lymph nodes;opened anastamoses;extremity treatment  -HR --     Initial Sequence  short neck;abdomen;diaphragmatic breathing  -HR --     Abdomen superficial;deep  -HR --     Opened Regional Lymph Nodes axillary;inguinal  -HR --     Axillary right;left  -HR --     Inguinal right;left  -HR --     Opened Anastamoses inguino-axillary  -HR --     Inguino-Axillary right;left  Supine and prone  -HR --     Extremity Treatment MLD to full limb  -HR --     MLD to Full Limb LLE  -HR --     Manual Lymphatic Drainage Comments IASTM using hand held massager to the L Upper leg and calf in prone and the L upper leg in supine.  -HR --     Manual Therapy 65  -HR --        Compression/Skin Care    Compression/Skin Care compression garment;skin care  -HR --     Compression/Skin Care Comments Patient donned his new Mediven Comfort compression thigh high, size 5, 20-30 mmHg.  -HR --               User Key  (r) = Recorded By, (t) = Taken By, (c) = Cosigned By      Initials Name Provider Type    HR Debbie Morales, PT, DPT, CLT-LANNY Physical Therapist                          Therapeutic Exercises    51024 Units Comments   L Prone quad stretch   ROM is limited due to knee surgeries.    Half foam roll under L hip     B hip flexor stretch in the Sang Test position     B hamstring stretch with L ankle dorsi flexion            Timed Minutes 10             Therapy Education/Self Care 10580   Education offered today Work on CDT and use the pump.   Medbridge Code    Ongoing HEP   Wear compression garment and use the Flexitouch pump   Timed Minutes        Total Timed Treatment:      75  mins  Total Time of Visit:             75  mins         ASSESSMENT/PLAN     GOALS  Goals                                          Progress Note due by 10/20/23                                                      Recert due by 10/19/23   LTG by: 12 weeks Comments Date Status   Patient will have a reduction in LLE of at least 20 cm to allow more comfortable walking and mobility.  He has had a reduction this week 9/27 Ongoing   Patient  will be independent with specific HEP for lymphedema He is able to move around much better. 6/20 Met   He will have no s/s infection.  No signs or symptoms of infection 11/16 Met   He will have no complaint of increased edema in the groin. Only minor swelling in the L groin area. 9/27 Progressing   Patient will be independent with all tools available to manage his LLE lymphedema. He will start wrapping the L leg more during the day. 9/20 Ongoing             ASSESSMENT:  Patient has had a reduction in the size of the leg this week. He has been very busy and on his feet a lot but has been using the pump daily and wrapping the leg at night.      PLAN: Cont CDT.      SIGNATURE: Debbie Morales, PT, DPT, [place] License #  [id number]  Electronically Signed on 9/27/2023        [address] [place] [address]. [address]  [phone]

## 2023-10-11 ENCOUNTER — TREATMENT (OUTPATIENT)
Dept: PHYSICAL THERAPY | Facility: CLINIC | Age: 53
End: 2023-10-11
Payer: MEDICARE

## 2023-10-11 DIAGNOSIS — C64.1 RENAL CELL CARCINOMA OF RIGHT KIDNEY: ICD-10-CM

## 2023-10-11 DIAGNOSIS — I89.0 LYMPHEDEMA OF GENITALIA: ICD-10-CM

## 2023-10-11 DIAGNOSIS — I89.0 LYMPHEDEMA OF LEFT LEG: Primary | ICD-10-CM

## 2023-10-11 NOTE — PROGRESS NOTES
Physical Therapy Treatment Note    [address] [place] [address]    Patient: Holland Phelps                                                 Visit Date: 10/11/2023  :     1970    Referring practitioner:    GILBERTO Ornelas  Date of Initial Visit:          Type: THERAPY  Noted: 10/12/2022    Patient seen for 44 sessions    Visit Diagnoses:    ICD-10-CM ICD-9-CM   1. Lymphedema of left leg  I89.0 457.1   2. Lymphedema of genitalia  I89.0 457.1   3. Renal cell carcinoma of right kidney  C64.1 189.0       SUBJECTIVE     Subjective  He states he has been doing pretty good, he has been washing a lot of cars.  He thinks he might be swollen, he continues to wrap the L leg and use the pump night.    PAIN: 0/10          OBJECTIVE     Objective    Lymphedema       Row Name 10/11/23 0800             Subjective Pain    Able to rate subjective pain? yes  -AL      Pre-Treatment Pain Level 0  -AL      Subjective Pain Comment Numbness L lateral lower leg.  -AL         Lymphedema Measurements    Measurement Type(s) Circumferential  -AL      Circumferential Areas Lower extremities  -AL         BLE Circumferential (cm)    Measurement Location 1 BOT  -AL      Left 1 22.5 cm  -AL      Measurement Location 2 +7  -AL      Left 2 26 cm  -AL      Measurement Location 3 +7  -AL      Left 3 27.5 cm  -AL      Measurement Location 4 +10  -AL      Left 4 32 cm  -AL      Measurement Location 5 +10  -AL      Left 5 40.4 cm  -AL      Measurement Location 6 +10  -AL      Left 6 46.2 cm  -AL      Measurement Location 7 +10  -AL      Left 7 42.5 cm  -AL      Measurement Location 8 +10  -AL      Left 8 46.9 cm  -AL      Measurement Location 9 +10  -AL      Left 9 54.9 cm  -AL      Measurement Location 10 +10  -AL      Left 10 60.7 cm  -AL      LLE Circumferential Total 399.6 cm  -AL         Manual Lymphatic Drainage    Manual Lymphatic Drainage initial sequence;opened  regional lymph nodes;opened anastamoses;extremity treatment  -AL      Initial Sequence short neck;abdomen;diaphragmatic breathing  -AL      Abdomen superficial;deep  -AL      Diaphragmatic Breathing x 9 with superficial abdominals  -AL      Opened Regional Lymph Nodes axillary;inguinal  -AL      Axillary right;left  -AL      Inguinal right;left  -AL      Opened Anastamoses inguino-axillary  -AL      Inguino-Axillary right;left  Supine and prone  -AL      Extremity Treatment MLD to full limb  -AL      MLD to Full Limb LLE  -AL      Manual Lymphatic Drainage Comments IASTM using the small roller and hand held massager to the L Upper leg and calf in prone and the L upper leg in supine.  -AL      Manual Therapy 60  -AL         Compression/Skin Care    Compression/Skin Care compression garment;skin care  -AL      Compression/Skin Care Comments Patient donned his new Mediven Comfort compression thigh high, size 5, 20-30 mmHg.  -AL                User Key  (r) = Recorded By, (t) = Taken By, (c) = Cosigned By      Initials Name Provider Type    AL Elida Sequeira, KEILA, CLT-LANNY Physical Therapist Assistant                            Therapeutic Exercises    90981 Units Comments   L Prone quad stretch   ROM is limited due to knee surgeries.    Half foam roll under L hip     B hip flexor stretch in the Sang Test position     B hamstring stretch with L ankle dorsi flexion            Timed Minutes 10             Therapy Education/Self Care 11038   Education offered today Work on CDT and use the pump.   Medbridge Code    Ongoing HEP   Wear compression garment and use the Flexitouch pump   Timed Minutes        Total Timed Treatment:      70  mins  Total Time of Visit:             70  mins         ASSESSMENT/PLAN     GOALS  Goals                                          Progress Note due by 10/20/23                                                      Recert due by 10/19/23   LTG by: 12 weeks Comments Date Status   Patient will  have a reduction in LLE of at least 20 cm to allow more comfortable walking and mobility.  He has had a reduction this week 9/27 Ongoing   Patient will be independent with specific HEP for lymphedema He is able to move around much better. 6/20 Met   He will have no s/s infection.  No signs or symptoms of infection 11/16 Met   He will have no complaint of increased edema in the groin. Only minor swelling in the L groin area. 9/27 Progressing   Patient will be independent with all tools available to manage his LLE lymphedema. Continue to wrap the L Leg and use the pump. 10/11 Ongoing             ASSESSMENT:  Patient has had a slight overall reduction in size of the L LE.  Discussed wrapping techniques as his wife will sometimes start distally, the proximally, the distal again with the wraps.  This can cause increase swelling as the wraps need to have gradient pressure. He did have an increase in the lower leg as compared to his last treatment.  This is the area that swells the most and also the area that has numbness.     PLAN: Cont CDT.      SIGNATURE: Elida Sequeira PTA, [place] License #  [id number]  Electronically Signed on 10/11/2023        [address] [place] [address]. [address]  [phone]

## 2023-10-18 ENCOUNTER — TREATMENT (OUTPATIENT)
Dept: PHYSICAL THERAPY | Facility: CLINIC | Age: 53
End: 2023-10-18
Payer: MEDICARE

## 2023-10-18 DIAGNOSIS — I89.0 LYMPHEDEMA OF LEFT LEG: Primary | ICD-10-CM

## 2023-10-18 DIAGNOSIS — I89.0 LYMPHEDEMA OF GENITALIA: ICD-10-CM

## 2023-10-18 DIAGNOSIS — C64.1 RENAL CELL CARCINOMA OF RIGHT KIDNEY: ICD-10-CM

## 2023-10-18 PROCEDURE — 97110 THERAPEUTIC EXERCISES: CPT | Performed by: PHYSICAL THERAPIST

## 2023-10-18 PROCEDURE — 97140 MANUAL THERAPY 1/> REGIONS: CPT | Performed by: PHYSICAL THERAPIST

## 2023-10-18 NOTE — PROGRESS NOTES
Physical Therapy Treatment Note, 30 Day Progress Note, and 90 Day Recertification Note    [address] [place] [address]    Patient: Holland Phelps                                                 Visit Date: 10/18/2023  :     1970    Referring practitioner:    GILBERTO Ornelas  Date of Initial Visit:          Type: THERAPY  Noted: 10/12/2022    Patient seen for 45 sessions    Visit Diagnoses:    ICD-10-CM ICD-9-CM   1. Lymphedema of left leg  I89.0 457.1   2. Lymphedema of genitalia  I89.0 457.1   3. Renal cell carcinoma of right kidney  C64.1 189.0       SUBJECTIVE     Subjective  He states his wife's technique for wrapping is improving. He always has numbness in the L lower leg.  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  He only has minor swelling L groin area.     PAIN: 0/10          OBJECTIVE     Objective    Lymphedema       Row Name 10/18/23 0800             Subjective Pain    Able to rate subjective pain? yes  -AL      Pre-Treatment Pain Level 2  -AL      Subjective Pain Comment L lower leg above the ankle.  -AL         Lymphedema Measurements    Measurement Type(s) Circumferential  -AL      Circumferential Areas Lower extremities  -AL         BLE Circumferential (cm)    Measurement Location 1 BOT  -AL      Left 1 22.5 cm  -AL      Measurement Location 2 +7  -AL      Left 2 25 cm  -AL      Measurement Location 3 +7  -AL      Left 3 28 cm  -AL      Measurement Location 4 +10  -AL      Left 4 32.9 cm  -AL      Measurement Location 5 +10  -AL      Left 5 40.8 cm  -AL      Measurement Location 6 +10  -AL      Left 6 46.9 cm  -AL      Measurement Location 7 +10  -AL      Left 7 42.9 cm  -AL      Measurement Location 8 +10  -AL      Left 8 46.8 cm  -AL      Measurement Location 9 +10  -AL      Left 9 55.1 cm  -AL      Measurement Location 10 +10  -AL      Left 10 61.4 cm  -AL      LLE Circumferential Total 402.3  cm  -AL         Manual Lymphatic Drainage    Manual Lymphatic Drainage initial sequence;opened regional lymph nodes;opened anastamoses;extremity treatment  -AL      Initial Sequence short neck;abdomen;diaphragmatic breathing  -AL      Abdomen superficial;deep  -AL      Diaphragmatic Breathing x 9 with superficial abdominals  -AL      Opened Regional Lymph Nodes axillary;inguinal  -AL      Axillary right;left  -AL      Inguinal right;left  -AL      Opened Anastamoses inguino-axillary  -AL      Inguino-Axillary right;left  Supine and prone  -AL      Extremity Treatment MLD to full limb  -AL      MLD to Full Limb LLE  -AL      Manual Lymphatic Drainage Comments IASTM using the small and medium roller and hand held massager to the L Upper leg and calf in prone and the L upper leg in supine.  -AL      Manual Therapy 60  -AL         Compression/Skin Care    Compression/Skin Care compression garment;skin care  -AL      Compression/Skin Care Comments Patient donned his new Mediven Comfort compression thigh high, size 5, 20-30 mmHg.  -AL                User Key  (r) = Recorded By, (t) = Taken By, (c) = Cosigned By      Initials Name Provider Type    Elida Pickering PTA, SAHIL Physical Therapist Assistant                              Therapeutic Exercises    33000 Units Comments   L Prone quad stretch   ROM is limited due to knee surgeries.    Half foam roll under L hip     B hip flexor stretch in the Sang Test position     B hamstring stretch with L ankle dorsi flexion            Timed Minutes 10             Therapy Education/Self Care 75843   Education offered today Work on CDT and use the pump.   Medbridge Code    Ongoing HEP   Wear compression garment and use the Flexitouch pump   Timed Minutes        Total Timed Treatment:      70  mins  Total Time of Visit:             70  mins         ASSESSMENT/PLAN     GOALS  Goals                                          Progress Note due by 11/17/23                                                       Recert due by 1/16/24   LTG by: 12 weeks Comments Date Status   Patient will have a reduction in LLE of at least 20 cm to allow more comfortable walking and mobility.  Slight increase in size of the L LE today. 10/18 Ongoing   Patient will be independent with specific HEP for lymphedema He is able to move around much better. 6/20 Met   He will have no s/s infection.  No signs or symptoms of infection 11/16 Met   He will have no complaint of increased edema in the groin. Very minimal amount of swelling in the L groin area. 10/18 Progressing   Patient will be independent with all tools available to manage his LLE lymphedema. He is working towards his home maintenance program. 10/18 Ongoing             ASSESSMENT:  Patient will have some groin swelling that is very minimal, this does increase slightly at times after the pump and wrapping.  He does feel like this is scar tissue. Most of the swelling he feels is in the L upper leg.  The numbness in the L lower anterior leg never goes away. The tissue in the L upper leg was more dense today.  He will bring his wraps next treatment so I can wrap the leg to help reduce the L LE.     PLAN: Cont CDT.      SIGNATURE: Elida Sequeira PTA, [place], [place] License #  [id number]  Electronically Signed on 10/18/2023        [address] [place] [address]. [address]  [phone]

## 2023-10-25 ENCOUNTER — TREATMENT (OUTPATIENT)
Dept: PHYSICAL THERAPY | Facility: CLINIC | Age: 53
End: 2023-10-25
Payer: MEDICARE

## 2023-10-25 DIAGNOSIS — C64.1 RENAL CELL CARCINOMA OF RIGHT KIDNEY: ICD-10-CM

## 2023-10-25 DIAGNOSIS — I89.0 LYMPHEDEMA OF GENITALIA: ICD-10-CM

## 2023-10-25 DIAGNOSIS — I89.0 LYMPHEDEMA OF LEFT LEG: Primary | ICD-10-CM

## 2023-10-25 PROCEDURE — 97110 THERAPEUTIC EXERCISES: CPT | Performed by: PHYSICAL THERAPIST

## 2023-10-25 PROCEDURE — 97140 MANUAL THERAPY 1/> REGIONS: CPT | Performed by: PHYSICAL THERAPIST

## 2023-10-25 NOTE — PROGRESS NOTES
Physical Therapy Treatment Note    [address], [place] [address]    Patient: Holland Phelps                                                 Visit Date: 10/25/2023  :     1970    Referring practitioner:    GILBERTO Ornelas  Date of Initial Visit:          Type: THERAPY  Noted: 10/12/2022    Patient seen for 46 sessions    Visit Diagnoses:    ICD-10-CM ICD-9-CM   1. Lymphedema of left leg  I89.0 457.1   2. Lymphedema of genitalia  I89.0 457.1   3. Renal cell carcinoma of right kidney  C64.1 189.0       SUBJECTIVE     Subjective  He states he continues to work on CDT and use the pump.  The L leg is a little less numb today.     PAIN: 0/10          OBJECTIVE     Objective    Lymphedema       Row Name 10/25/23 0800             Subjective Pain    Able to rate subjective pain? yes  -AL      Pre-Treatment Pain Level 0  -AL         Lymphedema Measurements    Measurement Type(s) Circumferential  -AL      Circumferential Areas Lower extremities  -AL         BLE Circumferential (cm)    Measurement Location 1 BOT  -AL      Left 1 21.5 cm  -AL      Measurement Location 2 +7  -AL      Left 2 24.5 cm  -AL      Measurement Location 3 +7  -AL      Left 3 27.4 cm  -AL      Measurement Location 4 +10  -AL      Left 4 31.6 cm  -AL      Measurement Location 5 +10  -AL      Left 5 38.9 cm  -AL      Measurement Location 6 +10  -AL      Left 6 46.2 cm  -AL      Measurement Location 7 +10  -AL      Left 7 43.3 cm  -AL      Measurement Location 8 +10  -AL      Left 8 46.6 cm  -AL      Measurement Location 9 +10  -AL      Left 9 53.7 cm  -AL      Measurement Location 10 +10  -AL      Left 10 58.9 cm  -AL      LLE Circumferential Total 392.6 cm  -AL         Manual Lymphatic Drainage    Manual Lymphatic Drainage initial sequence;opened regional lymph nodes;opened anastamoses;extremity treatment  -AL      Initial Sequence short neck;abdomen;diaphragmatic  breathing  -AL      Abdomen superficial;deep  -AL      Diaphragmatic Breathing x 9 with superficial abdominals  -AL      Opened Regional Lymph Nodes axillary;inguinal  -AL      Axillary right;left  -AL      Inguinal right;left  -AL      Opened Anastamoses inguino-axillary  -AL      Inguino-Axillary right;left  Supine and prone  -AL      Extremity Treatment MLD to full limb  -AL      MLD to Full Limb LLE  -AL      Manual Lymphatic Drainage Comments IASTM using the small and medium roller and hand held massager to the L Upper leg and calf in prone and the L upper leg in supine.  -AL      Manual Therapy 60  -AL         Compression/Skin Care    Compression/Skin Care compression garment;skin care  -AL      Compression/Skin Care Comments Patient donned his new Mediven Comfort compression thigh high, size 5, 20-30 mmHg.  -AL                User Key  (r) = Recorded By, (t) = Taken By, (c) = Cosigned By      Initials Name Provider Type    Elida Pickering PTA, CLT-LANA Physical Therapist Assistant                                Therapeutic Exercises    09361 Units Comments   L Prone quad stretch   ROM is limited due to knee surgeries.    Half foam roll under L hip     B hip flexor stretch in the Sang Test position     B hamstring stretch with L ankle dorsi flexion            Timed Minutes 10             Therapy Education/Self Care 58099   Education offered today Work on CDT and use the pump.   Medbridge Code    Ongoing HEP   Wear compression garment and use the Flexitouch pump   Timed Minutes        Total Timed Treatment:      70  mins  Total Time of Visit:             70  mins         ASSESSMENT/PLAN     GOALS  Goals                                          Progress Note due by 11/17/23                                                      Recert due by 1/16/24   LTG by: 12 weeks Comments Date Status   Patient will have a reduction in LLE of at least 20 cm to allow more comfortable walking and mobility.  The L LE has  reduced in size as compared to his last visit.  10/25 Ongoing   Patient will be independent with specific HEP for lymphedema He is able to move around much better. 6/20 Met   He will have no s/s infection.  No signs or symptoms of infection 11/16 Met   He will have no complaint of increased edema in the groin. Very minimal amount of swelling in the L groin area. 10/18 Progressing   Patient will be independent with all tools available to manage his LLE lymphedema. He is working towards his home maintenance program. 10/18 Ongoing             ASSESSMENT:  Patient has had a reduction of 9.7 cm in the L LE as compared to his last measurement.  He elevated the L leg after using the pump for about 20 min, this most likely helped the swelling. The L LE is less dense today as well. Will plan to wrap the L LE next treatment.     PLAN: Cont CDT.  Wrap L LE next treatment.       SIGNATURE: Elida Sequeira PTA, [facility] [place] License #  [id number]  Electronically Signed on 10/25/2023        [address] [place] [address]. [address]  [phone]

## 2023-11-01 ENCOUNTER — TREATMENT (OUTPATIENT)
Dept: PHYSICAL THERAPY | Facility: CLINIC | Age: 53
End: 2023-11-01
Payer: MEDICARE

## 2023-11-01 DIAGNOSIS — I89.0 LYMPHEDEMA OF LEFT LEG: Primary | ICD-10-CM

## 2023-11-01 DIAGNOSIS — C64.1 RENAL CELL CARCINOMA OF RIGHT KIDNEY: ICD-10-CM

## 2023-11-01 DIAGNOSIS — I89.0 LYMPHEDEMA OF GENITALIA: ICD-10-CM

## 2023-11-01 NOTE — PROGRESS NOTES
Physical Therapy Treatment Note    [address] [place] [address]    Patient: Holland Phelps                                                 Visit Date: 2023  :     1970    Referring practitioner:    GILBERTO Ornelas  Date of Initial Visit:          Type: THERAPY  Noted: 10/12/2022    Patient seen for 47 sessions    Visit Diagnoses:    ICD-10-CM ICD-9-CM   1. Lymphedema of left leg  I89.0 457.1   2. Lymphedema of genitalia  I89.0 457.1   3. Renal cell carcinoma of right kidney  C64.1 189.0       SUBJECTIVE     Subjective  He has a little numbness in the lower leg and foot is stiff.  He can tell the leg is up a little.     PAIN: 0/10          OBJECTIVE     Objective    Lymphedema       Row Name 23 0800             Subjective Pain    Able to rate subjective pain? yes  -AL      Pre-Treatment Pain Level 0  -AL         Lymphedema Measurements    Measurement Type(s) Circumferential  -AL      Circumferential Areas Lower extremities  -AL         BLE Circumferential (cm)    Measurement Location 1 BOT  -AL      Left 1 22.5 cm  -AL      Measurement Location 2 +7  -AL      Left 2 24.5 cm  -AL      Measurement Location 3 +7  -AL      Left 3 27 cm  -AL      Measurement Location 4 +10  -AL      Left 4 32 cm  -AL      Measurement Location 5 +10  -AL      Left 5 40.2 cm  -AL      Measurement Location 6 +10  -AL      Left 6 46.5 cm  -AL      Measurement Location 7 +10  -AL      Left 7 44 cm  -AL      Measurement Location 8 +10  -AL      Left 8 47 cm  -AL      Measurement Location 9 +10  -AL      Left 9 53.6 cm  -AL      Measurement Location 10 +10  -AL      Left 10 60 cm  -AL      LLE Circumferential Total 397.3 cm  -AL         Manual Lymphatic Drainage    Manual Lymphatic Drainage initial sequence;opened regional lymph nodes;opened anastamoses;extremity treatment  -AL      Initial Sequence short neck;abdomen;diaphragmatic breathing  -AL       Abdomen superficial;deep  -AL      Diaphragmatic Breathing x 9 with superficial abdominals  -AL      Opened Regional Lymph Nodes axillary;inguinal  -AL      Axillary right;left  -AL      Inguinal right;left  -AL      Opened Anastamoses inguino-axillary  -AL      Inguino-Axillary right;left  Supine and prone  -AL      Extremity Treatment MLD to full limb  -AL      MLD to Full Limb LLE  -AL      Manual Lymphatic Drainage Comments IASTM using the small and medium roller and hand held massager to the L Upper leg and calf in prone and the L upper leg in supine.  -AL      Manual Therapy 80  -AL         Compression/Skin Care    Compression/Skin Care compression garment;skin care  -AL      Skin Care lotion applied  -AL      Wrapping Location lower extremity  -AL      Wrapping Location LE left:;toes to groin  -AL      Bandaging Technique circumferential/spiral;figure-eight;moderate compression;strong compression  -AL      Compression Garment Comments Used 4 in stockinette, Comprifoam, Comprilan short stretch bandages: two 8 cm, two 10 cm, two 12 cm.  -AL                User Key  (r) = Recorded By, (t) = Taken By, (c) = Cosigned By      Initials Name Provider Type    Elida Pickering PTA, CLRAISA Physical Therapist Assistant                        Therapeutic Exercises    03679 Units Comments   L Prone quad stretch   ROM is limited due to knee surgeries.    Half foam roll under L hip     B hip flexor stretch in the Sang Test position     B hamstring stretch with L ankle dorsi flexion            Timed Minutes 10             Therapy Education/Self Care 26494   Education offered today Work on CDT and use the pump.   Medbridge Code    Ongoing HEP   Wear compression garment and use the Flexitouch pump   Timed Minutes        Total Timed Treatment:      90  mins  Total Time of Visit:             90  mins         ASSESSMENT/PLAN     GOALS  Goals                                          Progress Note due by 11/17/23                                                       Recert due by 1/16/24   LTG by: 12 weeks Comments Date Status   Patient will have a reduction in LLE of at least 20 cm to allow more comfortable walking and mobility.  The L LE has reduced in size as compared to his last visit.  10/25 Ongoing   Patient will be independent with specific HEP for lymphedema He is able to move around much better. 6/20 Met   He will have no s/s infection.  No signs or symptoms of infection 11/16 Met   He will have no complaint of increased edema in the groin. Very minimal amount of swelling in the L groin area. 10/18 Progressing   Patient will be independent with all tools available to manage his LLE lymphedema. He will order more short stretch bandages.  11/1 Ongoing             ASSESSMENT:  Patient has had an increase in size of 4.7 cm as  compared to his last visit.  I did wrap the L LE today to help move the fluid out of the L LE.  The wraps are losing their elasticity, he will order new short stretch bandages from [employer].    PLAN: Cont CDT.  Wrap L LE next treatment.       SIGNATURE: Elida Sequeira PTA, [facility] [place] License #  [id number]  Electronically Signed on 11/1/2023        [address] [place] [address]. [address]  [phone]

## 2023-11-06 ENCOUNTER — HOSPITAL ENCOUNTER (OUTPATIENT)
Dept: ULTRASOUND IMAGING | Facility: HOSPITAL | Age: 53
Discharge: HOME | End: 2023-11-06
Payer: MEDICARE

## 2023-11-08 ENCOUNTER — TREATMENT (OUTPATIENT)
Dept: PHYSICAL THERAPY | Facility: CLINIC | Age: 53
End: 2023-11-08
Payer: MEDICARE

## 2023-11-08 DIAGNOSIS — C64.1 RENAL CELL CARCINOMA OF RIGHT KIDNEY: ICD-10-CM

## 2023-11-08 DIAGNOSIS — I89.0 LYMPHEDEMA OF LEFT LEG: Primary | ICD-10-CM

## 2023-11-08 DIAGNOSIS — I89.0 LYMPHEDEMA OF GENITALIA: ICD-10-CM

## 2023-11-08 NOTE — PROGRESS NOTES
Physical Therapy Treatment Note    [address], [place] [address]    Patient: Holland Phelps                                                 Visit Date: 2023  :     1970    Referring practitioner:    GILBERTO Ornelas  Date of Initial Visit:          Type: THERAPY  Noted: 10/12/2022    Patient seen for 48 sessions    Visit Diagnoses:    ICD-10-CM ICD-9-CM   1. Lymphedema of left leg  I89.0 457.1   2. Lymphedema of genitalia  I89.0 457.1   3. Renal cell carcinoma of right kidney  C64.1 189.0       SUBJECTIVE     Subjective  He states he has been busy and he did not wrap the leg last night.  He did use the pump.  He does sleep in the Edema Wear sometimes at night, this does seem to help.     PAIN: 0/10 no pain but a little numbness in the calf         OBJECTIVE     Objective    Lymphedema       Row Name 23 0800             Subjective Pain    Able to rate subjective pain? yes  -AL      Pre-Treatment Pain Level 0  -AL         Lymphedema Measurements    Measurement Type(s) Circumferential  -AL      Circumferential Areas Lower extremities  -AL         BLE Circumferential (cm)    Measurement Location 1 BOT  -AL      Left 1 23.4 cm  -AL      Measurement Location 2 +7  -AL      Left 2 25.2 cm  -AL      Measurement Location 3 +7  -AL      Left 3 27.4 cm  -AL      Measurement Location 4 +10  -AL      Left 4 33.7 cm  -AL      Measurement Location 5 +10  -AL      Left 5 42.6 cm  -AL      Measurement Location 6 +10  -AL      Left 6 48.4 cm  -AL      Measurement Location 7 +10  -AL      Left 7 43.8 cm  -AL      Measurement Location 8 +10  -AL      Left 8 48 cm  -AL      Measurement Location 9 +10  -AL      Left 9 56 cm  -AL      Measurement Location 10 +10  -AL      Left 10 59.6 cm  -AL      LLE Circumferential Total 408.1 cm  -AL         Manual Lymphatic Drainage    Manual Lymphatic Drainage initial sequence;opened regional lymph  nodes;opened anastamoses;extremity treatment  -AL      Initial Sequence short neck;abdomen;diaphragmatic breathing  -AL      Abdomen superficial;deep  -AL      Diaphragmatic Breathing x 9 with superficial abdominals  -AL      Opened Regional Lymph Nodes axillary;inguinal  -AL      Axillary right;left  -AL      Inguinal right;left  -AL      Opened Anastamoses inguino-axillary  -AL      Inguino-Axillary right;left  Supine and prone  -AL      Extremity Treatment MLD to full limb  -AL      Extremity Treatment Focus On LLE  -AL      Manual Lymphatic Drainage Comments IASTM using the small and medium roller and hand held massager to the L Upper leg and calf in prone and the L upper leg in supine.  -AL         Compression/Skin Care    Compression/Skin Care compression garment;skin care  -AL      Compression/Skin Care Comments Patient donned his new Mediven Comfort compression thigh high, size 5, 20-30 mmHg.  -AL                User Key  (r) = Recorded By, (t) = Taken By, (c) = Cosigned By      Initials Name Provider Type    Elida Pickering, PTA, CLT-LANNY Physical Therapist Assistant                          Therapeutic Exercises    15142 Units Comments   L Prone quad stretch   ROM is limited due to knee surgeries.    Half foam roll under L hip     B hip flexor stretch in the Sang Test position     B hamstring stretch with L ankle dorsi flexion            Timed Minutes 10             Therapy Education/Self Care 58845   Education offered today Work on CDT and use the pump.   [employer] Code    Ongoing HEP   Wear compression garment and use the Flexitouch pump   Timed Minutes        Total Timed Treatment:      70  mins  Total Time of Visit:             70  mins         ASSESSMENT/PLAN     GOALS  Goals                                          Progress Note due by 11/17/23                                                      Recert due by 1/16/24   LTG by: 12 weeks Comments Date Status   Patient will have a reduction in LLE  of at least 20 cm to allow more comfortable walking and mobility.  The L LE has reduced in size as compared to his last visit.  10/25 Ongoing   Patient will be independent with specific HEP for lymphedema He is able to move around much better. 6/20 Met   He will have no s/s infection.  No signs or symptoms of infection 11/16 Met   He will have no complaint of increased edema in the groin. Very minimal amount of swelling in the L groin area. 10/18 Progressing   Patient will be independent with all tools available to manage his LLE lymphedema. He will order more short stretch bandages.  11/8 Ongoing             ASSESSMENT:  Patient has had an increase in size of 10.8 cm as  compared to his last visit. He did not wear the short stretch bandages last night which most likely caused the L LE to increase in size. He is going to try the use the stockinette, Edema Wear, then the short stretch bandages to see if this helps reduce the L leg more.     PLAN: Cont CDT.  Wrap L LE next treatment.       SIGNATURE: Elida Sequeira PTA, [facility] [place] License #  [id number]  Electronically Signed on 11/8/2023        [address] [place] [address]. [address]  [phone]

## 2023-11-15 ENCOUNTER — TREATMENT (OUTPATIENT)
Dept: PHYSICAL THERAPY | Facility: CLINIC | Age: 53
End: 2023-11-15
Payer: MEDICARE

## 2023-11-15 DIAGNOSIS — I89.0 LYMPHEDEMA OF LEFT LEG: Primary | ICD-10-CM

## 2023-11-15 DIAGNOSIS — I89.0 LYMPHEDEMA OF GENITALIA: ICD-10-CM

## 2023-11-15 DIAGNOSIS — C64.1 RENAL CELL CARCINOMA OF RIGHT KIDNEY: ICD-10-CM

## 2023-11-15 PROCEDURE — 97140 MANUAL THERAPY 1/> REGIONS: CPT | Performed by: PHYSICAL THERAPIST

## 2023-11-15 PROCEDURE — 97110 THERAPEUTIC EXERCISES: CPT | Performed by: PHYSICAL THERAPIST

## 2023-11-15 NOTE — PROGRESS NOTES
Physical Therapy Treatment Note and 30 Day Progress Note    [address] [place] [address]    Patient: Holland Phelps                                                 Visit Date: 11/15/2023  :     1970    Referring practitioner:    GILBERTO Ornelas  Date of Initial Visit:          Type: THERAPY  Noted: 10/12/2022    Patient seen for 49 sessions    Visit Diagnoses:    ICD-10-CM ICD-9-CM   1. Lymphedema of left leg  I89.0 457.1   2. Lymphedema of genitalia  I89.0 457.1   3. Renal cell carcinoma of right kidney  C64.1 189.0       SUBJECTIVE     Subjective  He states he has been experimenting with wrapping using the Edema Wear.  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  He has to go to [place] tomorrow for a bone density test a CT scan.     PAIN: 0/10 no pain but a little numbness in the calf         OBJECTIVE     Objective    Lymphedema       Row Name 11/15/23 0800             Subjective Pain    Able to rate subjective pain? yes  -AL      Pre-Treatment Pain Level 0  -AL         Lymphedema Measurements    Measurement Type(s) Circumferential  -AL      Circumferential Areas Lower extremities  -AL         BLE Circumferential (cm)    Measurement Location 1 BOT  -AL      Left 1 22.8 cm  -AL      Measurement Location 2 +7  -AL      Left 2 25.4 cm  -AL      Measurement Location 3 +7  -AL      Left 3 27.5 cm  -AL      Measurement Location 4 +10  -AL      Left 4 33.5 cm  -AL      Measurement Location 5 +10  -AL      Left 5 42.6 cm  -AL      Measurement Location 6 +10  -AL      Left 6 47 cm  -AL      Measurement Location 7 +10  -AL      Left 7 43.6 cm  -AL      Measurement Location 8 +10  -AL      Left 8 48 cm  -AL      Measurement Location 9 +10  -AL      Left 9 56 cm  -AL      Measurement Location 10 +10  -AL      Left 10 60.5 cm  -AL      LLE Circumferential Total 406.9 cm  -AL         Manual Lymphatic Drainage    Manual Lymphatic  Drainage initial sequence;opened regional lymph nodes;opened anastamoses;extremity treatment  -AL      Initial Sequence short neck;abdomen;diaphragmatic breathing  -AL      Abdomen superficial;deep  -AL      Diaphragmatic Breathing x 9 with superficial abdominals  -AL      Opened Regional Lymph Nodes axillary;inguinal  -AL      Axillary right;left  -AL      Inguinal right;left  -AL      Opened Anastamoses inguino-axillary  -AL      Inguino-Axillary right;left  Supine and prone  -AL      Extremity Treatment MLD to full limb  -AL      Extremity Treatment Focus On LLE  -AL      Manual Lymphatic Drainage Comments IASTM using the small and medium roller and hand held massager to the L Upper leg and calf in prone and the L upper leg in supine.  -AL      Manual Therapy 60  -AL         Compression/Skin Care    Compression/Skin Care compression garment;skin care  -AL      Compression/Skin Care Comments Patient donned his new Mediven Comfort compression thigh high, size 5, 20-30 mmHg.  -AL                User Key  (r) = Recorded By, (t) = Taken By, (c) = Cosigned By      Initials Name Provider Type    AL Elida Sequeira, KEILA, CLRAISA Physical Therapist Assistant                              Therapeutic Exercises    23037 Units Comments   L Prone quad stretch   ROM is limited due to knee surgeries.    Half foam roll under L hip     B hip flexor stretch in the Sang Test position     B hamstring stretch with L ankle dorsi flexion            Timed Minutes 10             Therapy Education/Self Care 49021   Education offered today Work on CDT and use the pump.   Medbridge Code    Ongoing HEP   Wear compression garment and use the Flexitouch pump   Timed Minutes        Total Timed Treatment:      70  mins  Total Time of Visit:             70  mins         ASSESSMENT/PLAN     GOALS  Goals                                          Progress Note due by 12/15/23                                                      Recert due by 1/16/24    LTG by: 12 weeks Comments Date Status   Patient will have a reduction in LLE of at least 20 cm to allow more comfortable walking and mobility.  The L LE has reduced in size as compared to his last visit.  10/25 Ongoing   Patient will be independent with specific HEP for lymphedema He is able to move around much better. 6/20 Met   He will have no s/s infection.  No signs or symptoms of infection 11/16 Met   He will have no complaint of increased edema in the groin. He will have some increase in swelling after wrapping or wearing the compression.   11/15 Progressing   Patient will be independent with all tools available to manage his LLE lymphedema. He will order more short stretch bandages.  11/15 Ongoing             ASSESSMENT:  Patient has had a reduction in size of 1.2 cm as compared to his last visit. He does have some swelling when he has compression on the L LE, the groin will swell.  He works on MLD to the L groin area and this helps the swelling decrease. Patient would benefit from ordering new short stretch bandages as his have lost their elasticity.      PLAN: Cont CDT.  Wrap L LE next treatment.       SIGNATURE: Elida Sequeira PTA, [place], [place] License #  [id number]  Electronically Signed on 11/15/2023        [address] [place] [address]. [address]  [phone]

## 2023-11-20 NOTE — PROGRESS NOTES
Progress Note Addendum      Patient: Holland Phelps           : 1970  Visit Date: 11/15/2023  Referring practitioner: GILBERTO Ornelas  Date of Initial Visit: Type: THERAPY  Noted: 10/12/2022  Patient seen for 49 sessions  Visit Diagnoses:    ICD-10-CM ICD-9-CM   1. Lymphedema of left leg  I89.0 457.1   2. Lymphedema of genitalia  I89.0 457.1   3. Renal cell carcinoma of right kidney  C64.1 189.0          Clinical Progress: improved  Home Program Compliance: Yes  Progress toward previous goals: Partially Met  Prognosis to achieve goals: good    Objective     Assessment & Plan       Assessment  Impairments: abnormal gait, abnormal or restricted ROM, lacks appropriate home exercise program and pain with function   Functional limitations: walking, uncomfortable because of pain and standing   Prognosis: good    Plan  Therapy options: will be seen for skilled therapy services  Planned therapy interventions: manual therapy, compression, soft tissue mobilization, stretching, therapeutic activities, home exercise program and functional ROM exercises  Frequency: 2x week (1-2 X/wk)  Duration in weeks: 4  Treatment plan discussed with: PTA        I reviewed the treatment and goals with Aneta Sequeira PTA and agree with the POC.    SIGNATURE: Dereck Walsh PT, License #: [id number]  Electronically Signed on 2023

## 2023-11-22 ENCOUNTER — TREATMENT (OUTPATIENT)
Dept: PHYSICAL THERAPY | Facility: CLINIC | Age: 53
End: 2023-11-22
Payer: MEDICARE

## 2023-11-22 DIAGNOSIS — I89.0 LYMPHEDEMA OF LEFT LEG: Primary | ICD-10-CM

## 2023-11-22 DIAGNOSIS — C64.1 RENAL CELL CARCINOMA OF RIGHT KIDNEY: ICD-10-CM

## 2023-11-22 DIAGNOSIS — I89.0 LYMPHEDEMA OF GENITALIA: ICD-10-CM

## 2023-11-22 PROCEDURE — 97140 MANUAL THERAPY 1/> REGIONS: CPT | Performed by: PHYSICAL THERAPIST

## 2023-11-22 PROCEDURE — 97110 THERAPEUTIC EXERCISES: CPT | Performed by: PHYSICAL THERAPIST

## 2023-11-22 NOTE — PROGRESS NOTES
Physical Therapy Treatment Note    [address] [place] [address]    Patient: Holland Phelps                                                 Visit Date: 2023  :     1970    Referring practitioner:    GILBERTO Ornelas  Date of Initial Visit:          Type: THERAPY  Noted: 10/12/2022    Patient seen for 50 sessions    Visit Diagnoses:    ICD-10-CM ICD-9-CM   1. Lymphedema of left leg  I89.0 457.1   2. Lymphedema of genitalia  I89.0 457.1   3. Renal cell carcinoma of right kidney  C64.1 189.0       SUBJECTIVE     Subjective  He states he has been wearing the compression sleeve during the day and the Edema Wear at night.  He keeps the L leg slightly elevated at night.     PAIN: 0/10          OBJECTIVE     Objective    Lymphedema       Row Name 23 0800             Subjective Pain    Able to rate subjective pain? yes  -AL      Pre-Treatment Pain Level 0  -AL         Lymphedema Measurements    Measurement Type(s) Circumferential  -AL      Circumferential Areas Lower extremities  -AL         BLE Circumferential (cm)    Measurement Location 1 BOT  -AL      Left 1 22.5 cm  -AL      Measurement Location 2 +7  -AL      Left 2 25 cm  -AL      Measurement Location 3 +7  -AL      Left 3 25.5 cm  -AL      Measurement Location 4 +10  -AL      Left 4 31.1 cm  -AL      Measurement Location 5 +10  -AL      Left 5 40.1 cm  -AL      Measurement Location 6 +10  -AL      Left 6 45 cm  -AL      Measurement Location 7 +10  -AL      Left 7 42.4 cm  -AL      Measurement Location 8 +10  -AL      Left 8 46.2 cm  -AL      Measurement Location 9 +10  -AL      Left 9 53.1 cm  -AL      Measurement Location 10 +10  -AL      Left 10 57.4 cm  -AL      LLE Circumferential Total 388.3 cm  -AL         Manual Lymphatic Drainage    Manual Lymphatic Drainage initial sequence;opened regional lymph nodes;opened anastamoses;extremity treatment  -AL      Initial  Sequence short neck;abdomen;diaphragmatic breathing  -AL      Abdomen superficial;deep  -AL      Diaphragmatic Breathing x 9 with superficial abdominals  -AL      Opened Regional Lymph Nodes axillary;inguinal  -AL      Axillary right;left  -AL      Inguinal right;left  -AL      Opened Anastamoses inguino-axillary  -AL      Inguino-Axillary right;left  Supine and prone  -AL      Extremity Treatment MLD to full limb  -AL      Extremity Treatment Focus On LLE  -AL      Manual Lymphatic Drainage Comments IASTM using the small and medium roller and hand held massager to the L Upper leg and calf in prone and the L upper leg in supine.  -AL      Manual Therapy 60  -AL         Compression/Skin Care    Compression/Skin Care compression garment;skin care  -AL      Compression/Skin Care Comments Patient donned his new Mediven Comfort compression thigh high, size 5, 20-30 mmHg.  -AL                User Key  (r) = Recorded By, (t) = Taken By, (c) = Cosigned By      Initials Name Provider Type    Elida Pickering, PTA, CLT-LANNY Physical Therapist Assistant                                Therapeutic Exercises    80098 Units Comments   L Prone quad stretch   ROM is limited due to knee surgeries.    Half foam roll under L hip     B hip flexor stretch in the Sang Test position     B hamstring stretch with L ankle dorsi flexion            Timed Minutes 10             Therapy Education/Self Care 15375   Education offered today Work on CDT and use the pump.   [employer] Code    Ongoing HEP   Wear compression garment and use the Flexitouch pump   Timed Minutes        Total Timed Treatment:      70  mins  Total Time of Visit:             70  mins         ASSESSMENT/PLAN     GOALS  Goals                                          Progress Note due by 12/15/23                                                      Recert due by 1/16/24   LTG by: 12 weeks Comments Date Status   Patient will have a reduction in LLE of at least 20 cm to allow  more comfortable walking and mobility.  The L LE has reduced in size as compared to his last visit.  10/25 Ongoing   Patient will be independent with specific HEP for lymphedema He is able to move around much better. 6/20 Met   He will have no s/s infection.  No signs or symptoms of infection 11/16 Met   He will have no complaint of increased edema in the groin. He has had an 18.6 cm reduction today as compared to his last treatment.  11/22 Progressing   Patient will be independent with all tools available to manage his LLE lymphedema. He will order more short stretch bandages.  11/22 Ongoing             ASSESSMENT:  Patient has had a reduction in size of 18.6 cm as compared to his last visit. He has been wearing the Edema Wear the last few days after the using the pump.  He then dons the Edema Wear at night and elevates the L leg.  He wears the Compression sleeve during the day.  This combination has helped reduce the L LE.     PLAN: Cont CDT      SIGNATURE: Elida Sequeira PTA, [facility] [place] License #  [id number]  Electronically Signed on 11/22/2023        [address] [place] [address]. [address]  [phone]

## 2023-11-29 ENCOUNTER — TREATMENT (OUTPATIENT)
Dept: PHYSICAL THERAPY | Facility: CLINIC | Age: 53
End: 2023-11-29
Payer: MEDICARE

## 2023-11-29 DIAGNOSIS — C64.1 RENAL CELL CARCINOMA OF RIGHT KIDNEY: ICD-10-CM

## 2023-11-29 DIAGNOSIS — I89.0 LYMPHEDEMA OF GENITALIA: ICD-10-CM

## 2023-11-29 DIAGNOSIS — I89.0 LYMPHEDEMA OF LEFT LEG: Primary | ICD-10-CM

## 2023-11-29 PROCEDURE — 97140 MANUAL THERAPY 1/> REGIONS: CPT | Performed by: PHYSICAL THERAPIST

## 2023-11-29 PROCEDURE — 97110 THERAPEUTIC EXERCISES: CPT | Performed by: PHYSICAL THERAPIST

## 2023-11-29 NOTE — PROGRESS NOTES
Physical Therapy Treatment Note    [address], [place] [address]    Patient: Holland Phelps                                                 Visit Date: 2023  :     1970    Referring practitioner:    GILBERTO Ornelas  Date of Initial Visit:          Type: THERAPY  Noted: 10/12/2022    Patient seen for 51 sessions    Visit Diagnoses:    ICD-10-CM ICD-9-CM   1. Lymphedema of left leg  I89.0 457.1   2. Lymphedema of genitalia  I89.0 457.1   3. Renal cell carcinoma of right kidney  C64.1 189.0       SUBJECTIVE     Subjective  He states he has been resting more since he hasn't been working as much so he has not worn compression the last two days during the day, he has elevated the leg. His wife has wrapped the leg at night.     PAIN: 0/10 numbness in the L shin         OBJECTIVE     Objective    Lymphedema       Row Name 23 0800             Subjective Pain    Able to rate subjective pain? yes  -AL      Pre-Treatment Pain Level 0  -AL         Lymphedema Measurements    Measurement Type(s) Circumferential  -AL      Circumferential Areas Lower extremities  -AL         BLE Circumferential (cm)    Measurement Location 1 BOT  -AL      Left 1 22.5 cm  -AL      Measurement Location 2 +7  -AL      Left 2 24.5 cm  -AL      Measurement Location 3 +7  -AL      Left 3 24 cm  -AL      Measurement Location 4 +10  -AL      Left 4 27.9 cm  -AL      Measurement Location 5 +10  -AL      Left 5 37.9 cm  -AL      Measurement Location 6 +10  -AL      Left 6 44.3 cm  -AL      Measurement Location 7 +10  -AL      Left 7 41.6 cm  -AL      Measurement Location 8 +10  -AL      Left 8 47 cm  -AL      Measurement Location 9 +10  -AL      Left 9 53 cm  -AL      Measurement Location 10 +10  -AL      Left 10 59 cm  -AL      LLE Circumferential Total 381.7 cm  -AL         Manual Lymphatic Drainage    Manual Lymphatic Drainage initial sequence;opened regional  lymph nodes;opened anastamoses;extremity treatment  -AL      Initial Sequence short neck;abdomen;diaphragmatic breathing  -AL      Abdomen superficial;deep  -AL      Diaphragmatic Breathing x 9 with superficial abdominals  -AL      Opened Regional Lymph Nodes axillary;inguinal  -AL      Axillary right;left  -AL      Inguinal right;left  -AL      Opened Anastamoses inguino-axillary  -AL      Inguino-Axillary right;left  Supine and prone  -AL      Extremity Treatment MLD to full limb  -AL      Extremity Treatment Focus On LLE  -AL      Manual Lymphatic Drainage Comments IASTM using the small and medium roller and hand held massager to the L Upper leg and calf in prone and the L upper leg in supine.  -AL      Manual Therapy 60  -AL                User Key  (r) = Recorded By, (t) = Taken By, (c) = Cosigned By      Initials Name Provider Type    Eilda Pickering PTA, CLT-LANA Physical Therapist Assistant                                  Therapeutic Exercises    21687 Units Comments   L Prone quad stretch   ROM is limited due to knee surgeries.    Half foam roll under L hip     B hip flexor stretch in the Sang Test position     B hamstring stretch with L ankle dorsi flexion            Timed Minutes 10             Therapy Education/Self Care 90103   Education offered today Work on CDT and use the pump.   Medbridge Code    Ongoing HEP   Wear compression garment and use the Flexitouch pump   Timed Minutes        Total Timed Treatment:      70  mins  Total Time of Visit:             70  mins         ASSESSMENT/PLAN     GOALS  Goals                                          Progress Note due by 12/15/23                                                      Recert due by 1/16/24   LTG by: 12 weeks Comments Date Status   Patient will have a reduction in LLE of at least 20 cm to allow more comfortable walking and mobility.  The L LE has reduced in size as compared to his last visit.  10/25 Ongoing   Patient will be independent  with specific HEP for lymphedema He is able to move around much better. 6/20 Met   He will have no s/s infection.  No signs or symptoms of infection 11/16 Met   He will have no complaint of increased edema in the groin. He has had an 18.6 cm reduction today as compared to his last treatment.  11/22 Progressing   Patient will be independent with all tools available to manage his LLE lymphedema. He is trying different options with elevating the leg and wrapping at night. 11/29 Ongoing             ASSESSMENT:  Patient has had a reduction in size of 6.6 cm as compared to his last visit. He has not bee working the past few days, he has been going without compression during the day but elevating the leg.  He has had an increase in size in the most proximal L leg, the tissue is more dense in this area.  The dense tissue L proximal leg does soften with the MLD.     PLAN: Cont CDT      SIGNATURE: Elida Sequeira PTA, [place], [place] License #  [id number]  Electronically Signed on 11/29/2023        [address] [place] [address]. [address]  [phone]

## 2023-12-06 ENCOUNTER — TREATMENT (OUTPATIENT)
Dept: PHYSICAL THERAPY | Facility: CLINIC | Age: 53
End: 2023-12-06
Payer: MEDICARE

## 2023-12-06 DIAGNOSIS — C64.1 RENAL CELL CARCINOMA OF RIGHT KIDNEY: ICD-10-CM

## 2023-12-06 DIAGNOSIS — I89.0 LYMPHEDEMA OF GENITALIA: ICD-10-CM

## 2023-12-06 DIAGNOSIS — I89.0 LYMPHEDEMA OF LEFT LEG: Primary | ICD-10-CM

## 2023-12-06 PROCEDURE — 97140 MANUAL THERAPY 1/> REGIONS: CPT | Performed by: PHYSICAL THERAPIST

## 2023-12-06 PROCEDURE — 97110 THERAPEUTIC EXERCISES: CPT | Performed by: PHYSICAL THERAPIST

## 2023-12-06 NOTE — PROGRESS NOTES
Physical Therapy Treatment Note    [address] [place] [address]    Patient: Holland Phelps                                                 Visit Date: 2023  :     1970    Referring practitioner:    GILBERTO Ornelas  Date of Initial Visit:          Type: THERAPY  Noted: 10/12/2022    Patient seen for 52 sessions    Visit Diagnoses:    ICD-10-CM ICD-9-CM   1. Lymphedema of left leg  I89.0 457.1   2. Lymphedema of genitalia  I89.0 457.1   3. Renal cell carcinoma of right kidney  C64.1 189.0       SUBJECTIVE     Subjective  He states he knows his leg is up in size because he has he has been working more.      PAIN: 0/10 numbness in the L shin         OBJECTIVE     Objective    Lymphedema       Row Name 23 0800             Subjective Pain    Able to rate subjective pain? yes  -AL      Pre-Treatment Pain Level 0  -AL         Lymphedema Measurements    Measurement Type(s) Circumferential  -AL      Circumferential Areas Lower extremities  -AL         BLE Circumferential (cm)    Measurement Location 1 BOT  -AL      Left 1 23 cm  -AL      Measurement Location 2 +7  -AL      Left 2 25.4 cm  -AL      Measurement Location 3 +7  -AL      Left 3 27 cm  -AL      Measurement Location 4 +10  -AL      Left 4 34 cm  -AL      Measurement Location 5 +10  -AL      Left 5 43 cm  -AL      Measurement Location 6 +10  -AL      Left 6 48.6 cm  -AL      Measurement Location 7 +10  -AL      Left 7 43.9 cm  -AL      Measurement Location 8 +10  -AL      Left 8 47 cm  -AL      Measurement Location 9 +10  -AL      Left 9 44.9 cm  -AL      Measurement Location 10 +10  -AL      Left 10 60 cm  -AL      LLE Circumferential Total 396.8 cm  -AL         Manual Lymphatic Drainage    Manual Lymphatic Drainage initial sequence;opened regional lymph nodes;opened anastamoses;extremity treatment  -AL      Initial Sequence short neck;abdomen;diaphragmatic breathing   -AL      Abdomen superficial;deep  -AL      Diaphragmatic Breathing x 9 with superficial abdominals  -AL      Opened Regional Lymph Nodes axillary;inguinal  -AL      Axillary right;left  -AL      Inguinal right;left  -AL      Opened Anastamoses inguino-axillary  -AL      Inguino-Axillary right;left  Supine and prone  -AL      Extremity Treatment MLD to full limb  -AL      Extremity Treatment Focus On LLE  -AL      Manual Lymphatic Drainage Comments IASTM using the small and medium roller and hand held massager to the L Upper leg and calf in prone and the L upper leg in supine.  -AL      Manual Therapy 60  -AL         Compression/Skin Care    Compression/Skin Care compression garment  -AL      Compression/Skin Care Comments Patient donned his new Mediven Comfort compression thigh high, size 5, 20-30 mmHg.  -AL                User Key  (r) = Recorded By, (t) = Taken By, (c) = Cosigned By      Initials Name Provider Type    Elida Pickering PTA, CLT-LANA Physical Therapist Assistant                            Therapeutic Exercises    75670 Units Comments   L Prone quad stretch   ROM is limited due to knee surgeries.    Half foam roll under L hip     B hip flexor stretch in the Sang Test position     B hamstring stretch with L ankle dorsi flexion            Timed Minutes 10             Therapy Education/Self Care 79455   Education offered today Work on CDT and use the pump.   Medbridge Code    Ongoing HEP   Wear compression garment and use the Flexitouch pump   Timed Minutes        Total Timed Treatment:      70  mins  Total Time of Visit:             70  mins         ASSESSMENT/PLAN     GOALS  Goals                                          Progress Note due by 12/15/23                                                      Recert due by 1/16/24   LTG by: 12 weeks Comments Date Status   Patient will have a reduction in LLE of at least 20 cm to allow more comfortable walking and mobility.  The L LE has reduced in size  as compared to his last visit.  10/25 Ongoing   Patient will be independent with specific HEP for lymphedema He is able to move around much better. 6/20 Met   He will have no s/s infection.  No signs or symptoms of infection 11/16 Met   He will have no complaint of increased edema in the groin. He has had an 18.6 cm reduction today as compared to his last treatment.  11/22 Progressing   Patient will be independent with all tools available to manage his LLE lymphedema. He has not been able to elevate the L leg as much. 12/6 Ongoing             ASSESSMENT:  Patient has had an increase in size of 15.1 cm as compared to his last treatment.  He has been on his feet more cleaning cars, this is most likely why the L UE is up in size.  He will work on his normal routine for CDT and using the pump this weekend.      PLAN: Cont CDT, Patient will need a progress note next treatment      SIGNATURE: Elida Sequeira PTA, [place], [place] License #  [id number]  Electronically Signed on 12/6/2023        [address] [place] [address]. [address]  [phone]

## 2023-12-13 ENCOUNTER — TELEPHONE (OUTPATIENT)
Dept: VASCULAR SURGERY | Facility: CLINIC | Age: 53
End: 2023-12-13
Payer: MEDICARE

## 2023-12-13 ENCOUNTER — TREATMENT (OUTPATIENT)
Dept: PHYSICAL THERAPY | Facility: CLINIC | Age: 53
End: 2023-12-13
Payer: MEDICARE

## 2023-12-13 DIAGNOSIS — C64.1 RENAL CELL CARCINOMA OF RIGHT KIDNEY: ICD-10-CM

## 2023-12-13 DIAGNOSIS — I89.0 LYMPHEDEMA OF LEFT LEG: Primary | ICD-10-CM

## 2023-12-13 DIAGNOSIS — I89.0 LYMPHEDEMA OF GENITALIA: ICD-10-CM

## 2023-12-13 PROCEDURE — 97110 THERAPEUTIC EXERCISES: CPT | Performed by: PHYSICAL THERAPIST

## 2023-12-13 PROCEDURE — 97140 MANUAL THERAPY 1/> REGIONS: CPT | Performed by: PHYSICAL THERAPIST

## 2023-12-13 NOTE — TELEPHONE ENCOUNTER
[facility] staff attempted to follow warm transfer process and was unsuccessful     Caller: Primo Phelps    Relationship: Emergency Contact    Best call back number:     [phone]        What is the best time to reach you: ANY    Who are you requesting to speak with (clinical staff, provider,  specific staff member): CLINICAL     Do you know the name of the person who called: PT SPOUSE     What was the call regarding: PT SPOUSE CALLED [facility] STATING THAT THE PT HAD CT SCAN ON DECEMBER 8TH. THE REPORTING'S CAME BACK THAT THERE IS THROMBOSIS IN THE STENT. PT STATED THAT HE WAS HAVING SOME PAIN IN HIS LEG AND WAS NOT AWARE OF THIS UNTIL THE RESULTS CAME BACK.     Is it okay if the provider responds through MyChart: PHONE CALL PREFERRED

## 2023-12-13 NOTE — PROGRESS NOTES
Physical Therapy Treatment Note and 30 Day Progress Note    [address], [place] [address]    Patient: Holland Phelps                                                 Visit Date: 2023  :     1970    Referring practitioner:    GILBERTO Ornelas  Date of Initial Visit:          Type: THERAPY  Noted: 10/12/2022    Patient seen for 53 sessions    Visit Diagnoses:    ICD-10-CM ICD-9-CM   1. Lymphedema of left leg  I89.0 457.1   2. Lymphedema of genitalia  I89.0 457.1   3. Renal cell carcinoma of right kidney  C64.1 189.0       SUBJECTIVE     Subjective  He states he had [place], the DrPriti Said everything ok.  When his wife saw the results on his chart online, she said there was a spot on his R foot and his tailbone. The spot on his tailbone has been there for awhile.     PAIN: 0/10 numbness in the L shin, not as numb as normal.         OBJECTIVE     Objective    Lymphedema       Row Name 23 0800             Subjective Pain    Able to rate subjective pain? yes  -AL      Pre-Treatment Pain Level 0  -AL         Lymphedema Measurements    Measurement Type(s) Circumferential  -AL      Circumferential Areas Lower extremities  -AL         BLE Circumferential (cm)    Measurement Location 1 BOT  -AL      Left 1 22.1 cm  -AL      Measurement Location 2 +7  -AL      Left 2 24.5 cm  -AL      Measurement Location 3 +7  -AL      Left 3 26.5 cm  -AL      Measurement Location 4 +10  -AL      Left 4 32.5 cm  -AL      Measurement Location 5 +10  -AL      Left 5 40.9 cm  -AL      Measurement Location 6 +10  -AL      Left 6 45.9 cm  -AL      Measurement Location 7 +10  -AL      Left 7 42.7 cm  -AL      Measurement Location 8 +10  -AL      Left 8 47.5 cm  -AL      Measurement Location 9 +10  -AL      Left 9 45 cm  -AL      Measurement Location 10 +10  -AL      Left 10 59.6 cm  -AL      LLE Circumferential Total 387.2 cm  -AL          Edward Becerril     Chief Complaint   Patient presents with    Follow-up     blood pressure medicine, medication questions      Vitals:    02/13/19 0809   BP: 145/77   Pulse: 65   Temp: 98.3 °F (36.8 °C)   TempSrc: Oral   SpO2: 97%   Weight: 177 lb (80.3 kg)   Height: 5' 1\" (1.549 m)   PainSc:   0 - No pain         HPI: Patient is here to establish care with new PCP, her old PCP has left the clinic. Need medication refill. Patient has history of hypertension she she was on losartan 50 mg daily her pharmacy sure that her medication was recalled that she need to contact her PCP. Patient has taken amlodipine in the past as well as lisinopril with no side effects. Blood results from November has been reviewed with the patient patient has prediabetes and that is worsening from last year her hemoglobin A1c is 5.9 it used to be 5.7.       Past Medical History:   Diagnosis Date    Anxiety     Depression     Headache     Hypercholesterolemia      Past Surgical History:   Procedure Laterality Date    HX HYSTEROSCOPY WITH ENDOMETRIAL ABLATION      HX KNEE ARTHROSCOPY Left     HX KNEE ARTHROSCOPY Right     HX MENISCUS REPAIR Left     HX MENISCUS REPAIR Right     HX TUBAL LIGATION      HX WISDOM TEETH EXTRACTION       Social History     Tobacco Use    Smoking status: Never Smoker    Smokeless tobacco: Current User    Tobacco comment: Vape 0.3 nicotine   Substance Use Topics    Alcohol use: No       Family History   Problem Relation Age of Onset    Diabetes Mother     Hypertension Father     Cancer Father         Non-Hodgkins Lymphoma with widespread METS    Diabetes Maternal Grandmother     Hypertension Maternal Grandmother     Diabetes Maternal Grandfather     Hypertension Maternal Grandfather     Cancer Paternal Grandmother     Hypertension Paternal Grandmother     Cancer Paternal Grandfather     Hypertension Paternal Grandfather        Review of Systems   Constitutional: Negative for Manual Lymphatic Drainage    Manual Lymphatic Drainage initial sequence;opened regional lymph nodes;opened anastamoses;extremity treatment  -AL      Initial Sequence short neck;abdomen;diaphragmatic breathing  -AL      Abdomen superficial;deep  -AL      Diaphragmatic Breathing x 9 with superficial abdominals  -AL      Opened Regional Lymph Nodes axillary;inguinal  -AL      Axillary right;left  -AL      Inguinal right;left  -AL      Opened Anastamoses inguino-axillary  -AL      Inguino-Axillary right;left  Supine and prone  -AL      Extremity Treatment MLD to full limb  -AL      Extremity Treatment Focus On LLE  -AL      Manual Lymphatic Drainage Comments IASTM using the medium roller and hand held massager to the L Upper leg and calf in prone and the L upper leg in supine.  -AL      Manual Therapy 60  -AL         Compression/Skin Care    Compression/Skin Care compression garment  -AL      Skin Care lotion applied  -AL      Compression/Skin Care Comments Patient donned his new Mediven Comfort compression thigh high, size 5, 20-30 mmHg.  -AL                User Key  (r) = Recorded By, (t) = Taken By, (c) = Cosigned By      Initials Name Provider Type    Elida Pickering PTA, SAHIL Physical Therapist Assistant                              Therapeutic Exercises    82191 Units Comments   L Prone quad stretch   ROM is limited due to knee surgeries.    Half foam roll under L hip     B hip flexor stretch in the Sang Test position     B hamstring stretch with L ankle dorsi flexion            Timed Minutes 10             Therapy Education/Self Care 21922   Education offered today Work on CDT and use the pump.  Walk with toes pointing forward.   Medbridge Code    Ongoing HEP   Wear compression garment and use the Flexitouch pump   Timed Minutes        Total Timed Treatment:      70  mins  Total Time of Visit:             70  mins         ASSESSMENT/PLAN     GOALS  Goals                                          Progress  chills, fever, malaise/fatigue and weight loss. HENT: Negative for congestion, ear discharge, ear pain, hearing loss, nosebleeds, sinus pain and sore throat. Eyes: Negative for blurred vision, double vision and discharge. Respiratory: Negative for cough, hemoptysis, sputum production, shortness of breath and wheezing. Cardiovascular: Negative for chest pain, palpitations, claudication and leg swelling. Gastrointestinal: Negative for abdominal pain, constipation, diarrhea, nausea and vomiting. Genitourinary: Negative for dysuria, frequency, hematuria and urgency. Musculoskeletal: Negative for back pain, joint pain, myalgias and neck pain. Skin: Negative for itching and rash. Neurological: Negative for dizziness, tingling, sensory change, speech change, focal weakness, seizures, loss of consciousness, weakness and headaches. Psychiatric/Behavioral: Negative for depression, hallucinations, substance abuse and suicidal ideas. The patient is not nervous/anxious and does not have insomnia. Physical Exam   Constitutional: She is oriented to person, place, and time. She appears well-developed and well-nourished. No distress. HENT:   Head: Normocephalic and atraumatic. Mouth/Throat: Oropharynx is clear and moist. No oropharyngeal exudate. Eyes: Conjunctivae are normal. Pupils are equal, round, and reactive to light. Right eye exhibits no discharge. Left eye exhibits no discharge. No scleral icterus. Neck: Normal range of motion. Neck supple. No thyromegaly present. Cardiovascular: Normal rate, regular rhythm and normal heart sounds. No murmur heard. Pulmonary/Chest: Effort normal and breath sounds normal. No respiratory distress. She has no wheezes. She has no rales. She exhibits no tenderness. Abdominal: Soft. She exhibits no distension. There is no tenderness. There is no rebound. Musculoskeletal: Normal range of motion. She exhibits no edema, tenderness or deformity. Lymphadenopathy:     She has no cervical adenopathy. Neurological: She is alert and oriented to person, place, and time. No cranial nerve deficit. Coordination normal.   Skin: Skin is warm and dry. No rash noted. She is not diaphoretic. No erythema. No pallor. Psychiatric: She has a normal mood and affect. Her behavior is normal. Judgment and thought content normal.   Nursing note and vitals reviewed. Assessment and plan     Plan of care has been discussed with the patient, he agrees to the plan and verbalized understanding. All his questions were answered  More than 50% of the time spent in this visit was counseling the patient about  illness and treatment options         1. Anxiety and depression  Stable not depressed not anxious and has no suicidal thoughts continue Celexa 20 mg daily  - citalopram (CELEXA) 20 mg tablet; TAKE ONE TABLET BY MOUTH DAILY  Dispense: 90 Tab; Refill: 6    2. Essential hypertension  Patient will be started on Lotrel 5/20 daily and follow-up for blood pressure check when she get her well woman in 1 month  - amLODIPine-benazepril (LOTREL) 5-20 mg per capsule; Take 1 Cap by mouth daily. Dispense: 90 Cap; Refill: 0  - [facility]    3. Obesity (BMI 30-39. 9)  I have discussed with patient in length about lifestyle modification and introduction slowly change to high sugar completely and significantly decrease simple carbohydrates, and gradually introduce complex carbohydrates and increase physical activity with walking and slowly get herself into gym activities. I recommended to the patient that she go to the edition at this time she declined that option    I have spent over 5minutes discussing with the patient regarding her weight management and the importance of adhering to lifestyle modification to reduce her risks of diabetes and other complications including joint pains and back pain.     4. Prediabetes  After discussing option of lifestyle modification only Note due by 1/12/24                                                      Recert due by 1/16/24   LTG by: 12 weeks Comments Date Status   Patient will have a reduction in LLE of at least 20 cm to allow more comfortable walking and mobility.  The L LE size varies depending on his activity. 12/13/23 Ongoing   Patient will be independent with specific HEP for lymphedema He is able to move around much better. 6/20/23 Met   He will have no s/s infection.  No signs or symptoms of infection 11/16/23 Met   He will have no complaint of increased edema in the groin. No swelling in the groin today, he does have a harder area L groin.  12/13/23 Progressing   Patient will be independent with all tools available to manage his LLE lymphedema. He has been on his leg more. 12/13/23 Ongoing             ASSESSMENT:  Patient has had a reduction of 9.6 cm as compared to his last treatment.  He has been washing vehicles but this has not affected the swelling this week.  He is concerned about the recent scans and plans to speak with his Dr. Today.  He continues to work on CDT.  He is getting into the habit of walking with the L hip in ER, he needs verbal cues to correct this.  He will work on walking wit his L foot/toes pointing forward.     PLAN: Cont CDT, will see patient 1 x a week x 6 weeks.      SIGNATURE: Elida Sequeira PTA, [facility] [place] License #  [id number]  Electronically Signed on 12/13/2023        [address] [place] [address]. [address]  [phone]   or lifestyle modification plus metformin, patient agrees to take metformin and will recheck her A1c in 3 months. Side effects including abdominal pain and diarrhea has been discussed with patient. - metFORMIN (GLUCOPHAGE) 500 mg tablet; Take 1 Tab by mouth two (2) times daily (with meals). Dispense: 180 Tab; Refill: 0    Current Outpatient Medications   Medication Sig Dispense Refill    amLODIPine-benazepril (LOTREL) 5-20 mg per capsule Take 1 Cap by mouth daily. 90 Cap 0    metFORMIN (GLUCOPHAGE) 500 mg tablet Take 1 Tab by mouth two (2) times daily (with meals). 180 Tab 0    citalopram (CELEXA) 20 mg tablet TAKE ONE TABLET BY MOUTH DAILY 90 Tab 6    multivitamin (ONE A DAY) tablet Take 1 Tab by mouth daily. Patient Active Problem List    Diagnosis Date Noted    Obesity (BMI 30-39.9) 12/18/2017    Essential hypertension with goal blood pressure less than 140/90 09/27/2016    Pure hypercholesterolemia 08/30/2016    Anxiety and depression 08/30/2016     Results for orders placed or performed during the hospital encounter of 09/13/59   METABOLIC PANEL, COMPREHENSIVE   Result Value Ref Range    Sodium 140 136 - 145 mmol/L    Potassium 4.2 3.5 - 5.5 mmol/L    Chloride 105 100 - 108 mmol/L    CO2 28 21 - 32 mmol/L    Anion gap 7 3.0 - 18 mmol/L    Glucose 90 74 - 99 mg/dL    BUN 16 7.0 - 18 MG/DL    Creatinine 0.74 0.6 - 1.3 MG/DL    BUN/Creatinine ratio 22 (H) 12 - 20      GFR est AA >60 >60 ml/min/1.73m2    GFR est non-AA >60 >60 ml/min/1.73m2    Calcium 9.2 8.5 - 10.1 MG/DL    Bilirubin, total 0.4 0.2 - 1.0 MG/DL    ALT (SGPT) 39 13 - 56 U/L    AST (SGOT) 19 15 - 37 U/L    Alk.  phosphatase 94 45 - 117 U/L    Protein, total 7.5 6.4 - 8.2 g/dL    Albumin 4.2 3.4 - 5.0 g/dL    Globulin 3.3 2.0 - 4.0 g/dL    A-G Ratio 1.3 0.8 - 1.7     LIPID PANEL   Result Value Ref Range    LIPID PROFILE          Cholesterol, total 208 (H) <200 MG/DL    Triglyceride 133 <150 MG/DL    HDL Cholesterol 42 40 - 60 MG/DL LDL, calculated 139.4 (H) 0 - 100 MG/DL    VLDL, calculated 26.6 MG/DL    CHOL/HDL Ratio 5.0 0 - 5.0     HEMOGLOBIN A1C WITH EAG   Result Value Ref Range    Hemoglobin A1c 5.9 (H) 4.2 - 5.6 %    Est. average glucose 123 mg/dL     Hospital Outpatient Visit on 11/29/2018   Component Date Value Ref Range Status    Sodium 11/29/2018 140  136 - 145 mmol/L Final    Potassium 11/29/2018 4.2  3.5 - 5.5 mmol/L Final    Chloride 11/29/2018 105  100 - 108 mmol/L Final    CO2 11/29/2018 28  21 - 32 mmol/L Final    Anion gap 11/29/2018 7  3.0 - 18 mmol/L Final    Glucose 11/29/2018 90  74 - 99 mg/dL Final    BUN 11/29/2018 16  7.0 - 18 MG/DL Final    Creatinine 11/29/2018 0.74  0.6 - 1.3 MG/DL Final    BUN/Creatinine ratio 11/29/2018 22* 12 - 20   Final    GFR est AA 11/29/2018 >60  >60 ml/min/1.73m2 Final    GFR est non-AA 11/29/2018 >60  >60 ml/min/1.73m2 Final    Comment: (NOTE)  Estimated GFR is calculated using the Modification of Diet in Renal   Disease (MDRD) Study equation, reported for both  Americans   (GFRAA) and non- Americans (GFRNA), and normalized to 1.73m2   body surface area. The physician must decide which value applies to   the patient. The MDRD study equation should only be used in   individuals age 25 or older. It has not been validated for the   following: pregnant women, patients with serious comorbid conditions,   or on certain medications, or persons with extremes of body size,   muscle mass, or nutritional status.  Calcium 11/29/2018 9.2  8.5 - 10.1 MG/DL Final    Bilirubin, total 11/29/2018 0.4  0.2 - 1.0 MG/DL Final    ALT (SGPT) 11/29/2018 39  13 - 56 U/L Final    AST (SGOT) 11/29/2018 19  15 - 37 U/L Final    Alk.  phosphatase 11/29/2018 94  45 - 117 U/L Final    Protein, total 11/29/2018 7.5  6.4 - 8.2 g/dL Final    Albumin 11/29/2018 4.2  3.4 - 5.0 g/dL Final    Globulin 11/29/2018 3.3  2.0 - 4.0 g/dL Final    A-G Ratio 11/29/2018 1.3  0.8 - 1.7   Final    LIPID PROFILE 11/29/2018        Final    Cholesterol, total 11/29/2018 208* <200 MG/DL Final    Triglyceride 11/29/2018 133  <150 MG/DL Final    Comment: The drugs N-acetylcysteine (NAC) and  Metamiszole have been found to cause falsely  low results in this chemical assay. Please  be sure to submit blood samples obtained  BEFORE administration of either of these  drugs to assure correct results.  HDL Cholesterol 11/29/2018 42  40 - 60 MG/DL Final    LDL, calculated 11/29/2018 139.4* 0 - 100 MG/DL Final    VLDL, calculated 11/29/2018 26.6  MG/DL Final    CHOL/HDL Ratio 11/29/2018 5.0  0 - 5.0   Final    Hemoglobin A1c 11/29/2018 5.9* 4.2 - 5.6 % Final    Comment: (NOTE)  HbA1C Interpretive Ranges  <5.7              Normal  5.7 - 6.4         Consider Prediabetes  >6.5              Consider Diabetes      Est. average glucose 11/29/2018 123  mg/dL Final    Comment: (NOTE)  The eAG should be interpreted with patient characteristics in mind   since ethnicity, interindividual differences, red cell lifespan,   variation in rates of glycation, etc. may affect the validity of the   calculation.             Follow-up Disposition: Not on File

## 2023-12-13 NOTE — TELEPHONE ENCOUNTER
Meredith MACIAS called needing patient to be seen I consulted rosalio she stated the patient will need his ct on a disc and to have him seen with Dr. Robert puga he will need a procedure. I relayed the information to GILBERTO and she verified.

## 2023-12-16 NOTE — PROGRESS NOTES
Progress Note Addendum      Patient: Holland Phelps           : 1970  Visit Date: 2023  Referring practitioner: GILBERTO Ornelas  Date of Initial Visit: Type: THERAPY  Noted: 10/12/2022  Patient seen for 53 sessions  Visit Diagnoses:    ICD-10-CM ICD-9-CM   1. Lymphedema of left leg  I89.0 457.1   2. Lymphedema of genitalia  I89.0 457.1   3. Renal cell carcinoma of right kidney  C64.1 189.0          Clinical Progress: improved  Home Program Compliance: Yes  Progress toward previous goals: Partially Met  Prognosis to achieve goals: good    Objective     Assessment & Plan       Assessment  Impairments: abnormal gait, abnormal or restricted ROM, lacks appropriate home exercise program and pain with function   Functional limitations: walking, uncomfortable because of pain and standing   Prognosis: good    Plan  Therapy options: will be seen for skilled therapy services  Planned therapy interventions: manual therapy, compression, soft tissue mobilization, stretching, therapeutic activities, home exercise program and functional ROM exercises  Frequency: 2x week (1-2 X/wk)  Duration in weeks: 4  Treatment plan discussed with: PTA        I reviewed the treatment and goals with Aneta Sequeira PTA and agree with the POC.    SIGNATURE: Dereck Walsh PT, License #: [id number]  Electronically Signed on 12/15/2023

## 2024-01-03 ENCOUNTER — TREATMENT (OUTPATIENT)
Dept: PHYSICAL THERAPY | Facility: CLINIC | Age: 54
End: 2024-01-03
Payer: MEDICARE

## 2024-01-03 DIAGNOSIS — I89.0 LYMPHEDEMA OF LEFT LEG: Primary | ICD-10-CM

## 2024-01-03 DIAGNOSIS — I89.0 LYMPHEDEMA OF GENITALIA: ICD-10-CM

## 2024-01-03 DIAGNOSIS — C64.1 RENAL CELL CARCINOMA OF RIGHT KIDNEY: ICD-10-CM

## 2024-01-03 PROCEDURE — 97110 THERAPEUTIC EXERCISES: CPT | Performed by: PHYSICAL THERAPIST

## 2024-01-03 PROCEDURE — 97140 MANUAL THERAPY 1/> REGIONS: CPT | Performed by: PHYSICAL THERAPIST

## 2024-01-03 NOTE — PROGRESS NOTES
Physical Therapy Treatment Note    [address] [place] [address]    Patient: Holland Phelps                                                 Visit Date: 1/3/2024  :     1970    Referring practitioner:    GILBERTO Ornelas  Date of Initial Visit:          Type: THERAPY  Noted: 10/12/2022    Patient seen for 54 sessions    Visit Diagnoses:    ICD-10-CM ICD-9-CM   1. Lymphedema of left leg  I89.0 457.1   2. Lymphedema of genitalia  I89.0 457.1   3. Renal cell carcinoma of right kidney  C64.1 189.0       SUBJECTIVE     Subjective  He states his DrPriti [place] told him he has a thrombus in the stent, his DrPriti Did ok patient to continue with MLD today.  The  Did give patient a note that can be found in the media tab in patient's chart.  Patient will clean out the current stent and put another one in.  The  Will also do an angioplasty in the L upper leg.  The  Told patient the Eliquis will take care of the clot so he does not have to worry about pieces of the clot breaking off. He will have the procedure completed on 24.      PAIN: 0/10 numbness in the L shin, just numbness.          OBJECTIVE     Objective    Lymphedema       Row Name 24 0800             Subjective Pain    Able to rate subjective pain? yes  -AL      Pre-Treatment Pain Level 0  -AL         Lymphedema Measurements    Measurement Type(s) Circumferential  -AL      Circumferential Areas Lower extremities  -AL         BLE Circumferential (cm)    Measurement Location 1 BOT  -AL      Left 1 21.5 cm  -AL      Measurement Location 2 +7  -AL      Left 2 24.4 cm  -AL      Measurement Location 3 +7  -AL      Left 3 25.8 cm  -AL      Measurement Location 4 +10  -AL      Left 4 32 cm  -AL      Measurement Location 5 +10  -AL      Left 5 40 cm  -AL      Measurement Location 6 +10  -AL      Left 6 45.9 cm  -AL      Measurement Location 7 +10  -AL      Left 7 43 cm  -AL       Measurement Location 8 +10  -AL      Left 8 47.5 cm  -AL      Measurement Location 9 +10  -AL      Left 9 53.4 cm  -AL      Measurement Location 10 +10  -AL      Left 10 58.6 cm  -AL      LLE Circumferential Total 392.1 cm  -AL         RLE Circumferential (cm)    Measurement Location 1 BOT  -AL      Measurement Location 2 +7  -AL      Measurement Location 3 +7  -AL      Measurement Location 4 +10  -AL      Measurement Location 5 +10  -AL      Measurement Location 6 +10  -AL      Measurement Location 7 +10  -AL      Measurement Location 8 +10  -AL      Measurement Location 9 +10  -AL      Measurement Location 10 +10  -AL         Manual Lymphatic Drainage    Manual Lymphatic Drainage initial sequence;opened regional lymph nodes;opened anastamoses;extremity treatment  -AL      Initial Sequence short neck;abdomen;diaphragmatic breathing  -AL      Abdomen superficial;deep  -AL      Opened Regional Lymph Nodes axillary;inguinal  -AL      Axillary right;left  -AL      Inguinal right;left  -AL      Opened Anastamoses inguino-axillary  -AL      Inguino-Axillary right;left  Supine and prone  -AL      Extremity Treatment MLD to full limb  -AL      MLD to Full Limb L LE  -AL      Manual Therapy 70  -AL         Compression/Skin Care    Compression/Skin Care compression garment  -AL      Skin Care lotion applied  -AL      Compression/Skin Care Comments Patient donned his 20-30 mmHg compression thigh high to the L LE.  -AL                User Key  (r) = Recorded By, (t) = Taken By, (c) = Cosigned By      Initials Name Provider Type    Elida Pickering PTA, CLT-LANNY Physical Therapist Assistant                                Therapeutic Exercises    11646 Units Comments   L Prone quad stretch   ROM is limited due to knee surgeries.    Half foam roll under L hip     B hip flexor stretch in the Sang Test position     B hamstring stretch with L ankle dorsi flexion            Timed Minutes 15             Therapy Education/Self  Care 35828   Education offered today Work on CDT and use the pump.  Walk with toes pointing forward.   Medbridge Code    Ongoing HEP   Wear compression garment and use the Flexitouch pump   Timed Minutes        Total Timed Treatment:      85  mins  Total Time of Visit:             85  mins         ASSESSMENT/PLAN     GOALS  Goals                                          Progress Note due by 1/12/24                                                      Recert due by 1/16/24   LTG by: 12 weeks Comments Date Status   Patient will have a reduction in LLE of at least 20 cm to allow more comfortable walking and mobility.  He has had an increase in size of the L LE. 1/3/24 Ongoing   Patient will be independent with specific HEP for lymphedema He is able to move around much better. 6/20/23 Met   He will have no s/s infection.  No signs or symptoms of infection 11/16/23 Met   He will have no complaint of increased edema in the groin. No swelling in the groin today, he does have a harder area L groin.  12/13/23 Progressing   Patient will be independent with all tools available to manage his LLE lymphedema. He has been on his leg more. 12/13/23 Ongoing             ASSESSMENT:  Patient has had an increase of 4.9 cm as compared to his last treatment.  The area that is swollen more is the mid upper leg, this is distal to where the clot is. I am hopefull that after patient has another stent and angioplasty to the L upper leg this will help move more fluid out of the L LE.     PLAN: Cont CDT, will see patient 1 x a week x 6 weeks. Patient will need a progress note next treatment.       SIGNATURE: Elida Sequeira PTA, [facility] [place] License #  [id number]  Electronically Signed on 1/3/2024        [address] [place] [address]. [address]  [phone]

## 2024-01-10 ENCOUNTER — TREATMENT (OUTPATIENT)
Dept: PHYSICAL THERAPY | Facility: CLINIC | Age: 54
End: 2024-01-10
Payer: MEDICARE

## 2024-01-10 DIAGNOSIS — I89.0 LYMPHEDEMA OF GENITALIA: ICD-10-CM

## 2024-01-10 DIAGNOSIS — I89.0 LYMPHEDEMA OF LEFT LEG: Primary | ICD-10-CM

## 2024-01-10 DIAGNOSIS — C64.1 RENAL CELL CARCINOMA OF RIGHT KIDNEY: ICD-10-CM

## 2024-01-10 PROCEDURE — 97110 THERAPEUTIC EXERCISES: CPT | Performed by: PHYSICAL THERAPIST

## 2024-01-10 PROCEDURE — 97140 MANUAL THERAPY 1/> REGIONS: CPT | Performed by: PHYSICAL THERAPIST

## 2024-01-10 NOTE — PROGRESS NOTES
Physical Therapy Treatment Note and 90 Day Recertification Note  [address] [place] [address]    Patient: Holland Phelps                                                 Visit Date: 1/10/2024  :     1970    Referring practitioner:    GLIBERTO Ornelas  Date of Initial Visit:          Type: THERAPY  Noted: 10/12/2022    Patient seen for 55 sessions    Visit Diagnoses:    ICD-10-CM ICD-9-CM   1. Lymphedema of left leg  I89.0 457.1   2. Renal cell carcinoma of right kidney  C64.1 189.0   3. Lymphedema of genitalia  I89.0 457.1     SUBJECTIVE     Subjective:  He told me about what the vascular specialist found and his planned surgery on .    PAIN: 0/10         OBJECTIVE     Objective    Lymphedema       Row Name 01/10/24 0800             Subjective Pain    Able to rate subjective pain? yes  -HR      Pre-Treatment Pain Level 0  -HR         Manual Lymphatic Drainage    Manual Lymphatic Drainage initial sequence;opened regional lymph nodes;opened anastamoses;extremity treatment  -HR      Initial Sequence short neck;abdomen;diaphragmatic breathing  -HR      Abdomen superficial;deep  -HR      Opened Regional Lymph Nodes axillary;inguinal  -HR      Axillary right;left  -HR      Inguinal right;left  -HR      Opened Anastamoses inguino-axillary  -HR      Inguino-Axillary right;left  Supine and prone  -HR      Extremity Treatment MLD to full limb  -HR      MLD to Full Limb LLE  -HR      Manual Lymphatic Drainage Comments IASTM using the medium roller and hand held massager to the L Upper leg and calf in prone and the L upper leg in supine.  -HR      Manual Therapy 60  -HR                User Key  (r) = Recorded By, (t) = Taken By, (c) = Cosigned By      Initials Name Provider Type    HR Debbie Morales, PT, DPT, CLT-LANNY Physical Therapist                      Therapeutic Exercises    57553 Units Comments   L Prone quad stretch   ROM  is limited due to knee surgeries.                          Timed Minutes 8               Therapy Education/Self Care 47155   Education offered today Cont with pump and wrapping   Medbride Code    Ongoing HEP      Timed Minutes        Total Timed Treatment:     68   mins  Total Time of Visit:             68   mins         ASSESSMENT/PLAN     GOALS    Goals                                          Progress Note due by 2/9/24                                                      Recert due by 4/8/24   LTG by: 12 weeks Comments Date Status   Patient will have a reduction in LLE of at least 20 cm to allow more comfortable walking and mobility.  Has fluctuated due to cellulitis, and DVT over past year.  1/3/24 Ongoing   Patient will be independent with specific HEP for lymphedema He is able to move around much better. 6/20/23 Met   He will have no s/s infection.  No signs or symptoms of infection 11/16/23 Met   He will have no complaint of increased edema in the groin. No swelling in the groin today, he does have a harder area L groin.  1/10/24 Progressing   Patient will be independent with all tools available to manage his LLE lymphedema. He has resumed use of the pump and wraps at night 1/10/24 Ongoing       Assessment & Plan       Assessment  Impairments: abnormal gait, abnormal or restricted ROM, lacks appropriate home exercise program and pain with function   Functional limitations: walking, uncomfortable because of pain and standing   Assessment details:  is having a surgical procedure on the LLE in 2 weeks due to thrombus formed at the L inguinal stent. Hoping this will result in better venous return in the leg. He has had ups and downs in the lymphedema dealing with the blood clots as well as a bout of cellulitis in 2023. In April 2023, his measurements totaled 433.7 and the latest are at 392.1.   Prognosis: good    Plan  Therapy options: will be seen for skilled therapy services  Planned therapy  interventions: manual therapy, compression, soft tissue mobilization, stretching, therapeutic activities, home exercise program and functional ROM exercises  Frequency: 1x week (1-2 X/wk)  Duration in weeks: 12  Treatment plan discussed with: patient  Plan details: Will see him next week and then await orders from vascular surgeon post-operatively.           SIGNATURE: Debbie Morales, PT, DPT, [place] License #: [id number]  Electronically Signed on 1/10/2024        [address] [place] [address]. [address]  [phone]

## 2024-01-17 ENCOUNTER — TREATMENT (OUTPATIENT)
Dept: PHYSICAL THERAPY | Facility: CLINIC | Age: 54
End: 2024-01-17
Payer: MEDICARE

## 2024-01-17 DIAGNOSIS — C64.1 RENAL CELL CARCINOMA OF RIGHT KIDNEY: ICD-10-CM

## 2024-01-17 DIAGNOSIS — I89.0 LYMPHEDEMA OF LEFT LEG: Primary | ICD-10-CM

## 2024-01-17 DIAGNOSIS — I89.0 LYMPHEDEMA OF GENITALIA: ICD-10-CM

## 2024-01-17 PROCEDURE — 97140 MANUAL THERAPY 1/> REGIONS: CPT | Performed by: PHYSICAL THERAPIST

## 2024-01-17 PROCEDURE — 97110 THERAPEUTIC EXERCISES: CPT | Performed by: PHYSICAL THERAPIST

## 2024-01-17 NOTE — PROGRESS NOTES
Physical Therapy Treatment Note  [address] [place] [address]    Patient: Holland Phelps                                                 Visit Date: 2024  :     1970    Referring practitioner:    GILBERTO Ornelas  Date of Initial Visit:          Type: THERAPY  Noted: 10/12/2022    Patient seen for 56 sessions    Visit Diagnoses:    ICD-10-CM ICD-9-CM   1. Lymphedema of left leg  I89.0 457.1   2. Renal cell carcinoma of right kidney  C64.1 189.0   3. Lymphedema of genitalia  I89.0 457.1     SUBJECTIVE     Subjective:  He is still planning on surgery on  for the clot in the stent.  He had a kidney tests completed and he had good results.     PAIN: 0/10         OBJECTIVE     Objective    Lymphedema       Row Name 24 0915             Subjective Pain    Able to rate subjective pain? yes  -AL      Pre-Treatment Pain Level 0  -AL         BLE Circumferential (cm)    Measurement Location 1 BOT  -AL      Left 1 21.5 cm  -AL      Measurement Location 2 +7  -AL      Left 2 24.5 cm  -AL      Measurement Location 3 +7  -AL      Left 3 26 cm  -AL      Measurement Location 4 +10  -AL      Left 4 29.7 cm  -AL      Measurement Location 5 +10  -AL      Left 5 38 cm  -AL      Measurement Location 6 +10  -AL      Left 6 45 cm  -AL      Measurement Location 7 +10  -AL      Left 7 42.6 cm  -AL      Measurement Location 8 +10  -AL      Left 8 48 cm  -AL      Measurement Location 9 +10  -AL      Left 9 53.5 cm  -AL      Measurement Location 10 +10  -AL      Left 10 58.2 cm  -AL      LLE Circumferential Total 387 cm  -AL         Manual Lymphatic Drainage    Manual Lymphatic Drainage initial sequence;opened regional lymph nodes;opened anastamoses;extremity treatment  -AL      Initial Sequence short neck;abdomen;diaphragmatic breathing  -AL      Abdomen superficial;deep  -AL      Diaphragmatic Breathing x 9 with superficial abdominals  -AL       Opened Regional Lymph Nodes axillary;inguinal  -AL      Axillary right;left  -AL      Inguinal right;left  -AL      Opened Anastamoses inguino-axillary  -AL      Inguino-Axillary right;left  Supine and prone  -AL      Extremity Treatment MLD to full limb  -AL      MLD to Full Limb LLE  -AL      Manual Lymphatic Drainage Comments IASTM using the medium roller and hand held massager to the L Upper leg and calf in prone and the L upper leg in supine.  -AL      Manual Therapy 65  -AL         Compression/Skin Care    Compression/Skin Care compression garment  -AL      Skin Care lotion applied  -AL      Compression/Skin Care Comments Patient donned his 20-30 mmHg compression thigh high to the L LE.  -AL                User Key  (r) = Recorded By, (t) = Taken By, (c) = Cosigned By      Initials Name Provider Type    Elida Pickering PTA, CLT-LANA Physical Therapist Assistant                        Therapeutic Exercises    93812 Units Comments   L Prone quad stretch   ROM is limited due to knee surgeries.    B hamstring stretch     B hip flexor stretch in the Sang Test position                 Timed Minutes 10               Therapy Education/Self Care 12129   Education offered today Cont with pump and wrapping   Medbridge Code    Ongoing HEP      Timed Minutes        Total Timed Treatment:     75   mins  Total Time of Visit:             75   mins         ASSESSMENT/PLAN     GOALS    Goals                                          Progress Note due by 2/9/24                                                      Recert due by 4/8/24   LTG by: 12 weeks Comments Date Status   Patient will have a reduction in LLE of at least 20 cm to allow more comfortable walking and mobility.  The L LE has had  a reduction as compared to his last measurements.  1/19/24 Ongoing   Patient will be independent with specific HEP for lymphedema He is able to move around much better. 6/20/23 Met   He will have no s/s infection.  No signs or  symptoms of infection 11/16/23 Met   He will have no complaint of increased edema in the groin. No swelling in the groin today, he does have a harder area L groin.  1/10/24 Progressing   Patient will be independent with all tools available to manage his LLE lymphedema. He has resumed use of the pump and wraps at night 1/10/24 Ongoing       Assessment/Plan     Patient has had a reduction of 5.1 cm in the L LE.  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  I do believe this helps keep his leg down in size.  He continues to be compliant with CDT and using the pump.    PLAN  Will continue with CDT      SIGNATURE: Elida Sequeira PTA, [facility], [place] License #: [id number]  Electronically Signed on 1/17/2024        [address] [place] [address]. [address]  [phone]

## 2024-01-22 ENCOUNTER — TREATMENT (OUTPATIENT)
Dept: PHYSICAL THERAPY | Facility: CLINIC | Age: 54
End: 2024-01-22
Payer: MEDICARE

## 2024-01-22 DIAGNOSIS — I89.0 LYMPHEDEMA OF LEFT LEG: Primary | ICD-10-CM

## 2024-01-22 DIAGNOSIS — I89.0 LYMPHEDEMA OF GENITALIA: ICD-10-CM

## 2024-01-22 DIAGNOSIS — C64.1 RENAL CELL CARCINOMA OF RIGHT KIDNEY: ICD-10-CM

## 2024-01-22 PROCEDURE — 97140 MANUAL THERAPY 1/> REGIONS: CPT | Performed by: PHYSICAL THERAPIST

## 2024-01-22 PROCEDURE — 97110 THERAPEUTIC EXERCISES: CPT | Performed by: PHYSICAL THERAPIST

## 2024-01-22 NOTE — PROGRESS NOTES
Physical Therapy Treatment Note  [address] [place] [address]    Patient: Holland Phelps                                                 Visit Date: 2024  :     1970    Referring practitioner:    GILBERTO Ornelas  Date of Initial Visit:          Type: THERAPY  Noted: 10/12/2022    Patient seen for 57 sessions    Visit Diagnoses:    ICD-10-CM ICD-9-CM   1. Lymphedema of left leg  I89.0 457.1   2. Renal cell carcinoma of right kidney  C64.1 189.0   3. Lymphedema of genitalia  I89.0 457.1     SUBJECTIVE     Subjective:  He is still planning on surgery on  for the clot in the stent.  He is not having any pain today but has some stiffness in the L lower leg.     PAIN: 0/10         OBJECTIVE     Objective    Lymphedema       Row Name 24 0945             Subjective Pain    Able to rate subjective pain? yes  -AL      Pre-Treatment Pain Level 0  -AL         Lymphedema Measurements    Measurement Type(s) Circumferential  -AL      Circumferential Areas Lower extremities  -AL         BLE Circumferential (cm)    Measurement Location 1 BOT  -AL      Left 1 21.7 cm  -AL      Measurement Location 2 +7  -AL      Left 2 25.6 cm  -AL      Measurement Location 3 +7  -AL      Left 3 26 cm  -AL      Measurement Location 4 +10  -AL      Left 4 23.6 cm  -AL      Measurement Location 5 +10  -AL      Left 5 42.2 cm  -AL      Measurement Location 6 +10  -AL      Left 6 47 cm  -AL      Measurement Location 7 +10  -AL      Left 7 43.5 cm  -AL      Measurement Location 8 +10  -AL      Left 8 49 cm  -AL      Measurement Location 9 +10  -AL      Left 9 55.5 cm  -AL      Measurement Location 10 +10  -AL      Left 10 59.5 cm  -AL      LLE Circumferential Total 393.6 cm  -AL         Manual Lymphatic Drainage    Manual Lymphatic Drainage initial sequence;opened regional lymph nodes;opened anastamoses;extremity treatment  -AL      Initial Sequence  short neck;abdomen;diaphragmatic breathing  -AL      Abdomen superficial;deep  -AL      Diaphragmatic Breathing x 9 with superficial abdominals  -AL      Opened Regional Lymph Nodes axillary;inguinal  -AL      Axillary right;left  -AL      Inguinal right;left  -AL      Opened Anastamoses inguino-axillary  -AL      Inguino-Axillary right;left  Supine and prone  -AL      Extremity Treatment MLD to full limb  -AL      MLD to Full Limb LLE  -AL      Extremity Treatment Focus On Spent extra time working on MLD to the L lower leg.  -AL      Manual Lymphatic Drainage Comments IASTM using the medium roller and hand held massager to the L Upper leg and calf in prone and the L upper leg in supine.  -AL      Manual Therapy 65  -AL         Compression/Skin Care    Compression/Skin Care compression garment  -AL      Skin Care lotion applied  -AL      Compression/Skin Care Comments Patient donned his 20-30 mmHg compression thigh high to the L LE.  -AL                User Key  (r) = Recorded By, (t) = Taken By, (c) = Cosigned By      Initials Name Provider Type    AL Elida Sequeira, KEILA, CLT-LANNY Physical Therapist Assistant                          Therapeutic Exercises    99224 Units Comments   L Prone quad stretch   ROM is limited due to knee surgeries.    B hamstring stretch with ankle dorsi flexion     B hip flexor stretch in the Sang Test position                 Timed Minutes 10               Therapy Education/Self Care 89858   Education offered today Cont with pump and wrapping   Medbridge Code    Ongoing HEP      Timed Minutes        Total Timed Treatment:     75   mins  Total Time of Visit:             75   mins         ASSESSMENT/PLAN     GOALS    Goals                                          Progress Note due by 2/9/24                                                      Recert due by 4/8/24   LTG by: 12 weeks Comments Date Status   Patient will have a reduction in LLE of at least 20 cm to allow more comfortable  walking and mobility.  The L LE has increased since his last visit.  1/22/24 Ongoing   Patient will be independent with specific HEP for lymphedema He is able to move around much better. 6/20/23 Met   He will have no s/s infection.  No signs or symptoms of infection 11/16/23 Met   He will have no complaint of increased edema in the groin. No swelling in the groin today, he does have a harder area L groin.  1/10/24 Progressing   Patient will be independent with all tools available to manage his LLE lymphedema. Patient's wife wraps the L LE at night, he continues to use the Flexitouch pump. 1/10/24 Ongoing       Assessment/Plan     Patient has had an increase in size of 6.6 cm in the L LE.  He will have surgery this week for a stent placement in the L lower leg and to clean the clot out of the stent in the L groin.  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  Patient will need to get the OK from his DrPriti To resume treatments with us.     PLAN  Resume lymphedema treatments when ok with his Dr. Patient will need a progress note next treatment.       SIGNATURE: Elida Sequeira PTA, [place] License #: [id number]  Electronically Signed on 1/22/2024        [address]  [place] [address]  [phone]

## 2024-02-16 ENCOUNTER — TREATMENT (OUTPATIENT)
Dept: PHYSICAL THERAPY | Facility: CLINIC | Age: 54
End: 2024-02-16
Payer: MEDICARE

## 2024-02-16 DIAGNOSIS — C64.1 RENAL CELL CARCINOMA OF RIGHT KIDNEY: ICD-10-CM

## 2024-02-16 DIAGNOSIS — I89.0 LYMPHEDEMA OF GENITALIA: ICD-10-CM

## 2024-02-16 DIAGNOSIS — I89.0 LYMPHEDEMA OF LEFT LEG: Primary | ICD-10-CM

## 2024-02-16 PROCEDURE — 97140 MANUAL THERAPY 1/> REGIONS: CPT | Performed by: PHYSICAL THERAPIST

## 2024-02-16 PROCEDURE — 97110 THERAPEUTIC EXERCISES: CPT | Performed by: PHYSICAL THERAPIST

## 2024-02-20 ENCOUNTER — TREATMENT (OUTPATIENT)
Dept: PHYSICAL THERAPY | Facility: CLINIC | Age: 54
End: 2024-02-20
Payer: MEDICARE

## 2024-02-20 DIAGNOSIS — I89.0 LYMPHEDEMA OF GENITALIA: ICD-10-CM

## 2024-02-20 DIAGNOSIS — C64.1 RENAL CELL CARCINOMA OF RIGHT KIDNEY: ICD-10-CM

## 2024-02-20 DIAGNOSIS — I89.0 LYMPHEDEMA OF LEFT LEG: Primary | ICD-10-CM

## 2024-02-20 PROCEDURE — 97110 THERAPEUTIC EXERCISES: CPT | Performed by: PHYSICAL THERAPIST

## 2024-02-20 PROCEDURE — 97140 MANUAL THERAPY 1/> REGIONS: CPT | Performed by: PHYSICAL THERAPIST

## 2024-02-20 NOTE — PROGRESS NOTES
Physical Therapy Treatment Note  [address] [place] [address]    Patient: Holland Phelps                                                 Visit Date: 2024  :     1970    Referring practitioner:    GILBERTO Ornelas  Date of Initial Visit:          Type: THERAPY  Noted: 10/12/2022    Patient seen for 59 sessions    Visit Diagnoses:    ICD-10-CM ICD-9-CM   1. Lymphedema of left leg  I89.0 457.1   2. Renal cell carcinoma of right kidney  C64.1 189.0   3. Lymphedema of genitalia  I89.0 457.1     SUBJECTIVE     Subjective:  He has a little soreness L anterior lower leg where the skin is a darker color. He thinks the boots he wears rub this area. He is wearing the new compression and elevating the leg.    PAIN: 3/10 L anterior lower leg         OBJECTIVE     Objective    Lymphedema       Row Name 24 0900             Subjective Pain    Able to rate subjective pain? yes  -AL      Pre-Treatment Pain Level 0  -AL         Lymphedema Measurements    Measurement Type(s) Circumferential  -AL      Circumferential Areas Lower extremities  -AL         BLE Circumferential (cm)    Measurement Location 1 BOT  -AL      Left 1 21.7 cm  -AL      Measurement Location 2 +7  -AL      Left 2 24.1 cm  -AL      Measurement Location 3 +7  -AL      Left 3 25.4 cm  -AL      Measurement Location 4 +10  -AL      Left 4 32.5 cm  -AL      Measurement Location 5 +10  -AL      Left 5 42.5 cm  -AL      Measurement Location 6 +10  -AL      Left 6 46.5 cm  -AL      Measurement Location 7 +10  -AL      Left 7 43.1 cm  -AL      Measurement Location 8 +10  -AL      Left 8 47.9 cm  -AL      Measurement Location 9 +10  -AL      Left 9 55 cm  -AL      Measurement Location 10 +10  -AL      Left 10 59.5 cm  -AL      LLE Circumferential Total 398.2 cm  -AL         RLE Circumferential (cm)    Measurement Location 1 BOT  -AL      Measurement Location 2 +7  -AL       Measurement Location 3 +7  -AL      Measurement Location 4 +10  -AL      Measurement Location 5 +10  -AL      Measurement Location 6 +10  -AL      Measurement Location 7 +10  -AL      Measurement Location 8 +10  -AL      Measurement Location 9 +10  -AL      Measurement Location 10 +10  -AL         Manual Lymphatic Drainage    Manual Lymphatic Drainage initial sequence;opened regional lymph nodes;opened anastamoses;extremity treatment  -AL      Initial Sequence short neck;abdomen;diaphragmatic breathing  -AL      Abdomen superficial;deep  -AL      Diaphragmatic Breathing x 9 with superficial abdominals  -AL      Opened Regional Lymph Nodes axillary;inguinal  -AL      Axillary right;left  -AL      Inguinal right;left  -AL      Opened Anastamoses inguino-axillary  -AL      Inguino-Axillary right;left  Supine and prone  -AL      Extremity Treatment MLD to full limb  -AL      MLD to Full Limb LLE  -AL      Extremity Treatment Focus On Extra time working on anterior L lower leg where discoleration is located  -AL      Manual Lymphatic Drainage Comments IASTM using the medium roller and hand held massager to the L Upper leg and calf in prone and the L upper leg in supine.  -AL      Manual Therapy 60  -AL         Compression/Skin Care    Compression/Skin Care compression garment  -AL      Skin Care lotion applied  Aquaphor to the L anterior lower leg  -AL      Compression/Skin Care Comments Patient donned his 20-30 mmHg compression thigh high to the L LE.  -AL                User Key  (r) = Recorded By, (t) = Taken By, (c) = Cosigned By      Initials Name Provider Type    Elida Pickering PTA, CLT-LANNY Physical Therapist Assistant                        Therapeutic Exercises    61775 Units Comments   L Prone quad stretch   ROM is limited due to knee surgeries.    B hamstring stretch with ankle dorsi flexion     B hip flexor stretch in the Sang Test position                 Timed Minutes 10          Therapy  Education/Self Care 44185   Education offered today Cont with pump and wrapping.  Try size J Tubigrip when lying down.    Medbridge Code    Ongoing HEP   Work on CDT   Timed Minutes        Total Timed Treatment:     70   mins  Total Time of Visit:             70   mins         ASSESSMENT/PLAN     GOALS    Goals                                          Progress Note due by 3/17/24                                                      Recert due by 4/8/24   LTG by: 12 weeks Comments Date Status   Patient will have a reduction in LLE of at least 20 cm to allow more comfortable walking and mobility.  Patient has had a reduction in the L LE. 2/20 Ongoing   Patient will be independent with specific HEP for lymphedema He is able to move around much better. 6/20/23 Met   He will have no s/s infection.  No signs or symptoms of infection 11/16/23 Met   He will have no complaint of increased edema in the groin. No complaints of swelling in the groin today. 2/16 Ongoing   Patient will be independent with all tools available to manage his LLE lymphedema. Patient's wife wraps the L LE at night, he continues to use the Flexitouch pump. 12/16/24 Ongoing       Assessment/Plan     Patient has had a reduction of 12.6 cm in the L LE.  Patient has a new compression garment that is helping reduce the swelling.  I did give him a size J Tubigrip to wear if he is lying down.  Hopefully the leg will be more reduced next treatment.     PLAN  Continue with CDT      SIGNATURE: Elida Sequeira PTA, [place], [place] License #: [id number]  Electronically Signed on 2/20/2024        [address] [place] [address]. [address]  [phone]

## 2024-02-28 ENCOUNTER — TREATMENT (OUTPATIENT)
Dept: PHYSICAL THERAPY | Facility: CLINIC | Age: 54
End: 2024-02-28
Payer: MEDICARE

## 2024-02-28 DIAGNOSIS — I89.0 LYMPHEDEMA OF LEFT LEG: Primary | ICD-10-CM

## 2024-02-28 PROCEDURE — 97110 THERAPEUTIC EXERCISES: CPT | Performed by: PHYSICAL THERAPIST

## 2024-02-28 PROCEDURE — 97140 MANUAL THERAPY 1/> REGIONS: CPT | Performed by: PHYSICAL THERAPIST

## 2024-02-28 NOTE — PROGRESS NOTES
Physical Therapy Treatment Note  [address] [place] [address]    Patient: Holland Phelps                                                 Visit Date: 2024  :     1970    Referring practitioner:    GILBERTO Ornelas  Date of Initial Visit:          Type: THERAPY  Noted: 10/12/2022    Patient seen for 60 sessions    Visit Diagnoses:    ICD-10-CM ICD-9-CM   1. Lymphedema of left leg  I89.0 457.1       SUBJECTIVE     Subjective:  He states the discoloration L anterior leg is better, there is just one spot that is darker and discolored.  His leg has been down the past few days.  He feels like the Tubigrip helped make the upper leg smaller.     PAIN: 3/10 L anterior lower leg         OBJECTIVE     Objective    Lymphedema       Row Name [id number]             Subjective Pain    Able to rate subjective pain? yes  -AL      Pre-Treatment Pain Level 0  -AL         Lymphedema Measurements    Measurement Type(s) Circumferential  -AL      Circumferential Areas Lower extremities  -AL         BLE Circumferential (cm)    Measurement Location 1 BOT  -AL      Left 1 21.7 cm  -AL      Measurement Location 2 +7  -AL      Left 2 24.4 cm  -AL      Measurement Location 3 +7  -AL      Left 3 26.4 cm  -AL      Measurement Location 4 +10  -AL      Left 4 31.6 cm  -AL      Measurement Location 5 +10  -AL      Left 5 40.6 cm  -AL      Measurement Location 6 +10  -AL      Left 6 46.9 cm  -AL      Measurement Location 7 +10  -AL      Left 7 43.5 cm  -AL      Measurement Location 8 +10  -AL      Left 8 46.9 cm  -AL      Measurement Location 9 +10  -AL      Left 9 53.6 cm  -AL      Measurement Location 10 +10  -AL      Left 10 59.5 cm  -AL      LLE Circumferential Total 395.1 cm  -AL         RLE Circumferential (cm)    Measurement Location 1 BOT  -AL      Measurement Location 2 +7  -AL      Measurement Location 3 +7  -AL      Measurement Location 4 +10  -AL       Measurement Location 5 +10  -AL      Measurement Location 6 +10  -AL      Measurement Location 7 +10  -AL      Measurement Location 8 +10  -AL      Measurement Location 9 +10  -AL      Measurement Location 10 +10  -AL         Manual Lymphatic Drainage    Manual Lymphatic Drainage initial sequence;opened regional lymph nodes;opened anastamoses;extremity treatment  -AL      Initial Sequence short neck;abdomen;diaphragmatic breathing  -AL      Abdomen superficial;deep  -AL      Diaphragmatic Breathing x 9 with superficial abdominals  -AL      Opened Regional Lymph Nodes axillary;inguinal  -AL      Axillary right;left  -AL      Inguinal right;left  -AL      Opened Anastamoses inguino-axillary  -AL      Inguino-Axillary right;left  Supine and prone  -AL      Extremity Treatment MLD to full limb  -AL      MLD to Full Limb LLE  -AL      Extremity Treatment Focus On Extra time working on anterior L lower leg where discoleration is located  -AL      Manual Lymphatic Drainage Comments IASTM using the medium roller and hand held massager to the L Upper leg and calf in prone and the L upper leg in supine.  -AL      Manual Therapy 75  -AL         Compression/Skin Care    Compression/Skin Care compression garment  -AL      Skin Care lotion applied  Aquaphor to the L anterior lower leg  -AL      Compression/Skin Care Comments Shaunna donned his compression garment.  Try using the Tubigrip over the leg and using foam and short stretch bandages over the lower leg at night.  -AL                User Key  (r) = Recorded By, (t) = Taken By, (c) = Cosigned By      Initials Name Provider Type    Elida Pickering PTA, YOS[facility]LANNY Physical Therapist Assistant                          Therapeutic Exercises    63325 Units Comments   L Prone quad stretch   ROM is limited due to knee surgeries.    B hamstring stretch with ankle dorsi flexion     B hip flexor stretch in the Sang Test position                 Timed Minutes 10           Therapy Education/Self Care 01303   Education offered today Try using the Tubigrip over the leg and using foam and short stretch bandages over the lower leg at night.    Medbridge Code    Ongoing HEP   Work on CDT   Timed Minutes        Total Timed Treatment:     85   mins  Total Time of Visit:             85   mins         ASSESSMENT/PLAN     GOALS    Goals                                          Progress Note due by 3/17/24                                                      Recert due by 4/8/24   LTG by: 12 weeks Comments Date Status   Patient will have a reduction in LLE of at least 20 cm to allow more comfortable walking and mobility.  Patient has had a reduction in the L LE. 2/28 Ongoing   Patient will be independent with specific HEP for lymphedema He is able to move around much better. 6/20/23 Met   He will have no s/s infection.  No signs or symptoms of infection 11/16/23 Met   He will have no complaint of increased edema in the groin. No complaints of swelling in the groin today. 2/16 Ongoing   Patient will be independent with all tools available to manage his LLE lymphedema. Patient will try using the Tubigrip over the leg and using foam and short stretch bandages over the lower leg at night.  2/28 Ongoing       Assessment/Plan     Patient has had a reduction of 3.1 cm in the L LE.  He has been wearing the Tubigrip on the L leg at night which has helped soften the upper leg.  He will try to wear the Tubigrip on the entire leg at night then wrap the lower leg. We did discuss wearing the thigh high compression then a knee high garment over the thigh high during the day.     PLAN  Continue with CDT assess effectiveness of using combination of compression types.       SIGNATURE: Elida Sequeira PTA, [place], [place] License #: [id number]  Electronically Signed on 2/28/2024        [address] [place] [address]. [address]  [phone]

## 2024-03-06 ENCOUNTER — TREATMENT (OUTPATIENT)
Dept: PHYSICAL THERAPY | Facility: CLINIC | Age: 54
End: 2024-03-06
Payer: MEDICARE

## 2024-03-06 DIAGNOSIS — I89.0 LYMPHEDEMA OF GENITALIA: ICD-10-CM

## 2024-03-06 DIAGNOSIS — C64.1 RENAL CELL CARCINOMA OF RIGHT KIDNEY: ICD-10-CM

## 2024-03-06 DIAGNOSIS — I89.0 LYMPHEDEMA OF LEFT LEG: Primary | ICD-10-CM

## 2024-03-06 PROCEDURE — 97140 MANUAL THERAPY 1/> REGIONS: CPT | Performed by: PHYSICAL THERAPIST

## 2024-03-06 PROCEDURE — 97110 THERAPEUTIC EXERCISES: CPT | Performed by: PHYSICAL THERAPIST

## 2024-03-06 NOTE — PROGRESS NOTES
Physical Therapy Treatment Note  [address] [place] [address]    Patient: Holland Phelps                                                 Visit Date: 3/6/2024  :     1970    Referring practitioner:    GILBERTO Ornelas  Date of Initial Visit:          Type: THERAPY  Noted: 10/12/2022    Patient seen for 61 sessions    Visit Diagnoses:    ICD-10-CM ICD-9-CM   1. Lymphedema of left leg  I89.0 457.1   2. Renal cell carcinoma of right kidney  C64.1 189.0   3. Lymphedema of genitalia  I89.0 457.1       SUBJECTIVE     Subjective:  He states the spots on the L anterior lower leg are better.  He has been wrapping the lower leg with the Tubigrip underneath.  This has helped the leg decrease in size as well as become softer.     PAIN: 2/10 L anterior lower leg  No pain after treatment.         OBJECTIVE     Objective    Lymphedema       Row Name [id number]             Subjective Pain    Able to rate subjective pain? yes  -AL      Pre-Treatment Pain Level 2  -AL         Lymphedema Measurements    Measurement Type(s) Circumferential  -AL      Circumferential Areas Lower extremities  -AL         BLE Circumferential (cm)    Measurement Location 1 BOT  -AL      Left 1 21.8 cm  -AL      Measurement Location 2 +7  -AL      Left 2 24 cm  -AL      Measurement Location 3 +7  -AL      Left 3 26 cm  -AL      Measurement Location 4 +10  -AL      Left 4 27.6 cm  -AL      Measurement Location 5 +10  -AL      Left 5 37.9 cm  -AL      Measurement Location 6 +10  -AL      Left 6 45.7 cm  -AL      Measurement Location 7 +10  -AL      Left 7 43.6 cm  -AL      Measurement Location 8 +10  -AL      Left 8 46.8 cm  -AL      Measurement Location 9 +10  -AL      Left 9 54 cm  -AL      Measurement Location 10 +10  -AL      Left 10 59 cm  -AL      LLE Circumferential Total 386.4 cm  -AL         RLE Circumferential (cm)    Measurement Location 1 --  -AL       Measurement Location 2 --  -AL      Measurement Location 3 --  -AL      Measurement Location 4 --  -AL      Measurement Location 5 --  -AL      Measurement Location 6 --  -AL      Measurement Location 7 --  -AL      Measurement Location 8 --  -AL      Measurement Location 9 --  -AL      Measurement Location 10 --  -AL         Manual Lymphatic Drainage    Manual Lymphatic Drainage initial sequence;opened regional lymph nodes;opened anastamoses;extremity treatment  -AL      Initial Sequence short neck;abdomen;diaphragmatic breathing  -AL      Abdomen superficial;deep  -AL      Diaphragmatic Breathing x 9 with superficial abdominals  -AL      Opened Regional Lymph Nodes axillary;inguinal  -AL      Axillary right;left  -AL      Inguinal right;left  -AL      Opened Anastamoses inguino-axillary  -AL      Inguino-Axillary right;left  Supine and prone  -AL      Extremity Treatment MLD to full limb  -AL      MLD to Full Limb LLE  -AL      Extremity Treatment Focus On Extra time working on anterior L lower leg where discoleration is located  -AL      Manual Lymphatic Drainage Comments IASTM using the medium roller and hand held massager to the L Upper leg and calf in prone and the L upper leg in supine.  -AL      Manual Therapy 75  -AL         Compression/Skin Care    Compression/Skin Care compression garment  -AL      Skin Care lotion applied  Aquaphor to the L anterior lower leg  -AL      Compression/Skin Care Comments Shaunna donned his compression garment. Continue using the Tubigrip over the leg and using foam and short stretch bandages over the lower leg at night.  -AL                User Key  (r) = Recorded By, (t) = Taken By, (c) = Cosigned By      Initials Name Provider Type    AL Elida Sequeira, PTA, CLT-LANNY Physical Therapist Assistant                            Therapeutic Exercises    52545 Units Comments   L Prone quad stretch   ROM is limited due to knee surgeries.    B hamstring stretch with ankle dorsi  flexion     B hip flexor stretch in the Sang Test position                 Timed Minutes 10          Therapy Education/Self Care 29586   Education offered today Try using the Tubigrip over the leg and using foam and short stretch bandages over the lower leg at night.    Medbridge Code    Ongoing HEP   Work on CDT. Use the Tubigrip over the leg and using foam and short stretch bandages over the lower leg at night.   Timed Minutes        Total Timed Treatment:     85   mins  Total Time of Visit:             85   mins         ASSESSMENT/PLAN     GOALS    Goals                                          Progress Note due by 3/17/24                                                      Recert due by 4/8/24   LTG by: 12 weeks Comments Date Status   Patient will have a reduction in LLE of at least 20 cm to allow more comfortable walking and mobility.  The L LE is much softer today. 3/6 Ongoing   Patient will be independent with specific HEP for lymphedema He is able to move around much better. 6/20/23 Met   He will have no s/s infection.  No signs or symptoms of infection 11/16/23 Met   He will have no complaint of increased edema in the groin. No complaints of swelling in the groin today. 2/16 Ongoing   Patient will be independent with all tools available to manage his LLE lymphedema. Patient will try using the Tubigrip over the leg and using foam and short stretch bandages over the lower leg at night.  2/28 Ongoing       Assessment/Plan     Patient has had a reduction of 8.7 cm in the L LE.  The combination of wearing the Tubigrip over the leg and using foam and short stretch bandages over the lower leg at night has helped. No pain after treatment, with the swelling is less in the L LE he has been more mobile.     PLAN  Continue with CDT     SIGNATURE: Elida Sequeira PTA, [place] License #: [id number]  Electronically Signed on 3/6/2024        [address] [place] [address]. [address]  [phone]

## 2024-03-20 ENCOUNTER — TREATMENT (OUTPATIENT)
Dept: PHYSICAL THERAPY | Facility: CLINIC | Age: 54
End: 2024-03-20
Payer: MEDICARE

## 2024-03-20 DIAGNOSIS — I89.0 LYMPHEDEMA OF GENITALIA: ICD-10-CM

## 2024-03-20 DIAGNOSIS — C64.1 RENAL CELL CARCINOMA OF RIGHT KIDNEY: ICD-10-CM

## 2024-03-20 DIAGNOSIS — I89.0 LYMPHEDEMA OF LEFT LEG: Primary | ICD-10-CM

## 2024-03-20 PROCEDURE — 97140 MANUAL THERAPY 1/> REGIONS: CPT | Performed by: PHYSICAL THERAPIST

## 2024-03-20 NOTE — PROGRESS NOTES
Physical Therapy Treatment Note and 30 Day Progress Note  [address] [place] [address]    Patient: Holland Phelps                                                 Visit Date: 3/20/2024  :     1970    Referring practitioner:    GILBERTO Ornelas  Date of Initial Visit:          Type: THERAPY  Noted: 10/12/2022    Patient seen for 62 sessions    Visit Diagnoses:    ICD-10-CM ICD-9-CM   1. Lymphedema of left leg  I89.0 457.1   2. Renal cell carcinoma of right kidney  C64.1 189.0   3. Lymphedema of genitalia  I89.0 457.1       SUBJECTIVE     Subjective:  He states he was in the hospital with a Sickle Cell Crisis, he was in the hospital three days.     PAIN: 0/10 L anterior lower leg but stiffness in the leg.          OBJECTIVE     Objective    Lymphedema       Row Name [id number] 0800             Subjective Pain    Able to rate subjective pain? yes  -AL      Pre-Treatment Pain Level 0  -AL         Lymphedema Measurements    Measurement Type(s) Circumferential  -AL      Circumferential Areas Lower extremities  -AL         BLE Circumferential (cm)    Measurement Location 1 BOT  -AL      Left 1 21.6 cm  -AL      Measurement Location 2 +7  -AL      Left 2 24.5 cm  -AL      Measurement Location 3 +7  -AL      Left 3 24.6 cm  -AL      Measurement Location 4 +10  -AL      Left 4 31 cm  -AL      Measurement Location 5 +10  -AL      Left 5 41.2 cm  -AL      Measurement Location 6 +10  -AL      Left 6 46.5 cm  -AL      Measurement Location 7 +10  -AL      Left 7 43.1 cm  -AL      Measurement Location 8 +10  -AL      Left 8 48.5 cm  -AL      Measurement Location 9 +10  -AL      Left 9 55.1 cm  -AL      Measurement Location 10 +10  -AL      Left 10 60.1 cm  -AL      LLE Circumferential Total 396.2 cm  -AL         Manual Lymphatic Drainage    Manual Lymphatic Drainage initial sequence;opened regional lymph nodes;opened anastamoses;extremity treatment   -AL      Initial Sequence short neck;abdomen;diaphragmatic breathing  -AL      Abdomen superficial;deep  -AL      Diaphragmatic Breathing x 9 with superficial abdominals  -AL      Opened Regional Lymph Nodes axillary;inguinal  -AL      Axillary right;left  -AL      Inguinal right;left  -AL      Opened Anastamoses inguino-axillary  -AL      Inguino-Axillary right;left  Supine and prone  -AL      Extremity Treatment MLD to full limb  -AL      MLD to Full Limb LLE  -AL      Manual Lymphatic Drainage Comments IASTM using the medium roller and hand held massager to the L Upper leg and calf in prone and the L upper leg in supine.  -AL      Manual Therapy 60  -AL         Compression/Skin Care    Compression/Skin Care compression garment  -AL      Skin Care lotion applied  Aquaphor to the L anterior lower leg  -AL      Compression/Skin Care Comments Patient donned his compression thigh high  -AL                User Key  (r) = Recorded By, (t) = Taken By, (c) = Cosigned By      Initials Name Provider Type    Elida Pickering, KEILA, CLT-LANNY Physical Therapist Assistant                              Therapeutic Exercises    62517 Units Comments   L Prone quad stretch   ROM is limited due to knee surgeries.    B hamstring stretch with ankle dorsi flexion     B hip flexor stretch in the Sang Test position                 Timed Minutes 10          Therapy Education/Self Care 39183   Education offered today Try using the Tubigrip over the leg and using foam and short stretch bandages over the lower leg at night.    [employer] Code    Ongoing HEP   Work on CDT. Use the Tubigrip over the leg and using foam and short stretch bandages over the lower leg at night.   Timed Minutes        Total Timed Treatment:     70   mins  Total Time of Visit:             70   mins         ASSESSMENT/PLAN     GOALS    Goals                                          Progress Note due by 4/19/24                                                       Recert due by 4/8/24   LTG by: 12 weeks Comments Date Status   Patient will have a reduction in LLE of at least 20 cm to allow more comfortable walking and mobility.  Patient had an increase in size of the L LE. 3/20 Ongoing   Patient will be independent with specific HEP for lymphedema He is able to move around much better. 6/20/23 Met   He will have no s/s infection.  No signs or symptoms of infection 11/16/23 Met   He will have no complaint of increased edema in the groin. No complaints of swelling in the groin today. 3/20 Ongoing   Patient will be independent with all tools available to manage his LLE lymphedema. He is trying different options combining the wraps and Tubigrip. 3/20 Ongoing       Assessment/Plan     Patient has had an increase of 9.8 cm in the L LE.  Patient was in the hospital for a few days due to a Sickle Cell Crisis, this most likely is the reason the L LE is up in size.  I did give him new wraps for the L LE to use at night in combination with the Tubigrip.  This should help the leg reduce. The scab on the L anterior lower leg did fall off, there is still discoloration in this area.     PLAN  Continue with CDT     SIGNATURE: Elida Sequeira PTA, [facility], [place] License #: [id number]  Electronically Signed on 3/20/2024        [address] [place] [address] [place] [address]  [phone]

## 2024-03-22 NOTE — PROGRESS NOTES
Progress Note Addendum      Patient: Holland Phelps           : 1970  Visit Date: 3/20/2024  Referring practitioner: GILBERTO Ornelas  Date of Initial Visit: Type: THERAPY  Noted: 10/12/2022  Patient seen for 62 sessions  Visit Diagnoses:    ICD-10-CM ICD-9-CM   1. Lymphedema of left leg  I89.0 457.1   2. Renal cell carcinoma of right kidney  C64.1 189.0   3. Lymphedema of genitalia  I89.0 457.1          Clinical Progress: worse- Has been hospitalized with sickle cell crisis  Home Program Compliance: Yes  Progress toward previous goals: Partially Met  Prognosis to achieve goals: good    Objective     Assessment & Plan       Assessment  Impairments: abnormal gait, abnormal or restricted ROM, lacks appropriate home exercise program and pain with function   Functional limitations: walking, uncomfortable because of pain and standing   Assessment details: Patient has had an increase of 9.8 cm in the L LE.  Patient was in the hospital for a few days due to a Sickle Cell Crisis, this most likely is the reason the L LE is up in size.  I did give him new wraps for the L LE to use at night in combination with the Tubigrip.  This should help the leg reduce. The scab on the L anterior lower leg did fall off, there is still discoloration in this area.   Prognosis: good    Plan  Therapy options: will be seen for skilled therapy services  Planned therapy interventions: manual therapy, compression, soft tissue mobilization, stretching, therapeutic activities, home exercise program and functional ROM exercises  Frequency: 2x week (1-2 X/wk)  Duration in weeks: 4  Treatment plan discussed with: PTA        I reviewed the treatment and goals with Aneta Sequeira and agree with the POC.    SIGNATURE: Debbie Morales, PT, DPT, CLT-LANNY, License #: [id number]  Electronically Signed on 3/22/2024

## 2024-03-27 ENCOUNTER — TREATMENT (OUTPATIENT)
Dept: PHYSICAL THERAPY | Facility: CLINIC | Age: 54
End: 2024-03-27
Payer: MEDICARE

## 2024-03-27 DIAGNOSIS — C64.1 RENAL CELL CARCINOMA OF RIGHT KIDNEY: ICD-10-CM

## 2024-03-27 DIAGNOSIS — I89.0 LYMPHEDEMA OF LEFT LEG: Primary | ICD-10-CM

## 2024-03-27 DIAGNOSIS — I89.0 LYMPHEDEMA OF GENITALIA: ICD-10-CM

## 2024-03-27 PROCEDURE — 97110 THERAPEUTIC EXERCISES: CPT | Performed by: PHYSICAL THERAPIST

## 2024-03-27 PROCEDURE — 97140 MANUAL THERAPY 1/> REGIONS: CPT | Performed by: PHYSICAL THERAPIST

## 2024-03-27 NOTE — PROGRESS NOTES
Physical Therapy Treatment Note  [address] [place] [address]    Patient: Holland Phelps                                                 Visit Date: 3/27/2024  :     1970    Referring practitioner:    GILBERTO Ornelas  Date of Initial Visit:          Type: THERAPY  Noted: 10/12/2022    Patient seen for 63 sessions    Visit Diagnoses:    ICD-10-CM ICD-9-CM   1. Lymphedema of left leg  I89.0 457.1   2. Renal cell carcinoma of right kidney  C64.1 189.0   3. Lymphedema of genitalia  I89.0 457.1       SUBJECTIVE     Subjective:  He states he has been using the Tubigrip and wrapped the L leg to the groin which helped soften the upper leg.  He is having a lot of personal stress at home. He is going to have scan in [place] mid April, he is having some pain in the R knee. He is not sure if this is arthritis pain or something else.     PAIN: 0/10 L shin numbness           OBJECTIVE     Objective    Lymphedema       Row Name 24 0800             Subjective Pain    Able to rate subjective pain? yes  -AL      Pre-Treatment Pain Level 0  -AL         Lymphedema Measurements    Measurement Type(s) Circumferential  -AL      Circumferential Areas Lower extremities  -AL         BLE Circumferential (cm)    Measurement Location 1 BOT  -AL      Left 1 21.4 cm  -AL      Measurement Location 2 +7  -AL      Left 2 24.5 cm  -AL      Measurement Location 3 +7  -AL      Left 3 24.5 cm  -AL      Measurement Location 4 +10  -AL      Left 4 29.4 cm  -AL      Measurement Location 5 +10  -AL      Left 5 38 cm  -AL      Measurement Location 6 +10  -AL      Left 6 44.4 cm  -AL      Measurement Location 7 +10  -AL      Left 7 42.5 cm  -AL      Measurement Location 8 +10  -AL      Left 8 48.1 cm  -AL      Measurement Location 9 +10  -AL      Left 9 56 cm  -AL      Measurement Location 10 +10  -AL      Left 10 59.6 cm  -AL      LLE Circumferential Total 388.4 cm   -AL         Manual Lymphatic Drainage    Manual Lymphatic Drainage initial sequence;opened regional lymph nodes;opened anastamoses;extremity treatment  -AL      Initial Sequence short neck;abdomen;diaphragmatic breathing  -AL      Abdomen superficial;deep  -AL      Diaphragmatic Breathing x 9 with superficial abdominals  -AL      Opened Regional Lymph Nodes axillary;inguinal  -AL      Axillary right;left  -AL      Inguinal right;left  -AL      Opened Anastamoses inguino-axillary  -AL      Inguino-Axillary right;left  Supine and prone  -AL      Extremity Treatment MLD to full limb  -AL      MLD to Full Limb LLE  -AL      Extremity Treatment Focus On Extra time working on anterior L lower leg where discoleration is located  -AL      Manual Lymphatic Drainage Comments IASTM using the medium roller, the spring roller,  and hand held massager to the L Upper leg and calf in prone and the L upper leg in supine.  -AL      Manual Therapy 60  -AL         Compression/Skin Care    Compression/Skin Care compression garment  -AL      Skin Care lotion applied  Aquaphor to the L anterior lower leg  -AL      Compression/Skin Care Comments Patient donned his compression thigh high  -AL                User Key  (r) = Recorded By, (t) = Taken By, (c) = Cosigned By      Initials Name Provider Type    AL Elida Sequeira PTA, CLT-LANNY Physical Therapist Assistant                        Therapeutic Exercises    25608 Units Comments   L Prone quad stretch   ROM is limited due to knee surgeries.    B hamstring stretch with ankle dorsi flexion     L hip flexor stretch in the Sang Test position                 Timed Minutes 10          Therapy Education/Self Care 49301   Education offered today Stretch B hamstrings   Medbridge Code    Ongoing HEP   Work on CDT. Use the Tubigrip over the leg and using foam and short stretch bandages over the entire leg at night.   Timed Minutes        Total Timed Treatment:     70   mins  Total Time of  Visit:             70   mins         ASSESSMENT/PLAN     GOALS    Goals                                          Progress Note due by 4/19/24                                                      Recert due by 4/8/24   LTG by: 12 weeks Comments Date Status   Patient will have a reduction in LLE of at least 20 cm to allow more comfortable walking and mobility.  Patient had an increase in size of the L LE. 3/20 Ongoing   Patient will be independent with specific HEP for lymphedema He is able to move around much better. 6/20/23 Met   He will have no s/s infection.  No signs or symptoms of infection 11/16/23 Met   He will have no complaint of increased edema in the groin. No complaints of swelling in the groin today. 3/20 Ongoing   Patient will be independent with all tools available to manage his LLE lymphedema. He is trying different options combining the wraps and Tubigrip, he is now wrapping the upper leg as well as the lower leg. 3/27 Ongoing       Assessment/Plan     Patient has had a reduction of 7.8 cm in the L LE as compared to his last treatment.  He is now using the [place] with the compression wraps on the entire L LE.  This has helped to keep the lower leg softer and is now softening the L upper leg.  He also has not washed as many cars in the past week which helps the leg stay less swollen in general.     PLAN  Continue with CDT     SIGNATURE: Elida Sequeira PTA, [place] License #: [id number]  Electronically Signed on 3/27/2024        [address] [place] [address] [place] [address]  [phone]

## 2024-04-03 ENCOUNTER — TREATMENT (OUTPATIENT)
Dept: PHYSICAL THERAPY | Facility: CLINIC | Age: 54
End: 2024-04-03
Payer: MEDICARE

## 2024-04-03 DIAGNOSIS — I89.0 LYMPHEDEMA OF GENITALIA: ICD-10-CM

## 2024-04-03 DIAGNOSIS — C64.1 RENAL CELL CARCINOMA OF RIGHT KIDNEY: ICD-10-CM

## 2024-04-03 DIAGNOSIS — I89.0 LYMPHEDEMA OF LEFT LEG: Primary | ICD-10-CM

## 2024-04-03 PROCEDURE — 97140 MANUAL THERAPY 1/> REGIONS: CPT | Performed by: PHYSICAL THERAPIST

## 2024-04-03 PROCEDURE — 97110 THERAPEUTIC EXERCISES: CPT | Performed by: PHYSICAL THERAPIST

## 2024-04-03 NOTE — PROGRESS NOTES
Physical Therapy Treatment Note  [address] [place] [address]    Patient: Holland Phelps                                                 Visit Date: 4/3/2024  :     1970    Referring practitioner:    GILBERTO Ornelas  Date of Initial Visit:          Type: THERAPY  Noted: 10/12/2022    Patient seen for 64 sessions    Visit Diagnoses:    ICD-10-CM ICD-9-CM   1. Lymphedema of left leg  I89.0 457.1   2. Renal cell carcinoma of right kidney  C64.1 189.0   3. Lymphedema of genitalia  I89.0 457.1         SUBJECTIVE     Subjective:  He states he can tell the L lower leg is a little swollen.  He is having discomfort and swelling in the R knee still, he does thinks there is arthritis in the R knee. He has scans scheduled soon, he will ask his Dr. About the R knee.      PAIN: 0/10 L shin numbness           OBJECTIVE     Objective    Lymphedema       Row Name 24 0800             Subjective Pain    Able to rate subjective pain? yes  -AL      Pre-Treatment Pain Level 0  -AL         Lymphedema Measurements    Measurement Type(s) Circumferential  -AL      Circumferential Areas Lower extremities  -AL         BLE Circumferential (cm)    Measurement Location 1 BOT  -AL      Left 1 21.6 cm  -AL      Measurement Location 2 +7  -AL      Left 2 24.5 cm  -AL      Measurement Location 3 +7  -AL      Left 3 26.5 cm  -AL      Measurement Location 4 +10  -AL      Left 4 30.2 cm  -AL      Measurement Location 5 +10  -AL      Left 5 38 cm  -AL      Measurement Location 6 +10  -AL      Left 6 44.5 cm  -AL      Measurement Location 7 +10  -AL      Left 7 41.5 cm  -AL      Measurement Location 8 +10  -AL      Left 8 46 cm  -AL      Measurement Location 9 +10  -AL      Left 9 54.3 cm  -AL      Measurement Location 10 +10  -AL      Left 10 58.1 cm  -AL      LLE Circumferential Total 385.2 cm  -AL         Manual Lymphatic Drainage    Manual Lymphatic Drainage  initial sequence;opened regional lymph nodes;opened anastamoses;extremity treatment  -AL      Initial Sequence short neck;abdomen;diaphragmatic breathing  -AL      Abdomen superficial;deep  -AL      Diaphragmatic Breathing x 9 with superficial abdominals  -AL      Opened Regional Lymph Nodes axillary;inguinal  -AL      Axillary right;left  -AL      Inguinal right;left  -AL      Opened Anastamoses inguino-axillary  -AL      Inguino-Axillary right;left  Supine and prone  -AL      Extremity Treatment MLD to full limb  -AL      MLD to Full Limb LLE  -AL      Manual Lymphatic Drainage Comments IASTM using the spring roller and hand held massager to the L Upper leg and calf in prone and the L upper leg in supine.  -AL      Manual Therapy 80  -AL         Compression/Skin Care    Compression/Skin Care compression garment  -AL      Skin Care lotion applied  Aquaphor to the L anterior lower leg  -AL      Compression/Skin Care Comments Patient donned his compression thigh high  -AL                User Key  (r) = Recorded By, (t) = Taken By, (c) = Cosigned By      Initials Name Provider Type    AL Elida Sequeira, KEILA, CLT-LANNY Physical Therapist Assistant                          Therapeutic Exercises    00419 Units Comments   L Prone quad stretch   ROM is limited due to knee surgeries.    B hamstring stretch with ankle dorsi flexion     L hip flexor stretch in the Sang Test position                 Timed Minutes 10          Therapy Education/Self Care 61922   Education offered today Stretch B hamstrings   Medbridge Code    Ongoing HEP   Work on CDT. Use the Tubigrip over the leg and using foam and short stretch bandages over the entire leg at night.   Timed Minutes        Total Timed Treatment:     90   mins  Total Time of Visit:             90   mins         ASSESSMENT/PLAN     GOALS    Goals                                          Progress Note due by 4/19/24                                                      Recert due  by 4/8/24   LTG by: 12 weeks Comments Date Status   Patient will have a reduction in LLE of at least 20 cm to allow more comfortable walking and mobility.  Patient had an increase in size of the L LE. 3/20 Ongoing   Patient will be independent with specific HEP for lymphedema He is able to move around much better. 6/20/23 Met   He will have no s/s infection.  No signs or symptoms of infection 11/16/23 Met   He will have no complaint of increased edema in the groin. No complaints of swelling in the groin today. 3/20 Ongoing   Patient will be independent with all tools available to manage his LLE lymphedema. Patient will try using the new size J Tubigrip when wrapping the L LE. 3/27 Ongoing       Assessment/Plan     Patient has had a reduction of 3.2 cm in the L LE as compared to his last treatment. Locally has had an increase in size of the L lower leg.  I did give patient a new size J Tubigrip to use for more compression for the L upper leg.  He will speak to his  In [place] about the pain in the R knee.     PLAN  Continue with CDT     SIGNATURE: Elida Sequeira PTA, [facility] [place] License #: [id number]  Electronically Signed on 4/3/2024        [address] [place] [address] [place] [address]  [phone]

## 2024-04-10 ENCOUNTER — TREATMENT (OUTPATIENT)
Dept: PHYSICAL THERAPY | Facility: CLINIC | Age: 54
End: 2024-04-10
Payer: MEDICARE

## 2024-04-10 DIAGNOSIS — I89.0 LYMPHEDEMA OF GENITALIA: ICD-10-CM

## 2024-04-10 DIAGNOSIS — C64.1 RENAL CELL CARCINOMA OF RIGHT KIDNEY: ICD-10-CM

## 2024-04-10 DIAGNOSIS — I89.0 LYMPHEDEMA OF LEFT LEG: Primary | ICD-10-CM

## 2024-04-10 PROCEDURE — 97110 THERAPEUTIC EXERCISES: CPT | Performed by: PHYSICAL THERAPIST

## 2024-04-10 PROCEDURE — 97140 MANUAL THERAPY 1/> REGIONS: CPT | Performed by: PHYSICAL THERAPIST

## 2024-04-10 NOTE — PROGRESS NOTES
Physical Therapy Treatment Note, 30 Day Progress Note, and 90 Day Recertification Note  [address] [place] [address]    Patient: Holland Phelps                                                 Visit Date: 4/10/2024  :     1970    Referring practitioner:    GILBERTO Ornelas  Date of Initial Visit:          Type: THERAPY  Noted: 10/12/2022    Patient seen for 65 sessions    Visit Diagnoses:    ICD-10-CM ICD-9-CM   1. Lymphedema of left leg  I89.0 457.1   2. Renal cell carcinoma of right kidney  C64.1 189.0   3. Lymphedema of genitalia  I89.0 457.1         SUBJECTIVE     Subjective:  He feels ok today, his L leg is doing good.     PAIN: 0/10 L shin numbness           OBJECTIVE     Objective    Lymphedema       Row Name 04/10/24 0800             Subjective Pain    Able to rate subjective pain? yes  -AL      Pre-Treatment Pain Level 0  -AL         Lymphedema Measurements    Measurement Type(s) Circumferential  -AL      Circumferential Areas Lower extremities  -AL         BLE Circumferential (cm)    Measurement Location 1 BOT  -AL      Left 1 20.2 cm  -AL      Measurement Location 2 +7  -AL      Left 2 24.4 cm  -AL      Measurement Location 3 +7  -AL      Left 3 25.5 cm  -AL      Measurement Location 4 +10  -AL      Left 4 31.2 cm  -AL      Measurement Location 5 +10  -AL      Left 5 39.8 cm  -AL      Measurement Location 6 +10  -AL      Left 6 44 cm  -AL      Measurement Location 7 +10  -AL      Left 7 41.2 cm  -AL      Measurement Location 8 +10  -AL      Left 8 46 cm  -AL      Measurement Location 9 +10  -AL      Left 9 53 cm  -AL      Measurement Location 10 +10  -AL      Left 10 57.5 cm  -AL      LLE Circumferential Total 382.8 cm  -AL         Manual Lymphatic Drainage    Manual Lymphatic Drainage initial sequence;opened regional lymph nodes;opened anastamoses;extremity treatment  -AL      Initial Sequence short  neck;abdomen;diaphragmatic breathing  -AL      Abdomen superficial;deep  -AL      Diaphragmatic Breathing x 9 with superficial abdominals  -AL      Opened Regional Lymph Nodes axillary;inguinal  -AL      Axillary right;left  -AL      Inguinal right;left  -AL      Opened Anastamoses inguino-axillary  -AL      Inguino-Axillary right;left  Supine and prone  -AL      Extremity Treatment MLD to full limb  -AL      MLD to Full Limb LLE  -AL      Manual Lymphatic Drainage Comments IASTM using the spring roller and hand held massager to the L Upper leg and calf in prone and the L upper leg in supine.  -AL      Manual Therapy 60  -AL         Compression/Skin Care    Compression/Skin Care compression garment  -AL      Skin Care lotion applied  Aquaphor to the L anterior lower leg  -AL      Compression/Skin Care Comments Patient donned his compression thigh high  -AL                User Key  (r) = Recorded By, (t) = Taken By, (c) = Cosigned By      Initials Name Provider Type    AL Elida eSqueira PTA, CLT-LANNY Physical Therapist Assistant                        Therapeutic Exercises    85614 Units Comments   L Prone quad stretch   ROM is limited due to knee surgeries.    B hamstring stretch with ankle dorsi flexion     L hip flexor stretch in the Sang Test position                 Timed Minutes 10          Therapy Education/Self Care 01043   Education offered today Stretch B hamstrings   Medbridge Code    Ongoing HEP   Work on CDT. Use the Tubigrip over the leg and using foam and short stretch bandages over the entire leg at night.   Timed Minutes        Total Timed Treatment:     70   mins  Total Time of Visit:             70   mins         ASSESSMENT/PLAN     GOALS    Goals                                          Progress Note due by 5/10/24                                                      Recert due by 7/8/24   LTG by: 12 weeks Comments Date Status   Patient will have a reduction in LLE of at least 20 cm to allow  more comfortable walking and mobility.  He has had an overall reduction in the L LE since his last visit. 4/10 Ongoing   Patient will be independent with specific HEP for lymphedema He is able to move around much better. 6/20/23 Met   He will have no s/s infection.  No signs or symptoms of infection 11/16/23 Met   He will have no complaint of increased edema in the groin. No complaints of swelling in the groin today. 4/10 Met   Patient will be independent with all tools available to manage his LLE lymphedema. Still trying different wrapping techniques. 4/10 Ongoing       Assessment/Plan     Patient has had a reduction of 2.4 cm in the L LE as compared to his last treatment. Locally has had an increase in size of the L calf.  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  He continues to work on CDT with the help of his wife and he uses the pump.    PLAN  Continue with CDT     SIGNATURE: Elida Sequeira PTA, [facility] [place] License #: [id number]  Electronically Signed on 4/10/2024        [address] [place] [address]. [address]  [phone]

## 2024-04-17 ENCOUNTER — TREATMENT (OUTPATIENT)
Dept: PHYSICAL THERAPY | Facility: CLINIC | Age: 54
End: 2024-04-17
Payer: MEDICARE

## 2024-04-17 DIAGNOSIS — I89.0 LYMPHEDEMA OF LEFT LEG: Primary | ICD-10-CM

## 2024-04-17 DIAGNOSIS — C64.1 RENAL CELL CARCINOMA OF RIGHT KIDNEY: ICD-10-CM

## 2024-04-17 DIAGNOSIS — I89.0 LYMPHEDEMA OF GENITALIA: ICD-10-CM

## 2024-04-17 PROCEDURE — 97110 THERAPEUTIC EXERCISES: CPT | Performed by: PHYSICAL THERAPIST

## 2024-04-17 PROCEDURE — 97140 MANUAL THERAPY 1/> REGIONS: CPT | Performed by: PHYSICAL THERAPIST

## 2024-04-17 NOTE — PROGRESS NOTES
Physical Therapy Treatment Note  [address] [place] [address]    Patient: Holland Phelps                                                 Visit Date: 2024  :     1970    Referring practitioner:    GILBERTO Ornelas  Date of Initial Visit:          Type: THERAPY  Noted: 10/12/2022    Patient seen for 66 sessions    Visit Diagnoses:    ICD-10-CM ICD-9-CM   1. Lymphedema of left leg  I89.0 457.1   2. Renal cell carcinoma of right kidney  C64.1 189.0   3. Lymphedema of genitalia  I89.0 457.1         SUBJECTIVE     Subjective:  He thinks the swelling is down today, his wife wrapped the L leg last night. His scans last week came back good. The scan showed he has arthritis in the R knee.     PAIN: 0/10 L shin numbness           OBJECTIVE     Objective    Lymphedema       Row Name 24 0800             Lymphedema Measurements    Measurement Type(s) Circumferential  -AL      Circumferential Areas Lower extremities  -AL         BLE Circumferential (cm)    Measurement Location 1 BOT  -AL      Left 1 20.9 cm  -AL      Measurement Location 2 +7  -AL      Left 2 24.5 cm  -AL      Measurement Location 3 +7  -AL      Left 3 26.5 cm  -AL      Measurement Location 4 +10  -AL      Left 4 30.6 cm  -AL      Measurement Location 5 +10  -AL      Left 5 38.9 cm  -AL      Measurement Location 6 +10  -AL      Left 6 44.5 cm  -AL      Measurement Location 7 +10  -AL      Left 7 42 cm  -AL      Measurement Location 8 +10  -AL      Left 8 45.9 cm  -AL      Measurement Location 9 +10  -AL      Left 9 52.1 cm  -AL      Measurement Location 10 +10  -AL      Left 10 55.7 cm  -AL      LLE Circumferential Total 381.6 cm  -AL         Manual Lymphatic Drainage    Manual Lymphatic Drainage initial sequence;opened regional lymph nodes;opened anastamoses;extremity treatment  -AL      Initial Sequence short neck;abdomen;diaphragmatic breathing  -AL      Abdomen  superficial;deep  -AL      Diaphragmatic Breathing x 9 with superficial abdominals  -AL      Opened Regional Lymph Nodes axillary;inguinal  -AL      Axillary right;left  -AL      Inguinal right;left  -AL      Opened Anastamoses inguino-axillary  -AL      Inguino-Axillary right;left  Supine and prone  -AL      Extremity Treatment MLD to full limb  -AL      MLD to Full Limb LLE  -AL      Manual Lymphatic Drainage Comments Continue to with IASTM using the spring roller and hand held massager to the L Upper leg and calf in prone and the L upper leg in supine.  -AL      Manual Therapy 60  -AL         Compression/Skin Care    Compression/Skin Care compression garment  -AL      Skin Care lotion applied  -AL      Compression/Skin Care Comments Patient donned his compression thigh high  -AL                User Key  (r) = Recorded By, (t) = Taken By, (c) = Cosigned By      Initials Name Provider Type    Elida Pickering PTA, CLT-LANA Physical Therapist Assistant                          Therapeutic Exercises    51353 Units Comments   L Prone quad stretch   ROM is limited due to knee surgeries.    B hamstring stretch with ankle dorsi flexion     B hip flexor stretch in the Sang Test position                 Timed Minutes 10          Therapy Education/Self Care 25634   Education offered today Stretch B hamstrings   Medbridge Code    Ongoing HEP   Work on CDT. Use the Tubigrip over the leg and using foam and short stretch bandages over the entire leg at night.   Timed Minutes        Total Timed Treatment:     70   mins  Total Time of Visit:             70   mins         ASSESSMENT/PLAN     GOALS    Goals                                          Progress Note due by 5/10/24                                                      Recert due by 7/8/24   LTG by: 12 weeks Comments Date Status   Patient will have a reduction in LLE of at least 20 cm to allow more comfortable walking and mobility.  Slight reduction since last  treatment. 4/17 Ongoing   Patient will be independent with specific HEP for lymphedema He is able to move around much better. 6/20/23 Met   He will have no s/s infection.  No signs or symptoms of infection 11/16/23 Met   He will have no complaint of increased edema in the groin. No complaints of swelling in the groin today. 4/10 Met   Patient will be independent with all tools available to manage his LLE lymphedema. Still trying different wrapping techniques. 4/10 Ongoing       Assessment/Plan     Patient has had a slight reduction in the L LE as compared to his last visit. The area that is up is the L lower leg, the calf area has increased.  He has numbness in the L shin area that stays with him.  He and his wife continue to wrap the L LE at night using the combination of foam and the short stretch bandages, as well as Tubigrip for the stockinette.  He wears compression during the day.     PLAN  Continue with CDT     SIGNATURE: Elida Sequeira PTA, [facility] [place] License #: [id number]  Electronically Signed on 4/17/2024        [address] [place] [address]. [address]  [phone]

## 2024-04-24 ENCOUNTER — TREATMENT (OUTPATIENT)
Dept: PHYSICAL THERAPY | Facility: CLINIC | Age: 54
End: 2024-04-24
Payer: MEDICARE

## 2024-04-24 DIAGNOSIS — I89.0 LYMPHEDEMA OF LEFT LEG: Primary | ICD-10-CM

## 2024-04-24 PROCEDURE — 97110 THERAPEUTIC EXERCISES: CPT | Performed by: PHYSICAL THERAPIST

## 2024-04-24 PROCEDURE — 97140 MANUAL THERAPY 1/> REGIONS: CPT | Performed by: PHYSICAL THERAPIST

## 2024-04-24 NOTE — PROGRESS NOTES
Physical Therapy Treatment Note  [address] [place] [address]    Patient: Holland Phelps                                                 Visit Date: 2024  :     1970    Referring practitioner:    GILBERTO Ornelas  Date of Initial Visit:          Type: THERAPY  Noted: 10/12/2022    Patient seen for 67 sessions    Visit Diagnoses:    ICD-10-CM ICD-9-CM   1. Lymphedema of left leg  I89.0 457.1         SUBJECTIVE     Subjective:  He and his wife wrap his leg every night and are always trying to figure out better ways. He said his R knee has really been aching and stiff and that the X-ray showed arthritis. His doctor from [place] has referred him to get cortisone injections but he is not wanting to do that.    PAIN: 0/10 L shin numbness           OBJECTIVE     Objective    Lymphedema       Row Name 24 0800             Subjective Pain    Able to rate subjective pain? yes  -HR      Pre-Treatment Pain Level 0  -HR         Lymphedema Measurements    Measurement Type(s) Circumferential  -HR      Circumferential Areas Lower extremities  -HR         BLE Circumferential (cm)    Measurement Location 1 BOT  -HR      Left 1 21.9 cm  -HR      Measurement Location 2 +7  -HR      Left 2 24.6 cm  -HR      Measurement Location 3 +7  -HR      Left 3 26.1 cm  -HR      Measurement Location 4 +10  -HR      Left 4 32.4 cm  -HR      Measurement Location 5 +10  -HR      Left 5 42.4 cm  -HR      Measurement Location 6 +10  -HR      Left 6 47.4 cm  -HR      Measurement Location 7 +10  -HR      Left 7 44 cm  -HR      Measurement Location 8 +10  -HR      Left 8 48 cm  -HR      Measurement Location 9 +10  -HR      Left 9 55 cm  -HR      Measurement Location 10 +10  -HR      Left 10 60 cm  -HR      LLE Circumferential Total 401.8 cm  -HR         Manual Lymphatic Drainage    Manual Lymphatic Drainage initial sequence;opened regional lymph nodes;opened  anastamoses;extremity treatment  -HR      Initial Sequence short neck;abdomen;diaphragmatic breathing  -HR      Abdomen superficial;deep  -HR      Opened Regional Lymph Nodes axillary;inguinal  -HR      Axillary right;left  -HR      Inguinal right;left  -HR      Opened Anastamoses inguino-axillary  -HR      Inguino-Axillary right;left  Supine and prone  -HR      Extremity Treatment MLD to full limb  -HR      MLD to Full Limb LLE  -HR      Manual Lymphatic Drainage Comments Used small wratchet roller (green tband) for IASTM  -HR      Manual Therapy 60  -HR                User Key  (r) = Recorded By, (t) = Taken By, (c) = Cosigned By      Initials Name Provider Type    HR Carmen, Debbie Estrada, PT, DPT, CLT-LANNY Physical Therapist                          Therapeutic Exercises    03119 Units Comments   L Prone quad stretch   ROM is limited due to knee surgeries.    B hamstring stretch with ankle dorsi flexion     B hip flexor stretch in the Sang Test position                 Timed Minutes 10          Therapy Education/Self Care 93478   Education offered today Stretch B hamstrings   Medbridge Code    Ongoing HEP   Work on CDT. Use the Tubigrip over the leg and using foam and short stretch bandages over the entire leg at night.   Timed Minutes        Total Timed Treatment:     70   mins  Total Time of Visit:             70   mins         ASSESSMENT/PLAN     GOALS    Goals                                          Progress Note due by 5/10/24                                                      Recert due by 7/8/24   LTG by: 12 weeks Comments Date Status   Patient will have a reduction in LLE of at least 20 cm to allow more comfortable walking and mobility.  Slight reduction since last treatment. 4/17 Ongoing   Patient will be independent with specific HEP for lymphedema He is able to move around much better. 6/20/23 Met   He will have no s/s infection.  No signs or symptoms of infection 11/16/23 Met   He will have no  complaint of increased edema in the groin. No complaints of swelling in the groin today. 4/10 Met   Patient will be independent with all tools available to manage his LLE lymphedema. Still trying different wrapping techniques. 4/10 Ongoing       Assessment/Plan     Patient has had a pretty big increase since last week. I worry that it was an error on my end somewhere in the measuring process today. The numbers are just higher than they have been in a long time and there isn't a reason why that would be true. We will re-measure next visit and see where it is. I did speak with Karin today about the R knee pain and the possibility of trying dry needling. I explained the process a bit as he doesn't want the steroid injections. He may decide to give it a try. It is on the non-lymphedema leg, so there shouldn't be any down side to trying it.     PLAN  Continue with CDT     SIGNATURE: Debbie Morales, PT, DPT, [facility] [place] License #: [id number]  Electronically Signed on 4/24/2024        [address] [place] [address] [place]. [address]  [phone]

## 2024-05-01 ENCOUNTER — TREATMENT (OUTPATIENT)
Dept: PHYSICAL THERAPY | Facility: CLINIC | Age: 54
End: 2024-05-01
Payer: MEDICARE

## 2024-05-01 DIAGNOSIS — I89.0 LYMPHEDEMA OF GENITALIA: ICD-10-CM

## 2024-05-01 DIAGNOSIS — I89.0 LYMPHEDEMA OF LEFT LEG: Primary | ICD-10-CM

## 2024-05-01 DIAGNOSIS — C64.1 RENAL CELL CARCINOMA OF RIGHT KIDNEY: ICD-10-CM

## 2024-05-01 PROCEDURE — 97110 THERAPEUTIC EXERCISES: CPT | Performed by: PHYSICAL THERAPIST

## 2024-05-01 PROCEDURE — 97140 MANUAL THERAPY 1/> REGIONS: CPT | Performed by: PHYSICAL THERAPIST

## 2024-05-01 NOTE — PROGRESS NOTES
Physical Therapy Treatment Note  [address] [place] [address]    Patient: Holland Phelps                                                 Visit Date: 2024  :     1970    Referring practitioner:    GILBERTO Ornelas  Date of Initial Visit:          Type: THERAPY  Noted: 10/12/2022    Patient seen for 68 sessions    Visit Diagnoses:    ICD-10-CM ICD-9-CM   1. Lymphedema of left leg  I89.0 457.1   2. Renal cell carcinoma of right kidney  C64.1 189.0   3. Lymphedema of genitalia  I89.0 457.1           SUBJECTIVE     Subjective:  Patient states he hasn't had as many cars to wash.  He feels like his wife does not wrap the leg tight enough on the lower portion of the leg. Debbie Morales PT, DPT, [facility] will do dry needling for patient today.    PAIN: 0/10 L shin numbness           OBJECTIVE     Objective    Lymphedema       Row Name 24 0745             Subjective Pain    Able to rate subjective pain? yes  -AL      Pre-Treatment Pain Level 0  -AL         Lymphedema Measurements    Measurement Type(s) Circumferential  -AL      Circumferential Areas Lower extremities  -AL         BLE Circumferential (cm)    Measurement Location 1 BOT  -AL      Left 1 22 cm  -AL      Measurement Location 2 +7  -AL      Left 2 25.6 cm  -AL      Measurement Location 3 +7  -AL      Left 3 27.4 cm  -AL      Measurement Location 4 +10  -AL      Left 4 33.4 cm  -AL      Measurement Location 5 +10  -AL      Left 5 41.8 cm  -AL      Measurement Location 6 +10  -AL      Left 6 48 cm  -AL      Measurement Location 7 +10  -AL      Left 7 43.6 cm  -AL      Measurement Location 8 +10  -AL      Left 8 47.6 cm  -AL      Measurement Location 9 +10  -AL      Left 9 54.8 cm  -AL      Measurement Location 10 +10  -AL      Left 10 58 cm  -AL      LLE Circumferential Total 402.2 cm  -AL         Manual Lymphatic Drainage    Manual Lymphatic Drainage initial  sequence;opened regional lymph nodes;opened anastamoses;extremity treatment  -AL      Initial Sequence short neck;abdomen;diaphragmatic breathing  -AL      Abdomen superficial;deep  -AL      Diaphragmatic Breathing x 9 with superficial abdominals  -AL      Opened Regional Lymph Nodes axillary;inguinal  -AL      Axillary right;left  -AL      Inguinal right;left  -AL      Opened Anastamoses inguino-axillary  -AL      Inguino-Axillary right;left  Supine and prone  -AL      Extremity Treatment MLD to full limb  -AL      Manual Lymphatic Drainage Comments IASTM to the L LE using the hand held massager and small ridged roller.  -AL      Manual Therapy 60  -AL         Compression/Skin Care    Compression/Skin Care compression garment  -AL      Skin Care lotion applied  -AL      Compression/Skin Care Comments Patient donned his compression thigh high  -AL                User Key  (r) = Recorded By, (t) = Taken By, (c) = Cosigned By      Initials Name Provider Type    Elida Pickering PTA, CLT[facility] Physical Therapist Assistant                            Therapeutic Exercises    09179 Units Comments   L Prone quad stretch   ROM is limited due to knee surgeries.    B hamstring stretch with ankle dorsi flexion     B hip flexor stretch in the Sang Test position                 Timed Minutes 15          Therapy Education/Self Care 97999   Education offered today Stretch B hamstrings   Medbridge Code    Ongoing HEP   Work on CDT. Use the Tubigrip over the leg and using foam and short stretch bandages over the entire leg at night.   Timed Minutes        Total Timed Treatment:     75   mins  Total Time of Visit:             90   mins         ASSESSMENT/PLAN     GOALS    Goals                                          Progress Note due by 5/10/24                                                      Recert due by 7/8/24   LTG by: 12 weeks Comments Date Status   Patient will have a reduction in LLE of at least 20 cm to allow more  comfortable walking and mobility.  Slight reduction since last treatment. 4/17 Ongoing   Patient will be independent with specific HEP for lymphedema He is able to move around much better. 6/20/23 Met   He will have no s/s infection.  No signs or symptoms of infection 11/16/23 Met   He will have no complaint of increased edema in the groin. No complaints of swelling in the groin today. 4/10 Met   Patient will be independent with all tools available to manage his LLE lymphedema. Still trying different wrapping techniques. 5/1 Ongoing       Assessment/Plan     Debbie Morales PT, DPT, [facility] did the dry needling for the R knee area today, please see Debbie's note. Patient felt the dry needling did help the R knee pain. Patient has had a slight increase in size of the L LE, more so in the L foot and lower leg.  He will remove the compression wraps before gets out of bed then don the compression garment.  He usually removed the wraps in the morning then takes a shower. By the time he is out of the shower the L leg is swollen.  He may bring his wraps next treatment so I can wrap the L leg.     PLAN  Continue with CDT     SIGNATURE: Elida Sequeira PTA, [place], [place] License #: [id number]  Electronically Signed on 5/1/2024        [address] [place] [address] [place] [address]  [phone]

## 2024-05-08 ENCOUNTER — TREATMENT (OUTPATIENT)
Dept: PHYSICAL THERAPY | Facility: CLINIC | Age: 54
End: 2024-05-08
Payer: MEDICARE

## 2024-05-08 DIAGNOSIS — I89.0 LYMPHEDEMA OF LEFT LEG: Primary | ICD-10-CM

## 2024-05-08 DIAGNOSIS — I89.0 LYMPHEDEMA OF GENITALIA: ICD-10-CM

## 2024-05-08 DIAGNOSIS — C64.1 RENAL CELL CARCINOMA OF RIGHT KIDNEY: ICD-10-CM

## 2024-05-08 PROCEDURE — 97110 THERAPEUTIC EXERCISES: CPT | Performed by: PHYSICAL THERAPIST

## 2024-05-08 PROCEDURE — 97140 MANUAL THERAPY 1/> REGIONS: CPT | Performed by: PHYSICAL THERAPIST

## 2024-05-15 ENCOUNTER — TREATMENT (OUTPATIENT)
Dept: PHYSICAL THERAPY | Facility: CLINIC | Age: 54
End: 2024-05-15
Payer: MEDICARE

## 2024-05-15 DIAGNOSIS — I89.0 LYMPHEDEMA OF GENITALIA: ICD-10-CM

## 2024-05-15 DIAGNOSIS — C64.1 RENAL CELL CARCINOMA OF RIGHT KIDNEY: ICD-10-CM

## 2024-05-15 DIAGNOSIS — I89.0 LYMPHEDEMA OF LEFT LEG: Primary | ICD-10-CM

## 2024-05-15 PROCEDURE — 97140 MANUAL THERAPY 1/> REGIONS: CPT | Performed by: PHYSICAL THERAPIST

## 2024-05-15 PROCEDURE — 97110 THERAPEUTIC EXERCISES: CPT | Performed by: PHYSICAL THERAPIST

## 2024-05-15 NOTE — PROGRESS NOTES
Physical Therapy Treatment Note  [address] [place] [address]    Patient: Holland Phelps                                                 Visit Date: 5/15/2024  :     1970    Referring practitioner:    GILBERTO Ornelas  Date of Initial Visit:          Type: THERAPY  Noted: 10/12/2022    Patient seen for 70 sessions    Visit Diagnoses:    ICD-10-CM ICD-9-CM   1. Lymphedema of left leg  I89.0 457.1   2. Renal cell carcinoma of right kidney  C64.1 189.0   3. Lymphedema of genitalia  I89.0 457.1           SUBJECTIVE     Subjective:  Nothing new to report, he feels like the L leg is doing pretty good.    PAIN: 0/10 L shin numbness           OBJECTIVE     Objective    Lymphedema       Row Name 05/15/24 0800             Subjective Pain    Able to rate subjective pain? yes  -AL      Pre-Treatment Pain Level 0  -AL      Subjective Pain Comment Numbness in the L lower leg  -AL         Lymphedema Measurements    Measurement Type(s) Circumferential  -AL      Circumferential Areas Lower extremities  -AL         BLE Circumferential (cm)    Measurement Location 1 BOT  -AL      Left 1 21.8 cm  -AL      Measurement Location 2 +7  -AL      Left 2 24 cm  -AL      Measurement Location 3 +7  -AL      Left 3 27 cm  -AL      Measurement Location 4 +10  -AL      Left 4 30.4 cm  -AL      Measurement Location 5 +10  -AL      Left 5 38.1 cm  -AL      Measurement Location 6 +10  -AL      Left 6 44.5 cm  -AL      Measurement Location 7 +10  -AL      Left 7 41.5 cm  -AL      Measurement Location 8 +10  -AL      Left 8 47.5 cm  -AL      Measurement Location 9 +10  -AL      Left 9 55.9 cm  -AL      Measurement Location 10 +10  -AL      Left 10 60.5 cm  -AL      LLE Circumferential Total 391.2 cm  -AL         Manual Lymphatic Drainage    Manual Lymphatic Drainage initial sequence;opened regional lymph nodes;opened anastamoses;extremity treatment  -AL      Initial  Sequence short neck;abdomen;diaphragmatic breathing  -AL      Abdomen superficial;deep  -AL      Diaphragmatic Breathing x 9 with superficial abdominals  -AL      Opened Regional Lymph Nodes axillary;inguinal  -AL      Axillary right;left  -AL      Inguinal right;left  -AL      Opened Anastamoses inguino-axillary  -AL      Inguino-Axillary right;left  Supine and prone  -AL      Extremity Treatment MLD to full limb  -AL      MLD to Full Limb L LE  -AL      Manual Lymphatic Drainage Comments Continue to with IASTM to the L LE using the hand held massager and spring ridged roller.  -AL      Manual Therapy 70  -AL         Compression/Skin Care    Compression/Skin Care compression garment  -AL      Skin Care lotion applied  -AL      Compression/Skin Care Comments Patient donned his compression thigh high  -AL                User Key  (r) = Recorded By, (t) = Taken By, (c) = Cosigned By      Initials Name Provider Type    Elida Pickering PTA, CLT[facility] Physical Therapist Assistant                        Therapeutic Exercises    77227 Units Comments   L Prone quad stretch   ROM is limited due to knee surgeries.    B hamstring stretch with ankle dorsi flexion     B hip flexor stretch in the Sang Test position     Used a half foam roll under upper thigh for hip flexor stretch            Timed Minutes 15          Therapy Education/Self Care 16389   Education offered today Stretch B hamstrings   Medbridge Code    Ongoing HEP   Work on CDT. Use the Tubigrip over the leg and using foam and short stretch bandages over the entire leg at night.   Timed Minutes        Total Timed Treatment:     85   mins  Total Time of Visit:             85   mins         ASSESSMENT/PLAN     GOALS    Goals                                          Progress Note due by 6/7/24                                                      Recert due by 7/8/24   LTG by: 12 weeks Comments Date Status   Patient will have a reduction in LLE of at least 20 cm to  allow more comfortable walking and mobility.  He has had a large reduction in the L LE as compared to his last visit. 5/8 Ongoing   Patient will be independent with specific HEP for lymphedema He is able to move around much better. 6/20/23 Met   He will have no s/s infection.  No signs or symptoms of infection 11/16/23 Met   He will have no complaint of increased edema in the groin. No complaints of swelling in the groin today. 4/10 Met   Patient will be independent with all tools available to manage his LLE lymphedema. Still trying different wrapping techniques. 5/15 Ongoing       Assessment/Plan     Patient has had an increase of 3.9 cm in the L LE, mostly in the most proximal measurements.  He did not wrap the L Leg last night, this is most likely whey the proximal leg is up in size. He hasn't washed any cars in the past two weeks which always helps keep the L leg down in size.     PLAN  Continue with CDT     SIGNATURE: Elida Sequeira PTA, [facility] [place] License #: [id number]  Electronically Signed on 5/15/2024        [address] [place] [address]. [address]  [phone]

## 2024-05-22 ENCOUNTER — TREATMENT (OUTPATIENT)
Dept: PHYSICAL THERAPY | Facility: CLINIC | Age: 54
End: 2024-05-22
Payer: MEDICARE

## 2024-05-22 DIAGNOSIS — C64.1 RENAL CELL CARCINOMA OF RIGHT KIDNEY: ICD-10-CM

## 2024-05-22 DIAGNOSIS — I89.0 LYMPHEDEMA OF LEFT LEG: Primary | ICD-10-CM

## 2024-05-22 DIAGNOSIS — I89.0 LYMPHEDEMA OF GENITALIA: ICD-10-CM

## 2024-05-22 PROCEDURE — 97140 MANUAL THERAPY 1/> REGIONS: CPT | Performed by: PHYSICAL THERAPIST

## 2024-05-22 PROCEDURE — 97110 THERAPEUTIC EXERCISES: CPT | Performed by: PHYSICAL THERAPIST

## 2024-05-22 NOTE — PROGRESS NOTES
Physical Therapy Treatment Note  [address] [place] [address]    Patient: Holland Phelps                                                 Visit Date: 2024  :     1970    Referring practitioner:    GILBERTO Ornelas  Date of Initial Visit:          Type: THERAPY  Noted: 10/12/2022    Patient seen for 71 sessions    Visit Diagnoses:    ICD-10-CM ICD-9-CM   1. Lymphedema of left leg  I89.0 457.1   2. Renal cell carcinoma of right kidney  C64.1 189.0   3. Lymphedema of genitalia  I89.0 457.1           SUBJECTIVE     Subjective:  He thinks the L leg may be up a little, he did some yard work.     PAIN: 0/10 L shin numbness           OBJECTIVE     Objective    Lymphedema       Row Name 24 0800             Subjective Pain    Able to rate subjective pain? yes  -AL      Pre-Treatment Pain Level 0  -AL      Subjective Pain Comment Numbness L shin  -AL         Lymphedema Measurements    Measurement Type(s) Circumferential  -AL      Circumferential Areas Lower extremities  -AL         BLE Circumferential (cm)    Measurement Location 1 BOT  -AL      Left 1 21 cm  -AL      Measurement Location 2 +7  -AL      Left 2 24.5 cm  -AL      Measurement Location 3 +7  -AL      Left 3 25 cm  -AL      Measurement Location 4 +10  -AL      Left 4 31.9 cm  -AL      Measurement Location 5 +10  -AL      Left 5 40.1 cm  -AL      Measurement Location 6 +10  -AL      Left 6 46.7 cm  -AL      Measurement Location 7 +10  -AL      Left 7 42.8 cm  -AL      Measurement Location 8 +10  -AL      Left 8 47.6 cm  -AL      Measurement Location 9 +10  -AL      Left 9 54.9 cm  -AL      Measurement Location 10 +10  -AL      Left 10 58.4 cm  -AL      LLE Circumferential Total 392.9 cm  -AL         Manual Lymphatic Drainage    Manual Lymphatic Drainage initial sequence;opened regional lymph nodes;opened anastamoses;extremity treatment  -AL      Initial Sequence short  neck;abdomen;diaphragmatic breathing  -AL      Abdomen superficial;deep  -AL      Diaphragmatic Breathing x 9 with superficial abdominals  -AL      Opened Regional Lymph Nodes axillary;inguinal  -AL      Axillary right;left  -AL      Inguinal right;left  -AL      Opened Anastamoses inguino-axillary  -AL      Inguino-Axillary right;left  Supine and prone  -AL      Extremity Treatment MLD to full limb  -AL      MLD to Full Limb L LE  -AL      Manual Lymphatic Drainage Comments IASTM using the hand held massager and the spring roller to anterior and posterior L LE  -AL      Manual Therapy 55  -AL         Compression/Skin Care    Compression/Skin Care compression garment  -AL      Skin Care lotion applied  -AL      Compression/Skin Care Comments Patient donned his compression thigh high  -AL                User Key  (r) = Recorded By, (t) = Taken By, (c) = Cosigned By      Initials Name Provider Type    Elida Pickering PTA, [facility] Physical Therapist Assistant                          Therapeutic Exercises    31432 Units Comments   L Prone quad stretch   ROM is limited due to knee surgeries.    B hamstring stretch with ankle dorsi flexion     B hip flexor stretch in the Sang Test position     Used a half foam roll under upper thigh for hip flexor stretch            Timed Minutes 15          Therapy Education/Self Care 75812   Education offered today Stretch B hamstrings   Medbridge Code    Ongoing HEP   Work on CDT. Use the Tubigrip over the leg and using foam and short stretch bandages over the entire leg at night.   Timed Minutes        Total Timed Treatment:     70   mins  Total Time of Visit:             70   mins         ASSESSMENT/PLAN     GOALS    Goals                                          Progress Note due by 6/7/24                                                      Recert due by 7/8/24   LTG by: 12 weeks Comments Date Status   Patient will have a reduction in LLE of at least 20 cm to allow more  "comfortable walking and mobility.  He has had a large reduction in the L LE as compared to his last visit. 5/8 Ongoing   Patient will be independent with specific HEP for lymphedema He is able to move around much better. 6/20/23 Met   He will have no s/s infection.  No signs or symptoms of infection 11/16/23 Met   He will have no complaint of increased edema in the groin. No complaints of swelling in the groin today. 4/10 Met   Patient will be independent with all tools available to manage his LLE lymphedema. Still trying different wrapping techniques.  I did give him more 5\" Compression  to use as stockinette. 5/22 Ongoing       Assessment/Plan     No significant overall change in the size of the L LE, he had less swelling proximally and more swelling distally.  I did give patient new 5\" Compression  to use as a stockinette under the compression wraps.  He nay need new compression wraps as well.     PLAN  Continue with CDT     SIGNATURE: Elida Sequeira PTA, [facility], [place] License #: [id number]  Electronically Signed on 5/22/2024        [address] [place] [address]. [address]  [phone]   "

## 2024-05-28 ENCOUNTER — TRANSCRIBE ORDERS (OUTPATIENT)
Dept: PHYSICAL THERAPY | Facility: CLINIC | Age: 54
End: 2024-05-28
Payer: MEDICARE

## 2024-05-28 DIAGNOSIS — I89.0 LYMPHEDEMA OF GENITALIA: Primary | ICD-10-CM

## 2024-06-05 ENCOUNTER — TREATMENT (OUTPATIENT)
Dept: PHYSICAL THERAPY | Facility: CLINIC | Age: 54
End: 2024-06-05
Payer: MEDICARE

## 2024-06-05 DIAGNOSIS — I89.0 LYMPHEDEMA OF GENITALIA: ICD-10-CM

## 2024-06-05 DIAGNOSIS — C64.1 RENAL CELL CARCINOMA OF RIGHT KIDNEY: ICD-10-CM

## 2024-06-05 DIAGNOSIS — I89.0 LYMPHEDEMA OF LEFT LEG: Primary | ICD-10-CM

## 2024-06-05 NOTE — PROGRESS NOTES
Physical Therapy Treatment Note and 30 Day Progress Note  [address] [place] [address]    Patient: Holland Phelps                                                 Visit Date: 2024  :     1970    Referring practitioner:    Calixto Champion,*  Date of Initial Visit:          Type: THERAPY  Noted: 10/12/2022    Patient seen for 72 sessions    Visit Diagnoses:    ICD-10-CM ICD-9-CM   1. Lymphedema of left leg  I89.0 457.1   2. Renal cell carcinoma of right kidney  C64.1 189.0   3. Lymphedema of genitalia  I89.0 457.1           SUBJECTIVE     Subjective:  He has not wrapped the leg the past couple of nights but elevated it and the leg is better.     PAIN: 0/10 L shin numbness           OBJECTIVE     Objective    Lymphedema       Row Name 24 0800             Subjective Pain    Able to rate subjective pain? yes  -AL      Pre-Treatment Pain Level 0  -AL         Lymphedema Measurements    Measurement Type(s) Circumferential  -AL      Circumferential Areas Lower extremities  -AL         BLE Circumferential (cm)    Measurement Location 1 BOT  -AL      Left 1 22 cm  -AL      Measurement Location 2 +7  -AL      Left 2 24.6 cm  -AL      Measurement Location 3 +7  -AL      Left 3 24.8 cm  -AL      Measurement Location 4 +10  -AL      Left 4 29 cm  -AL      Measurement Location 5 +10  -AL      Left 5 37.7 cm  -AL      Measurement Location 6 +10  -AL      Left 6 44.2 cm  -AL      Measurement Location 7 +10  -AL      Left 7 42.2 cm  -AL      Measurement Location 8 +10  -AL      Left 8 48.2 cm  -AL      Measurement Location 9 +10  -AL      Left 9 54.5 cm  -AL      Measurement Location 10 +10  -AL      Left 10 61.2 cm  -AL      LLE Circumferential Total 388.4 cm  -AL         Manual Lymphatic Drainage    Manual Lymphatic Drainage initial sequence;opened regional lymph nodes;opened anastamoses;extremity treatment  -AL      Initial Sequence  short neck;abdomen;diaphragmatic breathing  -AL      Abdomen superficial;deep  -AL      Diaphragmatic Breathing x 9 with superficial abdominals  -AL      Opened Regional Lymph Nodes axillary;inguinal  -AL      Axillary right;left  -AL      Inguinal right;left  -AL      Opened Anastamoses inguino-axillary  -AL      Inguino-Axillary right;left  Supine and prone  -AL      Extremity Treatment MLD to full limb  -AL      MLD to Full Limb L LE  -AL      Manual Lymphatic Drainage Comments IASTM using the spring roller to anterior and posterior L LE  -AL      Manual Therapy 70  -AL         Compression/Skin Care    Compression/Skin Care compression garment  -AL      Skin Care lotion applied  30 min before treatment ended I applied Aquaphor to the dry area anterior lower leg the Eucerin to the rest of the L LE.  -AL      Compression/Skin Care Comments Patient donned his compression thigh high  -AL                User Key  (r) = Recorded By, (t) = Taken By, (c) = Cosigned By      Initials Name Provider Type    Elida Pickering, PTA, CLT[facility] Physical Therapist Assistant                            Therapeutic Exercises    82963 Units Comments   L Prone quad stretch   ROM is limited due to knee surgeries.    B hamstring stretch with ankle dorsi flexion     B hip flexor stretch in the Sang Test position     Used a half foam roll under upper thigh for hip flexor stretch            Timed Minutes 15          Therapy Education/Self Care 41976   Education offered today Stretch B hamstrings   Medbridge Code    Ongoing HEP   Work on CDT. Use the Tubigrip over the leg and using foam and short stretch bandages over the entire leg at night.   Timed Minutes        Total Timed Treatment:     85  mins  Total Time of Visit:             85   mins         ASSESSMENT/PLAN     GOALS    Goals                                          Progress Note due by 7/5/24                                                      Recert due by 7/8/24   LTG by: 12  weeks Comments Date Status   Patient will have a reduction in LLE of at least 20 cm to allow more comfortable walking and mobility.  He has had a good reduction since his last treatment.  6/5 Ongoing   Patient will be independent with specific HEP for lymphedema He is able to move around much better. 6/20/23 Met   He will have no s/s infection.  No signs or symptoms of infection 11/16/23 Met   He will have no complaint of increased edema in the groin. No complaints of swelling in the groin today. 4/10 Met   Patient will be independent with all tools available to manage his LLE lymphedema. Still trying different wrapping techniques.  6/5 Ongoing       Assessment/Plan     Patient  has had a reduction of 4.5 cm as compared to his last measurement.  He is going to try different combinations of stockinette and elevation at night.  He has not wrapped the leg for the past couple of nights but elevated the L leg and the leg is better. When the water is warmer in his pool he will start water exercises.      PLAN  Continue with CDT     SIGNATURE: Elida Sequeira PTA, [facility] [place] License #: [id number]  Electronically Signed on 6/5/2024        [address] [place] [address]. [address]  [phone]

## 2024-06-12 ENCOUNTER — TREATMENT (OUTPATIENT)
Dept: PHYSICAL THERAPY | Facility: CLINIC | Age: 54
End: 2024-06-12
Payer: MEDICARE

## 2024-06-12 DIAGNOSIS — I89.0 LYMPHEDEMA OF GENITALIA: ICD-10-CM

## 2024-06-12 DIAGNOSIS — C64.1 RENAL CELL CARCINOMA OF RIGHT KIDNEY: ICD-10-CM

## 2024-06-12 DIAGNOSIS — I89.0 LYMPHEDEMA OF LEFT LEG: Primary | ICD-10-CM

## 2024-06-12 NOTE — PROGRESS NOTES
Physical Therapy Treatment Note  [address] [place] [address]    Patient: Holland Phelps                                                 Visit Date: 2024  :     1970    Referring practitioner:    Calixto Champion,*  Date of Initial Visit:          Type: THERAPY  Noted: 10/12/2022    Patient seen for 73 sessions    Visit Diagnoses:    ICD-10-CM ICD-9-CM   1. Lymphedema of left leg  I89.0 457.1   2. Renal cell carcinoma of right kidney  C64.1 189.0   3. 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     PAIN: 0/10 L shin numbness           OBJECTIVE     Objective    Lymphedema       Row Name 24 0800             Subjective Pain    Able to rate subjective pain? yes  -AL      Pre-Treatment Pain Level 0  -AL         Lymphedema Measurements    Measurement Type(s) Circumferential  -AL      Circumferential Areas Lower extremities  -AL         BLE Circumferential (cm)    Measurement Location 1 BOT  -AL      Left 1 22.2 cm  -AL      Measurement Location 2 +7  -AL      Left 2 25 cm  -AL      Measurement Location 3 +7  -AL      Left 3 25 cm  -AL      Measurement Location 4 +10  -AL      Left 4 31.5 cm  -AL      Measurement Location 5 +10  -AL      Left 5 39.5 cm  -AL      Measurement Location 6 +10  -AL      Left 6 44.3 cm  -AL      Measurement Location 7 +10  -AL      Left 7 41 cm  -AL      Measurement Location 8 +10  -AL      Left 8 47.4 cm  -AL      Measurement Location 9 +10  -AL      Left 9 54 cm  -AL      Measurement Location 10 +10  -AL      Left 10 59.1 cm  -AL      LLE Circumferential Total 389 cm  -AL         Manual Lymphatic Drainage    Manual Lymphatic Drainage initial sequence;opened regional lymph nodes;opened anastamoses;extremity treatment  -AL      Initial Sequence short neck;abdomen;diaphragmatic breathing  -AL      Abdomen  superficial;deep  -AL      Diaphragmatic Breathing x 9 with superficial abdominals  -AL      Opened Regional Lymph Nodes axillary;inguinal  -AL      Axillary right;left  -AL      Inguinal right;left  -AL      Opened Anastamoses inguino-axillary  -AL      Inguino-Axillary right;left  Supine and prone  -AL      Extremity Treatment MLD to full limb  -AL      MLD to Full Limb L LE  -AL      Extremity Treatment Focus On Spent extra time working MLD to the L lower leg  -AL      Manual Lymphatic Drainage Comments IASTM using the spring roller to anterior and posterior L LE  -AL      Manual Therapy 60  -AL         Compression/Skin Care    Compression/Skin Care compression garment  -AL      Skin Care lotion applied  30 min before treatment ended I applied  Eucerin to the L LE.  -AL      Compression/Skin Care Comments Patient donned his compression thigh high  -AL                User Key  (r) = Recorded By, (t) = Taken By, (c) = Cosigned By      Initials Name Provider Type    lEida Pickering PTA, [facility] Physical Therapist Assistant                              Therapeutic Exercises    70285 Units Comments   L Prone quad stretch   ROM is limited due to knee surgeries.    B hamstring stretch with ankle dorsi flexion     B hip flexor stretch in the Sang Test position     Used a half foam roll under upper thigh for hip flexor stretch            Timed Minutes 10          Therapy Education/Self Care 24286   Education offered today Stretch B hamstrings   Medbridge Code    Ongoing HEP   Work on CDT. Use the Tubigrip over the leg and using foam and short stretch bandages over the entire leg at night.   Timed Minutes        Total Timed Treatment:     70  mins  Total Time of Visit:             70   mins         ASSESSMENT/PLAN     GOALS    Goals                                          Progress Note due by 7/5/24                                                      Recert due by 7/8/24   LTG by: 12 weeks Comments Date Status    Patient will have a reduction in LLE of at least 20 cm to allow more comfortable walking and mobility.  He has had a good reduction since his last treatment.  6/5 Ongoing   Patient will be independent with specific HEP for lymphedema He is able to move around much better. 6/20/23 Met   He will have no s/s infection.  No signs or symptoms of infection 11/16/23 Met   He will have no complaint of increased edema in the groin. No complaints of swelling in the groin today. 4/10 Met   Patient will be independent with all tools available to manage his LLE lymphedema. Still trying different wrapping techniques.  6/11 Ongoing       Assessment/Plan     No significant change in the overall  size of the L LE as compared to his last visit.  He has had an increase in size of the L lower leg and a reduction in the upper leg. I did spend more time working on MLD to the L lower leg.  Patient will try to exercise in his pool this week.    PLAN  Continue with CDT     SIGNATURE: Elida Sequeira PTA, [facility] [place] License #: [id number]  Electronically Signed on 6/12/2024        [address] [place] [address] [place] [address]  [phone]

## 2024-06-19 ENCOUNTER — TREATMENT (OUTPATIENT)
Dept: PHYSICAL THERAPY | Facility: CLINIC | Age: 54
End: 2024-06-19
Payer: MEDICARE

## 2024-06-19 DIAGNOSIS — I89.0 LYMPHEDEMA OF GENITALIA: ICD-10-CM

## 2024-06-19 DIAGNOSIS — C64.1 RENAL CELL CARCINOMA OF RIGHT KIDNEY: ICD-10-CM

## 2024-06-19 DIAGNOSIS — I89.0 LYMPHEDEMA OF LEFT LEG: Primary | ICD-10-CM

## 2024-06-19 NOTE — PROGRESS NOTES
Physical Therapy Treatment Note  [address] [place] [address]    Patient: Holland Phelps                                                 Visit Date: 2024  :     1970    Referring practitioner:    Calixto Champion,*  Date of Initial Visit:          Type: THERAPY  Noted: 10/12/2022    Patient seen for 74 sessions    Visit Diagnoses:    ICD-10-CM ICD-9-CM   1. Lymphedema of left leg  I89.0 457.1   2. Renal cell carcinoma of right kidney  C64.1 189.0   3. Lymphedema of genitalia  I89.0 457.1           SUBJECTIVE     Subjective:  He went fishing for hours yesterday so he thinks the leg may be up.     PAIN: 0/10 L shin numbness           OBJECTIVE     Objective    Lymphedema       Row Name 24 0800             Subjective Pain    Able to rate subjective pain? yes  -AL      Pre-Treatment Pain Level 0  -AL         Lymphedema Measurements    Measurement Type(s) Circumferential  -AL      Circumferential Areas Lower extremities  -AL         BLE Circumferential (cm)    Measurement Location 1 BOT  -AL      Left 1 23.6 cm  -AL      Measurement Location 2 +7  -AL      Left 2 26.2 cm  -AL      Measurement Location 3 +7  -AL      Left 3 28.5 cm  -AL      Measurement Location 4 +10  -AL      Left 4 34.2 cm  -AL      Measurement Location 5 +10  -AL      Left 5 42.1 cm  -AL      Measurement Location 6 +10  -AL      Left 6 48.1 cm  -AL      Measurement Location 7 +10  -AL      Left 7 43.4 cm  -AL      Measurement Location 8 +10  -AL      Left 8 47.8 cm  -AL      Measurement Location 9 +10  -AL      Left 9 54 cm  -AL      Measurement Location 10 +10  -AL      Left 10 59.9 cm  -AL      LLE Circumferential Total 407.8 cm  -AL         Manual Lymphatic Drainage    Manual Lymphatic Drainage initial sequence;opened regional lymph nodes;opened anastamoses;extremity treatment  -AL      Initial Sequence short neck;abdomen;diaphragmatic breathing  -AL       Abdomen superficial;deep  -AL      Diaphragmatic Breathing x 9 with superficial abdominals  -AL      Opened Regional Lymph Nodes axillary;inguinal  -AL      Axillary right;left  -AL      Inguinal right;left  -AL      Opened Anastamoses inguino-axillary  -AL      Inguino-Axillary right;left  Supine and prone  -AL      Extremity Treatment MLD to full limb  -AL      MLD to Full Limb L LE  -AL      Extremity Treatment Focus On MLD completed in supine and prone.  -AL      Manual Lymphatic Drainage Comments IASTM using the spring roller and hand held massager to anterior and posterior L LE  -AL      Manual Therapy 60  -AL         Compression/Skin Care    Compression/Skin Care compression garment  -AL      Skin Care lotion applied  30 min before treatment ended I applied  Eucerin to the L LE.  -AL      Compression/Skin Care Comments Patient donned his compression thigh high  -AL                User Key  (r) = Recorded By, (t) = Taken By, (c) = Cosigned By      Initials Name Provider Type    Elida Pickering PTA, CLZIGGY-[facility]AN[facility] Physical Therapist Assistant                                Therapeutic Exercises    15588 Units Comments   L Prone quad stretch   ROM is limited due to knee surgeries.    B hamstring stretch with ankle dorsi flexion     B hip flexor stretch in the Sang Test position     Used a half foam roll under upper thigh for hip flexor stretch            Timed Minutes 10          Therapy Education/Self Care 43751   Education offered today Stretch B hamstrings   Medbridge Code    Ongoing HEP   Work on CDT. Use the Tubigrip over the leg and using foam and short stretch bandages over the entire leg at night.   Timed Minutes        Total Timed Treatment:     70  mins  Total Time of Visit:             70   mins         ASSESSMENT/PLAN     GOALS    Goals                                          Progress Note due by 7/5/24                                                      Recert due by 7/8/24   LTG by: 12 weeks  Comments Date Status   Patient will have a reduction in LLE of at least 20 cm to allow more comfortable walking and mobility.  He has had a large increase in size of the L LE.  6/19 Ongoing   Patient will be independent with specific HEP for lymphedema He is able to move around much better. 6/20/23 Met   He will have no s/s infection.  No signs or symptoms of infection 11/16/23 Met   He will have no complaint of increased edema in the groin. No complaints of swelling in the groin today. 4/10 Met   Patient will be independent with all tools available to manage his LLE lymphedema. Still trying different wrapping techniques.  6/11 Ongoing       Assessment/Plan     He has had an increase of 18.8 cm as compared to his last treatment.  He was outside in the heat for several hours which is most likely why he has had an increase in the L LE.  He will not be outside as much the next few days so he will be able to concentrate on decreasing the swelling in the L LE.     PLAN  Continue with CDT     SIGNATURE: Elida Sequeira PTA, [facility] [place] License #: [id number]  Electronically Signed on 6/19/2024        [address] [place] [address]. [address]  [phone]

## 2024-07-10 ENCOUNTER — TREATMENT (OUTPATIENT)
Dept: PHYSICAL THERAPY | Facility: CLINIC | Age: 54
End: 2024-07-10
Payer: MEDICARE

## 2024-07-10 DIAGNOSIS — I89.0 LYMPHEDEMA OF LEFT LEG: Primary | ICD-10-CM

## 2024-07-10 DIAGNOSIS — C64.1 RENAL CELL CARCINOMA OF RIGHT KIDNEY: ICD-10-CM

## 2024-07-12 NOTE — PROGRESS NOTES
Physical Therapy Treatment Note, 30 Day Progress Note, and 90 Day Recertification Note  [address] [place] [address]    Patient: Holland Phelps                                                 Visit Date: 7/10/2024  :     1970    Referring practitioner:    Calixto Champion,*  Date of Initial Visit:          Type: THERAPY  Noted: 10/12/2022    Patient seen for 75 sessions    Visit Diagnoses:    ICD-10-CM ICD-9-CM   1. Lymphedema of left leg  I89.0 457.1   2. Renal cell carcinoma of right kidney  C64.1 189.0           SUBJECTIVE     Subjective:  He had all his scans done in [place] and they said everything looked good.     PAIN: 0/10 L shin numbness           OBJECTIVE     Objective      07/10/24 1200   Subjective Pain   Able to rate subjective pain? yes   Manual Lymphatic Drainage   Manual Lymphatic Drainage initial sequence;opened regional lymph nodes;opened anastamoses;extremity treatment   Initial Sequence short neck;abdomen;diaphragmatic breathing   Abdomen superficial;deep   Opened Regional Lymph Nodes axillary;inguinal   Axillary right;left   Inguinal right;left   Opened Anastamoses inguino-axillary   Inguino-Axillary right;left  (Supine and prone)   Extremity Treatment MLD to full limb   MLD to Full Limb LLE   Extremity Treatment Focus On MLD completed in supine and prone.   Manual Lymphatic Drainage Comments IASTM using the spring roller and hand held massager to anterior and posterior L LE   Manual Therapy 50               Therapeutic Exercises    53182 Units Comments   L Prone quad stretch   ROM is limited due to knee surgeries.    L calf stretch                      Timed Minutes 10          Therapy Education/Self Care 91959   Education offered today Stretch B hamstrings   Medbridge Code    Ongoing HEP   Work on CDT. Use the Tubigrip over the leg and using foam and short stretch bandages over the entire leg at night.    Timed Minutes        Total Timed Treatment:     60  mins  Total Time of Visit:             60   mins         ASSESSMENT/PLAN     GOALS    Goals                                          Progress Note due by 8/9/24                                                      Recert due by 10/7/24   LTG by: 12 weeks Comments Date Status   Patient will have a reduction in LLE of at least 20 cm to allow more comfortable walking and mobility.  He had an increase last visit. [facility] tx time today so unable to remeasure  7/10 Ongoing   Patient will be independent with specific HEP for lymphedema He is able to move around much better. 6/20/23 Met   He will have no s/s infection.  No signs or symptoms of infection 11/16/23 Met   He will have no complaint of increased edema in the groin. No complaints of swelling in the groin today. 4/10 Met   Patient will be independent with all tools available to manage his LLE lymphedema. Still trying different wrapping techniques. Wife is now using a rolling pin which seems to help as well 7/10 Ongoing   He will be able to complete 5 rotations with both legs on the Unicam bike with the pedal lengths adjusted  7/10 New       Assessment & Plan       Assessment  Impairments: abnormal gait, abnormal or restricted ROM, lacks appropriate home exercise program and pain with function   Functional limitations: walking, uncomfortable because of pain and standing   Assessment details:    Prognosis: good    Plan  Therapy options: will be seen for skilled therapy services  Planned therapy interventions: manual therapy, compression, soft tissue mobilization, stretching, therapeutic activities, home exercise program and functional ROM exercises  Frequency: 2x week (1-2 X/wk)  Duration in weeks: 12  Treatment plan discussed with: patient         He had an increase in size of the LLE last time he was here. It still looks quite swollen, but there was not enough time for treatment and measurements today. Will  measure again next time. We did discuss his knee motion and we will start having him try the unicam bike with the pedals adjusted for the lack of knee ROM to see if he can loosen it up as he would love to be able to ride his bicycle. I don't know if that will ever be a feasible option, but using the unicam can allow him to use the RLE to assist in trying to get the L around the pedal.     PLAN  Continue with CDT     SIGNATURE: Debbie Morales, PT, DPT, [facility] [place] License #: [id number]  Electronically Signed on 7/12/2024        [address] [place] [address]. [address]  [phone]

## 2024-07-17 ENCOUNTER — TREATMENT (OUTPATIENT)
Dept: PHYSICAL THERAPY | Facility: CLINIC | Age: 54
End: 2024-07-17
Payer: MEDICARE

## 2024-07-17 DIAGNOSIS — I89.0 LYMPHEDEMA OF LEFT LEG: Primary | ICD-10-CM

## 2024-07-17 DIAGNOSIS — C64.1 RENAL CELL CARCINOMA OF RIGHT KIDNEY: ICD-10-CM

## 2024-07-17 DIAGNOSIS — I89.0 LYMPHEDEMA OF GENITALIA: ICD-10-CM

## 2024-07-17 NOTE — PROGRESS NOTES
Physical Therapy Treatment Note  [address] [place] [address]    Patient: Holland Phelps                                                 Visit Date: 2024  :     1970    Referring practitioner:    Calixto Champion,*  Date of Initial Visit:          Type: THERAPY  Noted: 10/12/2022    Patient seen for 76 sessions    Visit Diagnoses:    ICD-10-CM ICD-9-CM   1. Lymphedema of left leg  I89.0 457.1   2. Renal cell carcinoma of right kidney  C64.1 189.0   3. Lymphedema of genitalia  I89.0 457.1           SUBJECTIVE     Subjective:  He states his leg was getting hard so his wife used the roller on his leg.  He had been outside working in the yard and cleaning his car.     PAIN: 0/10 L shin numbness           OBJECTIVE     Objective    Lymphedema       Row Name 24 0900             Subjective Pain    Able to rate subjective pain? yes  -AL      Pre-Treatment Pain Level 0  -AL         Lymphedema Measurements    Measurement Type(s) Circumferential  -AL      Circumferential Areas Lower extremities  -AL         BLE Circumferential (cm)    Measurement Location 1 BOT  -AL      Measurement Location 2 +7  -AL      Measurement Location 3 +7  -AL      Measurement Location 4 +10  -AL      Measurement Location 5 +10  -AL      Measurement Location 6 +10  -AL      Measurement Location 7 +10  -AL      Measurement Location 8 +10  -AL      Measurement Location 9 +10  -AL      Measurement Location 10 +10  -AL         Manual Lymphatic Drainage    Manual Lymphatic Drainage initial sequence;opened regional lymph nodes;opened anastamoses;extremity treatment  -AL      Initial Sequence short neck;abdomen;diaphragmatic breathing  -AL      Abdomen superficial;deep  -AL      Diaphragmatic Breathing x 9 with superficial abdominals  -AL      Opened Regional Lymph Nodes axillary;inguinal  -AL      Axillary right;left  -AL      Inguinal right;left  -AL       Opened Anastamoses inguino-axillary  -AL      Inguino-Axillary right;left  Supine and prone  -AL      Extremity Treatment MLD to full limb  -AL      MLD to Full Limb LLE  -AL      Extremity Treatment Focus On MLD completed in supine and prone.  -AL      Manual Lymphatic Drainage Comments IASTM using the spring roller and hand held massager to anterior and posterior L LE  -AL      Manual Therapy 60  -AL         Compression/Skin Care    Compression/Skin Care compression garment  -AL      Skin Care lotion applied  -AL      Compression/Skin Care Comments Patient donned his compression thigh high  -AL                User Key  (r) = Recorded By, (t) = Taken By, (c) = Cosigned By      Initials Name Provider Type    Elida Pickering, PTA, CLT[facility]ANNY Physical Therapist Assistant                          Therapeutic Exercises    65591 Units Comments   Unicam bike  X 3 min No resistance seat #15   L Prone quad stretch   ROM is limited due to knee surgeries.    L calf stretch                      Timed Minutes 10          Therapy Education/Self Care 29589   Education offered today Stretch B hamstrings   Medbridge Code    Ongoing HEP   Work on CDT. Use the Tubigrip over the leg and using foam and short stretch bandages over the entire leg at night.   Timed Minutes        Total Timed Treatment:     70  mins  Total Time of Visit:             70   mins         ASSESSMENT/PLAN     GOALS    Goals                                          Progress Note due by 8/9/24                                                      Recert due by 10/7/24   LTG by: 12 weeks Comments Date Status   Patient will have a reduction in LLE of at least 20 cm to allow more comfortable walking and mobility.  Slight increase in size of the L LE.  7/17 Ongoing   Patient will be independent with specific HEP for lymphedema He is able to move around much better. 6/20/23 Met   He will have no s/s infection.  No signs or symptoms of infection 11/16/23 Met   He will  have no complaint of increased edema in the groin. No complaints of swelling in the groin today. 4/10 Met   Patient will be independent with all tools available to manage his LLE lymphedema. Still trying different wrapping techniques. Wife is now using a rolling pin which seems to help as well 7/10 Ongoing   He will be able to complete 5 rotations with both legs on the Unicam bike with the pedal lengths adjusted He rode the stationary bike x 3 min today. 7/17 Ongoing       Assessment/Plan       He has had increase in size of 1.4 cm in the L LE. He has more dense tissue present in the most proximal area of the L LE, I spent more time working on MLD to this area.  He was able to ride the stationary bike but the seat was at a very high level.  He did feel like the knee was a little more loose after being on the stationary bike.    PLAN  Continue with CDT     SIGNATURE: Elida Sequeira PTA, [place], [place] License #: [id number]  Electronically Signed on 7/17/2024        [address] [place] [address]. [address]  [phone]

## 2024-07-24 ENCOUNTER — TREATMENT (OUTPATIENT)
Dept: PHYSICAL THERAPY | Facility: CLINIC | Age: 54
End: 2024-07-24
Payer: MEDICARE

## 2024-07-24 DIAGNOSIS — C64.1 RENAL CELL CARCINOMA OF RIGHT KIDNEY: ICD-10-CM

## 2024-07-24 DIAGNOSIS — I89.0 LYMPHEDEMA OF GENITALIA: ICD-10-CM

## 2024-07-24 DIAGNOSIS — I89.0 LYMPHEDEMA OF LEFT LEG: Primary | ICD-10-CM

## 2024-07-24 NOTE — PROGRESS NOTES
Physical Therapy Treatment Note  [address] [place] [address]    Patient: Holland Phelps                                                 Visit Date: 2024  :     1970    Referring practitioner:    Calixto Champion,*  Date of Initial Visit:          Type: THERAPY  Noted: 10/12/2022    Patient seen for 77 sessions    Visit Diagnoses:    ICD-10-CM ICD-9-CM   1. Lymphedema of left leg  I89.0 457.1   2. Renal cell carcinoma of right kidney  C64.1 189.0   3. Lymphedema of genitalia  I89.0 457.1           SUBJECTIVE     Subjective:  He states he was a little sore after riding the bike, this did not last all day.  He could tell a difference with the new short stretch bandage, it was tighter when his wife wrapped the leg. He and his wife have been rolling and wrapping the L leg at night, he feels like the leg is down in size.     PAIN: 0/10 L shin numbness           OBJECTIVE     Objective    Lymphedema       Row Name 24 0900             Subjective Pain    Able to rate subjective pain? yes  -AL      Pre-Treatment Pain Level 0  -AL         Lymphedema Measurements    Measurement Type(s) Circumferential  -AL      Circumferential Areas Lower extremities  -AL         BLE Circumferential (cm)    Measurement Location 1 BOT  -AL      Left 1 22.4 cm  -AL      Measurement Location 2 +7  -AL      Left 2 25.4 cm  -AL      Measurement Location 3 +7  -AL      Left 3 27.2 cm  -AL      Measurement Location 4 +10  -AL      Left 4 29.8 cm  -AL      Measurement Location 5 +10  -AL      Left 5 37.5 cm  -AL      Measurement Location 6 +10  -AL      Left 6 44 cm  -AL      Measurement Location 7 +10  -AL      Left 7 42 cm  -AL      Measurement Location 8 +10  -AL      Left 8 45.9 cm  -AL      Measurement Location 9 +10  -AL      Left 9 53.2 cm  -AL      Measurement Location 10 +10  -AL      Left 10 59.6 cm  -AL      LLE Circumferential Total 387 cm   -AL         Manual Lymphatic Drainage    Manual Lymphatic Drainage initial sequence;opened regional lymph nodes;opened anastamoses;extremity treatment  -AL      Initial Sequence short neck;abdomen;diaphragmatic breathing  -AL      Abdomen superficial;deep  -AL      Diaphragmatic Breathing x 9 with superficial abdominals  -AL      Opened Regional Lymph Nodes axillary;inguinal  -AL      Axillary right;left  -AL      Inguinal right;left  -AL      Opened Anastamoses inguino-axillary  -AL      Inguino-Axillary right;left  Supine and prone  -AL      Extremity Treatment MLD to full limb  -AL      MLD to Full Limb LLE  -AL      Extremity Treatment Focus On MLD completed in supine and prone.  -AL      Manual Lymphatic Drainage Comments IASTM using the spring roller and hand held massager to anterior and posterior L LE  -AL      Manual Therapy 60  -AL         Compression/Skin Care    Compression/Skin Care compression garment  -AL      Skin Care lotion applied  -AL      Compression/Skin Care Comments Patient donned his compression thigh high  -AL                User Key  (r) = Recorded By, (t) = Taken By, (c) = Cosigned By      Initials Name Provider Type    AL Elida Sequeira, KEILA, CLZIGGY-LANNY Physical Therapist Assistant                            Therapeutic Exercises    31994 Units Comments   L Prone quad stretch   ROM is limited due to knee surgeries.    L calf stretch     Unicam bike  X 5 min No resistance seat #[id number]   Walked around inside clinic after being on the bike.  Needs verbal cues to keep L leg IR          Timed Minutes 10          Therapy Education/Self Care 91984   Education offered today Stretch B hamstrings   Medbridge Code    Ongoing HEP   Work on CDT. Use the Tubigrip over the leg and using foam and short stretch bandages over the entire leg at night.  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  Slight increase in size of the L LE.  7/17 Ongoing   Patient will be independent with specific HEP for lymphedema He is able to move around much better. 6/20/23 Met   He will have no s/s infection.  No signs or symptoms of infection 11/16/23 Met   He will have no complaint of increased edema in the groin. No complaints of swelling in the groin today. 4/10 Met   Patient will be independent with all tools available to manage his LLE lymphedema. Still trying different wrapping techniques. Wife is now using a rolling pin which seems to help as well 7/10 Ongoing   He will be able to complete 5 rotations with both legs on the Unicam bike with the pedal lengths adjusted He rode the stationary bike x 5 min today. 7/24 Ongoing       Assessment/Plan       He has had a reduction of 22.2 cm in the L LE since his last treatment, he and his wife have been diligent about working on CDT and IASTM in the evenings.  The entire leg is softer, still a minimal amount of dense tissue L proximal thigh. He was able to ride the stationary bike for 5 min today.  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     PLAN  Continue with CDT     SIGNATURE: Elida Sequeira PTA, [place], [place] License #: [id number]  Electronically Signed on 7/24/2024        [address] [place] [address]. [address]  [phone]

## 2024-07-31 ENCOUNTER — TREATMENT (OUTPATIENT)
Dept: PHYSICAL THERAPY | Facility: CLINIC | Age: 54
End: 2024-07-31
Payer: MEDICARE

## 2024-07-31 DIAGNOSIS — C64.1 RENAL CELL CARCINOMA OF RIGHT KIDNEY: ICD-10-CM

## 2024-07-31 DIAGNOSIS — I89.0 LYMPHEDEMA OF LEFT LEG: Primary | ICD-10-CM

## 2024-07-31 NOTE — PROGRESS NOTES
Physical Therapy Treatment Note  [address] [place] [address]    Patient: Holland Phelps                                                 Visit Date: 2024  :     1970    Referring practitioner:    Calixto Champion,*  Date of Initial Visit:          Type: THERAPY  Noted: 10/12/2022    Patient seen for 78 sessions    Visit Diagnoses:    ICD-10-CM ICD-9-CM   1. Lymphedema of left leg  I89.0 457.1   2. Renal cell carcinoma of right kidney  C64.1 189.0           SUBJECTIVE     Subjective:  He said he and his wife have been rolling out his leg every night and he showed me how good it looks in the stocking today.     PAIN: 0/10 L shin numbness           OBJECTIVE     Objective    Lymphedema       Row Name 24 0930             Manual Lymphatic Drainage    Manual Lymphatic Drainage initial sequence;opened regional lymph nodes;opened anastamoses;extremity treatment  -HR      Initial Sequence short neck;abdomen;diaphragmatic breathing  -HR      Abdomen superficial;deep  -HR      Opened Regional Lymph Nodes axillary;inguinal  -HR      Axillary right;left  -HR      Inguinal right;left  -HR      Opened Anastamoses inguino-axillary  -HR      Inguino-Axillary right;left  Supine and prone  -HR      Extremity Treatment MLD to full limb  -HR      MLD to Full Limb LLE  -HR      Extremity Treatment Focus On supine and prone  -HR      Manual Lymphatic Drainage Comments used handheld massager and roller to LEs  -HR      Manual Therapy 60  -HR         Compression/Skin Care    Compression/Skin Care compression garment  -HR      Skin Care lotion applied  -HR      Compression/Skin Care Comments Patient donned his compression thigh high  -HR                User Key  (r) = Recorded By, (t) = Taken By, (c) = Cosigned By      Initials Name Provider Type    HR Debbie Morales, PT, DPT, CLT-LANNY Physical Therapist                               Therapeutic Exercises    55783 Units Comments   L Prone quad stretch   ROM is limited due to knee surgeries.    L calf stretch     Unicam bike  X 6 min No resistance seat #12 then 15   Walked around inside clinic after being on the bike.  Needs verbal cues to keep L leg IR          Timed Minutes 10          Therapy Education/Self Care 80804   Education offered today Stretch B hamstrings   Medbridge Code    Ongoing HEP   Work on CDT. Use the Tubigrip over the leg and using foam and short stretch bandages over the entire leg at night.  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  Slight increase in size of the L LE.  7/17 Ongoing   Patient will be independent with specific HEP for lymphedema He is able to move around much better. 6/20/23 Met   He will have no s/s infection.  No signs or symptoms of infection 11/16/23 Met   He will have no complaint of increased edema in the groin. No complaints of swelling in the groin today. 4/10 Met   Patient will be independent with all tools available to manage his LLE lymphedema. Still trying different wrapping techniques. Wife is now using a rolling pin which seems to help as well 7/31 Ongoing   He will be able to complete 5 rotations with both legs on the Unicam bike with the pedal lengths adjusted He is able to do this but the seat is high and he is hiking the L hip to get the LLE around. 7/31 Met       Assessment/Plan       He has been showing continued improvement in the health and size of the Leg. He is pleased  and believes that the home program he and his wife have figured out lately is working well. He knows the knee motion won't return to normal but we are trying to keep as much of it as possible with stretches and the bike.     PLAN  Continue with CDT     SIGNATURE: Debbie Morales, PT, DPT, [place] License #: [id number]  Electronically Signed on 7/31/2024        [address] [place] [address]. [address]  [phone]

## 2024-08-07 ENCOUNTER — TREATMENT (OUTPATIENT)
Dept: PHYSICAL THERAPY | Facility: CLINIC | Age: 54
End: 2024-08-07
Payer: MEDICARE

## 2024-08-07 DIAGNOSIS — I89.0 LYMPHEDEMA OF GENITALIA: ICD-10-CM

## 2024-08-07 DIAGNOSIS — C64.1 RENAL CELL CARCINOMA OF RIGHT KIDNEY: ICD-10-CM

## 2024-08-07 DIAGNOSIS — I89.0 LYMPHEDEMA OF LEFT LEG: Primary | ICD-10-CM

## 2024-08-07 PROCEDURE — 97140 MANUAL THERAPY 1/> REGIONS: CPT | Performed by: PHYSICAL THERAPIST

## 2024-08-07 PROCEDURE — 97110 THERAPEUTIC EXERCISES: CPT | Performed by: PHYSICAL THERAPIST

## 2024-08-07 NOTE — PROGRESS NOTES
Physical Therapy Treatment Note and 30 Day Progress Note  [address] [place] [address]    Patient: Holland Phelps                                                 Visit Date: 2024  :     1970    Referring practitioner:    Calixto Champion,*  Date of Initial Visit:          Type: THERAPY  Noted: 10/12/2022    Patient seen for 79 sessions    Visit Diagnoses:    ICD-10-CM ICD-9-CM   1. Lymphedema of left leg  I89.0 457.1   2. Renal cell carcinoma of right kidney  C64.1 189.0   3. 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  He and his wife have been rolling the L leg and using the short stretch bandages several times a week.  He did not want to ride the stationary bike today, he did not do the normal routine of rolling the leg and wearing compression wraps the last two nights.    PAIN: 0/10 L shin numbness           OBJECTIVE     Objective    Lymphedema       Row Name 24 0800             Subjective Pain    Able to rate subjective pain? yes  -AL      Pre-Treatment Pain Level 0  -AL         Lymphedema Measurements    Measurement Type(s) Circumferential  -AL      Circumferential Areas Lower extremities  -AL         BLE Circumferential (cm)    Measurement Location 1 BOT  -AL      Left 1 22.5 cm  -AL      Measurement Location 2 +7  -AL      Left 2 24.9 cm  -AL      Measurement Location 3 +7  -AL      Left 3 25.6 cm  -AL      Measurement Location 4 +10  -AL      Left 4 32 cm  -AL      Measurement Location 5 +10  -AL      Left 5 40.2 cm  -AL      Measurement Location 6 +10  -AL      Left 6 44.6 cm  -AL      Measurement Location 7 +10  -AL      Left 7 41.9 cm  -AL      Measurement Location 8 +10  -AL      Left 8 48.5 cm  -AL      Measurement Location 9 +10  -AL      Left 9 56 cm  -AL      Measurement Location 10 +10  -AL      Left 10 62 cm   -AL      LLE Circumferential Total 398.2 cm  -AL         Manual Lymphatic Drainage    Manual Lymphatic Drainage initial sequence;opened regional lymph nodes;opened anastamoses;extremity treatment  -AL      Initial Sequence short neck;abdomen;diaphragmatic breathing  -AL      Abdomen superficial;deep  -AL      Diaphragmatic Breathing x 9 with superficial abdominals  -AL      Opened Regional Lymph Nodes axillary;inguinal  -AL      Axillary right;left  -AL      Inguinal right;left  -AL      Opened Anastamoses inguino-axillary  -AL      Inguino-Axillary right;left  Supine and prone  -AL      Extremity Treatment MLD to full limb  -AL      MLD to Full Limb LLE  -AL      Extremity Treatment Focus On supine and prone  -AL      Manual Lymphatic Drainage Comments used handheld massager and roller to LEs  -AL      Manual Therapy 60  -AL         Compression/Skin Care    Compression/Skin Care compression garment  -AL      Skin Care lotion applied  -AL      Compression/Skin Care Comments Patient donned his compression thigh high  -AL                User Key  (r) = Recorded By, (t) = Taken By, (c) = Cosigned By      Initials Name Provider Type    AL Elida Sequeira, KEILA, CLT-LANNY Physical Therapist Assistant                        Therapeutic Exercises    37048 Units Comments   L Prone quad stretch   ROM is limited due to knee surgeries.    B hamstrings stretch     B hip flexor stretch                 Timed Minutes 10          Therapy Education/Self Care 23785   Education offered today Stretch B hamstrings   Medbridge Code    Ongoing HEP   Work on CDT. Use the Tubigrip over the leg and using foam and short stretch bandages over the entire leg at night.  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  He has had an increase in size of the L LE.  8/7 Ongoing   Patient will be independent with specific HEP for lymphedema He is able to move around much better. 6/20/23 Met   He will have no s/s infection.  No signs or symptoms of infection 11/16/23 Met   He will have no complaint of increased edema in the groin. No complaints of swelling in the groin today. 4/10 Met   Patient will be independent with all tools available to manage his LLE lymphedema. Working on wrapping and using roller.   8/7 Ongoing   He will be able to complete 5 rotations with both legs on the Unicam bike with the pedal lengths adjusted He is able to do this but the seat is high and he is hiking the L hip to get the LLE around. 7/31 Met       Assessment/Plan       He has had an increase in size of 11.7 cm in the L LE as compared to his last measurements.  He has not rolled or wrapped the L LE for the past two days which affects the size of the LE.  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     PLAN  Continue with CDT     SIGNATURE: Elida Sequeira PTA, [place] License #: [id number]  Electronically Signed on 8/7/2024        [address] [place] [address]. [address]  [phone]

## 2024-08-14 ENCOUNTER — TREATMENT (OUTPATIENT)
Dept: PHYSICAL THERAPY | Facility: CLINIC | Age: 54
End: 2024-08-14
Payer: MEDICARE

## 2024-08-14 DIAGNOSIS — I89.0 LYMPHEDEMA OF LEFT LEG: ICD-10-CM

## 2024-08-14 DIAGNOSIS — I89.0 LYMPHEDEMA OF GENITALIA: Primary | ICD-10-CM

## 2024-08-14 DIAGNOSIS — C64.1 RENAL CELL CARCINOMA OF RIGHT KIDNEY: ICD-10-CM

## 2024-08-14 PROCEDURE — 97140 MANUAL THERAPY 1/> REGIONS: CPT | Performed by: PHYSICAL THERAPIST

## 2024-08-14 PROCEDURE — 97110 THERAPEUTIC EXERCISES: CPT | Performed by: PHYSICAL THERAPIST

## 2024-08-14 NOTE — PROGRESS NOTES
Physical Therapy Treatment Note  [address] [place] [address]    Patient: Holland Phelps                                                 Visit Date: 2024  :     1970    Referring practitioner:    Calixto Champion,*  Date of Initial Visit:          Type: THERAPY  Noted: 10/12/2022    Patient seen for 80 sessions    Visit Diagnoses:    ICD-10-CM ICD-9-CM   1. Lymphedema of genitalia  I89.0 457.1   2. Lymphedema of left leg  I89.0 457.1   3. Renal cell carcinoma of right kidney  C64.1 189.0           SUBJECTIVE     Subjective:  He states the leg is pretty good, he was up more over the weekend.     PAIN: 0/10 L shin numbness           OBJECTIVE     Objective    Lymphedema       Row Name 24 0800             Subjective Pain    Able to rate subjective pain? yes  -AL      Pre-Treatment Pain Level 0  -AL         Lymphedema Measurements    Measurement Type(s) Circumferential  -AL      Circumferential Areas Lower extremities  -AL         BLE Circumferential (cm)    Measurement Location 1 BOT  -AL      Left 1 22.5 cm  -AL      Measurement Location 2 +7  -AL      Left 2 24.8 cm  -AL      Measurement Location 3 +7  -AL      Left 3 25.3 cm  -AL      Measurement Location 4 +10  -AL      Left 4 29.2 cm  -AL      Measurement Location 5 +10  -AL      Left 5 38.4 cm  -AL      Measurement Location 6 +10  -AL      Left 6 43.8 cm  -AL      Measurement Location 7 +10  -AL      Left 7 41.4 cm  -AL      Measurement Location 8 +10  -AL      Left 8 45.5 cm  -AL      Measurement Location 9 +10  -AL      Left 9 52.5 cm  -AL      Measurement Location 10 +10  -AL      Left 10 58.4 cm  -AL      LLE Circumferential Total 381.8 cm  -AL         Manual Lymphatic Drainage    Manual Lymphatic Drainage initial sequence;opened regional lymph nodes;opened anastamoses;extremity treatment  -AL      Initial Sequence short neck;abdomen;diaphragmatic breathing  -AL       Abdomen superficial;deep  -AL      Diaphragmatic Breathing x 9 with superficial abdominals  -AL      Opened Regional Lymph Nodes axillary;inguinal  -AL      Axillary right;left  -AL      Inguinal right;left  -AL      Opened Anastamoses inguino-axillary  -AL      Inguino-Axillary right;left  Supine and prone  -AL      Extremity Treatment MLD to full limb  -AL      MLD to Full Limb LLE  -AL      Extremity Treatment Focus On supine and prone  -AL      Manual Lymphatic Drainage Comments IASTM using the spring roller and hand held massager to anterior and posterior L LE  -AL      Manual Therapy 60  -AL         Compression/Skin Care    Compression/Skin Care compression garment  -AL      Skin Care lotion applied  -AL      Compression/Skin Care Comments Patient donned his compression thigh high  -AL                User Key  (r) = Recorded By, (t) = Taken By, (c) = Cosigned By      Initials Name Provider Type    Elida Pickering, KEILA, SAHIL Physical Therapist Assistant                          Therapeutic Exercises    95812 Units Comments   L Prone quad stretch   ROM is limited due to knee surgeries.    B hamstrings stretch     B hip flexor stretch                 Timed Minutes 10          Therapy Education/Self Care 12093   Education offered today Stretch B hamstrings, check to see what compression company is in network with his insurance.    Crude Area Code    Ongoing HEP   Work on CDT. Use the Tubigrip over the leg and using foam and short stretch bandages over the entire leg at night.  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  He has had a reduction of 16.4 cm in the L LE as compared to his last visit. 8/14 Ongoing   Patient will be independent with specific HEP for lymphedema He is able to move around much better. 6/20/23 Met   He will have no s/s infection.  No signs or symptoms of infection 11/16/23 Met   He will have no complaint of increased edema in the groin. No complaints of swelling in the groin today. 4/10 Met   Patient will be independent with all tools available to manage his LLE lymphedema. Working on wrapping and using roller.   8/7 Ongoing   He will be able to complete 5 rotations with both legs on the Unicam bike with the pedal lengths adjusted He is able to do this but the seat is high and he is hiking the L hip to get the LLE around. 7/31 Met       Assessment/Plan       He has had a reduction of 16.4 cm in the L LE as compared to his last measurements.  He has not been on his legs as much, he has also been wrapping the L leg the past few nights.  This has help reduce the L LE as well as elevating the leg has helped. Will look into new compression garments for patient, he will see which company is in his network so he can have insurance coverage for the garments.  He is interested in a nighttime garment as well.     PLAN  Continue with CDT     SIGNATURE: Elida Sequeira PTA, [facility] [place] License #: [id number]  Electronically Signed on 8/14/2024        [address] [place] [address]. [address]  [phone]

## 2024-08-21 ENCOUNTER — TREATMENT (OUTPATIENT)
Dept: PHYSICAL THERAPY | Facility: CLINIC | Age: 54
End: 2024-08-21
Payer: MEDICARE

## 2024-08-21 DIAGNOSIS — I89.0 LYMPHEDEMA OF LEFT LEG: ICD-10-CM

## 2024-08-21 DIAGNOSIS — I89.0 LYMPHEDEMA OF GENITALIA: Primary | ICD-10-CM

## 2024-08-21 DIAGNOSIS — C64.1 RENAL CELL CARCINOMA OF RIGHT KIDNEY: ICD-10-CM

## 2024-08-21 PROCEDURE — 97110 THERAPEUTIC EXERCISES: CPT | Performed by: PHYSICAL THERAPIST

## 2024-08-21 PROCEDURE — 97140 MANUAL THERAPY 1/> REGIONS: CPT | Performed by: PHYSICAL THERAPIST

## 2024-08-21 NOTE — PROGRESS NOTES
Physical Therapy Treatment Note  [address] [place] [address]    Patient: Holland Phelps                                                 Visit Date: 2024  :     1970    Referring practitioner:    Calixto Champion,*  Date of Initial Visit:          Type: THERAPY  Noted: 10/12/2022    Patient seen for 81 sessions    Visit Diagnoses:    ICD-10-CM ICD-9-CM   1. Lymphedema of genitalia  I89.0 457.1   2. Lymphedema of left leg  I89.0 457.1   3. Renal cell carcinoma of right kidney  C64.1 189.0           SUBJECTIVE     Subjective He states the leg is up from being up on his legs more.     PAIN: 0/10 L shin numbness           OBJECTIVE     Objective    Lymphedema       Row Name 24 0800             Subjective Pain    Able to rate subjective pain? yes  -AL      Pre-Treatment Pain Level 0  -AL         Manual Lymphatic Drainage    Manual Lymphatic Drainage initial sequence;opened regional lymph nodes;opened anastamoses;extremity treatment  -AL      Initial Sequence short neck;abdomen;diaphragmatic breathing  -AL      Abdomen superficial;deep  -AL      Diaphragmatic Breathing x 9 with superficial abdominals  -AL      Opened Regional Lymph Nodes axillary;inguinal  -AL      Axillary right;left  -AL      Inguinal right;left  -AL      Opened Anastamoses inguino-axillary  -AL      Inguino-Axillary right;left  Supine and prone  -AL      Extremity Treatment MLD to full limb  -AL      MLD to Full Limb L LE  -AL      Extremity Treatment Focus On supine and prone  -AL      Manual Lymphatic Drainage Comments IASTM using the spring roller and hand held massager to anterior and posterior L LE  -AL      Manual Therapy 60  -AL         Compression/Skin Care    Compression/Skin Care compression garment  -AL      Skin Care lotion applied  -AL      Compression Garment Comments Measured for new compression, garments needed: Size 4 Medi comfort thigh  high, Size Medium tall night time lower leg garment.  -AL      Compression/Skin Care Comments Patient donned the size 5 compression thigh high L LE.  -AL                User Key  (r) = Recorded By, (t) = Taken By, (c) = Cosigned By      Initials Name Provider Type    Elida Pickering, KEILA, CLT-LANNY Physical Therapist Assistant                            Therapeutic Exercises    16840 Units Comments   L Prone quad stretch   ROM is limited due to knee surgeries.    B hamstrings stretch     B hip flexor stretch                 Timed Minutes 10          Therapy Education/Self Care 68100   Education offered today Stretch B hamstrings   Medbridge Code    Ongoing HEP   Work on CDT. Use the Tubigrip over the leg and using foam and short stretch bandages over the entire leg at night.  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  He has had a reduction of 16.4 cm in the L LE as compared to his last visit. 8/14 Ongoing   Patient will be independent with specific HEP for lymphedema He is able to move around much better. 6/20/23 Met   He will have no s/s infection.  No signs or symptoms of infection 11/16/23 Met   He will have no complaint of increased edema in the groin. No complaints of swelling in the groin today. 4/10 Met   Patient will be independent with all tools available to manage his LLE lymphedema. We will send information to [facility] for type of compression needed for day and nighttime use. 8/21 Ongoing   He will be able to complete 5 rotations  with both legs on the Unicam bike with the pedal lengths adjusted He is able to do this but the seat is high and he is hiking the L hip to get the LLE around. 7/31 Met       Assessment/Plan     Patient is in need of new compression for the L LE for day and nighttime use.  He normally wraps the L leg at night but would benefit from a nighttime garment to help soften the leg as well as reduce the leg.  His compression thigh high is stretched out and no longer containing the fluid.     Compression garments needed:     Size 4 Mediven Comfort Thigh High with beaded silicone top band, standard length, 20-30 mmHg, closed toe, Linda    Size Medium Regular SIGVARIS ChipSleeve Thigh High      PLAN  Continue with CDT     SIGNATURE: Elida Sequeira PTA, [facility], [facility], [place] License #: [id number]  Electronically Signed on 8/21/2024        [address] [place] [address]. [address]  [phone]

## 2024-08-28 ENCOUNTER — HOSPITAL ENCOUNTER (OUTPATIENT)
Dept: PHYSICAL THERAPY | Facility: HOSPITAL | Age: 54
Setting detail: THERAPIES SERIES
Discharge: HOME | End: 2024-08-28
Payer: MEDICARE

## 2024-08-28 DIAGNOSIS — C64.1 RENAL CELL CARCINOMA OF RIGHT KIDNEY: ICD-10-CM

## 2024-08-28 DIAGNOSIS — I89.0 LYMPHEDEMA OF GENITALIA: Primary | ICD-10-CM

## 2024-08-28 DIAGNOSIS — I89.0 LYMPHEDEMA OF LEFT LEG: ICD-10-CM

## 2024-08-28 PROCEDURE — 97110 THERAPEUTIC EXERCISES: CPT

## 2024-08-28 PROCEDURE — 97140 MANUAL THERAPY 1/> REGIONS: CPT

## 2024-08-28 NOTE — THERAPY TREATMENT NOTE
Physical Therapy Treatment Note  [address] [place] [address]    Patient: Holland Phelps                                                 Visit Date: 2024  :     1970    Referring practitioner:    Calixto Champion,*  Date of Initial Visit:          Type: THERAPY  Noted: 10/12/2022      Visit Diagnoses:    ICD-10-CM ICD-9-CM   1. Lymphedema of genitalia  I89.0 457.1   2. Lymphedema of left leg  I89.0 457.1   3. Renal cell carcinoma of right kidney  C64.1 189.0     SUBJECTIVE     Subjective:  He states things are going good in general.  His wife is wrapping the leg, using the roller, and elevating.        PAIN: 0/10         OBJECTIVE     Objective    Lymphedema       Row Name 24 0800             Subjective Pain    Able to rate subjective pain? yes  -AL      Pre-Treatment Pain Level 0  -AL         Lymphedema Measurements    Measurement Type(s) Circumferential  -AL      Circumferential Areas Lower extremities  -AL         BLE Circumferential (cm)    Measurement Location 1 BOT  -AL      Left 1 22.4 cm  -AL      Measurement Location 2 +7  -AL      Left 2 25 cm  -AL      Measurement Location 3 +7  -AL      Left 3 26.5 cm  -AL      Measurement Location 4 +10  -AL      Left 4 31.8 cm  -AL      Measurement Location 5 +10  -AL      Left 5 39.5 cm  -AL      Measurement Location 6 +10  -AL      Left 6 44.2 cm  -AL      Measurement Location 7 +10  -AL      Left 7 41 cm  -AL      Measurement Location 8 +10  -AL      Left 8 45.6 cm  -AL      Measurement Location 9 +10  -AL      Left 9 53 cm  -AL      Measurement Location 10 +10  -AL      Left 10 58 cm  -AL      LLE Circumferential Total 387 cm  -AL         Manual Lymphatic Drainage    Manual Lymphatic Drainage initial sequence;opened regional lymph nodes;opened anastamoses;extremity treatment  -AL      Initial Sequence short neck;abdomen;diaphragmatic breathing  -AL      Abdomen  superficial;deep  -AL      Diaphragmatic Breathing x 9 with superficial abdominals  -AL      Opened Regional Lymph Nodes axillary;inguinal  -AL      Axillary right;left  -AL      Inguinal right;left  -AL      Opened Anastamoses inguino-axillary  -AL      Inguino-Axillary right;left  Supine and prone  -AL      Extremity Treatment MLD to full limb  -AL      MLD to Full Limb L LE  -AL      Extremity Treatment Focus On supine and prone  -AL      Manual Lymphatic Drainage Comments IASTM using the spring roller and hand held massager to anterior and posterior L LE  -AL      Manual Therapy 60  -AL         Compression/Skin Care    Compression/Skin Care compression garment  -AL      Skin Care lotion applied  -AL      Compression Garment Comments --  -AL      Compression/Skin Care Comments Patient donned the size 5 compression thigh high L LE.  -AL                User Key  (r) = Recorded By, (t) = Taken By, (c) = Cosigned By      Initials Name Provider Type    Elida Pickering PTA, CLT-LANNY Physical Therapist Assistant                    Therapeutic Exercises    62420 Units Comments   L Prone quad stretch   ROM is limited due to knee surgeries.    B hamstrings stretch       B hip flexor stretch                       Timed Minutes 10         Therapy Education/Self Care 09593   Education offered today Continue to work on wearing compression, wrapping the leg, and using the pump.   Medbridge Code    Ongoing HEP   Work on CDT   Timed Minutes        Total Timed Treatment:     70   mins  Total Time of Visit:             70   mins         ASSESSMENT/PLAN     GOALS  Goals                                          Progress Note due by 9/6/24                                                      Recert due by 10/7/24   LTG by: 12 weeks Comments Date Status   Patient will have a reduction in LLE of at least 20 cm to allow more comfortable walking and mobility.  He has had a reduction of 16.4 cm in the L LE as compared to his last visit.  8/14 Ongoing   Patient will be independent with specific HEP for lymphedema He is able to move around much better. 6/20/23 Met   He will have no s/s infection.  No signs or symptoms of infection 11/16/23 Met   He will have no complaint of increased edema in the groin. Has dense tissue L groin today 8/28 Ongoing   Patient will be independent with all tools available to manage his LLE lymphedema. We will send information to [facility] for type of compression needed for day and nighttime use. 8/21 Ongoing   He will be able to complete 5 rotations with both legs on the Unicam bike with the pedal lengths adjusted He is able to do this but the seat is high and he is hiking the L hip to get the LLE around. 7/31 Met        Assessment/Plan     ASSESSMENT:   He has had an increase in size of the L LE of 5.2 cm.  The increase is from the knee down, gravity definitely plays a part in the lower leg increasing. He is in need of new compression which we are working on.  He does have dense tissue L groin today.     PLAN:   Assess any new compression that comes in, continue with CDT.    SIGNATURE: Elida Sequeira PTA, [facility] [place] License #: [id number]  Electronically Signed on 8/28/2024        [address] [place] [address] [place] [address]  [phone]

## 2024-09-04 ENCOUNTER — HOSPITAL ENCOUNTER (OUTPATIENT)
Dept: PHYSICAL THERAPY | Facility: HOSPITAL | Age: 54
Setting detail: THERAPIES SERIES
Discharge: HOME | End: 2024-09-04
Payer: MEDICARE

## 2024-09-04 DIAGNOSIS — C64.1 RENAL CELL CARCINOMA OF RIGHT KIDNEY: ICD-10-CM

## 2024-09-04 DIAGNOSIS — I89.0 LYMPHEDEMA OF LEFT LEG: ICD-10-CM

## 2024-09-04 DIAGNOSIS — I89.0 LYMPHEDEMA OF GENITALIA: Primary | ICD-10-CM

## 2024-09-04 PROCEDURE — 97110 THERAPEUTIC EXERCISES: CPT

## 2024-09-04 PROCEDURE — 97140 MANUAL THERAPY 1/> REGIONS: CPT

## 2024-09-04 NOTE — THERAPY TREATMENT NOTE
Physical Therapy Treatment Note  [address] [place] [address]    Patient: Holland Phelps                                                 Visit Date: 2024  :     1970    Referring practitioner:    Calixto Champion,*  Date of Initial Visit:          Type: THERAPY  Noted: 10/12/2022      Visit Diagnoses:    ICD-10-CM ICD-9-CM   1. Lymphedema of genitalia  I89.0 457.1   2. Lymphedema of left leg  I89.0 457.1   3. Renal cell carcinoma of right kidney  C64.1 189.0     SUBJECTIVE     Subjective:  He has not been as busy this week, he thinks his leg down.       PAIN: 0/10 numbness in the L shin         OBJECTIVE     Objective    Lymphedema       Row Name 24 0800             Subjective Pain    Able to rate subjective pain? yes  -AL      Pre-Treatment Pain Level 0  -AL         Lymphedema Measurements    Measurement Type(s) Circumferential  -AL      Circumferential Areas Lower extremities  -AL         BLE Circumferential (cm)    Measurement Location 1 BOT  -AL      Left 1 21.6 cm  -AL      Measurement Location 2 +7  -AL      Left 2 25 cm  -AL      Measurement Location 3 +7  -AL      Left 3 24 cm  -AL      Measurement Location 4 +10  -AL      Left 4 28.4 cm  -AL      Measurement Location 5 +10  -AL      Left 5 38.1 cm  -AL      Measurement Location 6 +10  -AL      Left 6 43.9 cm  -AL      Measurement Location 7 +10  -AL      Left 7 40.7 cm  -AL      Measurement Location 8 +10  -AL      Left 8 44.7 cm  -AL      Measurement Location 9 +10  -AL      Left 9 52.6 cm  -AL      Measurement Location 10 +10  -AL      Left 10 57.2 cm  -AL      LLE Circumferential Total 376.2 cm  -AL         Manual Lymphatic Drainage    Manual Lymphatic Drainage initial sequence;opened regional lymph nodes;opened anastamoses;extremity treatment  -AL      Initial Sequence short neck;abdomen;diaphragmatic breathing  -AL      Abdomen superficial;deep  -AL       Diaphragmatic Breathing x 9 with superficial abdominals  -AL      Opened Regional Lymph Nodes axillary;inguinal  -AL      Axillary right;left  -AL      Inguinal right;left  -AL      Opened Anastamoses inguino-axillary  -AL      Inguino-Axillary right;left  Supine and prone  -AL      Extremity Treatment MLD to full limb  -AL      MLD to Full Limb L LE  -AL      Extremity Treatment Focus On supine and prone  -AL      Manual Lymphatic Drainage Comments IASTM using the spring roller and hand held massager to anterior and posterior L LE  -AL      Manual Therapy 60  -AL         Compression/Skin Care    Compression/Skin Care compression garment  -AL      Skin Care lotion applied  -AL      Compression/Skin Care Comments Patient donned the size 5 compression thigh high L LE.  -AL                User Key  (r) = Recorded By, (t) = Taken By, (c) = Cosigned By      Initials Name Provider Type    Elida Pickering PTA, SAHIL Physical Therapist Assistant                      Therapeutic Exercises    53494 Units Comments   L Prone quad stretch   ROM is limited due to knee surgeries.    B hamstrings stretch       B hip flexor stretch                       Timed Minutes 10         Therapy Education/Self Care 14043   Education offered today Continue to work on wearing compression, wrapping the leg, and using the pump.   Medbridge Code    Ongoing HEP   Work on CDT   Timed Minutes        Total Timed Treatment:     70   mins  Total Time of Visit:             70   mins         ASSESSMENT/PLAN     GOALS  Goals                                          Progress Note due by 9/6/24                                                      Recert due by 10/7/24   LTG by: 12 weeks Comments Date Status   Patient will have a reduction in LLE of at least 20 cm to allow more comfortable walking and mobility.  He has had a reduction of 10.8 cm in the L LE as compared to his last visit. 9/4 Ongoing   Patient will be independent with specific HEP for  lymphedema He is able to move around much better. 6/20/23 Met   He will have no s/s infection.  No signs or symptoms of infection 11/16/23 Met   He will have no complaint of increased edema in the groin. Has dense tissue L groin today 8/28 Ongoing   Patient will be independent with all tools available to manage his LLE lymphedema. We will send information to [facility] for type of compression needed for day and nighttime use. 8/21 Ongoing   He will be able to complete 5 rotations with both legs on the Unicam bike with the pedal lengths adjusted He is able to do this but the seat is high and he is hiking the L hip to get the LLE around. 7/31 Met        Assessment/Plan     ASSESSMENT:   He has had a reduction of 10.8 cm as compared to his last treatment.  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 He has been alternating types of compression he wears at night, he continues to wear the compression thigh high during the day.    PLAN:   Assess any new compression that comes in, continue with CDT.    SIGNATURE: Elida Sequeira PTA, [place], [place] License #: [id number]  Electronically Signed on 9/4/2024        [address] [place] [address]. [address]  [phone]

## 2024-09-11 ENCOUNTER — HOSPITAL ENCOUNTER (OUTPATIENT)
Dept: PHYSICAL THERAPY | Facility: HOSPITAL | Age: 54
Setting detail: THERAPIES SERIES
Discharge: HOME | End: 2024-09-11
Payer: MEDICARE

## 2024-09-11 DIAGNOSIS — I89.0 LYMPHEDEMA OF GENITALIA: Primary | ICD-10-CM

## 2024-09-11 DIAGNOSIS — C64.1 RENAL CELL CARCINOMA OF RIGHT KIDNEY: ICD-10-CM

## 2024-09-11 DIAGNOSIS — I89.0 LYMPHEDEMA OF LEFT LEG: ICD-10-CM

## 2024-09-11 PROCEDURE — 97110 THERAPEUTIC EXERCISES: CPT

## 2024-09-11 PROCEDURE — 97140 MANUAL THERAPY 1/> REGIONS: CPT

## 2024-09-11 NOTE — THERAPY PROGRESS REPORT/RE-CERT
Physical Therapy Treatment Note and 30 Day Progress Note  [address] [place] [address]    Patient: Holland Phelps                                                 Visit Date: 2024  :     1970    Referring practitioner:    Calixto Champion,*  Date of Initial Visit:          Type: THERAPY  Episode: LLE lymphedema s/p cellulitis and I&D    Visit Diagnoses:    ICD-10-CM ICD-9-CM   1. Lymphedema of genitalia  I89.0 457.1   2. Lymphedema of left leg  I89.0 457.1   3. 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  His wife has been rolling wrapping the L leg at night. He has some stiffness in the L lower leg today, he knows the leg is up in size today.        PAIN: 0/10         OBJECTIVE     Objective    Lymphedema       Row Name 24 0800             Subjective Pain    Able to rate subjective pain? yes  -AL      Pre-Treatment Pain Level 0  -AL         Lymphedema Measurements    Measurement Type(s) Circumferential  -AL      Circumferential Areas Lower extremities  -AL         BLE Circumferential (cm)    Measurement Location 1 BOT  -AL      Left 1 21.5 cm  -AL      Measurement Location 2 +7  -AL      Left 2 25.4 cm  -AL      Measurement Location 3 +7  -AL      Left 3 26.1 cm  -AL      Measurement Location 4 +10  -AL      Left 4 33.6 cm  -AL      Measurement Location 5 +10  -AL      Left 5 41 cm  -AL      Measurement Location 6 +10  -AL      Left 6 46.9 cm  -AL      Measurement Location 7 +10  -AL      Left 7 43.4 cm  -AL      Measurement Location 8 +10  -AL      Left 8 48.6 cm  -AL      Measurement Location 9 +10  -AL      Left 9 56.4 cm  -AL      Measurement Location 10 +10  -AL      Left 10 61.4 cm  -AL      LLE Circumferential Total 404.3 cm  -AL         Manual Lymphatic Drainage    Manual Lymphatic Drainage initial  sequence;opened regional lymph nodes;opened anastamoses;extremity treatment  -AL      Initial Sequence short neck;abdomen;diaphragmatic breathing  -AL      Abdomen superficial;deep  -AL      Diaphragmatic Breathing x 9 with superficial abdominals  -AL      Opened Regional Lymph Nodes axillary;inguinal  -AL      Axillary right;left  -AL      Inguinal right;left  -AL      Opened Anastamoses inguino-axillary  -AL      Inguino-Axillary right;left  Supine and prone  -AL      Extremity Treatment MLD to full limb  -AL      MLD to Full Limb L LE  -AL      Extremity Treatment Focus On supine and prone  -AL      Manual Lymphatic Drainage Comments IASTM using the spring roller and hand held massager to anterior and posterior L LE  -AL      Manual Therapy 60  -AL         Compression/Skin Care    Compression/Skin Care compression garment  -AL      Skin Care lotion applied  -AL      Compression/Skin Care Comments Patient donned the size 5 compression thigh high L LE.  -AL                User Key  (r) = Recorded By, (t) = Taken By, (c) = Cosigned By      Initials Name Provider Type    Elida Pickering PTA, CLT-LANNY Physical Therapist Assistant                    Therapeutic Exercises    92648 Units Comments   L Prone quad stretch   ROM is limited due to knee surgeries.    B hamstrings stretch       B hip flexor stretch                       Timed Minutes 10          Therapy Education/Self Care 69279   Education offered today Continue to work on wearing compression, wrapping the leg, and using the pump.   Medbridge Code     Ongoing HEP   Work on CDT   Timed Minutes            Total Timed Treatment:     70   mins  Total Time of Visit:             70   mins         ASSESSMENT/PLAN     GOALS  Goals                                          Progress Note due by 10/11/24                                                      Recert due by 10/7/24   LTG by: 12 weeks Comments Date Status   Patient will have a reduction in LLE of at least  20 cm to allow more comfortable walking and mobility.  He has had an increase in size of 28.1 cm 9/11 Ongoing   Patient will be independent with specific HEP for lymphedema He is able to move around much better. 6/20/23 Met   He will have no s/s infection.  No signs or symptoms of infection 11/16/23 Met   He will have no complaint of increased edema in the groin. Has dense tissue L groin today 9/11 Ongoing   Patient will be independent with all tools available to manage his LLE lymphedema. Information has been sent to [facility], waiting for insurance verification 9/11 Ongoing   He will be able to complete 5 rotations with both legs on the Unicam bike with the pedal lengths adjusted He is able to do this but the seat is high and he is hiking the L hip to get the LLE around. 7/31 Met        Assessment/Plan     ASSESSMENT:   Patient has been on his leg more cleaning cars, he has had a large increase in size of the L LE.  We are waiting on insurance verification for his compression garments. Patient will try to find out which company his insurance will cover for the garments.     PLAN:   Continue with CDT    SIGNATURE: Elida Sequeira PTA, [place] License #: [id number]  Electronically Signed on 9/11/2024        [address] [place] [address]  [phone]

## 2024-09-18 ENCOUNTER — HOSPITAL ENCOUNTER (OUTPATIENT)
Dept: PHYSICAL THERAPY | Facility: HOSPITAL | Age: 54
Setting detail: THERAPIES SERIES
Discharge: HOME | End: 2024-09-18
Payer: MEDICARE

## 2024-09-18 DIAGNOSIS — I89.0 LYMPHEDEMA OF GENITALIA: Primary | ICD-10-CM

## 2024-09-18 DIAGNOSIS — I89.0 LYMPHEDEMA OF LEFT LEG: ICD-10-CM

## 2024-09-18 DIAGNOSIS — C64.1 RENAL CELL CARCINOMA OF RIGHT KIDNEY: ICD-10-CM

## 2024-09-18 PROCEDURE — 97110 THERAPEUTIC EXERCISES: CPT

## 2024-09-18 PROCEDURE — 97140 MANUAL THERAPY 1/> REGIONS: CPT

## 2024-09-25 ENCOUNTER — HOSPITAL ENCOUNTER (OUTPATIENT)
Dept: PHYSICAL THERAPY | Facility: HOSPITAL | Age: 54
Setting detail: THERAPIES SERIES
Discharge: HOME | End: 2024-09-25
Payer: MEDICARE

## 2024-09-25 DIAGNOSIS — I89.0 LYMPHEDEMA OF LEFT LEG: ICD-10-CM

## 2024-09-25 DIAGNOSIS — I89.0 LYMPHEDEMA OF GENITALIA: Primary | ICD-10-CM

## 2024-09-25 DIAGNOSIS — C64.1 RENAL CELL CARCINOMA OF RIGHT KIDNEY: ICD-10-CM

## 2024-09-25 PROCEDURE — 97140 MANUAL THERAPY 1/> REGIONS: CPT

## 2024-09-25 PROCEDURE — 97110 THERAPEUTIC EXERCISES: CPT

## 2024-10-02 ENCOUNTER — HOSPITAL ENCOUNTER (OUTPATIENT)
Dept: PHYSICAL THERAPY | Facility: HOSPITAL | Age: 54
Setting detail: THERAPIES SERIES
Discharge: HOME | End: 2024-10-02
Payer: MEDICARE

## 2024-10-02 DIAGNOSIS — I89.0 LYMPHEDEMA OF GENITALIA: Primary | ICD-10-CM

## 2024-10-02 DIAGNOSIS — C64.1 RENAL CELL CARCINOMA OF RIGHT KIDNEY: ICD-10-CM

## 2024-10-02 DIAGNOSIS — I89.0 LYMPHEDEMA OF LEFT LEG: ICD-10-CM

## 2024-10-02 PROCEDURE — 97110 THERAPEUTIC EXERCISES: CPT

## 2024-10-02 PROCEDURE — 97140 MANUAL THERAPY 1/> REGIONS: CPT

## 2024-10-02 NOTE — THERAPY TREATMENT NOTE
Physical Therapy Treatment Note  [address] [place] [address]    Patient: Holland Phelps                                                 Visit Date: 10/2/2024  :     1970    Referring practitioner:    Calixto Champion,*  Date of Initial Visit:          Type: THERAPY  Episode: LLE lymphedema s/p cellulitis and I&D      Visit Diagnoses:    ICD-10-CM ICD-9-CM   1. Lymphedema of genitalia  I89.0 457.1   2. Lymphedema of left leg  I89.0 457.1   3. Renal cell carcinoma of right kidney  C64.1 189.0     SUBJECTIVE     Subjective: He states he can tell the nighttime garment is getting looser.  He is adding an extra layer of Tubigrip over the nighttime garment. He states the numbness in the L LE is improving.  He states he was on his leg more doing yard work yesterday and his leg did good.      PAIN: 0/10 numbness in the dorsum of the L foot a little numbness L shin         OBJECTIVE     Objective    Lymphedema       Row Name 10/02/24 0800             Subjective Pain    Able to rate subjective pain? yes  -AL      Pre-Treatment Pain Level 0  -AL         Lymphedema Measurements    Measurement Type(s) Circumferential  -AL      Circumferential Areas Lower extremities  -AL         BLE Circumferential (cm)    Measurement Location 1 BOT  -AL      Left 1 21.4 cm  -AL      Measurement Location 2 +7  -AL      Left 2 24.5 cm  -AL      Measurement Location 3 +7  -AL      Left 3 24 cm  -AL      Measurement Location 4 +10  -AL      Left 4 28.8 cm  -AL      Measurement Location 5 +10  -AL      Left 5 37.5 cm  -AL      Measurement Location 6 +10  -AL      Left 6 43.9 cm  -AL      Measurement Location 7 +10  -AL      Left 7 41.6 cm  -AL      Measurement Location 8 +10  -AL      Left 8 46.6 cm  -AL      Measurement Location 9 +10  -AL      Left 9 53.4 cm  -AL      Measurement Location 10 +10  -AL      Left 10 59.3 cm  -AL      Left 11 --  61.5 cm proximal  thigh  -AL      LLE Circumferential Total 381 cm  -AL         Manual Lymphatic Drainage    Manual Lymphatic Drainage initial sequence;opened regional lymph nodes;opened anastamoses;extremity treatment  -AL      Initial Sequence short neck;abdomen;diaphragmatic breathing  -AL      Abdomen superficial;deep  -AL      Diaphragmatic Breathing x 9 with superficial abdominals  -AL      Opened Regional Lymph Nodes axillary;inguinal  -AL      Axillary right;left  -AL      Inguinal right;left  -AL      Opened Anastamoses inguino-axillary  -AL      Inguino-Axillary right;left  -AL      Extremity Treatment MLD to full limb  -AL      MLD to Full Limb L LE  -AL      Extremity Treatment Focus On Supine and prone  -AL      Manual Lymphatic Drainage Comments IASTM using the spring roller to the L LE  -AL      Manual Therapy 60  -AL         Compression/Skin Care    Compression/Skin Care compression garment  -AL      Skin Care lotion applied  -AL      Compression/Skin Care Comments Patient donned his Medi Comfort size 4 compression garment.  -AL                User Key  (r) = Recorded By, (t) = Taken By, (c) = Cosigned By      Initials Name Provider Type    AL Elida Sequeira, KEILA, CLT-LANNY Physical Therapist Assistant                            Therapeutic Exercises    96924 Units Comments   L Prone quad stretch   ROM is limited due to knee surgeries.    B hamstrings stretch       B hip flexor stretch                       Timed Minutes 10         Therapy Education/Self Care 37320   Education offered today Continue to wear the nighttime garment as well as the new compression thigh high.   Medbridge Code    Ongoing HEP   Work on CDT   Timed Minutes        Total Timed Treatment:     70   mins  Total Time of Visit:             70   mins         ASSESSMENT/PLAN     GOALS        Goals                                          Progress Note due by 10/11/24                                                      Recert due by 10/7/24   LTG by:  12 weeks Comments Date Status   Patient will have a reduction in LLE of at least 20 cm to allow more comfortable walking and mobility.  He has had an increase in size of 28.1 cm 9/11 Ongoing   Patient will be independent with specific HEP for lymphedema He is able to move around much better. 6/20/23 Met   He will have no s/s infection.  No signs or symptoms of infection 11/16/23 Met   He will have no complaint of increased edema in the groin. Has dense tissue L groin today 9/11 Ongoing   Patient will be independent with all tools available to manage his LLE lymphedema. The size 4 Medi plus 20-30 mmHg thigh high has been ordered. 10/2 Ongoing   He will be able to complete 5 rotations with both legs on the Unicam bike with the pedal lengths adjusted He is able to do this but the seat is high and he is hiking the L hip to get the LLE around. 7/31 Met        Assessment/Plan     ASSESSMENT:   Patient has had a reduction of 4.8 cm in the L LE as compared to his last visit. He is having more carry over with the nighttime garment and the new compression thigh high.  He was on his leg more yesterday doing yard work but this did not cause any increase in size of the L LE. The order for the Medi Plus compression garment has been sent, I am hoping this is more containment for patient.         PLAN:   Assess any new compression that comes in, continue with CDT.      SIGNATURE: Elida Sequeira PTA, [facility], [place] License #: [id number]  Electronically Signed on 10/2/2024        [address] [place] [address]. [address]  [phone]

## 2024-10-09 ENCOUNTER — HOSPITAL ENCOUNTER (OUTPATIENT)
Dept: PHYSICAL THERAPY | Facility: HOSPITAL | Age: 54
Setting detail: THERAPIES SERIES
Discharge: HOME | End: 2024-10-09
Payer: MEDICARE

## 2024-10-09 DIAGNOSIS — C64.1 RENAL CELL CARCINOMA OF RIGHT KIDNEY: ICD-10-CM

## 2024-10-09 DIAGNOSIS — I89.0 LYMPHEDEMA OF GENITALIA: Primary | ICD-10-CM

## 2024-10-09 DIAGNOSIS — I89.0 LYMPHEDEMA OF LEFT LEG: ICD-10-CM

## 2024-10-09 PROCEDURE — 97140 MANUAL THERAPY 1/> REGIONS: CPT

## 2024-10-09 NOTE — THERAPY TREATMENT NOTE
Physical Therapy Treatment Note, 30 Day Progress Note, and 90 Day Recertification Note  [address] [place] [address]    Patient: Holland Phelps                                                 Visit Date: 10/9/2024  :     1970    Referring practitioner:    Calixto Champion,*  Date of Initial Visit:          Type: THERAPY  Episode: LLE lymphedema s/p cellulitis and I&D      Visit Diagnoses:    ICD-10-CM ICD-9-CM   1. Lymphedema of genitalia  I89.0 457.1   2. Lymphedema of left leg  I89.0 457.1   3. Renal cell carcinoma of right kidney  C64.1 189.0     SUBJECTIVE     Subjective: He states he likes the new compression, he likes that it is thicker.  He is going to try to use the short stretch bandages over th nighttime garments. He states he can tell the L leg is up, he has been busy cleaning cars.       PAIN: 0/10 numbness in the dorsum of the L foot a little numbness L shin         OBJECTIVE     Objective                 Therapeutic Exercises    18144 Units Comments   L Prone quad stretch   ROM is limited due to knee surgeries.    B hamstrings stretch       B hip flexor stretch                       Timed Minutes 10         Therapy Education/Self Care 30883   Education offered today Continue to wear the nighttime garment as well as the new compression thigh high.   Medbridge Code    Ongoing HEP   Work on CDT   Timed Minutes        Total Timed Treatment:     70   mins  Total Time of Visit:             70   mins         ASSESSMENT/PLAN     GOALS        Goals                                          Progress Note due by 24                                                      Recert due by 25   LTG by: 12 weeks Comments Date Status   Patient will have a reduction in LLE of at least 20 cm to allow more comfortable walking and mobility.  He has had an increase in size of 11 cm 10/9 Ongoing   Patient will be independent with  specific HEP for lymphedema He is able to move around much better. 6/20/23 Met   He will have no s/s infection.  No signs or symptoms of infection 11/16/23 Met   He will have no complaint of increased edema in the groin. Dense tissue is less in the groin area. 10/9 Ongoing   Patient will be independent with all tools available to manage his LLE lymphedema. I will order another size 4 Medi plus 20-30 mmHg thigh high  10/9 Ongoing   He will be able to complete 5 rotations with both legs on the Unicam bike with the pedal lengths adjusted He is able to do this but the seat is high and he is hiking the L hip to get the LLE around. 7/31 Met        Assessment/Plan     ASSESSMENT:   Patient has had an increase in size of the L LE as compared to his last visit. He is still very pleased with the nighttime garment and is going to try wrapping the garment with the gray foam and short stretch bandages. He has been on his legs more washing cars which has caused the increase in size of the L LE.  He does like the new Medi plus compression garment, this garment has more containment which will help keep the size of the L LE down.      Patient will need the following compression:       Mediven Plus 20-30 mmHg thigh high size 4 standard length    PLAN:   Assess any new compression that comes in, continue with CDT.      SIGNATURE: Elida Sequeira PTA, [place], [place] License #: [id number]  Electronically Signed on 10/9/2024        [address] [place] [address]. [address]  [phone]

## 2024-10-10 NOTE — THERAPY TREATMENT NOTE
30 Day Progress Note and 90 Day Recertification Addendum      Patient: Holland Phelps           : 1970  Visit Date: 10/9/2024  Referring practitioner: Calixto Champion,*  Date of Initial Visit: Type: THERAPY  Episode: LLE lymphedema s/p cellulitis and I&D    Visit Diagnoses:    ICD-10-CM ICD-9-CM   1. Lymphedema of genitalia  I89.0 457.1   2. Lymphedema of left leg  I89.0 457.1   3. Renal cell carcinoma of right kidney  C64.1 189.0          Clinical Progress: improved  Home Program Compliance: Yes  Progress toward previous goals: Partially Met  Prognosis to achieve goals: good    Objective     Assessment & Plan       Assessment  Impairments: abnormal or restricted ROM, activity intolerance and lacks appropriate home exercise program   Other impairment: edema  Prognosis: good    Plan  Therapy options: will be seen for skilled therapy services  Planned therapy interventions: manual therapy, soft tissue mobilization, strengthening, therapeutic activities, functional ROM exercises and home exercise program  Frequency: 1x week  Duration in visits: 12  Treatment plan discussed with: PTA        Anticipated CPT codes: Therapeutic Exercise 57557, Manual Therapy 65116, and Self Care/Home Management 30852    I reviewed the treatment and goals with Aneta Sequeira PTA and agree with the POC.    SIGNATURE: Dereck Walsh PT, License #: [id number]  Electronically Signed on 10/10/2024      90 Day Recertification  Certification Period: 10/10/2024 through 2025  Based upon review of the patient's progress and continued therapy plan, it is my medical opinion that Holland Phelps should continue physical therapy treatment at [facility]     PHYSICIAN: Calixto Champion MD (NPI: [id number])    Signature: __________________________________________________DATE: ___________________     Please sign and return via fax to [phone].   Thank you so much for letting us work with Holland. I appreciate your  letting us work with your patients. If you have any questions or concerns, please don't hesitate to contact me.

## 2024-10-16 ENCOUNTER — HOSPITAL ENCOUNTER (OUTPATIENT)
Dept: PHYSICAL THERAPY | Facility: HOSPITAL | Age: 54
Setting detail: THERAPIES SERIES
Discharge: HOME | End: 2024-10-16
Payer: MEDICARE

## 2024-10-16 DIAGNOSIS — C64.1 RENAL CELL CARCINOMA OF RIGHT KIDNEY: ICD-10-CM

## 2024-10-16 DIAGNOSIS — I89.0 LYMPHEDEMA OF LEFT LEG: ICD-10-CM

## 2024-10-16 DIAGNOSIS — I89.0 LYMPHEDEMA OF GENITALIA: Primary | ICD-10-CM

## 2024-10-16 PROCEDURE — 97140 MANUAL THERAPY 1/> REGIONS: CPT

## 2024-10-16 PROCEDURE — 97110 THERAPEUTIC EXERCISES: CPT

## 2024-10-16 NOTE — THERAPY TREATMENT NOTE
Physical Therapy Treatment Note  [address] [place] [address]    Patient: Holland Phelps                                                 Visit Date: 10/16/2024  :     1970    Referring practitioner:    Calixto Champion,*  Date of Initial Visit:          Type: THERAPY  Episode: LLE lymphedema s/p cellulitis and I&D      Visit Diagnoses:    ICD-10-CM ICD-9-CM   1. Lymphedema of genitalia  I89.0 457.1   2. Lymphedema of left leg  I89.0 457.1   3. Renal cell carcinoma of right kidney  C64.1 189.0     SUBJECTIVE     Subjective: He states the L leg is up today, he has been on his leg at night. He has been using the nighttime garment and various compression over the garment.  He feels like the leg is better today than last night.       PAIN: 0/10 numbness in the dorsum of the L foot a little numbness L shin         OBJECTIVE     Objective    Lymphedema       Row Name 10/16/24 0800             Subjective Pain    Able to rate subjective pain? yes  -AL      Pre-Treatment Pain Level 0  -AL         Lymphedema Measurements    Measurement Type(s) Circumferential  -AL      Circumferential Areas Lower extremities  -AL         BLE Circumferential (cm)    Measurement Location 1 BOT  -AL      Left 1 21.8 cm  -AL      Measurement Location 2 +7  -AL      Left 2 24.2 cm  -AL      Measurement Location 3 +7  -AL      Left 3 24.8 cm  -AL      Measurement Location 4 +10  -AL      Left 4 33.1 cm  -AL      Measurement Location 5 +10  -AL      Left 5 41.8 cm  -AL      Measurement Location 6 +10  -AL      Left 6 48.1 cm  -AL      Measurement Location 7 +10  -AL      Left 7 43.2 cm  -AL      Measurement Location 8 +10  -AL      Left 8 47.5 cm  -AL      Measurement Location 9 +10  -AL      Left 9 55.5 cm  -AL      Measurement Location 10 +10  -AL      Left 10 60.5 cm  -AL      LLE Circumferential Total 400.5 cm  -AL         Manual Lymphatic Drainage    Manual  Lymphatic Drainage initial sequence;opened regional lymph nodes;opened anastamoses;extremity treatment  -AL      Initial Sequence short neck;abdomen;diaphragmatic breathing  -AL      Abdomen superficial;deep  -AL      Diaphragmatic Breathing x 9 with superficial abdominals  -AL      Opened Regional Lymph Nodes axillary;inguinal  -AL      Axillary right;left  -AL      Inguinal right;left  -AL      Opened Anastamoses inguino-axillary  -AL      Inguino-Axillary right;left  -AL      Extremity Treatment MLD to full limb  -AL      MLD to Full Limb L LE  -AL      Extremity Treatment Focus On Supine and prone  -AL      Manual Lymphatic Drainage Comments IASTM using the spring roller to the L LE  -AL      Manual Therapy 60  -AL         Compression/Skin Care    Compression/Skin Care compression garment  -AL      Skin Care lotion applied  -AL                User Key  (r) = Recorded By, (t) = Taken By, (c) = Cosigned By      Initials Name Provider Type    Elida Pickering PTA, CLT-LANA Physical Therapist Assistant                              Therapeutic Exercises    94513 Units Comments   L Prone quad stretch   ROM is limited due to knee surgeries.    B hamstrings stretch       B hip flexor stretch                       Timed Minutes 10         Therapy Education/Self Care 77991   Education offered today Continue to wear the nighttime garment as well as the new compression thigh high.   Medbridge Code    Ongoing HEP   Work on CDT   Timed Minutes        Total Timed Treatment:     70   mins  Total Time of Visit:             70   mins         ASSESSMENT/PLAN     GOALS        Goals                                          Progress Note due by 11/09/24                                                      Recert due by 1/7/25   LTG by: 12 weeks Comments Date Status   Patient will have a reduction in LLE of at least 20 cm to allow more comfortable walking and mobility.  He has had an increase in size of 11 cm 10/9 Ongoing   Patient  will be independent with specific HEP for lymphedema He is able to move around much better. 6/20/23 Met   He will have no s/s infection.  No signs or symptoms of infection 11/16/23 Met   He will have no complaint of increased edema in the groin. Dense tissue is less in the groin area. 10/9 Ongoing   Patient will be independent with all tools available to manage his LLE lymphedema. He now has two size 4 Medi plus 20-30 mmHg thigh highs and a size 4 Medi comfort legging.  He also has a nighttime garment. 10/16 Met   He will be able to complete 5 rotations with both legs on the Unicam bike with the pedal lengths adjusted He is able to do this but the seat is high and he is hiking the L hip to get the LLE around. 7/31 Met        Assessment/Plan     ASSESSMENT:   He has an increase in size in the L LE as compared to his last visit.  The area with the most increase was in the thigh area, this is his trouble area for swelling. He now has new compression garments and an nighttime garment.  He has been on his leg a lot which has increased the size of his leg, he should be on his leg less the next few weeks.  This will help reduce the L LE.           PLAN:   Assess any new compression that comes in, continue with CDT.      SIGNATURE: Elida Sequeira PTA, [facility] License #: [id number]  Electronically Signed on 10/16/2024        [address] [place] [address]. [address]  [phone]

## 2024-10-23 ENCOUNTER — HOSPITAL ENCOUNTER (OUTPATIENT)
Dept: PHYSICAL THERAPY | Facility: HOSPITAL | Age: 54
Setting detail: THERAPIES SERIES
Discharge: HOME | End: 2024-10-23
Payer: MEDICARE

## 2024-10-23 DIAGNOSIS — I89.0 LYMPHEDEMA OF LEFT LEG: ICD-10-CM

## 2024-10-23 DIAGNOSIS — I89.0 LYMPHEDEMA OF GENITALIA: Primary | ICD-10-CM

## 2024-10-23 DIAGNOSIS — C64.1 RENAL CELL CARCINOMA OF RIGHT KIDNEY: ICD-10-CM

## 2024-10-23 PROCEDURE — 97140 MANUAL THERAPY 1/> REGIONS: CPT

## 2024-10-23 PROCEDURE — 97110 THERAPEUTIC EXERCISES: CPT

## 2024-10-23 NOTE — THERAPY TREATMENT NOTE
Physical Therapy Treatment Note  [address] [place] [address]    Patient: Holland Phelps                                                 Visit Date: 10/23/2024  :     1970    Referring practitioner:    Calixto Champion,*  Date of Initial Visit:          Type: THERAPY  Episode: LLE lymphedema s/p cellulitis and I&D      Visit Diagnoses:    ICD-10-CM ICD-9-CM   1. Lymphedema of genitalia  I89.0 457.1   2. Lymphedema of left leg  I89.0 457.1   3. Renal cell carcinoma of right kidney  C64.1 189.0     SUBJECTIVE     Subjective: He states the L leg is up today, he has been on his leg at night. He has been using the nighttime garment and various compression over the garment.  He feels like the leg is better today than last night.       PAIN: 0/10 numbness in the dorsum of the L foot a little numbness L shin         OBJECTIVE     Objective    Lymphedema       Row Name 10/23/24 0750             Subjective Pain    Able to rate subjective pain? yes  -AL      Pre-Treatment Pain Level 0  -AL      Post-Treatment Pain Level 0  -AL         Lymphedema Measurements    Measurement Type(s) Circumferential  -AL      Circumferential Areas Lower extremities  -AL         BLE Circumferential (cm)    Measurement Location 1 BOT  -AL      Left 1 21.6 cm  -AL      Measurement Location 2 +7  -AL      Left 2 24.6 cm  -AL      Measurement Location 3 +7  -AL      Left 3 25.4 cm  -AL      Measurement Location 4 +10  -AL      Left 4 31.7 cm  -AL      Measurement Location 5 +10  -AL      Left 5 41 cm  -AL      Measurement Location 6 +10  -AL      Left 6 46.6 cm  -AL      Measurement Location 7 +10  -AL      Left 7 44 cm  -AL      Measurement Location 8 +10  -AL      Left 8 47.4 cm  -AL      Measurement Location 9 +10  -AL      Left 9 55.5 cm  -AL      Measurement Location 10 +10  -AL      Left 10 60.4 cm  -AL      LLE Circumferential Total 398.2 cm  -AL          Manual Lymphatic Drainage    Manual Lymphatic Drainage initial sequence;opened regional lymph nodes;opened anastamoses;extremity treatment  -AL      Initial Sequence short neck;abdomen;diaphragmatic breathing  -AL      Abdomen superficial;deep  -AL      Opened Regional Lymph Nodes axillary;inguinal  -AL      Axillary right;left  -AL      Inguinal right;left  -AL      Opened Anastamoses inguino-axillary  -AL      Inguino-Axillary right;left  -AL      Extremity Treatment MLD to full limb  -AL      MLD to Full Limb L LE  -AL      Extremity Treatment Focus On Supine and prone  -AL      Manual Lymphatic Drainage Comments IASTM using the spring roller to the L LE  -AL         Compression/Skin Care    Compression/Skin Care compression garment  -AL      Skin Care lotion applied  -AL                User Key  (r) = Recorded By, (t) = Taken By, (c) = Cosigned By      Initials Name Provider Type    Elida Pickering PTA, CLT-LANA Physical Therapist Assistant                      Therapeutic Exercises    28929 Units Comments   L Prone quad stretch   ROM is limited due to knee surgeries.    B hamstrings stretch       B hip flexor stretch                       Timed Minutes 10         Therapy Education/Self Care 30820   Education offered today Continue to wear the nighttime garment as well as the new compression thigh high.   Medbridge Code    Ongoing HEP   Work on CDT   Timed Minutes        Total Timed Treatment:     70   mins  Total Time of Visit:             70   mins         ASSESSMENT/PLAN     GOALS        Goals                                          Progress Note due by 11/09/24                                                      Recert due by 1/7/25   LTG by: 12 weeks Comments Date Status   Patient will have a reduction in LLE of at least 20 cm to allow more comfortable walking and mobility.  He has had a reduction of 2 cm in the L LE as compared to his last treatment. 10/23 Ongoing   Patient will be independent with  specific HEP for lymphedema He is able to move around much better. 6/20/23 Met   He will have no s/s infection.  No signs or symptoms of infection 11/16/23 Met   He will have no complaint of increased edema in the groin. Dense tissue is less in the groin area. 10/9 Ongoing   Patient will be independent with all tools available to manage his LLE lymphedema. He now has two size 4 Medi plus 20-30 mmHg thigh highs and a size 4 Medi comfort legging.  He also has a nighttime garment. 10/16 Met   He will be able to complete 5 rotations with both legs on the Unicam bike with the pedal lengths adjusted He is able to do this but the seat is high and he is hiking the L hip to get the LLE around. 7/31 Met        Assessment/Plan     ASSESSMENT:   He has had a reduction of 2.3 cm in the L LE as compared to his last measurements.  He is using the nighttime garment nightly and wrapping the leg as well.  He will try to wrap over the nighttime garment using the short stretch bandages.  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 The Medi Plus thigh high is a good fit and offers better containment as compared to the Medi Comfort.           PLAN:   continue with CDT.      SIGNATURE: Elida Sequeira PTA, [place], [place] License #: [id number]  Electronically Signed on 10/23/2024        [address] [place] [address]. [address]  [phone]

## 2024-10-30 ENCOUNTER — HOSPITAL ENCOUNTER (OUTPATIENT)
Dept: PHYSICAL THERAPY | Facility: HOSPITAL | Age: 54
Setting detail: THERAPIES SERIES
Discharge: HOME | End: 2024-10-30
Payer: MEDICARE

## 2024-10-30 DIAGNOSIS — I89.0 LYMPHEDEMA OF LEFT LEG: ICD-10-CM

## 2024-10-30 DIAGNOSIS — C64.1 RENAL CELL CARCINOMA OF RIGHT KIDNEY: ICD-10-CM

## 2024-10-30 DIAGNOSIS — I89.0 LYMPHEDEMA OF GENITALIA: Primary | ICD-10-CM

## 2024-10-30 PROCEDURE — 97140 MANUAL THERAPY 1/> REGIONS: CPT

## 2024-10-30 PROCEDURE — 97110 THERAPEUTIC EXERCISES: CPT

## 2024-10-30 NOTE — THERAPY TREATMENT NOTE
Physical Therapy Treatment Note  [address] [place] [address]    Patient: Holland Phelps                                                 Visit Date: 10/30/2024  :     1970    Referring practitioner:    Calixto Champion,*  Date of Initial Visit:          Type: THERAPY  Episode: LLE lymphedema s/p cellulitis and I&D      Visit Diagnoses:    ICD-10-CM ICD-9-CM   1. Lymphedema of genitalia  I89.0 457.1   2. Lymphedema of left leg  I89.0 457.1   3. Renal cell carcinoma of right kidney  C64.1 189.0     SUBJECTIVE     Subjective: He states he is doing pretty good, he has been trying different techniques for nighttime compression.      PAIN: 0/10          OBJECTIVE     Objective    Lymphedema       Row Name 10/30/24 0800             Subjective Pain    Able to rate subjective pain? yes  -AL      Pre-Treatment Pain Level 0  -AL      Post-Treatment Pain Level 0  -AL         Lymphedema Measurements    Measurement Type(s) Circumferential  -AL      Circumferential Areas Lower extremities  -AL         BLE Circumferential (cm)    Measurement Location 1 BOT  -AL      Left 1 21.4 cm  -AL      Measurement Location 2 +7  -AL      Left 2 24.2 cm  -AL      Measurement Location 3 +7  -AL      Left 3 24.4 cm  -AL      Measurement Location 4 +10  -AL      Left 4 31.8 cm  -AL      Measurement Location 5 +10  -AL      Left 5 40.9 cm  -AL      Measurement Location 6 +10  -AL      Left 6 46.5 cm  -AL      Measurement Location 7 +10  -AL      Left 7 42.8 cm  -AL      Measurement Location 8 +10  -AL      Left 8 47.5 cm  -AL      Measurement Location 9 +10  -AL      Left 9 56 cm  -AL      Measurement Location 10 +10  -AL      Left 10 60.3 cm  -AL      LLE Circumferential Total 395.8 cm  -AL         Manual Lymphatic Drainage    Manual Lymphatic Drainage initial sequence;opened regional lymph nodes;opened anastamoses;extremity treatment  -AL      Initial Sequence  short neck;abdomen;diaphragmatic breathing  -AL      Abdomen superficial;deep  -AL      Opened Regional Lymph Nodes axillary;inguinal  -AL      Axillary right;left  -AL      Inguinal right;left  -AL      Opened Anastamoses inguino-axillary  -AL      Inguino-Axillary right;left  -AL      Extremity Treatment MLD to full limb  -AL      MLD to Full Limb L LE  -AL      Extremity Treatment Focus On Supine and prone  -AL      Manual Lymphatic Drainage Comments IASTM using the spring roller to the L LE  -AL      Manual Therapy 60  -AL         Compression/Skin Care    Compression/Skin Care compression garment  -AL      Skin Care lotion applied  -AL      Compression/Skin Care Comments Patient donned his Medi Comfort size 4 compression garment.  -AL                User Key  (r) = Recorded By, (t) = Taken By, (c) = Cosigned By      Initials Name Provider Type    Elida Pickering PTA, CLT-LANA Physical Therapist Assistant                        Therapeutic Exercises    04495 Units Comments   L Prone quad stretch   ROM is limited due to knee surgeries.    B hamstrings stretch       B hip flexor stretch                       Timed Minutes 10         Therapy Education/Self Care 51149   Education offered today Continue to wear the nighttime garment as well as the new compression thigh high.   Medbridge Code    Ongoing HEP   Work on CDT   Timed Minutes        Total Timed Treatment:     70   mins  Total Time of Visit:             70   mins         ASSESSMENT/PLAN     GOALS        Goals                                          Progress Note due by 11/09/24                                                      Recert due by 1/7/25   LTG by: 12 weeks Comments Date Status   Patient will have a reduction in LLE of at least 20 cm to allow more comfortable walking and mobility.  The L LE continues to reduce in size. 10/30 Ongoing   Patient will be independent with specific HEP for lymphedema He is able to move around much better. 6/20/23  Met   He will have no s/s infection.  No signs or symptoms of infection 11/16/23 Met   He will have no complaint of increased edema in the groin. Dense tissue is less in the groin area. 10/9 Ongoing   Patient will be independent with all tools available to manage his LLE lymphedema. He now has two size 4 Medi plus 20-30 mmHg thigh highs and a size 4 Medi comfort legging.  He also has a nighttime garment. 10/16 Met   He will be able to complete 5 rotations with both legs on the Unicam bike with the pedal lengths adjusted He is able to do this but the seat is high and he is hiking the L hip to get the LLE around. 7/31 Met        Assessment/Plan     ASSESSMENT:   He has had a reduction of 2.4 cm in the L LE as compared to his last measurements.  The numbness is less in the lower leg.  He is trying different options with the nighttime, different compression, and wrapping. He will apply more gradient compression to the mid lower leg will hopefully reduce in size.            PLAN:   continue with CDT.      SIGNATURE: Elida Sequeira PTA, [facility] [place] License #: [id number]  Electronically Signed on 10/30/2024        [address] [place] [address]. [address]  [phone]

## 2024-11-06 ENCOUNTER — HOSPITAL ENCOUNTER (OUTPATIENT)
Dept: PHYSICAL THERAPY | Facility: HOSPITAL | Age: 54
Setting detail: THERAPIES SERIES
Discharge: HOME | End: 2024-11-06
Payer: MEDICARE

## 2024-11-06 DIAGNOSIS — I89.0 LYMPHEDEMA OF GENITALIA: Primary | ICD-10-CM

## 2024-11-06 DIAGNOSIS — I89.0 LYMPHEDEMA OF LEFT LEG: ICD-10-CM

## 2024-11-06 DIAGNOSIS — C64.1 RENAL CELL CARCINOMA OF RIGHT KIDNEY: ICD-10-CM

## 2024-11-06 PROCEDURE — 97110 THERAPEUTIC EXERCISES: CPT

## 2024-11-06 PROCEDURE — 97140 MANUAL THERAPY 1/> REGIONS: CPT

## 2024-11-06 NOTE — THERAPY TREATMENT NOTE
Physical Therapy Treatment Note and 30 Day Progress Note  [address] [place] [address]    Patient: Holland Phelps                                                 Visit Date: 2024  :     1970    Referring practitioner:    Calixto Champion,*  Date of Initial Visit:          Type: THERAPY  Episode: LLE lymphedema s/p cellulitis and I&D      Visit Diagnoses:    ICD-10-CM ICD-9-CM   1. Lymphedema of genitalia  I89.0 457.1   2. Lymphedema of left leg  I89.0 457.1   3. 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  He continues to use the nighttime garment with other compression.       PAIN: 0/10          OBJECTIVE     Objective    Lymphedema       Row Name 24 0800             Lymphedema Measurements    Measurement Type(s) Circumferential  -AL      Circumferential Areas Lower extremities  -AL         BLE Circumferential (cm)    Measurement Location 1 BOT  -AL      Left 1 21.1 cm  -AL      Measurement Location 2 +7  -AL      Left 2 24 cm  -AL      Measurement Location 3 +7  -AL      Left 3 23.7 cm  -AL      Measurement Location 4 +10  -AL      Left 4 30.4 cm  -AL      Measurement Location 5 +10  -AL      Left 5 39.9 cm  -AL      Measurement Location 6 +10  -AL      Left 6 44.9 cm  -AL      Measurement Location 7 +10  -AL      Left 7 42 cm  -AL      Measurement Location 8 +10  -AL      Left 8 47.4 cm  -AL      Measurement Location 9 +10  -AL      Left 9 55.4 cm  -AL      Measurement Location 10 +10  -AL      Left 10 59.3 cm  -AL      LLE Circumferential Total 388.1 cm  -AL         Manual Lymphatic Drainage    Manual Lymphatic Drainage initial sequence;opened regional lymph nodes;opened anastamoses;extremity treatment  -AL      Initial Sequence short neck;abdomen;diaphragmatic breathing  -AL       Abdomen superficial;deep  -AL      Opened Regional Lymph Nodes axillary;inguinal  -AL      Axillary right;left  -AL      Inguinal right;left  -AL      Opened Anastamoses inguino-axillary  -AL      Inguino-Axillary right;left  -AL      Extremity Treatment MLD to full limb  -AL      MLD to Full Limb L LE  -AL      Extremity Treatment Focus On Supine and prone  -AL      Manual Lymphatic Drainage Comments IASTM using the spring roller to the L LE  -AL      Manual Therapy 60  -AL         Compression/Skin Care    Compression/Skin Care compression garment  -AL      Skin Care lotion applied  -AL      Compression/Skin Care Comments IASTM using the spring roller to the L LE  -AL                User Key  (r) = Recorded By, (t) = Taken By, (c) = Cosigned By      Initials Name Provider Type    Elida Pickering, KEILA, SAHIL Physical Therapist Assistant                          Therapeutic Exercises    44732 Units Comments   L Prone quad stretch   ROM is limited due to knee surgeries.    B hamstrings stretch       B hip flexor stretch                       Timed Minutes 10         Therapy Education/Self Care 52259   Education offered today Continue to wear the nighttime garment as well as the new compression thigh high.   Medbridge Code    Ongoing HEP   Work on CDT   Timed Minutes        Total Timed Treatment:     70   mins  Total Time of Visit:             70   mins         ASSESSMENT/PLAN     GOALS        Goals                                          Progress Note due by 12/06/24                                                      Recert due by 1/7/25   LTG by: 12 weeks Comments Date Status   Patient will have a reduction in LLE of at least 20 cm to allow more comfortable walking and mobility.  The L LE continues to reduce in size. 11/6 Ongoing   Patient will be independent with specific HEP for lymphedema He is able to move around much better. 6/20/23 Met   He will have no s/s infection.  No signs or symptoms of  infection 11/16/23 Met   He will have no complaint of increased edema in the groin. He has little to no edema in the groin area. 11/6 Ongoing   Patient will be independent with all tools available to manage his LLE lymphedema. He now has two size 4 Medi plus 20-30 mmHg thigh highs and a size 4 Medi comfort legging.  He also has a nighttime garment. 10/16 Met   He will be able to complete 5 rotations with both legs on the Unicam bike with the pedal lengths adjusted He is able to do this but the seat is high and he is hiking the L hip to get the LLE around. 7/31 Met        Assessment/Plan     ASSESSMENT:   He has had a reduction of 7.7 cm in the L LE as compared to his last measurements.  He us using the nighttime garment every night, then uses various light compression sleeves over the nighttime.  This is more comfortable for him than wrapping and has been very effective. He may try to wrap over the nighttime garment as well. 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          PLAN:   continue with CDT.      SIGNATURE: Elida Sequeira PTA, [facility] [place] License #: [id number]  Electronically Signed on 11/6/2024        [address] [place] [address]. [address]  [phone]

## 2024-11-13 ENCOUNTER — HOSPITAL ENCOUNTER (OUTPATIENT)
Dept: PHYSICAL THERAPY | Facility: HOSPITAL | Age: 54
Setting detail: THERAPIES SERIES
Discharge: HOME | End: 2024-11-13
Payer: MEDICARE

## 2024-11-13 DIAGNOSIS — I89.0 LYMPHEDEMA OF GENITALIA: Primary | ICD-10-CM

## 2024-11-13 DIAGNOSIS — I89.0 LYMPHEDEMA OF LEFT LEG: ICD-10-CM

## 2024-11-13 DIAGNOSIS — C64.1 RENAL CELL CARCINOMA OF RIGHT KIDNEY: ICD-10-CM

## 2024-11-13 PROCEDURE — 97110 THERAPEUTIC EXERCISES: CPT

## 2024-11-13 PROCEDURE — 97140 MANUAL THERAPY 1/> REGIONS: CPT

## 2024-11-13 NOTE — THERAPY TREATMENT NOTE
Physical Therapy Treatment Note  [address] [place] [address]    Patient: Holland Phelps                                                 Visit Date: 2024  :     1970    Referring practitioner:    Calixto Champion,*  Date of Initial Visit:          Type: THERAPY  Episode: LLE lymphedema s/p cellulitis and I&D      Visit Diagnoses:    ICD-10-CM ICD-9-CM   1. Lymphedema of genitalia  I89.0 457.1   2. Lymphedema of left leg  I89.0 457.1   3. 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 He states he has less numbness in the L shin, no pain today.      PAIN: 0/10 numbness in the L shin.         OBJECTIVE     Objective    Lymphedema       Row Name 24 0800             Subjective Pain    Able to rate subjective pain? yes  -AL      Pre-Treatment Pain Level 0  -AL         Lymphedema Measurements    Measurement Type(s) Circumferential  -AL      Circumferential Areas Lower extremities  -AL         BLE Circumferential (cm)    Measurement Location 1 BOT  -AL      Left 1 21.5 cm  -AL      Measurement Location 2 +7  -AL      Left 2 24.4 cm  -AL      Measurement Location 3 +7  -AL      Left 3 23.2 cm  -AL      Measurement Location 4 +10  -AL      Left 4 29.8 cm  -AL      Measurement Location 5 +10  -AL      Left 5 37.8 cm  -AL      Measurement Location 6 +10  -AL      Left 6 44 cm  -AL      Measurement Location 7 +10  -AL      Left 7 41.6 cm  -AL      Measurement Location 8 +10  -AL      Left 8 45.9 cm  -AL      Measurement Location 9 +10  -AL      Left 9 53.6 cm  -AL      Measurement Location 10 +10  -AL      Left 10 57.6 cm  -AL      LLE Circumferential Total 379.4 cm  -AL         Manual Lymphatic Drainage    Manual Lymphatic Drainage initial sequence;opened regional lymph  nodes;opened anastamoses;extremity treatment  -AL      Initial Sequence short neck;abdomen;diaphragmatic breathing  -AL      Abdomen superficial;deep  -AL      Opened Regional Lymph Nodes axillary;inguinal  -AL      Axillary right;left  -AL      Inguinal right;left  -AL      Opened Anastamoses inguino-axillary  -AL      Inguino-Axillary right;left  -AL      Extremity Treatment MLD to full limb  -AL      MLD to Full Limb L LE  -AL      Extremity Treatment Focus On Supine and prone  -AL      Manual Lymphatic Drainage Comments IASTM using the spring roller to the L LE  -AL      Manual Therapy 60  -AL         Compression/Skin Care    Compression/Skin Care compression garment  -AL      Skin Care lotion applied  -AL                User Key  (r) = Recorded By, (t) = Taken By, (c) = Cosigned By      Initials Name Provider Type    Elida Pickering PTA, CLT-LANA Physical Therapist Assistant                            Therapeutic Exercises    95218 Units Comments   L Prone quad stretch   ROM is limited due to knee surgeries.    B hamstrings stretch       B hip flexor stretch                       Timed Minutes 10         Therapy Education/Self Care 29123   Education offered today Continue to wear the nighttime garment as well as the new compression thigh high.   Medbridge Code    Ongoing HEP   Work on CDT   Timed Minutes        Total Timed Treatment:     70   mins  Total Time of Visit:             70   mins         ASSESSMENT/PLAN     GOALS        Goals                                          Progress Note due by 12/06/24                                                      Recert due by 1/7/25   LTG by: 12 weeks Comments Date Status   Patient will have a reduction in LLE of at least 20 cm to allow more comfortable walking and mobility.  He has had a reduction of 18.8 cm as compared to 11/15/23.  A reduction of 48.5 cm since 4/20/23. 11/13 Ongoing   Patient will be independent with specific HEP for lymphedema He is able to  move around much better. 6/20/23 Met   He will have no s/s infection.  No signs or symptoms of infection 11/16/23 Met   He will have no complaint of increased edema in the groin. He has little to no edema in the groin area. 11/6 Ongoing   Patient will be independent with all tools available to manage his LLE lymphedema. Measured for another nighttime garments, he will need: Size Medium Regular SIGVARIS ChipSleeve Thigh High  11/13 Ongoing   He will be able to complete 5 rotations with both legs on the Unicam bike with the pedal lengths adjusted He is able to do this but the seat is high and he is hiking the L hip to get the LLE around. 7/31 Met        Assessment/Plan     ASSESSMENT:   He has had a reduction of 8.7 cm in the L LE as compared to his last measurements.  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 He continues to use the nighttime garment as well as compression sleeves over the garment.  This combination has made a great difference in the size of the L LE.   Patient will need a smaller nighttime garment due to the reduction in the L LE.       Compression needed:     Size small Regular SIGVARIS ChipSleeve Thigh High       PLAN:   continue with CDT.      SIGNATURE: Elida Sequeira PTA, [place], [place] License #: [id number]  Electronically Signed on 11/13/2024        [address] [place] [address]. [address]  [phone]

## 2024-11-20 ENCOUNTER — HOSPITAL ENCOUNTER (OUTPATIENT)
Dept: PHYSICAL THERAPY | Facility: HOSPITAL | Age: 54
Setting detail: THERAPIES SERIES
Discharge: HOME | End: 2024-11-20
Payer: MEDICARE

## 2024-11-20 DIAGNOSIS — I89.0 LYMPHEDEMA OF GENITALIA: Primary | ICD-10-CM

## 2024-11-20 DIAGNOSIS — I89.0 LYMPHEDEMA OF LEFT LEG: ICD-10-CM

## 2024-11-20 DIAGNOSIS — C64.1 RENAL CELL CARCINOMA OF RIGHT KIDNEY: ICD-10-CM

## 2024-11-20 PROCEDURE — 97140 MANUAL THERAPY 1/> REGIONS: CPT

## 2024-11-20 PROCEDURE — 97110 THERAPEUTIC EXERCISES: CPT

## 2024-11-20 NOTE — THERAPY TREATMENT NOTE
Physical Therapy Treatment Note  [address] [place] [address]    Patient: Holland Phelps                                                 Visit Date: 2024  :     1970    Referring practitioner:    Calixto hCampion,*  Date of Initial Visit:          Type: THERAPY  Episode: LLE lymphedema s/p cellulitis and I&D      Visit Diagnoses:    ICD-10-CM ICD-9-CM   1. Lymphedema of genitalia  I89.0 457.1   2. Lymphedema of left leg  I89.0 457.1   3. Renal cell carcinoma of right kidney  C64.1 189.0     SUBJECTIVE     Subjective: He can tell his leg is better as far as size goes and is feeling better. He wakes up with less swelling, the swelling increases as the day goes on.  Even though the leg swells it still much less than in the past.       PAIN: 0/10 numbness in the L shin.         OBJECTIVE     Objective    Lymphedema       Row Name 24 0800             Subjective Pain    Able to rate subjective pain? yes  -AL      Pre-Treatment Pain Level 0  -AL      Post-Treatment Pain Level 0  -AL         Lymphedema Measurements    Measurement Type(s) Circumferential  -AL      Circumferential Areas Lower extremities  -AL         BLE Circumferential (cm)    Measurement Location 1 BOT  -AL      Left 1 21.2 cm  -AL      Measurement Location 2 +7  -AL      Left 2 23.6 cm  -AL      Measurement Location 3 +7  -AL      Left 3 24 cm  -AL      Measurement Location 4 +10  -AL      Left 4 30 cm  -AL      Measurement Location 5 +10  -AL      Left 5 37.5 cm  -AL      Measurement Location 6 +10  -AL      Left 6 44.2 cm  -AL      Measurement Location 7 +10  -AL      Left 7 42.5 cm  -AL      Measurement Location 8 +10  -AL      Left 8 46.9 cm  -AL      Measurement Location 9 +10  -AL      Left 9 54.1 cm  -AL      Measurement Location 10 +10  -AL      Left 10 58.5 cm  -AL      LLE Circumferential Total 382.5 cm  -AL         Manual Lymphatic Drainage     Manual Lymphatic Drainage initial sequence;opened regional lymph nodes;opened anastamoses;extremity treatment  -AL      Initial Sequence short neck;abdomen;diaphragmatic breathing  -AL      Abdomen superficial;deep  -AL      Opened Regional Lymph Nodes axillary;inguinal  -AL      Axillary right;left  -AL      Inguinal right;left  -AL      Opened Anastamoses inguino-axillary  -AL      Inguino-Axillary right;left  -AL      Extremity Treatment MLD to full limb  -AL      MLD to Full Limb L LE  -AL      Extremity Treatment Focus On Supine and prone  -AL      Manual Lymphatic Drainage Comments IASTM using the spring roller to the L LE  -AL      Manual Therapy 60  -AL         Compression/Skin Care    Compression/Skin Care compression garment  -AL      Skin Care lotion applied  -AL      Compression/Skin Care Comments Patient donned his compression thigh high.  -AL                User Key  (r) = Recorded By, (t) = Taken By, (c) = Cosigned By      Initials Name Provider Type    Elida Pickering PTA, CLT-LANNY Physical Therapist Assistant                      Therapeutic Exercises    60760 Units Comments   L Prone quad stretch   ROM is limited due to knee surgeries.    B hamstrings stretch       B hip flexor stretch                       Timed Minutes 10         Therapy Education/Self Care 26784   Education offered today Continue to wear the nighttime garment as well as the new compression thigh high.   Medbridge Code    Ongoing HEP   Work on CDT   Timed Minutes        Total Timed Treatment:     70   mins  Total Time of Visit:             70   mins         ASSESSMENT/PLAN     GOALS        Goals                                          Progress Note due by 12/06/24                                                      Recert due by 1/7/25   LTG by: 12 weeks Comments Date Status   Patient will have a reduction in LLE of at least 20 cm to allow more comfortable walking and mobility.  He has had a reduction of 18.8 cm as compared  to 11/15/23.  A reduction of 48.5 cm since 4/20/23. 11/13 Ongoing   Patient will be independent with specific HEP for lymphedema He is able to move around much better. 6/20/23 Met   He will have no s/s infection.  No signs or symptoms of infection 11/16/23 Met   He will have no complaint of increased edema in the groin. He has little to no edema in the groin area. 11/6 Ongoing   Patient will be independent with all tools available to manage his LLE lymphedema. Patient will need a Size small Regular SIGVARIS ChipSleeve Thigh High  11/20 Ongoing   He will be able to complete 5 rotations with both legs on the Unicam bike with the pedal lengths adjusted He is able to do this but the seat is high and he is hiking the L hip to get the LLE around. 7/31 Met        Assessment/Plan     ASSESSMENT:   He has had an increase in size of the L UE of 3.1 cm.  He slept without any compression two nights ago, this may be the reason for the increase.  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 I am waiting for a signed order from patient's provider so I can complete the compression garment order.       Compression needed:     Size small Regular SIGVARIS ChipSleeve Thigh High       PLAN:   continue with CDT.      SIGNATURE: Elida Sequeira PTA, [facility] License #: [id number]  Electronically Signed on 11/20/2024        [address] [place] [address]. [address]  [phone]

## 2024-11-27 ENCOUNTER — HOSPITAL ENCOUNTER (OUTPATIENT)
Dept: PHYSICAL THERAPY | Facility: HOSPITAL | Age: 54
Setting detail: THERAPIES SERIES
Discharge: HOME | End: 2024-11-27
Payer: MEDICARE

## 2024-11-27 DIAGNOSIS — I89.0 LYMPHEDEMA OF LEFT LEG: ICD-10-CM

## 2024-11-27 DIAGNOSIS — C64.1 RENAL CELL CARCINOMA OF RIGHT KIDNEY: ICD-10-CM

## 2024-11-27 DIAGNOSIS — I89.0 LYMPHEDEMA OF GENITALIA: Primary | ICD-10-CM

## 2024-11-27 PROCEDURE — 97110 THERAPEUTIC EXERCISES: CPT

## 2024-11-27 PROCEDURE — 97140 MANUAL THERAPY 1/> REGIONS: CPT

## 2024-11-27 NOTE — THERAPY TREATMENT NOTE
Physical Therapy Treatment Note  [address] [place] [address]    Patient: Holland Phelps                                                 Visit Date: 2024  :     1970    Referring practitioner:    Calixto Champion,*  Date of Initial Visit:          Type: THERAPY  Episode: LLE lymphedema s/p cellulitis and I&D      Visit Diagnoses:    ICD-10-CM ICD-9-CM   1. Lymphedema of genitalia  I89.0 457.1   2. Lymphedema of left leg  I89.0 457.1   3. Renal cell carcinoma of right kidney  C64.1 189.0     SUBJECTIVE     Subjective: He likes the smaller nighttime garment, he thinks this has helped his leg become even less swollen.       PAIN: 0/10 numbness just distal to medial L knee         OBJECTIVE     Objective    Lymphedema       Row Name 24 0800             Subjective Pain    Able to rate subjective pain? yes  -AL      Pre-Treatment Pain Level 0  -AL      Post-Treatment Pain Level 0  -AL         Lymphedema Measurements    Measurement Type(s) Circumferential  -AL      Circumferential Areas Lower extremities  -AL         BLE Circumferential (cm)    Measurement Location 1 BOT  -AL      Left 1 21.6 cm  -AL      Measurement Location 2 +7  -AL      Left 2 23.6 cm  -AL      Measurement Location 3 +7  -AL      Left 3 24 cm  -AL      Measurement Location 4 +10  -AL      Left 4 29.4 cm  -AL      Measurement Location 5 +10  -AL      Left 5 37 cm  -AL      Measurement Location 6 +10  -AL      Left 6 43.1 cm  -AL      Measurement Location 7 +10  -AL      Left 7 41.7 cm  -AL      Measurement Location 8 +10  -AL      Left 8 45.7 cm  -AL      Measurement Location 9 +10  -AL      Left 9 54.1 cm  -AL      Measurement Location 10 +10  -AL      Left 10 57.4 cm  -AL      LLE Circumferential Total 377.6 cm  -AL         Manual Lymphatic Drainage    Manual Lymphatic Drainage initial sequence;opened regional lymph nodes;opened anastamoses;extremity  treatment  -AL      Initial Sequence short neck;abdomen;diaphragmatic breathing  -AL      Abdomen superficial;deep  -AL      Diaphragmatic Breathing x 9 with superficial abdominals  -AL      Opened Regional Lymph Nodes axillary;inguinal  -AL      Axillary right;left  -AL      Inguinal right;left  -AL      Opened Anastamoses inguino-axillary  -AL      Inguino-Axillary right;left  -AL      Extremity Treatment MLD to full limb  -AL      MLD to Full Limb L LE  -AL      Extremity Treatment Focus On Supine and prone  -AL      Manual Lymphatic Drainage Comments IASTM using the spring roller to the L LE  -AL      Manual Therapy 60  -AL         Compression/Skin Care    Compression/Skin Care compression garment  -AL      Skin Care lotion applied  -AL      Compression/Skin Care Comments Patient donned his compression thigh high.  -AL                User Key  (r) = Recorded By, (t) = Taken By, (c) = Cosigned By      Initials Name Provider Type    Elida Pickering PTA, [facility] Physical Therapist Assistant                      Therapeutic Exercises    91961 Units Comments   L Prone quad stretch   ROM is limited due to knee surgeries.    B hamstrings stretch       B hip flexor stretch                       Timed Minutes 10         Therapy Education/Self Care 70149   Education offered today Continue to wear the nighttime garment as well as the new compression thigh high.   Medbridge Code    Ongoing HEP   Work on CDT   Timed Minutes        Total Timed Treatment:     70   mins  Total Time of Visit:             70   mins         ASSESSMENT/PLAN     GOALS        Goals                                          Progress Note due by 12/06/24                                                      Recert due by 1/7/25   LTG by: 12 weeks Comments Date Status   Patient will have a reduction in LLE of at least 20 cm to allow more comfortable walking and mobility.  He has had a reduction of 18.8 cm as compared to 11/15/23.  A reduction of 48.5  cm since 4/20/23. 11/13 Ongoing   Patient will be independent with specific HEP for lymphedema He is able to move around much better. 6/20/23 Met   He will have no s/s infection.  No signs or symptoms of infection 11/16/23 Met   He will have no complaint of increased edema in the groin. He has little to no edema in the groin area. 11/6 Ongoing   Patient will be independent with all tools available to manage his LLE lymphedema. Patient now has the Size small Regular SIGVARIS ChipSleeve Thigh High  11/27 Ongoing   He will be able to complete 5 rotations with both legs on the Unicam bike with the pedal lengths adjusted He is able to do this but the seat is high and he is hiking the L hip to get the LLE around. 7/31 Met        Assessment/Plan     ASSESSMENT:   He has had a decrease in size of the L LE of 4.9 cm.  He now has the new small nighttime garment for the L LE, this has helped reduce the calf area which has been more difficult to reduce.  Since he has been using the nighttime garments he has had a good reduction in the L LE, this also makes walking and moving around in general much easier.         PLAN:   continue with CDT.      SIGNATURE: Elida Sequeira PTA, [facility], [place] License #: [id number]  Electronically Signed on 11/27/2024        [address] [place] [address]. [address]  [phone]

## 2024-12-04 ENCOUNTER — HOSPITAL ENCOUNTER (OUTPATIENT)
Dept: PHYSICAL THERAPY | Facility: HOSPITAL | Age: 54
Setting detail: THERAPIES SERIES
Discharge: HOME | End: 2024-12-04
Payer: MEDICARE

## 2024-12-04 DIAGNOSIS — I89.0 LYMPHEDEMA OF GENITALIA: Primary | ICD-10-CM

## 2024-12-04 DIAGNOSIS — I89.0 LYMPHEDEMA OF LEFT LEG: ICD-10-CM

## 2024-12-04 DIAGNOSIS — C64.1 RENAL CELL CARCINOMA OF RIGHT KIDNEY: ICD-10-CM

## 2024-12-04 PROCEDURE — 97110 THERAPEUTIC EXERCISES: CPT

## 2024-12-04 PROCEDURE — 97140 MANUAL THERAPY 1/> REGIONS: CPT

## 2024-12-04 NOTE — THERAPY TREATMENT NOTE
Physical Therapy Treatment Note and 30 Day Progress Note  [address] [place] [address]    Patient: Holland Phelps                                                 Visit Date: 2024  :     1970    Referring practitioner:    Calixto Champion,*  Date of Initial Visit:          Type: THERAPY  Episode: LLE lymphedema s/p cellulitis and I&D      Visit Diagnoses:    ICD-10-CM ICD-9-CM   1. Lymphedema of genitalia  I89.0 457.1   2. Lymphedema of left leg  I89.0 457.1   3. Renal cell carcinoma of right kidney  C64.1 189.0     SUBJECTIVE     Subjective: Since the nighttime garment has done so well, he will give his leg a break from compression at night sometimes.  He can the leg is tighter when he doesn't wear the garment at night.  Overall the leg is doing good. He feels just a little numbness/tingle at the base of the L toes.       PAIN: 0/10          OBJECTIVE     Objective    Lymphedema       Row Name 24 0800             Subjective Pain    Able to rate subjective pain? yes  -AL      Pre-Treatment Pain Level 0  -AL      Post-Treatment Pain Level 0  -AL         Lymphedema Measurements    Measurement Type(s) Circumferential  -AL      Circumferential Areas Lower extremities  -AL         BLE Circumferential (cm)    Measurement Location 1 BOT  -AL      Left 1 21.5 cm  -AL      Measurement Location 2 +7  -AL      Left 2 24 cm  -AL      Measurement Location 3 +7  -AL      Left 3 24 cm  -AL      Measurement Location 4 +10  -AL      Left 4 29 cm  -AL      Measurement Location 5 +10  -AL      Left 5 37.1 cm  -AL      Measurement Location 6 +10  -AL      Left 6 44.1 cm  -AL      Measurement Location 7 +10  -AL      Left 7 42 cm  -AL      Measurement Location 8 +10  -AL      Left 8 46.6 cm  -AL      Measurement Location 9 +10  -AL      Left 9 54.2 cm  -AL      Measurement Location 10 +10  -AL      Left 10 58.4 cm  -AL      LLE  Circumferential Total 380.9 cm  -AL         Manual Lymphatic Drainage    Manual Lymphatic Drainage initial sequence;opened regional lymph nodes;opened anastamoses;extremity treatment  -AL      Initial Sequence short neck;abdomen;diaphragmatic breathing  -AL      Abdomen superficial;deep  -AL      Diaphragmatic Breathing x 9 with superficial abdominals  -AL      Opened Regional Lymph Nodes axillary;inguinal  -AL      Axillary right;left  -AL      Inguinal right;left  -AL      Opened Anastamoses inguino-axillary  -AL      Inguino-Axillary right;left  -AL      Extremity Treatment MLD to full limb  -AL      MLD to Full Limb L LE  -AL      Extremity Treatment Focus On Supine and prone  -AL      Manual Lymphatic Drainage Comments IASTM using the spring roller to the L LE  -AL      Manual Therapy 65  -AL         Compression/Skin Care    Compression/Skin Care compression garment  -AL      Skin Care lotion applied  -AL      Compression/Skin Care Comments Patient donned his compression thigh high.  -AL                User Key  (r) = Recorded By, (t) = Taken By, (c) = Cosigned By      Initials Name Provider Type    Elida Pickering PTA, [facility] Physical Therapist Assistant                      Therapeutic Exercises    16520 Units Comments   L Prone quad stretch   ROM is limited due to knee surgeries.    B hamstrings stretch       B hip flexor stretch                       Timed Minutes 10         Therapy Education/Self Care 11692   Education offered today Wear compression during the day and the nighttime garment at night.  Use the Flexitouch pump.   itBit Code    Ongoing HEP   Work on CDT   Timed Minutes        Total Timed Treatment:     75   mins  Total Time of Visit:             75   mins         ASSESSMENT/PLAN     GOALS        Goals                                          Progress Note due by 1/03/25                                                      Recert due by 1/7/25   LTG by: 12 weeks Comments Date Status    Patient will have a reduction in LLE of at least 20 cm to allow more comfortable walking and mobility.  He has had a slight increase as compared to his last measurements. 12/4 Ongoing   Patient will be independent with specific HEP for lymphedema He is able to move around much better. 6/20/23 Met   He will have no s/s infection.  No signs or symptoms of infection 11/16/23 Met   He will have no complaint of increased edema in the groin. Small amount of edema L groin 12/4 Ongoing   Patient will be independent with all tools available to manage his LLE lymphedema. Patient now has the Size small Regular SIGVARIS ChipSleeve Thigh High.  Patient is using the Flexitouch pump as needed.  12/4 Met   He will be able to complete 5 rotations with both legs on the Unicam bike with the pedal lengths adjusted He is able to do this but the seat is high and he is hiking the L hip to get the LLE around. 7/31 Met        Assessment/Plan     ASSESSMENT:   He has had an increase in size of the L LE of 3.3 cm in the L LE as compared to his last treatment.  He has been trying to go without the nighttime garment occasionally as he wears compression normally 23 hours a day.  The leg is still soft, he has met his goal for compression.  He will need one more compression thigh high, I will put the order into [employer] for him as his insurance will cover one more thigh high.  He is able to alternate thigh highs which allows more containment for the L LE swelling. Overall patient is feeling much better, we will see him again one week. If patient is still doing well we will start to decrease frequency.    Compression needed:   20-30 mmHg compression thigh high for the L leg      PLAN:   continue with CDT, will see 1 x a week x 6 weeks.    SIGNATURE: Elida Sequeira PTA, [place], [place] License #: [id number]  Electronically Signed on 12/4/2024        [address] [place] [address]. [address]  [phone]

## 2024-12-06 NOTE — THERAPY TREATMENT NOTE
Patient: Holland Phelps           : 1970  Visit Date: 2024  Referring practitioner: Calixto Champion,*  Date of Initial Visit: Type: THERAPY  Episode: LLE lymphedema s/p cellulitis and I&D    Visit Diagnoses:    ICD-10-CM ICD-9-CM   1. Lymphedema of genitalia  I89.0 457.1   2. Lymphedema of left leg  I89.0 457.1   3. Renal cell carcinoma of right kidney  C64.1 189.0          Clinical Progress: worse  Home Program Compliance: Yes  Progress toward previous goals: Partially Met  Prognosis to achieve goals: good    Objective     Assessment & Plan       Assessment  Impairments: lacks appropriate home exercise program   Other impairment: edema  Prognosis: good    Plan  Planned therapy interventions: manual therapy, soft tissue mobilization, therapeutic activities, home exercise program and functional ROM exercises  Frequency: 1x week  Duration in weeks: 12  Treatment plan discussed with: PTA        Anticipated CPT codes: Therapeutic Exercise 73081, Manual Therapy 89644, and Self Care/Home Management 69776    I reviewed the treatment and goals with Aneta Sequeira PTA and agree with the POC.    SIGNATURE: Dereck Walsh PT, License #: [id number]  Electronically Signed on 2024

## 2024-12-11 ENCOUNTER — HOSPITAL ENCOUNTER (OUTPATIENT)
Dept: PHYSICAL THERAPY | Facility: HOSPITAL | Age: 54
Setting detail: THERAPIES SERIES
Discharge: HOME | End: 2024-12-11
Payer: MEDICARE

## 2024-12-11 DIAGNOSIS — I89.0 LYMPHEDEMA OF GENITALIA: Primary | ICD-10-CM

## 2024-12-11 DIAGNOSIS — C64.1 RENAL CELL CARCINOMA OF RIGHT KIDNEY: ICD-10-CM

## 2024-12-11 DIAGNOSIS — I89.0 LYMPHEDEMA OF LEFT LEG: ICD-10-CM

## 2024-12-11 PROCEDURE — 97110 THERAPEUTIC EXERCISES: CPT

## 2024-12-11 PROCEDURE — 97140 MANUAL THERAPY 1/> REGIONS: CPT

## 2024-12-11 NOTE — THERAPY TREATMENT NOTE
Physical Therapy Treatment Note  [address] [place] [address]    Patient: Holland Phelps                                                 Visit Date: 2024  :     1970    Referring practitioner:    Calixto Champion,*  Date of Initial Visit:          Type: THERAPY  Episode: LLE lymphedema s/p cellulitis and I&D      Visit Diagnoses:    ICD-10-CM ICD-9-CM   1. Lymphedema of genitalia  I89.0 457.1   2. Lymphedema of left leg  I89.0 457.1   3. Renal cell carcinoma of right kidney  C64.1 189.0     SUBJECTIVE     Subjective: The L leg is still doing well.  He is still using the nighttime garment, he hasn't had to use the pump since he got the nighttime garments.       PAIN: 0/10          OBJECTIVE     Objective    Lymphedema       Row Name 24 0800             Subjective Pain    Able to rate subjective pain? yes  -AL      Pre-Treatment Pain Level 0  -AL      Post-Treatment Pain Level 0  -AL         Lymphedema Measurements    Measurement Type(s) Circumferential  -AL      Circumferential Areas Lower extremities  -AL         BLE Circumferential (cm)    Measurement Location 1 BOT  -AL      Left 1 20.9 cm  -AL      Measurement Location 2 +7  -AL      Left 2 24 cm  -AL      Measurement Location 3 +7  -AL      Left 3 24 cm  -AL      Measurement Location 4 +10  -AL      Left 4 28.2 cm  -AL      Measurement Location 5 +10  -AL      Left 5 36.5 cm  -AL      Measurement Location 6 +10  -AL      Left 6 42.4 cm  -AL      Measurement Location 7 +10  -AL      Left 7 41.1 cm  -AL      Measurement Location 8 +10  -AL      Left 8 47.6 cm  -AL      Measurement Location 9 +10  -AL      Left 9 54.4 cm  -AL      Measurement Location 10 +10  -AL      Left 10 57.8 cm  -AL      LLE Circumferential Total 376.9 cm  -AL         Manual Lymphatic Drainage    Manual Lymphatic Drainage initial sequence;opened regional lymph nodes;opened anastamoses;extremity  treatment  -AL      Initial Sequence short neck;abdomen;diaphragmatic breathing  -AL      Abdomen superficial;deep  -AL      Diaphragmatic Breathing x 9 with superficial abdominals  -AL      Opened Regional Lymph Nodes axillary;inguinal  -AL      Axillary right;left  -AL      Inguinal right;left  -AL      Opened Anastamoses inguino-axillary  -AL      Inguino-Axillary right;left  -AL      Extremity Treatment MLD to full limb  -AL      MLD to Full Limb L LE  -AL      Extremity Treatment Focus On Supine and prone  -AL      Manual Lymphatic Drainage Comments Used spring roller over anterior and posterior L LE.  -AL      Manual Therapy 65  -AL         Compression/Skin Care    Compression/Skin Care compression garment  -AL      Skin Care lotion applied  -AL      Compression/Skin Care Comments Patient donned his compression thigh high.  -AL                User Key  (r) = Recorded By, (t) = Taken By, (c) = Cosigned By      Initials Name Provider Type    Elida Pickering PTA, [facility] Physical Therapist Assistant                        Therapeutic Exercises    87619 Units Comments   L Prone quad stretch   ROM is limited due to knee surgeries.    B hamstrings stretch       B hip flexor stretch                       Timed Minutes 10         Therapy Education/Self Care 43311   Education offered today Continue to wear compression and use the pump as needed.    Medbridge Code    Ongoing HEP   Work on CDT   Timed Minutes        Total Timed Treatment:     75   mins  Total Time of Visit:             75   mins         ASSESSMENT/PLAN     GOALS        Goals                                          Progress Note due by 1/03/25                                                      Recert due by 1/7/25   LTG by: 12 weeks Comments Date Status   Patient will have a reduction in LLE of at least 20 cm to allow more comfortable walking and mobility.  He has had a reduction in the L LE as compared to his last visit.  12/11 Ongoing   Patient  will be independent with specific HEP for lymphedema He is able to move around much better. 6/20/23 Met   He will have no s/s infection.  No signs or symptoms of infection 11/16/23 Met   He will have no complaint of increased edema in the groin. Small amount of edema L groin 12/4 Ongoing   Patient will be independent with all tools available to manage his LLE lymphedema. Patient now has the Size small Regular SIGVARIS ChipSleeve Thigh High.  Patient is using the Flexitouch pump as needed.  12/4 Met   He will be able to complete 5 rotations with both legs on the Unicam bike with the pedal lengths adjusted He is able to do this but the seat is high and he is hiking the L hip to get the LLE around. 7/31 Met        Assessment/Plan     ASSESSMENT:   He has had a reduction of 4 cm in the L LE as compared to his last treatment. He is able to wear the small nighttime garment and add compression over the garment.  He will try adding more compression to the proximal L leg as the nighttime garment does not cover this area and he has a pocket of fluid at the L lateral proximal leg.  There is dense tissue L groin, this does soften with the MLD. We will start decreasing frequency, we will see patient again in 3 weeks. If he does well with this we will reduce frequency to 1 x a month.     Compression needed:   20-30 mmHg compression thigh high for the L leg      PLAN:   continue with CDT, will start decreasing frequency.     SIGNATURE: Elida Sequeira PTA, [place], [place] License #: [id number]  Electronically Signed on 12/11/2024        [address] [place] [address]. [address]  [phone]

## 2025-01-02 ENCOUNTER — APPOINTMENT (OUTPATIENT)
Dept: PHYSICAL THERAPY | Facility: HOSPITAL | Age: 55
End: 2025-01-02
Payer: MEDICARE

## 2025-01-08 ENCOUNTER — APPOINTMENT (OUTPATIENT)
Dept: PHYSICAL THERAPY | Facility: HOSPITAL | Age: 55
End: 2025-01-08
Payer: MEDICARE

## 2025-01-15 ENCOUNTER — APPOINTMENT (OUTPATIENT)
Dept: PHYSICAL THERAPY | Facility: HOSPITAL | Age: 55
End: 2025-01-15
Payer: MEDICARE

## 2025-01-22 ENCOUNTER — HOSPITAL ENCOUNTER (OUTPATIENT)
Dept: PHYSICAL THERAPY | Facility: HOSPITAL | Age: 55
Setting detail: THERAPIES SERIES
Discharge: HOME | End: 2025-01-22
Payer: MEDICARE

## 2025-01-22 DIAGNOSIS — I89.0 LYMPHEDEMA OF LEFT LEG: ICD-10-CM

## 2025-01-22 DIAGNOSIS — C64.1 RENAL CELL CARCINOMA OF RIGHT KIDNEY: ICD-10-CM

## 2025-01-22 DIAGNOSIS — I89.0 LYMPHEDEMA OF GENITALIA: Primary | ICD-10-CM

## 2025-01-22 PROCEDURE — 97110 THERAPEUTIC EXERCISES: CPT

## 2025-01-22 PROCEDURE — 97140 MANUAL THERAPY 1/> REGIONS: CPT

## 2025-01-22 NOTE — THERAPY TREATMENT NOTE
Physical Therapy Treatment Note, 30 Day Progress Note, and 90 Day Recertification Note  [address] [place] [address]    Patient: Holland Phelps                                                 Visit Date: 2025  :     1970    Referring practitioner:    Calixto Champion,*  Date of Initial Visit:          Type: THERAPY  Episode: LLE lymphedema s/p cellulitis and I&D      Visit Diagnoses:    ICD-10-CM ICD-9-CM   1. Lymphedema of genitalia  I89.0 457.1   2. Lymphedema of left leg  I89.0 457.1   3. 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       PAIN: 0/10          OBJECTIVE     Objective    Lymphedema       Row Name 25 0800             Subjective Pain    Able to rate subjective pain? yes  -AL      Pre-Treatment Pain Level 0  -AL      Post-Treatment Pain Level 0  -AL         Lymphedema Measurements    Measurement Type(s) Circumferential  -AL      Circumferential Areas Lower extremities  -AL         BLE Circumferential (cm)    Measurement Location 1 BOT  -AL      Left 1 21.1 cm  -AL      Measurement Location 2 +7  -AL      Left 2 24.4 cm  -AL      Measurement Location 3 +7  -AL      Left 3 24.1 cm  -AL      Measurement Location 4 +10  -AL      Left 4 32.5 cm  -AL      Measurement Location 5 +10  -AL      Left 5 41.1 cm  -AL      Measurement Location 6 +10  -AL      Left 6 46.1 cm  -AL      Measurement Location 7 +10  -AL      Left 7 44.2 cm  -AL      Measurement Location 8 +10  -AL      Left 8 49 cm  -AL      Measurement Location 9 +10  -AL      Left 9 57.4 cm  -AL      Measurement Location 10 +10  -AL      Left 10 60.4 cm  -AL      LLE Circumferential Total 400.3 cm  -AL         Manual Lymphatic Drainage    Manual Lymphatic Drainage initial sequence;opened regional lymph nodes;opened anastamoses;extremity treatment  -AL       Initial Sequence short neck;abdomen;diaphragmatic breathing  -AL      Abdomen superficial;deep  -AL      Diaphragmatic Breathing x 9 with superficial abdominals  -AL      Opened Regional Lymph Nodes axillary;inguinal  -AL      Axillary right;left  -AL      Inguinal right;left  -AL      Opened Anastamoses inguino-axillary  -AL      Inguino-Axillary right;left  -AL      Extremity Treatment MLD to full limb  -AL      MLD to Full Limb L LE  -AL      Extremity Treatment Focus On Supine and prone  -AL      Manual Lymphatic Drainage Comments Used hand-held massager as well as small roller to the left lower extremity both posterior and anterior  -AL      Manual Therapy 65  -AL         Compression/Skin Care    Compression/Skin Care compression garment  -AL      Skin Care lotion applied  -AL      Compression/Skin Care Comments Patient donned his compression thigh high.  -AL                User Key  (r) = Recorded By, (t) = Taken By, (c) = Cosigned By      Initials Name Provider Type    Elida Pickering PTA, [facility] Physical Therapist Assistant                          Therapeutic Exercises    18702 Units Comments   L Prone quad stretch   ROM is limited due to knee surgeries.    B hamstrings stretch       B hip flexor stretch                       Timed Minutes 10         Therapy Education/Self Care 61218   Education offered today Continue to wear compression and use the pump as needed.    Medbridge Code    Ongoing HEP   Work on CDT   Timed Minutes        Total Timed Treatment:     75   mins  Total Time of Visit:             75   mins         ASSESSMENT/PLAN     GOALS        Goals                                          Progress Note due by 2/21/2025                                                      Recert due by 4/21/2025   LTG by: 12 weeks Comments Date Status   Patient will have a reduction in LLE of at least 20 cm to allow more comfortable walking and mobility.  He has had an increase in size of the left lower  extremity as compared to his last visit. 1/22/2025 Ongoing   Patient will be independent with specific HEP for lymphedema He is able to move around much better. 6/20/23 Met   He will have no s/s infection.  No signs or symptoms of infection 11/16/23 Met   He will have no complaint of increased edema in the groin. Small amount of edema L groin is still present. 1/22/25 Ongoing   Patient will be independent with all tools available to manage his LLE lymphedema. Patient now has the Size small Regular SIGVARIS ChipSleeve Thigh High.  Patient is using the Flexitouch pump as needed.  12/4 Met   He will be able to complete 5 rotations with both legs on the Unicam bike with the pedal lengths adjusted He is able to do this but the seat is high and he is hiking the L hip to get the LLE around. 7/31 Met        Assessment/Plan     ASSESSMENT: Patient has had an increase in size of the left lower extremity of 23.4 cm as compared to his last treatment.  We were going to try to reduce frequency of treatment and we have not seen him for 6 weeks, at this time I do believe that weekly treatments would be more beneficial for him.  The leg has more dense tissue more so in the proximal portion of the leg.  He is wearing compression daily as well as wearing his night compression.  I do believe getting back into the routine of MLD in the clinic will help to further reduce the leg.      Compression needed:   20-30 mmHg compression thigh high for the L leg      PLAN:   continue with CDT will see 1 time a week x 8 weeks    SIGNATURE: Elida Sequeira PTA, [facility] [place] License #: [id number]  Electronically Signed on 1/22/2025        [facility] [place] [address]  [phone]

## 2025-01-24 NOTE — THERAPY TREATMENT NOTE
30 Day Progress Note and 90 Day Recertification Addendum      Patient: Holland Phelps           : 1970  Visit Date: 2025  Referring practitioner: Calixto Champion,*  Date of Initial Visit: Type: THERAPY  Episode: LLE lymphedema s/p cellulitis and I&D    Visit Diagnoses:    ICD-10-CM ICD-9-CM   1. Lymphedema of genitalia  I89.0 457.1   2. Lymphedema of left leg  I89.0 457.1   3. Renal cell carcinoma of right kidney  C64.1 189.0          Clinical Progress: worse  Home Program Compliance: Yes  Progress toward previous goals: Partially Met  Prognosis to achieve goals: good    Objective     Assessment & Plan       Assessment  Assessment details: Patient has had an increase in size of the left lower extremity of 23.4 cm as compared to his last treatment.  We were going to try to reduce frequency of treatment and we have not seen him for 6 weeks, at this time I do believe that weekly treatments would be more beneficial for him.  The leg has more dense tissue more so in the proximal portion of the leg.  He is wearing compression daily as well as wearing his night compression.  I do believe getting back into the routine of MLD in the clinic will help to further reduce the leg.    Plan  Therapy options: will be seen for skilled therapy services  Frequency: 1x week  Duration in weeks: 12  Treatment plan discussed with: patient        Anticipated CPT codes: Therapeutic Exercise 10873, Manual Therapy 09595, Therapeutic Activity 49397, and Self Care/Home Management 70571    I spent 5 minutes with the patient and Aneta Sequeira and reviewed their progress and plan of care. The patient is in agreement with this plan.     SIGNATURE: Debbie Morales, PT, DPT, CLT-LANNY, License #: [id number]  Electronically Signed on 2025      90 Day Recertification  Certification Period: 2025 through 2025  Based upon review of the patient's progress and continued therapy plan, it is my medical opinion that Holland   should continue physical therapy treatment at [facility]     PHYSICIAN: Calixto Champion MD (NPI: [id number])    Signature: __________________________________________________DATE: ___________________     Please sign and return via fax to [phone].   Thank you so much for letting us work with Gene. I appreciate your letting us work with your patients. If you have any questions or concerns, please don't hesitate to contact me.

## 2025-01-29 ENCOUNTER — HOSPITAL ENCOUNTER (OUTPATIENT)
Dept: PHYSICAL THERAPY | Facility: HOSPITAL | Age: 55
Setting detail: THERAPIES SERIES
Discharge: HOME | End: 2025-01-29
Payer: MEDICARE

## 2025-01-29 DIAGNOSIS — I89.0 LYMPHEDEMA OF LEFT LEG: ICD-10-CM

## 2025-01-29 DIAGNOSIS — C64.1 RENAL CELL CARCINOMA OF RIGHT KIDNEY: ICD-10-CM

## 2025-01-29 DIAGNOSIS — I89.0 LYMPHEDEMA OF GENITALIA: Primary | ICD-10-CM

## 2025-01-29 PROCEDURE — 97110 THERAPEUTIC EXERCISES: CPT

## 2025-01-29 PROCEDURE — 97140 MANUAL THERAPY 1/> REGIONS: CPT

## 2025-01-29 NOTE — THERAPY TREATMENT NOTE
Physical Therapy Treatment Note  [address] [place] [address]    Patient: Holland hPelps                                                 Visit Date: 2025  :     1970    Referring practitioner:    Calixto Champion,*  Date of Initial Visit:          Type: THERAPY  Episode: LLE lymphedema s/p cellulitis and I&D      Visit Diagnoses:    ICD-10-CM ICD-9-CM   1. Lymphedema of genitalia  I89.0 457.1   2. Lymphedema of left leg  I89.0 457.1   3. 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 He has been using the nighttime garment and compression daily.  He has not used the pump in awhile. No pain or numbness in the L leg.       PAIN: 0/10          OBJECTIVE     Objective    Lymphedema       Row Name 25 0800             Subjective Pain    Able to rate subjective pain? yes  -AL      Pre-Treatment Pain Level 0  -AL      Post-Treatment Pain Level 0  -AL         Lymphedema Measurements    Measurement Type(s) Circumferential  -AL      Circumferential Areas Lower extremities  -AL         BLE Circumferential (cm)    Measurement Location 1 BOT  -AL      Left 1 21.1 cm  -AL      Measurement Location 2 +7  -AL      Left 2 24 cm  -AL      Measurement Location 3 +7  -AL      Left 3 24.5 cm  -AL      Measurement Location 4 +10  -AL      Left 4 31.6 cm  -AL      Measurement Location 5 +10  -AL      Left 5 41 cm  -AL      Measurement Location 6 +10  -AL      Left 6 46.8 cm  -AL      Measurement Location 7 +10  -AL      Left 7 43.7 cm  -AL      Measurement Location 8 +10  -AL      Left 8 48 cm  -AL      Measurement Location 9 +10  -AL      Left 9 55.7 cm  -AL      Measurement Location 10 +10  -AL      Left 10 61.7 cm  -AL      LLE Circumferential Total 398.1 cm  -AL         Manual Lymphatic Drainage    Manual Lymphatic Drainage initial  sequence;opened regional lymph nodes;opened anastamoses;extremity treatment  -AL      Initial Sequence short neck;abdomen;diaphragmatic breathing  -AL      Abdomen superficial;deep  -AL      Diaphragmatic Breathing x 9 with superficial abdominals  -AL      Opened Regional Lymph Nodes axillary;inguinal  -AL      Axillary right;left  -AL      Inguinal right;left  -AL      Opened Anastamoses inguino-axillary  -AL      Inguino-Axillary right;left  -AL      Extremity Treatment MLD to full limb  -AL      MLD to Full Limb L LE  -AL      Extremity Treatment Focus On Supine and prone  -AL      Manual Lymphatic Drainage Comments Used hand-held massager as well as small roller to the left lower extremity both posterior and anterior  -AL      Manual Therapy 75  -AL         Compression/Skin Care    Compression/Skin Care compression garment  -AL      Skin Care lotion applied  -AL      Compression/Skin Care Comments Patient donned his compression thigh high.  -AL                User Key  (r) = Recorded By, (t) = Taken By, (c) = Cosigned By      Initials Name Provider Type    AL Elida Sequeira, PTA, CLT-LANNY Physical Therapist Assistant                            Therapeutic Exercises    92157 Units Comments   L Prone quad stretch   ROM is limited due to knee surgeries.    B hamstrings stretch       B hip flexor stretch                       Timed Minutes 10         Therapy Education/Self Care 63088   Education offered today Continue to wear compression and use the pump as needed.    Medbridge Code    Ongoing HEP   Work on CDT   Timed Minutes        Total Timed Treatment:     85   mins  Total Time of Visit:             85   mins         ASSESSMENT/PLAN     GOALS        Goals                                          Progress Note due by 2/21/2025                                                      Recert due by 4/21/2025   LTG by: 12 weeks Comments Date Status   Patient will have a reduction in LLE of at least 20 cm to allow more  comfortable walking and mobility.  He has had an increase in size of the left lower extremity as compared to his last visit. 1/22/2025 Ongoing   Patient will be independent with specific HEP for lymphedema He is able to move around much better. 6/20/23 Met   He will have no s/s infection.  No signs or symptoms of infection 11/16/23 Met   He will have no complaint of increased edema in the groin. Small amount of edema L groin is still present. 1/22/25 Ongoing   Patient will be independent with all tools available to manage his LLE lymphedema. He will use the Flexitouch pump manage the swelling in the left lower extremity. 1/29/2025 Ongoing   He will be able to complete 5 rotations with both legs on the Unicam bike with the pedal lengths adjusted He is able to do this but the seat is high and he is hiking the L hip to get the LLE around. 7/31 Met        Assessment/Plan     ASSESSMENT: Patient has had a reduction of the left lower extremity of 2.2 cm as compared to his last treatment.  His leg is still larger today as compared to past visits.  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  He has not used the Flexitouch pump recently, I did instruct patient to start using the pump again to help move the fluid out of the left lower extremity.  Patient will make more visits for us to see him for lymphedema therapy to the left lower extremity and groin.      Compression needed:   20-30 mmHg compression thigh high for the L leg      PLAN:   continue with CDT will see 1 time a week x 8 weeks    SIGNATURE: Elida Sequeira PTA, [place], [place] License #: [id number]  Electronically Signed on 1/29/2025        [address] [place] [address]. [address]  [phone]

## 2025-02-01 ENCOUNTER — TRANSCRIBE ORDERS (OUTPATIENT)
Dept: PHYSICAL THERAPY | Facility: HOSPITAL | Age: 55
End: 2025-02-01
Payer: MEDICARE

## 2025-02-01 DIAGNOSIS — I89.0 LYMPHEDEMA: Primary | ICD-10-CM

## 2025-03-05 ENCOUNTER — HOSPITAL ENCOUNTER (OUTPATIENT)
Dept: PHYSICAL THERAPY | Facility: HOSPITAL | Age: 55
Setting detail: THERAPIES SERIES
Discharge: HOME | End: 2025-03-05
Payer: MEDICARE

## 2025-03-05 DIAGNOSIS — I89.0 LYMPHEDEMA OF LEFT LEG: ICD-10-CM

## 2025-03-05 DIAGNOSIS — I89.0 LYMPHEDEMA OF GENITALIA: Primary | ICD-10-CM

## 2025-03-05 DIAGNOSIS — C64.1 RENAL CELL CARCINOMA OF RIGHT KIDNEY: ICD-10-CM

## 2025-03-05 PROCEDURE — 97140 MANUAL THERAPY 1/> REGIONS: CPT

## 2025-03-05 PROCEDURE — 97110 THERAPEUTIC EXERCISES: CPT

## 2025-03-05 NOTE — THERAPY TREATMENT NOTE
Physical Therapy Treatment Note and 30 Day Progress Note  [address] [place] [address]    Patient: Holland Phelps                                                 Visit Date: 3/5/2025  :     1970    Referring practitioner:    GILBERTO Ornelas  Date of Initial Visit:          Type: THERAPY  Episode: LLE lymphedema s/p cellulitis and I&D      Visit Diagnoses:    ICD-10-CM ICD-9-CM   1. Lymphedema of genitalia  I89.0 457.1   2. Lymphedema of left leg  I89.0 457.1   3. Renal cell carcinoma of right kidney  C64.1 189.0     SUBJECTIVE     Subjective: He has been in the hospital twice since his last treatment with the Flu and pneumonia.  You CHF, he was anemic, O2 levels were in the 60's.  His leg has been good, he has not been able to work on the leg but it has been down.  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 He will have a heart cath in [place] . He lost 12 pounds, he has gained 4 pounds back.  He hasn't smoked in the past 2 weeks.       PAIN: 0/10          OBJECTIVE     Objective    Lymphedema       Row Name 25 0800             Subjective Pain    Able to rate subjective pain? yes  -AL      Pre-Treatment Pain Level 0  -AL      Post-Treatment Pain Level 0  -AL         Lymphedema Measurements    Measurement Type(s) Circumferential  -AL      Circumferential Areas Lower extremities  -AL         BLE Circumferential (cm)    Measurement Location 1 BOT  -AL      Left 1 20.4 cm  -AL      Measurement Location 2 +7  -AL      Left 2 24.4 cm  -AL      Measurement Location 3 +7  -AL      Left 3 24.6 cm  -AL      Measurement Location 4 +10  -AL      Left 4 27.5 cm  -AL      Measurement Location 5 +10  -AL      Left 5 36.1 cm  -AL      Measurement Location 6 +10  -AL      Left 6 44.2 cm  -AL      Measurement Location 7 +10  -AL      Left 7 41.7 cm  -AL      Measurement Location 8 +10  -AL      Left 8 47.4 cm  -AL       Measurement Location 9 +10  -AL      Left 9 54 cm  -AL      Measurement Location 10 +10  -AL      Left 10 60.2 cm  -AL      LLE Circumferential Total 380.5 cm  -AL         Manual Lymphatic Drainage    Manual Lymphatic Drainage initial sequence;opened regional lymph nodes;opened anastamoses;extremity treatment  -AL      Initial Sequence short neck;abdomen;diaphragmatic breathing  -AL      Abdomen superficial;deep  -AL      Diaphragmatic Breathing x 9 with superficial abdominals  -AL      Opened Regional Lymph Nodes axillary;inguinal  -AL      Axillary right;left  -AL      Inguinal right;left  -AL      Opened Anastamoses inguino-axillary  -AL      Inguino-Axillary right;left  -AL      Extremity Treatment MLD to full limb  -AL      MLD to Full Limb L LE  -AL      Extremity Treatment Focus On Supine and prone  -AL      Manual Lymphatic Drainage Comments Used hand-held massager as well as small roller to the left lower extremity both posterior and anterior  -AL      Manual Therapy 65  -AL         Compression/Skin Care    Compression/Skin Care compression garment  -AL      Skin Care lotion applied  -AL      Compression/Skin Care Comments Patient donned his compression thigh high.  -AL                User Key  (r) = Recorded By, (t) = Taken By, (c) = Cosigned By      Initials Name Provider Type    AL Elida Sequeira, PTA, CLT-LANNY Physical Therapist Assistant                              Therapeutic Exercises    33956 Units Comments   L Prone quad stretch   ROM is limited due to knee surgeries.    B hamstrings stretch       B hip flexor stretch                       Timed Minutes 10         Therapy Education/Self Care 26090   Education offered today Continue to wear compression and use the pump as needed.    Medbridge Code    Ongoing HEP   Work on CDT   Timed Minutes        Total Timed Treatment:     75   mins  Total Time of Visit:             75   mins         ASSESSMENT/PLAN     GOALS        Goals                                           Progress Note due by 4/4/2025                                                      Recert due by 4/21/2025   LTG by: 12 weeks Comments Date Status   Patient will have a reduction in LLE of at least 20 cm to allow more comfortable walking and mobility.  He has had a reduction in the left lower extremity 3/5/2025 Ongoing   Patient will be independent with specific HEP for lymphedema He is able to move around much better. 6/20/23 Met   He will have no s/s infection.  No signs or symptoms of infection 11/16/23 Met   He will have no complaint of increased edema in the groin. Very minimal edema left groin 3/5/2025 Ongoing   Patient will be independent with all tools available to manage his LLE lymphedema. He has not had to use the Flexitouch pump as often since the leg is down and he has been ill. 3/5/2025 Ongoing   He will be able to complete 5 rotations with both legs on the Unicam bike with the pedal lengths adjusted He is able to do this but the seat is high and he is hiking the L hip to get the LLE around. 7/31 Met        Assessment/Plan     ASSESSMENT: Patient has had a reduction of the left lower extremity of 17.6 cm as compared to his last measurements.  Patient has been ill with the flu and pneumonia and been in the hospital twice since his last treatment, so he has not been on his legs as much.  He has not had to wear nighttime compression or use the pump as often, he still has his compression thigh-high daily.  The leg is much softer today I am hopeful the leg will stay down in size as he increases his activity level.      Compression needed:   20-30 mmHg compression thigh high for the L leg      PLAN:   continue with CDT will see 1 time a week x 8 weeks    SIGNATURE: Elida Sequeira PTA, [place] License #: [id number]  Electronically Signed on 3/5/2025        [address] [place] [address] [place] [address]  [phone]

## 2025-03-07 NOTE — THERAPY TREATMENT NOTE
Progress Note Addendum    Patient: Holland Phelps           : 1970  Visit Date: 3/5/2025  Referring practitioner: GILBERTO Ornelas  Date of Initial Visit: Type: THERAPY  Episode: LLE lymphedema s/p cellulitis and I&D    Visit Diagnoses:    ICD-10-CM ICD-9-CM   1. Lymphedema of genitalia  I89.0 457.1   2. Lymphedema of left leg  I89.0 457.1   3. Renal cell carcinoma of right kidney  C64.1 189.0          Clinical Progress: improved  Home Program Compliance: Yes  Progress toward previous goals: Partially Met  Prognosis to achieve goals: good    Objective     Assessment & Plan       Assessment  Assessment details: Patient has had a reduction of the left lower extremity of 17.6 cm as compared to his last measurements.  Patient has been ill with the flu and pneumonia and been in the hospital twice since his last treatment, so he has not been on his legs as much.  He has not had to wear nighttime compression or use the pump as often, he still has his compression thigh-high daily.  The leg is much softer today I am hopeful the leg will stay down in size as he increases his activity level.    Plan  Therapy options: will be seen for skilled therapy services  Frequency: 1x week  Duration in weeks: 8  Treatment plan discussed with: PTA        Anticipated CPT codes: Therapeutic Exercise 27928, Manual Therapy 04769, Therapeutic Activity 26477, and Self Care/Home Management 19884    I reviewed the treatment and goals with Aneta Sequeira and agree with the POC.    SIGNATURE: Debbie Morales, PT, DPT, CLZIGGY-LANNY, License #: [id number]  Electronically Signed on 3/7/2025

## 2025-03-12 ENCOUNTER — HOSPITAL ENCOUNTER (OUTPATIENT)
Dept: PHYSICAL THERAPY | Facility: HOSPITAL | Age: 55
Setting detail: THERAPIES SERIES
Discharge: HOME | End: 2025-03-12
Payer: MEDICARE

## 2025-03-12 DIAGNOSIS — I89.0 LYMPHEDEMA OF GENITALIA: Primary | ICD-10-CM

## 2025-03-12 DIAGNOSIS — I89.0 LYMPHEDEMA OF LEFT LEG: ICD-10-CM

## 2025-03-12 DIAGNOSIS — C64.1 RENAL CELL CARCINOMA OF RIGHT KIDNEY: ICD-10-CM

## 2025-03-12 PROCEDURE — 97110 THERAPEUTIC EXERCISES: CPT

## 2025-03-12 PROCEDURE — 97140 MANUAL THERAPY 1/> REGIONS: CPT

## 2025-03-12 NOTE — THERAPY TREATMENT NOTE
Physical Therapy Treatment Note  [address] [place] [address]    Patient: Holland Phelps                                                 Visit Date: 3/12/2025  :     1970    Referring practitioner:    GILBERTO Ornelas  Date of Initial Visit:          Type: THERAPY  Episode: LLE lymphedema s/p cellulitis and I&D      Visit Diagnoses:    ICD-10-CM ICD-9-CM   1. Lymphedema of genitalia  I89.0 457.1   2. Lymphedema of left leg  I89.0 457.1   3. Renal cell carcinoma of right kidney  C64.1 189.0     SUBJECTIVE     Subjective: He can tell something is not right, his energy level is low.  He thinks he may need a blood transfusion due to his blood levels being low, this happens with his Sickle Cell.  He is still is recovering from Flu and pneumonia.  His leg is swollen from being up too much the past few days.       PAIN: 0/10          OBJECTIVE     Objective    Lymphedema       Row Name 25 0800             Subjective Pain    Able to rate subjective pain? yes  -AL      Pre-Treatment Pain Level 0  -AL      Post-Treatment Pain Level 0  -AL         Manual Lymphatic Drainage    Manual Lymphatic Drainage initial sequence;opened regional lymph nodes;opened anastamoses;extremity treatment  -AL      Initial Sequence short neck;abdomen;diaphragmatic breathing  -AL      Abdomen superficial;deep  -AL      Diaphragmatic Breathing x 9 with superficial abdominals  -AL      Opened Regional Lymph Nodes axillary;inguinal  -AL      Axillary right;left  -AL      Inguinal right;left  -AL      Opened Anastamoses inguino-axillary  -AL      Inguino-Axillary right;left  -AL      Extremity Treatment MLD to full limb  -AL      MLD to Full Limb L LE  -AL      Extremity Treatment Focus On Supine and prone  -AL      Manual Lymphatic Drainage Comments Used hand-held massager as well as small roller to the left lower extremity both posterior and anterior  -AL       Manual Therapy 60  -AL         Compression/Skin Care    Compression/Skin Care compression garment  -AL      Skin Care lotion applied  -AL      Compression/Skin Care Comments Patient donned his compression thigh high.  -AL                User Key  (r) = Recorded By, (t) = Taken By, (c) = Cosigned By      Initials Name Provider Type    Elida Pickering, PTA, CLT-LANNY Physical Therapist Assistant                      Therapeutic Exercises    09402 Units Comments   L Prone quad stretch   ROM is limited due to knee surgeries.    B hamstrings stretch       B hip flexor stretch                       Timed Minutes 10         Therapy Education/Self Care 84851   Education offered today Continue to wear compression and use the pump as needed.    Medbridge Code    Ongoing HEP   Work on CDT   Timed Minutes        Total Timed Treatment:     70   mins  Total Time of Visit:             70   mins         ASSESSMENT/PLAN     GOALS        Goals                                          Progress Note due by 4/4/2025                                                      Recert due by 4/21/2025   LTG by: 12 weeks Comments Date Status   Patient will have a reduction in LLE of at least 20 cm to allow more comfortable walking and mobility.  He has had a reduction in the left lower extremity 3/5/2025 Ongoing   Patient will be independent with specific HEP for lymphedema He is able to move around much better. 6/20/23 Met   He will have no s/s infection.  No signs or symptoms of infection 11/16/23 Met   He will have no complaint of increased edema in the groin. Very minimal edema left groin 3/5/2025 Ongoing   Patient will be independent with all tools available to manage his LLE lymphedema. Will look into ordering Reduction Kits for the L LE. 3/12/25 Ongoing   He will be able to complete 5 rotations with both legs on the Unicam bike with the pedal lengths adjusted He is able to do this but the seat is high and he is hiking the L hip to get the  LLE around. 7/31 Met        Assessment/Plan     ASSESSMENT: Patient has been up more the past week.  He had blood work and his APRN will contact his Sickle Cell DrPriti As patient's hemoglobin is below 8.  He normally needs a blood transfusion if his hemoglobin is below 8.  Patient has dense tissue L LE but less than he has had a few months ago.  Patient would benefit from a Reduction Kit for the L thigh, knee, and lower leg.  Also a PAC band for the L foot.  This will help further soften the dense tissue and reduce the leg.  He can also adjust the Reduction Kit straps as needed throughout the day.       Compression needed:   Reduction Kit L thigh   Reduction Kit L Knee    Reduction Kit lower L leg   PAC Band L foot      PLAN:   continue with CDT will see 1 time a week x 8 weeks    SIGNATURE: Elida Sequeira PTA, [place], [place] License #: [id number]  Electronically Signed on 3/12/2025        [address] [place] [address]. [address]  [phone]

## 2025-03-19 ENCOUNTER — HOSPITAL ENCOUNTER (OUTPATIENT)
Dept: PHYSICAL THERAPY | Facility: HOSPITAL | Age: 55
Setting detail: THERAPIES SERIES
Discharge: HOME | End: 2025-03-19
Payer: MEDICARE

## 2025-03-19 DIAGNOSIS — I89.0 LYMPHEDEMA OF GENITALIA: Primary | ICD-10-CM

## 2025-03-19 DIAGNOSIS — C64.1 RENAL CELL CARCINOMA OF RIGHT KIDNEY: ICD-10-CM

## 2025-03-19 DIAGNOSIS — I89.0 LYMPHEDEMA OF LEFT LEG: ICD-10-CM

## 2025-03-19 PROCEDURE — 97110 THERAPEUTIC EXERCISES: CPT

## 2025-03-19 PROCEDURE — 97140 MANUAL THERAPY 1/> REGIONS: CPT

## 2025-03-19 NOTE — THERAPY TREATMENT NOTE
Physical Therapy Treatment Note  [address] [place] [address]    Patient: Holland Phelps                                                 Visit Date: 3/19/2025  :     1970    Referring practitioner:    GILBERTO Ornelas  Date of Initial Visit:          Type: THERAPY  Episode: LLE lymphedema s/p cellulitis and I&D      Visit Diagnoses:    ICD-10-CM ICD-9-CM   1. Lymphedema of genitalia  I89.0 457.1   2. Lymphedema of left leg  I89.0 457.1   3. Renal cell carcinoma of right kidney  C64.1 189.0     SUBJECTIVE     Subjective: He went to [place] yesterday and had his scans, everything is stable.  He has not had any change in his scans for 5 years, patient does not have to return for scans for another 6 months. He did have a blood transfusion last week and he feels better, he had two pints of blood. He has some swelling and numbness in the L lower leg today.  He knows he needs to rest, he does have several vehicles to wash/clean today.      PAIN: 0/10          OBJECTIVE     Objective    Lymphedema       Row Name 25 0800             Subjective Pain    Able to rate subjective pain? yes  -AL      Pre-Treatment Pain Level 0  -AL      Post-Treatment Pain Level 0  -AL         Manual Lymphatic Drainage    Manual Lymphatic Drainage initial sequence;opened regional lymph nodes;opened anastamoses;extremity treatment  -AL      Initial Sequence short neck;abdomen;diaphragmatic breathing  -AL      Abdomen superficial;deep  -AL      Diaphragmatic Breathing x 9 with superficial abdominals  -AL      Opened Regional Lymph Nodes axillary;inguinal  -AL      Axillary right;left  -AL      Inguinal right;left  -AL      Opened Anastamoses inguino-axillary  -AL      Inguino-Axillary right;left  -AL      Extremity Treatment MLD to full limb  -AL      MLD to Full Limb L LE  -AL      Extremity Treatment Focus On Supine and prone  -AL      Manual Lymphatic  Drainage Comments Used hand-held massager as well as small roller to the left lower extremity both posterior and anterior  -AL      Manual Therapy 60  -AL         Compression/Skin Care    Compression/Skin Care compression garment  -AL      Skin Care lotion applied  -AL      Compression/Skin Care Comments Patient donned his compression thigh high.  -AL                User Key  (r) = Recorded By, (t) = Taken By, (c) = Cosigned By      Initials Name Provider Type    Elida Pickering, KEILA, CLT-LANNY Physical Therapist Assistant                        Therapeutic Exercises    65886 Units Comments   L Prone quad stretch   ROM is limited due to knee surgeries.    B hamstrings stretch       B hip flexor stretch                       Timed Minutes 10         Therapy Education/Self Care 37609   Education offered today Continue to wear compression and use the pump as needed.    Medbridge Code    Ongoing HEP   Work on CDT   Timed Minutes        Total Timed Treatment:     70   mins  Total Time of Visit:             70   mins         ASSESSMENT/PLAN     GOALS        Goals                                          Progress Note due by 4/4/2025                                                      Recert due by 4/21/2025   LTG by: 12 weeks Comments Date Status   Patient will have a reduction in LLE of at least 20 cm to allow more comfortable walking and mobility.  The L LE is up in size today. 3/19/2025 Ongoing   Patient will be independent with specific HEP for lymphedema He is able to move around much better. 6/20/23 Met   He will have no s/s infection.  No signs or symptoms of infection 11/16/23 Met   He will have no complaint of increased edema in the groin. Very minimal edema left groin 3/5/2025 Ongoing   Patient will be independent with all tools available to manage his LLE lymphedema. Will look into ordering Reduction Kits for the L LE. 3/12/25 Ongoing   He will be able to complete 5 rotations with both legs on the Unicam bike  with the pedal lengths adjusted He is able to do this but the seat is high and he is hiking the L hip to get the LLE around. 7/31 Met        Assessment/Plan     ASSESSMENT: Patient traveled to [place] yesterday and was on his legs a lot more than usual.  Visually he does have increased swelling today, as well as tight shiny skin left lower leg.  Tissue is more dense today in the entire leg.  Patient has been out of his routine for complete decongestive therapy and using the pump, he will work on getting back into his normal routine.  Patient will contact Yane at [facility] and see if he can schedule an appointment next week to be measured for reduction kits for the left lower extremity as well as a PAC band for the left foot.  I will take measurements of the left lower extremity next week.      Compression needed:   Reduction Kit L thigh   Reduction Kit L Knee    Reduction Kit lower L leg   PAC Band L foot      PLAN:   continue with CDT will see 1 time a week x 8 weeks    SIGNATURE: Elida Sequeira PTA, [place], [place] License #: [id number]  Electronically Signed on 3/19/2025        [address] [place] [address]. [address]  [phone]

## 2025-03-26 ENCOUNTER — HOSPITAL ENCOUNTER (OUTPATIENT)
Dept: PHYSICAL THERAPY | Facility: HOSPITAL | Age: 55
Setting detail: THERAPIES SERIES
Discharge: HOME | End: 2025-03-26
Payer: MEDICARE

## 2025-03-26 DIAGNOSIS — I89.0 LYMPHEDEMA OF GENITALIA: ICD-10-CM

## 2025-03-26 DIAGNOSIS — C64.1 RENAL CELL CARCINOMA OF RIGHT KIDNEY: ICD-10-CM

## 2025-03-26 DIAGNOSIS — I89.0 LYMPHEDEMA OF LEFT LEG: Primary | ICD-10-CM

## 2025-03-26 PROCEDURE — 97140 MANUAL THERAPY 1/> REGIONS: CPT | Performed by: PHYSICAL THERAPIST

## 2025-03-28 NOTE — THERAPY TREATMENT NOTE
Physical Therapy Treatment Note  [address] [place] [address]    Patient: Holland Phelps                                                 Visit Date: 3/26/2025  :     1970    Referring practitioner:    GILBERTO Ornelas  Date of Initial Visit:          Type: THERAPY  Episode: LLE lymphedema s/p cellulitis and I&D      Visit Diagnoses:    ICD-10-CM ICD-9-CM   1. Lymphedema of left leg  I89.0 457.1   2. Lymphedema of genitalia  I89.0 457.1   3. Renal cell carcinoma of right kidney  C64.1 189.0     SUBJECTIVE     Subjective: He went to [place] yesterday and had his scans, everything is stable.  He has not had any change in his scans for 5 years, patient does not have to return for scans for another 6 months. He did have a blood transfusion last week and he feels better, he had two pints of blood. He has some swelling and numbness in the L lower leg today.  He knows he needs to rest, he does have several vehicles to wash/clean today.      PAIN: 0/10          OBJECTIVE     Objective        25 0800   Subjective Pain   Able to rate subjective pain? yes   Pre-Treatment Pain Level 0   Manual Lymphatic Drainage   Manual Lymphatic Drainage initial sequence;opened regional lymph nodes;opened anastamoses;extremity treatment   Initial Sequence short neck;abdomen;diaphragmatic breathing   Abdomen superficial;deep   Opened Regional Lymph Nodes axillary;inguinal   Axillary right;left   Inguinal right;left   Opened Anastamoses inguino-axillary   Inguino-Axillary right;left   Extremity Treatment MLD to full limb   MLD to Full Limb LLE   Extremity Treatment Focus On Supine and prone   Manual Lymphatic Drainage Comments Used small roller to the left lower extremity both posterior and anterior   L Prone quad stretch    Supine quad stretch and hip flexor stretch   Manual Therapy  60 minutes   Compression/Skin Care   Compression/Skin Care  compression garment   Skin Care lotion applied   Compression/Skin Care Comments Patient donned his compression thigh high.               Therapy Education/Self Care 05456   Education offered today Continue to wear compression and use the pump as needed.    Medbridge Code    Ongoing HEP   Work on CDT   Timed Minutes        Total Timed Treatment:     60   mins  Total Time of Visit:             60   mins         ASSESSMENT/PLAN     GOALS        Goals                                          Progress Note due by 4/4/2025                                                      Recert due by 4/21/2025   LTG by: 12 weeks Comments Date Status   Patient will have a reduction in LLE of at least 20 cm to allow more comfortable walking and mobility.  The L LE is up in size today. 3/19/2025 Ongoing   Patient will be independent with specific HEP for lymphedema He is able to move around much better. 6/20/23 Met   He will have no s/s infection.  No signs or symptoms of infection 11/16/23 Met   He will have no complaint of increased edema in the groin. Very minimal edema left groin 3/5/2025 Ongoing   Patient will be independent with all tools available to manage his LLE lymphedema. Will look into ordering Reduction Kits for the L LE. 3/26/25 Ongoing   He will be able to complete 5 rotations with both legs on the Unicam bike with the pedal lengths adjusted He is able to do this but the seat is high and he is hiking the L hip to get the LLE around. 7/31/24 Met        Assessment/Plan     ASSESSMENT: He doubled up on the compression last night because his leg had been feeling very tight. He went to ER and had it checked. They did find blood clots, but his doctor believes these are residual from the ones he had surgery for.       Compression needed:   Reduction Kit L thigh   Reduction Kit L Knee    Reduction Kit lower L leg   PAC Band L foot      PLAN:   continue with CDT will see 1 time a week x 8 weeks    SIGNATURE: Debbie Morales,  PT, DPT, [place] License #: [id number]  Electronically Signed on 3/28/2025        [address] [place] [address]  [phone]

## 2025-04-02 ENCOUNTER — HOSPITAL ENCOUNTER (OUTPATIENT)
Dept: PHYSICAL THERAPY | Facility: HOSPITAL | Age: 55
Setting detail: THERAPIES SERIES
Discharge: HOME | End: 2025-04-02
Payer: MEDICARE

## 2025-04-02 DIAGNOSIS — I89.0 LYMPHEDEMA OF LEFT LEG: Primary | ICD-10-CM

## 2025-04-02 DIAGNOSIS — I89.0 LYMPHEDEMA OF GENITALIA: ICD-10-CM

## 2025-04-02 DIAGNOSIS — C64.1 RENAL CELL CARCINOMA OF RIGHT KIDNEY: ICD-10-CM

## 2025-04-02 PROCEDURE — 97140 MANUAL THERAPY 1/> REGIONS: CPT

## 2025-04-02 PROCEDURE — 97110 THERAPEUTIC EXERCISES: CPT

## 2025-04-02 NOTE — THERAPY TREATMENT NOTE
Physical Therapy Treatment Note and 30 Day Progress Note  [address] [place] [address]    Patient: Holland Phelps                                                 Visit Date: 2025  :     1970    Referring practitioner:    GILBERTO Ornelas  Date of Initial Visit:          Type: THERAPY  Episode: LLE lymphedema s/p cellulitis and I&D      Visit Diagnoses:    ICD-10-CM ICD-9-CM   1. Lymphedema of left leg  I89.0 457.1   2. Lymphedema of genitalia  I89.0 457.1   3. Renal cell carcinoma of right kidney  C64.1 189.0     SUBJECTIVE     Subjective: He has been trying different options with his compression.  He states the swelling is better today as compared to last week.       PAIN: 0/10 numbness R ankle         OBJECTIVE     Objective    Lymphedema       Row Name 25 0800             Subjective Pain    Able to rate subjective pain? yes  -AL      Pre-Treatment Pain Level 0  -AL      Post-Treatment Pain Level 0  -AL         Lymphedema Measurements    Measurement Type(s) Circumferential  -AL      Circumferential Areas Lower extremities  -AL         BLE Circumferential (cm)    Measurement Location 1 BOT  -AL      Left 1 21.2 cm  -AL      Measurement Location 2 +7  -AL      Left 2 25 cm  -AL      Measurement Location 3 +7  -AL      Left 3 25.2 cm  -AL      Measurement Location 4 +10  -AL      Left 4 31.5 cm  -AL      Measurement Location 5 +10  -AL      Left 5 39.2 cm  -AL      Measurement Location 6 +10  -AL      Left 6 46 cm  -AL      Measurement Location 7 +10  -AL      Left 7 42.7 cm  -AL      Measurement Location 8 +10  -AL      Left 8 48.2 cm  -AL      Measurement Location 9 +10  -AL      Left 9 55 cm  -AL      Measurement Location 10 +10  -AL      Left 10 59.2 cm  -AL      LLE Circumferential Total 393.2 cm  -AL         Manual Lymphatic Drainage    Manual Lymphatic Drainage initial sequence;opened regional lymph nodes;opened  anastamoses;extremity treatment  -AL      Initial Sequence short neck;abdomen;diaphragmatic breathing  -AL      Abdomen superficial;deep  -AL      Diaphragmatic Breathing x 9 with superficial abdominals  -AL      Opened Regional Lymph Nodes axillary;inguinal  -AL      Axillary right;left  -AL      Inguinal right;left  -AL      Opened Anastamoses inguino-axillary  -AL      Inguino-Axillary right;left  -AL      Extremity Treatment MLD to full limb  -AL      MLD to Full Limb LLE  -AL      Extremity Treatment Focus On Supine and prone  -AL      Manual Lymphatic Drainage Comments Used small roller to the left lower extremity both posterior and anterior  -AL      Manual Therapy 75  -AL         Compression/Skin Care    Compression/Skin Care compression garment  -AL      Skin Care lotion applied  -AL      Compression/Skin Care Comments Patient donned his compression thigh high.  Patient will need a regular standard size lower leg reduction kit as well as the upper leg reduction kit.  He will need a knee reduction kit and a large PAC band  -AL                User Key  (r) = Recorded By, (t) = Taken By, (c) = Cosigned By      Initials Name Provider Type    Elida Pickering, KEILA, SAHIL Physical Therapist Assistant                      Therapeutic Exercises    44836 Units Comments   Stretched L quads in prone     Stretched each hamstring, each hip flexor, and each ankle into dorsiflexion                    Timed Minutes 10          Therapy Education/Self Care 06748   Education offered today Discussed the reduction kits   Medbridge Code    Ongoing HEP   Work on CDT and use the pump   Timed Minutes        Total Timed Treatment:     85   mins  Total Time of Visit:             85   mins         ASSESSMENT/PLAN     GOALS        Goals                                          Progress Note due by 5/2/2025                                                      Recert due by 4/21/2025   LTG by: 12 weeks Comments Date Status   Patient  will have a reduction in LLE of at least 20 cm to allow more comfortable walking and mobility.  The L LE is up in size today compared to his last measurements, per patient his left lower extremity is smaller than last week. 4/2/2025 Ongoing   Patient will be independent with specific HEP for lymphedema He is able to move around much better. 6/20/23 Met   He will have no s/s infection.  No signs or symptoms of infection 11/16/23 Met   He will have no complaint of increased edema in the groin. Very minimal edema left groin 4/2/2025 Ongoing   Patient will be independent with all tools available to manage his LLE lymphedema. Will look into ordering Reduction Kits for the L LE. 4/2/2025 Ongoing   He will be able to complete 5 rotations with both legs on the Unicam bike with the pedal lengths adjusted He is able to do this but the seat is high and he is hiking the L hip to get the LLE around. 7/31/24 Met        Assessment/Plan     ASSESSMENT: Patient had increased swelling in the left lower extremity last week which he went to the emergency room for.  Since that time he has been wearing his compression during the day and the nighttime garment at night using extra compression as needed.  Patient would benefit from reduction kits and a PAC band for the left lower extremity to help reduce the lower extremity more.  Patient continues to work on complete decongestive therapy.      Compression needed:   Reduction Kit L thigh   Reduction Kit L Knee    Reduction Kit lower L leg   PAC Band L foot      PLAN:   continue with CDT will see 1 time a week x 8 weeks    SIGNATURE: Elida Sequeira PTA, [facility] [place] License #: [id number]  Electronically Signed on 4/2/2025        [address] [place] [address]. [address]  [phone]

## 2025-04-04 NOTE — THERAPY TREATMENT NOTE
Progress Note Addendum    Patient: Holland Phelps           : 1970  Visit Date: 2025  Referring practitioner: GILBERTO Ornelas  Date of Initial Visit: Type: THERAPY  Episode: LLE lymphedema s/p cellulitis and I&D    Visit Diagnoses:    ICD-10-CM ICD-9-CM   1. Lymphedema of left leg  I89.0 457.1   2. Lymphedema of genitalia  I89.0 457.1   3. Renal cell carcinoma of right kidney  C64.1 189.0          Clinical Progress: improved  Home Program Compliance: Yes  Progress toward previous goals: Partially Met  Prognosis to achieve goals: good    Objective     Assessment & Plan       Assessment  Assessment details: Patient had increased swelling in the left lower extremity last week which he went to the emergency room for.  Since that time he has been wearing his compression during the day and the nighttime garment at night using extra compression as needed.  Patient would benefit from reduction kits and a PAC band for the left lower extremity to help reduce the lower extremity more.  Patient continues to work on complete decongestive therapy.    Plan  Therapy options: will be seen for skilled therapy services  Frequency: 2x week  Treatment plan discussed with: PTA        Anticipated CPT codes: Therapeutic Exercise 24811, Manual Therapy 94886, Therapeutic Activity 53523, and Self Care/Home Management 40947    I reviewed the treatment and goals with Aneta Sequeira and agree with the POC.    SIGNATURE: Debbie Morales, PT, DPT, CLT-LANNY, License #: [id number]  Electronically Signed on 2025

## 2025-04-09 ENCOUNTER — HOSPITAL ENCOUNTER (OUTPATIENT)
Dept: PHYSICAL THERAPY | Facility: HOSPITAL | Age: 55
Setting detail: THERAPIES SERIES
Discharge: HOME | End: 2025-04-09
Payer: MEDICARE

## 2025-04-09 DIAGNOSIS — I89.0 LYMPHEDEMA OF LEFT LEG: Primary | ICD-10-CM

## 2025-04-09 DIAGNOSIS — C64.1 RENAL CELL CARCINOMA OF RIGHT KIDNEY: ICD-10-CM

## 2025-04-09 DIAGNOSIS — I89.0 LYMPHEDEMA OF GENITALIA: ICD-10-CM

## 2025-04-09 PROCEDURE — 97110 THERAPEUTIC EXERCISES: CPT

## 2025-04-09 PROCEDURE — 97140 MANUAL THERAPY 1/> REGIONS: CPT

## 2025-04-09 NOTE — THERAPY TREATMENT NOTE
Physical Therapy Treatment Note  [address] [place] [address]    Patient: Holland Phelps                                                 Visit Date: 2025  :     1970    Referring practitioner:    GILBERTO Ornelas  Date of Initial Visit:          Type: THERAPY  Episode: LLE lymphedema s/p cellulitis and I&D      Visit Diagnoses:    ICD-10-CM ICD-9-CM   1. Lymphedema of left leg  I89.0 457.1   2. Lymphedema of genitalia  I89.0 457.1   3. Renal cell carcinoma of right kidney  C64.1 189.0     SUBJECTIVE     Subjective: He hopes he can get the Reduction Kits for the L LE.       PAIN: 0/10 numbness R ankle         OBJECTIVE     Objective    Lymphedema       Row Name 25 0800             Subjective Pain    Able to rate subjective pain? yes  -AL      Pre-Treatment Pain Level 0  -AL      Post-Treatment Pain Level 0  -AL         Manual Lymphatic Drainage    Manual Lymphatic Drainage initial sequence;opened regional lymph nodes;opened anastamoses;extremity treatment  -AL      Initial Sequence short neck;abdomen;diaphragmatic breathing  -AL      Abdomen superficial;deep  -AL      Diaphragmatic Breathing x 9 with superficial abdominals  -AL      Opened Regional Lymph Nodes axillary;inguinal  -AL      Axillary right;left  -AL      Inguinal right;left  -AL      Opened Anastamoses inguino-axillary  -AL      Inguino-Axillary right;left  -AL      Extremity Treatment MLD to full limb  -AL      MLD to Full Limb LLE  -AL      Extremity Treatment Focus On Supine and prone  -AL      Manual Lymphatic Drainage Comments Used small roller to the left lower extremity both posterior and anterior  -AL      Manual Therapy 60  -AL         Compression/Skin Care    Compression/Skin Care compression garment  -AL      Skin Care lotion applied  -AL      Compression/Skin Care Comments Used small roller to the left lower extremity both posterior and anterior   -AL                User Key  (r) = Recorded By, (t) = Taken By, (c) = Cosigned By      Initials Name Provider Type    Elida Pickering, KEILA, CLT-LANNY Physical Therapist Assistant                      Therapeutic Exercises    31015 Units Comments   Stretched L quads in prone     Stretched each hamstring, each hip flexor, and each ankle into dorsiflexion                    Timed Minutes 10          Therapy Education/Self Care 44578   Education offered today Discussed the reduction kits   Medbridge Code    Ongoing HEP   Work on CDT and use the pump   Timed Minutes        Total Timed Treatment:     70   mins  Total Time of Visit:             70   mins         ASSESSMENT/PLAN     GOALS        Goals                                          Progress Note due by 5/2/2025                                                      Recert due by 4/21/2025   LTG by: 12 weeks Comments Date Status   Patient will have a reduction in LLE of at least 20 cm to allow more comfortable walking and mobility.  The L LE is up in size today compared to his last measurements, per patient his left lower extremity is smaller than last week. 4/2/2025 Ongoing   Patient will be independent with specific HEP for lymphedema He is able to move around much better. 6/20/23 Met   He will have no s/s infection.  No signs or symptoms of infection 11/16/23 Met   He will have no complaint of increased edema in the groin. Very minimal edema left groin 4/2/2025 Ongoing   Patient will be independent with all tools available to manage his LLE lymphedema. We are working on getting reduction kits to be covered by Medicare. 4/9/25 Ongoing   He will be able to complete 5 rotations with both legs on the Unicam bike with the pedal lengths adjusted He is able to do this but the seat is high and he is hiking the L hip to get the LLE around. 7/31/24 Met        Assessment/Plan     ASSESSMENT: Patient still has swelling in the L LE, the leg is reduced per patient in the L LE  when he first gets up in the morning.  He is using a variety of different compression on the L LE with the night garment. I did give him more stockinette to put over the nighttime garment for more compression.  He works to make sure he has gradient compression. I am hopeful patient will be able to get the Reduction Kits, I do feel the garments will help reduce his legs further.       Compression needed:   Reduction Kit L thigh   Reduction Kit L Knee    Reduction Kit lower L leg   PAC Band L foot      PLAN:   continue with CDT will see 1 time a week x 8 weeks    SIGNATURE: Elida Sequeira PTA, [place], [place] License #: [id number]  Electronically Signed on 4/9/2025        [address] [place] [address]. [address]  [phone]

## 2025-04-16 ENCOUNTER — HOSPITAL ENCOUNTER (OUTPATIENT)
Dept: PHYSICAL THERAPY | Facility: HOSPITAL | Age: 55
Setting detail: THERAPIES SERIES
Discharge: HOME | End: 2025-04-16
Payer: MEDICARE

## 2025-04-16 DIAGNOSIS — I89.0 LYMPHEDEMA OF LEFT LEG: Primary | ICD-10-CM

## 2025-04-16 DIAGNOSIS — C64.1 RENAL CELL CARCINOMA OF RIGHT KIDNEY: ICD-10-CM

## 2025-04-16 DIAGNOSIS — I89.0 LYMPHEDEMA OF GENITALIA: ICD-10-CM

## 2025-04-16 PROCEDURE — 97110 THERAPEUTIC EXERCISES: CPT

## 2025-04-16 PROCEDURE — 97140 MANUAL THERAPY 1/> REGIONS: CPT

## 2025-04-16 NOTE — THERAPY TREATMENT NOTE
Physical Therapy Treatment Note  [address] [place] [address]    Patient: Holland Phelps                                                 Visit Date: 2025  :     1970    Referring practitioner:    GILBERTO Ornelas  Date of Initial Visit:          Type: THERAPY  Episode: LLE lymphedema s/p cellulitis and I&D      Visit Diagnoses:    ICD-10-CM ICD-9-CM   1. Lymphedema of left leg  I89.0 457.1   2. Lymphedema of genitalia  I89.0 457.1   3. Renal cell carcinoma of right kidney  C64.1 189.0     SUBJECTIVE     Subjective: He has been working with the nighttime garment and other compression to get the L leg down in size.  He can tell the leg is less swollen.        PAIN: 0/10 numbness R ankle         OBJECTIVE     Objective    Lymphedema       Row Name 25 0800             Subjective Pain    Able to rate subjective pain? yes  -AL      Pre-Treatment Pain Level 0  -AL      Post-Treatment Pain Level 0  -AL         Manual Lymphatic Drainage    Manual Lymphatic Drainage initial sequence;opened regional lymph nodes;opened anastamoses;extremity treatment  -AL      Initial Sequence short neck;abdomen;diaphragmatic breathing  -AL      Abdomen superficial;deep  -AL      Opened Regional Lymph Nodes axillary;inguinal  -AL      Axillary right;left  -AL      Inguinal right;left  -AL      Opened Anastamoses inguino-axillary  -AL      Inguino-Axillary right;left  -AL      Extremity Treatment MLD to full limb  -AL      MLD to Full Limb LLE  -AL      Extremity Treatment Focus On Supine and prone  -AL      Manual Lymphatic Drainage Comments Used small roller to the left lower extremity both posterior and anterior  -AL      Manual Therapy 60  -AL         Compression/Skin Care    Compression/Skin Care compression garment  -AL      Skin Care lotion applied  -AL      Compression/Skin Care Comments He donned the compression thigh high  -AL                 User Key  (r) = Recorded By, (t) = Taken By, (c) = Cosigned By      Initials Name Provider Type    Elida Pickering, PTA, [facility] Physical Therapist Assistant                        Therapeutic Exercises    93602 Units Comments   Stretched L quads in prone     Stretched each hamstring, each hip flexor, and each ankle into dorsiflexion                    Timed Minutes 10          Therapy Education/Self Care 28530   Education offered today Discussed the reduction kits   Medbridge Code    Ongoing HEP   Work on CDT and use the pump   Timed Minutes        Total Timed Treatment:     70   mins  Total Time of Visit:             70   mins         ASSESSMENT/PLAN     GOALS        Goals                                          Progress Note due by 5/2/2025                                                      Recert due by 4/21/2025   LTG by: 12 weeks Comments Date Status   Patient will have a reduction in LLE of at least 20 cm to allow more comfortable walking and mobility.  The L LE is up in size today compared to his last measurements, per patient his left lower extremity is smaller than last week. 4/2/2025 Ongoing   Patient will be independent with specific HEP for lymphedema He is able to move around much better. 6/20/23 Met   He will have no s/s infection.  No signs or symptoms of infection 11/16/23 Met   He will have no complaint of increased edema in the groin. Very minimal edema left groin 4/2/2025 Ongoing   Patient will be independent with all tools available to manage his LLE lymphedema. We are working on getting reduction kits to be covered by Medicare.  He is working with his nighttime garment and other compression. 4/16/25 Ongoing   He will be able to complete 5 rotations with both legs on the Unicam bike with the pedal lengths adjusted He is able to do this but the seat is high and he is hiking the L hip to get the LLE around. 7/31/24 Met        Assessment/Plan     ASSESSMENT: The L leg is visibly less  swollen today, patient has been working on using different compression garments with the nighttime garments.  He has had an increase in urine output with the decrease size of the L Leg. I did receive the signed order the Reduction Kits from patient's provider, but the quality of the fax was very poor due to a change in our system.  Cruzito was not able to read the fax, I did give a copy of the order to patient to give to his provider.  Patient will have his provider fax the form to Cruzito from her office.       Compression needed:   Reduction Kit L thigh   Reduction Kit L Knee    Reduction Kit lower L leg   PAC Band L foot      PLAN:   continue with CDT will see 1 time a week x 8 weeks    SIGNATURE: Elida Sequeira PTA, [place], [place] License #: [id number]  Electronically Signed on 4/16/2025        [address] [place] [address]. [address]  [phone]

## 2025-04-23 ENCOUNTER — HOSPITAL ENCOUNTER (OUTPATIENT)
Dept: PHYSICAL THERAPY | Facility: HOSPITAL | Age: 55
Setting detail: THERAPIES SERIES
Discharge: HOME | End: 2025-04-23
Payer: MEDICARE

## 2025-04-23 DIAGNOSIS — I89.0 LYMPHEDEMA OF LEFT LEG: Primary | ICD-10-CM

## 2025-04-23 DIAGNOSIS — I89.0 LYMPHEDEMA OF GENITALIA: ICD-10-CM

## 2025-04-23 DIAGNOSIS — C64.1 RENAL CELL CARCINOMA OF RIGHT KIDNEY: ICD-10-CM

## 2025-04-23 PROCEDURE — 97140 MANUAL THERAPY 1/> REGIONS: CPT

## 2025-04-23 PROCEDURE — 97110 THERAPEUTIC EXERCISES: CPT

## 2025-04-23 NOTE — THERAPY TREATMENT NOTE
Physical Therapy Treatment Note, 30 Day Progress Note, and 90 Day Recertification Note  [address] [place] [address]    Patient: Holland Pehlps                                                 Visit Date: 2025  :     1970    Referring practitioner:    GILBERTO Ornelas  Date of Initial Visit:          Type: THERAPY  Episode: LLE lymphedema s/p cellulitis and I&D      Visit Diagnoses:    ICD-10-CM ICD-9-CM   1. Lymphedema of left leg  I89.0 457.1   2. Lymphedema of genitalia  I89.0 457.1   3. Renal cell carcinoma of right kidney  C64.1 189.0     SUBJECTIVE     Subjective: His BP is high, the DrPriti Is changing patient's meds.  He continues to wear his compression daily and use the nighttime garment. Patient requests no R knee flexion stretch due to knee discomfort.  He has had to make two trips to [facility] last week.   Patient states his leg is much smaller in the morning, when he stands up and moves around gravity brings the swelling back into his L leg.       PAIN: 0/10          OBJECTIVE     Objective    Lymphedema       Row Name 25 0800             Subjective Pain    Able to rate subjective pain? yes  -AL      Pre-Treatment Pain Level 0  -AL      Post-Treatment Pain Level 0  -AL         BLE Circumferential (cm)    Measurement Location 1 BOT  -AL      Left 1 21.5 cm  -AL      Measurement Location 2 +7  -AL      Left 2 24.2 cm  -AL      Measurement Location 3 +7  -AL      Left 3 25.1 cm  -AL      Measurement Location 4 +10  -AL      Left 4 31.4 cm  -AL      Measurement Location 5 +10  -AL      Left 5 41.4 cm  -AL      Measurement Location 6 +10  -AL      Left 6 46.5 cm  -AL      Measurement Location 7 +10  -AL      Left 7 44 cm  -AL      Measurement Location 8 +10  -AL      Left 8 47.7 cm  -AL      Measurement Location 9 +10  -AL      Left 9 55 cm  -AL      Measurement Location 10 +10  -AL      Left 10 61 cm  -AL      LLE  Circumferential Total 397.8 cm  -AL         Manual Lymphatic Drainage    Manual Lymphatic Drainage initial sequence;opened regional lymph nodes;opened anastamoses;extremity treatment  -AL      Initial Sequence short neck;abdomen;diaphragmatic breathing  -AL      Abdomen superficial;deep  -AL      Diaphragmatic Breathing x 9 with superficial abdominals  -AL      Opened Regional Lymph Nodes axillary;inguinal  -AL      Axillary right;left  -AL      Inguinal right;left  -AL      Opened Anastamoses inguino-axillary  -AL      Inguino-Axillary right;left  -AL      Extremity Treatment MLD to full limb  -AL      MLD to Full Limb LLE  -AL      Extremity Treatment Focus On Supine and prone  -AL      Manual Lymphatic Drainage Comments Continue to use chung hand held massager and small roller to the L LE in supine and prone.  -AL      Manual Therapy 60  -AL         Compression/Skin Care    Compression/Skin Care compression garment  -AL      Skin Care lotion applied  -AL      Compression/Skin Care Comments Wear compression.  -AL                User Key  (r) = Recorded By, (t) = Taken By, (c) = Cosigned By      Initials Name Provider Type    AL Elida Sequeira, PTA, CLT-LANNY Physical Therapist Assistant                          Therapeutic Exercises    14031 Units Comments   Stretched each hamstring, each hip flexor, and each ankle into dorsiflexion                    Timed Minutes 10          Therapy Education/Self Care 14389   Education offered today Bring the reduction kits when they come in.   Medbridge Code    Ongoing HEP   Work on CDT and use the pump   Timed Minutes        Total Timed Treatment:     70   mins  Total Time of Visit:             70   mins         ASSESSMENT/PLAN     GOALS        Goals                                          Progress Note due by 5/23/2025                                                      Recert due by 7/22/2025   LTG by: 12 weeks Comments Date Status   Patient will have a reduction in LLE of  at least 20 cm to allow more comfortable walking and mobility.  The left lower extremity is up slightly as compared to his last measurements 4/23/2025 Ongoing   Patient will be independent with specific HEP for lymphedema He is able to move around much better. 6/20/23 Met   He will have no s/s infection.  No signs or symptoms of infection 11/16/23 Met   He will have no complaint of increased edema in the groin. Very minimal edema left groin 4/23/2025 Partially met   Patient will be independent with all tools available to manage his LLE lymphedema. He is waiting for the Reduction Kits and PAC band to be shipped. 4/23/25 Ongoing   He will be able to complete 5 rotations with both legs on the Unicam bike with the pedal lengths adjusted He is able to do this but the seat is high and he is hiking the L hip to get the LLE around. 7/31/24 Met        Assessment/Plan     ASSESSMENT:  The order for the Reduction Kits have been ordered and approved, the insurance will cover all that was ordered except the knee reduction kit.  The Upper and lower leg reduction kit and the PAC band are covered, the order has not been shipped yet.  He has been to [place], the DrPriti Is monitoring his BP as his numbers are high. The DrPriti Is making medication changes. He has had an increase of 4.6 cm in the L LE as compared to his last measurement.     Compression needed:   Reduction Kit L thigh   Reduction Kit L Knee    Reduction Kit lower L leg   PAC Band L foot      PLAN:   continue with CDT will see 1 time a week x 8 weeks    SIGNATURE: Elida Sequeira PTA, [facility] [place] License #: [id number]  Electronically Signed on 4/23/2025        [address] [place] [address]. [address]  [phone]

## 2025-04-30 ENCOUNTER — HOSPITAL ENCOUNTER (OUTPATIENT)
Dept: PHYSICAL THERAPY | Facility: HOSPITAL | Age: 55
Setting detail: THERAPIES SERIES
Discharge: HOME | End: 2025-04-30
Payer: MEDICARE

## 2025-04-30 DIAGNOSIS — I89.0 LYMPHEDEMA OF LEFT LEG: Primary | ICD-10-CM

## 2025-04-30 DIAGNOSIS — C64.1 RENAL CELL CARCINOMA OF RIGHT KIDNEY: ICD-10-CM

## 2025-04-30 DIAGNOSIS — I89.0 LYMPHEDEMA OF GENITALIA: ICD-10-CM

## 2025-04-30 PROCEDURE — 97535 SELF CARE MNGMENT TRAINING: CPT

## 2025-04-30 PROCEDURE — 97140 MANUAL THERAPY 1/> REGIONS: CPT

## 2025-04-30 NOTE — THERAPY TREATMENT NOTE
Physical Therapy Treatment Note  [address] [place] [address]    Patient: Holland Phelps                                                 Visit Date: 2025  :     1970    Referring practitioner:    GILBERTO Ornelas  Date of Initial Visit:          Type: THERAPY  Episode: LLE lymphedema s/p cellulitis and I&D      Visit Diagnoses:    ICD-10-CM ICD-9-CM   1. Lymphedema of left leg  I89.0 457.1   2. Lymphedema of genitalia  I89.0 457.1   3. Renal cell carcinoma of right kidney  C64.1 189.0     SUBJECTIVE     Subjective: He states he went to [facility] last Friday and was admitted for two days with CHF.  He was on a small does of Lasix, he went form 210 pounds and came out of the hospital 183.  His energy level is much better and his BP is not high anymore, 131/78 and HR 70-78 BPM..  He is now averaging around 192 pounds.        PAIN: 0/10          OBJECTIVE     Objective    Lymphedema       Row Name 25 0745             Subjective Pain    Able to rate subjective pain? yes  -AL      Pre-Treatment Pain Level 0  -AL      Post-Treatment Pain Level 0  -AL         BLE Circumferential (cm)    Measurement Location 1 BOT  -AL      Left 1 21.6 cm  -AL      Measurement Location 2 +7  -AL      Left 2 24.1 cm  -AL      Measurement Location 3 +7  -AL      Left 3 24.1 cm  -AL      Measurement Location 4 +10  -AL      Left 4 29.1 cm  -AL      Measurement Location 5 +10  -AL      Left 5 37 cm  -AL      Measurement Location 6 +10  -AL      Left 6 41.8 cm  -AL      Measurement Location 7 +10  -AL      Left 7 41.2 cm  -AL      Measurement Location 8 +10  -AL      Left 8 46 cm  -AL      Measurement Location 9 +10  -AL      Left 9 53.4 cm  -AL      Measurement Location 10 +10  -AL      Left 10 60.8 cm  -AL      LLE Circumferential Total 379.1 cm  -AL         Manual Lymphatic Drainage    Manual Lymphatic Drainage initial sequence;opened regional  lymph nodes;opened anastamoses;extremity treatment  -AL      Initial Sequence short neck;abdomen;diaphragmatic breathing  -AL      Abdomen superficial;deep  -AL      Diaphragmatic Breathing x 9 with superficial abdominals  -AL      Opened Regional Lymph Nodes axillary;inguinal  -AL      Axillary right;left  -AL      Inguinal right;left  -AL      Opened Anastamoses inguino-axillary  -AL      Inguino-Axillary right;left  -AL      Extremity Treatment MLD to full limb  -AL      MLD to Full Limb L LE  -AL      Extremity Treatment Focus On Supine and prone  -AL      Manual Lymphatic Drainage Comments Stretched both hip flexors and hamstrings, L quad stretch in prone.  -AL      Manual Therapy 75  -AL         Compression/Skin Care    Compression/Skin Care compression garment  -AL      Skin Care --  -AL      Compression/Skin Care Comments Adjusted the Reduction kits for the L upper leg and L lower leg, adjusted the PAC band.  Patient donned his copression thigh high.  -AL                User Key  (r) = Recorded By, (t) = Taken By, (c) = Cosigned By      Initials Name Provider Type    AL Elida Sequeira, PTA, SAHIL Physical Therapist Assistant                           Therapy Education/Self Care 91282   Education offered today Educated on how the inelastic garments work, how to adjust them, and how to don and doff the garments.  Patient can wear the inelastic garments during the day and also at night if he would like.  Add a piece of gray foam under the garment to help soften any dense tissue.   Medbridge Code    Ongoing HEP   Work on CDT and use the pump   Timed Minutes 10       Total Timed Treatment:     85   mins  Total Time of Visit:             85   mins         ASSESSMENT/PLAN     GOALS        Goals                                          Progress Note due by 5/23/2025                                                      Recert due by 7/22/2025   LTG by: 12 weeks Comments Date Status   Patient will have a reduction  in LLE of at least 20 cm to allow more comfortable walking and mobility.  The left lower extremity is up slightly as compared to his last measurements 4/23/2025 Ongoing   Patient will be independent with specific HEP for lymphedema He is able to move around much better. 6/20/23 Met   He will have no s/s infection.  No signs or symptoms of infection 11/16/23 Met   He will have no complaint of increased edema in the groin. Very minimal edema left groin 4/23/2025 Partially met   Patient will be independent with all tools available to manage his LLE lymphedema. Fit the Reduction Kits to the L LE and the PAC band to the left foot. 4/30/25 Ongoing   He will be able to complete 5 rotations with both legs on the Unicam bike with the pedal lengths adjusted He is able to do this but the seat is high and he is hiking the L hip to get the LLE around. 7/31/24 Met        Assessment/Plan     ASSESSMENT:  Patient is feeling better since being admitted to [facility].  His BP medication has been increased and he takes the Lasix if he gains 5 pounds or more. He has been up on the leg in the past few days mowing and the leg has not increased.  He has had a reduction of 18.7 cm in the L LE as compared to his last visit. I did fit the reduction kits to the L LE as well as the as the PAC band.  He will use gray foam over any dense tissue left lower extremity.  We did discuss how to adjust the compression which he can do as needed as long as is not causing hills and valleys on the leg from the compression being too tight.  Patient did like the way the compression felt on the left lower extremity.  Insurance did not or a reduction kit knee garment so he will use the the lobule straps to cover the knee using Velcro to secure the straps.    Compression needed:   Reduction Kit L thigh   Reduction Kit L Knee    Reduction Kit lower L leg   PAC Band L foot      PLAN:   continue with CDT will see 1 time a week x 8 weeks.  Assess the effectiveness  of the new compression.    SIGNATURE: Elida Sequeira PTA, [place] License #: [id number]  Electronically Signed on 4/30/2025        [address] [place] [address]  [phone]

## 2025-05-07 ENCOUNTER — APPOINTMENT (OUTPATIENT)
Dept: PHYSICAL THERAPY | Facility: HOSPITAL | Age: 55
End: 2025-05-07
Payer: MEDICARE

## 2025-05-15 ENCOUNTER — HOSPITAL ENCOUNTER (OUTPATIENT)
Dept: PHYSICAL THERAPY | Facility: HOSPITAL | Age: 55
Setting detail: THERAPIES SERIES
Discharge: HOME | End: 2025-05-15
Payer: MEDICARE

## 2025-05-15 DIAGNOSIS — I89.0 LYMPHEDEMA OF LEFT LEG: Primary | ICD-10-CM

## 2025-05-15 DIAGNOSIS — C64.1 RENAL CELL CARCINOMA OF RIGHT KIDNEY: ICD-10-CM

## 2025-05-15 DIAGNOSIS — I89.0 LYMPHEDEMA OF GENITALIA: ICD-10-CM

## 2025-05-15 PROCEDURE — 97140 MANUAL THERAPY 1/> REGIONS: CPT

## 2025-05-15 NOTE — THERAPY TREATMENT NOTE
Physical Therapy Treatment Note  [address] [place] [address]    Patient: Holland Phelps                                                 Visit Date: 5/15/2025  :     1970    Referring practitioner:    GILBERTO Ornelas  Date of Initial Visit:          Type: THERAPY  Episode: LLE lymphedema s/p cellulitis and I&D      Visit Diagnoses:    ICD-10-CM ICD-9-CM   1. Lymphedema of left leg  I89.0 457.1   2. Lymphedema of genitalia  I89.0 457.1   3. Renal cell carcinoma of right kidney  C64.1 189.0     SUBJECTIVE     Subjective:  He had a stomach virus last week and was unable to come for his appointment.  Everything else was fine.  He thinks is leg is down a little, it may be up from driving.  He has been using the reduction kits and he thinks this has helped. He adjusts the garments as needed. He weighed more yesterday morning so he took the Lasix last night, he did have an increase in urine output.       PAIN: 0/10          OBJECTIVE     Objective    Lymphedema       Row Name 05/15/25 0745             Subjective Pain    Able to rate subjective pain? yes  -AL      Pre-Treatment Pain Level 0  -AL      Post-Treatment Pain Level 0  -AL         Lymphedema Measurements    Measurement Type(s) Circumferential  -AL      Circumferential Areas Lower extremities  -AL         BLE Circumferential (cm)    Measurement Location 1 BOT  -AL      Left 1 21.1 cm  -AL      Measurement Location 2 +7  -AL      Left 2 23.9 cm  -AL      Measurement Location 3 +7  -AL      Left 3 23.8 cm  -AL      Measurement Location 4 +10  -AL      Left 4 30.1 cm  -AL      Measurement Location 5 +10  -AL      Left 5 38 cm  -AL      Measurement Location 6 +10  -AL      Left 6 44.4 cm  -AL      Measurement Location 7 +10  -AL      Left 7 42.1 cm  -AL      Measurement Location 8 +10  -AL      Left 8 47.6 cm  -AL      Measurement Location 9 +10  -AL      Left 9 53.8 cm  -AL       Measurement Location 10 +10  -AL      Left 10 57.8 cm  -AL      LLE Circumferential Total 382.6 cm  -AL         Manual Lymphatic Drainage    Manual Lymphatic Drainage initial sequence;opened regional lymph nodes;opened anastamoses;extremity treatment  -AL      Initial Sequence short neck;abdomen;diaphragmatic breathing  -AL      Abdomen superficial;deep  -AL      Diaphragmatic Breathing x 9 with superficial abdominals  -AL      Opened Regional Lymph Nodes axillary;inguinal  -AL      Axillary right;left  -AL      Inguinal right;left  -AL      Opened Anastamoses inguino-axillary  -AL      Inguino-Axillary right;left  -AL      Extremity Treatment MLD to full limb  -AL      MLD to Full Limb L LE  -AL      Extremity Treatment Focus On Worked in supine and prone.  The spring roller over the anterior and posterior left lower extremity.  -AL      Manual Lymphatic Drainage Comments Also did bilateral hamstring and hip flexor stretch, left quad stretch in prone.  -AL      Manual Therapy 70  -AL         Compression/Skin Care    Compression/Skin Care compression garment  -AL      Compression/Skin Care Comments He donned his compression thigh high  -AL                User Key  (r) = Recorded By, (t) = Taken By, (c) = Cosigned By      Initials Name Provider Type    Elida Pickering, PTA, SAHIL Physical Therapist Assistant                        Therapy Education/Self Care 85389   Education offered today Continue to wear compression    Medbridge Code    Ongoing HEP   Work on CDT and use the pump   Timed Minutes        Total Timed Treatment:     70   mins  Total Time of Visit:             70   mins         ASSESSMENT/PLAN     GOALS        Goals                                          Progress Note due by 5/23/2025                                                      Recert due by 7/22/2025   LTG by: 12 weeks Comments Date Status   Patient will have a reduction in LLE of at least 20 cm to allow more comfortable walking and  mobility.  The left lower extremity is up slightly as compared to his last measurements 4/23/2025 Ongoing   Patient will be independent with specific HEP for lymphedema He is able to move around much better. 6/20/23 Met   He will have no s/s infection.  No signs or symptoms of infection 11/16/23 Met   He will have no complaint of increased edema in the groin. Very minimal edema left groin 4/23/2025 Partially met   Patient will be independent with all tools available to manage his LLE lymphedema. He has more options for compression now that he had the Reduction Kits. 5/15/25 Ongoing   He will be able to complete 5 rotations with both legs on the Unicam bike with the pedal lengths adjusted He is able to do this but the seat is high and he is hiking the L hip to get the LLE around. 7/31/24 Met        Assessment/Plan     ASSESSMENT: Patient has had a slight increase in size of the left lower extremity as compared to his last measurements.  Patient did have an increased urine output last night because he took a Lasix, he did weigh more yesterday than normal.  Because of this patient is most likely down in size as compared to yesterday.  He is working with the reduction kit and PAC band on his left lower extremity and foot, he does adjust the straps as needed for more compression.    Compression needed:   Reduction Kit L thigh   Reduction Kit L Knee    Reduction Kit lower L leg   PAC Band L foot      PLAN:   continue with CDT will see 1 time a week x 8 weeks.      SIGNATURE: Elida Sequeira PTA, [place], [place] License #: [id number]  Electronically Signed on 5/15/2025        [address] [place] [address]. [address]  [phone]

## 2025-05-22 ENCOUNTER — HOSPITAL ENCOUNTER (OUTPATIENT)
Dept: PHYSICAL THERAPY | Facility: HOSPITAL | Age: 55
Setting detail: THERAPIES SERIES
Discharge: HOME | End: 2025-05-22
Payer: MEDICARE

## 2025-05-22 DIAGNOSIS — I89.0 LYMPHEDEMA OF LEFT LEG: Primary | ICD-10-CM

## 2025-05-22 DIAGNOSIS — C64.1 RENAL CELL CARCINOMA OF RIGHT KIDNEY: ICD-10-CM

## 2025-05-22 DIAGNOSIS — I89.0 LYMPHEDEMA OF GENITALIA: ICD-10-CM

## 2025-05-22 PROCEDURE — 97140 MANUAL THERAPY 1/> REGIONS: CPT

## 2025-05-22 NOTE — THERAPY TREATMENT NOTE
Progress Note Addendum    Patient: Holland Phelps           : 1970  Visit Date: 2025  Referring practitioner: GILBERTO Ornelas  Date of Initial Visit: Type: THERAPY  Episode: LLE lymphedema s/p cellulitis and I&D    Visit Diagnoses:    ICD-10-CM ICD-9-CM   1. Lymphedema of left leg  I89.0 457.1   2. Lymphedema of genitalia  I89.0 457.1   3. Renal cell carcinoma of right kidney  C64.1 189.0          Clinical Progress: improved  Home Program Compliance: Yes  Progress toward previous goals: Partially Met  Prognosis to achieve goals: good    Objective     Assessment & Plan       Assessment  Impairments: lacks appropriate home exercise program   Other impairment: lymphedema  Prognosis: good    Plan  Therapy options: will be seen for skilled therapy services  Planned therapy interventions: soft tissue mobilization, manual therapy, therapeutic activities, functional ROM exercises and home exercise program  Frequency: 1x week  Duration in weeks: 12  Treatment plan discussed with: PTA        Anticipated CPT codes: Therapeutic Exercise 93279, Manual Therapy 51285, and Self Care/Home Management 02489    I reviewed the treatment and goals with Aneta Sequeira PTA and agree with the POC.    SIGNATURE: Dereck Walsh PT, License #: [id number]  Electronically Signed on 2025

## 2025-05-22 NOTE — THERAPY TREATMENT NOTE
Physical Therapy Treatment Note and 30 Day Progress Note  [address] [place] [address]    Patient: Holland Phelps                                                 Visit Date: 2025  :     1970    Referring practitioner:    GILBERTO Ornelas  Date of Initial Visit:          Type: THERAPY  Episode: LLE lymphedema s/p cellulitis and I&D      Visit Diagnoses:    ICD-10-CM ICD-9-CM   1. Lymphedema of left leg  I89.0 457.1   2. Lymphedema of genitalia  I89.0 457.1   3. 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       PAIN: 4/10 numbness         OBJECTIVE     Objective    Lymphedema       Row Name [id number]             Subjective Pain    Able to rate subjective pain? yes  -AL      Pre-Treatment Pain Level 4  -AL      Post-Treatment Pain Level 4  -AL         Lymphedema Measurements    Measurement Type(s) Circumferential  -AL      Circumferential Areas Lower extremities  -AL         BLE Circumferential (cm)    Measurement Location 1 BOT  -AL      Left 1 21.7 cm  -AL      Measurement Location 2 +7  -AL      Left 2 24.1 cm  -AL      Measurement Location 3 +7  -AL      Left 3 24.1 cm  -AL      Measurement Location 4 +10  -AL      Left 4 30.1 cm  -AL      Measurement Location 5 +10  -AL      Left 5 37.9 cm  -AL      Measurement Location 6 +10  -AL      Left 6 44.7 cm  -AL      Measurement Location 7 +10  -AL      Left 7 41.9 cm  -AL      Measurement Location 8 +10  -AL      Left 8 46.7 cm  -AL      Measurement Location 9 +10  -AL      Left 9 53 cm  -AL      Measurement Location 10 +10  -AL      Left 10 59.6 cm  -AL      LLE Circumferential Total 383.8 cm  -AL         RLE Circumferential (cm)    Measurement Location 1 --  -AL      Measurement Location 2 --  -AL      Measurement Location 3 --  -AL       Measurement Location 4 --  -AL      Measurement Location 5 --  -AL      Measurement Location 6 --  -AL      Measurement Location 7 --  -AL      Measurement Location 8 --  -AL      Measurement Location 9 --  -AL      Measurement Location 10 --  -AL         Manual Lymphatic Drainage    Manual Lymphatic Drainage initial sequence;opened regional lymph nodes;opened anastamoses;extremity treatment  -AL      Initial Sequence short neck;abdomen;diaphragmatic breathing  -AL      Abdomen superficial;deep  -AL      Diaphragmatic Breathing x 9 with superficial abdominals  -AL      Opened Regional Lymph Nodes axillary;inguinal  -AL      Axillary right;left  -AL      Inguinal right;left  -AL      Opened Anastamoses inguino-axillary  -AL      Inguino-Axillary right;left  -AL      Extremity Treatment MLD to full limb  -AL      MLD to Full Limb L LE  -AL      Extremity Treatment Focus On Worked in supine and prone. The spring roller over the anterior and posterior left lower extremity.  -AL      Manual Lymphatic Drainage Comments Also did bilateral hamstring and hip flexor stretch, left quad stretch in prone.  -AL      Manual Therapy 70  -AL         Compression/Skin Care    Compression/Skin Care compression garment  -AL      Compression/Skin Care Comments He donned his compression thigh high  -AL                User Key  (r) = Recorded By, (t) = Taken By, (c) = Cosigned By      Initials Name Provider Type    AL Elida Sequeira, PTA, CLT-LANNY Physical Therapist Assistant                          Therapy Education/Self Care [id number]   Education offered today Continue to wear compression day and night.     [employer] Code    Ongoing HEP   Work on CDT and use the pump   Timed Minutes        Total Timed Treatment:     70   mins  Total Time of Visit:             70   mins         ASSESSMENT/PLAN     GOALS        Goals                                          Progress Note due by 6/21/2025                                                       Recert due by 7/22/2025   LTG by: 12 weeks Comments Date Status   Patient will have a reduction in LLE of at least 20 cm to allow more comfortable walking and mobility.  No significant change in the size of the left lower extremity as compared to his last treatment but he has had a good reduction overall in the past month. 5/22/2025 Ongoing   Patient will be independent with specific HEP for lymphedema He is able to move around much better. 6/20/23 Met   He will have no s/s infection.  No signs or symptoms of infection 11/16/23 Met   He will have no complaint of increased edema in the groin. Patient has dense tissue present in the left groin area today. 5/22/25 Ongoing   Patient will be independent with all tools available to manage his LLE lymphedema. Patient has good fitting compression, he has several options for compression garments. 5/22/2025 Met   He will be able to complete 5 rotations with both legs on the Unicam bike with the pedal lengths adjusted He is able to do this but the seat is high and he is hiking the L hip to get the LLE around. 7/31/24 Met        Assessment/Plan     ASSESSMENT: Patient has had a an increase of 1.2 cm as compared to his last measurements but he has had a good reduction in the past month.  Patient has been diagnosed with congestive heart failure and is going to see the his doctor tomorrow, he worries about taking Lasix due to having just 1 kidney.  Patient has a variety of compression garments from ready to wear 20 to 30 mmHg thigh-high's, and elastic garments for the entire left lower extremity, and nighttime garments for the left lower extremity.  Patient also works on using the Flexitouch pump for the left lower extremity.  Since patient has options for both day and night he no longer has to use the short stretch bandages to wrap the left lower extremity.  Overall the left lower extremity is less dense than in past treatments.  Patient still does have swelling if he is up on  his leg for several hours during the day but this is a big improvement as compared to the past few months.    Compression needed:   Reduction Kit L thigh   Reduction Kit L Knee    Reduction Kit lower L leg   PAC Band L foot      PLAN:   continue with CDT will see 1 time a week x 8 weeks.      SIGNATURE: Elida Sequeira PTA, [place], [place] License #: [id number]  Electronically Signed on 5/22/2025        [address] [place] [address]. [address]  [phone]

## 2025-05-28 ENCOUNTER — HOSPITAL ENCOUNTER (OUTPATIENT)
Dept: PHYSICAL THERAPY | Facility: HOSPITAL | Age: 55
Setting detail: THERAPIES SERIES
Discharge: HOME | End: 2025-05-28
Payer: MEDICARE

## 2025-05-28 DIAGNOSIS — C64.1 RENAL CELL CARCINOMA OF RIGHT KIDNEY: ICD-10-CM

## 2025-05-28 DIAGNOSIS — I89.0 LYMPHEDEMA OF GENITALIA: ICD-10-CM

## 2025-05-28 DIAGNOSIS — I89.0 LYMPHEDEMA OF LEFT LEG: Primary | ICD-10-CM

## 2025-05-28 PROCEDURE — 97140 MANUAL THERAPY 1/> REGIONS: CPT

## 2025-05-28 NOTE — THERAPY TREATMENT NOTE
Physical Therapy Treatment Note  [address] [place] [address]    Patient: Holland Phelps                                                 Visit Date: 2025  :     1970    Referring practitioner:    GILBERTO Ornelas  Date of Initial Visit:          Type: THERAPY  Episode: LLE lymphedema s/p cellulitis and I&D      Visit Diagnoses:    ICD-10-CM ICD-9-CM   1. Lymphedema of left leg  I89.0 457.1   2. Lymphedema of genitalia  I89.0 457.1   3. Renal cell carcinoma of right kidney  C64.1 189.0     SUBJECTIVE     Subjective:  His BP and his weight are both up and down.  He wore the night sleeve last night and his leg was down, after driving here his leg is up some.       PAIN: 4/10 numbness         OBJECTIVE     Objective    Lymphedema       Row Name 25 [id number]             Subjective Pain    Able to rate subjective pain? yes  -AL      Pre-Treatment Pain Level 4  -AL      Post-Treatment Pain Level 4  -AL         Manual Lymphatic Drainage    Manual Lymphatic Drainage initial sequence;opened regional lymph nodes;opened anastamoses;extremity treatment  -AL      Initial Sequence short neck;abdomen;diaphragmatic breathing  -AL      Abdomen superficial;deep  -AL      Diaphragmatic Breathing x 9 with superficial abdominals  -AL      Opened Regional Lymph Nodes axillary;inguinal  -AL      Axillary right;left  -AL      Inguinal right;left  -AL      Opened Anastamoses inguino-axillary  -AL      Inguino-Axillary right;left  -AL      Extremity Treatment MLD to full limb  -AL      MLD to Full Limb L LE  -AL      Extremity Treatment Focus On Worked in supine and prone. The spring roller over the anterior and posterior left lower extremity.  -AL      Manual Lymphatic Drainage Comments Also did bilateral hamstring and hip flexor stretch, left quad stretch in prone.  -AL      Manual Therapy 55  -AL         Compression/Skin Care    Compression/Skin  Care compression garment  -AL      Compression/Skin Care Comments He donned his compression thigh high  -AL                User Key  (r) = Recorded By, (t) = Taken By, (c) = Cosigned By      Initials Name Provider Type    Elida Pickering, KEILA, SAHIL Physical Therapist Assistant                            Therapy Education/Self Care [id number]   Education offered today Continue to wear compression day and night.     [employer] Code    Ongoing HEP   Work on CDT and use the pump   Timed Minutes        Total Timed Treatment:     55   mins  Total Time of Visit:             55   mins         ASSESSMENT/PLAN     GOALS        Goals                                          Progress Note due by 6/21/2025                                                      Recert due by 7/22/2025   LTG by: 12 weeks Comments Date Status   Patient will have a reduction in LLE of at least 20 cm to allow more comfortable walking and mobility.  No significant change in the size of the left lower extremity as compared to his last treatment but he has had a good reduction overall in the past month. 5/22/2025 Ongoing   Patient will be independent with specific HEP for lymphedema He is able to move around much better. 6/20/23 Met   He will have no s/s infection.  No signs or symptoms of infection 11/16/23 Met   He will have no complaint of increased edema in the groin. Patient has dense tissue present in the left groin area today. 5/28/25 Ongoing   Patient will be independent with all tools available to manage his LLE lymphedema. Patient has good fitting compression, he has several options for compression garments. 5/22/2025 Met   He will be able to complete 5 rotations with both legs on the Unicam bike with the pedal lengths adjusted He is able to do this but the seat is high and he is hiking the L hip to get the LLE around. 7/31/24 Met        Assessment/Plan     ASSESSMENT: Patient continues to use the various compression garments he has both day and  night.  Overall the left lower extremity is softer than it was a few months ago.  Patient does still have a slight amount of swelling in the left groin.    PLAN:   continue with CDT will see 1 time a week x 8 weeks.      SIGNATURE: Elida Sequeira PTA, [place], [place] License #: [id number]  Electronically Signed on 5/28/2025        [address] [place] [address]. [address]  [phone]

## 2025-06-05 ENCOUNTER — APPOINTMENT (OUTPATIENT)
Dept: PHYSICAL THERAPY | Facility: HOSPITAL | Age: 55
End: 2025-06-05
Payer: MEDICARE

## 2025-06-12 ENCOUNTER — HOSPITAL ENCOUNTER (OUTPATIENT)
Dept: PHYSICAL THERAPY | Facility: HOSPITAL | Age: 55
Setting detail: THERAPIES SERIES
Discharge: HOME | End: 2025-06-12
Payer: MEDICARE

## 2025-06-12 DIAGNOSIS — I89.0 LYMPHEDEMA OF LEFT LEG: Primary | ICD-10-CM

## 2025-06-12 DIAGNOSIS — C64.1 RENAL CELL CARCINOMA OF RIGHT KIDNEY: ICD-10-CM

## 2025-06-12 DIAGNOSIS — I89.0 LYMPHEDEMA OF GENITALIA: ICD-10-CM

## 2025-06-12 PROCEDURE — 97110 THERAPEUTIC EXERCISES: CPT

## 2025-06-12 PROCEDURE — 97140 MANUAL THERAPY 1/> REGIONS: CPT

## 2025-06-12 NOTE — THERAPY TREATMENT NOTE
Physical Therapy Treatment Note  [address] [place] [address]    Patient: Holland Phelps                                                 Visit Date: 2025  :     1970    Referring practitioner:    GILBERTO Ornelas  Date of Initial Visit:          Type: THERAPY  Episode: LLE lymphedema s/p cellulitis and I&D      Visit Diagnoses:    ICD-10-CM ICD-9-CM   1. Lymphedema of left leg  I89.0 457.1   2. Lymphedema of genitalia  I89.0 457.1   3. Renal cell carcinoma of right kidney  C64.1 189.0     SUBJECTIVE     Subjective:  He has had increase pain R knee from his arthritis. No pain in the L leg, just tightness and stiffness in the L leg. 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       PAIN: 0/10 numbness tight         OBJECTIVE     Objective    Lymphedema       Row Name 25 0755             Subjective Pain    Able to rate subjective pain? yes  -AL      Pre-Treatment Pain Level 0  -AL      Post-Treatment Pain Level 0  -AL         Manual Lymphatic Drainage    Manual Lymphatic Drainage initial sequence;opened regional lymph nodes;opened anastamoses;extremity treatment  -AL      Initial Sequence short neck;abdomen;diaphragmatic breathing  -AL      Abdomen superficial;deep  -AL      Diaphragmatic Breathing x 9 with superficial abdominals  -AL      Opened Regional Lymph Nodes axillary;inguinal  -AL      Axillary right;left  -AL      Inguinal right;left  -AL      Opened Anastamoses inguino-axillary  -AL      Inguino-Axillary right;left  -AL      Extremity Treatment MLD to full limb  -AL      MLD to Full Limb L LE  -AL      Extremity Treatment Focus On Worked in supine and prone. The spring roller over the anterior and posterior left lower extremity.  -AL      Manual Lymphatic Drainage Comments Used the hand held massager to L LE  and L hip.  Also spring roller to the anterior and posterior L LE.  -AL      Manual Therapy 75  -AL         Compression/Skin Care    Compression/Skin Care compression garment  -AL      Compression/Skin Care Comments He donned his compression thigh high  -AL                User Key  (r) = Recorded By, (t) = Taken By, (c) = Cosigned By      Initials Name Provider Type    Elida Pickering, PTA, CLT-LANNY Physical Therapist Assistant                        Therapeutic Exercises    97436 Units Comments   Stretched B hamstrings     Stretch L quads in prone     L hip flexor stretch in supine with gentle quad stretch               Timed Minutes 10          Therapy Education/Self Care 16533   Education offered today Continue to wear compression day and night.     [employer] Code    Ongoing HEP   Work on CDT and use the pump   Timed Minutes        Total Timed Treatment:     85   mins  Total Time of Visit:             85   mins         ASSESSMENT/PLAN     GOALS        Goals                                          Progress Note due by 6/21/2025                                                      Recert due by 7/22/2025   LTG by: 12 weeks Comments Date Status   Patient will have a reduction in LLE of at least 20 cm to allow more comfortable walking and mobility.  Will measure next treatment 6/12/25 Ongoing   Patient will be independent with specific HEP for lymphedema He is able to move around much better. 6/20/23 Met   He will have no s/s infection.  No signs or symptoms of infection 11/16/23 Met   He will have no complaint of increased edema in the groin. Patient has dense tissue present in the left groin area today. 5/28/25 Ongoing   Patient will be independent with all tools available to manage his LLE lymphedema. Patient has good fitting compression, he has several options for compression garments. 5/22/2025 Met   He will be able to complete 5 rotations with both legs on the Unicam bike with the pedal lengths adjusted He  is able to do this but the seat is high and he is hiking the L hip to get the LLE around. 7/31/24 Met        Assessment/Plan     ASSESSMENT: Patient has help washing cars which has helped the pain in his legs as he is not on his feet as long and his is not going up and down the step ladder.  He does have more dense tissue L hip area today, I did spend extra time working on MLD to this area and also using the hand held massager.  I will measure the L LE next treatment.     PLAN:   continue with CDT will see 1 time a week x 8 weeks.      SIGNATURE: Elida Sequeira PTA, [place] License #: [id number]  Electronically Signed on 6/12/2025        [address] [place] [address]. [address]  [phone]

## 2025-06-16 ENCOUNTER — HOSPITAL ENCOUNTER (OUTPATIENT)
Dept: PHYSICAL THERAPY | Facility: HOSPITAL | Age: 55
Setting detail: THERAPIES SERIES
Discharge: HOME | End: 2025-06-16
Payer: MEDICARE

## 2025-06-16 DIAGNOSIS — C64.1 RENAL CELL CARCINOMA OF RIGHT KIDNEY: ICD-10-CM

## 2025-06-16 DIAGNOSIS — I89.0 LYMPHEDEMA OF LEFT LEG: Primary | ICD-10-CM

## 2025-06-16 DIAGNOSIS — I89.0 LYMPHEDEMA OF GENITALIA: ICD-10-CM

## 2025-06-16 PROCEDURE — 97110 THERAPEUTIC EXERCISES: CPT

## 2025-06-16 PROCEDURE — 97140 MANUAL THERAPY 1/> REGIONS: CPT

## 2025-06-16 NOTE — THERAPY TREATMENT NOTE
Progress Note Addendum    Patient: Holland Phelps           : 1970  Visit Date: 2025  Referring practitioner: GILBERTO Ornelas  Date of Initial Visit: Type: THERAPY  Episode: LLE lymphedema s/p cellulitis and I&D    Visit Diagnoses:    ICD-10-CM ICD-9-CM   1. Lymphedema of left leg  I89.0 457.1   2. Lymphedema of genitalia  I89.0 457.1   3. Renal cell carcinoma of right kidney  C64.1 189.0          Clinical Progress: improved  Home Program Compliance: Yes  Progress toward previous goals: Partially Met  Prognosis to achieve goals: good    Objective     Assessment & Plan       Assessment  Impairments: lacks appropriate home exercise program   Other impairment: lymphedema  Prognosis: good    Plan  Therapy options: will be seen for skilled therapy services  Planned therapy interventions: soft tissue mobilization, manual therapy, therapeutic activities, functional ROM exercises and home exercise program  Frequency: 1x week  Duration in weeks: 12  Treatment plan discussed with: PTA        Anticipated CPT codes: Therapeutic Exercise 98333, Manual Therapy 49875, and Self Care/Home Management 15294    I reviewed the treatment and goals with Aneta Sequeira PTA and agree with the POC.    SIGNATURE: Dereck Walsh PT, License #: [id number]  Electronically Signed on 2025

## 2025-06-16 NOTE — THERAPY TREATMENT NOTE
Physical Therapy Treatment Note and 30 Day Progress Note  [address] [place] [address]    Patient: Holland Phelps                                                 Visit Date: 2025  :     1970    Referring practitioner:    GILBERTO Ornelas  Date of Initial Visit:          Type: THERAPY  Episode: LLE lymphedema s/p cellulitis and I&D      Visit Diagnoses:    ICD-10-CM ICD-9-CM   1. Lymphedema of left leg  I89.0 457.1   2. Lymphedema of genitalia  I89.0 457.1   3. Renal cell carcinoma of right kidney  C64.1 189.0     SUBJECTIVE     Subjective:  He went to the ER last Friday because he was short of breath and tired.  He was put on IV Lasix and was admitted staying one night.        PAIN: 0/10 numbness tight         OBJECTIVE     Objective    Lymphedema       Row Name 25 0700             Subjective Pain    Able to rate subjective pain? yes  -AL      Pre-Treatment Pain Level 0  -AL      Post-Treatment Pain Level 0  -AL         BLE Circumferential (cm)    Measurement Location 1 BOT  -AL      Left 1 21.9 cm  -AL      Measurement Location 2 +7  -AL      Left 2 23.9 cm  -AL      Measurement Location 3 +7  -AL      Left 3 23.7 cm  -AL      Measurement Location 4 +10  -AL      Left 4 29.7 cm  -AL      Measurement Location 5 +10  -AL      Left 5 37 cm  -AL      Measurement Location 6 +10  -AL      Left 6 42.6 cm  -AL      Measurement Location 7 +10  -AL      Left 7 41.5 cm  -AL      Measurement Location 8 +10  -AL      Left 8 45.9 cm  -AL      Measurement Location 9 +10  -AL      Left 9 53.6 cm  -AL      Measurement Location 10 +10  -AL      Left 10 59.9 cm  -AL      LLE Circumferential Total 379.7 cm  -AL         Manual Lymphatic Drainage    Manual Lymphatic Drainage initial sequence;opened regional lymph nodes;opened anastamoses;extremity treatment  -AL      Initial Sequence short neck;abdomen;diaphragmatic breathing  -AL       Abdomen superficial;deep  -AL      Diaphragmatic Breathing x 9 with superficial abdominals  -AL      Opened Regional Lymph Nodes axillary;inguinal  -AL      Axillary right;left  -AL      Inguinal right;left  -AL      Opened Anastamoses inguino-axillary  -AL      Inguino-Axillary right;left  -AL      Extremity Treatment MLD to full limb  -AL      MLD to Full Limb L LE  -AL      Extremity Treatment Focus On Worked in supine and prone. The spring roller over the anterior and posterior left lower extremity.  -AL      Manual Lymphatic Drainage Comments Used the hand held massager to L LE and L hip. Also spring roller to the anterior and posterior L LE.  -AL      Manual Therapy 45  -AL         Compression/Skin Care    Compression/Skin Care compression garment  -AL      Compression/Skin Care Comments He donned his compression thigh high  -AL                User Key  (r) = Recorded By, (t) = Taken By, (c) = Cosigned By      Initials Name Provider Type    Elida Pickering PTA, SAHIL Physical Therapist Assistant                          Therapeutic Exercises    69470 Units Comments   Stretched B hamstrings     Stretch L quads in prone     L hip flexor stretch in supine with gentle quad stretch     R hip flexor stretch in supine          Timed Minutes 10          Therapy Education/Self Care 61279   Education offered today Continue to wear compression day and night.     [employer] Code    Ongoing HEP   Work on CDT and use the pump   Timed Minutes        Total Timed Treatment:     55   mins  Total Time of Visit:             55   mins         ASSESSMENT/PLAN     GOALS        Goals                                          Progress Note due by 7/15/2025                                                      Recert due by 7/22/2025   LT by: 12 weeks Comments Date Status   Patient will have a reduction in LLE of at least 20 cm to allow more comfortable walking and mobility.  He has had a 4.1 cm reduction in the L LE. 6/16/25  Ongoing   Patient will be independent with specific HEP for lymphedema He is able to move around much better. 6/20/23 Met   He will have no s/s infection.  No signs or symptoms of infection 11/16/23 Met   He will have no complaint of increased edema in the groin. Dense tissue is softening 6/16/25 Ongoing   Patient will be independent with all tools available to manage his LLE lymphedema. Patient has good fitting compression, he has several options for compression garments. 5/22/2025 Met   He will be able to complete 5 rotations with both legs on the Unicam bike with the pedal lengths adjusted He is able to do this but the seat is high and he is hiking the L hip to get the LLE around. 7/31/24 Met        Assessment/Plan     ASSESSMENT: Patient has had a reduction of 4.1 cm in the L LE as compared to his last measurements. The L leg in general is much softer.  He has been having issues with CHF which affects his activity level.  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  He will contact his DrPriti In [place] about possibly increasing his Lasix.     PLAN:   continue with CDT will see 1 time a week x 8 weeks.      SIGNATURE: Elida Sequeira PTA, [facility] [place] License #: [id number]  Electronically Signed on 6/16/2025        [address] [place] [address]. [address]  [phone]

## 2025-06-23 ENCOUNTER — APPOINTMENT (OUTPATIENT)
Dept: PHYSICAL THERAPY | Facility: HOSPITAL | Age: 55
End: 2025-06-23
Payer: MEDICARE

## 2025-06-30 ENCOUNTER — HOSPITAL ENCOUNTER (OUTPATIENT)
Dept: PHYSICAL THERAPY | Facility: HOSPITAL | Age: 55
Setting detail: THERAPIES SERIES
Discharge: HOME | End: 2025-06-30
Payer: MEDICARE

## 2025-06-30 DIAGNOSIS — C64.1 RENAL CELL CARCINOMA OF RIGHT KIDNEY: ICD-10-CM

## 2025-06-30 DIAGNOSIS — I89.0 LYMPHEDEMA OF LEFT LEG: Primary | ICD-10-CM

## 2025-06-30 DIAGNOSIS — I89.0 LYMPHEDEMA OF GENITALIA: ICD-10-CM

## 2025-06-30 PROCEDURE — 97140 MANUAL THERAPY 1/> REGIONS: CPT

## 2025-06-30 PROCEDURE — 97110 THERAPEUTIC EXERCISES: CPT

## 2025-06-30 NOTE — THERAPY TREATMENT NOTE
Physical Therapy Treatment Note  [address] [place] [address]    Patient: Holland Phelps                                                 Visit Date: 2025  :     1970    Referring practitioner:    GILBERTO Ornelas  Date of Initial Visit:          Type: THERAPY  Episode: LLE lymphedema s/p cellulitis and I&D      Visit Diagnoses:    ICD-10-CM ICD-9-CM   1. Lymphedema of left leg  I89.0 457.1   2. Lymphedema of genitalia  I89.0 457.1   3. Renal cell carcinoma of right kidney  C64.1 189.0     SUBJECTIVE     Subjective:  He has to go to [place] this week, his wife was told the R knee is deteriorating.  He thinks the apolinar in the femur is ok.       PAIN: 0/10 numbness tight         OBJECTIVE     Objective    Lymphedema       Row Name 25 0700             Subjective Pain    Able to rate subjective pain? yes  -AL      Pre-Treatment Pain Level 0  -AL      Post-Treatment Pain Level 0  -AL         Manual Lymphatic Drainage    Manual Lymphatic Drainage initial sequence;opened regional lymph nodes;opened anastamoses;extremity treatment  -AL      Initial Sequence short neck;abdomen;diaphragmatic breathing  -AL      Abdomen superficial;deep  -AL      Diaphragmatic Breathing x 9 with superficial abdominals  -AL      Opened Regional Lymph Nodes axillary;inguinal  -AL      Axillary right;left  -AL      Inguinal right;left  -AL      Opened Anastamoses inguino-axillary  -AL      Inguino-Axillary right;left  -AL      Extremity Treatment MLD to full limb  -AL      MLD to Full Limb L LE  -AL      Extremity Treatment Focus On Worked in supine and prone. The spring roller over the anterior and posterior left lower extremity.  -AL      Manual Lymphatic Drainage Comments Used the hand held massager to L LE and L hip. Also spring roller to the anterior and posterior L LE.  -AL      Manual Therapy 45  -AL         Compression/Skin Care     Compression/Skin Care compression garment  -AL      Compression/Skin Care Comments He donned the compression thigh high  -AL                User Key  (r) = Recorded By, (t) = Taken By, (c) = Cosigned By      Initials Name Provider Type    Elida Pickering, PTA, CLT-LANNY Physical Therapist Assistant                            Therapeutic Exercises    92477 Units Comments   Stretched B hamstrings     L hip flexor stretch in supine with gentle quad stretch     R hip flexor stretch in supine          Timed Minutes 10          Therapy Education/Self Care 27355   Education offered today Continue to wear compression day and night.     Medbridge Code    Ongoing HEP   Work on CDT and use the pump   Timed Minutes        Total Timed Treatment:     55   mins  Total Time of Visit:             55   mins         ASSESSMENT/PLAN     GOALS        Goals                                          Progress Note due by 7/15/2025                                                      Recert due by 7/22/2025   LTG by: 12 weeks Comments Date Status   Patient will have a reduction in LLE of at least 20 cm to allow more comfortable walking and mobility.  He has had a 4.1 cm reduction in the L LE. 6/16/25 Ongoing   Patient will be independent with specific HEP for lymphedema He is able to move around much better. 6/20/23 Met   He will have no s/s infection.  No signs or symptoms of infection 11/16/23 Met   He will have no complaint of increased edema in the groin. Dense tissue continues to  softening 6/30/25 Ongoing   Patient will be independent with all tools available to manage his LLE lymphedema. Patient has good fitting compression, he has several options for compression garments. 5/22/2025 Met   He will be able to complete 5 rotations with both legs on the Unicam bike with the pedal lengths adjusted He is able to do this but the seat is high and he is hiking the L hip to get the LLE around. 7/31/24 Met        Assessment/Plan     ASSESSMENT:  Patient is having pain in the right knee which is causing an antalgic gait on the right side, he will see his doctor in [place] this week about the knee pain.  The leg is softer today around the ankle and the posterior aspect of the left lower extremity.  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    PLAN:   continue with CDT will see 1 time a week x 8 weeks.      SIGNATURE: Elida Sequeira PTA, [place], [place] License #: [id number]  Electronically Signed on 6/30/2025        [address] [place] [address]. [address]  [phone]

## 2025-07-09 ENCOUNTER — HOSPITAL ENCOUNTER (OUTPATIENT)
Dept: PHYSICAL THERAPY | Facility: HOSPITAL | Age: 55
Setting detail: THERAPIES SERIES
Discharge: HOME | End: 2025-07-09
Payer: MEDICARE

## 2025-07-09 DIAGNOSIS — I89.0 LYMPHEDEMA OF GENITALIA: ICD-10-CM

## 2025-07-09 DIAGNOSIS — I89.0 LYMPHEDEMA OF LEFT LEG: Primary | ICD-10-CM

## 2025-07-09 DIAGNOSIS — C64.1 RENAL CELL CARCINOMA OF RIGHT KIDNEY: ICD-10-CM

## 2025-07-09 PROCEDURE — 97110 THERAPEUTIC EXERCISES: CPT

## 2025-07-09 PROCEDURE — 97140 MANUAL THERAPY 1/> REGIONS: CPT

## 2025-07-09 NOTE — THERAPY TREATMENT NOTE
Physical Therapy Treatment Note, 30 Day Progress Note, and 90 Day Recertification Note  [address] [place] [address]    Patient: Holland Phelps                                                 Visit Date: 2025  :     1970    Referring practitioner:    GILBERTO Ornelas  Date of Initial Visit:          Type: THERAPY  Episode: LLE lymphedema s/p cellulitis and I&D      Visit Diagnoses:    ICD-10-CM ICD-9-CM   1. Lymphedema of left leg  I89.0 457.1   2. Lymphedema of genitalia  I89.0 457.1   3. Renal cell carcinoma of right kidney  C64.1 189.0     SUBJECTIVE     Subjective: Patient states he did see his doctor in [place], patient and his doctor discussed that at this time it would not be in patient's best interest to do surgery on the right knee from the arthritis.  Patient's doctor does want patient to use a straight cane for ambulation to take pressure off of the right knee.  Patient states he is interested in getting another thigh-high for the left lower extremity.      PAIN: 0/10 numbness tight         OBJECTIVE     Objective    Lymphedema       Row Name 25 0800             Subjective Pain    Able to rate subjective pain? yes  -AL      Pre-Treatment Pain Level 0  -AL      Post-Treatment Pain Level 0  -AL         BLE Circumferential (cm)    Measurement Location 1 BOT  -AL      Left 1 22 cm  -AL      Measurement Location 2 +7  -AL      Left 2 24 cm  -AL      Measurement Location 3 +7  -AL      Left 3 27 cm  -AL      Measurement Location 4 +10  -AL      Left 4 29.5 cm  -AL      Measurement Location 5 +10  -AL      Left 5 37.9 cm  -AL      Measurement Location 6 +10  -AL      Left 6 44.1 cm  -AL      Measurement Location 7 +10  -AL      Left 7 41.6 cm  -AL      Measurement Location 8 +10  -AL      Left 8 45.5 cm  -AL      Measurement Location 9 +10  -AL      Left 9 52.6 cm  -AL      Measurement Location 10 +10  -AL      Left  10 59 cm  -AL      LLE Circumferential Total 383.2 cm  -AL         Manual Lymphatic Drainage    Manual Lymphatic Drainage initial sequence;opened regional lymph nodes;opened anastamoses;extremity treatment  -AL      Initial Sequence short neck;abdomen;diaphragmatic breathing  -AL      Abdomen superficial;deep  -AL      Diaphragmatic Breathing x 9 with superficial abdominals  -AL      Opened Regional Lymph Nodes axillary;inguinal  -AL      Axillary right;left  -AL      Inguinal right;left  -AL      Opened Anastamoses inguino-axillary  -AL      Inguino-Axillary right;left  -AL      Extremity Treatment MLD to full limb  -AL      MLD to Full Limb L LE  -AL      Extremity Treatment Focus On Worked in supine and prone. The spring roller over the anterior and posterior left lower extremity.  -AL      Manual Lymphatic Drainage Comments Used the hand held massager to L LE and L hip. Also spring roller to the anterior and posterior L LE.  -AL      Manual Therapy 75  -AL         Compression/Skin Care    Compression/Skin Care compression garment  -AL      Compression/Skin Care Comments He donned the compression thigh high  -AL                User Key  (r) = Recorded By, (t) = Taken By, (c) = Cosigned By      Initials Name Provider Type    Elida Pickering, PTA, CLT-LANNY Physical Therapist Assistant                      Therapeutic Exercises    51881 Units Comments   Stretched B hamstrings     L hip flexor stretch in supine with gentle quad stretch     Worked on proper gait pattern with the straight cane          Timed Minutes 10          Therapy Education/Self Care 20618   Education offered today Continue to wear compression day and night.     Medbridge Code    Ongoing HEP   Work on CDT and use the pump   Timed Minutes        Total Timed Treatment:     85   mins  Total Time of Visit:             85   mins         ASSESSMENT/PLAN     GOALS        Goals                                          Progress Note due by 8/8/2025                                                       Recert due by 10/7/2025   LT by: 12 weeks Comments Date Status   Patient will have a reduction in LLE of at least 20 cm to allow more comfortable walking and mobility.  He has had an increase in size in the L LE of 3.5 cm as compared to his last treatment. 7/9/25 Ongoing   Patient will be independent with specific HEP for lymphedema He is able to move around much better. 6/20/23 Met   He will have no s/s infection.  No signs or symptoms of infection 11/16/23 Met   He will have no complaint of increased edema in the groin. Very minimal edema in the left groin 7/9/25 Ongoing   Patient will be independent with all tools available to manage his LLE lymphedema. Patient has good fitting compression, he has several options for compression garments. 5/22/2025 Met   He will be able to complete 5 rotations with both legs on the Unicam bike with the pedal lengths adjusted He is able to do this but the seat is high and he is hiking the L hip to get the LLE around. 7/31/24 Met        Assessment/Plan     ASSESSMENT: Patient has had an increase of 3.5 cm in the L LE as compared to his last treatment.  Patient continues to use various types of compression garments to keep the swelling down in the left lower extremity.  He has not had to use the Flexitouch pump for a while.  Patient does have more swelling in the left ankle and the left calf, I did spend more time working on MLD to this area.  We also worked on proper gait pattern with the straight cane today.  Functionally patient is having a little more difficulty with ambulating using the cane, I do believe this is awkward to him.  In the long run it will help keep some pressure off of the right knee.  The cane he is currently using does have a small base to it, he may try a straight cane without the base to see if this is less awkward.      PLAN:   continue with CDT will see 1 time a week x 8 weeks.      SIGNATURE: Elida Sequeira,  [place] License #: [id number]  Electronically Signed on 7/9/2025        [address] [place] [address]  [phone]

## 2025-07-18 ENCOUNTER — HOSPITAL ENCOUNTER (OUTPATIENT)
Dept: PHYSICAL THERAPY | Facility: HOSPITAL | Age: 55
Setting detail: THERAPIES SERIES
Discharge: HOME | End: 2025-07-18
Payer: MEDICARE

## 2025-07-18 DIAGNOSIS — I89.0 LYMPHEDEMA OF GENITALIA: ICD-10-CM

## 2025-07-18 DIAGNOSIS — C64.1 RENAL CELL CARCINOMA OF RIGHT KIDNEY: ICD-10-CM

## 2025-07-18 DIAGNOSIS — I89.0 LYMPHEDEMA OF LEFT LEG: Primary | ICD-10-CM

## 2025-07-18 PROCEDURE — 97110 THERAPEUTIC EXERCISES: CPT

## 2025-07-18 PROCEDURE — 97140 MANUAL THERAPY 1/> REGIONS: CPT

## 2025-07-18 NOTE — THERAPY TREATMENT NOTE
Physical Therapy Treatment Note  [address] [place] [address]    Patient: Holland Phelps                                                 Visit Date: 2025  :     1970    Referring practitioner:    GILBERTO Ornelas  Date of Initial Visit:          Type: THERAPY  Episode: LLE lymphedema s/p cellulitis and I&D      Visit Diagnoses:    ICD-10-CM ICD-9-CM   1. Lymphedema of left leg  I89.0 457.1   2. Lymphedema of genitalia  I89.0 457.1   3. Renal cell carcinoma of right kidney  C64.1 189.0     SUBJECTIVE     Subjective: Patient states he has a tibial plateau fracture in the R knee, at this time his Dr. Does not think there is anything that could be done except for surgery.  The doctor does not think that surgery would be a good thing for patient at this time.      PAIN: 0/10 numbness tight         OBJECTIVE     Objective    Lymphedema       Row Name 25 0900             Subjective Pain    Able to rate subjective pain? yes  -AL      Pre-Treatment Pain Level 0  -AL      Post-Treatment Pain Level 0  -AL         Manual Lymphatic Drainage    Manual Lymphatic Drainage initial sequence;opened regional lymph nodes;opened anastamoses;extremity treatment  -AL      Initial Sequence short neck;abdomen;diaphragmatic breathing  -AL      Abdomen superficial;deep  -AL      Diaphragmatic Breathing x 9 with superficial abdominals  -AL      Opened Regional Lymph Nodes axillary;inguinal  -AL      Axillary right;left  -AL      Inguinal right;left  -AL      Opened Anastamoses inguino-axillary  -AL      Inguino-Axillary right;left  -AL      Extremity Treatment MLD to full limb  -AL      MLD to Full Limb L LE  -AL      Extremity Treatment Focus On Worked in supine and prone. The spring roller over the anterior and posterior left lower extremity.  -AL      Manual Lymphatic Drainage Comments Used the hand held massager to L LE and L hip. Also spring  roller to the anterior and posterior L LE.  -AL      Manual Therapy 60  -AL         Compression/Skin Care    Compression/Skin Care compression garment  -AL      Compression/Skin Care Comments He donned the compression thigh high  -AL                User Key  (r) = Recorded By, (t) = Taken By, (c) = Cosigned By      Initials Name Provider Type    Elida Pickering, PTA, CLT-LANNY Physical Therapist Assistant                        Therapeutic Exercises    88861 Units Comments   Stretched B hamstrings     L hip flexor stretch in supine with gentle quad stretch               Timed Minutes 10          Therapy Education/Self Care 76623   Education offered today Continue to wear compression day and night.     Medbridge Code    Ongoing HEP   Work on CDT and use the pump   Timed Minutes        Total Timed Treatment:     70   mins  Total Time of Visit:             70   mins         ASSESSMENT/PLAN     GOALS        Goals                                          Progress Note due by 8/8/2025                                                      Recert due by 10/7/2025   LTG by: 12 weeks Comments Date Status   Patient will have a reduction in LLE of at least 20 cm to allow more comfortable walking and mobility.  He has had an increase in size in the L LE of 3.5 cm as compared to his last treatment. 7/9/25 Ongoing   Patient will be independent with specific HEP for lymphedema He is able to move around much better. 6/20/23 Met   He will have no s/s infection.  No signs or symptoms of infection 11/16/23 Met   He will have no complaint of increased edema in the groin. Very minimal edema in the left groin 7/9/25 Ongoing   Patient will be independent with all tools available to manage his LLE lymphedema. Patient is waiting on a new compression thigh-high which is on backorder. 7/18/2025 Ongoing   He will be able to complete 5 rotations with both legs on the Unicam bike with the pedal lengths adjusted He is able to do this but the seat  is high and he is hiking the L hip to get the LLE around. 7/31/24 Met        Assessment/Plan     ASSESSMENT: I did check the status of patient's compression thigh-high for the left lower extremity, it is on backorder until 7/29/2025.  Patient did find out from his doctor in [place] that he has a tibial plateau fracture, he does not think anything will be done for it at this time.  Patient continues to use a cane with ambulation.      PLAN:   continue with CDT will see 1 time a week x 8 weeks.      SIGNATURE: Elida Sequeira PTA, [place], [place] License #: [id number]  Electronically Signed on 7/18/2025        [address] [place] [address]. [address]  [phone]

## 2025-07-25 ENCOUNTER — APPOINTMENT (OUTPATIENT)
Dept: PHYSICAL THERAPY | Facility: HOSPITAL | Age: 55
End: 2025-07-25
Payer: MEDICARE

## 2025-08-01 ENCOUNTER — HOSPITAL ENCOUNTER (OUTPATIENT)
Dept: PHYSICAL THERAPY | Facility: HOSPITAL | Age: 55
Setting detail: THERAPIES SERIES
Discharge: HOME | End: 2025-08-01
Payer: MEDICARE

## 2025-08-01 DIAGNOSIS — C64.1 RENAL CELL CARCINOMA OF RIGHT KIDNEY: ICD-10-CM

## 2025-08-01 DIAGNOSIS — I89.0 LYMPHEDEMA OF GENITALIA: ICD-10-CM

## 2025-08-01 DIAGNOSIS — I89.0 LYMPHEDEMA OF LEFT LEG: Primary | ICD-10-CM

## 2025-08-01 PROCEDURE — 97140 MANUAL THERAPY 1/> REGIONS: CPT | Performed by: PHYSICAL THERAPIST

## 2025-08-01 NOTE — THERAPY TREATMENT NOTE
Physical Therapy Treatment Note  [address] [place] [address]    Patient: Holland Phelps                                                 Visit Date: 2025  :     1970    Referring practitioner:    GILBERTO Ornelas  Date of Initial Visit:          Type: THERAPY  Episode: LLE lymphedema s/p cellulitis and I&D      Visit Diagnoses:    ICD-10-CM ICD-9-CM   1. Lymphedema of left leg  I89.0 457.1   2. Lymphedema of genitalia  I89.0 457.1   3. Renal cell carcinoma of right kidney  C64.1 189.0     SUBJECTIVE     Subjective: Patient was in the hospital for like 6 days for congestive heart failure.  They changed some of his medicines.  His leg has been staying down well for the past 2 to 3 days.    PAIN: 0/10 numbness tight         OBJECTIVE     Objective    Lymphedema       Row Name 25 0800             Subjective Pain    Able to rate subjective pain? yes  -HR      Pre-Treatment Pain Level 0  -HR         BLE Circumferential (cm)    Measurement Location 1 BOT  -HR      Left 1 23 cm  -HR      Measurement Location 2 +7  -HR      Left 2 24.2 cm  -HR      Measurement Location 3 +7  -HR      Left 3 25.2 cm  -HR      Measurement Location 4 +10  -HR      Left 4 27.5 cm  -HR      Measurement Location 5 +10  -HR      Left 5 36 cm  -HR      Measurement Location 6 +10  -HR      Left 6 43.3 cm  -HR      Measurement Location 7 +10  -HR      Left 7 42.4 cm  -HR      Measurement Location 8 +10  -HR      Left 8 46.8 cm  -HR      Measurement Location 9 +10  -HR      Left 9 52.4 cm  -HR      Measurement Location 10 +10  -HR      Left 10 58.2 cm  -HR      LLE Circumferential Total 379 cm  -HR         Manual Lymphatic Drainage    Manual Lymphatic Drainage initial sequence;opened regional lymph nodes;opened anastamoses;extremity treatment  -HR      Initial Sequence short neck;abdomen;diaphragmatic breathing  -HR      Abdomen superficial;deep  -HR       Opened Regional Lymph Nodes axillary;inguinal  -HR      Axillary right;left  -HR      Inguinal right;left  -HR      Opened Anastamoses inguino-axillary  -HR      Inguino-Axillary right;left  -HR      Extremity Treatment MLD to full limb  -HR      MLD to Full Limb LLE  -HR      Extremity Treatment Focus On extra time to the post thigh and knee in prone  -HR      Manual Lymphatic Drainage Comments Used hand held massager to popliteal space and used roller to anterior thigh and post calf and thigh. Gentle quad stretch in prone  -HR      Manual Therapy 70  -HR         Compression/Skin Care    Compression/Skin Care compression garment  -HR                User Key  (r) = Recorded By, (t) = Taken By, (c) = Cosigned By      Initials Name Provider Type    HR Debbie Morales, PT, DPT, CLT-LANNY Physical Therapist                        Therapy Education/Self Care [id number]   Education offered today Continue to wear compression day and night.     Medbridge Code    Ongoing HEP   Work on CDT and use the pump   Timed Minutes        Total Timed Treatment:     70   mins  Total Time of Visit:             70   mins         ASSESSMENT/PLAN     GOALS        Goals                                          Progress Note due by 8/8/2025                                                      Recert due by 10/7/2025   LTG by: 12 weeks Comments Date Status   Patient will have a reduction in LLE of at least 20 cm to allow more comfortable walking and mobility.  Down more today 8/1/25 Ongoing   Patient will be independent with specific HEP for lymphedema He is able to move around much better. 6/20/23 Met   He will have no s/s infection.  No signs or symptoms of infection 8/1/25 Met   He will have no complaint of increased edema in the groin. Very minimal edema in the left groin 7/9/25 Ongoing   Patient will be independent with all tools available to manage his LLE lymphedema. Patient is waiting on a new compression thigh-high which is on  backorder. 8/1/25 Ongoing   He will be able to complete 5 rotations with both legs on the Unicam bike with the pedal lengths adjusted He is able to do this but the seat is high and he is hiking the L hip to get the LLE around. 7/31/24 Met        Assessment/Plan     ASSESSMENT: He has been in the hospital and was diuresed and then did require some IV fluids put back in his system due to his kidney numbers going down.  One of the medications he was taking may have been increasing the swelling in the leg and he was taken off of that.  Even with him doing some activities outside in the heat, the leg has not swelled up to the extent we would have expected.  Measurements taken today show the biggest reduction in the calf. He is using a cane for the R knee pain and I let him try a forearm crutch today to see if he felt any better with it. He is going to look into getting that as he felt like it gave more stability and let him stand up straighter.       PLAN:   continue with CDT will see 1 time a week x 8 weeks.      SIGNATURE: Debbie Morales, PT, DPT, [place] License #: [id number]  Electronically Signed on 8/1/2025        [address] [place] [address] [place] [address]  [phone]

## 2025-08-27 ENCOUNTER — HOSPITAL ENCOUNTER (OUTPATIENT)
Dept: PHYSICAL THERAPY | Facility: HOSPITAL | Age: 55
Setting detail: THERAPIES SERIES
Discharge: HOME | End: 2025-08-27
Payer: MEDICARE

## 2025-08-27 DIAGNOSIS — I89.0 LYMPHEDEMA OF GENITALIA: ICD-10-CM

## 2025-08-27 DIAGNOSIS — C64.1 RENAL CELL CARCINOMA OF RIGHT KIDNEY: ICD-10-CM

## 2025-08-27 DIAGNOSIS — I89.0 LYMPHEDEMA OF LEFT LEG: Primary | ICD-10-CM

## 2025-08-27 PROCEDURE — 97110 THERAPEUTIC EXERCISES: CPT

## 2025-08-27 PROCEDURE — 97140 MANUAL THERAPY 1/> REGIONS: CPT

## (undated) DEVICE — GLV SURG DERMASSURE GRN LF PF 8.0

## (undated) DEVICE — ATLAS® GOLD PTA DILATATION CATHETER 12 MM X 40 MM, 80 CM CATHETER: Brand: ATLAS® GOLD

## (undated) DEVICE — GLV SURG SENSICARE W/ALOE PF LF 7.5 STRL

## (undated) DEVICE — DORADO® PTA BALLOON DILATATION CATHETER 10 MM X 40 MM, 80 CM CATHETER: Brand: DORADO®

## (undated) DEVICE — CATH IMG IVUS VISIONS .035 DIG 8.2F

## (undated) DEVICE — PINNACLE R/O II INTRODUCER SHEATH WITH RADIOPAQUE MARKER: Brand: PINNACLE

## (undated) DEVICE — DRSNG SURESITE WNDW 4X4.5

## (undated) DEVICE — BAPTIST TURNOVER KIT: Brand: MEDLINE INDUSTRIES, INC.

## (undated) DEVICE — Device

## (undated) DEVICE — IMMOB KN 3PNL DLX CANVS 22IN BLU

## (undated) DEVICE — CLOTTRIEVER CATHETER, 16 MM, 105 CM: Brand: CLOTTRIEVER CATHETER, 16 MM

## (undated) DEVICE — NAVICROSS SUPPORT CATHETER: Brand: NAVICROSS

## (undated) DEVICE — PAD ENDOVASCULAR: Brand: MEDLINE INDUSTRIES, INC.

## (undated) DEVICE — RADIFOCUS GLIDEWIRE ADVANTAGE GUIDEWIRE: Brand: GLIDEWIRE ADVANTAGE

## (undated) DEVICE — INFLATION DEVICE: Brand: ENCORE™ 26

## (undated) DEVICE — CLOTTRIEVER SHEATH, 16 FR, 15 CM LENGTH: Brand: CLOTTRIEVER SHEATH,  16 FR

## (undated) DEVICE — BG BANDED WRUBBER BAND AND TP 36X54IN

## (undated) DEVICE — PROXIMATE RH ROTATING HEAD SKIN STAPLERS (35 WIDE) CONTAINS 35 STAINLESS STEEL STAPLES: Brand: PROXIMATE

## (undated) DEVICE — ST MIC/INTRO ACC SHRP/NDL TUNG/TP NITNL 5F 45CM 7CM